# Patient Record
Sex: MALE | Race: WHITE | NOT HISPANIC OR LATINO | Employment: OTHER | URBAN - NONMETROPOLITAN AREA
[De-identification: names, ages, dates, MRNs, and addresses within clinical notes are randomized per-mention and may not be internally consistent; named-entity substitution may affect disease eponyms.]

---

## 2017-04-20 ENCOUNTER — HOSPITAL ENCOUNTER (EMERGENCY)
Facility: HOSPITAL | Age: 52
Discharge: HOME | End: 2017-04-20
Attending: FAMILY MEDICINE | Admitting: FAMILY MEDICINE

## 2017-04-20 ENCOUNTER — APPOINTMENT (OUTPATIENT)
Dept: GENERAL RADIOLOGY | Facility: HOSPITAL | Age: 52
End: 2017-04-20

## 2017-04-20 ENCOUNTER — APPOINTMENT (OUTPATIENT)
Dept: CT IMAGING | Facility: HOSPITAL | Age: 52
End: 2017-04-20

## 2017-04-20 VITALS
BODY MASS INDEX: 36.57 KG/M2 | SYSTOLIC BLOOD PRESSURE: 146 MMHG | DIASTOLIC BLOOD PRESSURE: 64 MMHG | OXYGEN SATURATION: 96 % | RESPIRATION RATE: 18 BRPM | HEART RATE: 86 BPM | TEMPERATURE: 98.4 F | HEIGHT: 72 IN | WEIGHT: 270 LBS

## 2017-04-20 DIAGNOSIS — N39.0 UTI (URINARY TRACT INFECTION), BACTERIAL: ICD-10-CM

## 2017-04-20 DIAGNOSIS — N30.00 ACUTE CYSTITIS WITHOUT HEMATURIA: Primary | ICD-10-CM

## 2017-04-20 DIAGNOSIS — K76.0 STEATOSIS OF LIVER: ICD-10-CM

## 2017-04-20 DIAGNOSIS — A49.9 UTI (URINARY TRACT INFECTION), BACTERIAL: ICD-10-CM

## 2017-04-20 DIAGNOSIS — K80.20 CALCULUS OF GALLBLADDER WITHOUT CHOLECYSTITIS WITHOUT OBSTRUCTION: ICD-10-CM

## 2017-04-20 DIAGNOSIS — N41.9 PROSTATITIS, UNSPECIFIED PROSTATITIS TYPE: ICD-10-CM

## 2017-04-20 LAB
ALBUMIN SERPL-MCNC: 3.7 G/DL (ref 3.5–5)
ALBUMIN/GLOB SERPL: 1.1 G/DL (ref 1.1–2.5)
ALP SERPL-CCNC: 88 U/L (ref 24–120)
ALT SERPL W P-5'-P-CCNC: 49 U/L (ref 0–54)
ANION GAP SERPL CALCULATED.3IONS-SCNC: 14 MMOL/L (ref 4–13)
AST SERPL-CCNC: 52 U/L (ref 7–45)
BACTERIA UR QL AUTO: ABNORMAL /HPF
BASOPHILS # BLD AUTO: 0.04 10*3/MM3 (ref 0–0.2)
BASOPHILS NFR BLD AUTO: 0.2 % (ref 0–2)
BILIRUB SERPL-MCNC: 0.4 MG/DL (ref 0.1–1)
BILIRUB UR QL STRIP: NEGATIVE
BUN BLD-MCNC: 17 MG/DL (ref 5–21)
BUN/CREAT SERPL: 19.8 (ref 7–25)
CALCIUM SPEC-SCNC: 10.6 MG/DL (ref 8.4–10.4)
CHLORIDE SERPL-SCNC: 97 MMOL/L (ref 98–110)
CLARITY UR: CLEAR
CO2 SERPL-SCNC: 27 MMOL/L (ref 24–31)
COLOR UR: YELLOW
CREAT BLD-MCNC: 0.86 MG/DL (ref 0.5–1.4)
DEPRECATED RDW RBC AUTO: 41.5 FL (ref 40–54)
EOSINOPHIL # BLD AUTO: 0.14 10*3/MM3 (ref 0–0.7)
EOSINOPHIL NFR BLD AUTO: 0.8 % (ref 0–4)
ERYTHROCYTE [DISTWIDTH] IN BLOOD BY AUTOMATED COUNT: 12.6 % (ref 12–15)
GFR SERPL CREATININE-BSD FRML MDRD: 95 ML/MIN/1.73
GLOBULIN UR ELPH-MCNC: 3.5 GM/DL
GLUCOSE BLD-MCNC: 318 MG/DL (ref 70–100)
GLUCOSE UR STRIP-MCNC: ABNORMAL MG/DL
HCT VFR BLD AUTO: 36.1 % (ref 40–52)
HGB BLD-MCNC: 12.7 G/DL (ref 14–18)
HGB UR QL STRIP.AUTO: NEGATIVE
HOLD SPECIMEN: NORMAL
HOLD SPECIMEN: NORMAL
HYALINE CASTS UR QL AUTO: ABNORMAL /LPF
IMM GRANULOCYTES # BLD: 0.15 10*3/MM3 (ref 0–0.03)
IMM GRANULOCYTES NFR BLD: 0.9 % (ref 0–5)
KETONES UR QL STRIP: NEGATIVE
LEUKOCYTE ESTERASE UR QL STRIP.AUTO: ABNORMAL
LIPASE SERPL-CCNC: 129 U/L (ref 23–203)
LYMPHOCYTES # BLD AUTO: 2.79 10*3/MM3 (ref 0.72–4.86)
LYMPHOCYTES NFR BLD AUTO: 16.1 % (ref 15–45)
MCH RBC QN AUTO: 32.2 PG (ref 28–32)
MCHC RBC AUTO-ENTMCNC: 35.2 G/DL (ref 33–36)
MCV RBC AUTO: 91.4 FL (ref 82–95)
MONOCYTES # BLD AUTO: 1.67 10*3/MM3 (ref 0.19–1.3)
MONOCYTES NFR BLD AUTO: 9.6 % (ref 4–12)
NEUTROPHILS # BLD AUTO: 12.57 10*3/MM3 (ref 1.87–8.4)
NEUTROPHILS NFR BLD AUTO: 72.4 % (ref 39–78)
NITRITE UR QL STRIP: NEGATIVE
PH UR STRIP.AUTO: 6 [PH] (ref 5–8)
PLATELET # BLD AUTO: 248 10*3/MM3 (ref 130–400)
PMV BLD AUTO: 11.9 FL (ref 6–12)
POTASSIUM BLD-SCNC: 4.3 MMOL/L (ref 3.5–5.3)
PROT SERPL-MCNC: 7.2 G/DL (ref 6.3–8.7)
PROT UR QL STRIP: NEGATIVE
RBC # BLD AUTO: 3.95 10*6/MM3 (ref 4.8–5.9)
RBC # UR: ABNORMAL /HPF
REF LAB TEST METHOD: ABNORMAL
SODIUM BLD-SCNC: 138 MMOL/L (ref 135–145)
SP GR UR STRIP: 1.03 (ref 1–1.03)
SQUAMOUS #/AREA URNS HPF: ABNORMAL /HPF
UROBILINOGEN UR QL STRIP: ABNORMAL
WBC NRBC COR # BLD: 17.36 10*3/MM3 (ref 4.8–10.8)
WBC UR QL AUTO: ABNORMAL /HPF
WHOLE BLOOD HOLD SPECIMEN: NORMAL
WHOLE BLOOD HOLD SPECIMEN: NORMAL

## 2017-04-20 PROCEDURE — 0 IOPAMIDOL PER 1 ML: Performed by: FAMILY MEDICINE

## 2017-04-20 PROCEDURE — 96365 THER/PROPH/DIAG IV INF INIT: CPT

## 2017-04-20 PROCEDURE — 87077 CULTURE AEROBIC IDENTIFY: CPT | Performed by: FAMILY MEDICINE

## 2017-04-20 PROCEDURE — 25010000002 ONDANSETRON PER 1 MG: Performed by: FAMILY MEDICINE

## 2017-04-20 PROCEDURE — 99284 EMERGENCY DEPT VISIT MOD MDM: CPT

## 2017-04-20 PROCEDURE — 74177 CT ABD & PELVIS W/CONTRAST: CPT

## 2017-04-20 PROCEDURE — 87186 SC STD MICRODIL/AGAR DIL: CPT | Performed by: FAMILY MEDICINE

## 2017-04-20 PROCEDURE — 25010000002 MORPHINE PER 10 MG: Performed by: FAMILY MEDICINE

## 2017-04-20 PROCEDURE — 25010000002 CEFTRIAXONE: Performed by: FAMILY MEDICINE

## 2017-04-20 PROCEDURE — 81001 URINALYSIS AUTO W/SCOPE: CPT | Performed by: FAMILY MEDICINE

## 2017-04-20 PROCEDURE — 83690 ASSAY OF LIPASE: CPT | Performed by: FAMILY MEDICINE

## 2017-04-20 PROCEDURE — 80053 COMPREHEN METABOLIC PANEL: CPT | Performed by: FAMILY MEDICINE

## 2017-04-20 PROCEDURE — 87086 URINE CULTURE/COLONY COUNT: CPT | Performed by: FAMILY MEDICINE

## 2017-04-20 PROCEDURE — 36415 COLL VENOUS BLD VENIPUNCTURE: CPT | Performed by: FAMILY MEDICINE

## 2017-04-20 PROCEDURE — 96375 TX/PRO/DX INJ NEW DRUG ADDON: CPT

## 2017-04-20 PROCEDURE — 85025 COMPLETE CBC W/AUTO DIFF WBC: CPT | Performed by: FAMILY MEDICINE

## 2017-04-20 RX ORDER — SODIUM CHLORIDE 0.9 % (FLUSH) 0.9 %
10 SYRINGE (ML) INJECTION AS NEEDED
Status: DISCONTINUED | OUTPATIENT
Start: 2017-04-20 | End: 2017-04-21 | Stop reason: HOSPADM

## 2017-04-20 RX ORDER — SODIUM CHLORIDE 9 MG/ML
500 INJECTION, SOLUTION INTRAVENOUS ONCE
Status: COMPLETED | OUTPATIENT
Start: 2017-04-20 | End: 2017-04-20

## 2017-04-20 RX ORDER — SODIUM CHLORIDE 9 MG/ML
1000 INJECTION, SOLUTION INTRAVENOUS ONCE
Status: COMPLETED | OUTPATIENT
Start: 2017-04-20 | End: 2017-04-20

## 2017-04-20 RX ORDER — MORPHINE SULFATE 4 MG/ML
4 INJECTION, SOLUTION INTRAMUSCULAR; INTRAVENOUS ONCE
Status: COMPLETED | OUTPATIENT
Start: 2017-04-20 | End: 2017-04-20

## 2017-04-20 RX ORDER — CIPROFLOXACIN 500 MG/1
500 TABLET, FILM COATED ORAL 2 TIMES DAILY
Qty: 56 TABLET | Refills: 0 | Status: SHIPPED | OUTPATIENT
Start: 2017-04-20 | End: 2017-05-18

## 2017-04-20 RX ORDER — ONDANSETRON 2 MG/ML
4 INJECTION INTRAMUSCULAR; INTRAVENOUS ONCE
Status: COMPLETED | OUTPATIENT
Start: 2017-04-20 | End: 2017-04-20

## 2017-04-20 RX ADMIN — SODIUM CHLORIDE 500 ML: 0.9 INJECTION, SOLUTION INTRAVENOUS at 19:55

## 2017-04-20 RX ADMIN — ONDANSETRON 4 MG: 2 INJECTION INTRAMUSCULAR; INTRAVENOUS at 19:17

## 2017-04-20 RX ADMIN — CEFTRIAXONE 1 G: 1 INJECTION, POWDER, FOR SOLUTION INTRAMUSCULAR; INTRAVENOUS at 19:20

## 2017-04-20 RX ADMIN — MORPHINE SULFATE 4 MG: 4 INJECTION, SOLUTION INTRAMUSCULAR; INTRAVENOUS at 19:17

## 2017-04-20 RX ADMIN — IOPAMIDOL 100 ML: 755 INJECTION, SOLUTION INTRAVENOUS at 21:10

## 2017-04-20 RX ADMIN — SODIUM CHLORIDE 1000 ML: 9 INJECTION, SOLUTION INTRAVENOUS at 19:21

## 2017-04-21 LAB — BACTERIA SPEC AEROBE CULT: ABNORMAL

## 2017-04-24 ENCOUNTER — TELEPHONE (OUTPATIENT)
Dept: EMERGENCY DEPT | Facility: HOSPITAL | Age: 52
End: 2017-04-24

## 2017-04-24 NOTE — TELEPHONE ENCOUNTER
----- Message from SOBIA Barnett sent at 4/24/2017 11:35 AM CDT -----  Stop cipro, change to Keflex 500mg PO BID x 7 days. Thanks

## 2017-04-25 ENCOUNTER — TELEPHONE (OUTPATIENT)
Dept: EMERGENCY DEPT | Facility: HOSPITAL | Age: 52
End: 2017-04-25

## 2017-04-25 NOTE — TELEPHONE ENCOUNTER
----- Message from Mervin Epps MD sent at 4/25/2017  7:30 AM CDT -----  There is a possibility of 1 st generation resistance so start omnicef 300 mg po bid x 10 days and dc the rest

## 2017-04-27 ENCOUNTER — TELEPHONE (OUTPATIENT)
Dept: EMERGENCY DEPT | Facility: HOSPITAL | Age: 52
End: 2017-04-27

## 2017-07-17 ENCOUNTER — HOSPITAL ENCOUNTER (EMERGENCY)
Facility: HOSPITAL | Age: 52
Discharge: HOME | End: 2017-07-17
Attending: EMERGENCY MEDICINE | Admitting: EMERGENCY MEDICINE

## 2017-07-17 VITALS
TEMPERATURE: 97.8 F | SYSTOLIC BLOOD PRESSURE: 107 MMHG | DIASTOLIC BLOOD PRESSURE: 68 MMHG | HEART RATE: 80 BPM | BODY MASS INDEX: 33.18 KG/M2 | HEIGHT: 72 IN | WEIGHT: 245 LBS | RESPIRATION RATE: 18 BRPM | OXYGEN SATURATION: 95 %

## 2017-07-17 DIAGNOSIS — R51.9 CHRONIC NONINTRACTABLE HEADACHE, UNSPECIFIED HEADACHE TYPE: Primary | ICD-10-CM

## 2017-07-17 DIAGNOSIS — G89.29 CHRONIC NONINTRACTABLE HEADACHE, UNSPECIFIED HEADACHE TYPE: Primary | ICD-10-CM

## 2017-07-17 PROCEDURE — 25010000002 KETOROLAC TROMETHAMINE PER 15 MG: Performed by: EMERGENCY MEDICINE

## 2017-07-17 PROCEDURE — 25010000002 PROMETHAZINE PER 50 MG: Performed by: EMERGENCY MEDICINE

## 2017-07-17 PROCEDURE — 99283 EMERGENCY DEPT VISIT LOW MDM: CPT

## 2017-07-17 PROCEDURE — 96372 THER/PROPH/DIAG INJ SC/IM: CPT

## 2017-07-17 RX ORDER — KETOROLAC TROMETHAMINE 30 MG/ML
60 INJECTION, SOLUTION INTRAMUSCULAR; INTRAVENOUS ONCE
Status: COMPLETED | OUTPATIENT
Start: 2017-07-17 | End: 2017-07-17

## 2017-07-17 RX ORDER — PROMETHAZINE HYDROCHLORIDE 25 MG/ML
25 INJECTION, SOLUTION INTRAMUSCULAR; INTRAVENOUS ONCE
Status: COMPLETED | OUTPATIENT
Start: 2017-07-17 | End: 2017-07-17

## 2017-07-17 RX ADMIN — PROMETHAZINE HYDROCHLORIDE 25 MG: 25 INJECTION INTRAMUSCULAR; INTRAVENOUS at 10:59

## 2017-07-17 RX ADMIN — KETOROLAC TROMETHAMINE 60 MG: 30 INJECTION, SOLUTION INTRAMUSCULAR at 10:59

## 2017-07-17 NOTE — ED PROVIDER NOTES
Subjective   Patient is a 51 y.o. male presenting with migraines.   Migraine   Pain location:  Generalized  Quality:  Dull  Radiates to:  Does not radiate  Severity currently:  9/10  Severity at highest:  9/10  Onset quality:  Gradual  Timing:  Constant  Progression:  Worsening  Chronicity:  Chronic  Context: not activity, not exposure to bright light, not caffeine, not coughing, not defecating, not eating, not stress, not exposure to cold air, not intercourse, not loud noise and not straining    Relieved by:  Nothing  Worsened by:  Nothing  Ineffective treatments:  None tried  Associated symptoms: no abdominal pain, no back pain, no blurred vision, no congestion, no cough, no diarrhea, no dizziness, no drainage, no eye pain, no facial pain, no fatigue, no fever, no hearing loss, no loss of balance, no myalgias, no nausea, no near-syncope, no neck pain, no neck stiffness, no numbness, no photophobia, no seizures, no sinus pressure, no swollen glands, no syncope, no visual change, no vomiting and no weakness    Risk factors: no anger, no family hx of SAH and does not have insomnia        Review of Systems   Constitutional: Negative.  Negative for chills, fatigue and fever.   HENT: Negative.  Negative for congestion, hearing loss, postnasal drip and sinus pressure.    Eyes: Negative for blurred vision, photophobia and pain.   Respiratory: Negative.  Negative for cough, chest tightness and stridor.    Cardiovascular: Negative.  Negative for chest pain, syncope and near-syncope.   Gastrointestinal: Negative.  Negative for abdominal distention, abdominal pain, diarrhea, nausea and vomiting.   Endocrine: Negative.    Genitourinary: Negative.  Negative for difficulty urinating and flank pain.   Musculoskeletal: Negative.  Negative for back pain, myalgias, neck pain and neck stiffness.   Skin: Negative.  Negative for color change.   Neurological: Negative.  Negative for dizziness, seizures, weakness, numbness, headaches and  loss of balance.   All other systems reviewed and are negative.      Past Medical History:   Diagnosis Date   • Allergic rhinitis    • Anxiety    • Bursitis    • DDD (degenerative disc disease), cervical    • Depression    • HIV disease    • HLD (hyperlipidemia)    • HTN (hypertension)    • Osteoporosis    • Type 2 diabetes mellitus        Allergies   Allergen Reactions   • Amitriptyline    • Darvon [Propoxyphene]    • Zithromax [Azithromycin]        Past Surgical History:   Procedure Laterality Date   • ANKLE SURGERY     • FOREARM SURGERY     • LYMPH NODE BIOPSY         Family History   Problem Relation Age of Onset   • Diabetes Mother    • Hypertension Mother    • Thyroid disease Mother    • Hypertension Father    • Thyroid disease Father    • Diabetes Maternal Grandfather    • Heart disease Maternal Grandfather    • Hypertension Maternal Grandfather    • Diabetes Paternal Grandmother    • Stroke Paternal Grandmother    • Heart disease Paternal Grandmother    • Hypertension Paternal Grandmother    • Thyroid disease Paternal Grandmother    • Hypertension Paternal Grandfather        Social History     Social History   • Marital status:      Spouse name: N/A   • Number of children: N/A   • Years of education: N/A     Social History Main Topics   • Smoking status: Current Every Day Smoker     Packs/day: 1.00     Types: Cigarettes   • Smokeless tobacco: Never Used   • Alcohol use No   • Drug use: Defer   • Sexual activity: Not Asked     Other Topics Concern   • None     Social History Narrative           Objective   Physical Exam   Constitutional: He is oriented to person, place, and time. He appears well-developed and well-nourished.   HENT:   Head: Normocephalic and atraumatic.   Eyes: Conjunctivae and EOM are normal. Pupils are equal, round, and reactive to light.   Neck: Normal range of motion. Neck supple.   Cardiovascular: Normal rate, regular rhythm, normal heart sounds and intact distal pulses.     Pulmonary/Chest: Effort normal and breath sounds normal.   Abdominal: Soft. Bowel sounds are normal.   Musculoskeletal: Normal range of motion.   Neurological: He is alert and oriented to person, place, and time. He has normal reflexes.   Skin: Skin is warm and dry.   Psychiatric: He has a normal mood and affect.   Nursing note and vitals reviewed.      Procedures         ED Course  ED Course   Comment By Time   Does not want ct or lp or tyler wants toradol and dc Mervin Epps MD 07/17 6779                  [facility]    Final diagnoses:   Chronic nonintractable headache, unspecified headache type            Mervin Epps MD  07/18/17 6517

## 2017-07-24 ENCOUNTER — TRANSCRIBE ORDERS (OUTPATIENT)
Dept: ADMINISTRATIVE | Facility: HOSPITAL | Age: 52
End: 2017-07-24

## 2017-07-24 ENCOUNTER — APPOINTMENT (OUTPATIENT)
Dept: LAB | Facility: HOSPITAL | Age: 52
End: 2017-07-24

## 2017-07-24 DIAGNOSIS — Z79.899 POLYPHARMACY: Primary | ICD-10-CM

## 2017-07-24 LAB
AMPHET+METHAMPHET UR QL: NEGATIVE
BARBITURATES UR QL SCN: NEGATIVE
BENZODIAZ UR QL SCN: NEGATIVE
CANNABINOIDS SERPL QL: NEGATIVE
COCAINE UR QL: NEGATIVE
METHADONE UR QL SCN: NEGATIVE
OPIATES UR QL: NEGATIVE
PCP UR QL SCN: NEGATIVE

## 2017-07-24 PROCEDURE — 80307 DRUG TEST PRSMV CHEM ANLYZR: CPT | Performed by: PSYCHIATRY & NEUROLOGY

## 2017-10-04 RX ORDER — M-VIT,TX,IRON,MINS/CALC/FOLIC 27MG-0.4MG
1 TABLET ORAL DAILY
COMMUNITY
End: 2019-10-01 | Stop reason: SDUPTHER

## 2017-10-04 RX ORDER — ESOMEPRAZOLE MAGNESIUM 40 MG/1
40 CAPSULE, DELAYED RELEASE ORAL
COMMUNITY
End: 2018-11-08 | Stop reason: SDUPTHER

## 2017-10-04 RX ORDER — LOPERAMIDE HYDROCHLORIDE 2 MG/1
2 CAPSULE ORAL 4 TIMES DAILY PRN
COMMUNITY
End: 2020-02-10 | Stop reason: CLARIF

## 2017-10-04 RX ORDER — LOSARTAN POTASSIUM 100 MG/1
100 TABLET ORAL DAILY
COMMUNITY
End: 2018-01-08 | Stop reason: SDUPTHER

## 2017-10-04 RX ORDER — PREGABALIN 75 MG/1
75 CAPSULE ORAL 2 TIMES DAILY
COMMUNITY
End: 2022-06-09

## 2017-10-04 RX ORDER — TRAZODONE HYDROCHLORIDE 150 MG/1
150 TABLET ORAL NIGHTLY
COMMUNITY
End: 2018-08-15 | Stop reason: SDUPTHER

## 2017-10-04 RX ORDER — TIZANIDINE 2 MG/1
2 TABLET ORAL 4 TIMES DAILY
COMMUNITY
End: 2018-06-21 | Stop reason: SDUPTHER

## 2017-10-04 RX ORDER — LORAZEPAM 1 MG/1
1 TABLET ORAL 2 TIMES DAILY PRN
COMMUNITY
End: 2017-10-06

## 2017-10-04 RX ORDER — IBUPROFEN 800 MG/1
800 TABLET ORAL 3 TIMES DAILY
COMMUNITY
End: 2017-10-06 | Stop reason: ALTCHOICE

## 2017-10-04 RX ORDER — HYDROCODONE BITARTRATE AND ACETAMINOPHEN 5; 325 MG/1; MG/1
1 TABLET ORAL 2 TIMES DAILY PRN
COMMUNITY
End: 2022-01-11 | Stop reason: SDUPTHER

## 2017-10-04 RX ORDER — PAROXETINE HYDROCHLORIDE 40 MG/1
20 TABLET, FILM COATED ORAL EVERY MORNING
COMMUNITY
End: 2018-01-08 | Stop reason: ALTCHOICE

## 2017-10-04 RX ORDER — PROPRANOLOL HCL 60 MG
60 CAPSULE, EXTENDED RELEASE 24HR ORAL DAILY
COMMUNITY
End: 2018-01-08 | Stop reason: SDUPTHER

## 2017-10-04 RX ORDER — GABAPENTIN 600 MG/1
1200 TABLET ORAL 2 TIMES DAILY
COMMUNITY
End: 2018-01-08 | Stop reason: ALTCHOICE

## 2017-10-04 RX ORDER — RIZATRIPTAN BENZOATE 10 MG/1
10 TABLET ORAL
COMMUNITY
End: 2020-03-11 | Stop reason: SDUPTHER

## 2017-10-04 RX ORDER — ATORVASTATIN CALCIUM 40 MG/1
40 TABLET, FILM COATED ORAL DAILY
COMMUNITY
End: 2018-04-10 | Stop reason: SDUPTHER

## 2017-10-04 RX ORDER — HYDROXYZINE PAMOATE 50 MG/1
50 CAPSULE ORAL 3 TIMES DAILY PRN
COMMUNITY
End: 2018-08-31

## 2017-10-06 ENCOUNTER — TELEPHONE (OUTPATIENT)
Dept: INTERNAL MEDICINE | Age: 52
End: 2017-10-06

## 2017-10-06 ENCOUNTER — OFFICE VISIT (OUTPATIENT)
Dept: INTERNAL MEDICINE | Age: 52
End: 2017-10-06
Payer: MEDICARE

## 2017-10-06 VITALS
DIASTOLIC BLOOD PRESSURE: 64 MMHG | BODY MASS INDEX: 35.62 KG/M2 | OXYGEN SATURATION: 97 % | HEIGHT: 72 IN | WEIGHT: 263 LBS | SYSTOLIC BLOOD PRESSURE: 122 MMHG | HEART RATE: 86 BPM

## 2017-10-06 DIAGNOSIS — N39.44 BED WETTING: ICD-10-CM

## 2017-10-06 DIAGNOSIS — Z12.5 SCREENING FOR PROSTATE CANCER: ICD-10-CM

## 2017-10-06 DIAGNOSIS — R07.9 CHEST PAIN AT REST: ICD-10-CM

## 2017-10-06 DIAGNOSIS — Z79.4 TYPE 2 DIABETES MELLITUS WITHOUT COMPLICATION, WITH LONG-TERM CURRENT USE OF INSULIN (HCC): Primary | ICD-10-CM

## 2017-10-06 DIAGNOSIS — I20.8 ANGINA AT REST (HCC): ICD-10-CM

## 2017-10-06 DIAGNOSIS — E11.9 TYPE 2 DIABETES MELLITUS WITHOUT COMPLICATION, WITH LONG-TERM CURRENT USE OF INSULIN (HCC): Primary | ICD-10-CM

## 2017-10-06 DIAGNOSIS — E78.00 PURE HYPERCHOLESTEROLEMIA: ICD-10-CM

## 2017-10-06 DIAGNOSIS — I10 ESSENTIAL HYPERTENSION: ICD-10-CM

## 2017-10-06 PROBLEM — I20.89 ANGINA AT REST: Status: ACTIVE | Noted: 2017-10-06

## 2017-10-06 LAB
BACTERIA: NEGATIVE /HPF
BILIRUBIN URINE: NEGATIVE
BLOOD, URINE: NEGATIVE
CLARITY: CLEAR
COLOR: YELLOW
EPITHELIAL CELLS, UA: 3 /HPF (ref 0–5)
GLUCOSE URINE: >=1000 MG/DL
HYALINE CASTS: 0 /HPF (ref 0–8)
KETONES, URINE: NEGATIVE MG/DL
LEUKOCYTE ESTERASE, URINE: NEGATIVE
NITRITE, URINE: NEGATIVE
PH UA: 6
PROSTATE SPECIFIC ANTIGEN: 0.24 NG/ML (ref 0–4)
PROTEIN UA: NEGATIVE MG/DL
RBC UA: 0 /HPF (ref 0–4)
SPECIFIC GRAVITY UA: 1.03
UROBILINOGEN, URINE: 0.2 E.U./DL
WBC UA: 3 /HPF (ref 0–5)

## 2017-10-06 PROCEDURE — G8484 FLU IMMUNIZE NO ADMIN: HCPCS | Performed by: INTERNAL MEDICINE

## 2017-10-06 PROCEDURE — G8417 CALC BMI ABV UP PARAM F/U: HCPCS | Performed by: INTERNAL MEDICINE

## 2017-10-06 PROCEDURE — 3046F HEMOGLOBIN A1C LEVEL >9.0%: CPT | Performed by: INTERNAL MEDICINE

## 2017-10-06 PROCEDURE — 4004F PT TOBACCO SCREEN RCVD TLK: CPT | Performed by: INTERNAL MEDICINE

## 2017-10-06 PROCEDURE — G8599 NO ASA/ANTIPLAT THER USE RNG: HCPCS | Performed by: INTERNAL MEDICINE

## 2017-10-06 PROCEDURE — G8427 DOCREV CUR MEDS BY ELIG CLIN: HCPCS | Performed by: INTERNAL MEDICINE

## 2017-10-06 PROCEDURE — 99204 OFFICE O/P NEW MOD 45 MIN: CPT | Performed by: INTERNAL MEDICINE

## 2017-10-06 RX ORDER — NYSTATIN 100000 [USP'U]/G
POWDER TOPICAL
Refills: 0 | COMMUNITY
Start: 2017-09-21 | End: 2020-07-31 | Stop reason: ALTCHOICE

## 2017-10-06 RX ORDER — BACITRACIN, NEOMYCIN, POLYMYXIN B 400; 3.5; 5 [USP'U]/G; MG/G; [USP'U]/G
OINTMENT TOPICAL
COMMUNITY
End: 2017-10-06

## 2017-10-06 RX ORDER — MECLIZINE HYDROCHLORIDE 25 MG/1
TABLET ORAL
Refills: 2 | COMMUNITY
Start: 2017-09-21 | End: 2018-01-08 | Stop reason: SDUPTHER

## 2017-10-06 RX ORDER — CLONAZEPAM 0.5 MG/1
0.5 TABLET ORAL 2 TIMES DAILY PRN
Qty: 60 TABLET | Refills: 5
Start: 2017-10-06 | End: 2022-09-27 | Stop reason: ALTCHOICE

## 2017-10-06 RX ORDER — PEN NEEDLE, DIABETIC
NEEDLE, DISPOSABLE MISCELLANEOUS
Refills: 6 | COMMUNITY
Start: 2017-07-24 | End: 2020-02-25

## 2017-10-06 RX ORDER — PHENOL 1.4 %
600 AEROSOL, SPRAY (ML) MUCOUS MEMBRANE
COMMUNITY
End: 2021-02-05 | Stop reason: ALTCHOICE

## 2017-10-06 RX ORDER — FLUTICASONE PROPIONATE 50 MCG
2 SPRAY, SUSPENSION (ML) NASAL
COMMUNITY
Start: 2016-10-11 | End: 2017-10-06

## 2017-10-06 RX ORDER — PROMETHAZINE HYDROCHLORIDE 25 MG/1
25 TABLET ORAL
COMMUNITY
End: 2017-10-06

## 2017-10-06 RX ORDER — CLONAZEPAM 0.5 MG/1
0.5 TABLET ORAL
COMMUNITY
End: 2017-10-06 | Stop reason: SDUPTHER

## 2017-10-06 RX ORDER — ACETAMINOPHEN 160 MG
TABLET,DISINTEGRATING ORAL
Refills: 5 | COMMUNITY
Start: 2017-09-21 | End: 2020-02-26

## 2017-10-06 RX ORDER — IBUPROFEN 600 MG/1
TABLET ORAL
Refills: 2 | COMMUNITY
Start: 2017-09-21 | End: 2020-07-31 | Stop reason: SDUPTHER

## 2017-10-06 RX ORDER — MELATONIN
1000
COMMUNITY
End: 2017-10-06

## 2017-10-06 RX ORDER — LORATADINE 10 MG/1
TABLET ORAL
Refills: 2 | COMMUNITY
Start: 2017-09-21 | End: 2020-02-26

## 2017-10-06 RX ORDER — VARENICLINE TARTRATE 1 MG/1
1 TABLET, FILM COATED ORAL 2 TIMES DAILY
Qty: 60 TABLET | Refills: 3
Start: 2017-10-06 | End: 2019-06-17

## 2017-10-06 ASSESSMENT — PATIENT HEALTH QUESTIONNAIRE - PHQ9
SUM OF ALL RESPONSES TO PHQ QUESTIONS 1-9: 1
SUM OF ALL RESPONSES TO PHQ9 QUESTIONS 1 & 2: 1
2. FEELING DOWN, DEPRESSED OR HOPELESS: 1
1. LITTLE INTEREST OR PLEASURE IN DOING THINGS: 0

## 2017-10-06 NOTE — MR AVS SNAPSHOT
After Visit Summary             Brenda Roger   10/6/2017 8:00 AM   Office Visit    Description:  Male : 1965   Provider:  Jacob Mckenna MD   Department:  [facility]              Your Follow-Up and Future Appointments         Below is a list of your follow-up and future appointments. This may not be a complete list as you may have made appointments directly with providers that we are not aware of or your providers may have made some for you. Please call your providers to confirm appointments. It is important to keep your appointments. Please bring your current insurance card, photo ID, co-pay, and all medication bottles to your appointment. If self-pay, payment is expected at the time of service. Your To-Do List     Future Appointments Provider Department Dept Phone    2018 12:30 PM Jacob Mckenna MD [facility] [phone]    Please arrive 15 minutes prior to appointment time, bring insurance card and photo ID. Future Orders Complete By Expires    EKG 12 Lead [EKG1 Custom]  10/6/2017 10/6/2018    Psa screening [TZQ3006 Custom]  10/6/2017 10/6/2018    Urinalysis with Microscopic [ILI406 Custom]  10/6/2017 10/6/2018    NM Myocardial Spect Rest Multi [61290 Custom]  10/12/2017 10/6/2018    CBC [WVA552 Custom]  2018 10/6/2018    Comprehensive Metabolic Panel [VTZ81 Custom]  2018 10/6/2018    Hemoglobin A1C [LAB90 Custom]  2018 10/6/2018    Lipid Panel [LAB18 Custom]  2018 10/6/2018    Microalbumin / Creatinine Urine Ratio [GRA109 Custom]  2018 10/6/2018    TSH without Reflex [QSY378 Custom]  2018 10/6/2018    Follow-Up    Return in about 3 months (around 2018).          Information from Your Visit        Department     Name Address Phone Fax    [facility] [address] [phone]      You Were Seen for:         Comments    Screening for prostate cancer   [778147] TIMES DAILY    nystatin (MYCOSTATIN) 926844 UNIT/GM powder APPLY 1 GRAM ON THE SKIN 3 TIMES DAILY AS NEEDED    NASONEX 50 MCG/ACT nasal spray Use 1 spray in each nostril TWICE DAILY . ...(SHAKE WELL)    Cholecalciferol (VITAMIN D3) 2000 units CAPS TAKE 1 CAPSULE BY MOUTH DAILY    clonazePAM (KLONOPIN) 0.5 MG tablet Take 1 tablet by mouth 2 times daily as needed for Anxiety    varenicline (CHANTIX CONTINUING MONTH AMAYA) 1 MG tablet Take 1 tablet by mouth 2 times daily    insulin detemir (LEVEMIR FLEXTOUCH) 100 UNIT/ML injection pen Inject 130 Units into the skin nightly    gabapentin (NEURONTIN) 600 MG tablet Take 1,200 mg by mouth 2 times daily    Fqnaflc-Kpgwj-Sfdpfhoo-TenofAF (GENVOYA) [phone] MG TABS Take 1 tablet by mouth daily    HYDROcodone-acetaminophen (NORCO) 5-325 MG per tablet Take 1 tablet by mouth 2 times daily as needed for Pain . atorvastatin (LIPITOR) 40 MG tablet Take 40 mg by mouth daily    loperamide (IMODIUM) 2 MG capsule Take 2 mg by mouth 4 times daily as needed for Diarrhea    losartan (COZAAR) 100 MG tablet Take 100 mg by mouth daily 80 mg    pregabalin (LYRICA) 75 MG capsule Take 75 mg by mouth 2 times daily    rizatriptan (MAXALT) 10 MG tablet Take 10 mg by mouth once as needed for Migraine May repeat in 2 hours if needed    metFORMIN (GLUCOPHAGE) 500 MG tablet Take 1,000 mg by mouth 2 times daily (with meals)    Multiple Vitamins-Minerals (THERAPEUTIC MULTIVITAMIN-MINERALS) tablet Take 1 tablet by mouth daily    esomeprazole (NEXIUM) 40 MG delayed release capsule Take 40 mg by mouth every morning (before breakfast)    PARoxetine (PAXIL) 40 MG tablet Take 20 mg by mouth every morning Indications:  Take 1.5 tabs a day = 60mg  takes care of this medication    propranolol (INDERAL LA) 60 MG extended release capsule Take 60 mg by mouth daily    traZODone (DESYREL) 150 MG tablet Take 150 mg by mouth nightly hydrOXYzine (VISTARIL) 50 MG capsule Take 50 mg by mouth 3 times daily as needed for Anxiety    tiZANidine (ZANAFLEX) 2 MG tablet Take 2 mg by mouth 4 times daily     insulin aspart (NOVOLOG FLEXPEN) 100 UNIT/ML injection pen Inject into the skin 3 times daily (before meals) Indications: 70 untis with reg. meals and 80 units with large meals 60 units with snacks       Allergies              Darvon [Propoxyphene]     Elavil [Amitriptyline Hcl]     Zithromax [Azithromycin]          Additional Information        Basic Information     Date Of Birth Sex Race Ethnicity Preferred Language    1965 Male White Unavailable/Unknown English      Problem List as of 10/6/2017                 Type 2 diabetes mellitus without complication (HCC)    Hypertension    Hyperlipidemia    Bed wetting    Angina at rest [facility])    Chest pain at rest      Preventive Care        Date Due    HIV screening is recommended for all people regardless of risk factors  aged 15-65 years at least once (lifetime) who have never been HIV tested. 12/31/1982    Tetanus Combination Vaccine (1 - Tdap) 12/31/1986    Cholesterol Screening 12/31/2007    Diabetes Screening 12/31/2007    Yearly Flu Vaccine (1) 9/1/2017            MyChart Signup           Our records indicate that you have declined MyChart signup.

## 2017-10-06 NOTE — PROGRESS NOTES
Chief Complaint   Patient presents with    Established New Doctor    Diabetes     Uncontrolled needs new machine uses Lifestlye lite and talk about insulin    Sore     Pt noticed a sore on his left arm. Started off 2 months ago a zit and recently popped and left a sore        HPI:  Pt sees Dr Jennifer Heaton, Dr Emilio Pereira, Dr Javier Olivas. He did see Dr. Gus Ramsey for diabetes he sees Dr. Jennifer Heaton for HIV primary care. He is 54 he has diabetes controlled HIV infection hypertension and hyperlipidemia. Blood sugars running too high. He has some fatigue he has chronic back pain because to pain management mention several medical problems he's had some bedwetting which is new for him recently is had a little bit of erectile dysfunction recently he has had some chest pain recently and is at high risk for coronary artery disease he has fatigue back pain arthralgias we spent a good deal of time reviewing his medical care and history. Past Medical History:   Diagnosis Date    Controlled diabetes mellitus with diabetic polyneuropathy (HCC)     Depression     HIV-[facility] infection, symptomatic ([facility] [address]     Hyperlipidemia     Hypertension     Male hypogonadism     Osteopenia     Type 2 diabetes mellitus without complication ([facility] [address]        Past Surgical History:   Procedure Laterality Date    LYMPHADENECTOMY Left     thoracic       Family History   Problem Relation Age of Onset    High Blood Pressure Mother     Diabetes Mother     High Blood Pressure Father     Diabetes Maternal Grandfather        Social History     Social History    Marital status:      Spouse name: N/A    Number of children: N/A    Years of education: N/A     Occupational History    Not on file.      Social History Main Topics    Smoking status: Current Every Day Smoker    Smokeless tobacco: Never Used    Alcohol use Yes    Drug use: No    Sexual activity: Not on file     Other Topics Concern    Not on file     Social History Narrative    No narrative on daily (with meals)      Multiple Vitamins-Minerals (THERAPEUTIC MULTIVITAMIN-MINERALS) tablet Take 1 tablet by mouth daily      esomeprazole (NEXIUM) 40 MG delayed release capsule Take 40 mg by mouth every morning (before breakfast)      PARoxetine (PAXIL) 40 MG tablet Take 20 mg by mouth every morning Indications: Take 1.5 tabs a day = 60mg  takes care of this medication      propranolol (INDERAL LA) 60 MG extended release capsule Take 60 mg by mouth daily      traZODone (DESYREL) 150 MG tablet Take 150 mg by mouth nightly      hydrOXYzine (VISTARIL) 50 MG capsule Take 50 mg by mouth 3 times daily as needed for Anxiety      tiZANidine (ZANAFLEX) 2 MG tablet Take 2 mg by mouth 4 times daily       insulin aspart (NOVOLOG FLEXPEN) 100 UNIT/ML injection pen As directed 5 Pen 5    insulin detemir (LEVEMIR FLEXTOUCH) 100 UNIT/ML injection pen Inject 130 Units into the skin nightly 5 Pen 5     No current facility-administered medications for this visit. Review of Systems   Constitutional: Positive for fatigue. Negative for chills and fever. HENT: Negative for congestion and sinus pressure. Eyes: Negative for discharge and redness. Respiratory: Positive for shortness of breath. Negative for cough. Cardiovascular: Positive for chest pain and leg swelling. Negative for palpitations. Gastrointestinal: Negative for abdominal distention and abdominal pain. Endocrine: Positive for polyuria. Genitourinary: Positive for enuresis. Negative for dysuria, frequency and urgency. Erectile dysfunction   Musculoskeletal: Positive for arthralgias. Skin: Positive for wound. Negative for rash. Allergic/Immunologic: Positive for environmental allergies and immunocompromised state. Neurological: Negative for dizziness, light-headedness and headaches. Hematological: Negative for adenopathy. Does not bruise/bleed easily.    Psychiatric/Behavioral: Negative for dysphoric mood and sleep UNIT/ML injection pen  Diabetes not in good control he needs to check his blood sugar follow with diabetic diet were going to work with his insulin he has not been taking it recently. Follow up closely. We renewed his insulin strips etc.    2. Essential hypertension  Stable    3. Pure hypercholesterolemia  Lipid Panel   4. Bed wetting  Psa screening    Urinalysis with Microscopic    May be overmedication sleeping to having may be related to an infection or prostate issue we will assess    5. Angina at rest [facility])  EKG 12 Lead    We are going to get a Cat scan    6. Screening for prostate cancer  Psa screening   7.  Chest pain at rest  EKG 12 Lead    Stress Test W Pharm     Keep up to date with routine care and follow-up keep up-to-date with infectious disease physicians call with any problems or complaints a monitor

## 2017-10-11 ENCOUNTER — TRANSCRIBE ORDERS (OUTPATIENT)
Dept: ADMINISTRATIVE | Facility: HOSPITAL | Age: 52
End: 2017-10-11

## 2017-10-11 DIAGNOSIS — I20.8 ANGINAL CHEST PAIN AT REST (HCC): Primary | ICD-10-CM

## 2017-10-12 ENCOUNTER — TELEPHONE (OUTPATIENT)
Dept: INTERNAL MEDICINE | Age: 52
End: 2017-10-12

## 2017-10-13 DIAGNOSIS — Z79.4 TYPE 2 DIABETES MELLITUS WITHOUT COMPLICATION, WITH LONG-TERM CURRENT USE OF INSULIN (HCC): ICD-10-CM

## 2017-10-13 DIAGNOSIS — E11.9 TYPE 2 DIABETES MELLITUS WITHOUT COMPLICATION, WITH LONG-TERM CURRENT USE OF INSULIN (HCC): ICD-10-CM

## 2017-10-16 ENCOUNTER — TELEPHONE (OUTPATIENT)
Dept: INTERNAL MEDICINE | Age: 52
End: 2017-10-16

## 2017-10-17 ENCOUNTER — HOSPITAL ENCOUNTER (OUTPATIENT)
Dept: CARDIOLOGY | Facility: HOSPITAL | Age: 52
Discharge: HOME | End: 2017-10-17

## 2017-10-17 ENCOUNTER — TELEPHONE (OUTPATIENT)
Dept: INTERNAL MEDICINE | Age: 52
End: 2017-10-17

## 2017-10-17 VITALS — HEART RATE: 90 BPM | DIASTOLIC BLOOD PRESSURE: 105 MMHG | SYSTOLIC BLOOD PRESSURE: 144 MMHG

## 2017-10-17 DIAGNOSIS — I20.8 ANGINAL CHEST PAIN AT REST (HCC): ICD-10-CM

## 2017-10-17 PROCEDURE — 78452 HT MUSCLE IMAGE SPECT MULT: CPT | Performed by: INTERNAL MEDICINE

## 2017-10-17 PROCEDURE — A9500 TC99M SESTAMIBI: HCPCS | Performed by: INTERNAL MEDICINE

## 2017-10-17 PROCEDURE — 0 TECHNETIUM SESTAMIBI: Performed by: INTERNAL MEDICINE

## 2017-10-17 PROCEDURE — 93018 CV STRESS TEST I&R ONLY: CPT | Performed by: INTERNAL MEDICINE

## 2017-10-17 PROCEDURE — 78452 HT MUSCLE IMAGE SPECT MULT: CPT

## 2017-10-17 PROCEDURE — 93017 CV STRESS TEST TRACING ONLY: CPT

## 2017-10-17 PROCEDURE — 25010000002 REGADENOSON 0.4 MG/5ML SOLUTION: Performed by: INTERNAL MEDICINE

## 2017-10-17 RX ADMIN — REGADENOSON 0.4 MG: 0.08 INJECTION, SOLUTION INTRAVENOUS at 10:29

## 2017-10-17 RX ADMIN — TECHNETIUM TC-99M SESTAMIBI 1 DOSE: 1 INJECTION INTRAVENOUS at 10:30

## 2017-10-17 RX ADMIN — TECHNETIUM TC-99M SESTAMIBI 1 DOSE: 1 INJECTION INTRAVENOUS at 09:30

## 2017-10-17 ASSESSMENT — ENCOUNTER SYMPTOMS
SINUS PRESSURE: 0
COUGH: 0
ABDOMINAL PAIN: 0
EYE DISCHARGE: 0
ABDOMINAL DISTENTION: 0
EYE REDNESS: 0
SHORTNESS OF BREATH: 1

## 2017-10-17 NOTE — TELEPHONE ENCOUNTER
[phone], they have left a vm stating that order is incorrect and they are needing to verify what we are needing ordered

## 2017-10-18 ENCOUNTER — TELEPHONE (OUTPATIENT)
Dept: INTERNAL MEDICINE | Age: 52
End: 2017-10-18

## 2017-10-18 DIAGNOSIS — R07.9 CHEST PAIN AT REST: Primary | ICD-10-CM

## 2017-10-18 LAB
BH CV STRESS BP STAGE 1: NORMAL
BH CV STRESS COMMENTS STAGE 1: NORMAL
BH CV STRESS DOSE REGADENOSON STAGE 1: 0.4
BH CV STRESS DURATION MIN STAGE 1: 0
BH CV STRESS DURATION SEC STAGE 1: 10
BH CV STRESS HR STAGE 1: 108
BH CV STRESS PROTOCOL 1: NORMAL
BH CV STRESS RECOVERY BP: NORMAL MMHG
BH CV STRESS RECOVERY HR: 68 BPM
BH CV STRESS STAGE 1: 1
LV EF NUC BP: 70 %
MAXIMAL PREDICTED HEART RATE: 171 BPM
PERCENT MAX PREDICTED HR: 63.16 %
STRESS BASELINE BP: NORMAL MMHG
STRESS BASELINE HR: 90 BPM
STRESS PERCENT HR: 74 %
STRESS POST EXERCISE DUR SEC: 10 SEC
STRESS POST PEAK BP: NORMAL MMHG
STRESS POST PEAK HR: 108 BPM
STRESS TARGET HR: 145 BPM

## 2017-10-19 NOTE — TELEPHONE ENCOUNTER
Let him know no active area of ischemia --- it looks like he may have had a heart attack in one area that is hard to see versus artifact --- Cardiology says otherwise ok--- suggest we refer to Dr Tangela Ma for his opinion and if not taking a baby aspirin a day- take one daily

## 2017-10-25 ENCOUNTER — TELEPHONE (OUTPATIENT)
Dept: INTERNAL MEDICINE | Age: 52
End: 2017-10-25

## 2017-10-25 NOTE — TELEPHONE ENCOUNTER
[facility] wants us to know that they have an appointment for Mr. Kaveh Shannon on Friday, October 27th at 9 am with Dr. [employer]

## 2017-10-27 ENCOUNTER — OFFICE VISIT (OUTPATIENT)
Dept: CARDIOLOGY | Facility: CLINIC | Age: 52
End: 2017-10-27

## 2017-10-27 VITALS
HEIGHT: 72 IN | SYSTOLIC BLOOD PRESSURE: 130 MMHG | HEART RATE: 84 BPM | DIASTOLIC BLOOD PRESSURE: 80 MMHG | WEIGHT: 266 LBS | BODY MASS INDEX: 36.03 KG/M2

## 2017-10-27 DIAGNOSIS — R07.82 INTERCOSTAL PAIN: Primary | ICD-10-CM

## 2017-10-27 PROCEDURE — 99204 OFFICE O/P NEW MOD 45 MIN: CPT | Performed by: INTERNAL MEDICINE

## 2017-10-27 PROCEDURE — 93000 ELECTROCARDIOGRAM COMPLETE: CPT | Performed by: INTERNAL MEDICINE

## 2017-10-27 RX ORDER — VARENICLINE TARTRATE 1 MG/1
1 TABLET, FILM COATED ORAL 2 TIMES DAILY
COMMUNITY
End: 2019-07-24

## 2017-10-27 NOTE — PROGRESS NOTES
Referring Provider: Oksana Mares MD    Reason for Consultation: chest pain    Chief complaint:   Chief Complaint   Patient presents with   • Abnormal ECG     referred by Dr. Valentine for evaluation of a abnormal ekg and stress echo   • Chest Pain     happens everynight when lays stuart.  feels like a bolt of lightning that comes and goes every 30 min until he falls asleep.   • Shortness of Breath     has this with exertion.  he smokes   • Palpitations     with anxiety   • Edema     ankles   • Fatigue     gets tired easy.  has HIV       Subjective .     History of present illness:  Donis Yi is a 51 y.o. yo male with history of sharp stabbing chest pain that feels like a lightening bolt when it hits. Usually occurrs at night while in bed. He had a normal nuclear stress test last month   Chief Complaint   Patient presents with   • Abnormal ECG     referred by Dr. Valentine for evaluation of a abnormal ekg and stress echo   • Chest Pain     happens everynight when lays stuart.  feels like a bolt of lightning that comes and goes every 30 min until he falls asleep.   • Shortness of Breath     has this with exertion.  he smokes   • Palpitations     with anxiety   • Edema     ankles   • Fatigue     gets tired easy.  has HIV   .    History  Past Medical History:   Diagnosis Date   • Allergic rhinitis    • Anxiety    • Bursitis    • DDD (degenerative disc disease), cervical    • Depression    • HIV disease    • HLD (hyperlipidemia)    • HTN (hypertension)    • Myocardial infarction    • Osteoporosis    • Sleep apnea    • Type 2 diabetes mellitus    ,   Past Surgical History:   Procedure Laterality Date   • ANKLE SURGERY     • FOREARM SURGERY     • LYMPH NODE BIOPSY     ,   Family History   Problem Relation Age of Onset   • Diabetes Mother    • Hypertension Mother    • Thyroid disease Mother    • Hypertension Father    • Thyroid disease Father    • Arrhythmia Father    • Diabetes Maternal Grandfather    • Heart disease  Maternal Grandfather    • Hypertension Maternal Grandfather    • Diabetes Paternal Grandmother    • Stroke Paternal Grandmother    • Heart disease Paternal Grandmother    • Hypertension Paternal Grandmother    • Thyroid disease Paternal Grandmother    • Hypertension Paternal Grandfather    • Hypertension Sister    • No Known Problems Brother    ,   Social History   Substance Use Topics   • Smoking status: Current Every Day Smoker     Packs/day: 1.00     Types: Cigarettes     Start date: 1988   • Smokeless tobacco: Never Used   • Alcohol use Yes      Comment: occasionally   ,     Medications  Current Outpatient Prescriptions   Medication Sig Dispense Refill   • albuterol (PROVENTIL HFA;VENTOLIN HFA) 108 (90 BASE) MCG/ACT inhaler Inhale 2 puffs every 6 (six) hours as needed for wheezing.     • atorvastatin (LIPITOR) 80 MG tablet Take 80 mg by mouth daily.     • calcium carbonate (OS-THEODORA) 600 MG tablet Take 600 mg by mouth Daily.     • cholecalciferol (VITAMIN D3) 1000 UNITS tablet Take 1,000 Units by mouth daily.     • clonazePAM (KlonoPIN) 0.5 MG tablet Take 0.5 mg by mouth 3 (Three) Times a Day As Needed for Anxiety.     • Oqmhniu-Haweu-Hyutxjfc-TenofAF (GENVOYA) [phone] MG tablet Take 1 tablet by mouth daily.     • esomeprazole (NexIUM) 40 MG capsule Take 40 mg by mouth every morning before breakfast.     • fluticasone (FLONASE) 50 MCG/ACT nasal spray 2 sprays into each nostril Daily. 1 bottle 6   • gabapentin (NEURONTIN) 600 MG tablet Take 600 mg by mouth 2 (Two) Times a Day. Takes 2 twice a day     • guaiFENesin (MUCINEX) 600 MG 12 hr tablet Take 600 mg by mouth 2 (two) times a day as needed for cough.     • HYDROcodone-acetaminophen (NORCO) 5-325 MG per tablet Take 1 tablet by mouth 2 (two) times a day as needed.     • hydrOXYzine (VISTARIL) 25 MG capsule Take 25 mg by mouth every 6 (six) hours as needed for itching.     • insulin aspart (NOVOLOG FLEXPEN) 100 UNIT/ML solution pen-injector sc pen Inject 80  Units under the skin 4 (Four) Times a Day With Meals & at Bedtime.     • insulin detemir (LEVEMIR) 100 UNIT/ML injection Inject 130 Units under the skin Every Night.     • loperamide (IMODIUM) 2 MG capsule Take 2 mg by mouth daily as needed for diarrhea.     • loratadine (CLARITIN) 10 MG tablet Take 10 mg by mouth daily.     • losartan (COZAAR) 100 MG tablet Take 100 mg by mouth daily.     • meclizine (ANTIVERT) 25 MG tablet Take 25 mg by mouth daily.     • metFORMIN (GLUCOPHAGE) 1000 MG tablet Take 1,000 mg by mouth 2 (two) times a day with meals.     • miconazole (MICOTIN) 2 % cream Apply 1 application topically 2 (two) times a day.     • neomycin-bacitracin-polymyxin (NEOSPORIN) 5-400-5000 ointment Apply 1 application topically 4 (four) times a day as needed.     • PARoxetine (PAXIL) 40 MG tablet Take 10 mg by mouth Every Morning.     • pregabalin (LYRICA) 50 MG capsule Take 50 mg by mouth 2 (two) times a day.     • Prenatal Vit-Fe Fumarate-FA (VOL-PLUS) 27-1 MG tablet Take 1 tablet by mouth daily.     • promethazine (PHENERGAN) 25 MG tablet Take 25 mg by mouth every 6 (six) hours as needed for nausea or vomiting.     • propranolol LA (INDERAL LA) 60 MG 24 hr capsule Take 60 mg by mouth daily.     • rizatriptan (MAXALT) 10 MG tablet Take 10 mg by mouth 1 (one) time as needed for migraine. May repeat in 2 hours if needed     • sodium chloride (OCEAN) 0.65 % nasal spray 1 spray into each nostril as needed for congestion.     • tiZANidine (ZANAFLEX) 4 MG tablet Take 6 mg by mouth 3 (three) times a day as needed for muscle spasms.     • traZODone (DESYREL) 150 MG tablet Take 150 mg by mouth Every Night.     • varenicline (CHANTIX) 1 MG tablet Take 1 mg by mouth 2 (Two) Times a Day.     • teriparatide (FORTEO) 600 MCG/2.4ML injection Inject 20 mcg under the skin daily.       No current facility-administered medications for this visit.         Allergies:  Amitriptyline; Darvon [propoxyphene]; and Zithromax  "[azithromycin]    Review of Systems  Review of Systems   HENT: Negative for nosebleeds.    Cardiovascular: Positive for chest pain and dyspnea on exertion. Negative for claudication, irregular heartbeat, leg swelling, near-syncope, orthopnea, palpitations, paroxysmal nocturnal dyspnea and syncope.   Respiratory: Positive for shortness of breath. Negative for cough and hemoptysis.    Gastrointestinal: Negative for dysphagia, hematemesis and melena.   Genitourinary: Negative for hematuria.       Objective     Physical Exam:  /80 (BP Location: Left arm, Patient Position: Sitting, Cuff Size: Adult)  Pulse 84  Ht 72\" (182.9 cm)  Wt 266 lb (121 kg)  BMI 36.08 kg/m2  Physical Exam   Constitutional: He is oriented to person, place, and time. He appears well-nourished. No distress.   HENT:   Head: Normocephalic.   Eyes: No scleral icterus.   Neck: Normal range of motion. Neck supple.   Cardiovascular: Normal rate, regular rhythm and normal heart sounds.  Exam reveals no gallop and no friction rub.    No murmur heard.  Pulmonary/Chest: Effort normal and breath sounds normal. No respiratory distress. He has no wheezes. He has no rales.   Abdominal: Soft. Bowel sounds are normal. He exhibits no distension. There is no tenderness.   Musculoskeletal: He exhibits no edema.   Neurological: He is alert and oriented to person, place, and time.   Skin: Skin is warm and dry. He is not diaphoretic. No erythema.   Psychiatric: He has a normal mood and affect. His behavior is normal.       Results Review:   I reviewed the patient's new clinical results.    ECG 12 Lead  Date/Time: 10/27/2017 9:41 AM  Performed by: NIELS GOODRICH  Authorized by: NIELS GOODRICH   Previous ECG: no previous ECG available  Rhythm: sinus rhythm  Rate: normal  QRS axis: right  Q waves: II, III and aVF  Clinical impression: abnormal ECG            Hospital Outpatient Visit on 10/17/2017   Component Date Value Ref Range Status   •  CV STRESS PROTOCOL 1 " 10/17/2017 Pharmacologic   Final   • Stage 1 10/17/2017 1   Final   • HR Stage 1 10/17/2017 108   Final   • BP Stage 1 10/17/2017 129/77   Final   • Duration Min Stage 1 10/17/2017 0   Final   • Duration Sec Stage 1 10/17/2017 10   Final   • Stress Dose Regadenoson Stage 1 10/17/2017 0.4   Final   • Stress Comments Stage 1 10/17/2017 10 sec bolus injection   Final   • Baseline HR 10/17/2017 90  bpm Final   • Baseline BP 10/17/2017 133/99  mmHg Final   • Peak HR 10/17/2017 108  bpm Final   • Percent Max Pred HR 10/17/2017 63.16  % Final   • Percent Target HR 10/17/2017 74  % Final   • Peak BP 10/17/2017 129/77  mmHg Final   • Recovery HR 10/17/2017 68  bpm Final   • Recovery BP 10/17/2017 122/81  mmHg Final   • Target HR (85%) 10/17/2017 145  bpm Final   • Max. Pred. HR (100%) 10/17/2017 171  bpm Final   • Exercise duration (sec) 10/17/2017 10  sec Final   • Nuc Stress EF 10/17/2017 70  % Final       Assessment/Plan   Donis was seen today for abnormal ecg, chest pain, shortness of breath, palpitations, edema and fatigue.    Diagnoses and all orders for this visit:    Intercostal pain, very atypical with normal nuclear stress test. He certainly has significant risk factors for CAD and I would recommend continued statin therapy. I have urged the Pt to quick smoking. No further evaluation of his chest pain is indicated at present unless there is a change in the character of the pain.

## 2017-11-07 ENCOUNTER — INFUSION (OUTPATIENT)
Dept: ONCOLOGY | Facility: HOSPITAL | Age: 52
End: 2017-11-07

## 2017-11-07 VITALS
BODY MASS INDEX: 38.65 KG/M2 | RESPIRATION RATE: 18 BRPM | SYSTOLIC BLOOD PRESSURE: 128 MMHG | HEIGHT: 70 IN | TEMPERATURE: 97.9 F | DIASTOLIC BLOOD PRESSURE: 68 MMHG | WEIGHT: 270 LBS | OXYGEN SATURATION: 96 % | HEART RATE: 91 BPM

## 2017-11-07 DIAGNOSIS — M81.0 AGE-RELATED OSTEOPOROSIS WITHOUT CURRENT PATHOLOGICAL FRACTURE: Primary | ICD-10-CM

## 2017-11-07 PROCEDURE — 25010000002 DENOSUMAB 60 MG/ML SOLUTION: Performed by: NURSE PRACTITIONER

## 2017-11-07 PROCEDURE — 96372 THER/PROPH/DIAG INJ SC/IM: CPT

## 2017-11-07 RX ADMIN — DENOSUMAB 60 MG: 60 INJECTION SUBCUTANEOUS at 12:54

## 2017-11-07 NOTE — PATIENT INSTRUCTIONS
Denosumab injection  What is this medicine?  DENOSUMAB (den oh kash mab) slows bone breakdown. Prolia is used to treat osteoporosis in women after menopause and in men. Xgeva is used to prevent bone fractures and other bone problems caused by cancer bone metastases. Xgeva is also used to treat giant cell tumor of the bone.  This medicine may be used for other purposes; ask your health care provider or pharmacist if you have questions.  COMMON BRAND NAME(S): Prolia, XGEVA  What should I tell my health care provider before I take this medicine?  They need to know if you have any of these conditions:  -dental disease  -eczema  -infection or history of infections  -kidney disease or on dialysis  -low blood calcium or vitamin D  -malabsorption syndrome  -scheduled to have surgery or tooth extraction  -taking medicine that contains denosumab  -thyroid or parathyroid disease  -an unusual reaction to denosumab, other medicines, foods, dyes, or preservatives  -pregnant or trying to get pregnant  -breast-feeding  How should I use this medicine?  This medicine is for injection under the skin. It is given by a health care professional in a hospital or clinic setting.  If you are getting Prolia, a special MedGuide will be given to you by the pharmacist with each prescription and refill. Be sure to read this information carefully each time.  For Prolia, talk to your pediatrician regarding the use of this medicine in children. Special care may be needed. For Xgeva, talk to your pediatrician regarding the use of this medicine in children. While this drug may be prescribed for children as young as 13 years for selected conditions, precautions do apply.  Overdosage: If you think you have taken too much of this medicine contact a poison control center or emergency room at once.  NOTE: This medicine is only for you. Do not share this medicine with others.  What if I miss a dose?  It is important not to miss your dose. Call your doctor  or health care professional if you are unable to keep an appointment.  What may interact with this medicine?  Do not take this medicine with any of the following medications:  -other medicines containing denosumab  This medicine may also interact with the following medications:  -medicines that suppress the immune system  -medicines that treat cancer  -steroid medicines like prednisone or cortisone  This list may not describe all possible interactions. Give your health care provider a list of all the medicines, herbs, non-prescription drugs, or dietary supplements you use. Also tell them if you smoke, drink alcohol, or use illegal drugs. Some items may interact with your medicine.  What should I watch for while using this medicine?  Visit your doctor or health care professional for regular checks on your progress. Your doctor or health care professional may order blood tests and other tests to see how you are doing.  Call your doctor or health care professional if you get a cold or other infection while receiving this medicine. Do not treat yourself. This medicine may decrease your body's ability to fight infection.  You should make sure you get enough calcium and vitamin D while you are taking this medicine, unless your doctor tells you not to. Discuss the foods you eat and the vitamins you take with your health care professional.  See your dentist regularly. Brush and floss your teeth as directed. Before you have any dental work done, tell your dentist you are receiving this medicine.  Do not become pregnant while taking this medicine or for 5 months after stopping it. Women should inform their doctor if they wish to become pregnant or think they might be pregnant. There is a potential for serious side effects to an unborn child. Talk to your health care professional or pharmacist for more information.  What side effects may I notice from receiving this medicine?  Side effects that you should report to your doctor  or health care professional as soon as possible:  -allergic reactions like skin rash, itching or hives, swelling of the face, lips, or tongue  -breathing problems  -chest pain  -fast, irregular heartbeat  -feeling faint or lightheaded, falls  -fever, chills, or any other sign of infection  -muscle spasms, tightening, or twitches  -numbness or tingling  -skin blisters or bumps, or is dry, peels, or red  -slow healing or unexplained pain in the mouth or jaw  -unusual bleeding or bruising  Side effects that usually do not require medical attention (report to your doctor or health care professional if they continue or are bothersome):  -muscle pain  -stomach upset, gas  This list may not describe all possible side effects. Call your doctor for medical advice about side effects. You may report side effects to FDA at 0-478-FDA-0958.  Where should I keep my medicine?  This medicine is only given in a clinic, doctor's office, or other health care setting and will not be stored at home.  NOTE: This sheet is a summary. It may not cover all possible information. If you have questions about this medicine, talk to your doctor, pharmacist, or health care provider.     © 2017, [employer]. (2017-01-19 10:06:55)

## 2017-11-07 NOTE — PROGRESS NOTES
S/W  Patient regarding statement about having suicidal thoughts. He stated that he had those a long time ago and is now on anti-depression medicine. He also stated that he never actually tried to commit suicide. Kuldip Huang RN

## 2017-12-06 ENCOUNTER — HOSPITAL ENCOUNTER (EMERGENCY)
Facility: HOSPITAL | Age: 52
Discharge: HOME | End: 2017-12-06
Admitting: EMERGENCY MEDICINE

## 2017-12-06 VITALS
SYSTOLIC BLOOD PRESSURE: 154 MMHG | DIASTOLIC BLOOD PRESSURE: 68 MMHG | HEART RATE: 83 BPM | HEIGHT: 72 IN | TEMPERATURE: 98.5 F | OXYGEN SATURATION: 97 % | BODY MASS INDEX: 35.89 KG/M2 | RESPIRATION RATE: 20 BRPM | WEIGHT: 265 LBS

## 2017-12-06 DIAGNOSIS — K08.89 PAIN, DENTAL: Primary | ICD-10-CM

## 2017-12-06 PROCEDURE — 99282 EMERGENCY DEPT VISIT SF MDM: CPT

## 2017-12-06 RX ORDER — AMOXICILLIN 500 MG/1
500 CAPSULE ORAL 2 TIMES DAILY
Qty: 20 CAPSULE | Refills: 0 | Status: SHIPPED | OUTPATIENT
Start: 2017-12-06 | End: 2017-12-16

## 2017-12-06 NOTE — ED PROVIDER NOTES
Subjective   Patient is a 51 y.o. male presenting with tooth pain.   History provided by:  Patient   used: No    Dental Pain   Location:  Lower  Lower teeth location:  31/RL 2nd molar  Quality:  Aching and dull  Severity:  Mild  Onset quality:  Sudden  Duration:  2 days  Timing:  Constant  Progression:  Unchanged  Chronicity:  New  Context: dental caries    Context: not abscess, cap still on, not crown fracture, not dental fracture, not enamel fracture, filling intact, not intrusion, not malocclusion, normal dentition, not recent dental surgery and not trauma    Relieved by:  Nothing  Worsened by:  Nothing  Ineffective treatments:  None tried  Associated symptoms: no congestion, no difficulty swallowing, no drooling, no facial pain, no facial swelling, no fever, no gum swelling, no headaches, no neck pain, no neck swelling, no oral bleeding, no oral lesions and no trismus    Risk factors: diabetes and smoking    Risk factors: no alcohol problem, no cancer, no chewing tobacco use, no immunosuppression, sufficient dental care and no periodontal disease        Review of Systems   Constitutional: Negative for fever.   HENT: Negative for congestion, drooling, facial swelling and mouth sores.    Musculoskeletal: Negative for neck pain.   Neurological: Negative for headaches.   All other systems reviewed and are negative.      Past Medical History:   Diagnosis Date   • Allergic rhinitis    • Anxiety    • Bursitis    • DDD (degenerative disc disease), cervical    • Depression    • HIV disease    • HLD (hyperlipidemia)    • HTN (hypertension)    • Myocardial infarction    • Osteoporosis    • Sleep apnea    • Type 2 diabetes mellitus        Allergies   Allergen Reactions   • Amitriptyline    • Darvon [Propoxyphene]    • Zithromax [Azithromycin]        Past Surgical History:   Procedure Laterality Date   • ANKLE SURGERY     • FOREARM SURGERY     • LYMPH NODE BIOPSY         Family History   Problem Relation  Age of Onset   • Diabetes Mother    • Hypertension Mother    • Thyroid disease Mother    • Hypertension Father    • Thyroid disease Father    • Arrhythmia Father    • Diabetes Maternal Grandfather    • Heart disease Maternal Grandfather    • Hypertension Maternal Grandfather    • Diabetes Paternal Grandmother    • Stroke Paternal Grandmother    • Heart disease Paternal Grandmother    • Hypertension Paternal Grandmother    • Thyroid disease Paternal Grandmother    • Hypertension Paternal Grandfather    • Hypertension Sister    • No Known Problems Brother        Social History     Social History   • Marital status:      Spouse name: N/A   • Number of children: N/A   • Years of education: N/A     Social History Main Topics   • Smoking status: Current Every Day Smoker     Packs/day: 1.00     Types: Cigarettes     Start date: 1988   • Smokeless tobacco: Never Used   • Alcohol use Yes      Comment: occasionally   • Drug use: Defer   • Sexual activity: Not Asked     Other Topics Concern   • None     Social History Narrative           Objective   Physical Exam   Constitutional: He is oriented to person, place, and time.   HENT:   Mouth/Throat:       Cardiovascular: Normal rate.    Pulmonary/Chest: Effort normal.   Neurological: He is alert and oriented to person, place, and time.   Skin: Skin is warm and dry.   Psychiatric: He has a normal mood and affect.   Nursing note and vitals reviewed.      Procedures         ED Course  ED Course   Comment By Time   Pt currently goes to pain mngt. Recieves [place] monthly from Dr. Tinsley; advised to cont pain meds as prescribed Jocy Vasquez, APRN 12/06 1425   At this time patient will be discharged in stable condition.  I will give him a RX for amoxicillin.  Advised to follow-up with his dentist tomorrow.  He is advised that he may return to the ER for any new or worsening symptoms Jocy Vasquez, APRN 12/06 [id number]                  [facility]  Number of Diagnoses or Management  Options  Pain, dental: new and requires workup  Patient Progress  Patient progress: stable      Final diagnoses:   Pain, dental            Jocy Vasquez, APRN  12/06/17 [id number]

## 2018-01-08 ENCOUNTER — OFFICE VISIT (OUTPATIENT)
Dept: INTERNAL MEDICINE | Age: 53
End: 2018-01-08
Payer: MEDICARE

## 2018-01-08 VITALS
SYSTOLIC BLOOD PRESSURE: 130 MMHG | HEART RATE: 90 BPM | DIASTOLIC BLOOD PRESSURE: 74 MMHG | BODY MASS INDEX: 35.76 KG/M2 | OXYGEN SATURATION: 99 % | WEIGHT: 264 LBS | HEIGHT: 72 IN

## 2018-01-08 DIAGNOSIS — I10 ESSENTIAL HYPERTENSION: ICD-10-CM

## 2018-01-08 DIAGNOSIS — B20: ICD-10-CM

## 2018-01-08 DIAGNOSIS — E78.00 PURE HYPERCHOLESTEROLEMIA: ICD-10-CM

## 2018-01-08 DIAGNOSIS — E11.9 TYPE 2 DIABETES MELLITUS WITHOUT COMPLICATION, WITH LONG-TERM CURRENT USE OF INSULIN (HCC): Primary | ICD-10-CM

## 2018-01-08 DIAGNOSIS — Z79.4 TYPE 2 DIABETES MELLITUS WITHOUT COMPLICATION, WITH LONG-TERM CURRENT USE OF INSULIN (HCC): Primary | ICD-10-CM

## 2018-01-08 PROCEDURE — G8484 FLU IMMUNIZE NO ADMIN: HCPCS | Performed by: INTERNAL MEDICINE

## 2018-01-08 PROCEDURE — G8417 CALC BMI ABV UP PARAM F/U: HCPCS | Performed by: INTERNAL MEDICINE

## 2018-01-08 PROCEDURE — 4004F PT TOBACCO SCREEN RCVD TLK: CPT | Performed by: INTERNAL MEDICINE

## 2018-01-08 PROCEDURE — 3017F COLORECTAL CA SCREEN DOC REV: CPT | Performed by: INTERNAL MEDICINE

## 2018-01-08 PROCEDURE — 99214 OFFICE O/P EST MOD 30 MIN: CPT | Performed by: INTERNAL MEDICINE

## 2018-01-08 PROCEDURE — 3046F HEMOGLOBIN A1C LEVEL >9.0%: CPT | Performed by: INTERNAL MEDICINE

## 2018-01-08 PROCEDURE — G8599 NO ASA/ANTIPLAT THER USE RNG: HCPCS | Performed by: INTERNAL MEDICINE

## 2018-01-08 PROCEDURE — G8427 DOCREV CUR MEDS BY ELIG CLIN: HCPCS | Performed by: INTERNAL MEDICINE

## 2018-01-08 RX ORDER — MECLIZINE HYDROCHLORIDE 25 MG/1
TABLET ORAL
Qty: 90 TABLET | Refills: 2 | Status: SHIPPED | OUTPATIENT
Start: 2018-01-08 | End: 2018-08-28 | Stop reason: SDUPTHER

## 2018-01-08 RX ORDER — GABAPENTIN 400 MG/1
800 CAPSULE ORAL 3 TIMES DAILY
COMMUNITY
End: 2020-04-20

## 2018-01-08 RX ORDER — LOSARTAN POTASSIUM 100 MG/1
100 TABLET ORAL DAILY
Qty: 90 TABLET | Refills: 3 | Status: SHIPPED | OUTPATIENT
Start: 2018-01-08 | End: 2019-01-30 | Stop reason: SDUPTHER

## 2018-01-08 RX ORDER — PROPRANOLOL HCL 60 MG
60 CAPSULE, EXTENDED RELEASE 24HR ORAL DAILY
Qty: 90 CAPSULE | Refills: 3 | Status: SHIPPED | OUTPATIENT
Start: 2018-01-08 | End: 2018-12-27 | Stop reason: SDUPTHER

## 2018-01-09 ASSESSMENT — ENCOUNTER SYMPTOMS
ABDOMINAL PAIN: 0
BACK PAIN: 1
ABDOMINAL DISTENTION: 0
SHORTNESS OF BREATH: 1
EYE REDNESS: 0
COUGH: 0
EYE DISCHARGE: 0

## 2018-03-03 ENCOUNTER — APPOINTMENT (OUTPATIENT)
Dept: GENERAL RADIOLOGY | Facility: HOSPITAL | Age: 53
End: 2018-03-03

## 2018-03-03 ENCOUNTER — HOSPITAL ENCOUNTER (EMERGENCY)
Facility: HOSPITAL | Age: 53
Discharge: HOME | End: 2018-03-03
Admitting: EMERGENCY MEDICINE

## 2018-03-03 VITALS
TEMPERATURE: 98.3 F | RESPIRATION RATE: 16 BRPM | WEIGHT: 280 LBS | DIASTOLIC BLOOD PRESSURE: 84 MMHG | OXYGEN SATURATION: 98 % | HEIGHT: 72 IN | HEART RATE: 97 BPM | SYSTOLIC BLOOD PRESSURE: 138 MMHG | BODY MASS INDEX: 37.93 KG/M2

## 2018-03-03 DIAGNOSIS — B34.9 VIRAL SYNDROME: ICD-10-CM

## 2018-03-03 DIAGNOSIS — S46.912A SHOULDER STRAIN, LEFT, INITIAL ENCOUNTER: Primary | ICD-10-CM

## 2018-03-03 PROCEDURE — 73030 X-RAY EXAM OF SHOULDER: CPT

## 2018-03-03 PROCEDURE — 99283 EMERGENCY DEPT VISIT LOW MDM: CPT

## 2018-03-03 RX ORDER — KETOROLAC TROMETHAMINE 10 MG/1
10 TABLET, FILM COATED ORAL EVERY 6 HOURS PRN
Qty: 12 TABLET | Refills: 0 | Status: SHIPPED | OUTPATIENT
Start: 2018-03-03 | End: 2018-04-29

## 2018-03-04 NOTE — ED NOTES
Patient states left shoulder pain that started yesterday afternoon. Patient states that when he awoke his left arm was sore. Patient is unaware of mechanism of injury . Patient does report having a torn rotator cuff in the past and is concerned that this may be the cause of his his pain to his shoulder. Patient also reports abdominal pain with diarrhea that started this morning.      Renetta Galeana RN  03/03/18 [id number]

## 2018-03-04 NOTE — DISCHARGE INSTRUCTIONS
Push fluids; advance diet as tolerated; f/u with pcp for re-evaluation; f/u with orthopedics with continued shoulder pain

## 2018-03-06 NOTE — ED PROVIDER NOTES
Subjective   Patient is a 52 y.o. male presenting with upper extremity pain.   Upper Extremity Issue   Location:  Shoulder  Shoulder location:  L shoulder  Upper extremity injury: woke up with pain to the left shoulder, reports difficulty raising shoulder, admits to being a forceful wild sleeper.    Pain details:     Quality:  Unable to specify    Severity:  Mild    Timing:  Intermittent  Prior injury to area:  No  Relieved by:  Rest  Worsened by:  Movement  Associated symptoms: no back pain, no decreased range of motion, no fatigue, no fever, no neck pain, no numbness, no stiffness and no swelling    Associated symptoms comment:  Incidentally patient also reports 2 episodes of diarrhea with abdominal cramping; he has been exposed to others with similar sxs  Risk factors: no concern for non-accidental trauma        Review of Systems   Constitutional: Negative for appetite change, chills, fatigue and fever.   HENT: Negative for congestion and sore throat.    Respiratory: Negative for chest tightness and shortness of breath.    Cardiovascular: Negative for palpitations.   Gastrointestinal: Positive for diarrhea. Negative for abdominal distention, abdominal pain and vomiting.   Genitourinary: Negative for difficulty urinating and dysuria.   Musculoskeletal: Negative for back pain, joint swelling, neck pain, neck stiffness and stiffness.        Positive for left shoulder pain   Skin: Negative for rash.   Neurological: Negative for dizziness and headaches.   All other systems reviewed and are negative.      Past Medical History:   Diagnosis Date   • Allergic rhinitis    • Anxiety    • Bursitis    • DDD (degenerative disc disease), cervical    • Depression    • HIV disease    • HLD (hyperlipidemia)    • HTN (hypertension)    • Myocardial infarction    • Osteoporosis    • Sleep apnea    • Type 2 diabetes mellitus        Allergies   Allergen Reactions   • Amitriptyline Anaphylaxis   • Amitriptyline Hcl Anaphylaxis   •  Darvon [Propoxyphene] Anaphylaxis   • Zithromax [Azithromycin] Anaphylaxis       Past Surgical History:   Procedure Laterality Date   • ANKLE SURGERY     • FOREARM SURGERY     • LYMPH NODE BIOPSY         Family History   Problem Relation Age of Onset   • Diabetes Mother    • Hypertension Mother    • Thyroid disease Mother    • Hypertension Father    • Thyroid disease Father    • Arrhythmia Father    • Diabetes Maternal Grandfather    • Heart disease Maternal Grandfather    • Hypertension Maternal Grandfather    • Diabetes Paternal Grandmother    • Stroke Paternal Grandmother    • Heart disease Paternal Grandmother    • Hypertension Paternal Grandmother    • Thyroid disease Paternal Grandmother    • Hypertension Paternal Grandfather    • Hypertension Sister    • No Known Problems Brother        Social History     Social History   • Marital status:      Social History Main Topics   • Smoking status: Current Every Day Smoker     Packs/day: 1.00     Types: Cigarettes     Start date: 1988   • Smokeless tobacco: Never Used   • Alcohol use Yes      Comment: occasionally   • Drug use: Defer       Prior to Admission medications    Medication Sig Start Date End Date Taking? Authorizing Provider   albuterol (PROVENTIL HFA;VENTOLIN HFA) 108 (90 BASE) MCG/ACT inhaler Inhale 2 puffs every 6 (six) hours as needed for wheezing.   Yes Historical Provider, MD   atorvastatin (LIPITOR) 80 MG tablet Take 80 mg by mouth daily.   Yes Historical Provider, MD   calcium carbonate (OS-THEODORA) 600 MG tablet Take 600 mg by mouth Daily.   Yes Historical Provider, MD   cholecalciferol (VITAMIN D3) 1000 UNITS tablet Take 1,000 Units by mouth daily.   Yes Historical Provider, MD   clonazePAM (KlonoPIN) 0.5 MG tablet Take 0.5 mg by mouth 3 (Three) Times a Day As Needed for Anxiety.   Yes Nurse Epic Emergency, RN   denosumab (PROLIA) 60 MG/ML solution syringe Inject 60 mg under the skin Every 6 (Six) Months.   Yes Historical Provider, MD    Fdvsowi-Emkbq-Rpmfaejh-TenofAF (GENVOYA) [phone] MG tablet Take 1 tablet by mouth daily.   Yes Historical Provider, MD   esomeprazole (NexIUM) 40 MG capsule Take 40 mg by mouth every morning before breakfast.   Yes Historical Provider, MD   fluticasone (FLONASE) 50 MCG/ACT nasal spray 2 sprays into each nostril Daily.  Patient taking differently: 2 sprays into each nostril Daily As Needed. 10/11/16  Yes RAISA Tenorio   gabapentin (NEURONTIN) 600 MG tablet Take 800 mg by mouth 3 (Three) Times a Day. Takes 2 twice a day   Yes Historical Provider, MD   guaiFENesin (MUCINEX) 600 MG 12 hr tablet Take 600 mg by mouth 2 (two) times a day as needed for cough.   Yes Historical Provider, MD   HYDROcodone-acetaminophen (NORCO) 5-325 MG per tablet Take 1 tablet by mouth 2 (two) times a day as needed.   Yes Historical Provider, MD   hydrOXYzine (VISTARIL) 25 MG capsule Take 25 mg by mouth every 6 (six) hours as needed for itching.   Yes Historical Provider, MD   insulin aspart (NOVOLOG FLEXPEN) 100 UNIT/ML solution pen-injector sc pen Inject 80 Units under the skin 4 (Four) Times a Day With Meals & at Bedtime.   Yes Historical Provider, MD   insulin detemir (LEVEMIR) 100 UNIT/ML injection Inject 140 Units under the skin Every Night.   Yes Historical Provider, MD   loperamide (IMODIUM) 2 MG capsule Take 2 mg by mouth daily as needed for diarrhea.   Yes Historical Provider, MD   loratadine (CLARITIN) 10 MG tablet Take 10 mg by mouth daily.   Yes Historical Provider, MD   losartan (COZAAR) 100 MG tablet Take 100 mg by mouth daily.   Yes Historical Provider, MD   meclizine (ANTIVERT) 25 MG tablet Take 25 mg by mouth Daily As Needed.   Yes Historical Provider, MD   metFORMIN (GLUCOPHAGE) 1000 MG tablet Take 1,000 mg by mouth 2 (two) times a day with meals.   Yes Historical Provider, MD   miconazole (MICOTIN) 2 % cream Apply 1 application topically 2 (two) times a day.   Yes Historical Provider, MD   pregabalin  "(LYRICA) 50 MG capsule Take 100 mg by mouth 2 (Two) Times a Day.   Yes Historical Provider, MD   Prenatal Vit-Fe Fumarate-FA (VOL-PLUS) 27-1 MG tablet Take 1 tablet by mouth daily.   Yes Historical Provider, MD   promethazine (PHENERGAN) 25 MG tablet Take 25 mg by mouth every 6 (six) hours as needed for nausea or vomiting.   Yes Historical Provider, MD   propranolol LA (INDERAL LA) 60 MG 24 hr capsule Take 60 mg by mouth daily.   Yes Historical Provider, MD   rizatriptan (MAXALT) 10 MG tablet Take 10 mg by mouth 1 (one) time as needed for migraine. May repeat in 2 hours if needed   Yes Historical Provider, MD   tiZANidine (ZANAFLEX) 4 MG tablet Take 6 mg by mouth Every 6 (Six) Hours As Needed for Muscle Spasms.   Yes Historical Provider, MD   traZODone (DESYREL) 150 MG tablet Take 100 mg by mouth Every Night.   Yes Historical Provider, MD   varenicline (CHANTIX) 1 MG tablet Take 1 mg by mouth 2 (Two) Times a Day.   Yes Historical Provider, MD   ketorolac (TORADOL) 10 MG tablet Take 1 tablet by mouth Every 6 (Six) Hours As Needed for Moderate Pain . 3/3/18   SOBIA Lee   neomycin-bacitracin-polymyxin (NEOSPORIN) 5-400-5000 ointment Apply 1 application topically 4 (four) times a day as needed.    Historical Provider, MD   sodium chloride (OCEAN) 0.65 % nasal spray 1 spray into each nostril as needed for congestion.    Historical Provider, MD   Vortioxetine HBr (TRINTELLIX) 5 MG tablet Take 20 mg by mouth Daily With Breakfast.    Historical Provider, MD       Medications - No data to display    /84  Pulse 97  Temp 98.3 °F (36.8 °C) (Temporal Artery )   Resp 16  Ht 182.9 cm (72\")  Wt 127 kg (280 lb)  SpO2 98%  BMI 37.97 kg/m2      Objective   Physical Exam   Constitutional: He is oriented to person, place, and time. He appears well-developed and well-nourished.   HENT:   Head: Normocephalic and atraumatic.   Right Ear: External ear normal.   Left Ear: External ear normal.   Nose: Nose normal. "   Mouth/Throat: Oropharynx is clear and moist.   Eyes: Conjunctivae and EOM are normal. Pupils are equal, round, and reactive to light.   Neck: Normal range of motion. Neck supple.   Cardiovascular: Normal rate, regular rhythm, normal heart sounds and intact distal pulses.    Pulmonary/Chest: Effort normal and breath sounds normal.   Abdominal: Soft. Bowel sounds are normal.   Musculoskeletal: Normal range of motion. He exhibits tenderness. He exhibits no edema or deformity.   Pain to palpation to the left shoulder with limited range of motion secondary to pain; no swelling or deformity noted; neurovascular intact; cap refill within normal limits.   Neurological: He is alert and oriented to person, place, and time.   Skin: Skin is warm and dry.   Psychiatric: He has a normal mood and affect. His behavior is normal. Judgment and thought content normal.   Nursing note and vitals reviewed.      Procedures         Lab Results (last 24 hours)     ** No results found for the last 24 hours. **          XR Shoulder 2+ View Left   Final Result            ED Course offered work up regarding complaints of few episodes of diarrhea however patient prefers to refrain and will return if sxs worsen. He has had no vomiting or diarrhea while in the emergency dept  ED Course          MDM  Number of Diagnoses or Management Options  Shoulder strain, left, initial encounter: new and requires workup  Viral syndrome: new and requires workup     Amount and/or Complexity of Data Reviewed  Tests in the radiology section of CPT®: ordered and reviewed  Discuss the patient with other providers: yes    Risk of Complications, Morbidity, and/or Mortality  Presenting problems: low  Diagnostic procedures: low  Management options: low    Patient Progress  Patient progress: improved      Final diagnoses:   Shoulder strain, left, initial encounter   Viral syndrome          Beverly Molina, APRN  03/05/18 6410

## 2018-03-13 RX ORDER — INSULIN ASPART 100 [IU]/ML
INJECTION, SOLUTION INTRAVENOUS; SUBCUTANEOUS
Qty: 105 ML | Refills: 3 | Status: SHIPPED | OUTPATIENT
Start: 2018-03-13 | End: 2018-07-03 | Stop reason: SDUPTHER

## 2018-04-10 ENCOUNTER — OFFICE VISIT (OUTPATIENT)
Dept: INTERNAL MEDICINE | Age: 53
End: 2018-04-10
Payer: MEDICARE

## 2018-04-10 VITALS
HEIGHT: 72 IN | DIASTOLIC BLOOD PRESSURE: 86 MMHG | WEIGHT: 279 LBS | BODY MASS INDEX: 37.79 KG/M2 | OXYGEN SATURATION: 98 % | SYSTOLIC BLOOD PRESSURE: 138 MMHG | HEART RATE: 87 BPM

## 2018-04-10 DIAGNOSIS — N48.89 PENILE CYST: ICD-10-CM

## 2018-04-10 DIAGNOSIS — E78.00 PURE HYPERCHOLESTEROLEMIA: ICD-10-CM

## 2018-04-10 DIAGNOSIS — Z79.4 TYPE 2 DIABETES MELLITUS WITHOUT COMPLICATION, WITH LONG-TERM CURRENT USE OF INSULIN (HCC): Primary | ICD-10-CM

## 2018-04-10 DIAGNOSIS — E78.2 MIXED HYPERLIPIDEMIA: ICD-10-CM

## 2018-04-10 DIAGNOSIS — E11.9 TYPE 2 DIABETES MELLITUS WITHOUT COMPLICATION, WITH LONG-TERM CURRENT USE OF INSULIN (HCC): Primary | ICD-10-CM

## 2018-04-10 DIAGNOSIS — I10 ESSENTIAL HYPERTENSION: ICD-10-CM

## 2018-04-10 DIAGNOSIS — B20: ICD-10-CM

## 2018-04-10 PROCEDURE — 99214 OFFICE O/P EST MOD 30 MIN: CPT | Performed by: INTERNAL MEDICINE

## 2018-04-10 PROCEDURE — 2022F DILAT RTA XM EVC RTNOPTHY: CPT | Performed by: INTERNAL MEDICINE

## 2018-04-10 PROCEDURE — 3017F COLORECTAL CA SCREEN DOC REV: CPT | Performed by: INTERNAL MEDICINE

## 2018-04-10 PROCEDURE — 3046F HEMOGLOBIN A1C LEVEL >9.0%: CPT | Performed by: INTERNAL MEDICINE

## 2018-04-10 PROCEDURE — G8427 DOCREV CUR MEDS BY ELIG CLIN: HCPCS | Performed by: INTERNAL MEDICINE

## 2018-04-10 PROCEDURE — 4004F PT TOBACCO SCREEN RCVD TLK: CPT | Performed by: INTERNAL MEDICINE

## 2018-04-10 PROCEDURE — G8599 NO ASA/ANTIPLAT THER USE RNG: HCPCS | Performed by: INTERNAL MEDICINE

## 2018-04-10 PROCEDURE — G8417 CALC BMI ABV UP PARAM F/U: HCPCS | Performed by: INTERNAL MEDICINE

## 2018-04-10 RX ORDER — ATORVASTATIN CALCIUM 40 MG/1
40 TABLET, FILM COATED ORAL DAILY
Qty: 90 TABLET | Refills: 3 | Status: SHIPPED | OUTPATIENT
Start: 2018-04-10 | End: 2019-04-23 | Stop reason: SDUPTHER

## 2018-04-17 ASSESSMENT — ENCOUNTER SYMPTOMS
COUGH: 0
BACK PAIN: 1
ABDOMINAL DISTENTION: 0
EYE DISCHARGE: 0
EYE REDNESS: 0
SINUS PRESSURE: 0
ABDOMINAL PAIN: 0
SHORTNESS OF BREATH: 1

## 2018-04-29 ENCOUNTER — APPOINTMENT (OUTPATIENT)
Dept: GENERAL RADIOLOGY | Facility: HOSPITAL | Age: 53
End: 2018-04-29

## 2018-04-29 ENCOUNTER — HOSPITAL ENCOUNTER (EMERGENCY)
Facility: HOSPITAL | Age: 53
Discharge: HOME | End: 2018-04-29
Admitting: EMERGENCY MEDICINE

## 2018-04-29 VITALS
HEART RATE: 73 BPM | DIASTOLIC BLOOD PRESSURE: 86 MMHG | RESPIRATION RATE: 14 BRPM | WEIGHT: 270 LBS | TEMPERATURE: 98.2 F | BODY MASS INDEX: 36.57 KG/M2 | HEIGHT: 72 IN | SYSTOLIC BLOOD PRESSURE: 145 MMHG | OXYGEN SATURATION: 95 %

## 2018-04-29 DIAGNOSIS — M54.42 CHRONIC MIDLINE LOW BACK PAIN WITH LEFT-SIDED SCIATICA: Primary | ICD-10-CM

## 2018-04-29 DIAGNOSIS — G89.29 CHRONIC MIDLINE LOW BACK PAIN WITH LEFT-SIDED SCIATICA: Primary | ICD-10-CM

## 2018-04-29 LAB
BILIRUB UR QL STRIP: NEGATIVE
CLARITY UR: CLEAR
COLOR UR: YELLOW
GLUCOSE UR STRIP-MCNC: ABNORMAL MG/DL
HGB UR QL STRIP.AUTO: NEGATIVE
KETONES UR QL STRIP: NEGATIVE
LEUKOCYTE ESTERASE UR QL STRIP.AUTO: NEGATIVE
NITRITE UR QL STRIP: NEGATIVE
PH UR STRIP.AUTO: <=5 [PH] (ref 5–8)
PROT UR QL STRIP: NEGATIVE
SP GR UR STRIP: 1.02 (ref 1–1.03)
UROBILINOGEN UR QL STRIP: ABNORMAL

## 2018-04-29 PROCEDURE — 25010000002 ONDANSETRON PER 1 MG: Performed by: NURSE PRACTITIONER

## 2018-04-29 PROCEDURE — 99284 EMERGENCY DEPT VISIT MOD MDM: CPT

## 2018-04-29 PROCEDURE — 72110 X-RAY EXAM L-2 SPINE 4/>VWS: CPT

## 2018-04-29 PROCEDURE — 25010000002 KETOROLAC TROMETHAMINE PER 15 MG: Performed by: NURSE PRACTITIONER

## 2018-04-29 PROCEDURE — 81003 URINALYSIS AUTO W/O SCOPE: CPT | Performed by: NURSE PRACTITIONER

## 2018-04-29 PROCEDURE — 72220 X-RAY EXAM SACRUM TAILBONE: CPT

## 2018-04-29 PROCEDURE — 96372 THER/PROPH/DIAG INJ SC/IM: CPT

## 2018-04-29 PROCEDURE — 25010000002 HYDROMORPHONE PER 4 MG: Performed by: NURSE PRACTITIONER

## 2018-04-29 RX ORDER — DIAZEPAM 5 MG/1
5 TABLET ORAL ONCE
Status: COMPLETED | OUTPATIENT
Start: 2018-04-29 | End: 2018-04-29

## 2018-04-29 RX ORDER — KETOROLAC TROMETHAMINE 10 MG/1
10 TABLET, FILM COATED ORAL EVERY 6 HOURS PRN
Qty: 8 TABLET | Refills: 0 | Status: SHIPPED | OUTPATIENT
Start: 2018-04-29 | End: 2018-11-19

## 2018-04-29 RX ORDER — KETOROLAC TROMETHAMINE 30 MG/ML
30 INJECTION, SOLUTION INTRAMUSCULAR; INTRAVENOUS ONCE
Status: COMPLETED | OUTPATIENT
Start: 2018-04-29 | End: 2018-04-29

## 2018-04-29 RX ORDER — HYDROMORPHONE HCL 110MG/55ML
1 PATIENT CONTROLLED ANALGESIA SYRINGE INTRAVENOUS ONCE
Status: COMPLETED | OUTPATIENT
Start: 2018-04-29 | End: 2018-04-29

## 2018-04-29 RX ORDER — ONDANSETRON 2 MG/ML
4 INJECTION INTRAMUSCULAR; INTRAVENOUS ONCE
Status: COMPLETED | OUTPATIENT
Start: 2018-04-29 | End: 2018-04-29

## 2018-04-29 RX ADMIN — DIAZEPAM 5 MG: 5 TABLET ORAL at 16:49

## 2018-04-29 RX ADMIN — HYDROMORPHONE HYDROCHLORIDE 1 MG: 2 INJECTION, SOLUTION INTRAMUSCULAR; INTRAVENOUS; SUBCUTANEOUS at 16:38

## 2018-04-29 RX ADMIN — ONDANSETRON 4 MG: 2 INJECTION, SOLUTION INTRAMUSCULAR; INTRAVENOUS at 16:40

## 2018-04-29 RX ADMIN — KETOROLAC TROMETHAMINE 30 MG: 30 INJECTION, SOLUTION INTRAMUSCULAR at 16:02

## 2018-05-03 ENCOUNTER — APPOINTMENT (OUTPATIENT)
Dept: CT IMAGING | Facility: HOSPITAL | Age: 53
End: 2018-05-03

## 2018-05-03 ENCOUNTER — HOSPITAL ENCOUNTER (EMERGENCY)
Facility: HOSPITAL | Age: 53
Discharge: HOME | End: 2018-05-04
Attending: EMERGENCY MEDICINE | Admitting: FAMILY MEDICINE
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
## 2018-05-03 DIAGNOSIS — R10.11 RIGHT UPPER QUADRANT ABDOMINAL PAIN: ICD-10-CM

## 2018-05-03 DIAGNOSIS — R73.9 HYPERGLYCEMIA: ICD-10-CM

## 2018-05-03 LAB
ALBUMIN SERPL-MCNC: 3.7 G/DL (ref 3.5–5)
ALBUMIN/GLOB SERPL: 1.3 G/DL (ref 1.1–2.5)
ALP SERPL-CCNC: 70 U/L (ref 24–120)
ALT SERPL W P-5'-P-CCNC: 42 U/L (ref 0–54)
ANION GAP SERPL CALCULATED.3IONS-SCNC: 13 MMOL/L (ref 4–13)
AST SERPL-CCNC: 32 U/L (ref 7–45)
BASOPHILS # BLD AUTO: 0.04 10*3/MM3 (ref 0–0.2)
BASOPHILS NFR BLD AUTO: 0.5 % (ref 0–2)
BILIRUB SERPL-MCNC: 0.2 MG/DL (ref 0.1–1)
BUN BLD-MCNC: 20 MG/DL (ref 5–21)
BUN/CREAT SERPL: 20.2 (ref 7–25)
CALCIUM SPEC-SCNC: 8.7 MG/DL (ref 8.4–10.4)
CHLORIDE SERPL-SCNC: 106 MMOL/L (ref 98–110)
CO2 SERPL-SCNC: 22 MMOL/L (ref 24–31)
CREAT BLD-MCNC: 0.99 MG/DL (ref 0.5–1.4)
DEPRECATED RDW RBC AUTO: 41.3 FL (ref 40–54)
EOSINOPHIL # BLD AUTO: 0.2 10*3/MM3 (ref 0–0.7)
EOSINOPHIL NFR BLD AUTO: 2.3 % (ref 0–4)
ERYTHROCYTE [DISTWIDTH] IN BLOOD BY AUTOMATED COUNT: 12.6 % (ref 12–15)
GFR SERPL CREATININE-BSD FRML MDRD: 80 ML/MIN/1.73
GLOBULIN UR ELPH-MCNC: 2.8 GM/DL
GLUCOSE BLD-MCNC: 555 MG/DL (ref 70–100)
GLUCOSE BLDC GLUCOMTR-MCNC: 485 MG/DL (ref 70–130)
GLUCOSE BLDC GLUCOMTR-MCNC: 486 MG/DL (ref 70–130)
HCT VFR BLD AUTO: 31.1 % (ref 40–52)
HGB BLD-MCNC: 10.6 G/DL (ref 14–18)
IMM GRANULOCYTES # BLD: 0.15 10*3/MM3 (ref 0–0.03)
IMM GRANULOCYTES NFR BLD: 1.7 % (ref 0–5)
LIPASE SERPL-CCNC: 155 U/L (ref 23–203)
LYMPHOCYTES # BLD AUTO: 2.28 10*3/MM3 (ref 0.72–4.86)
LYMPHOCYTES NFR BLD AUTO: 25.9 % (ref 15–45)
MCH RBC QN AUTO: 31.3 PG (ref 28–32)
MCHC RBC AUTO-ENTMCNC: 34.1 G/DL (ref 33–36)
MCV RBC AUTO: 91.7 FL (ref 82–95)
MONOCYTES # BLD AUTO: 0.72 10*3/MM3 (ref 0.19–1.3)
MONOCYTES NFR BLD AUTO: 8.2 % (ref 4–12)
NEUTROPHILS # BLD AUTO: 5.43 10*3/MM3 (ref 1.87–8.4)
NEUTROPHILS NFR BLD AUTO: 61.4 % (ref 39–78)
NRBC BLD MANUAL-RTO: 0 /100 WBC (ref 0–0)
PLATELET # BLD AUTO: 181 10*3/MM3 (ref 130–400)
PMV BLD AUTO: 12 FL (ref 6–12)
POTASSIUM BLD-SCNC: 5.2 MMOL/L (ref 3.5–5.3)
PROT SERPL-MCNC: 6.5 G/DL (ref 6.3–8.7)
RBC # BLD AUTO: 3.39 10*6/MM3 (ref 4.8–5.9)
SODIUM BLD-SCNC: 141 MMOL/L (ref 135–145)
WBC NRBC COR # BLD: 8.82 10*3/MM3 (ref 4.8–10.8)

## 2018-05-03 PROCEDURE — 25010000002 HYDROMORPHONE PER 4 MG: Performed by: EMERGENCY MEDICINE

## 2018-05-03 PROCEDURE — 99284 EMERGENCY DEPT VISIT MOD MDM: CPT

## 2018-05-03 PROCEDURE — 96374 THER/PROPH/DIAG INJ IV PUSH: CPT

## 2018-05-03 PROCEDURE — 74177 CT ABD & PELVIS W/CONTRAST: CPT

## 2018-05-03 PROCEDURE — 25010000002 ONDANSETRON PER 1 MG: Performed by: EMERGENCY MEDICINE

## 2018-05-03 PROCEDURE — 96375 TX/PRO/DX INJ NEW DRUG ADDON: CPT

## 2018-05-03 PROCEDURE — 83690 ASSAY OF LIPASE: CPT | Performed by: EMERGENCY MEDICINE

## 2018-05-03 PROCEDURE — 25010000002 IOPAMIDOL 61 % SOLUTION: Performed by: FAMILY MEDICINE

## 2018-05-03 PROCEDURE — 85025 COMPLETE CBC W/AUTO DIFF WBC: CPT | Performed by: EMERGENCY MEDICINE

## 2018-05-03 PROCEDURE — 80053 COMPREHEN METABOLIC PANEL: CPT | Performed by: EMERGENCY MEDICINE

## 2018-05-03 PROCEDURE — 82962 GLUCOSE BLOOD TEST: CPT

## 2018-05-03 PROCEDURE — 63710000001 INSULIN REGULAR HUMAN PER 5 UNITS: Performed by: EMERGENCY MEDICINE

## 2018-05-03 RX ORDER — ONDANSETRON 2 MG/ML
4 INJECTION INTRAMUSCULAR; INTRAVENOUS ONCE
Status: COMPLETED | OUTPATIENT
Start: 2018-05-03 | End: 2018-05-03

## 2018-05-03 RX ORDER — SODIUM CHLORIDE 0.9 % (FLUSH) 0.9 %
10 SYRINGE (ML) INJECTION AS NEEDED
Status: DISCONTINUED | OUTPATIENT
Start: 2018-05-03 | End: 2018-05-04 | Stop reason: HOSPADM

## 2018-05-03 RX ORDER — HYDROCODONE BITARTRATE AND ACETAMINOPHEN 7.5; 325 MG/1; MG/1
1 TABLET ORAL EVERY 4 HOURS PRN
Qty: 12 TABLET | Refills: 0 | Status: SHIPPED | OUTPATIENT
Start: 2018-05-03 | End: 2018-11-19

## 2018-05-03 RX ORDER — HYDROMORPHONE HCL 110MG/55ML
1 PATIENT CONTROLLED ANALGESIA SYRINGE INTRAVENOUS ONCE
Status: COMPLETED | OUTPATIENT
Start: 2018-05-03 | End: 2018-05-03

## 2018-05-03 RX ADMIN — ONDANSETRON 4 MG: 2 INJECTION, SOLUTION INTRAMUSCULAR; INTRAVENOUS at 21:47

## 2018-05-03 RX ADMIN — IOPAMIDOL 100 ML: 612 INJECTION, SOLUTION INTRAVENOUS at 22:25

## 2018-05-03 RX ADMIN — INSULIN HUMAN 10 UNITS: 100 INJECTION, SOLUTION PARENTERAL at 22:47

## 2018-05-03 RX ADMIN — HYDROMORPHONE HYDROCHLORIDE 1 MG: 2 INJECTION INTRAMUSCULAR; INTRAVENOUS; SUBCUTANEOUS at 21:47

## 2018-05-04 ENCOUNTER — TELEPHONE (OUTPATIENT)
Dept: INTERNAL MEDICINE | Age: 53
End: 2018-05-04

## 2018-05-04 VITALS
OXYGEN SATURATION: 97 % | SYSTOLIC BLOOD PRESSURE: 117 MMHG | BODY MASS INDEX: 36.57 KG/M2 | RESPIRATION RATE: 16 BRPM | DIASTOLIC BLOOD PRESSURE: 61 MMHG | TEMPERATURE: 98.3 F | HEIGHT: 72 IN | HEART RATE: 77 BPM | WEIGHT: 270 LBS

## 2018-05-04 LAB — GLUCOSE BLDC GLUCOMTR-MCNC: 467 MG/DL (ref 70–130)

## 2018-05-04 NOTE — ED PROVIDER NOTES
Subjective   Patient was getting up at home and fell to right side when he tripped over table.  Fell directly to right side on floor.  Since then having increasing pain in right upper abdomen and can not move or turn without significant pain.        History provided by:  Patient   used: No    Fall   Mechanism of injury: fall    Injury location:  Torso  Torso injury location:  Abdomen  Incident location:  Home  Time since incident:  2 hours  Arrived directly from scene: no    Fall:     Fall occurred:  Standing    Impact surface:  Hard floor    Point of impact:  Unable to specify    Entrapped after fall: no    Protective equipment: none    Suspicion of alcohol use: no    Suspicion of drug use: no    Tetanus status:  Unknown  Prior to arrival data:     Bystander interventions:  None    Patient ambulatory at scene: yes      Blood loss:  None    Responsiveness at scene:  Alert    Orientation at scene:  Person, place, situation and time    Loss of consciousness: no      Amnesic to event: no      Airway interventions:  None    Breathing interventions:  None    IV access status:  None    IO access:  None    Fluids administered:  None    Cardiac interventions:  None    Medications administered:  None    Immobilization:  None  Associated symptoms: abdominal pain and chest pain        Review of Systems   Constitutional: Negative.    HENT: Negative.    Respiratory: Negative.    Cardiovascular: Positive for chest pain.   Gastrointestinal: Positive for abdominal pain.   Genitourinary: Negative.    Musculoskeletal: Negative.    Skin: Negative.    Neurological: Negative.    Hematological: Negative.    Psychiatric/Behavioral: Negative.    All other systems reviewed and are negative.      Past Medical History:   Diagnosis Date   • Allergic rhinitis    • Anxiety    • Bursitis    • DDD (degenerative disc disease), cervical    • Depression    • HIV disease    • HLD (hyperlipidemia)    • HTN (hypertension)    •  Myocardial infarction    • Osteoporosis    • Sleep apnea    • Type 2 diabetes mellitus        Allergies   Allergen Reactions   • Amitriptyline Anaphylaxis   • Amitriptyline Hcl Anaphylaxis   • Darvon [Propoxyphene] Anaphylaxis   • Zithromax [Azithromycin] Anaphylaxis       Past Surgical History:   Procedure Laterality Date   • ANKLE SURGERY     • FOREARM SURGERY     • LYMPH NODE BIOPSY         Family History   Problem Relation Age of Onset   • Diabetes Mother    • Hypertension Mother    • Thyroid disease Mother    • Hypertension Father    • Thyroid disease Father    • Arrhythmia Father    • Diabetes Maternal Grandfather    • Heart disease Maternal Grandfather    • Hypertension Maternal Grandfather    • Diabetes Paternal Grandmother    • Stroke Paternal Grandmother    • Heart disease Paternal Grandmother    • Hypertension Paternal Grandmother    • Thyroid disease Paternal Grandmother    • Hypertension Paternal Grandfather    • Hypertension Sister    • No Known Problems Brother        Social History     Social History   • Marital status:      Social History Main Topics   • Smoking status: Current Every Day Smoker     Packs/day: 1.00     Types: Cigarettes     Start date: 1988   • Smokeless tobacco: Never Used   • Alcohol use Yes      Comment: occasionally   • Drug use: Unknown     Other Topics Concern   • Not on file       Prior to Admission medications    Medication Sig Start Date End Date Taking? Authorizing Provider   albuterol (PROVENTIL HFA;VENTOLIN HFA) 108 (90 BASE) MCG/ACT inhaler Inhale 2 puffs every 6 (six) hours as needed for wheezing.    Historical Provider, MD   atorvastatin (LIPITOR) 80 MG tablet Take 80 mg by mouth daily.    Historical Provider, MD   calcium carbonate (OS-THEODORA) 600 MG tablet Take 600 mg by mouth Daily.    Historical Provider, MD   cholecalciferol (VITAMIN D3) 1000 UNITS tablet Take 1,000 Units by mouth daily.    Historical Provider, MD   clonazePAM (KlonoPIN) 0.5 MG tablet Take  0.5 mg by mouth 3 (Three) Times a Day As Needed for Anxiety.    Nurse Epic Emergency, RN   denosumab (PROLIA) 60 MG/ML solution syringe Inject 60 mg under the skin Every 6 (Six) Months.    Historical Provider, MD YatesXigvhdj-Skogu-Nrjsxtjw-TenofAF (GENVOYA) [phone] MG tablet Take 1 tablet by mouth daily.    Historical Provider, MD   esomeprazole (NexIUM) 40 MG capsule Take 40 mg by mouth every morning before breakfast.    Historical Provider, MD   fluticasone (FLONASE) 50 MCG/ACT nasal spray 2 sprays into each nostril Daily.  Patient taking differently: 2 sprays into each nostril Daily As Needed. 10/11/16   RAISA Tenorio   gabapentin (NEURONTIN) 600 MG tablet Take 800 mg by mouth 3 (Three) Times a Day. Takes 2 twice a day    Historical Provider, MD   guaiFENesin (MUCINEX) 600 MG 12 hr tablet Take 600 mg by mouth 2 (two) times a day as needed for cough.    Historical Provider, MD   HYDROcodone-acetaminophen (NORCO) 5-325 MG per tablet Take 1 tablet by mouth 2 (two) times a day as needed.    Historical Provider, MD   hydrOXYzine (VISTARIL) 25 MG capsule Take 25 mg by mouth every 6 (six) hours as needed for itching.    Historical Provider, MD   insulin aspart (NOVOLOG FLEXPEN) 100 UNIT/ML solution pen-injector sc pen Inject 80 Units under the skin 4 (Four) Times a Day With Meals & at Bedtime.    Historical Provider, MD   insulin detemir (LEVEMIR) 100 UNIT/ML injection Inject 140 Units under the skin Every Night.    Historical Provider, MD   ketorolac (TORADOL) 10 MG tablet Take 1 tablet by mouth Every 6 (Six) Hours As Needed for Moderate Pain . 4/29/18   SOBIA Chacko   loperamide (IMODIUM) 2 MG capsule Take 2 mg by mouth daily as needed for diarrhea.    Historical Provider, MD   loratadine (CLARITIN) 10 MG tablet Take 10 mg by mouth daily.    Historical Provider, MD   losartan (COZAAR) 100 MG tablet Take 100 mg by mouth daily.    Historical Provider, MD   meclizine (ANTIVERT) 25 MG tablet  Take 25 mg by mouth Daily As Needed.    Historical Provider, MD   metFORMIN (GLUCOPHAGE) 1000 MG tablet Take 1,000 mg by mouth 2 (two) times a day with meals.    Historical Provider, MD   miconazole (MICOTIN) 2 % cream Apply 1 application topically 2 (two) times a day.    Historical Provider, MD   neomycin-bacitracin-polymyxin (NEOSPORIN) 5-400-5000 ointment Apply 1 application topically 4 (four) times a day as needed.    Historical Provider, MD   pregabalin (LYRICA) 50 MG capsule Take 100 mg by mouth 2 (Two) Times a Day.    Historical Provider, MD   Prenatal Vit-Fe Fumarate-FA (VOL-PLUS) 27-1 MG tablet Take 1 tablet by mouth daily.    Historical Provider, MD   promethazine (PHENERGAN) 25 MG tablet Take 25 mg by mouth every 6 (six) hours as needed for nausea or vomiting.    Historical Provider, MD   propranolol LA (INDERAL LA) 60 MG 24 hr capsule Take 60 mg by mouth daily.    Historical Provider, MD   rizatriptan (MAXALT) 10 MG tablet Take 10 mg by mouth 1 (one) time as needed for migraine. May repeat in 2 hours if needed    Historical Provider, MD   sodium chloride (OCEAN) 0.65 % nasal spray 1 spray into each nostril as needed for congestion.    Historical Provider, MD   tiZANidine (ZANAFLEX) 4 MG tablet Take 6 mg by mouth Every 6 (Six) Hours As Needed for Muscle Spasms.    Historical Provider, MD   traZODone (DESYREL) 150 MG tablet Take 100 mg by mouth Every Night.    Historical Provider, MD   varenicline (CHANTIX) 1 MG tablet Take 1 mg by mouth 2 (Two) Times a Day.    Historical Provider, MD   Vortioxetine HBr (TRINTELLIX) 5 MG tablet Take 20 mg by mouth Daily With Breakfast.    Historical Provider, MD       Medications   sodium chloride 0.9 % flush 10 mL (not administered)   HYDROmorphone (DILAUDID) injection 1 mg (1 mg Intravenous Given 5/3/18 2147)   ondansetron (ZOFRAN) injection 4 mg (4 mg Intravenous Given 5/3/18 2147)   insulin regular (humuLIN R,novoLIN R) injection 10 Units (10 Units Intravenous Given  5/3/18 2247)   iopamidol (ISOVUE-300) 61 % injection 100 mL (100 mL Intravenous Given 5/3/18 2225)       Vitals:    05/03/18 2329   BP: 114/74   Pulse: 88   Resp:    Temp:    SpO2: 94%         Objective   Physical Exam   Constitutional: He is oriented to person, place, and time. He appears well-developed and well-nourished.   HENT:   Head: Normocephalic and atraumatic.   Neck: Normal range of motion. Neck supple.   Cardiovascular: Normal rate.    Pulmonary/Chest: Effort normal and breath sounds normal.   Abdominal: Soft. Bowel sounds are normal. There is tenderness.   Marked tenderness to palpation RUQ and flank, no rebound or percussion tenderness   Musculoskeletal: Normal range of motion.   Neurological: He is alert and oriented to person, place, and time.   Skin: Skin is warm and dry.   Psychiatric: He has a normal mood and affect. His behavior is normal.   Nursing note and vitals reviewed.      Procedures         Lab Results (last 24 hours)     Procedure Component Value Units Date/Time    CBC & Differential [[id number]] Collected:  05/03/18 2146    Specimen:  Blood Updated:  05/03/18 2154    Narrative:       The following orders were created for panel order CBC & Differential.  Procedure                               Abnormality         Status                     ---------                               -----------         ------                     CBC Auto Differential[[id number]]        Abnormal            Final result                 Please view results for these tests on the individual orders.    Comprehensive Metabolic Panel [[id number]]  (Abnormal) Collected:  05/03/18 2146    Specimen:  Blood Updated:  05/03/18 2207     Glucose 555 (C) mg/dL      BUN 20 mg/dL      Creatinine 0.99 mg/dL      Sodium 141 mmol/L      Potassium 5.2 mmol/L      Chloride 106 mmol/L      CO2 22.0 (L) mmol/L      Calcium 8.7 mg/dL      Total Protein 6.5 g/dL      Albumin 3.70 g/dL      ALT (SGPT) 42 U/L      AST (SGOT) 32 U/L       Alkaline Phosphatase 70 U/L      Total Bilirubin 0.2 mg/dL      eGFR Non African Amer 80 mL/min/1.73      Globulin 2.8 gm/dL      A/G Ratio 1.3 g/dL      BUN/Creatinine Ratio 20.2     Anion Gap 13.0 mmol/L     Lipase [[id number]]  (Normal) Collected:  05/03/18 2146    Specimen:  Blood Updated:  05/03/18 2204     Lipase 155 U/L     CBC Auto Differential [[id number]]  (Abnormal) Collected:  05/03/18 2146    Specimen:  Blood Updated:  05/03/18 2154     WBC 8.82 10*3/mm3      RBC 3.39 (L) 10*6/mm3      Hemoglobin 10.6 (L) g/dL      Hematocrit 31.1 (L) %      MCV 91.7 fL      MCH 31.3 pg      MCHC 34.1 g/dL      RDW 12.6 %      RDW-SD 41.3 fl      MPV 12.0 fL      Platelets 181 10*3/mm3      Neutrophil % 61.4 %      Lymphocyte % 25.9 %      Monocyte % 8.2 %      Eosinophil % 2.3 %      Basophil % 0.5 %      Immature Grans % 1.7 %      Neutrophils, Absolute 5.43 10*3/mm3      Lymphocytes, Absolute 2.28 10*3/mm3      Monocytes, Absolute 0.72 10*3/mm3      Eosinophils, Absolute 0.20 10*3/mm3      Basophils, Absolute 0.04 10*3/mm3      Immature Grans, Absolute 0.15 (H) 10*3/mm3      nRBC 0.0 /100 WBC     POC Glucose Once [[id number]]  (Abnormal) Collected:  05/03/18 2244    Specimen:  Blood Updated:  05/03/18 2259     Glucose 486 (C) mg/dL      Comment: : [id number] Cortez AndrewMeter ID: [id number]       POC Glucose Once [[id number]]  (Abnormal) Collected:  05/03/18 2245    Specimen:  Blood Updated:  05/03/18 2259     Glucose 485 (C) mg/dL      Comment: : [id number] Cortez AndrewMeter ID: [id number]             CT Abdomen Pelvis With Contrast    (Results Pending)       ED Course  ED Course   Comment By Time   Left with Dr. Ramos at shift change. Steve Peralta Jr., MD 05/03 2242          [facility]    Final diagnoses:   Fall, initial encounter   Right upper quadrant abdominal pain   Hyperglycemia     Patient will be discharged home in stable condition.  Patient reports to me that he needs to take his insulin at home.  He does  take 20 units every evening.  He is also going to be more mindful of keeping a glucose diary.  Finally patient will be given a small prescription for Norco for his contusions.  I will return here if symptoms change or worsen.     Charisma Ramos,   05/03/18 2875

## 2018-05-05 ENCOUNTER — HOSPITAL ENCOUNTER (EMERGENCY)
Age: 53
Discharge: HOME | End: 2018-05-05
Attending: EMERGENCY MEDICINE
Payer: MEDICARE

## 2018-05-05 ENCOUNTER — APPOINTMENT (OUTPATIENT)
Dept: GENERAL RADIOLOGY | Age: 53
End: 2018-05-05
Payer: MEDICARE

## 2018-05-05 VITALS
SYSTOLIC BLOOD PRESSURE: 117 MMHG | WEIGHT: 270 LBS | HEART RATE: 82 BPM | RESPIRATION RATE: 14 BRPM | TEMPERATURE: 98.2 F | OXYGEN SATURATION: 97 % | BODY MASS INDEX: 36.57 KG/M2 | HEIGHT: 72 IN | DIASTOLIC BLOOD PRESSURE: 68 MMHG

## 2018-05-05 DIAGNOSIS — S29.8XXA BLUNT CHEST TRAUMA, INITIAL ENCOUNTER: Primary | ICD-10-CM

## 2018-05-05 PROCEDURE — 99284 EMERGENCY DEPT VISIT MOD MDM: CPT

## 2018-05-05 PROCEDURE — 71101 X-RAY EXAM UNILAT RIBS/CHEST: CPT

## 2018-05-05 PROCEDURE — 99284 EMERGENCY DEPT VISIT MOD MDM: CPT | Performed by: EMERGENCY MEDICINE

## 2018-05-05 PROCEDURE — 6370000000 HC RX 637 (ALT 250 FOR IP): Performed by: EMERGENCY MEDICINE

## 2018-05-05 PROCEDURE — 94664 DEMO&/EVAL PT USE INHALER: CPT

## 2018-05-05 RX ORDER — OXYCODONE AND ACETAMINOPHEN 10; 325 MG/1; MG/1
1 TABLET ORAL ONCE
Status: COMPLETED | OUTPATIENT
Start: 2018-05-05 | End: 2018-05-05

## 2018-05-05 RX ADMIN — OXYCODONE HYDROCHLORIDE AND ACETAMINOPHEN 1 TABLET: 10; 325 TABLET ORAL at 10:50

## 2018-05-05 ASSESSMENT — PAIN SCALES - GENERAL
PAINLEVEL_OUTOF10: 10
PAINLEVEL_OUTOF10: 2
PAINLEVEL_OUTOF10: 10

## 2018-05-05 ASSESSMENT — ENCOUNTER SYMPTOMS
BACK PAIN: 0
DIARRHEA: 0
SHORTNESS OF BREATH: 1
RHINORRHEA: 0
COUGH: 0
NAUSEA: 0
VOMITING: 0
ABDOMINAL PAIN: 0
SORE THROAT: 0

## 2018-05-05 ASSESSMENT — PAIN DESCRIPTION - LOCATION: LOCATION: RIB CAGE

## 2018-05-05 ASSESSMENT — PAIN DESCRIPTION - ORIENTATION: ORIENTATION: RIGHT

## 2018-05-05 ASSESSMENT — PAIN DESCRIPTION - PAIN TYPE: TYPE: ACUTE PAIN

## 2018-05-09 ENCOUNTER — APPOINTMENT (OUTPATIENT)
Dept: ONCOLOGY | Facility: HOSPITAL | Age: 53
End: 2018-05-09

## 2018-05-18 ENCOUNTER — APPOINTMENT (OUTPATIENT)
Dept: ONCOLOGY | Facility: HOSPITAL | Age: 53
End: 2018-05-18

## 2018-05-18 RX ORDER — LANCETS 30 GAUGE
EACH MISCELLANEOUS
Qty: 400 EACH | Refills: 3 | Status: SHIPPED | OUTPATIENT
Start: 2018-05-18 | End: 2022-09-27

## 2018-05-22 ENCOUNTER — TELEPHONE (OUTPATIENT)
Dept: INTERNAL MEDICINE | Age: 53
End: 2018-05-22

## 2018-05-22 RX ORDER — AMOXICILLIN AND CLAVULANATE POTASSIUM 875; 125 MG/1; MG/1
1 TABLET, FILM COATED ORAL 2 TIMES DAILY
Qty: 20 TABLET | Refills: 0 | Status: SHIPPED | OUTPATIENT
Start: 2018-05-22 | End: 2018-06-01

## 2018-06-06 DIAGNOSIS — N28.9 ACUTE RENAL INSUFFICIENCY: Primary | ICD-10-CM

## 2018-06-15 ENCOUNTER — TRANSCRIBE ORDERS (OUTPATIENT)
Dept: ADMINISTRATIVE | Facility: HOSPITAL | Age: 53
End: 2018-06-15

## 2018-06-15 ENCOUNTER — APPOINTMENT (OUTPATIENT)
Dept: LAB | Facility: HOSPITAL | Age: 53
End: 2018-06-15

## 2018-06-15 DIAGNOSIS — Z79.899 ENCOUNTER FOR LONG-TERM (CURRENT) USE OF MEDICATIONS: Primary | ICD-10-CM

## 2018-06-15 LAB
AMPHET+METHAMPHET UR QL: NEGATIVE
BARBITURATES UR QL SCN: NEGATIVE
BENZODIAZ UR QL SCN: NEGATIVE
CANNABINOIDS SERPL QL: NEGATIVE
COCAINE UR QL: NEGATIVE
METHADONE UR QL SCN: NEGATIVE
OPIATES UR QL: POSITIVE
PCP UR QL SCN: NEGATIVE

## 2018-06-15 PROCEDURE — 80307 DRUG TEST PRSMV CHEM ANLYZR: CPT | Performed by: PSYCHIATRY & NEUROLOGY

## 2018-06-21 RX ORDER — FLUCONAZOLE 200 MG/1
200 TABLET ORAL DAILY
Qty: 3 TABLET | Refills: 0 | Status: SHIPPED | OUTPATIENT
Start: 2018-06-21 | End: 2018-06-24

## 2018-06-21 RX ORDER — TIZANIDINE 2 MG/1
2 TABLET ORAL 2 TIMES DAILY PRN
Qty: 60 TABLET | Refills: 0 | Status: SHIPPED | OUTPATIENT
Start: 2018-06-21 | End: 2020-04-03

## 2018-06-27 ENCOUNTER — APPOINTMENT (OUTPATIENT)
Dept: ONCOLOGY | Facility: HOSPITAL | Age: 53
End: 2018-06-27

## 2018-06-28 ENCOUNTER — APPOINTMENT (OUTPATIENT)
Dept: CT IMAGING | Facility: HOSPITAL | Age: 53
End: 2018-06-28

## 2018-06-28 ENCOUNTER — HOSPITAL ENCOUNTER (EMERGENCY)
Facility: HOSPITAL | Age: 53
Discharge: HOME | End: 2018-06-28
Attending: FAMILY MEDICINE | Admitting: FAMILY MEDICINE

## 2018-06-28 VITALS
DIASTOLIC BLOOD PRESSURE: 65 MMHG | RESPIRATION RATE: 16 BRPM | WEIGHT: 264 LBS | HEIGHT: 72 IN | BODY MASS INDEX: 35.76 KG/M2 | SYSTOLIC BLOOD PRESSURE: 115 MMHG | OXYGEN SATURATION: 95 % | TEMPERATURE: 98.4 F | HEART RATE: 107 BPM

## 2018-06-28 DIAGNOSIS — R73.9 HYPERGLYCEMIA: Primary | ICD-10-CM

## 2018-06-28 DIAGNOSIS — G43.809 OTHER MIGRAINE WITHOUT STATUS MIGRAINOSUS, NOT INTRACTABLE: ICD-10-CM

## 2018-06-28 LAB
ANION GAP SERPL CALCULATED.3IONS-SCNC: 14 MMOL/L (ref 4–13)
ARTERIAL PATENCY WRIST A: POSITIVE
ATMOSPHERIC PRESS: 752 MMHG
BASE EXCESS BLDA CALC-SCNC: -3.4 MMOL/L (ref 0–2)
BASOPHILS # BLD AUTO: 0.03 10*3/MM3 (ref 0–0.2)
BASOPHILS NFR BLD AUTO: 0.3 % (ref 0–2)
BDY SITE: ABNORMAL
BODY TEMPERATURE: 37 C
BUN BLD-MCNC: 27 MG/DL (ref 5–21)
BUN/CREAT SERPL: 13.1 (ref 7–25)
CALCIUM SPEC-SCNC: 8.8 MG/DL (ref 8.4–10.4)
CHLORIDE SERPL-SCNC: 102 MMOL/L (ref 98–110)
CO2 SERPL-SCNC: 22 MMOL/L (ref 24–31)
CREAT BLD-MCNC: 2.06 MG/DL (ref 0.5–1.4)
DEPRECATED RDW RBC AUTO: 44.2 FL (ref 40–54)
EOSINOPHIL # BLD AUTO: 0.14 10*3/MM3 (ref 0–0.7)
EOSINOPHIL NFR BLD AUTO: 1.2 % (ref 0–4)
ERYTHROCYTE [DISTWIDTH] IN BLOOD BY AUTOMATED COUNT: 13.1 % (ref 12–15)
GFR SERPL CREATININE-BSD FRML MDRD: 34 ML/MIN/1.73
GLUCOSE BLD-MCNC: 586 MG/DL (ref 70–100)
HCO3 BLDA-SCNC: 21.5 MMOL/L (ref 20–26)
HCT VFR BLD AUTO: 35.9 % (ref 40–52)
HGB BLD-MCNC: 11.9 G/DL (ref 14–18)
IMM GRANULOCYTES # BLD: 0.08 10*3/MM3 (ref 0–0.03)
IMM GRANULOCYTES NFR BLD: 0.7 % (ref 0–5)
LYMPHOCYTES # BLD AUTO: 1.92 10*3/MM3 (ref 0.72–4.86)
LYMPHOCYTES NFR BLD AUTO: 16.7 % (ref 15–45)
Lab: ABNORMAL
MCH RBC QN AUTO: 30.7 PG (ref 28–32)
MCHC RBC AUTO-ENTMCNC: 33.1 G/DL (ref 33–36)
MCV RBC AUTO: 92.8 FL (ref 82–95)
MODALITY: ABNORMAL
MONOCYTES # BLD AUTO: 0.97 10*3/MM3 (ref 0.19–1.3)
MONOCYTES NFR BLD AUTO: 8.4 % (ref 4–12)
NEUTROPHILS # BLD AUTO: 8.34 10*3/MM3 (ref 1.87–8.4)
NEUTROPHILS NFR BLD AUTO: 72.7 % (ref 39–78)
NRBC BLD MANUAL-RTO: 0 /100 WBC (ref 0–0)
PCO2 BLDA: 37.3 MM HG (ref 35–45)
PH BLDA: 7.37 PH UNITS (ref 7.35–7.45)
PLATELET # BLD AUTO: 187 10*3/MM3 (ref 130–400)
PMV BLD AUTO: 12 FL (ref 6–12)
PO2 BLDA: 80.3 MM HG (ref 83–108)
POTASSIUM BLD-SCNC: 4.8 MMOL/L (ref 3.5–5.3)
RBC # BLD AUTO: 3.87 10*6/MM3 (ref 4.8–5.9)
SAO2 % BLDCOA: 96.6 % (ref 94–99)
SODIUM BLD-SCNC: 138 MMOL/L (ref 135–145)
VENTILATOR MODE: ABNORMAL
WBC NRBC COR # BLD: 11.48 10*3/MM3 (ref 4.8–10.8)

## 2018-06-28 PROCEDURE — 70450 CT HEAD/BRAIN W/O DYE: CPT

## 2018-06-28 PROCEDURE — 85025 COMPLETE CBC W/AUTO DIFF WBC: CPT | Performed by: FAMILY MEDICINE

## 2018-06-28 PROCEDURE — 82803 BLOOD GASES ANY COMBINATION: CPT

## 2018-06-28 PROCEDURE — 36415 COLL VENOUS BLD VENIPUNCTURE: CPT

## 2018-06-28 PROCEDURE — 36600 WITHDRAWAL OF ARTERIAL BLOOD: CPT

## 2018-06-28 PROCEDURE — 25010000002 DIPHENHYDRAMINE PER 50 MG: Performed by: FAMILY MEDICINE

## 2018-06-28 PROCEDURE — 25010000002 METOCLOPRAMIDE PER 10 MG: Performed by: FAMILY MEDICINE

## 2018-06-28 PROCEDURE — 63710000001 INSULIN REGULAR HUMAN PER 5 UNITS: Performed by: FAMILY MEDICINE

## 2018-06-28 PROCEDURE — 80048 BASIC METABOLIC PNL TOTAL CA: CPT | Performed by: FAMILY MEDICINE

## 2018-06-28 PROCEDURE — 96372 THER/PROPH/DIAG INJ SC/IM: CPT

## 2018-06-28 PROCEDURE — 36415 COLL VENOUS BLD VENIPUNCTURE: CPT | Performed by: FAMILY MEDICINE

## 2018-06-28 PROCEDURE — 99283 EMERGENCY DEPT VISIT LOW MDM: CPT

## 2018-06-28 PROCEDURE — 25010000002 KETOROLAC TROMETHAMINE PER 15 MG: Performed by: FAMILY MEDICINE

## 2018-06-28 RX ORDER — DEXAMETHASONE SODIUM PHOSPHATE 10 MG/ML
10 INJECTION INTRAMUSCULAR; INTRAVENOUS ONCE
Status: DISCONTINUED | OUTPATIENT
Start: 2018-06-28 | End: 2018-06-28

## 2018-06-28 RX ORDER — KETOROLAC TROMETHAMINE 30 MG/ML
60 INJECTION, SOLUTION INTRAMUSCULAR; INTRAVENOUS ONCE
Status: COMPLETED | OUTPATIENT
Start: 2018-06-28 | End: 2018-06-28

## 2018-06-28 RX ORDER — METOCLOPRAMIDE HYDROCHLORIDE 5 MG/ML
10 INJECTION INTRAMUSCULAR; INTRAVENOUS ONCE
Status: COMPLETED | OUTPATIENT
Start: 2018-06-28 | End: 2018-06-28

## 2018-06-28 RX ORDER — DIPHENHYDRAMINE HYDROCHLORIDE 50 MG/ML
50 INJECTION INTRAMUSCULAR; INTRAVENOUS ONCE
Status: COMPLETED | OUTPATIENT
Start: 2018-06-28 | End: 2018-06-28

## 2018-06-28 RX ORDER — SUMATRIPTAN 6 MG/.5ML
6 INJECTION, SOLUTION SUBCUTANEOUS ONCE
Status: COMPLETED | OUTPATIENT
Start: 2018-06-28 | End: 2018-06-28

## 2018-06-28 RX ADMIN — DIPHENHYDRAMINE HYDROCHLORIDE 50 MG: 50 INJECTION, SOLUTION INTRAMUSCULAR; INTRAVENOUS at 22:05

## 2018-06-28 RX ADMIN — KETOROLAC TROMETHAMINE 60 MG: 30 INJECTION, SOLUTION INTRAMUSCULAR at 22:04

## 2018-06-28 RX ADMIN — SUMATRIPTAN 6 MG: 6 INJECTION, SOLUTION SUBCUTANEOUS at 22:05

## 2018-06-28 RX ADMIN — METOCLOPRAMIDE 10 MG: 5 INJECTION, SOLUTION INTRAMUSCULAR; INTRAVENOUS at 22:05

## 2018-06-28 RX ADMIN — INSULIN HUMAN 10 UNITS: 100 INJECTION, SOLUTION PARENTERAL at 22:53

## 2018-06-29 ENCOUNTER — TELEPHONE (OUTPATIENT)
Dept: INTERNAL MEDICINE | Age: 53
End: 2018-06-29

## 2018-06-29 NOTE — TELEPHONE ENCOUNTER
He states that Maxalt is no longer helping with headaches. He had to go to ER to get an inj.  on June 28th. He would like to switch to something else for migraines. Aware that he will not get a call back until after July 9th.

## 2018-07-03 RX ORDER — INSULIN ASPART 100 [IU]/ML
INJECTION, SOLUTION INTRAVENOUS; SUBCUTANEOUS
Qty: 105 ML | Refills: 3 | Status: SHIPPED | OUTPATIENT
Start: 2018-07-03 | End: 2018-08-27 | Stop reason: CLARIF

## 2018-07-09 ENCOUNTER — APPOINTMENT (OUTPATIENT)
Dept: LAB | Facility: HOSPITAL | Age: 53
End: 2018-07-09

## 2018-07-09 ENCOUNTER — TRANSCRIBE ORDERS (OUTPATIENT)
Dept: ADMINISTRATIVE | Facility: HOSPITAL | Age: 53
End: 2018-07-09

## 2018-07-09 DIAGNOSIS — M81.0 AGE-RELATED OSTEOPOROSIS WITHOUT CURRENT PATHOLOGICAL FRACTURE: Primary | ICD-10-CM

## 2018-07-09 LAB
25(OH)D3 SERPL-MCNC: 35.8 NG/ML (ref 30–100)
CALCIUM SPEC-SCNC: 9.4 MG/DL (ref 8.4–10.4)
MAGNESIUM SERPL-MCNC: 1.2 MG/DL (ref 1.4–2.2)
PHOSPHATE SERPL-MCNC: 3.2 MG/DL (ref 2.5–4.5)

## 2018-07-09 PROCEDURE — 84100 ASSAY OF PHOSPHORUS: CPT | Performed by: NURSE PRACTITIONER

## 2018-07-09 PROCEDURE — 82306 VITAMIN D 25 HYDROXY: CPT | Performed by: NURSE PRACTITIONER

## 2018-07-09 PROCEDURE — 83735 ASSAY OF MAGNESIUM: CPT | Performed by: NURSE PRACTITIONER

## 2018-07-09 PROCEDURE — 36415 COLL VENOUS BLD VENIPUNCTURE: CPT

## 2018-07-09 PROCEDURE — 82310 ASSAY OF CALCIUM: CPT | Performed by: NURSE PRACTITIONER

## 2018-07-10 ENCOUNTER — INFUSION (OUTPATIENT)
Dept: ONCOLOGY | Facility: HOSPITAL | Age: 53
End: 2018-07-10

## 2018-07-10 VITALS
WEIGHT: 269 LBS | DIASTOLIC BLOOD PRESSURE: 73 MMHG | OXYGEN SATURATION: 99 % | HEART RATE: 116 BPM | RESPIRATION RATE: 20 BRPM | SYSTOLIC BLOOD PRESSURE: 139 MMHG | BODY MASS INDEX: 36.44 KG/M2 | HEIGHT: 72 IN | TEMPERATURE: 98.2 F

## 2018-07-10 DIAGNOSIS — M81.0 AGE-RELATED OSTEOPOROSIS WITHOUT CURRENT PATHOLOGICAL FRACTURE: Primary | ICD-10-CM

## 2018-07-10 PROCEDURE — 25010000002 DENOSUMAB 60 MG/ML SOLUTION: Performed by: NURSE PRACTITIONER

## 2018-07-10 PROCEDURE — 96372 THER/PROPH/DIAG INJ SC/IM: CPT

## 2018-07-10 RX ADMIN — DENOSUMAB 60 MG: 60 INJECTION SUBCUTANEOUS at 15:21

## 2018-07-10 NOTE — PATIENT INSTRUCTIONS
Denosumab injection  What is this medicine?  DENOSUMAB (den oh kash mab) slows bone breakdown. Prolia is used to treat osteoporosis in women after menopause and in men. Xgeva is used to treat a high calcium level due to cancer and to prevent bone fractures and other bone problems caused by multiple myeloma or cancer bone metastases. Xgeva is also used to treat giant cell tumor of the bone.  This medicine may be used for other purposes; ask your health care provider or pharmacist if you have questions.  COMMON BRAND NAME(S): Prolia, XGEVA  What should I tell my health care provider before I take this medicine?  They need to know if you have any of these conditions:  -dental disease  -having surgery or tooth extraction  -infection  -kidney disease  -low levels of calcium or Vitamin D in the blood  -malnutrition  -on hemodialysis  -skin conditions or sensitivity  -thyroid or parathyroid disease  -an unusual reaction to denosumab, other medicines, foods, dyes, or preservatives  -pregnant or trying to get pregnant  -breast-feeding  How should I use this medicine?  This medicine is for injection under the skin. It is given by a health care professional in a hospital or clinic setting.  If you are getting Prolia, a special MedGuide will be given to you by the pharmacist with each prescription and refill. Be sure to read this information carefully each time.  For Prolia, talk to your pediatrician regarding the use of this medicine in children. Special care may be needed. For Xgeva, talk to your pediatrician regarding the use of this medicine in children. While this drug may be prescribed for children as young as 13 years for selected conditions, precautions do apply.  Overdosage: If you think you have taken too much of this medicine contact a poison control center or emergency room at once.  NOTE: This medicine is only for you. Do not share this medicine with others.  What if I miss a dose?  It is important not to miss your  dose. Call your doctor or health care professional if you are unable to keep an appointment.  What may interact with this medicine?  Do not take this medicine with any of the following medications:  -other medicines containing denosumab  This medicine may also interact with the following medications:  -medicines that lower your chance of fighting infection  -steroid medicines like prednisone or cortisone  This list may not describe all possible interactions. Give your health care provider a list of all the medicines, herbs, non-prescription drugs, or dietary supplements you use. Also tell them if you smoke, drink alcohol, or use illegal drugs. Some items may interact with your medicine.  What should I watch for while using this medicine?  Visit your doctor or health care professional for regular checks on your progress. Your doctor or health care professional may order blood tests and other tests to see how you are doing.  Call your doctor or health care professional for advice if you get a fever, chills or sore throat, or other symptoms of a cold or flu. Do not treat yourself. This drug may decrease your body's ability to fight infection. Try to avoid being around people who are sick.  You should make sure you get enough calcium and vitamin D while you are taking this medicine, unless your doctor tells you not to. Discuss the foods you eat and the vitamins you take with your health care professional.  See your dentist regularly. Brush and floss your teeth as directed. Before you have any dental work done, tell your dentist you are receiving this medicine.  Do not become pregnant while taking this medicine or for 5 months after stopping it. Talk with your doctor or health care professional about your birth control options while taking this medicine. Women should inform their doctor if they wish to become pregnant or think they might be pregnant. There is a potential for serious side effects to an unborn child. Talk  to your health care professional or pharmacist for more information.  What side effects may I notice from receiving this medicine?  Side effects that you should report to your doctor or health care professional as soon as possible:  -allergic reactions like skin rash, itching or hives, swelling of the face, lips, or tongue  -bone pain  -breathing problems  -dizziness  -jaw pain, especially after dental work  -redness, blistering, peeling of the skin  -signs and symptoms of infection like fever or chills; cough; sore throat; pain or trouble passing urine  -signs of low calcium like fast heartbeat, muscle cramps or muscle pain; pain, tingling, numbness in the hands or feet; seizures  -unusual bleeding or bruising  -unusually weak or tired  Side effects that usually do not require medical attention (report to your doctor or health care professional if they continue or are bothersome):  -constipation  -diarrhea  -headache  -joint pain  -loss of appetite  -muscle pain  -runny nose  -tiredness  -upset stomach  This list may not describe all possible side effects. Call your doctor for medical advice about side effects. You may report side effects to FDA at 9-301-FDA-1877.  Where should I keep my medicine?  This medicine is only given in a clinic, doctor's office, or other health care setting and will not be stored at home.  NOTE: This sheet is a summary. It may not cover all possible information. If you have questions about this medicine, talk to your doctor, pharmacist, or health care provider.  © 2018 [employer] (2018-01-09 19:17:21)

## 2018-07-10 NOTE — PROGRESS NOTES
Patient arrives for Prolia injection.  Side effects reviewed w/patient including importance of continuing Calcium/Vitamin D, avoiding invasive dental procedures, and following up w/labs prior to patient return in 6 months.  Patient v/u and discharged in stable condition.  Patient reports on-going suicidal thoughts but he is in therapy.  He states he has no plans to act on these and states his providers are aware.  Confirmed he has a support person to call if his ideas become more than ideas.  Patient reminded to reach out if he needs help.  NERY Loja RN

## 2018-07-31 DIAGNOSIS — E11.9 TYPE 2 DIABETES MELLITUS WITHOUT COMPLICATION, WITH LONG-TERM CURRENT USE OF INSULIN (HCC): ICD-10-CM

## 2018-07-31 DIAGNOSIS — Z79.4 TYPE 2 DIABETES MELLITUS WITHOUT COMPLICATION, WITH LONG-TERM CURRENT USE OF INSULIN (HCC): ICD-10-CM

## 2018-07-31 RX ORDER — INSULIN DETEMIR 100 [IU]/ML
INJECTION, SOLUTION SUBCUTANEOUS
Qty: 45 ML | Refills: 3 | Status: SHIPPED | OUTPATIENT
Start: 2018-07-31 | End: 2018-08-27 | Stop reason: CLARIF

## 2018-08-15 RX ORDER — TRAZODONE HYDROCHLORIDE 150 MG/1
150 TABLET ORAL NIGHTLY
Qty: 90 TABLET | Refills: 3 | Status: SHIPPED | OUTPATIENT
Start: 2018-08-15 | End: 2019-06-06 | Stop reason: SDUPTHER

## 2018-08-19 ENCOUNTER — APPOINTMENT (OUTPATIENT)
Dept: CT IMAGING | Facility: HOSPITAL | Age: 53
End: 2018-08-19

## 2018-08-19 ENCOUNTER — HOSPITAL ENCOUNTER (EMERGENCY)
Facility: HOSPITAL | Age: 53
Discharge: HOME | End: 2018-08-19
Attending: EMERGENCY MEDICINE | Admitting: EMERGENCY MEDICINE

## 2018-08-19 VITALS
RESPIRATION RATE: 16 BRPM | WEIGHT: 262 LBS | DIASTOLIC BLOOD PRESSURE: 65 MMHG | BODY MASS INDEX: 35.49 KG/M2 | SYSTOLIC BLOOD PRESSURE: 109 MMHG | HEART RATE: 93 BPM | HEIGHT: 72 IN | TEMPERATURE: 97.8 F | OXYGEN SATURATION: 94 %

## 2018-08-19 DIAGNOSIS — N39.0 ACUTE UTI (URINARY TRACT INFECTION): Primary | ICD-10-CM

## 2018-08-19 LAB
ALBUMIN SERPL-MCNC: 4.2 G/DL (ref 3.5–5)
ALBUMIN/GLOB SERPL: 1.1 G/DL (ref 1.1–2.5)
ALP SERPL-CCNC: 57 U/L (ref 24–120)
ALT SERPL W P-5'-P-CCNC: 36 U/L (ref 0–54)
ANION GAP SERPL CALCULATED.3IONS-SCNC: 10 MMOL/L (ref 4–13)
APTT PPP: 26.7 SECONDS (ref 24.1–34.8)
AST SERPL-CCNC: 38 U/L (ref 7–45)
BACTERIA UR QL AUTO: ABNORMAL /HPF
BASOPHILS # BLD AUTO: 0.04 10*3/MM3 (ref 0–0.2)
BASOPHILS NFR BLD AUTO: 0.4 % (ref 0–2)
BILIRUB SERPL-MCNC: 0.3 MG/DL (ref 0.1–1)
BILIRUB UR QL STRIP: NEGATIVE
BUN BLD-MCNC: 17 MG/DL (ref 5–21)
BUN/CREAT SERPL: 21.8 (ref 7–25)
CALCIUM SPEC-SCNC: 9.8 MG/DL (ref 8.4–10.4)
CHLORIDE SERPL-SCNC: 108 MMOL/L (ref 98–110)
CLARITY UR: ABNORMAL
CO2 SERPL-SCNC: 26 MMOL/L (ref 24–31)
COLOR UR: YELLOW
CREAT BLD-MCNC: 0.78 MG/DL (ref 0.5–1.4)
DEPRECATED RDW RBC AUTO: 42.5 FL (ref 40–54)
EOSINOPHIL # BLD AUTO: 0.2 10*3/MM3 (ref 0–0.7)
EOSINOPHIL NFR BLD AUTO: 2 % (ref 0–4)
ERYTHROCYTE [DISTWIDTH] IN BLOOD BY AUTOMATED COUNT: 12.9 % (ref 12–15)
GFR SERPL CREATININE-BSD FRML MDRD: 105 ML/MIN/1.73
GLOBULIN UR ELPH-MCNC: 3.7 GM/DL
GLUCOSE BLD-MCNC: 83 MG/DL (ref 70–100)
GLUCOSE UR STRIP-MCNC: ABNORMAL MG/DL
HCT VFR BLD AUTO: 33.7 % (ref 40–52)
HGB BLD-MCNC: 11.3 G/DL (ref 14–18)
HGB UR QL STRIP.AUTO: NEGATIVE
HYALINE CASTS UR QL AUTO: ABNORMAL /LPF
IMM GRANULOCYTES # BLD: 0.06 10*3/MM3 (ref 0–0.03)
IMM GRANULOCYTES NFR BLD: 0.6 % (ref 0–5)
INR PPP: 0.91 (ref 0.91–1.09)
KETONES UR QL STRIP: NEGATIVE
LEUKOCYTE ESTERASE UR QL STRIP.AUTO: ABNORMAL
LYMPHOCYTES # BLD AUTO: 2.92 10*3/MM3 (ref 0.72–4.86)
LYMPHOCYTES NFR BLD AUTO: 29.6 % (ref 15–45)
MCH RBC QN AUTO: 30.3 PG (ref 28–32)
MCHC RBC AUTO-ENTMCNC: 33.5 G/DL (ref 33–36)
MCV RBC AUTO: 90.3 FL (ref 82–95)
MONOCYTES # BLD AUTO: 0.88 10*3/MM3 (ref 0.19–1.3)
MONOCYTES NFR BLD AUTO: 8.9 % (ref 4–12)
NEUTROPHILS # BLD AUTO: 5.76 10*3/MM3 (ref 1.87–8.4)
NEUTROPHILS NFR BLD AUTO: 58.5 % (ref 39–78)
NITRITE UR QL STRIP: NEGATIVE
NRBC BLD MANUAL-RTO: 0 /100 WBC (ref 0–0)
PH UR STRIP.AUTO: <=5 [PH] (ref 5–8)
PLATELET # BLD AUTO: 289 10*3/MM3 (ref 130–400)
PMV BLD AUTO: 10.7 FL (ref 6–12)
POTASSIUM BLD-SCNC: 3.7 MMOL/L (ref 3.5–5.3)
PROT SERPL-MCNC: 7.9 G/DL (ref 6.3–8.7)
PROT UR QL STRIP: ABNORMAL
PROTHROMBIN TIME: 12.5 SECONDS (ref 11.9–14.6)
RBC # BLD AUTO: 3.73 10*6/MM3 (ref 4.8–5.9)
RBC # UR: ABNORMAL /HPF
REF LAB TEST METHOD: ABNORMAL
SODIUM BLD-SCNC: 144 MMOL/L (ref 135–145)
SP GR UR STRIP: 1.02 (ref 1–1.03)
SQUAMOUS #/AREA URNS HPF: ABNORMAL /HPF
UROBILINOGEN UR QL STRIP: ABNORMAL
WBC NRBC COR # BLD: 9.86 10*3/MM3 (ref 4.8–10.8)
WBC UR QL AUTO: ABNORMAL /HPF

## 2018-08-19 PROCEDURE — 80053 COMPREHEN METABOLIC PANEL: CPT | Performed by: EMERGENCY MEDICINE

## 2018-08-19 PROCEDURE — 85025 COMPLETE CBC W/AUTO DIFF WBC: CPT | Performed by: EMERGENCY MEDICINE

## 2018-08-19 PROCEDURE — 25010000002 ERTAPENEM PER 500 MG: Performed by: EMERGENCY MEDICINE

## 2018-08-19 PROCEDURE — 25010000002 ONDANSETRON PER 1 MG: Performed by: EMERGENCY MEDICINE

## 2018-08-19 PROCEDURE — 85610 PROTHROMBIN TIME: CPT | Performed by: EMERGENCY MEDICINE

## 2018-08-19 PROCEDURE — 87086 URINE CULTURE/COLONY COUNT: CPT | Performed by: EMERGENCY MEDICINE

## 2018-08-19 PROCEDURE — 25010000002 IOPAMIDOL 61 % SOLUTION: Performed by: EMERGENCY MEDICINE

## 2018-08-19 PROCEDURE — 99284 EMERGENCY DEPT VISIT MOD MDM: CPT

## 2018-08-19 PROCEDURE — 74177 CT ABD & PELVIS W/CONTRAST: CPT

## 2018-08-19 PROCEDURE — 25010000002 HYDROMORPHONE PER 4 MG: Performed by: EMERGENCY MEDICINE

## 2018-08-19 PROCEDURE — 81001 URINALYSIS AUTO W/SCOPE: CPT | Performed by: EMERGENCY MEDICINE

## 2018-08-19 PROCEDURE — 96375 TX/PRO/DX INJ NEW DRUG ADDON: CPT

## 2018-08-19 PROCEDURE — 85730 THROMBOPLASTIN TIME PARTIAL: CPT | Performed by: EMERGENCY MEDICINE

## 2018-08-19 PROCEDURE — 87077 CULTURE AEROBIC IDENTIFY: CPT | Performed by: EMERGENCY MEDICINE

## 2018-08-19 PROCEDURE — 96365 THER/PROPH/DIAG IV INF INIT: CPT

## 2018-08-19 PROCEDURE — 87186 SC STD MICRODIL/AGAR DIL: CPT | Performed by: EMERGENCY MEDICINE

## 2018-08-19 RX ORDER — HYDROMORPHONE HYDROCHLORIDE 1 MG/ML
1 INJECTION, SOLUTION INTRAMUSCULAR; INTRAVENOUS; SUBCUTANEOUS ONCE
Status: COMPLETED | OUTPATIENT
Start: 2018-08-19 | End: 2018-08-19

## 2018-08-19 RX ORDER — SODIUM CHLORIDE 9 MG/ML
75 INJECTION, SOLUTION INTRAVENOUS CONTINUOUS
Status: DISCONTINUED | OUTPATIENT
Start: 2018-08-19 | End: 2018-08-19 | Stop reason: HOSPADM

## 2018-08-19 RX ORDER — ONDANSETRON 2 MG/ML
4 INJECTION INTRAMUSCULAR; INTRAVENOUS ONCE
Status: COMPLETED | OUTPATIENT
Start: 2018-08-19 | End: 2018-08-19

## 2018-08-19 RX ORDER — CEPHALEXIN 500 MG/1
500 CAPSULE ORAL 4 TIMES DAILY
Qty: 28 CAPSULE | Refills: 0 | Status: SHIPPED | OUTPATIENT
Start: 2018-08-19 | End: 2018-08-26

## 2018-08-19 RX ADMIN — HYDROMORPHONE HYDROCHLORIDE 1 MG: 1 INJECTION, SOLUTION INTRAMUSCULAR; INTRAVENOUS; SUBCUTANEOUS at 18:43

## 2018-08-19 RX ADMIN — SODIUM CHLORIDE 1 G: 900 INJECTION INTRAVENOUS at 20:22

## 2018-08-19 RX ADMIN — IOPAMIDOL 150 ML: 612 INJECTION, SOLUTION INTRAVENOUS at 19:08

## 2018-08-19 RX ADMIN — SODIUM CHLORIDE 250 ML: 9 INJECTION, SOLUTION INTRAVENOUS at 18:30

## 2018-08-19 RX ADMIN — ONDANSETRON HYDROCHLORIDE 4 MG: 2 INJECTION, SOLUTION INTRAMUSCULAR; INTRAVENOUS at 18:43

## 2018-08-19 NOTE — ED PROVIDER NOTES
"Subjective   Patient is a 52-year-old male who reports that he has had \"sharp\" bilateral kidney pain, bilateral flank pain with painful urination and urinary frequency and urinary urgency with nausea since 1 AM today.  Patient reports this pain is worsened by movement and by urinating and relieved partially by rest.  He reports the pain currently is a 9 (out of 10) and at its worse is a 10 (out of 10).        History provided by:  Patient      Review of Systems   Constitutional: Negative.    HENT: Negative.    Eyes: Negative.    Respiratory: Negative.    Cardiovascular: Negative.    Gastrointestinal: Positive for nausea. Negative for diarrhea and vomiting.   Endocrine: Negative.    Genitourinary: Positive for dysuria, flank pain, frequency and urgency.   Skin: Negative.    Allergic/Immunologic: Negative.    Neurological: Negative.    Hematological: Negative.    Psychiatric/Behavioral: Negative.    All other systems reviewed and are negative.      Past Medical History:   Diagnosis Date   • Allergic rhinitis    • Anxiety    • Bursitis    • DDD (degenerative disc disease), cervical    • Depression    • HIV disease (CMS/HCC)    • HLD (hyperlipidemia)    • HTN (hypertension)    • Migraine    • Myocardial infarction    • Osteoporosis    • Sleep apnea    • Type 2 diabetes mellitus (CMS/HCC)        Allergies   Allergen Reactions   • Amitriptyline Anaphylaxis   • Amitriptyline Hcl Anaphylaxis   • Darvon [Propoxyphene] Anaphylaxis   • Zithromax [Azithromycin] Anaphylaxis       Past Surgical History:   Procedure Laterality Date   • ANKLE SURGERY     • FOREARM SURGERY     • LYMPH NODE BIOPSY         Family History   Problem Relation Age of Onset   • Diabetes Mother    • Hypertension Mother    • Thyroid disease Mother    • Hypertension Father    • Thyroid disease Father    • Arrhythmia Father    • Diabetes Maternal Grandfather    • Heart disease Maternal Grandfather    • Hypertension Maternal Grandfather    • Diabetes Paternal " Grandmother    • Stroke Paternal Grandmother    • Heart disease Paternal Grandmother    • Hypertension Paternal Grandmother    • Thyroid disease Paternal Grandmother    • Hypertension Paternal Grandfather    • Hypertension Sister    • No Known Problems Brother        Social History     Social History   • Marital status:      Social History Main Topics   • Smoking status: Current Every Day Smoker     Packs/day: 1.00     Types: Cigarettes     Start date: 1988   • Smokeless tobacco: Never Used   • Alcohol use Yes      Comment: occasionally   • Drug use: No     Other Topics Concern   • Not on file           Objective   Physical Exam   Constitutional: He is oriented to person, place, and time. He appears well-developed and well-nourished.   HENT:   Head: Normocephalic and atraumatic.   Right Ear: External ear normal.   Left Ear: External ear normal.   Nose: Nose normal.   Mouth/Throat: Oropharynx is clear and moist.   Eyes: Pupils are equal, round, and reactive to light. Conjunctivae and EOM are normal. Right eye exhibits no discharge. Left eye exhibits no discharge.   Neck: Normal range of motion. Neck supple. No tracheal deviation present. No thyromegaly present.   Cardiovascular: Normal rate, regular rhythm, normal heart sounds and intact distal pulses.    No murmur heard.  Pulmonary/Chest: Effort normal and breath sounds normal. No respiratory distress. He exhibits no tenderness.   Abdominal: Soft. He exhibits no distension. There is tenderness (Moderate pain to palpation of the left posterior kidney area, left flank area and left lower abdominal area.  Mild pain to palpation over the right posterior kidney area, right flank area and right lower abdominal area.).   Musculoskeletal: Normal range of motion. He exhibits no edema, tenderness or deformity.   Neurological: He is alert and oriented to person, place, and time. No cranial nerve deficit.   Skin: Skin is warm and dry. No erythema. No pallor.    Psychiatric: He has a normal mood and affect. Judgment normal.   Nursing note and vitals reviewed.      Procedures           ED Course  ED Course as of Aug 19 2057   Sun Aug 19, 2018   1730 Indications, risks and benefits of a CT scan of the abdomen and pelvis with IV contrast was discussed with the patient in details.  The patient voiced understanding.  The patient agrees to have a CT scan of the abdomen and pelvis with IV contrast performed at this emergency department visit.  [RM]   2049 Patient reports that he feels better at time of discharge.  Patient understands to return immediately to the emergency department for any new medical issues or any worsening current medical issues.  [RM]   2056 At time of discharge, patient understands to not drive or use any heavy equipment for at least the next 6 hours.  [RM]      ED Course User Index  [RM] Huber Figueroa MD                  MDM  Number of Diagnoses or Management Options  Acute UTI (urinary tract infection):      Amount and/or Complexity of Data Reviewed  Clinical lab tests: reviewed and ordered  Tests in the radiology section of CPT®: reviewed and ordered    Risk of Complications, Morbidity, and/or Mortality  Presenting problems: moderate  Diagnostic procedures: moderate  Management options: moderate    Patient Progress  Patient progress: stable        Final diagnoses:   Acute UTI (urinary tract infection)            Huber Figueroa MD  08/24/18 0802

## 2018-08-21 LAB — BACTERIA SPEC AEROBE CULT: ABNORMAL

## 2018-08-24 ENCOUNTER — OFFICE VISIT (OUTPATIENT)
Dept: ENDOCRINOLOGY | Facility: CLINIC | Age: 53
End: 2018-08-24

## 2018-08-24 VITALS
SYSTOLIC BLOOD PRESSURE: 122 MMHG | HEART RATE: 105 BPM | HEIGHT: 72 IN | DIASTOLIC BLOOD PRESSURE: 72 MMHG | WEIGHT: 264.8 LBS | BODY MASS INDEX: 35.87 KG/M2

## 2018-08-24 DIAGNOSIS — I15.2 HYPERTENSION ASSOCIATED WITH DIABETES (HCC): ICD-10-CM

## 2018-08-24 DIAGNOSIS — Z79.4 TYPE 2 DIABETES MELLITUS WITH HYPERGLYCEMIA, WITH LONG-TERM CURRENT USE OF INSULIN (HCC): Primary | ICD-10-CM

## 2018-08-24 DIAGNOSIS — E11.69 MIXED DIABETIC HYPERLIPIDEMIA ASSOCIATED WITH TYPE 2 DIABETES MELLITUS (HCC): ICD-10-CM

## 2018-08-24 DIAGNOSIS — E11.59 HYPERTENSION ASSOCIATED WITH DIABETES (HCC): ICD-10-CM

## 2018-08-24 DIAGNOSIS — E78.2 MIXED DIABETIC HYPERLIPIDEMIA ASSOCIATED WITH TYPE 2 DIABETES MELLITUS (HCC): ICD-10-CM

## 2018-08-24 DIAGNOSIS — E11.65 TYPE 2 DIABETES MELLITUS WITH HYPERGLYCEMIA, WITH LONG-TERM CURRENT USE OF INSULIN (HCC): Primary | ICD-10-CM

## 2018-08-24 PROCEDURE — 99204 OFFICE O/P NEW MOD 45 MIN: CPT | Performed by: INTERNAL MEDICINE

## 2018-08-24 NOTE — PATIENT INSTRUCTIONS
Keep metformin 1000 mg bid     Start trulicity 0.75 mg weekly , if you feel full - stop eating , if nausea try to eat less, if vomiting, please stop     Stop levemir and stop novolog    Start U500 insulin     120 units , 30 minutes before bkfast and 80 units, 30 minutes before supper      Change to U500 insulin     120  units for bkfast     bkfast dose controls lunch and before supper reading     If either lunch or supper reading is less than 100 - back off by 10 units     If lunch is higher than 180 - let it be     If supper is higher than 180 for 3 to 5 days - start increasing bkfast dose by 5 units      ------------        50 units for supper     supper dose controls bedtime and waking  reading     If either bedtime or waking reading is less than 100 - back off by 5 units     If  Bedtime  is higher than 180 - let it be     If waking is higher than 180 for 3 to 5 days - start increasing supper  dose by 5 units     ===    Novolog you may use it to fix high sugars    3 units per 50 above 150

## 2018-08-24 NOTE — PROGRESS NOTES
"Donis Yi is a 52 y.o. male who presents for  evaluation of   Chief Complaint   Patient presents with   • Diabetes     bs 135       Referring provider    Oksana Mares MD  [address] [place] [address] [address]  [place], [facility]    Primary Care Provider    Oksana Mares MD    Duration 15 years    Timing - Diabetes is Constant    Quality -  poorly controlled    Severity -  severe    Complications - peripheral neuropathy    Current symptoms/problems  none     Alleviating Factors: Compliance      Aggravating Factors :    Side Effects  none    Current diet  in general, a \"healthy\" diet      Current exercise none    Current monitoring regimen: home blood tests - checking 4 x daily   Home blood sugar records:     Hypoglycemia nocturnal and if skipped meals     Past Medical History:   Diagnosis Date   • Allergic rhinitis    • Anxiety    • Bursitis    • DDD (degenerative disc disease), cervical    • Depression    • HIV disease (CMS/HCC)    • HLD (hyperlipidemia)    • HTN (hypertension)    • Migraine    • Myocardial infarction    • Osteoporosis    • Sleep apnea    • Type 2 diabetes mellitus (CMS/HCC)      Family History   Problem Relation Age of Onset   • Diabetes Mother    • Hypertension Mother    • Thyroid disease Mother    • Hypertension Father    • Thyroid disease Father    • Arrhythmia Father    • Diabetes Maternal Grandfather    • Heart disease Maternal Grandfather    • Hypertension Maternal Grandfather    • Diabetes Paternal Grandmother    • Stroke Paternal Grandmother    • Heart disease Paternal Grandmother    • Hypertension Paternal Grandmother    • Thyroid disease Paternal Grandmother    • Hypertension Paternal Grandfather    • Hypertension Sister    • No Known Problems Brother      Social History   Substance Use Topics   • Smoking status: Current Every Day Smoker     Packs/day: 1.00     Types: Cigarettes     Start date: 1988   • Smokeless tobacco: Never Used   • Alcohol use Yes      Comment: occasionally "         Current Outpatient Prescriptions:   •  albuterol (PROVENTIL HFA;VENTOLIN HFA) 108 (90 BASE) MCG/ACT inhaler, Inhale 2 puffs every 6 (six) hours as needed for wheezing., Disp: , Rfl:   •  atorvastatin (LIPITOR) 80 MG tablet, Take 80 mg by mouth daily., Disp: , Rfl:   •  calcium carbonate (OS-THEODORA) 600 MG tablet, Take 600 mg by mouth Daily., Disp: , Rfl:   •  cephalexin (KEFLEX) 500 MG capsule, Take 1 capsule by mouth 4 (Four) Times a Day for 7 days., Disp: 28 capsule, Rfl: 0  •  cholecalciferol (VITAMIN D3) 1000 UNITS tablet, Take 1,000 Units by mouth daily., Disp: , Rfl:   •  clonazePAM (KlonoPIN) 0.5 MG tablet, Take 0.5 mg by mouth 3 (Three) Times a Day As Needed for Anxiety., Disp: , Rfl:   •  denosumab (PROLIA) 60 MG/ML solution syringe, Inject 60 mg under the skin Every 6 (Six) Months., Disp: , Rfl:   •  Vmwehub-Djnqv-Frmqsqyp-TenofAF (GENVOYA) [phone] MG tablet, Take 1 tablet by mouth daily., Disp: , Rfl:   •  esomeprazole (NexIUM) 40 MG capsule, Take 40 mg by mouth every morning before breakfast., Disp: , Rfl:   •  fluticasone (FLONASE) 50 MCG/ACT nasal spray, 2 sprays into each nostril Daily. (Patient taking differently: 2 sprays into each nostril Daily As Needed.), Disp: 1 bottle, Rfl: 6  •  gabapentin (NEURONTIN) 600 MG tablet, Take 800 mg by mouth 3 (Three) Times a Day. Takes 2 twice a day, Disp: , Rfl:   •  guaiFENesin (MUCINEX) 600 MG 12 hr tablet, Take 600 mg by mouth 2 (two) times a day as needed for cough., Disp: , Rfl:   •  HYDROcodone-acetaminophen (NORCO) 5-325 MG per tablet, Take 1 tablet by mouth 2 (two) times a day as needed., Disp: , Rfl:   •  HYDROcodone-acetaminophen (NORCO) 7.5-325 MG per tablet, Take 1 tablet by mouth Every 4 (Four) Hours As Needed for Severe Pain ., Disp: 12 tablet, Rfl: 0  •  hydrOXYzine (VISTARIL) 25 MG capsule, Take 25 mg by mouth every 6 (six) hours as needed for itching., Disp: , Rfl:   •  insulin aspart (NOVOLOG FLEXPEN) 100 UNIT/ML solution  pen-injector sc pen, Inject 80 Units under the skin 4 (Four) Times a Day With Meals & at Bedtime., Disp: , Rfl:   •  insulin detemir (LEVEMIR) 100 UNIT/ML injection, Inject 150 Units under the skin into the appropriate area as directed Every Night., Disp: , Rfl:   •  ketorolac (TORADOL) 10 MG tablet, Take 1 tablet by mouth Every 6 (Six) Hours As Needed for Moderate Pain ., Disp: 8 tablet, Rfl: 0  •  loperamide (IMODIUM) 2 MG capsule, Take 2 mg by mouth daily as needed for diarrhea., Disp: , Rfl:   •  loratadine (CLARITIN) 10 MG tablet, Take 10 mg by mouth daily., Disp: , Rfl:   •  losartan (COZAAR) 100 MG tablet, Take 100 mg by mouth daily., Disp: , Rfl:   •  meclizine (ANTIVERT) 25 MG tablet, Take 25 mg by mouth Daily As Needed., Disp: , Rfl:   •  metFORMIN (GLUCOPHAGE) 1000 MG tablet, Take 1,000 mg by mouth 2 (two) times a day with meals., Disp: , Rfl:   •  miconazole (MICOTIN) 2 % cream, Apply 1 application topically 2 (two) times a day., Disp: , Rfl:   •  neomycin-bacitracin-polymyxin (NEOSPORIN) 5-400-5000 ointment, Apply 1 application topically 4 (four) times a day as needed., Disp: , Rfl:   •  pregabalin (LYRICA) 50 MG capsule, Take 100 mg by mouth 2 (Two) Times a Day., Disp: , Rfl:   •  Prenatal Vit-Fe Fumarate-FA (VOL-PLUS) 27-1 MG tablet, Take 1 tablet by mouth daily., Disp: , Rfl:   •  promethazine (PHENERGAN) 25 MG tablet, Take 25 mg by mouth every 6 (six) hours as needed for nausea or vomiting., Disp: , Rfl:   •  propranolol LA (INDERAL LA) 60 MG 24 hr capsule, Take 60 mg by mouth daily., Disp: , Rfl:   •  rizatriptan (MAXALT) 10 MG tablet, Take 10 mg by mouth 1 (one) time as needed for migraine. May repeat in 2 hours if needed, Disp: , Rfl:   •  sodium chloride (OCEAN) 0.65 % nasal spray, 1 spray into each nostril as needed for congestion., Disp: , Rfl:   •  tiZANidine (ZANAFLEX) 4 MG tablet, Take 6 mg by mouth Every 6 (Six) Hours As Needed for Muscle Spasms., Disp: , Rfl:   •  traZODone (DESYREL) 150  MG tablet, Take 100 mg by mouth Every Night., Disp: , Rfl:   •  varenicline (CHANTIX) 1 MG tablet, Take 1 mg by mouth 2 (Two) Times a Day., Disp: , Rfl:   •  Vortioxetine HBr (TRINTELLIX) 5 MG tablet, Take 20 mg by mouth Daily With Breakfast., Disp: , Rfl:     Review of Systems    Review of Systems   Constitutional: Negative for activity change, appetite change, chills, diaphoresis, fatigue, fever and unexpected weight change.   HENT: Negative for congestion, dental problem, drooling, ear discharge, ear pain, facial swelling, mouth sores, postnasal drip, rhinorrhea, sinus pressure, sore throat, tinnitus, trouble swallowing and voice change.    Eyes: Negative for photophobia, pain, discharge, redness, itching and visual disturbance.   Respiratory: Negative for apnea, cough, choking, chest tightness, shortness of breath, wheezing and stridor.    Cardiovascular: Negative for chest pain, palpitations and leg swelling.   Gastrointestinal: Negative for abdominal distention, abdominal pain, constipation, diarrhea, nausea and vomiting.   Endocrine: Negative for cold intolerance, heat intolerance, polydipsia, polyphagia and polyuria.   Genitourinary: Negative for decreased urine volume, difficulty urinating, dysuria, flank pain, frequency, hematuria and urgency.   Musculoskeletal: Negative for arthralgias, back pain, gait problem, joint swelling, myalgias, neck pain and neck stiffness.   Skin: Negative for color change, pallor, rash and wound.   Allergic/Immunologic: Negative for immunocompromised state.   Neurological: Negative for dizziness, tremors, seizures, syncope, facial asymmetry, speech difficulty, weakness, light-headedness, numbness and headaches.   Hematological: Negative for adenopathy.   Psychiatric/Behavioral: Negative for agitation, behavioral problems, confusion, decreased concentration, dysphoric mood, hallucinations, self-injury, sleep disturbance and suicidal ideas. The patient is not nervous/anxious and  "is not hyperactive.         Objective:   /72   Pulse 105   Ht 182.9 cm (72\")   Wt 120 kg (264 lb 12.8 oz)   BMI 35.91 kg/m²     Physical Exam   Constitutional: He is oriented to person, place, and time. He appears well-developed and well-nourished. He is cooperative.   HENT:   Head: Normocephalic and atraumatic.   Right Ear: External ear normal.   Left Ear: External ear normal.   Nose: Nose normal.   Mouth/Throat: Oropharynx is clear and moist. No oropharyngeal exudate.   Eyes: Pupils are equal, round, and reactive to light. Conjunctivae and EOM are normal. No scleral icterus. Right eye exhibits normal extraocular motion. Left eye exhibits normal extraocular motion.   Neck: Neck supple. No JVD present. No muscular tenderness present. No tracheal deviation, no edema and no erythema present. No thyromegaly present.   Cardiovascular: Normal rate, regular rhythm, normal heart sounds and intact distal pulses.  Exam reveals no gallop and no friction rub.    No murmur heard.  Pulmonary/Chest: Effort normal and breath sounds normal. No stridor. No respiratory distress. He has no decreased breath sounds. He has no wheezes. He has no rhonchi. He has no rales. He exhibits no tenderness.   Abdominal: Soft. Bowel sounds are normal. He exhibits no distension and no mass. There is no hepatomegaly. There is no tenderness. There is no rebound and no guarding. No hernia.   Musculoskeletal: Normal range of motion. He exhibits no edema, tenderness or deformity.   Lymphadenopathy:     He has no cervical adenopathy.   Neurological: He is alert and oriented to person, place, and time. He has normal reflexes. No cranial nerve deficit. He exhibits normal muscle tone. Coordination normal.   Skin: Skin is warm. No rash noted. No erythema. No pallor.   Psychiatric: He has a normal mood and affect. His behavior is normal. Judgment and thought content normal.   Nursing note and vitals reviewed.      Lab Review          Assessment/Plan "       ICD-10-CM ICD-9-CM   1. Type 2 diabetes mellitus with hyperglycemia, with long-term current use of insulin (CMS/[facility]) E11.65 250.00    Z79.4 790.29     V58.67   2. Hypertension associated with diabetes (CMS/[facility]) E11.59 250.80    I10 401.9   3. Mixed diabetic hyperlipidemia associated with type 2 diabetes mellitus (CMS/[facility]) E11.69 250.80    E78.2 272.2         I reviewed and summarized records from Oksana Mares MD from 2018 and I reviewed / ordered labs.   From review of records :        Glycemic Management:   No results found for: HGBA1C  Lab Results   Component Value Date    GLUCOSE 83 08/19/2018    BUN 17 08/19/2018    CREATININE 0.78 08/19/2018    EGFRIFNONA 105 08/19/2018    BCR 21.8 08/19/2018    K 3.7 08/19/2018    CO2 26.0 08/19/2018    CALCIUM 9.8 08/19/2018    ALBUMIN 4.20 08/19/2018    LABIL2 CANCELED 03/29/2016    AST 38 08/19/2018    ALT 36 08/19/2018    ANIONGAP 10.0 08/19/2018     Lab Results   Component Value Date    WBC 9.86 08/19/2018    HGB 11.3 (L) 08/19/2018    HCT 33.7 (L) 08/19/2018    MCV 90.3 08/19/2018     08/19/2018       Novolog 60 to 80 w meals , for small to big meals    Levemir 150 mg qhs    Stop and start     U500 insulin , 120 units for bkfast and 80 units for supper    In addition , He will add U100 humalog 5 units per 50 above 150 at lunch and bedtime     Total 4 injections per day     Metformin 1000 mg bid     Add Trulicity 0.75 mg weekly     Next appt add jardiance    Approve for continuous sensor     #1  Patient has diabetes mellitus, insulin-dependent.    #2 he performs blood glucose testing 4 times daily and blood glucose log was brought to office with variability from .    #3  he is requiring U500 and U100  Insulin 3 times daily for a total of 4 injections per day.    #4 Patient tests blood sugars 4 times daily and makes frequent self-adjustments and patient is injecting insulin 4 times daily. He has been doing this for more than 90 days     #5 I have  personally seen patient within the past 6 months    #6 We plan on seeing him every 2-3 months for continuous adjustment of her diabetes regimen     #7 patient has hypoglycemia with unawareness.    #8 patient has day-to-day variation in her mealtime which confounds the degree of insulin dosing with multiple daily injections.    #9 patient has completed diabetes education program with us.    #10 he has demonstrated the ability to self monitor her glucose.        #11 Patient is motivated in improving  diabetes control           Lipid Management  No results found for: CHOL  No results found for: TRIG  No results found for: HDL  No components found for: LDLCALC  No results found for: LDL  No results found for: LDLDIRECT      Blood Pressure Management:    Vitals:    08/24/18 1555   BP: 122/72   Pulse: 105     Lab Results   Component Value Date    GLUCOSE 83 08/19/2018    CALCIUM 9.8 08/19/2018     08/19/2018    K 3.7 08/19/2018    CO2 26.0 08/19/2018     08/19/2018    BUN 17 08/19/2018    CREATININE 0.78 08/19/2018    EGFRIFNONA 105 08/19/2018    BCR 21.8 08/19/2018    ANIONGAP 10.0 08/19/2018               Microvascular Complication Monitoring:      Eye Exam Evaluation    -----------    Last Microalbumin-Proteinuria Assessment    No results found for: MALBCRERATIO    No results found for: UTPCR    -----------      Neuropathy      Immunizations:          Preventive Care:        Weight Related:   Wt Readings from Last 3 Encounters:   08/24/18 120 kg (264 lb 12.8 oz)   08/19/18 119 kg (262 lb)   07/10/18 122 kg (269 lb)     Body mass index is 35.91 kg/m².        Diet interventions: moderate (500 kCal/d) deficit diet.      Bone Health    Lab Results   Component Value Date    CALCIUM 9.8 08/19/2018    PHOS 3.2 07/09/2018    ALDX77HR 35.8 07/09/2018       Thyroid Health    No results found for: TSH    No results found for: FREET4    No results found for: G1OPFNV    Thyroid Antibodies  TPO AB  No results found for:  THYROIDAB    Thyroid Stimulating Immunoglobulin    No results found for: LABTHYR              Other Diabetes Related Aspects       No results found for: [id number]        Last celiac panel     No results found for: GDPABIGA, TTRANSGLIGA, IGA, [id number]      No orders of the defined types were placed in this encounter.        A copy of my note was sent to Oksana Mares MD    Please see my above opinion and suggestions.

## 2018-08-27 ENCOUNTER — TELEPHONE (OUTPATIENT)
Dept: INTERNAL MEDICINE | Age: 53
End: 2018-08-27

## 2018-08-28 ENCOUNTER — DOCUMENTATION (OUTPATIENT)
Dept: ENDOCRINOLOGY | Facility: CLINIC | Age: 53
End: 2018-08-28

## 2018-08-28 RX ORDER — MECLIZINE HYDROCHLORIDE 25 MG/1
TABLET ORAL
Qty: 90 TABLET | Refills: 2 | Status: SHIPPED | OUTPATIENT
Start: 2018-08-28 | End: 2019-05-21 | Stop reason: SDUPTHER

## 2018-08-31 ENCOUNTER — OFFICE VISIT (OUTPATIENT)
Dept: INTERNAL MEDICINE | Age: 53
End: 2018-08-31
Payer: MEDICARE

## 2018-08-31 VITALS
DIASTOLIC BLOOD PRESSURE: 72 MMHG | OXYGEN SATURATION: 99 % | HEART RATE: 84 BPM | HEIGHT: 72 IN | BODY MASS INDEX: 36.3 KG/M2 | WEIGHT: 268 LBS | SYSTOLIC BLOOD PRESSURE: 118 MMHG

## 2018-08-31 DIAGNOSIS — B96.89 UTI DUE TO KLEBSIELLA SPECIES: ICD-10-CM

## 2018-08-31 DIAGNOSIS — Z79.4 TYPE 2 DIABETES MELLITUS WITHOUT COMPLICATION, WITH LONG-TERM CURRENT USE OF INSULIN (HCC): ICD-10-CM

## 2018-08-31 DIAGNOSIS — Z79.4 TYPE 2 DIABETES MELLITUS WITHOUT COMPLICATION, WITH LONG-TERM CURRENT USE OF INSULIN (HCC): Primary | ICD-10-CM

## 2018-08-31 DIAGNOSIS — E78.2 MIXED HYPERLIPIDEMIA: ICD-10-CM

## 2018-08-31 DIAGNOSIS — E11.9 TYPE 2 DIABETES MELLITUS WITHOUT COMPLICATION, WITH LONG-TERM CURRENT USE OF INSULIN (HCC): Primary | ICD-10-CM

## 2018-08-31 DIAGNOSIS — N39.0 UTI DUE TO KLEBSIELLA SPECIES: ICD-10-CM

## 2018-08-31 DIAGNOSIS — E11.9 TYPE 2 DIABETES MELLITUS WITHOUT COMPLICATION, WITH LONG-TERM CURRENT USE OF INSULIN (HCC): ICD-10-CM

## 2018-08-31 DIAGNOSIS — I10 ESSENTIAL HYPERTENSION: ICD-10-CM

## 2018-08-31 LAB
ALBUMIN SERPL-MCNC: 4.2 G/DL (ref 3.5–5.2)
ALP BLD-CCNC: 56 U/L (ref 40–130)
ALT SERPL-CCNC: 33 U/L (ref 5–41)
ANION GAP SERPL CALCULATED.3IONS-SCNC: 16 MMOL/L (ref 7–19)
AST SERPL-CCNC: 35 U/L (ref 5–40)
BILIRUB SERPL-MCNC: <0.2 MG/DL (ref 0.2–1.2)
BUN BLDV-MCNC: 16 MG/DL (ref 6–20)
CALCIUM SERPL-MCNC: 8.3 MG/DL (ref 8.6–10)
CHLORIDE BLD-SCNC: 103 MMOL/L (ref 98–111)
CHOLESTEROL, TOTAL: 130 MG/DL (ref 160–199)
CO2: 21 MMOL/L (ref 22–29)
CREAT SERPL-MCNC: 1 MG/DL (ref 0.5–1.2)
GFR NON-AFRICAN AMERICAN: >60
GLUCOSE BLD-MCNC: 202 MG/DL (ref 74–109)
HBA1C MFR BLD: 10.4 % (ref 4–6)
HCT VFR BLD CALC: 35.9 % (ref 42–52)
HDLC SERPL-MCNC: 41 MG/DL (ref 55–121)
HEMOGLOBIN: 11.4 G/DL (ref 14–18)
LDL CHOLESTEROL CALCULATED: 71 MG/DL
MCH RBC QN AUTO: 30.1 PG (ref 27–31)
MCHC RBC AUTO-ENTMCNC: 31.8 G/DL (ref 33–37)
MCV RBC AUTO: 94.7 FL (ref 80–94)
PDW BLD-RTO: 13.5 % (ref 11.5–14.5)
PLATELET # BLD: 232 K/UL (ref 130–400)
PMV BLD AUTO: 11.5 FL (ref 9.4–12.4)
POTASSIUM SERPL-SCNC: 4.8 MMOL/L (ref 3.5–5)
RBC # BLD: 3.79 M/UL (ref 4.7–6.1)
SODIUM BLD-SCNC: 140 MMOL/L (ref 136–145)
TOTAL PROTEIN: 7.5 G/DL (ref 6.6–8.7)
TRIGL SERPL-MCNC: 91 MG/DL (ref 0–149)
TSH SERPL DL<=0.05 MIU/L-ACNC: 1.54 UIU/ML (ref 0.27–4.2)
WBC # BLD: 9.1 K/UL (ref 4.8–10.8)

## 2018-08-31 PROCEDURE — 99214 OFFICE O/P EST MOD 30 MIN: CPT | Performed by: INTERNAL MEDICINE

## 2018-08-31 PROCEDURE — 4004F PT TOBACCO SCREEN RCVD TLK: CPT | Performed by: INTERNAL MEDICINE

## 2018-08-31 PROCEDURE — G8417 CALC BMI ABV UP PARAM F/U: HCPCS | Performed by: INTERNAL MEDICINE

## 2018-08-31 PROCEDURE — G8427 DOCREV CUR MEDS BY ELIG CLIN: HCPCS | Performed by: INTERNAL MEDICINE

## 2018-08-31 PROCEDURE — 2022F DILAT RTA XM EVC RTNOPTHY: CPT | Performed by: INTERNAL MEDICINE

## 2018-08-31 PROCEDURE — 3046F HEMOGLOBIN A1C LEVEL >9.0%: CPT | Performed by: INTERNAL MEDICINE

## 2018-08-31 PROCEDURE — G8599 NO ASA/ANTIPLAT THER USE RNG: HCPCS | Performed by: INTERNAL MEDICINE

## 2018-08-31 PROCEDURE — 3017F COLORECTAL CA SCREEN DOC REV: CPT | Performed by: INTERNAL MEDICINE

## 2018-08-31 NOTE — PROGRESS NOTES
Chief Complaint   Patient presents with    3 Month Follow-Up    Diabetes     Checked sugar yesterday it was 106 - wanted to let you know    Other     Wants to discuss low labido       HPI: Patient is here today to follow-up diabetes we referred him to Dr. Carol Ann Babin endocrinologist patient is very excited about changes in plan of care for diabetes feels like his blood sugars doing much better he feels better physically and mentally more motivated to get diabetic control he denies headaches recent chest pain dyspnea or abdominal pain he complains of decreased libido and a recent UTI. Some stable numbness tingling of his feet arthralgias back pain. Past Medical History:   Diagnosis Date    Controlled diabetes mellitus with diabetic polyneuropathy (HCC)     Depression     HIV-[facility] symptomatic ([facility]     Hyperlipidemia     Hypertension     Male hypogonadism     Osteopenia     Type 2 diabetes mellitus without complication ([facility]        Past Surgical History:   Procedure Laterality Date    LYMPHADENECTOMY Left     thoracic       Family History   Problem Relation Age of Onset    High Blood Pressure Mother     Diabetes Mother     High Blood Pressure Father     Diabetes Maternal Grandfather        Social History     Social History    Marital status:      Spouse name: N/A    Number of children: N/A    Years of education: N/A     Occupational History    Not on file.      Social History Main Topics    Smoking status: Current Every Day Smoker    Smokeless tobacco: Never Used    Alcohol use Yes    Drug use: No    Sexual activity: Not on file     Other Topics Concern    Not on file     Social History Narrative    No narrative on file       Allergies   Allergen Reactions    Darvon [Propoxyphene]     Elavil [Amitriptyline Hcl]     Zithromax [Azithromycin]        Current Outpatient Prescriptions   Medication Sig Dispense Refill    meclizine (ANTIVERT) 25 MG tablet TAKE 1 TABLET BY MOUTH DAILY 90 tablet 2    Dulaglutide (TRULICITY) 9.12 YF/3.3HK SOPN Inject 0.75 mg into the skin once a week      insulin regular human (HUMULIN R U-500 KWIKPEN) 500 UNIT/ML SOPN concentrated injection pen Inject into the skin      traZODone (DESYREL) 150 MG tablet Take 1 tablet by mouth nightly (Patient taking differently: Take 100 mg by mouth nightly ) 90 tablet 3    tiZANidine (ZANAFLEX) 2 MG tablet Take 1 tablet by mouth 2 times daily as needed (muscle pain) (Patient taking differently: Take 4 mg by mouth 3 times daily ) 60 tablet 0    Lancets MISC Use to check blood sugar 4 times daily   Dx  E11.9  Insulin dependent 400 each 3    atorvastatin (LIPITOR) 40 MG tablet Take 1 tablet by mouth daily 90 tablet 3    VORTIoxetine HBr (TRINTELLIX) 20 MG TABS tablet Take 20 mg by mouth daily       gabapentin (NEURONTIN) 400 MG capsule Take 800 mg by mouth 3 times daily.  propranolol (INDERAL LA) 60 MG extended release capsule Take 1 capsule by mouth daily 90 capsule 3    losartan (COZAAR) 100 MG tablet Take 1 tablet by mouth daily 80 mg 90 tablet 3    metFORMIN (GLUCOPHAGE) 500 MG tablet Take 2 tablets by mouth 2 times daily (with meals) 360 tablet 3    glucose blood VI test strips (FREESTYLE LITE) strip 1 each by In Vitro route daily Test blood sugar 4 times daily 100 each 3    VENTOLIN  (90 Base) MCG/ACT inhaler INHALE 2 PUFFS EVERY 6 HOURS AS NEEDED  2    calcium carbonate 600 MG TABS tablet Take 600 mg by mouth      UNIFINE PENTIPS 31G X 6 MM MISC for use FOUR TIMES DAILY  6    loratadine (CLARITIN) 10 MG tablet TAKE 1 TABLET BY MOUTH DAILY  2    miconazole (MICOTIN) 2 % cream apply to affected area on the skin THREE TIMES DAILY  1    nystatin (MYCOSTATIN) 438089 UNIT/GM powder APPLY 1 GRAM ON THE SKIN 3 TIMES DAILY AS NEEDED  0    NASONEX 50 MCG/ACT nasal spray Use 1 spray in each nostril TWICE DAILY . ...(SHAKE WELL)  2    Cholecalciferol (VITAMIN D3) 2000 units CAPS TAKE 1 CAPSULE BY MOUTH DAILY 5    clonazePAM (KLONOPIN) 0.5 MG tablet Take 1 tablet by mouth 2 times daily as needed for Anxiety 60 tablet 5    varenicline (CHANTIX CONTINUING MONTH AMAYA) 1 MG tablet Take 1 tablet by mouth 2 times daily 60 tablet 3    Pdhzapr-Oflrr-Udezzcbc-TenofAF (GENVOYA) 275-256-030-10 MG TABS Take 1 tablet by mouth daily      HYDROcodone-acetaminophen (NORCO) 5-325 MG per tablet Take 1 tablet by mouth 2 times daily as needed for Pain .  loperamide (IMODIUM) 2 MG capsule Take 2 mg by mouth 4 times daily as needed for Diarrhea      pregabalin (LYRICA) 75 MG capsule Take 75 mg by mouth 2 times daily      rizatriptan (MAXALT) 10 MG tablet Take 10 mg by mouth once as needed for Migraine May repeat in 2 hours if needed      Multiple Vitamins-Minerals (THERAPEUTIC MULTIVITAMIN-MINERALS) tablet Take 1 tablet by mouth daily      esomeprazole (NEXIUM) 40 MG delayed release capsule Take 40 mg by mouth every morning (before breakfast)       No current facility-administered medications for this visit. Review of Systems   Constitutional: Positive for fatigue. Negative for chills and fever. HENT: Positive for congestion, postnasal drip and rhinorrhea. Negative for sinus pressure. Eyes: Negative for discharge and redness. Respiratory: Negative for cough and shortness of breath. Cardiovascular: Negative for chest pain, palpitations and leg swelling. Gastrointestinal: Negative for abdominal distention and abdominal pain. Endocrine:        Decreased libido   Genitourinary: Negative for dysuria, frequency and urgency. Musculoskeletal: Positive for arthralgias. Negative for back pain. Skin: Negative for rash and wound. Allergic/Immunologic: Positive for environmental allergies. Neurological: Positive for numbness. Negative for dizziness, light-headedness and headaches. Psychiatric/Behavioral: Positive for sleep disturbance. Negative for dysphoric mood. The patient is not nervous/anxious.         BP

## 2018-09-07 ENCOUNTER — DOCUMENTATION (OUTPATIENT)
Dept: ENDOCRINOLOGY | Facility: CLINIC | Age: 53
End: 2018-09-07

## 2018-09-07 NOTE — PROGRESS NOTES
Patient left voicemail stating he called [facility] and they had not received our request for Freestyle [employer]. Refaxed request along with office notes on 9/7/18.

## 2018-09-11 ASSESSMENT — ENCOUNTER SYMPTOMS
RHINORRHEA: 1
SHORTNESS OF BREATH: 0
COUGH: 0
ABDOMINAL DISTENTION: 0
SINUS PRESSURE: 0
EYE REDNESS: 0
EYE DISCHARGE: 0
BACK PAIN: 0
ABDOMINAL PAIN: 0

## 2018-09-12 PROBLEM — D64.9 ANEMIA: Status: ACTIVE | Noted: 2018-09-12

## 2018-09-18 ENCOUNTER — PATIENT MESSAGE (OUTPATIENT)
Dept: OTHER | Facility: CLINIC | Age: 53
End: 2018-09-18

## 2018-10-09 ENCOUNTER — TELEPHONE (OUTPATIENT)
Dept: INTERNAL MEDICINE | Age: 53
End: 2018-10-09

## 2018-10-09 RX ORDER — OSELTAMIVIR PHOSPHATE 75 MG/1
75 CAPSULE ORAL DAILY
Qty: 10 CAPSULE | Refills: 0 | Status: SHIPPED | OUTPATIENT
Start: 2018-10-09 | End: 2018-10-19

## 2018-10-10 ENCOUNTER — NURSE ONLY (OUTPATIENT)
Dept: INTERNAL MEDICINE | Age: 53
End: 2018-10-10
Payer: MEDICARE

## 2018-10-10 DIAGNOSIS — Z23 FLU VACCINE NEED: Primary | ICD-10-CM

## 2018-10-10 PROCEDURE — G0008 ADMIN INFLUENZA VIRUS VAC: HCPCS | Performed by: INTERNAL MEDICINE

## 2018-10-10 PROCEDURE — 90682 RIV4 VACC RECOMBINANT DNA IM: CPT | Performed by: INTERNAL MEDICINE

## 2018-11-08 ENCOUNTER — HOSPITAL ENCOUNTER (OUTPATIENT)
Dept: GENERAL RADIOLOGY | Age: 53
Discharge: HOME | End: 2018-11-08
Payer: MEDICARE

## 2018-11-08 ENCOUNTER — OFFICE VISIT (OUTPATIENT)
Dept: INTERNAL MEDICINE | Age: 53
End: 2018-11-08
Payer: MEDICARE

## 2018-11-08 VITALS
SYSTOLIC BLOOD PRESSURE: 100 MMHG | BODY MASS INDEX: 37.03 KG/M2 | DIASTOLIC BLOOD PRESSURE: 66 MMHG | WEIGHT: 273 LBS | HEART RATE: 72 BPM

## 2018-11-08 DIAGNOSIS — B20: ICD-10-CM

## 2018-11-08 DIAGNOSIS — M25.562 ACUTE PAIN OF BOTH KNEES: ICD-10-CM

## 2018-11-08 DIAGNOSIS — K21.9 GASTROESOPHAGEAL REFLUX DISEASE WITHOUT ESOPHAGITIS: ICD-10-CM

## 2018-11-08 DIAGNOSIS — M25.561 ACUTE PAIN OF BOTH KNEES: ICD-10-CM

## 2018-11-08 DIAGNOSIS — E11.9 TYPE 2 DIABETES MELLITUS WITHOUT COMPLICATION, WITH LONG-TERM CURRENT USE OF INSULIN (HCC): Primary | ICD-10-CM

## 2018-11-08 DIAGNOSIS — I10 ESSENTIAL HYPERTENSION: ICD-10-CM

## 2018-11-08 DIAGNOSIS — E78.2 MIXED HYPERLIPIDEMIA: ICD-10-CM

## 2018-11-08 DIAGNOSIS — E11.9 TYPE 2 DIABETES MELLITUS WITHOUT COMPLICATION, WITH LONG-TERM CURRENT USE OF INSULIN (HCC): ICD-10-CM

## 2018-11-08 DIAGNOSIS — Z79.4 TYPE 2 DIABETES MELLITUS WITHOUT COMPLICATION, WITH LONG-TERM CURRENT USE OF INSULIN (HCC): ICD-10-CM

## 2018-11-08 DIAGNOSIS — D64.9 ANEMIA, UNSPECIFIED TYPE: ICD-10-CM

## 2018-11-08 DIAGNOSIS — Z79.4 TYPE 2 DIABETES MELLITUS WITHOUT COMPLICATION, WITH LONG-TERM CURRENT USE OF INSULIN (HCC): Primary | ICD-10-CM

## 2018-11-08 LAB
ALBUMIN SERPL-MCNC: 4.1 G/DL (ref 3.5–5.2)
ALP BLD-CCNC: 46 U/L (ref 40–130)
ALT SERPL-CCNC: 34 U/L (ref 5–41)
ANION GAP SERPL CALCULATED.3IONS-SCNC: 14 MMOL/L (ref 7–19)
AST SERPL-CCNC: 28 U/L (ref 5–40)
BILIRUB SERPL-MCNC: <0.2 MG/DL (ref 0.2–1.2)
BUN BLDV-MCNC: 19 MG/DL (ref 6–20)
C-REACTIVE PROTEIN: 0.24 MG/DL (ref 0–0.5)
CALCIUM SERPL-MCNC: 10.1 MG/DL (ref 8.6–10)
CHLORIDE BLD-SCNC: 101 MMOL/L (ref 98–111)
CO2: 27 MMOL/L (ref 22–29)
CREAT SERPL-MCNC: 1 MG/DL (ref 0.5–1.2)
CREATININE URINE: 88.8 MG/DL (ref 4.2–622)
FERRITIN: 73.5 NG/ML (ref 30–400)
FOLATE: >20 NG/ML (ref 4.5–32.2)
GFR NON-AFRICAN AMERICAN: >60
GLUCOSE BLD-MCNC: 289 MG/DL (ref 74–109)
HBA1C MFR BLD: 8.9 % (ref 4–6)
HCT VFR BLD CALC: 38.8 % (ref 42–52)
HEMOGLOBIN: 12.4 G/DL (ref 14–18)
IRON: 56 UG/DL (ref 59–158)
MCH RBC QN AUTO: 30.5 PG (ref 27–31)
MCHC RBC AUTO-ENTMCNC: 32 G/DL (ref 33–37)
MCV RBC AUTO: 95.3 FL (ref 80–94)
MICROALBUMIN UR-MCNC: <1.2 MG/DL (ref 0–19)
MICROALBUMIN/CREAT UR-RTO: NORMAL MG/G
PDW BLD-RTO: 13.1 % (ref 11.5–14.5)
PLATELET # BLD: 213 K/UL (ref 130–400)
PMV BLD AUTO: 11.5 FL (ref 9.4–12.4)
POTASSIUM SERPL-SCNC: 4.2 MMOL/L (ref 3.5–5)
RBC # BLD: 4.07 M/UL (ref 4.7–6.1)
RHEUMATOID FACTOR: <10 IU/ML
SEDIMENTATION RATE, ERYTHROCYTE: 20 MM/HR (ref 0–15)
SODIUM BLD-SCNC: 142 MMOL/L (ref 136–145)
TOTAL PROTEIN: 7.5 G/DL (ref 6.6–8.7)
TSH SERPL DL<=0.05 MIU/L-ACNC: 1.38 UIU/ML (ref 0.27–4.2)
URIC ACID, SERUM: 4.8 MG/DL (ref 3.4–7)
VITAMIN B-12: 357 PG/ML (ref 211–946)
WBC # BLD: 11 K/UL (ref 4.8–10.8)

## 2018-11-08 PROCEDURE — 3045F PR MOST RECENT HEMOGLOBIN A1C LEVEL 7.0-9.0%: CPT | Performed by: INTERNAL MEDICINE

## 2018-11-08 PROCEDURE — G8427 DOCREV CUR MEDS BY ELIG CLIN: HCPCS | Performed by: INTERNAL MEDICINE

## 2018-11-08 PROCEDURE — 99214 OFFICE O/P EST MOD 30 MIN: CPT | Performed by: INTERNAL MEDICINE

## 2018-11-08 PROCEDURE — G8417 CALC BMI ABV UP PARAM F/U: HCPCS | Performed by: INTERNAL MEDICINE

## 2018-11-08 PROCEDURE — 3017F COLORECTAL CA SCREEN DOC REV: CPT | Performed by: INTERNAL MEDICINE

## 2018-11-08 PROCEDURE — G8482 FLU IMMUNIZE ORDER/ADMIN: HCPCS | Performed by: INTERNAL MEDICINE

## 2018-11-08 PROCEDURE — 4004F PT TOBACCO SCREEN RCVD TLK: CPT | Performed by: INTERNAL MEDICINE

## 2018-11-08 PROCEDURE — 73560 X-RAY EXAM OF KNEE 1 OR 2: CPT

## 2018-11-08 PROCEDURE — G8599 NO ASA/ANTIPLAT THER USE RNG: HCPCS | Performed by: INTERNAL MEDICINE

## 2018-11-08 PROCEDURE — 2022F DILAT RTA XM EVC RTNOPTHY: CPT | Performed by: INTERNAL MEDICINE

## 2018-11-08 RX ORDER — ESOMEPRAZOLE MAGNESIUM 40 MG/1
40 CAPSULE, DELAYED RELEASE ORAL
Qty: 90 CAPSULE | Refills: 3 | Status: SHIPPED | OUTPATIENT
Start: 2018-11-08 | End: 2019-02-08

## 2018-11-08 NOTE — PROGRESS NOTES
knees without any active synovitis a little crepitus on exam no edema no rashes mood is okay. Results for orders placed or performed in visit on 08/31/18   CBC   Result Value Ref Range    WBC 9.1 4.8 - 10.8 K/uL    RBC 3.79 (L) 4.70 - 6.10 M/uL    Hemoglobin 11.4 (L) 14.0 - 18.0 g/dL    Hematocrit 35.9 (L) 42.0 - 52.0 %    MCV 94.7 (H) 80.0 - 94.0 fL    MCH 30.1 27.0 - 31.0 pg    MCHC 31.8 (L) 33.0 - 37.0 g/dL    RDW 13.5 11.5 - 14.5 %    Platelets 375 588 - 867 K/uL    MPV 11.5 9.4 - 12.4 fL   Comprehensive Metabolic Panel   Result Value Ref Range    Sodium 140 136 - 145 mmol/L    Potassium 4.8 3.5 - 5.0 mmol/L    Chloride 103 98 - 111 mmol/L    CO2 21 (L) 22 - 29 mmol/L    Anion Gap 16 7 - 19 mmol/L    Glucose 202 (H) 74 - 109 mg/dL    BUN 16 6 - 20 mg/dL    CREATININE 1.0 0.5 - 1.2 mg/dL    GFR Non-African American >60 >60    Calcium 8.3 (L) 8.6 - 10.0 mg/dL    Total Protein 7.5 6.6 - 8.7 g/dL    Alb 4.2 3.5 - 5.2 g/dL    Total Bilirubin <0.2 0.2 - 1.2 mg/dL    Alkaline Phosphatase 56 40 - 130 U/L    ALT 33 5 - 41 U/L    AST 35 5 - 40 U/L   Hemoglobin A1C   Result Value Ref Range    Hemoglobin A1C 10.4 (H) 4.0 - 6.0 %   TSH without Reflex   Result Value Ref Range    TSH 1.540 0.270 - 4.200 uIU/mL   Lipid Panel   Result Value Ref Range    Cholesterol, Total 130 (L) 160 - 199 mg/dL    Triglycerides 91 0 - 149 mg/dL    HDL 41 (L) 55 - 121 mg/dL    LDL Calculated 71 <100 mg/dL       ASSESSMENT/ PLAN:  1. Type 2 diabetes mellitus without complication, with long-term current use of insulin (HCC)  Much better control of his diabetes although his hemoglobin A1c is still high he is improving continue to work with endocrinology. Needs to be compliant with a diabetic diet. - CBC; Future  - Comprehensive Metabolic Panel; Future  - TSH without Reflex; Future  - Hemoglobin A1C; Future  - CBC; Future  - Comprehensive Metabolic Panel; Future  - Hemoglobin A1C; Future  - TSH without Reflex; Future  - Lipid Panel; Future    2.

## 2018-11-24 ASSESSMENT — ENCOUNTER SYMPTOMS
SINUS PRESSURE: 0
ABDOMINAL PAIN: 0
EYE REDNESS: 0
BACK PAIN: 0
COUGH: 0
EYE DISCHARGE: 0
ABDOMINAL DISTENTION: 0
SHORTNESS OF BREATH: 0

## 2018-11-26 ENCOUNTER — OFFICE VISIT (OUTPATIENT)
Dept: ENDOCRINOLOGY | Facility: CLINIC | Age: 53
End: 2018-11-26

## 2018-11-26 VITALS
HEART RATE: 88 BPM | OXYGEN SATURATION: 98 % | WEIGHT: 277.7 LBS | BODY MASS INDEX: 37.61 KG/M2 | HEIGHT: 72 IN | DIASTOLIC BLOOD PRESSURE: 72 MMHG | SYSTOLIC BLOOD PRESSURE: 124 MMHG

## 2018-11-26 DIAGNOSIS — E11.69 MIXED DIABETIC HYPERLIPIDEMIA ASSOCIATED WITH TYPE 2 DIABETES MELLITUS (HCC): ICD-10-CM

## 2018-11-26 DIAGNOSIS — Z79.4 TYPE 2 DIABETES MELLITUS WITH HYPERGLYCEMIA, WITH LONG-TERM CURRENT USE OF INSULIN (HCC): Primary | ICD-10-CM

## 2018-11-26 DIAGNOSIS — E11.65 TYPE 2 DIABETES MELLITUS WITH HYPERGLYCEMIA, WITH LONG-TERM CURRENT USE OF INSULIN (HCC): Primary | ICD-10-CM

## 2018-11-26 DIAGNOSIS — E11.59 HYPERTENSION ASSOCIATED WITH DIABETES (HCC): ICD-10-CM

## 2018-11-26 DIAGNOSIS — I15.2 HYPERTENSION ASSOCIATED WITH DIABETES (HCC): ICD-10-CM

## 2018-11-26 DIAGNOSIS — E78.2 MIXED DIABETIC HYPERLIPIDEMIA ASSOCIATED WITH TYPE 2 DIABETES MELLITUS (HCC): ICD-10-CM

## 2018-11-26 PROCEDURE — 95251 CONT GLUC MNTR ANALYSIS I&R: CPT | Performed by: INTERNAL MEDICINE

## 2018-11-26 PROCEDURE — 99214 OFFICE O/P EST MOD 30 MIN: CPT | Performed by: INTERNAL MEDICINE

## 2018-11-26 NOTE — PROGRESS NOTES
"Donis Yi is a 52 y.o. male who presents for  evaluation of   Chief Complaint   Patient presents with   • Diabetes       Referring provider    No referring provider defined for this encounter.    Primary Care Provider    Oksana Mares MD    Duration 15 years    Timing - Diabetes is Constant    Quality -  poorly controlled but some improvement    Severity -  severe    Complications - peripheral neuropathy    Current symptoms/problems  none     Alleviating Factors: Compliance      Aggravating Factors :    Side Effects  none    Current diet  in general, a \"healthy\" diet      Current exercise none    Current monitoring regimen: home blood tests - checking 4 x daily   Home blood sugar records:  now avg 200s     Hypoglycemia nocturnal and if skipped meals     Past Medical History:   Diagnosis Date   • Allergic rhinitis    • Anxiety    • Bursitis    • DDD (degenerative disc disease), cervical    • Depression    • HIV disease (CMS/HCC)    • HLD (hyperlipidemia)    • HTN (hypertension)    • Migraine    • Myocardial infarction (CMS/HCC)    • Osteoporosis    • Sleep apnea    • Type 2 diabetes mellitus (CMS/HCC)      Family History   Problem Relation Age of Onset   • Diabetes Mother    • Hypertension Mother    • Thyroid disease Mother    • Hypertension Father    • Thyroid disease Father    • Arrhythmia Father    • Diabetes Maternal Grandfather    • Heart disease Maternal Grandfather    • Hypertension Maternal Grandfather    • Diabetes Paternal Grandmother    • Stroke Paternal Grandmother    • Heart disease Paternal Grandmother    • Hypertension Paternal Grandmother    • Thyroid disease Paternal Grandmother    • Hypertension Paternal Grandfather    • Hypertension Sister    • No Known Problems Brother      Social History     Tobacco Use   • Smoking status: Current Every Day Smoker     Packs/day: 1.00     Types: Cigarettes     Start date: 1988   • Smokeless tobacco: Never Used   Substance Use Topics   • Alcohol " use: Yes     Comment: occasionally   • Drug use: No         Current Outpatient Medications:   •  albuterol (PROVENTIL HFA;VENTOLIN HFA) 108 (90 BASE) MCG/ACT inhaler, Inhale 2 puffs every 6 (six) hours as needed for wheezing., Disp: , Rfl:   •  atorvastatin (LIPITOR) 80 MG tablet, Take 80 mg by mouth daily., Disp: , Rfl:   •  calcium carbonate (OS-THEODORA) 600 MG tablet, Take 600 mg by mouth Daily., Disp: , Rfl:   •  cholecalciferol (VITAMIN D3) 1000 UNITS tablet, Take 1,000 Units by mouth daily., Disp: , Rfl:   •  clonazePAM (KlonoPIN) 0.5 MG tablet, Take 0.5 mg by mouth 3 (Three) Times a Day As Needed for Anxiety., Disp: , Rfl:   •  denosumab (PROLIA) 60 MG/ML solution syringe, Inject 60 mg under the skin Every 6 (Six) Months., Disp: , Rfl:   •  Dulaglutide 0.75 MG/0.5ML solution pen-injector, Inject 0.75 mg under the skin into the appropriate area as directed 1 (One) Time Per Week., Disp: 4 pen, Rfl: 11  •  Iqqptmj-Bzqol-Hrffgfcc-TenofAF (GENVOYA) 410-125-067-10 MG tablet, Take 1 tablet by mouth daily., Disp: , Rfl:   •  esomeprazole (NexIUM) 40 MG capsule, Take 40 mg by mouth every morning before breakfast., Disp: , Rfl:   •  fluticasone (FLONASE) 50 MCG/ACT nasal spray, 2 sprays into each nostril Daily. (Patient taking differently: 2 sprays into each nostril Daily As Needed.), Disp: 1 bottle, Rfl: 6  •  gabapentin (NEURONTIN) 800 MG tablet, , Disp: , Rfl:   •  guaiFENesin (MUCINEX) 600 MG 12 hr tablet, Take 600 mg by mouth 2 (two) times a day as needed for cough., Disp: , Rfl:   •  HYDROcodone-acetaminophen (NORCO) 5-325 MG per tablet, Take 1 tablet by mouth 2 (two) times a day as needed., Disp: , Rfl:   •  hydrOXYzine (VISTARIL) 25 MG capsule, Take 25 mg by mouth every 6 (six) hours as needed for itching., Disp: , Rfl:   •  insulin aspart (NOVOLOG FLEXPEN) 100 UNIT/ML solution pen-injector sc pen, Inject 80 Units under the skin 4 (Four) Times a Day With Meals & at Bedtime., Disp: , Rfl:   •  Insulin Regular Human,  Conc, (HUMULIN R U-500 KWIKPEN) 500 UNIT/ML solution pen-injector CONCENTRATED injection, Up to 200 units bid with meals, Disp: 8 pen, Rfl: 11  •  loperamide (IMODIUM) 2 MG capsule, Take 2 mg by mouth daily as needed for diarrhea., Disp: , Rfl:   •  loratadine (CLARITIN) 10 MG tablet, Take 10 mg by mouth daily., Disp: , Rfl:   •  losartan (COZAAR) 100 MG tablet, Take 100 mg by mouth daily., Disp: , Rfl:   •  LYRICA 100 MG capsule, Take 100 mg by mouth 2 (Two) Times a Day., Disp: , Rfl:   •  meclizine (ANTIVERT) 25 MG tablet, Take 25 mg by mouth Daily As Needed., Disp: , Rfl:   •  metFORMIN (GLUCOPHAGE) 1000 MG tablet, Take 1,000 mg by mouth 2 (two) times a day with meals., Disp: , Rfl:   •  miconazole (MICOTIN) 2 % cream, Apply 1 application topically 2 (two) times a day., Disp: , Rfl:   •  neomycin-bacitracin-polymyxin (NEOSPORIN) 5-400-5000 ointment, Apply 1 application topically 4 (four) times a day as needed., Disp: , Rfl:   •  promethazine (PHENERGAN) 25 MG tablet, Take 25 mg by mouth every 6 (six) hours as needed for nausea or vomiting., Disp: , Rfl:   •  propranolol LA (INDERAL LA) 60 MG 24 hr capsule, Take 60 mg by mouth daily., Disp: , Rfl:   •  rizatriptan (MAXALT) 10 MG tablet, Take 10 mg by mouth 1 (one) time as needed for migraine. May repeat in 2 hours if needed, Disp: , Rfl:   •  tiZANidine (ZANAFLEX) 4 MG tablet, Take 6 mg by mouth Every 6 (Six) Hours As Needed for Muscle Spasms., Disp: , Rfl:   •  traZODone (DESYREL) 150 MG tablet, Take 100 mg by mouth Every Night., Disp: , Rfl:   •  triamcinolone (KENALOG) 0.1 % cream, Apply  topically to the appropriate area as directed 2 (Two) Times a Day for 14 days., Disp: 30 g, Rfl: 0  •  varenicline (CHANTIX) 1 MG tablet, Take 1 mg by mouth 2 (Two) Times a Day., Disp: , Rfl:   •  Vortioxetine HBr (TRINTELLIX) 5 MG tablet, Take 20 mg by mouth Daily With Breakfast., Disp: , Rfl:     Review of Systems    Review of Systems   Constitutional: Negative for activity  "change, appetite change, chills, diaphoresis, fatigue, fever and unexpected weight change.   HENT: Negative for congestion, dental problem, drooling, ear discharge, ear pain, facial swelling, mouth sores, postnasal drip, rhinorrhea, sinus pressure, sore throat, tinnitus, trouble swallowing and voice change.    Eyes: Negative for photophobia, pain, discharge, redness, itching and visual disturbance.   Respiratory: Negative for apnea, cough, choking, chest tightness, shortness of breath, wheezing and stridor.    Cardiovascular: Negative for chest pain, palpitations and leg swelling.   Gastrointestinal: Negative for abdominal distention, abdominal pain, constipation, diarrhea, nausea and vomiting.   Endocrine: Negative for cold intolerance, heat intolerance, polydipsia, polyphagia and polyuria.   Genitourinary: Negative for decreased urine volume, difficulty urinating, dysuria, flank pain, frequency, hematuria and urgency.   Musculoskeletal: Negative for arthralgias, back pain, gait problem, joint swelling, myalgias, neck pain and neck stiffness.   Skin: Negative for color change, pallor, rash and wound.   Allergic/Immunologic: Negative for immunocompromised state.   Neurological: Negative for dizziness, tremors, seizures, syncope, facial asymmetry, speech difficulty, weakness, light-headedness, numbness and headaches.   Hematological: Negative for adenopathy.   Psychiatric/Behavioral: Negative for agitation, behavioral problems, confusion, decreased concentration, dysphoric mood, hallucinations, self-injury, sleep disturbance and suicidal ideas. The patient is not nervous/anxious and is not hyperactive.         Objective:   /72   Pulse 88   Ht 182.9 cm (72\")   Wt 126 kg (277 lb 11.2 oz)   SpO2 98%   BMI 37.66 kg/m²     Physical Exam   Constitutional: He is oriented to person, place, and time. He appears well-developed and well-nourished. He is cooperative.   HENT:   Head: Normocephalic and atraumatic. "   Right Ear: External ear normal.   Left Ear: External ear normal.   Nose: Nose normal.   Mouth/Throat: Oropharynx is clear and moist. No oropharyngeal exudate.   Eyes: Conjunctivae and EOM are normal. Pupils are equal, round, and reactive to light. No scleral icterus. Right eye exhibits normal extraocular motion. Left eye exhibits normal extraocular motion.   Neck: Neck supple. No JVD present. No muscular tenderness present. No tracheal deviation, no edema and no erythema present. No thyromegaly present.   Cardiovascular: Normal rate, regular rhythm, normal heart sounds and intact distal pulses. Exam reveals no gallop and no friction rub.   No murmur heard.  Pulmonary/Chest: Effort normal and breath sounds normal. No stridor. No respiratory distress. He has no decreased breath sounds. He has no wheezes. He has no rhonchi. He has no rales. He exhibits no tenderness.   Abdominal: Soft. Bowel sounds are normal. He exhibits no distension and no mass. There is no hepatomegaly. There is no tenderness. There is no rebound and no guarding. No hernia.   Musculoskeletal: Normal range of motion. He exhibits no edema, tenderness or deformity.   Lymphadenopathy:     He has no cervical adenopathy.   Neurological: He is alert and oriented to person, place, and time. He has normal reflexes. No cranial nerve deficit. He exhibits normal muscle tone. Coordination normal.   Skin: Skin is warm. No rash noted. No erythema. No pallor.   Psychiatric: He has a normal mood and affect. His behavior is normal. Judgment and thought content normal.   Nursing note and vitals reviewed.      Lab Review          Assessment/Plan       ICD-10-CM ICD-9-CM   1. Type 2 diabetes mellitus with hyperglycemia, with long-term current use of insulin (CMS/[facility]) E11.65 250.00    Z79.4 790.29     V58.67   2. Hypertension associated with diabetes (CMS/[facility]) E11.59 250.80    I10 401.9   3. Mixed diabetic hyperlipidemia associated with type 2 diabetes mellitus  (CMS/[facility]) E11.69 250.80    E78.2 272.2         I reviewed and summarized records from Oksana Mares MD from 2018 and I reviewed / ordered labs.   From review of records :        Glycemic Management:   No results found for: HGBA1C  Lab Results   Component Value Date    GLUCOSE 83 08/19/2018    BUN 17 08/19/2018    CREATININE 0.78 08/19/2018    EGFRIFNONA 105 08/19/2018    BCR 21.8 08/19/2018    K 3.7 08/19/2018    CO2 26.0 08/19/2018    CALCIUM 9.8 08/19/2018    ALBUMIN 4.20 08/19/2018    LABIL2 CANCELED 03/29/2016    AST 38 08/19/2018    ALT 36 08/19/2018    ANIONGAP 10.0 08/19/2018     Lab Results   Component Value Date    WBC 9.86 08/19/2018    HGB 11.3 (L) 08/19/2018    HCT 33.7 (L) 08/19/2018    MCV 90.3 08/19/2018     08/19/2018        U500 insulin      120 units for bkfast -- increase to 140     80 units for supper    In addition , He will add U100 humalog 5 units per 50 above 150 at lunch and bedtime     Total 4 injections per day     Metformin 1000 mg bid     Add Trulicity 0.75 mg weekly     Next appt add jardiance    Approve for continuous sensor     #1  Patient has diabetes mellitus, insulin-dependent.    #2 he performs blood glucose testing 4 times daily and blood glucose log was brought to office with variability from .    #3  he is requiring U500 and U100  Insulin 3 times daily for a total of 4 injections per day.    #4 Patient tests blood sugars 4 times daily and makes frequent self-adjustments and patient is injecting insulin 4 times daily. He has been doing this for more than 90 days     #5 I have personally seen patient within the past 6 months    #6 We plan on seeing him every 2-3 months for continuous adjustment of her diabetes regimen     #7 patient has hypoglycemia with unawareness.    #8 patient has day-to-day variation in her mealtime which confounds the degree of insulin dosing with multiple daily injections.    #9 patient has completed diabetes education program with us.    #10  he has demonstrated the ability to self monitor her glucose.        #11 Patient is motivated in improving  diabetes control       I reviewed melba personal log from Nov 13 to Nov 26     avg glucose 225 , 2% low , 63% above 180    Lipid Management  No results found for: CHOL  No results found for: TRIG  No results found for: HDL  No components found for: LDLCALC  No results found for: LDL  No results found for: LDLDIRECT    lipitor 80 mg qhs     Blood Pressure Management:    Vitals:    11/26/18 1451   BP: 124/72   Pulse: 88   SpO2: 98%     Lab Results   Component Value Date    GLUCOSE 83 08/19/2018    CALCIUM 9.8 08/19/2018     08/19/2018    K 3.7 08/19/2018    CO2 26.0 08/19/2018     08/19/2018    BUN 17 08/19/2018    CREATININE 0.78 08/19/2018    EGFRIFNONA 105 08/19/2018    BCR 21.8 08/19/2018    ANIONGAP 10.0 08/19/2018               Microvascular Complication Monitoring:      Eye Exam Evaluation    -----------    Last Microalbumin-Proteinuria Assessment    No results found for: MALBCRERATIO    No results found for: UTPCR    -----------      Neuropathy      Immunizations:          Preventive Care:        Weight Related:   Wt Readings from Last 3 Encounters:   11/26/18 126 kg (277 lb 11.2 oz)   08/24/18 120 kg (264 lb 12.8 oz)   08/19/18 119 kg (262 lb)     Body mass index is 37.66 kg/m².        Diet interventions: moderate (500 kCal/d) deficit diet.      Bone Health    Lab Results   Component Value Date    CALCIUM 9.8 08/19/2018    PHOS 3.2 07/09/2018    OQHD08OP 35.8 07/09/2018       Thyroid Health    No results found for: TSH           Other Diabetes Related Aspects       No results found for: [id number]       No orders of the defined types were placed in this encounter.        A copy of my note was sent to Oksana Mares MD    Please see my above opinion and suggestions.

## 2018-12-06 ENCOUNTER — TELEPHONE (OUTPATIENT)
Dept: INTERNAL MEDICINE | Age: 53
End: 2018-12-06

## 2018-12-06 RX ORDER — GUAIFENESIN 600 MG/1
600 TABLET, EXTENDED RELEASE ORAL 2 TIMES DAILY PRN
Qty: 60 TABLET | Refills: 0 | Status: SHIPPED | OUTPATIENT
Start: 2018-12-06 | End: 2019-01-05

## 2018-12-11 ENCOUNTER — TELEPHONE (OUTPATIENT)
Dept: INTERNAL MEDICINE | Age: 53
End: 2018-12-11

## 2018-12-11 DIAGNOSIS — Z79.4 TYPE 2 DIABETES MELLITUS WITHOUT COMPLICATION, WITH LONG-TERM CURRENT USE OF INSULIN (HCC): Primary | ICD-10-CM

## 2018-12-11 DIAGNOSIS — E11.9 TYPE 2 DIABETES MELLITUS WITHOUT COMPLICATION, WITH LONG-TERM CURRENT USE OF INSULIN (HCC): Primary | ICD-10-CM

## 2018-12-13 RX ORDER — INSULIN ASPART 100 [IU]/ML
INJECTION, SOLUTION INTRAVENOUS; SUBCUTANEOUS
Qty: 105 ML | Refills: 3 | Status: SHIPPED | OUTPATIENT
Start: 2018-12-13 | End: 2019-03-28 | Stop reason: SDUPTHER

## 2018-12-27 RX ORDER — PROPRANOLOL HCL 60 MG
60 CAPSULE, EXTENDED RELEASE 24HR ORAL DAILY
Qty: 90 CAPSULE | Refills: 3 | Status: SHIPPED | OUTPATIENT
Start: 2018-12-27 | End: 2019-07-19

## 2019-01-03 ENCOUNTER — TELEPHONE (OUTPATIENT)
Dept: INTERNAL MEDICINE | Age: 54
End: 2019-01-03

## 2019-01-03 DIAGNOSIS — J01.00 ACUTE MAXILLARY SINUSITIS, RECURRENCE NOT SPECIFIED: ICD-10-CM

## 2019-01-03 PROBLEM — J01.90 ACUTE SINUSITIS: Status: ACTIVE | Noted: 2019-01-03

## 2019-01-03 RX ORDER — CEFDINIR 300 MG/1
300 CAPSULE ORAL 2 TIMES DAILY
Qty: 20 CAPSULE | Refills: 0 | Status: SHIPPED | OUTPATIENT
Start: 2019-01-03 | End: 2019-01-13

## 2019-01-14 ENCOUNTER — APPOINTMENT (OUTPATIENT)
Dept: LAB | Facility: HOSPITAL | Age: 54
End: 2019-01-14

## 2019-01-14 ENCOUNTER — TRANSCRIBE ORDERS (OUTPATIENT)
Dept: LAB | Facility: HOSPITAL | Age: 54
End: 2019-01-14

## 2019-01-14 DIAGNOSIS — E11.9 DIABETES MELLITUS WITHOUT COMPLICATION (HCC): Primary | ICD-10-CM

## 2019-01-14 DIAGNOSIS — M81.0 AGE-RELATED OSTEOPOROSIS WITHOUT CURRENT PATHOLOGICAL FRACTURE: ICD-10-CM

## 2019-01-14 LAB
25(OH)D3 SERPL-MCNC: 47.2 NG/ML (ref 30–100)
ALBUMIN SERPL-MCNC: 4.4 G/DL (ref 3.5–5)
ALBUMIN/GLOB SERPL: 1.4 G/DL (ref 1.1–2.5)
ALP SERPL-CCNC: 63 U/L (ref 24–120)
ALT SERPL W P-5'-P-CCNC: 71 U/L (ref 0–54)
ANION GAP SERPL CALCULATED.3IONS-SCNC: 10 MMOL/L (ref 4–13)
ARTICHOKE IGE QN: 92 MG/DL (ref 0–99)
AST SERPL-CCNC: 53 U/L (ref 7–45)
BILIRUB SERPL-MCNC: 0.4 MG/DL (ref 0.1–1)
BUN BLD-MCNC: 16 MG/DL (ref 5–21)
BUN/CREAT SERPL: 18.6 (ref 7–25)
CALCIUM SPEC-SCNC: 9.3 MG/DL (ref 8.4–10.4)
CHLORIDE SERPL-SCNC: 101 MMOL/L (ref 98–110)
CHOLEST SERPL-MCNC: 147 MG/DL (ref 130–200)
CO2 SERPL-SCNC: 28 MMOL/L (ref 24–31)
CREAT BLD-MCNC: 0.86 MG/DL (ref 0.5–1.4)
DEPRECATED RDW RBC AUTO: 44.3 FL (ref 40–54)
ERYTHROCYTE [DISTWIDTH] IN BLOOD BY AUTOMATED COUNT: 13 % (ref 12–15)
GFR SERPL CREATININE-BSD FRML MDRD: 94 ML/MIN/1.73
GLOBULIN UR ELPH-MCNC: 3.2 GM/DL
GLUCOSE BLD-MCNC: 352 MG/DL (ref 70–100)
HBA1C MFR BLD: 10.1 %
HCT VFR BLD AUTO: 39.8 % (ref 40–52)
HDLC SERPL-MCNC: 47 MG/DL
HGB BLD-MCNC: 13.2 G/DL (ref 14–18)
LDLC/HDLC SERPL: 1.6 {RATIO}
MAGNESIUM SERPL-MCNC: 1.7 MG/DL (ref 1.4–2.2)
MCH RBC QN AUTO: 31.1 PG (ref 28–32)
MCHC RBC AUTO-ENTMCNC: 33.2 G/DL (ref 33–36)
MCV RBC AUTO: 93.9 FL (ref 82–95)
PHOSPHATE SERPL-MCNC: 3.2 MG/DL (ref 2.5–4.5)
PLATELET # BLD AUTO: 225 10*3/MM3 (ref 130–400)
PMV BLD AUTO: 11.3 FL (ref 6–12)
POTASSIUM BLD-SCNC: 4.3 MMOL/L (ref 3.5–5.3)
PROT SERPL-MCNC: 7.6 G/DL (ref 6.3–8.7)
RBC # BLD AUTO: 4.24 10*6/MM3 (ref 4.8–5.9)
SODIUM BLD-SCNC: 139 MMOL/L (ref 135–145)
TRIGL SERPL-MCNC: 123 MG/DL (ref 0–149)
TSH SERPL DL<=0.05 MIU/L-ACNC: 2.15 MIU/ML (ref 0.47–4.68)
WBC NRBC COR # BLD: 9.03 10*3/MM3 (ref 4.8–10.8)

## 2019-01-14 PROCEDURE — 36415 COLL VENOUS BLD VENIPUNCTURE: CPT

## 2019-01-14 PROCEDURE — 80061 LIPID PANEL: CPT | Performed by: INTERNAL MEDICINE

## 2019-01-14 PROCEDURE — 80053 COMPREHEN METABOLIC PANEL: CPT | Performed by: INTERNAL MEDICINE

## 2019-01-14 PROCEDURE — 84100 ASSAY OF PHOSPHORUS: CPT | Performed by: INTERNAL MEDICINE

## 2019-01-14 PROCEDURE — 83735 ASSAY OF MAGNESIUM: CPT | Performed by: INTERNAL MEDICINE

## 2019-01-14 PROCEDURE — 83036 HEMOGLOBIN GLYCOSYLATED A1C: CPT | Performed by: INTERNAL MEDICINE

## 2019-01-14 PROCEDURE — 85027 COMPLETE CBC AUTOMATED: CPT | Performed by: INTERNAL MEDICINE

## 2019-01-14 PROCEDURE — 82306 VITAMIN D 25 HYDROXY: CPT | Performed by: INTERNAL MEDICINE

## 2019-01-14 PROCEDURE — 84443 ASSAY THYROID STIM HORMONE: CPT | Performed by: INTERNAL MEDICINE

## 2019-01-15 ENCOUNTER — INFUSION (OUTPATIENT)
Dept: ONCOLOGY | Facility: HOSPITAL | Age: 54
End: 2019-01-15

## 2019-01-15 VITALS
OXYGEN SATURATION: 96 % | BODY MASS INDEX: 37.65 KG/M2 | DIASTOLIC BLOOD PRESSURE: 65 MMHG | SYSTOLIC BLOOD PRESSURE: 128 MMHG | HEART RATE: 74 BPM | TEMPERATURE: 97.8 F | HEIGHT: 72 IN | WEIGHT: 278 LBS

## 2019-01-15 DIAGNOSIS — M81.0 OSTEOPOROSIS WITHOUT CURRENT PATHOLOGICAL FRACTURE, UNSPECIFIED OSTEOPOROSIS TYPE: Primary | ICD-10-CM

## 2019-01-15 DIAGNOSIS — M81.0 AGE-RELATED OSTEOPOROSIS WITHOUT CURRENT PATHOLOGICAL FRACTURE: ICD-10-CM

## 2019-01-15 PROCEDURE — 25010000002 DENOSUMAB 60 MG/ML SOLUTION: Performed by: NURSE PRACTITIONER

## 2019-01-15 PROCEDURE — 96372 THER/PROPH/DIAG INJ SC/IM: CPT

## 2019-01-15 RX ADMIN — DENOSUMAB 60 MG: 60 INJECTION SUBCUTANEOUS at 09:07

## 2019-01-15 NOTE — PATIENT INSTRUCTIONS
Denosumab injection  What is this medicine?  DENOSUMAB (den oh kash mab) slows bone breakdown. Prolia is used to treat osteoporosis in women after menopause and in men. Xgeva is used to treat a high calcium level due to cancer and to prevent bone fractures and other bone problems caused by multiple myeloma or cancer bone metastases. Xgeva is also used to treat giant cell tumor of the bone.  This medicine may be used for other purposes; ask your health care provider or pharmacist if you have questions.  COMMON BRAND NAME(S): Prolia, XGEVA  What should I tell my health care provider before I take this medicine?  They need to know if you have any of these conditions:  -dental disease  -having surgery or tooth extraction  -infection  -kidney disease  -low levels of calcium or Vitamin D in the blood  -malnutrition  -on hemodialysis  -skin conditions or sensitivity  -thyroid or parathyroid disease  -an unusual reaction to denosumab, other medicines, foods, dyes, or preservatives  -pregnant or trying to get pregnant  -breast-feeding  How should I use this medicine?  This medicine is for injection under the skin. It is given by a health care professional in a hospital or clinic setting.  If you are getting Prolia, a special MedGuide will be given to you by the pharmacist with each prescription and refill. Be sure to read this information carefully each time.  For Prolia, talk to your pediatrician regarding the use of this medicine in children. Special care may be needed. For Xgeva, talk to your pediatrician regarding the use of this medicine in children. While this drug may be prescribed for children as young as 13 years for selected conditions, precautions do apply.  Overdosage: If you think you have taken too much of this medicine contact a poison control center or emergency room at once.  NOTE: This medicine is only for you. Do not share this medicine with others.  What if I miss a dose?  It is important not to miss your  dose. Call your doctor or health care professional if you are unable to keep an appointment.  What may interact with this medicine?  Do not take this medicine with any of the following medications:  -other medicines containing denosumab  This medicine may also interact with the following medications:  -medicines that lower your chance of fighting infection  -steroid medicines like prednisone or cortisone  This list may not describe all possible interactions. Give your health care provider a list of all the medicines, herbs, non-prescription drugs, or dietary supplements you use. Also tell them if you smoke, drink alcohol, or use illegal drugs. Some items may interact with your medicine.  What should I watch for while using this medicine?  Visit your doctor or health care professional for regular checks on your progress. Your doctor or health care professional may order blood tests and other tests to see how you are doing.  Call your doctor or health care professional for advice if you get a fever, chills or sore throat, or other symptoms of a cold or flu. Do not treat yourself. This drug may decrease your body's ability to fight infection. Try to avoid being around people who are sick.  You should make sure you get enough calcium and vitamin D while you are taking this medicine, unless your doctor tells you not to. Discuss the foods you eat and the vitamins you take with your health care professional.  See your dentist regularly. Brush and floss your teeth as directed. Before you have any dental work done, tell your dentist you are receiving this medicine.  Do not become pregnant while taking this medicine or for 5 months after stopping it. Talk with your doctor or health care professional about your birth control options while taking this medicine. Women should inform their doctor if they wish to become pregnant or think they might be pregnant. There is a potential for serious side effects to an unborn child. Talk  to your health care professional or pharmacist for more information.  What side effects may I notice from receiving this medicine?  Side effects that you should report to your doctor or health care professional as soon as possible:  -allergic reactions like skin rash, itching or hives, swelling of the face, lips, or tongue  -bone pain  -breathing problems  -dizziness  -jaw pain, especially after dental work  -redness, blistering, peeling of the skin  -signs and symptoms of infection like fever or chills; cough; sore throat; pain or trouble passing urine  -signs of low calcium like fast heartbeat, muscle cramps or muscle pain; pain, tingling, numbness in the hands or feet; seizures  -unusual bleeding or bruising  -unusually weak or tired  Side effects that usually do not require medical attention (report to your doctor or health care professional if they continue or are bothersome):  -constipation  -diarrhea  -headache  -joint pain  -loss of appetite  -muscle pain  -runny nose  -tiredness  -upset stomach  This list may not describe all possible side effects. Call your doctor for medical advice about side effects. You may report side effects to FDA at 2-857-FDA-0703.  Where should I keep my medicine?  This medicine is only given in a clinic, doctor's office, or other health care setting and will not be stored at home.  NOTE: This sheet is a summary. It may not cover all possible information. If you have questions about this medicine, talk to your doctor, pharmacist, or health care provider.  © 2018 [employer] (2018-01-09 19:17:21)

## 2019-01-30 RX ORDER — LOSARTAN POTASSIUM 100 MG/1
TABLET ORAL
Qty: 90 TABLET | Refills: 3 | Status: SHIPPED | OUTPATIENT
Start: 2019-01-30 | End: 2020-01-07

## 2019-02-08 ENCOUNTER — OFFICE VISIT (OUTPATIENT)
Dept: INTERNAL MEDICINE | Age: 54
End: 2019-02-08
Payer: MEDICARE

## 2019-02-08 VITALS
HEIGHT: 72 IN | HEART RATE: 90 BPM | OXYGEN SATURATION: 98 % | BODY MASS INDEX: 36.84 KG/M2 | WEIGHT: 272 LBS | SYSTOLIC BLOOD PRESSURE: 122 MMHG | DIASTOLIC BLOOD PRESSURE: 68 MMHG

## 2019-02-08 DIAGNOSIS — Z79.4 TYPE 2 DIABETES MELLITUS WITHOUT COMPLICATION, WITH LONG-TERM CURRENT USE OF INSULIN (HCC): ICD-10-CM

## 2019-02-08 DIAGNOSIS — Z23 NEED FOR PROPHYLACTIC VACCINATION AND INOCULATION AGAINST VARICELLA: ICD-10-CM

## 2019-02-08 DIAGNOSIS — B20: ICD-10-CM

## 2019-02-08 DIAGNOSIS — I10 ESSENTIAL HYPERTENSION: ICD-10-CM

## 2019-02-08 DIAGNOSIS — K21.9 GASTROESOPHAGEAL REFLUX DISEASE WITHOUT ESOPHAGITIS: Primary | ICD-10-CM

## 2019-02-08 DIAGNOSIS — E11.9 TYPE 2 DIABETES MELLITUS WITHOUT COMPLICATION, WITH LONG-TERM CURRENT USE OF INSULIN (HCC): ICD-10-CM

## 2019-02-08 DIAGNOSIS — Z00.00 ROUTINE GENERAL MEDICAL EXAMINATION AT A HEALTH CARE FACILITY: ICD-10-CM

## 2019-02-08 DIAGNOSIS — E78.2 MIXED HYPERLIPIDEMIA: ICD-10-CM

## 2019-02-08 PROCEDURE — G8417 CALC BMI ABV UP PARAM F/U: HCPCS | Performed by: INTERNAL MEDICINE

## 2019-02-08 PROCEDURE — 4004F PT TOBACCO SCREEN RCVD TLK: CPT | Performed by: INTERNAL MEDICINE

## 2019-02-08 PROCEDURE — G8427 DOCREV CUR MEDS BY ELIG CLIN: HCPCS | Performed by: INTERNAL MEDICINE

## 2019-02-08 PROCEDURE — 3046F HEMOGLOBIN A1C LEVEL >9.0%: CPT | Performed by: INTERNAL MEDICINE

## 2019-02-08 PROCEDURE — 2022F DILAT RTA XM EVC RTNOPTHY: CPT | Performed by: INTERNAL MEDICINE

## 2019-02-08 PROCEDURE — G8599 NO ASA/ANTIPLAT THER USE RNG: HCPCS | Performed by: INTERNAL MEDICINE

## 2019-02-08 PROCEDURE — G8482 FLU IMMUNIZE ORDER/ADMIN: HCPCS | Performed by: INTERNAL MEDICINE

## 2019-02-08 PROCEDURE — 3017F COLORECTAL CA SCREEN DOC REV: CPT | Performed by: INTERNAL MEDICINE

## 2019-02-08 PROCEDURE — G0439 PPPS, SUBSEQ VISIT: HCPCS | Performed by: INTERNAL MEDICINE

## 2019-02-08 PROCEDURE — 99214 OFFICE O/P EST MOD 30 MIN: CPT | Performed by: INTERNAL MEDICINE

## 2019-02-08 RX ORDER — DEXLANSOPRAZOLE 60 MG/1
60 CAPSULE, DELAYED RELEASE ORAL DAILY
Qty: 90 CAPSULE | Refills: 1 | Status: SHIPPED | OUTPATIENT
Start: 2019-02-08 | End: 2019-07-18 | Stop reason: SDUPTHER

## 2019-02-08 ASSESSMENT — LIFESTYLE VARIABLES: HOW OFTEN DO YOU HAVE A DRINK CONTAINING ALCOHOL: 0

## 2019-02-08 ASSESSMENT — PATIENT HEALTH QUESTIONNAIRE - PHQ9
SUM OF ALL RESPONSES TO PHQ QUESTIONS 1-9: 0
SUM OF ALL RESPONSES TO PHQ QUESTIONS 1-9: 0

## 2019-02-20 ASSESSMENT — ENCOUNTER SYMPTOMS
EYE REDNESS: 0
ABDOMINAL DISTENTION: 0
ABDOMINAL PAIN: 0
EYE DISCHARGE: 0
SHORTNESS OF BREATH: 0
BACK PAIN: 1
SINUS PRESSURE: 0
COUGH: 0

## 2019-03-18 ENCOUNTER — OFFICE VISIT (OUTPATIENT)
Dept: ENDOCRINOLOGY | Facility: CLINIC | Age: 54
End: 2019-03-18

## 2019-03-18 DIAGNOSIS — E11.59 HYPERTENSION ASSOCIATED WITH DIABETES (HCC): ICD-10-CM

## 2019-03-18 DIAGNOSIS — E78.2 MIXED DIABETIC HYPERLIPIDEMIA ASSOCIATED WITH TYPE 2 DIABETES MELLITUS (HCC): ICD-10-CM

## 2019-03-18 DIAGNOSIS — E11.8 TYPE 2 DIABETES MELLITUS WITH COMPLICATION, UNSPECIFIED WHETHER LONG TERM INSULIN USE: Primary | ICD-10-CM

## 2019-03-18 DIAGNOSIS — E11.69 MIXED DIABETIC HYPERLIPIDEMIA ASSOCIATED WITH TYPE 2 DIABETES MELLITUS (HCC): ICD-10-CM

## 2019-03-18 DIAGNOSIS — I15.2 HYPERTENSION ASSOCIATED WITH DIABETES (HCC): ICD-10-CM

## 2019-03-18 LAB
GLUCOSE BLDC GLUCOMTR-MCNC: 105 MG/DL (ref 70–130)
HBA1C MFR BLD: 9.2 %

## 2019-03-18 PROCEDURE — 95249 CONT GLUC MNTR PT PROV EQP: CPT | Performed by: INTERNAL MEDICINE

## 2019-03-18 PROCEDURE — 99214 OFFICE O/P EST MOD 30 MIN: CPT | Performed by: INTERNAL MEDICINE

## 2019-03-18 NOTE — PROGRESS NOTES
"Donis Yi is a 53 y.o. male who presents for  evaluation of   Chief Complaint   Patient presents with   • Diabetes       Referring provider    No referring provider defined for this encounter.    Primary Care Provider    Oksana Mares MD    Duration 15 years    Timing - Diabetes is Constant    Quality -  poorly controlled but some improvement    Severity -  severe    Complications - peripheral neuropathy    Current symptoms/problems  none     Alleviating Factors: Compliance      Aggravating Factors :    Side Effects  none    Current diet  in general, a \"healthy\" diet      Current exercise none    Current monitoring regimen: home blood tests - checking 4 x daily   Home blood sugar records:  now avg 200s     Hypoglycemia nocturnal and if skipped meals     Past Medical History:   Diagnosis Date   • Allergic rhinitis    • Anxiety    • Bursitis    • DDD (degenerative disc disease), cervical    • Depression    • HIV disease (CMS/HCC)    • HLD (hyperlipidemia)    • HTN (hypertension)    • Migraine    • Myocardial infarction (CMS/HCC)    • Osteoporosis    • Sleep apnea    • Type 2 diabetes mellitus (CMS/HCC)      Family History   Problem Relation Age of Onset   • Diabetes Mother    • Hypertension Mother    • Thyroid disease Mother    • Hypertension Father    • Thyroid disease Father    • Arrhythmia Father    • Diabetes Maternal Grandfather    • Heart disease Maternal Grandfather    • Hypertension Maternal Grandfather    • Diabetes Paternal Grandmother    • Stroke Paternal Grandmother    • Heart disease Paternal Grandmother    • Hypertension Paternal Grandmother    • Thyroid disease Paternal Grandmother    • Hypertension Paternal Grandfather    • Hypertension Sister    • No Known Problems Brother      Social History     Tobacco Use   • Smoking status: Current Every Day Smoker     Packs/day: 1.00     Types: Cigarettes     Start date: 1988   • Smokeless tobacco: Never Used   Substance Use Topics   • Alcohol " use: Yes     Comment: occasionally   • Drug use: No         Current Outpatient Medications:   •  albuterol (PROVENTIL HFA;VENTOLIN HFA) 108 (90 BASE) MCG/ACT inhaler, Inhale 2 puffs every 6 (six) hours as needed for wheezing., Disp: , Rfl:   •  atorvastatin (LIPITOR) 80 MG tablet, Take 80 mg by mouth daily., Disp: , Rfl:   •  calcium carbonate (OS-THEODORA) 600 MG tablet, Take 600 mg by mouth Daily., Disp: , Rfl:   •  cholecalciferol (VITAMIN D3) 1000 UNITS tablet, Take 1,000 Units by mouth daily., Disp: , Rfl:   •  clonazePAM (KlonoPIN) 0.5 MG tablet, Take 0.5 mg by mouth 3 (Three) Times a Day As Needed for Anxiety., Disp: , Rfl:   •  denosumab (PROLIA) 60 MG/ML solution syringe, Inject 60 mg under the skin Every 6 (Six) Months., Disp: , Rfl:   •  Dulaglutide 0.75 MG/0.5ML solution pen-injector, Inject 0.75 mg under the skin into the appropriate area as directed 1 (One) Time Per Week., Disp: 4 pen, Rfl: 11  •  Acmdncq-Qfmmc-Cwuwvdfb-TenofAF (GENVOYA) [phone] MG tablet, Take 1 tablet by mouth daily., Disp: , Rfl:   •  esomeprazole (NexIUM) 40 MG capsule, Take 40 mg by mouth every morning before breakfast., Disp: , Rfl:   •  fluticasone (FLONASE) 50 MCG/ACT nasal spray, 2 sprays into each nostril Daily. (Patient taking differently: 2 sprays into each nostril Daily As Needed.), Disp: 1 bottle, Rfl: 6  •  gabapentin (NEURONTIN) 800 MG tablet, , Disp: , Rfl:   •  guaiFENesin (MUCINEX) 600 MG 12 hr tablet, Take 600 mg by mouth 2 (two) times a day as needed for cough., Disp: , Rfl:   •  HYDROcodone-acetaminophen (NORCO) 5-325 MG per tablet, Take 1 tablet by mouth 2 (two) times a day as needed., Disp: , Rfl:   •  hydrOXYzine (VISTARIL) 25 MG capsule, Take 25 mg by mouth every 6 (six) hours as needed for itching., Disp: , Rfl:   •  insulin aspart (NOVOLOG FLEXPEN) 100 UNIT/ML solution pen-injector sc pen, Inject 80 Units under the skin 4 (Four) Times a Day With Meals & at Bedtime., Disp: , Rfl:   •  Insulin Regular Human,  Conc, (HUMULIN R U-500 KWIKPEN) 500 UNIT/ML solution pen-injector CONCENTRATED injection, Up to 200 units bid with meals, Disp: 8 pen, Rfl: 11  •  loperamide (IMODIUM) 2 MG capsule, Take 2 mg by mouth daily as needed for diarrhea., Disp: , Rfl:   •  loratadine (CLARITIN) 10 MG tablet, Take 10 mg by mouth daily., Disp: , Rfl:   •  losartan (COZAAR) 100 MG tablet, Take 100 mg by mouth daily., Disp: , Rfl:   •  LYRICA 100 MG capsule, Take 100 mg by mouth 2 (Two) Times a Day., Disp: , Rfl:   •  meclizine (ANTIVERT) 25 MG tablet, Take 25 mg by mouth Daily As Needed., Disp: , Rfl:   •  metFORMIN (GLUCOPHAGE) 1000 MG tablet, Take 1,000 mg by mouth 2 (two) times a day with meals., Disp: , Rfl:   •  miconazole (MICOTIN) 2 % cream, Apply 1 application topically 2 (two) times a day., Disp: , Rfl:   •  neomycin-bacitracin-polymyxin (NEOSPORIN) 5-400-5000 ointment, Apply 1 application topically 4 (four) times a day as needed., Disp: , Rfl:   •  promethazine (PHENERGAN) 25 MG tablet, Take 25 mg by mouth every 6 (six) hours as needed for nausea or vomiting., Disp: , Rfl:   •  propranolol LA (INDERAL LA) 60 MG 24 hr capsule, Take 60 mg by mouth daily., Disp: , Rfl:   •  rizatriptan (MAXALT) 10 MG tablet, Take 10 mg by mouth 1 (one) time as needed for migraine. May repeat in 2 hours if needed, Disp: , Rfl:   •  tiZANidine (ZANAFLEX) 4 MG tablet, Take 6 mg by mouth Every 6 (Six) Hours As Needed for Muscle Spasms., Disp: , Rfl:   •  traZODone (DESYREL) 150 MG tablet, Take 100 mg by mouth Every Night., Disp: , Rfl:   •  varenicline (CHANTIX) 1 MG tablet, Take 1 mg by mouth 2 (Two) Times a Day., Disp: , Rfl:   •  Vortioxetine HBr (TRINTELLIX) 5 MG tablet, Take 20 mg by mouth Daily With Breakfast., Disp: , Rfl:     Review of Systems    Review of Systems   Constitutional: Negative for activity change, appetite change, chills, diaphoresis, fatigue, fever and unexpected weight change.   HENT: Negative for congestion, dental problem,  drooling, ear discharge, ear pain, facial swelling, mouth sores, postnasal drip, rhinorrhea, sinus pressure, sore throat, tinnitus, trouble swallowing and voice change.    Eyes: Negative for photophobia, pain, discharge, redness, itching and visual disturbance.   Respiratory: Negative for apnea, cough, choking, chest tightness, shortness of breath, wheezing and stridor.    Cardiovascular: Negative for chest pain, palpitations and leg swelling.   Gastrointestinal: Negative for abdominal distention, abdominal pain, constipation, diarrhea, nausea and vomiting.   Endocrine: Negative for cold intolerance, heat intolerance, polydipsia, polyphagia and polyuria.   Genitourinary: Negative for decreased urine volume, difficulty urinating, dysuria, flank pain, frequency, hematuria and urgency.   Musculoskeletal: Negative for arthralgias, back pain, gait problem, joint swelling, myalgias, neck pain and neck stiffness.   Skin: Negative for color change, pallor, rash and wound.   Allergic/Immunologic: Negative for immunocompromised state.   Neurological: Negative for dizziness, tremors, seizures, syncope, facial asymmetry, speech difficulty, weakness, light-headedness, numbness and headaches.   Hematological: Negative for adenopathy.   Psychiatric/Behavioral: Negative for agitation, behavioral problems, confusion, decreased concentration, dysphoric mood, hallucinations, self-injury, sleep disturbance and suicidal ideas. The patient is not nervous/anxious and is not hyperactive.         Objective:   There were no vitals taken for this visit.    Physical Exam   Constitutional: He is oriented to person, place, and time. He appears well-developed and well-nourished. He is cooperative.   HENT:   Head: Normocephalic and atraumatic.   Right Ear: External ear normal.   Left Ear: External ear normal.   Nose: Nose normal.   Mouth/Throat: Oropharynx is clear and moist. No oropharyngeal exudate.   Eyes: Conjunctivae and EOM are normal.  Pupils are equal, round, and reactive to light. No scleral icterus. Right eye exhibits normal extraocular motion. Left eye exhibits normal extraocular motion.   Neck: Neck supple. No JVD present. No muscular tenderness present. No tracheal deviation, no edema and no erythema present. No thyromegaly present.   Cardiovascular: Normal rate, regular rhythm, normal heart sounds and intact distal pulses. Exam reveals no gallop and no friction rub.   No murmur heard.  Pulmonary/Chest: Effort normal and breath sounds normal. No stridor. No respiratory distress. He has no decreased breath sounds. He has no wheezes. He has no rhonchi. He has no rales. He exhibits no tenderness.   Abdominal: Soft. Bowel sounds are normal. He exhibits no distension and no mass. There is no hepatomegaly. There is no tenderness. There is no rebound and no guarding. No hernia.   Musculoskeletal: Normal range of motion. He exhibits no edema, tenderness or deformity.   Lymphadenopathy:     He has no cervical adenopathy.   Neurological: He is alert and oriented to person, place, and time. He has normal reflexes. No cranial nerve deficit. He exhibits normal muscle tone. Coordination normal.   Skin: Skin is warm. No rash noted. No erythema. No pallor.   Psychiatric: He has a normal mood and affect. His behavior is normal. Judgment and thought content normal.   Nursing note and vitals reviewed.      Lab Review          Assessment/Plan       ICD-10-CM ICD-9-CM   1. Type 2 diabetes mellitus with complication, unspecified whether long term insulin use (CMS/[facility]) E11.8 250.90   2. Hypertension associated with diabetes (CMS/[facility]) E11.59 250.80    I10 401.9   3. Mixed diabetic hyperlipidemia associated with type 2 diabetes mellitus (CMS/[facility]) E11.69 250.80    E78.2 272.2         I reviewed and summarized records from Oksana Mares MD from 2018 and I reviewed / ordered labs.   From review of records :        Glycemic Management:   Lab Results   Component Value  Date    HGBA1C 10.1 01/14/2019     Lab Results   Component Value Date    GLUCOSE 352 (H) 01/14/2019    BUN 16 01/14/2019    CREATININE 0.86 01/14/2019    EGFRIFNONA 94 01/14/2019    BCR 18.6 01/14/2019    K 4.3 01/14/2019    CO2 28.0 01/14/2019    CALCIUM 9.3 01/14/2019    ALBUMIN 4.40 01/14/2019    LABIL2 CANCELED 03/29/2016    AST 53 (H) 01/14/2019    ALT 71 (H) 01/14/2019    ANIONGAP 10.0 01/14/2019     Lab Results   Component Value Date    WBC 9.03 01/14/2019    HGB 13.2 (L) 01/14/2019    HCT 39.8 (L) 01/14/2019    MCV 93.9 01/14/2019     01/14/2019        U500 insulin      180 units in am     80 units for supper    In addition , He will add U100 humalog 5 units per 50 above 150 at lunch and bedtime     Total 4 injections per day     Metformin 1000 mg bid     Add Trulicity 0.75 mg weekly     Next appt add jardiance      Has freestyle melba  March 5, to March 18 reviewed   avg 231   Std dev 101     In his case we can't go up on the dose , we should be consistent with diet   Lipid Management  Lab Results   Component Value Date    CHOL 147 01/14/2019     Lab Results   Component Value Date    TRIG 123 01/14/2019     Lab Results   Component Value Date    HDL 47 01/14/2019     No components found for: LDLCALC  Lab Results   Component Value Date    LDL 92 01/14/2019     No results found for: LDLDIRECT    lipitor 80 mg qhs     Blood Pressure Management:    There were no vitals filed for this visit.  Lab Results   Component Value Date    GLUCOSE 352 (H) 01/14/2019    CALCIUM 9.3 01/14/2019     01/14/2019    K 4.3 01/14/2019    CO2 28.0 01/14/2019     01/14/2019    BUN 16 01/14/2019    CREATININE 0.86 01/14/2019    EGFRIFNONA 94 01/14/2019    BCR 18.6 01/14/2019    ANIONGAP 10.0 01/14/2019               Microvascular Complication Monitoring:      Eye Exam Evaluation    -----------    Last Microalbumin-Proteinuria Assessment    No results found for: MALBCRELAMINETIO    No results found for:  UTPCR    -----------      Neuropathy on lyrica 100 bid          Weight Related:   Wt Readings from Last 3 Encounters:   01/15/19 126 kg (278 lb)   11/26/18 126 kg (277 lb 11.2 oz)   08/24/18 120 kg (264 lb 12.8 oz)     There is no height or weight on file to calculate BMI.        Diet interventions: moderate (500 kCal/d) deficit diet.      Bone Health    Lab Results   Component Value Date    CALCIUM 9.3 01/14/2019    PHOS 3.2 01/14/2019    OGRI92ED 47.2 01/14/2019       Thyroid Health    Lab Results   Component Value Date    TSH 2.150 01/14/2019              Other Diabetes Related Aspects       No results found for: [id number]       Orders Placed This Encounter   Procedures   • POC Glucose   • POC Glycosylated Hemoglobin (Hb A1C)         A copy of my note was sent to Oksana Mares MD    Please see my above opinion and suggestions.

## 2019-03-24 ENCOUNTER — APPOINTMENT (OUTPATIENT)
Dept: GENERAL RADIOLOGY | Facility: HOSPITAL | Age: 54
End: 2019-03-24

## 2019-03-24 ENCOUNTER — HOSPITAL ENCOUNTER (EMERGENCY)
Facility: HOSPITAL | Age: 54
Discharge: HOME | End: 2019-03-24
Admitting: EMERGENCY MEDICINE

## 2019-03-24 VITALS
RESPIRATION RATE: 18 BRPM | BODY MASS INDEX: 37.38 KG/M2 | HEIGHT: 72 IN | DIASTOLIC BLOOD PRESSURE: 80 MMHG | OXYGEN SATURATION: 98 % | HEART RATE: 88 BPM | SYSTOLIC BLOOD PRESSURE: 128 MMHG | WEIGHT: 276 LBS | TEMPERATURE: 98.3 F

## 2019-03-24 DIAGNOSIS — S82.54XA CLOSED NONDISPLACED FRACTURE OF MEDIAL MALLEOLUS OF RIGHT TIBIA, INITIAL ENCOUNTER: ICD-10-CM

## 2019-03-24 DIAGNOSIS — S82.831A CLOSED FRACTURE OF HEAD OF RIGHT FIBULA, INITIAL ENCOUNTER: ICD-10-CM
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
## 2019-03-24 PROCEDURE — 96372 THER/PROPH/DIAG INJ SC/IM: CPT

## 2019-03-24 PROCEDURE — 73610 X-RAY EXAM OF ANKLE: CPT

## 2019-03-24 PROCEDURE — 99283 EMERGENCY DEPT VISIT LOW MDM: CPT

## 2019-03-24 PROCEDURE — 73562 X-RAY EXAM OF KNEE 3: CPT

## 2019-03-24 PROCEDURE — 73590 X-RAY EXAM OF LOWER LEG: CPT

## 2019-03-24 PROCEDURE — 25010000002 ONDANSETRON PER 1 MG: Performed by: NURSE PRACTITIONER

## 2019-03-24 RX ORDER — ONDANSETRON 2 MG/ML
4 INJECTION INTRAMUSCULAR; INTRAVENOUS ONCE
Status: COMPLETED | OUTPATIENT
Start: 2019-03-24 | End: 2019-03-24

## 2019-03-24 RX ADMIN — ONDANSETRON HYDROCHLORIDE 4 MG: 2 SOLUTION INTRAMUSCULAR; INTRAVENOUS at 15:30

## 2019-03-24 RX ADMIN — HYDROMORPHONE HYDROCHLORIDE 1 MG: 1 INJECTION, SOLUTION INTRAMUSCULAR; INTRAVENOUS; SUBCUTANEOUS at 15:31

## 2019-03-25 ENCOUNTER — TELEPHONE (OUTPATIENT)
Dept: INTERNAL MEDICINE | Age: 54
End: 2019-03-25

## 2019-03-28 ENCOUNTER — TELEPHONE (OUTPATIENT)
Dept: PODIATRY | Facility: CLINIC | Age: 54
End: 2019-03-28

## 2019-03-28 RX ORDER — INSULIN ASPART 100 [IU]/ML
INJECTION, SOLUTION INTRAVENOUS; SUBCUTANEOUS
Qty: 105 ML | Refills: 3 | Status: SHIPPED | OUTPATIENT
Start: 2019-03-28 | End: 2019-07-18 | Stop reason: SDUPTHER

## 2019-03-28 NOTE — TELEPHONE ENCOUNTER
Called and reminded patient of appointment with Dr. Caesar Harper on April 1, 2019 at 11:00 am. Patient gave verbal confirmation of appointment at this time.

## 2019-04-01 ENCOUNTER — TELEPHONE (OUTPATIENT)
Dept: PODIATRY | Facility: CLINIC | Age: 54
End: 2019-04-01

## 2019-04-02 ENCOUNTER — TELEPHONE (OUTPATIENT)
Dept: PODIATRY | Facility: CLINIC | Age: 54
End: 2019-04-02

## 2019-04-02 NOTE — TELEPHONE ENCOUNTER
Called pt regarding missed appt yesterday 04/01/19 -  Pt answered and requested to rescheduled his appt, he forgot and apologized,  I made pt appt on 07/29/19 @ 2:30.  I will mail pt appt reminder. Pt verbalized appt date and time. DN

## 2019-04-23 DIAGNOSIS — E78.00 PURE HYPERCHOLESTEROLEMIA: ICD-10-CM

## 2019-04-23 RX ORDER — ATORVASTATIN CALCIUM 40 MG/1
40 TABLET, FILM COATED ORAL DAILY
Qty: 90 TABLET | Refills: 3 | Status: SHIPPED | OUTPATIENT
Start: 2019-04-23 | End: 2019-10-01 | Stop reason: SDUPTHER

## 2019-05-21 RX ORDER — MECLIZINE HYDROCHLORIDE 25 MG/1
TABLET ORAL
Qty: 90 TABLET | Refills: 2 | Status: SHIPPED | OUTPATIENT
Start: 2019-05-21 | End: 2019-12-05 | Stop reason: SDUPTHER

## 2019-05-22 ENCOUNTER — APPOINTMENT (OUTPATIENT)
Dept: LAB | Facility: HOSPITAL | Age: 54
End: 2019-05-22

## 2019-05-22 ENCOUNTER — TRANSCRIBE ORDERS (OUTPATIENT)
Dept: ADMINISTRATIVE | Facility: HOSPITAL | Age: 54
End: 2019-05-22

## 2019-05-22 DIAGNOSIS — Z79.899 ENCOUNTER FOR LONG-TERM (CURRENT) USE OF MEDICATIONS: Primary | ICD-10-CM

## 2019-05-22 PROCEDURE — 80307 DRUG TEST PRSMV CHEM ANLYZR: CPT | Performed by: PSYCHIATRY & NEUROLOGY

## 2019-05-24 ENCOUNTER — APPOINTMENT (OUTPATIENT)
Dept: GENERAL RADIOLOGY | Facility: HOSPITAL | Age: 54
End: 2019-05-24

## 2019-05-24 ENCOUNTER — HOSPITAL ENCOUNTER (EMERGENCY)
Facility: HOSPITAL | Age: 54
Discharge: HOME | End: 2019-05-24
Admitting: EMERGENCY MEDICINE

## 2019-05-24 VITALS
HEART RATE: 86 BPM | HEIGHT: 72 IN | WEIGHT: 275 LBS | TEMPERATURE: 98.1 F | DIASTOLIC BLOOD PRESSURE: 60 MMHG | SYSTOLIC BLOOD PRESSURE: 98 MMHG | BODY MASS INDEX: 37.25 KG/M2 | RESPIRATION RATE: 16 BRPM | OXYGEN SATURATION: 94 %

## 2019-05-24 DIAGNOSIS — R07.89 NON-CARDIAC CHEST PAIN: ICD-10-CM

## 2019-05-24 DIAGNOSIS — J02.0 STREP PHARYNGITIS: Primary | ICD-10-CM

## 2019-05-24 DIAGNOSIS — R05.9 COUGH: ICD-10-CM

## 2019-05-24 LAB
ALBUMIN SERPL-MCNC: 4.4 G/DL (ref 3.5–5)
ALBUMIN/GLOB SERPL: 1.4 G/DL (ref 1.1–2.5)
ALP SERPL-CCNC: 60 U/L (ref 24–120)
ALT SERPL W P-5'-P-CCNC: 32 U/L (ref 0–54)
ANION GAP SERPL CALCULATED.3IONS-SCNC: 11 MMOL/L (ref 4–13)
AST SERPL-CCNC: 33 U/L (ref 7–45)
BASOPHILS # BLD AUTO: 0.05 10*3/MM3 (ref 0–0.2)
BASOPHILS NFR BLD AUTO: 0.4 % (ref 0–2)
BILIRUB SERPL-MCNC: 0.4 MG/DL (ref 0.1–1)
BUN BLD-MCNC: 25 MG/DL (ref 5–21)
BUN/CREAT SERPL: 24.3 (ref 7–25)
CALCIUM SPEC-SCNC: 9.9 MG/DL (ref 8.4–10.4)
CHLORIDE SERPL-SCNC: 99 MMOL/L (ref 98–110)
CO2 SERPL-SCNC: 28 MMOL/L (ref 24–31)
CREAT BLD-MCNC: 1.03 MG/DL (ref 0.5–1.4)
DEPRECATED RDW RBC AUTO: 45.1 FL (ref 40–54)
EOSINOPHIL # BLD AUTO: 0.24 10*3/MM3 (ref 0–0.7)
EOSINOPHIL NFR BLD AUTO: 1.8 % (ref 0–4)
ERYTHROCYTE [DISTWIDTH] IN BLOOD BY AUTOMATED COUNT: 13.1 % (ref 12–15)
GFR SERPL CREATININE-BSD FRML MDRD: 76 ML/MIN/1.73
GLOBULIN UR ELPH-MCNC: 3.2 GM/DL
GLUCOSE BLD-MCNC: 255 MG/DL (ref 70–100)
GLUCOSE BLDC GLUCOMTR-MCNC: 226 MG/DL (ref 70–130)
HCT VFR BLD AUTO: 38.3 % (ref 40–52)
HGB BLD-MCNC: 13 G/DL (ref 14–18)
HOLD SPECIMEN: NORMAL
HOLD SPECIMEN: NORMAL
IMM GRANULOCYTES # BLD AUTO: 0.1 10*3/MM3 (ref 0–0.05)
IMM GRANULOCYTES NFR BLD AUTO: 0.7 % (ref 0–5)
LYMPHOCYTES # BLD AUTO: 3.11 10*3/MM3 (ref 0.72–4.86)
LYMPHOCYTES NFR BLD AUTO: 23 % (ref 15–45)
MCH RBC QN AUTO: 31.8 PG (ref 28–32)
MCHC RBC AUTO-ENTMCNC: 33.9 G/DL (ref 33–36)
MCV RBC AUTO: 93.6 FL (ref 82–95)
MONOCYTES # BLD AUTO: 1.16 10*3/MM3 (ref 0.19–1.3)
MONOCYTES NFR BLD AUTO: 8.6 % (ref 4–12)
NEUTROPHILS # BLD AUTO: 8.85 10*3/MM3 (ref 1.87–8.4)
NEUTROPHILS NFR BLD AUTO: 65.5 % (ref 39–78)
NRBC BLD AUTO-RTO: 0 /100 WBC (ref 0–0.2)
PLATELET # BLD AUTO: 215 10*3/MM3 (ref 130–400)
PMV BLD AUTO: 11.3 FL (ref 6–12)
POTASSIUM BLD-SCNC: 4 MMOL/L (ref 3.5–5.3)
PROT SERPL-MCNC: 7.6 G/DL (ref 6.3–8.7)
RBC # BLD AUTO: 4.09 10*6/MM3 (ref 4.8–5.9)
S PYO AG THROAT QL: POSITIVE
SODIUM BLD-SCNC: 138 MMOL/L (ref 135–145)
TROPONIN I SERPL-MCNC: <0.012 NG/ML (ref 0–0.03)
TROPONIN I SERPL-MCNC: <0.012 NG/ML (ref 0–0.03)
WBC NRBC COR # BLD: 13.51 10*3/MM3 (ref 4.8–10.8)
WHOLE BLOOD HOLD SPECIMEN: NORMAL
WHOLE BLOOD HOLD SPECIMEN: NORMAL

## 2019-05-24 PROCEDURE — 82962 GLUCOSE BLOOD TEST: CPT

## 2019-05-24 PROCEDURE — 93010 ELECTROCARDIOGRAM REPORT: CPT | Performed by: INTERNAL MEDICINE

## 2019-05-24 PROCEDURE — 84484 ASSAY OF TROPONIN QUANT: CPT | Performed by: PHYSICIAN ASSISTANT

## 2019-05-24 PROCEDURE — 93005 ELECTROCARDIOGRAM TRACING: CPT | Performed by: PHYSICIAN ASSISTANT

## 2019-05-24 PROCEDURE — 71046 X-RAY EXAM CHEST 2 VIEWS: CPT

## 2019-05-24 PROCEDURE — 80053 COMPREHEN METABOLIC PANEL: CPT | Performed by: PHYSICIAN ASSISTANT

## 2019-05-24 PROCEDURE — 25010000002 PENICILLIN G BENZATHINE PER 1200000 UNITS: Performed by: PHYSICIAN ASSISTANT

## 2019-05-24 PROCEDURE — 87880 STREP A ASSAY W/OPTIC: CPT | Performed by: PHYSICIAN ASSISTANT

## 2019-05-24 PROCEDURE — 85025 COMPLETE CBC W/AUTO DIFF WBC: CPT | Performed by: PHYSICIAN ASSISTANT

## 2019-05-24 PROCEDURE — 99284 EMERGENCY DEPT VISIT MOD MDM: CPT

## 2019-05-24 PROCEDURE — 96372 THER/PROPH/DIAG INJ SC/IM: CPT

## 2019-05-24 RX ORDER — SODIUM CHLORIDE 0.9 % (FLUSH) 0.9 %
10 SYRINGE (ML) INJECTION AS NEEDED
Status: DISCONTINUED | OUTPATIENT
Start: 2019-05-24 | End: 2019-05-24 | Stop reason: HOSPADM

## 2019-05-24 RX ORDER — ARIPIPRAZOLE 5 MG/1
5 TABLET ORAL DAILY
COMMUNITY
End: 2022-02-18

## 2019-05-24 RX ORDER — ASPIRIN 81 MG/1
324 TABLET, CHEWABLE ORAL ONCE
Status: COMPLETED | OUTPATIENT
Start: 2019-05-24 | End: 2019-05-24

## 2019-05-24 RX ADMIN — PENICILLIN G BENZATHINE 2.4 MILLION UNITS: 1200000 INJECTION, SUSPENSION INTRAMUSCULAR at 18:34

## 2019-05-24 RX ADMIN — ASPIRIN 81 MG 324 MG: 81 TABLET ORAL at 17:36

## 2019-05-27 ENCOUNTER — HOSPITAL ENCOUNTER (EMERGENCY)
Facility: HOSPITAL | Age: 54
Discharge: HOME | End: 2019-05-27
Admitting: EMERGENCY MEDICINE

## 2019-05-27 VITALS
RESPIRATION RATE: 18 BRPM | OXYGEN SATURATION: 98 % | HEIGHT: 72 IN | WEIGHT: 273.4 LBS | BODY MASS INDEX: 37.03 KG/M2 | DIASTOLIC BLOOD PRESSURE: 62 MMHG | TEMPERATURE: 97.7 F | SYSTOLIC BLOOD PRESSURE: 119 MMHG | HEART RATE: 83 BPM

## 2019-05-27 DIAGNOSIS — M54.2 NECK PAIN: Primary | ICD-10-CM

## 2019-05-27 LAB
CODEINE UR QL: NEGATIVE
HYDROCODONE UR CFM-MCNC: 473 NG/ML
HYDROCODONE UR QL: POSITIVE
HYDROMORPHONE SERPL-MCNC: POSITIVE NG/ML
HYDROMORPHONE UR CFM-MCNC: 166 NG/ML
Lab: ABNORMAL
MORPHINE UR QL: NEGATIVE
OPIATES UR QL SCN: POSITIVE NG/ML

## 2019-05-27 PROCEDURE — 99283 EMERGENCY DEPT VISIT LOW MDM: CPT

## 2019-05-27 PROCEDURE — 96372 THER/PROPH/DIAG INJ SC/IM: CPT

## 2019-05-27 PROCEDURE — 25010000002 KETOROLAC TROMETHAMINE PER 15 MG: Performed by: NURSE PRACTITIONER

## 2019-05-27 PROCEDURE — 51798 US URINE CAPACITY MEASURE: CPT

## 2019-05-27 RX ORDER — NAPROXEN 500 MG/1
500 TABLET ORAL 2 TIMES DAILY PRN
Qty: 20 TABLET | Refills: 0 | Status: SHIPPED | OUTPATIENT
Start: 2019-05-27 | End: 2019-06-06

## 2019-05-27 RX ORDER — KETOROLAC TROMETHAMINE 30 MG/ML
60 INJECTION, SOLUTION INTRAMUSCULAR; INTRAVENOUS ONCE
Status: COMPLETED | OUTPATIENT
Start: 2019-05-27 | End: 2019-05-27

## 2019-05-27 RX ADMIN — KETOROLAC TROMETHAMINE 60 MG: 60 INJECTION, SOLUTION INTRAMUSCULAR at 14:45

## 2019-05-28 ENCOUNTER — HOSPITAL ENCOUNTER (EMERGENCY)
Facility: HOSPITAL | Age: 54
Discharge: HOME | End: 2019-05-28
Attending: EMERGENCY MEDICINE | Admitting: EMERGENCY MEDICINE

## 2019-05-28 VITALS
BODY MASS INDEX: 37.38 KG/M2 | WEIGHT: 276 LBS | OXYGEN SATURATION: 95 % | HEIGHT: 72 IN | TEMPERATURE: 98.5 F | HEART RATE: 99 BPM | SYSTOLIC BLOOD PRESSURE: 106 MMHG | DIASTOLIC BLOOD PRESSURE: 64 MMHG | RESPIRATION RATE: 20 BRPM

## 2019-05-28 DIAGNOSIS — R33.9 URINARY RETENTION: ICD-10-CM

## 2019-05-28 DIAGNOSIS — N30.00 ACUTE CYSTITIS WITHOUT HEMATURIA: Primary | ICD-10-CM

## 2019-05-28 LAB
ALBUMIN SERPL-MCNC: 4.1 G/DL (ref 3.5–5)
ALBUMIN/GLOB SERPL: 1.3 G/DL (ref 1.1–2.5)
ALP SERPL-CCNC: 51 U/L (ref 24–120)
ALT SERPL W P-5'-P-CCNC: 29 U/L (ref 0–54)
ANION GAP SERPL CALCULATED.3IONS-SCNC: 10 MMOL/L (ref 4–13)
AST SERPL-CCNC: 63 U/L (ref 7–45)
BACTERIA UR QL AUTO: ABNORMAL /HPF
BASOPHILS # BLD AUTO: 0.04 10*3/MM3 (ref 0–0.2)
BASOPHILS NFR BLD AUTO: 0.3 % (ref 0–2)
BILIRUB SERPL-MCNC: 0.5 MG/DL (ref 0.1–1)
BILIRUB UR QL STRIP: NEGATIVE
BUN BLD-MCNC: 22 MG/DL (ref 5–21)
BUN/CREAT SERPL: 16.8 (ref 7–25)
CALCIUM SPEC-SCNC: 8.5 MG/DL (ref 8.4–10.4)
CHLORIDE SERPL-SCNC: 103 MMOL/L (ref 98–110)
CLARITY UR: CLEAR
CO2 SERPL-SCNC: 24 MMOL/L (ref 24–31)
COLOR UR: YELLOW
CREAT BLD-MCNC: 1.31 MG/DL (ref 0.5–1.4)
D-LACTATE SERPL-SCNC: 1.5 MMOL/L (ref 0.5–2)
DEPRECATED RDW RBC AUTO: 42.4 FL (ref 40–54)
EOSINOPHIL # BLD AUTO: 0.18 10*3/MM3 (ref 0–0.7)
EOSINOPHIL NFR BLD AUTO: 1.3 % (ref 0–4)
ERYTHROCYTE [DISTWIDTH] IN BLOOD BY AUTOMATED COUNT: 12.9 % (ref 12–15)
GFR SERPL CREATININE-BSD FRML MDRD: 58 ML/MIN/1.73
GLOBULIN UR ELPH-MCNC: 3.2 GM/DL
GLUCOSE BLD-MCNC: 106 MG/DL (ref 70–100)
GLUCOSE UR STRIP-MCNC: ABNORMAL MG/DL
HCT VFR BLD AUTO: 32.8 % (ref 40–52)
HGB BLD-MCNC: 11.5 G/DL (ref 14–18)
HGB UR QL STRIP.AUTO: NEGATIVE
HOLD SPECIMEN: NORMAL
HYALINE CASTS UR QL AUTO: ABNORMAL /LPF
IMM GRANULOCYTES # BLD AUTO: 0.06 10*3/MM3 (ref 0–0.05)
IMM GRANULOCYTES NFR BLD AUTO: 0.4 % (ref 0–5)
INR PPP: 1.06 (ref 0.91–1.09)
KETONES UR QL STRIP: NEGATIVE
LEUKOCYTE ESTERASE UR QL STRIP.AUTO: ABNORMAL
LIPASE SERPL-CCNC: 53 U/L (ref 23–203)
LYMPHOCYTES # BLD AUTO: 2.73 10*3/MM3 (ref 0.72–4.86)
LYMPHOCYTES NFR BLD AUTO: 19.4 % (ref 15–45)
MCH RBC QN AUTO: 31.9 PG (ref 28–32)
MCHC RBC AUTO-ENTMCNC: 35.1 G/DL (ref 33–36)
MCV RBC AUTO: 90.9 FL (ref 82–95)
MONOCYTES # BLD AUTO: 1.26 10*3/MM3 (ref 0.19–1.3)
MONOCYTES NFR BLD AUTO: 9 % (ref 4–12)
NEUTROPHILS # BLD AUTO: 9.77 10*3/MM3 (ref 1.87–8.4)
NEUTROPHILS NFR BLD AUTO: 69.6 % (ref 39–78)
NITRITE UR QL STRIP: NEGATIVE
NRBC BLD AUTO-RTO: 0 /100 WBC (ref 0–0.2)
NT-PROBNP SERPL-MCNC: 49.2 PG/ML (ref 0–900)
PH UR STRIP.AUTO: <=5 [PH] (ref 5–8)
PLATELET # BLD AUTO: 247 10*3/MM3 (ref 130–400)
PMV BLD AUTO: 11.3 FL (ref 6–12)
POTASSIUM BLD-SCNC: 4.1 MMOL/L (ref 3.5–5.3)
PROT SERPL-MCNC: 7.3 G/DL (ref 6.3–8.7)
PROT UR QL STRIP: NEGATIVE
PROTHROMBIN TIME: 14.1 SECONDS (ref 11.9–14.6)
RBC # BLD AUTO: 3.61 10*6/MM3 (ref 4.8–5.9)
RBC # UR: ABNORMAL /HPF
REF LAB TEST METHOD: ABNORMAL
SODIUM BLD-SCNC: 137 MMOL/L (ref 135–145)
SP GR UR STRIP: 1.02 (ref 1–1.03)
SQUAMOUS #/AREA URNS HPF: ABNORMAL /HPF
TROPONIN I SERPL-MCNC: <0.012 NG/ML (ref 0–0.03)
UROBILINOGEN UR QL STRIP: ABNORMAL
WBC NRBC COR # BLD: 14.04 10*3/MM3 (ref 4.8–10.8)
WBC UR QL AUTO: ABNORMAL /HPF

## 2019-05-28 PROCEDURE — 83690 ASSAY OF LIPASE: CPT | Performed by: EMERGENCY MEDICINE

## 2019-05-28 PROCEDURE — 51702 INSERT TEMP BLADDER CATH: CPT

## 2019-05-28 PROCEDURE — 25010000002 CEFTRIAXONE PER 250 MG: Performed by: EMERGENCY MEDICINE

## 2019-05-28 PROCEDURE — 81001 URINALYSIS AUTO W/SCOPE: CPT | Performed by: EMERGENCY MEDICINE

## 2019-05-28 PROCEDURE — 87040 BLOOD CULTURE FOR BACTERIA: CPT | Performed by: EMERGENCY MEDICINE

## 2019-05-28 PROCEDURE — 83880 ASSAY OF NATRIURETIC PEPTIDE: CPT | Performed by: EMERGENCY MEDICINE

## 2019-05-28 PROCEDURE — 85025 COMPLETE CBC W/AUTO DIFF WBC: CPT | Performed by: EMERGENCY MEDICINE

## 2019-05-28 PROCEDURE — 85610 PROTHROMBIN TIME: CPT | Performed by: EMERGENCY MEDICINE

## 2019-05-28 PROCEDURE — 87077 CULTURE AEROBIC IDENTIFY: CPT | Performed by: EMERGENCY MEDICINE

## 2019-05-28 PROCEDURE — 87086 URINE CULTURE/COLONY COUNT: CPT | Performed by: EMERGENCY MEDICINE

## 2019-05-28 PROCEDURE — 83605 ASSAY OF LACTIC ACID: CPT | Performed by: EMERGENCY MEDICINE

## 2019-05-28 PROCEDURE — 99284 EMERGENCY DEPT VISIT MOD MDM: CPT

## 2019-05-28 PROCEDURE — 80053 COMPREHEN METABOLIC PANEL: CPT | Performed by: EMERGENCY MEDICINE

## 2019-05-28 PROCEDURE — 87186 SC STD MICRODIL/AGAR DIL: CPT | Performed by: EMERGENCY MEDICINE

## 2019-05-28 PROCEDURE — 84484 ASSAY OF TROPONIN QUANT: CPT | Performed by: EMERGENCY MEDICINE

## 2019-05-28 PROCEDURE — 96365 THER/PROPH/DIAG IV INF INIT: CPT

## 2019-05-28 RX ORDER — DIAZEPAM 5 MG/1
2.5 TABLET ORAL EVERY 6 HOURS PRN
Qty: 6 TABLET | Refills: 0 | Status: ON HOLD | OUTPATIENT
Start: 2019-05-28 | End: 2019-10-19

## 2019-05-28 RX ORDER — PHENAZOPYRIDINE HYDROCHLORIDE 200 MG/1
200 TABLET, FILM COATED ORAL ONCE
Status: COMPLETED | OUTPATIENT
Start: 2019-05-28 | End: 2019-05-28

## 2019-05-28 RX ORDER — DIAZEPAM 5 MG/1
5 TABLET ORAL ONCE
Status: COMPLETED | OUTPATIENT
Start: 2019-05-28 | End: 2019-05-28

## 2019-05-28 RX ORDER — PHENAZOPYRIDINE HYDROCHLORIDE 200 MG/1
200 TABLET, FILM COATED ORAL 3 TIMES DAILY PRN
Qty: 12 TABLET | Refills: 0 | Status: SHIPPED | OUTPATIENT
Start: 2019-05-28 | End: 2020-01-07

## 2019-05-28 RX ORDER — AMOXICILLIN AND CLAVULANATE POTASSIUM 875; 125 MG/1; MG/1
1 TABLET, FILM COATED ORAL 2 TIMES DAILY
Qty: 10 TABLET | Refills: 0 | Status: SHIPPED | OUTPATIENT
Start: 2019-05-28 | End: 2019-07-24

## 2019-05-28 RX ADMIN — DIAZEPAM 5 MG: 5 TABLET ORAL at 15:37

## 2019-05-28 RX ADMIN — PHENAZOPYRIDINE HYDROCHLORIDE 200 MG: 200 TABLET, FILM COATED ORAL at 15:37

## 2019-05-28 RX ADMIN — CEFTRIAXONE SODIUM 2 G: 2 INJECTION, POWDER, FOR SOLUTION INTRAMUSCULAR; INTRAVENOUS at 14:28

## 2019-05-29 ENCOUNTER — NURSE TRIAGE (OUTPATIENT)
Dept: CALL CENTER | Facility: HOSPITAL | Age: 54
End: 2019-05-29

## 2019-05-29 ENCOUNTER — TELEPHONE (OUTPATIENT)
Dept: INTERNAL MEDICINE | Age: 54
End: 2019-05-29

## 2019-05-29 RX ORDER — PHENAZOPYRIDINE HYDROCHLORIDE 200 MG/1
200 TABLET, FILM COATED ORAL 3 TIMES DAILY PRN
Qty: 12 TABLET | Refills: 0 | Status: SHIPPED | OUTPATIENT
Start: 2019-05-29 | End: 2019-06-01

## 2019-05-29 NOTE — TELEPHONE ENCOUNTER
"Caller states he was in the ER last night for UTI, catheter inserted and still in place. Given augmentin, valium and pyridium which he states his insurance would only cover the first two. He has spoken with [facility] and was told if ER will phone out new rx they will get pyridium covered.  Pt was sent home with script for pyridium, explained ER is not going to order it twice, he can request the script back from pharmacy he used last night or would go through his PCP. He states she is out of town. He has appt tomorrow with [facility]. Medication is available without script but he cannot afford $40.     Reason for Disposition  • Caller requesting a NON-URGENT new prescription or refill and triager unable to refill per unit policy    Additional Information  • Negative: Drug overdose and nurse unable to answer question  • Negative: Caller requesting information not related to medicine  • Negative: Caller requesting a prescription for Strep throat and has a positive culture result  • Negative: Rash while taking a medication or within 3 days of stopping it  • Negative: Immunization reaction suspected  • Negative: [1] Asthma and [2] having symptoms of asthma (cough, wheezing, etc)  • Negative: MORE THAN A DOUBLE DOSE of a prescription or over-the-counter (OTC) drug  • Negative: [1] DOUBLE DOSE (an extra dose or lesser amount) of over-the-counter (OTC) drug AND [2] any symptoms (e.g., dizziness, nausea, pain, sleepiness)  • Negative: [1] DOUBLE DOSE (an extra dose or lesser amount) of prescription drug AND [2] any symptoms (e.g., dizziness, nausea, pain, sleepiness)  • Negative: Took another person's prescription drug  • Negative: [1] DOUBLE DOSE (an extra dose or lesser amount) of prescription drug AND [2] NO symptoms (Exception: a double dose of antibiotics)  • Negative: Diabetes drug error or overdose (e.g., insulin or extra dose)  • Negative: [1] Request for URGENT new prescription or refill of \"essential\" " "medication (i.e., likelihood of harm to patient if not taken) AND [2] triager unable to fill per unit policy  • Negative: [1] Prescription not at pharmacy AND [2] was prescribed today by PCP  • Negative: Pharmacy calling with prescription questions and triager unable to answer question  • Negative: Caller has URGENT medication question about med that PCP prescribed and triager unable to answer question  • Negative: Caller has NON-URGENT medication question about med that PCP prescribed and triager unable to answer question    Answer Assessment - Initial Assessment Questions  1. SYMPTOMS: \"Do you have any symptoms?\"      Urinary pain  2. SEVERITY: If symptoms are present, ask \"Are they mild, moderate or severe?\"      Moderate    Protocols used: MEDICATION QUESTION CALL-ADULT-      "

## 2019-05-29 NOTE — TELEPHONE ENCOUNTER
Patient called and stated he missed his appt with Jackie Ahumada this morning due to being in the ED last night for an UTI. He stated that they cathed him and he will be following up tomorrow. He did state he could not afford the pyridium he was prescribed.

## 2019-05-30 LAB — BACTERIA SPEC AEROBE CULT: ABNORMAL

## 2019-06-01 ENCOUNTER — TELEPHONE (OUTPATIENT)
Dept: EMERGENCY DEPT | Facility: HOSPITAL | Age: 54
End: 2019-06-01

## 2019-06-02 LAB
BACTERIA SPEC AEROBE CULT: NORMAL
BACTERIA SPEC AEROBE CULT: NORMAL

## 2019-06-03 ENCOUNTER — OFFICE VISIT (OUTPATIENT)
Dept: INTERNAL MEDICINE | Age: 54
End: 2019-06-03
Payer: MEDICARE

## 2019-06-03 VITALS
SYSTOLIC BLOOD PRESSURE: 110 MMHG | DIASTOLIC BLOOD PRESSURE: 58 MMHG | BODY MASS INDEX: 36.7 KG/M2 | HEART RATE: 104 BPM | HEIGHT: 72 IN | WEIGHT: 271 LBS | OXYGEN SATURATION: 98 %

## 2019-06-03 DIAGNOSIS — Z23 NEED FOR 23-POLYVALENT PNEUMOCOCCAL POLYSACCHARIDE VACCINE: ICD-10-CM

## 2019-06-03 DIAGNOSIS — B20: ICD-10-CM

## 2019-06-03 DIAGNOSIS — Z79.4 TYPE 2 DIABETES MELLITUS WITHOUT COMPLICATION, WITH LONG-TERM CURRENT USE OF INSULIN (HCC): ICD-10-CM

## 2019-06-03 DIAGNOSIS — Z11.59 NEED FOR HEPATITIS C SCREENING TEST: ICD-10-CM

## 2019-06-03 DIAGNOSIS — Z23 NEED FOR PROPHYLACTIC VACCINATION AND INOCULATION AGAINST VARICELLA: ICD-10-CM

## 2019-06-03 DIAGNOSIS — N30.00 ACUTE CYSTITIS WITHOUT HEMATURIA: ICD-10-CM

## 2019-06-03 DIAGNOSIS — E11.9 TYPE 2 DIABETES MELLITUS WITHOUT COMPLICATION, WITH LONG-TERM CURRENT USE OF INSULIN (HCC): ICD-10-CM

## 2019-06-03 DIAGNOSIS — Z23 NEED FOR PROPHYLACTIC VACCINATION AGAINST DIPHTHERIA-TETANUS-PERTUSSIS (DTP): ICD-10-CM

## 2019-06-03 DIAGNOSIS — Z82.0 FAMILY HISTORY OF PARKINSON DISEASE: ICD-10-CM

## 2019-06-03 DIAGNOSIS — R25.1 TREMOR: Primary | ICD-10-CM

## 2019-06-03 LAB
ALBUMIN SERPL-MCNC: 4.4 G/DL (ref 3.5–5.2)
ALP BLD-CCNC: 59 U/L (ref 40–130)
ALT SERPL-CCNC: 34 U/L (ref 5–41)
ANION GAP SERPL CALCULATED.3IONS-SCNC: 18 MMOL/L (ref 7–19)
AST SERPL-CCNC: 32 U/L (ref 5–40)
BILIRUB SERPL-MCNC: <0.2 MG/DL (ref 0.2–1.2)
BUN BLDV-MCNC: 26 MG/DL (ref 6–20)
CALCIUM SERPL-MCNC: 10 MG/DL (ref 8.6–10)
CHLORIDE BLD-SCNC: 100 MMOL/L (ref 98–111)
CHOLESTEROL, TOTAL: 138 MG/DL (ref 160–199)
CO2: 23 MMOL/L (ref 22–29)
CREAT SERPL-MCNC: 1.1 MG/DL (ref 0.5–1.2)
GFR NON-AFRICAN AMERICAN: >60
GLUCOSE BLD-MCNC: 302 MG/DL (ref 74–109)
HBA1C MFR BLD: 9 % (ref 4–6)
HCT VFR BLD CALC: 37.2 % (ref 42–52)
HDLC SERPL-MCNC: 45 MG/DL (ref 55–121)
HEMOGLOBIN: 11.9 G/DL (ref 14–18)
HEPATITIS C ANTIBODY INTERPRETATION: NORMAL
LDL CHOLESTEROL CALCULATED: 66 MG/DL
MCH RBC QN AUTO: 30.9 PG (ref 27–31)
MCHC RBC AUTO-ENTMCNC: 32 G/DL (ref 33–37)
MCV RBC AUTO: 96.6 FL (ref 80–94)
PDW BLD-RTO: 13 % (ref 11.5–14.5)
PLATELET # BLD: 309 K/UL (ref 130–400)
PMV BLD AUTO: 10.7 FL (ref 9.4–12.4)
POTASSIUM SERPL-SCNC: 4.8 MMOL/L (ref 3.5–5)
RBC # BLD: 3.85 M/UL (ref 4.7–6.1)
SODIUM BLD-SCNC: 141 MMOL/L (ref 136–145)
TOTAL PROTEIN: 7.7 G/DL (ref 6.6–8.7)
TRIGL SERPL-MCNC: 133 MG/DL (ref 0–149)
TSH SERPL DL<=0.05 MIU/L-ACNC: 1.97 UIU/ML (ref 0.27–4.2)
WBC # BLD: 12.5 K/UL (ref 4.8–10.8)

## 2019-06-03 PROCEDURE — G8599 NO ASA/ANTIPLAT THER USE RNG: HCPCS | Performed by: INTERNAL MEDICINE

## 2019-06-03 PROCEDURE — 3017F COLORECTAL CA SCREEN DOC REV: CPT | Performed by: INTERNAL MEDICINE

## 2019-06-03 PROCEDURE — 4004F PT TOBACCO SCREEN RCVD TLK: CPT | Performed by: INTERNAL MEDICINE

## 2019-06-03 PROCEDURE — 90715 TDAP VACCINE 7 YRS/> IM: CPT | Performed by: INTERNAL MEDICINE

## 2019-06-03 PROCEDURE — 90471 IMMUNIZATION ADMIN: CPT | Performed by: INTERNAL MEDICINE

## 2019-06-03 PROCEDURE — G8427 DOCREV CUR MEDS BY ELIG CLIN: HCPCS | Performed by: INTERNAL MEDICINE

## 2019-06-03 PROCEDURE — 99214 OFFICE O/P EST MOD 30 MIN: CPT | Performed by: INTERNAL MEDICINE

## 2019-06-03 PROCEDURE — 3045F PR MOST RECENT HEMOGLOBIN A1C LEVEL 7.0-9.0%: CPT | Performed by: INTERNAL MEDICINE

## 2019-06-03 PROCEDURE — 2022F DILAT RTA XM EVC RTNOPTHY: CPT | Performed by: INTERNAL MEDICINE

## 2019-06-03 PROCEDURE — G8417 CALC BMI ABV UP PARAM F/U: HCPCS | Performed by: INTERNAL MEDICINE

## 2019-06-03 PROCEDURE — G0009 ADMIN PNEUMOCOCCAL VACCINE: HCPCS | Performed by: INTERNAL MEDICINE

## 2019-06-03 PROCEDURE — 90732 PPSV23 VACC 2 YRS+ SUBQ/IM: CPT | Performed by: INTERNAL MEDICINE

## 2019-06-03 NOTE — PROGRESS NOTES
Chief Complaint   Patient presents with    3 Month Follow-Up     Follow up from ER - UTI - on Septra    Diabetes     asking about tetanus inj. HPI: Pt is here today to f/u medical problems including dm, hiv, htn, depression, and other medical problems. Pt had a recent uti and had to go to the ER. He has been on antibiotics and feels better. Urinating ok. He wants to quit smoking and asks about meds to quit smoking. Chronic pain. No fever or chills or rashes. He is also worried about a tremor he has developed. Past Medical History:   Diagnosis Date    Controlled diabetes mellitus with diabetic polyneuropathy (HCC)     Depression     HIV-1 infection, symptomatic ([address]     Hyperlipidemia     Hypertension     Male hypogonadism     Osteopenia     Type 2 diabetes mellitus without complication ([facility] [address]        Past Surgical History:   Procedure Laterality Date    LYMPHADENECTOMY Left     thoracic       Family History   Problem Relation Age of Onset    High Blood Pressure Mother     Diabetes Mother     High Blood Pressure Father     Diabetes Maternal Grandfather        Social History     Socioeconomic History    Marital status:      Spouse name: Not on file    Number of children: Not on file    Years of education: Not on file    Highest education level: Not on file   Occupational History    Not on file   Social Needs    Financial resource strain: Not on file    Food insecurity:     Worry: Not on file     Inability: Not on file    Transportation needs:     Medical: Not on file     Non-medical: Not on file   Tobacco Use    Smoking status: Current Every Day Smoker    Smokeless tobacco: Never Used   Substance and Sexual Activity    Alcohol use:  Yes    Drug use: No    Sexual activity: Not on file   Lifestyle    Physical activity:     Days per week: Not on file     Minutes per session: Not on file    Stress: Not on file   Relationships    Social connections:     Talks on phone: Not on file     Gets together: Not on file     Attends Jewish service: Not on file     Active member of club or organization: Not on file     Attends meetings of clubs or organizations: Not on file     Relationship status: Not on file    Intimate partner violence:     Fear of current or ex partner: Not on file     Emotionally abused: Not on file     Physically abused: Not on file     Forced sexual activity: Not on file   Other Topics Concern    Not on file   Social History Narrative    Not on file       Allergies   Allergen Reactions    Darvon [Propoxyphene]     Elavil [Amitriptyline Hcl]     Zithromax [Azithromycin]        Current Outpatient Medications   Medication Sig Dispense Refill    traZODone (DESYREL) 100 MG tablet Take 2 tablets by mouth nightly 180 tablet 3    nicotine (NICODERM CQ) 21 MG/24HR Use 21mg patch daily for 2 weeks and then 14mg patch daily for 2 weeks and then 7mg patch daily for 2 weeks 42 patch 0    triamcinolone (KENALOG) 0.1 % cream Apply topically 2 times daily prn 60 g 0    meclizine (ANTIVERT) 25 MG tablet TAKE 1 TABLET BY MOUTH DAILY 90 tablet 2    atorvastatin (LIPITOR) 40 MG tablet Take 1 tablet by mouth daily 90 tablet 3    NOVOLOG FLEXPEN 100 UNIT/ML injection pen INJECT 70 UNITS WITH REGULAR MEALS, 80 UNITS WITH LARGER MEALS, AND 60 UNITS WITH SNACKS AS DIRECTED 105 mL 3    dexlansoprazole (DEXILANT) 60 MG CPDR delayed release capsule Take 1 capsule by mouth daily 90 capsule 1    losartan (COZAAR) 100 MG tablet Take 1 tablet by mouth daily 90 tablet 3    propranolol (INDERAL LA) 60 MG extended release capsule TAKE 1 CAPSULE BY MOUTH DAILY 90 capsule 3    metFORMIN (GLUCOPHAGE) 500 MG tablet TAKE 2 TABLETS BY MOUTH TWICE DAILY WITH MEALS 360 tablet 3    Dulaglutide (TRULICITY) 9.45 LL/2.1ZQ SOPN Inject 0.75 mg into the skin once a week      insulin regular human (HUMULIN R U-500 KWIKPEN) 500 UNIT/ML SOPN concentrated injection pen Inject into the skin       tiZANidine (ZANAFLEX) 2 MG tablet Take 1 tablet by mouth 2 times daily as needed (muscle pain) (Patient taking differently: Take 4 mg by mouth 3 times daily ) 60 tablet 0    Lancets MISC Use to check blood sugar 4 times daily   Dx  E11.9  Insulin dependent 400 each 3    VORTIoxetine HBr (TRINTELLIX) 20 MG TABS tablet Take 20 mg by mouth daily       gabapentin (NEURONTIN) 400 MG capsule Take 800 mg by mouth 3 times daily.  glucose blood VI test strips (FREESTYLE LITE) strip 1 each by In Vitro route daily Test blood sugar 4 times daily 100 each 3    VENTOLIN  (90 Base) MCG/ACT inhaler INHALE 2 PUFFS EVERY 6 HOURS AS NEEDED  2    calcium carbonate 600 MG TABS tablet Take 600 mg by mouth      UNIFINE PENTIPS 31G X 6 MM MISC for use FOUR TIMES DAILY  6    loratadine (CLARITIN) 10 MG tablet TAKE 1 TABLET BY MOUTH DAILY  2    miconazole (MICOTIN) 2 % cream apply to affected area on the skin THREE TIMES DAILY  1    nystatin (MYCOSTATIN) 900903 UNIT/GM powder APPLY 1 GRAM ON THE SKIN 3 TIMES DAILY AS NEEDED  0    NASONEX 50 MCG/ACT nasal spray Use 1 spray in each nostril TWICE DAILY . ...(SHAKE WELL)  2    Cholecalciferol (VITAMIN D3) 2000 units CAPS TAKE 1 CAPSULE BY MOUTH DAILY  5    clonazePAM (KLONOPIN) 0.5 MG tablet Take 1 tablet by mouth 2 times daily as needed for Anxiety 60 tablet 5    varenicline (CHANTIX CONTINUING MONTH AMAYA) 1 MG tablet Take 1 tablet by mouth 2 times daily 60 tablet 3    Hwyqnol-Cfuxa-Ylbotmmm-TenofAF (GENVOYA) [phone] MG TABS Take 1 tablet by mouth daily      HYDROcodone-acetaminophen (NORCO) 5-325 MG per tablet Take 1 tablet by mouth 2 times daily as needed for Pain .       loperamide (IMODIUM) 2 MG capsule Take 2 mg by mouth 4 times daily as needed for Diarrhea      pregabalin (LYRICA) 75 MG capsule Take 75 mg by mouth 2 times daily      rizatriptan (MAXALT) 10 MG tablet Take 10 mg by mouth once as needed for Migraine May repeat in 2 hours if needed      Multiple Vitamins-Minerals (THERAPEUTIC MULTIVITAMIN-MINERALS) tablet Take 1 tablet by mouth daily       No current facility-administered medications for this visit. Review of Systems   Constitutional: Positive for fatigue. Negative for chills and fever. HENT: Negative for congestion and sinus pressure. Eyes: Negative for discharge and redness. Respiratory: Negative for cough and shortness of breath. Cardiovascular: Negative for chest pain, palpitations and leg swelling. Gastrointestinal: Negative for abdominal distention and abdominal pain. Genitourinary: Negative for dysuria, frequency and urgency. Musculoskeletal: Positive for arthralgias. Negative for back pain. Skin: Negative for rash and wound. Neurological: Negative for dizziness, light-headedness and headaches. Psychiatric/Behavioral: Negative for dysphoric mood and sleep disturbance. The patient is not nervous/anxious. BP (!) 110/58 (Site: Left Upper Arm)   Pulse 104   Ht 6' (1.829 m)   Wt 271 lb (122.9 kg)   SpO2 98%   BMI 36.75 kg/m²   BP Readings from Last 7 Encounters:   06/03/19 (!) 110/58   02/08/19 122/68   11/08/18 100/66   08/31/18 118/72   05/05/18 117/68   04/10/18 138/86   01/08/18 130/74     Wt Readings from Last 7 Encounters:   06/03/19 271 lb (122.9 kg)   02/08/19 272 lb (123.4 kg)   11/08/18 273 lb (123.8 kg)   08/31/18 268 lb (121.6 kg)   05/05/18 270 lb (122.5 kg)   04/10/18 279 lb (126.6 kg)   01/08/18 264 lb (119.7 kg)     BMI Readings from Last 7 Encounters:   06/03/19 36.75 kg/m²   02/08/19 36.89 kg/m²   11/08/18 37.03 kg/m²   08/31/18 36.35 kg/m²   05/05/18 36.62 kg/m²   04/10/18 37.84 kg/m²   01/08/18 35.80 kg/m²     Resp Readings from Last 7 Encounters:   05/05/18 14       Physical Exam   Constitutional: He is oriented to person, place, and time. He appears well-developed. No distress. HENT:   Right Ear: Tympanic membrane is not injected. Left Ear: Tympanic membrane is not injected. 5 - 40 U/L   CBC   Result Value Ref Range    WBC 12.5 (H) 4.8 - 10.8 K/uL    RBC 3.85 (L) 4.70 - 6.10 M/uL    Hemoglobin 11.9 (L) 14.0 - 18.0 g/dL    Hematocrit 37.2 (L) 42.0 - 52.0 %    MCV 96.6 (H) 80.0 - 94.0 fL    MCH 30.9 27.0 - 31.0 pg    MCHC 32.0 (L) 33.0 - 37.0 g/dL    RDW 13.0 11.5 - 14.5 %    Platelets 481 026 - 349 K/uL    MPV 10.7 9.4 - 12.4 fL   Hepatitis C Antibody   Result Value Ref Range    Hep C Ab Interp Non-Reactive    Rubeola Antibody, IgG   Result Value Ref Range    Rubeola (Measles) Ab IgG >300.0 AU/mL       ASSESSMENT/ PLAN:  1. Tremor  Refer to neurology. Just some mild fine tremor does not appear significant at this time patient went immediately do genetic testing for Parkinson's and I told him that is not routinely done. We will refer him to neurology  - Apolonia Rodriguez MD, Neurology, [place]    2. Family history of Parkinson disease    - Apolonia Rodriguez MD, Neurology, [place]    3. Need for prophylactic vaccination against diphtheria-tetanus-pertussis (DTP)    - Tdap (age 6y and older) IM (Boostrix)    4. Need for prophylactic vaccination and inoculation against varicella    - zoster recombinant adjuvanted vaccine [facility]) 50 MCG/0.5ML SUSR injection; Inject 0.5 mLs into the muscle once for 1 dose 50 MCG IM then repeat 2-6 months. Dispense: 1 each; Refill: 1    5. HIV-1 infection, symptomatic (HCC)    - Rubeola Antibody, IgG; Future    6. Need for hepatitis C screening test    - Hepatitis C Antibody; Future    7. Type 2 diabetes mellitus without complication, with long-term current use of insulin (HCC)  Diabetes is still not in control he needs to be compliant with the diet I think with that he would be in control continue management with endocrinology  - CBC; Future  - Comprehensive Metabolic Panel; Future  - Hemoglobin A1C; Future  - TSH without Reflex; Future  - Lipid Panel; Future  - Diabetic Shoe    8.  Acute cystitis without hematuria  Status post treatment he does

## 2019-06-04 ENCOUNTER — TELEPHONE (OUTPATIENT)
Dept: INTERNAL MEDICINE | Age: 54
End: 2019-06-04

## 2019-06-04 NOTE — TELEPHONE ENCOUNTER
He is asking if Trazodone can be increased to 200mg? and would like Nicotine patches sent to [facility]

## 2019-06-05 ENCOUNTER — TELEPHONE (OUTPATIENT)
Dept: NEUROLOGY | Age: 54
End: 2019-06-05

## 2019-06-06 ENCOUNTER — TELEPHONE (OUTPATIENT)
Dept: INTERNAL MEDICINE | Age: 54
End: 2019-06-06

## 2019-06-06 ENCOUNTER — PATIENT MESSAGE (OUTPATIENT)
Dept: INTERNAL MEDICINE | Age: 54
End: 2019-06-06

## 2019-06-06 DIAGNOSIS — L30.9 ECZEMA, UNSPECIFIED TYPE: Primary | ICD-10-CM

## 2019-06-06 LAB — RUBEOLA (MEASLES) AB IGG: >300 AU/ML

## 2019-06-06 RX ORDER — NICOTINE 21 MG/24HR
PATCH, TRANSDERMAL 24 HOURS TRANSDERMAL
Qty: 42 PATCH | Refills: 0 | Status: SHIPPED | OUTPATIENT
Start: 2019-06-06 | End: 2021-09-16

## 2019-06-06 RX ORDER — TRAZODONE HYDROCHLORIDE 100 MG/1
200 TABLET ORAL NIGHTLY
Qty: 180 TABLET | Refills: 3 | Status: SHIPPED | OUTPATIENT
Start: 2019-06-06 | End: 2020-05-19

## 2019-06-06 RX ORDER — TRIAMCINOLONE ACETONIDE 1 MG/G
CREAM TOPICAL
Qty: 60 G | Refills: 0 | Status: SHIPPED | OUTPATIENT
Start: 2019-06-06

## 2019-06-06 NOTE — TELEPHONE ENCOUNTER
From: Leigh Ann Shaffer  To: Nasrin Gama MD  Sent: 6/6/2019 12:15 PM CDT  Subject: Test Results Question    Not sure I understand rubeola test results can u clarify please.

## 2019-06-09 PROBLEM — I20.8 ANGINA AT REST (HCC): Status: RESOLVED | Noted: 2017-10-06 | Resolved: 2019-06-09

## 2019-06-09 PROBLEM — N48.89 PENILE CYST: Status: RESOLVED | Noted: 2018-04-10 | Resolved: 2019-06-09

## 2019-06-09 PROBLEM — I20.89 ANGINA AT REST: Status: RESOLVED | Noted: 2017-10-06 | Resolved: 2019-06-09

## 2019-06-09 PROBLEM — R07.9 CHEST PAIN AT REST: Status: RESOLVED | Noted: 2017-10-06 | Resolved: 2019-06-09

## 2019-06-09 ASSESSMENT — ENCOUNTER SYMPTOMS
EYE REDNESS: 0
BACK PAIN: 0
EYE DISCHARGE: 0
SHORTNESS OF BREATH: 0
SINUS PRESSURE: 0
COUGH: 0
ABDOMINAL DISTENTION: 0
ABDOMINAL PAIN: 0

## 2019-06-11 ENCOUNTER — TELEPHONE (OUTPATIENT)
Dept: INTERNAL MEDICINE | Age: 54
End: 2019-06-11

## 2019-06-11 RX ORDER — GUAIFENESIN/DEXTROMETHORPHAN 100-10MG/5
5 SYRUP ORAL 3 TIMES DAILY PRN
Qty: 120 ML | Refills: 0 | Status: SHIPPED | OUTPATIENT
Start: 2019-06-11 | End: 2019-06-17

## 2019-06-11 RX ORDER — CEFDINIR 300 MG/1
300 CAPSULE ORAL 2 TIMES DAILY
Qty: 14 CAPSULE | Refills: 0 | Status: SHIPPED | OUTPATIENT
Start: 2019-06-11 | End: 2019-06-17

## 2019-06-11 NOTE — TELEPHONE ENCOUNTER
He has bronchitis symptoms, cough, thick phelgm, fatigue. Would like abx and guafenasin syrup sent to pharmacy.

## 2019-06-17 ENCOUNTER — TELEPHONE (OUTPATIENT)
Dept: INTERNAL MEDICINE | Age: 54
End: 2019-06-17

## 2019-06-17 ENCOUNTER — HOSPITAL ENCOUNTER (OUTPATIENT)
Dept: GENERAL RADIOLOGY | Age: 54
Discharge: HOME | End: 2019-06-17
Payer: MEDICARE

## 2019-06-17 ENCOUNTER — OFFICE VISIT (OUTPATIENT)
Dept: INTERNAL MEDICINE | Age: 54
End: 2019-06-17
Payer: MEDICARE

## 2019-06-17 VITALS
DIASTOLIC BLOOD PRESSURE: 66 MMHG | SYSTOLIC BLOOD PRESSURE: 124 MMHG | HEART RATE: 90 BPM | OXYGEN SATURATION: 97 % | WEIGHT: 275 LBS | HEIGHT: 72 IN | TEMPERATURE: 97.3 F | BODY MASS INDEX: 37.25 KG/M2

## 2019-06-17 DIAGNOSIS — B37.9 YEAST INFECTION: ICD-10-CM

## 2019-06-17 DIAGNOSIS — B20: ICD-10-CM

## 2019-06-17 DIAGNOSIS — J01.00 ACUTE MAXILLARY SINUSITIS, RECURRENCE NOT SPECIFIED: Primary | ICD-10-CM

## 2019-06-17 DIAGNOSIS — R05.9 COUGH: ICD-10-CM

## 2019-06-17 PROCEDURE — G8599 NO ASA/ANTIPLAT THER USE RNG: HCPCS | Performed by: NURSE PRACTITIONER

## 2019-06-17 PROCEDURE — 99214 OFFICE O/P EST MOD 30 MIN: CPT | Performed by: NURSE PRACTITIONER

## 2019-06-17 PROCEDURE — 96372 THER/PROPH/DIAG INJ SC/IM: CPT | Performed by: NURSE PRACTITIONER

## 2019-06-17 PROCEDURE — 3017F COLORECTAL CA SCREEN DOC REV: CPT | Performed by: NURSE PRACTITIONER

## 2019-06-17 PROCEDURE — G8427 DOCREV CUR MEDS BY ELIG CLIN: HCPCS | Performed by: NURSE PRACTITIONER

## 2019-06-17 PROCEDURE — 4004F PT TOBACCO SCREEN RCVD TLK: CPT | Performed by: NURSE PRACTITIONER

## 2019-06-17 PROCEDURE — G8417 CALC BMI ABV UP PARAM F/U: HCPCS | Performed by: NURSE PRACTITIONER

## 2019-06-17 PROCEDURE — 71046 X-RAY EXAM CHEST 2 VIEWS: CPT

## 2019-06-17 RX ORDER — FLUCONAZOLE 100 MG/1
100 TABLET ORAL DAILY
Qty: 2 TABLET | Refills: 0 | Status: SHIPPED | OUTPATIENT
Start: 2019-06-17 | End: 2019-06-19

## 2019-06-17 RX ORDER — CEFDINIR 300 MG/1
300 CAPSULE ORAL 2 TIMES DAILY
Qty: 14 CAPSULE | Refills: 0 | Status: SHIPPED | OUTPATIENT
Start: 2019-06-17 | End: 2019-06-24

## 2019-06-17 RX ORDER — ARIPIPRAZOLE 5 MG/1
5 TABLET ORAL DAILY
COMMUNITY
Start: 2019-06-07 | End: 2022-01-18

## 2019-06-17 RX ORDER — NYSTATIN 100000 U/G
OINTMENT TOPICAL
Qty: 1 TUBE | Refills: 0 | Status: SHIPPED | OUTPATIENT
Start: 2019-06-17 | End: 2019-06-18

## 2019-06-17 RX ORDER — CEFTRIAXONE 1 G/1
1 INJECTION, POWDER, FOR SOLUTION INTRAMUSCULAR; INTRAVENOUS ONCE
Status: COMPLETED | OUTPATIENT
Start: 2019-06-17 | End: 2019-06-17

## 2019-06-17 RX ORDER — CEFTRIAXONE 1 G/1
1 INJECTION, POWDER, FOR SOLUTION INTRAMUSCULAR; INTRAVENOUS ONCE
Qty: 1 G | Refills: 0
Start: 2019-06-17 | End: 2019-06-17

## 2019-06-17 RX ADMIN — CEFTRIAXONE 1 G: 1 INJECTION, POWDER, FOR SOLUTION INTRAMUSCULAR; INTRAVENOUS at 16:19

## 2019-06-17 ASSESSMENT — ENCOUNTER SYMPTOMS
CHOKING: 0
COLOR CHANGE: 0
COUGH: 1
ABDOMINAL DISTENTION: 0
SORE THROAT: 0
SHORTNESS OF BREATH: 1
EYE ITCHING: 0
WHEEZING: 0
NAUSEA: 0
STRIDOR: 0
EYE DISCHARGE: 0
VOMITING: 0
ABDOMINAL PAIN: 0
RHINORRHEA: 1
TROUBLE SWALLOWING: 0
CONSTIPATION: 0
DIARRHEA: 0
BLOOD IN STOOL: 0

## 2019-06-17 NOTE — PROGRESS NOTES
97 98 98 - 99 97   Weight 275 lb 271 lb 272 lb 273 lb 268 lb -   Height 6' 0\" 6' 0\" 6' 0\" - 6' 0\" -   BMI (wt*703/ht~2) 37.29 kg/m2 36.75 kg/m2 36.89 kg/m2 - 36.34 kg/m2 -   Pain Level - - - - - 2   Some recent data might be hidden       Family History   Problem Relation Age of Onset    High Blood Pressure Mother     Diabetes Mother     High Blood Pressure Father     Diabetes Maternal Grandfather        Social History     Tobacco Use    Smoking status: Current Every Day Smoker    Smokeless tobacco: Never Used   Substance Use Topics    Alcohol use: Yes      Current Outpatient Medications   Medication Sig Dispense Refill    ARIPiprazole (ABILIFY) 5 MG tablet Take 5 mg by mouth daily       traZODone (DESYREL) 100 MG tablet Take 2 tablets by mouth nightly 180 tablet 3    nicotine (NICODERM CQ) 21 MG/24HR Use 21mg patch daily for 2 weeks and then 14mg patch daily for 2 weeks and then 7mg patch daily for 2 weeks 42 patch 0    triamcinolone (KENALOG) 0.1 % cream Apply topically 2 times daily prn 60 g 0    meclizine (ANTIVERT) 25 MG tablet TAKE 1 TABLET BY MOUTH DAILY 90 tablet 2    atorvastatin (LIPITOR) 40 MG tablet Take 1 tablet by mouth daily 90 tablet 3    NOVOLOG FLEXPEN 100 UNIT/ML injection pen INJECT 70 UNITS WITH REGULAR MEALS, 80 UNITS WITH LARGER MEALS, AND 60 UNITS WITH SNACKS AS DIRECTED 105 mL 3    dexlansoprazole (DEXILANT) 60 MG CPDR delayed release capsule Take 1 capsule by mouth daily 90 capsule 1    losartan (COZAAR) 100 MG tablet Take 1 tablet by mouth daily 90 tablet 3    propranolol (INDERAL LA) 60 MG extended release capsule TAKE 1 CAPSULE BY MOUTH DAILY 90 capsule 3    metFORMIN (GLUCOPHAGE) 500 MG tablet TAKE 2 TABLETS BY MOUTH TWICE DAILY WITH MEALS 360 tablet 3    Dulaglutide (TRULICITY) 3.82 CO/0.6LB SOPN Inject 0.75 mg into the skin once a week      insulin regular human (HUMULIN R U-500 KWIKPEN) 500 UNIT/ML SOPN concentrated injection pen Inject into the skin       tiZANidine (ZANAFLEX) 2 MG tablet Take 1 tablet by mouth 2 times daily as needed (muscle pain) (Patient taking differently: Take 4 mg by mouth 3 times daily ) 60 tablet 0    Lancets MISC Use to check blood sugar 4 times daily   Dx  E11.9  Insulin dependent 400 each 3    VORTIoxetine HBr (TRINTELLIX) 20 MG TABS tablet Take 20 mg by mouth daily       gabapentin (NEURONTIN) 400 MG capsule Take 800 mg by mouth 3 times daily.  glucose blood VI test strips (FREESTYLE LITE) strip 1 each by In Vitro route daily Test blood sugar 4 times daily 100 each 3    VENTOLIN  (90 Base) MCG/ACT inhaler INHALE 2 PUFFS EVERY 6 HOURS AS NEEDED  2    calcium carbonate 600 MG TABS tablet Take 600 mg by mouth      UNIFINE PENTIPS 31G X 6 MM MISC for use FOUR TIMES DAILY  6    loratadine (CLARITIN) 10 MG tablet TAKE 1 TABLET BY MOUTH DAILY  2    miconazole (MICOTIN) 2 % cream apply to affected area on the skin THREE TIMES DAILY  1    Cholecalciferol (VITAMIN D3) 2000 units CAPS TAKE 1 CAPSULE BY MOUTH DAILY  5    clonazePAM (KLONOPIN) 0.5 MG tablet Take 1 tablet by mouth 2 times daily as needed for Anxiety 60 tablet 5    Kpnpsnn-Ydtgg-Ylegzvnv-TenofAF (GENVOYA) [phone] MG TABS Take 1 tablet by mouth daily      HYDROcodone-acetaminophen (NORCO) 5-325 MG per tablet Take 1 tablet by mouth 2 times daily as needed for Pain .  loperamide (IMODIUM) 2 MG capsule Take 2 mg by mouth 4 times daily as needed for Diarrhea      pregabalin (LYRICA) 75 MG capsule Take 75 mg by mouth 2 times daily      rizatriptan (MAXALT) 10 MG tablet Take 10 mg by mouth once as needed for Migraine May repeat in 2 hours if needed      Multiple Vitamins-Minerals (THERAPEUTIC MULTIVITAMIN-MINERALS) tablet Take 1 tablet by mouth daily      nystatin (MYCOSTATIN) 840017 UNIT/GM powder APPLY 1 GRAM ON THE SKIN 3 TIMES DAILY AS NEEDED  0    NASONEX 50 MCG/ACT nasal spray Use 1 spray in each nostril TWICE DAILY . ...(SHAKE WELL)  2     No current facility-administered medications for this visit.       Allergies   Allergen Reactions    Darvon [Propoxyphene]     Elavil [Amitriptyline Hcl]     Zithromax [Azithromycin]        Health Maintenance   Topic Date Due    Meningococcal (ACWY) Vaccine (1 - Risk 2-dose series) 12/31/1969    Diabetic foot exam  12/31/1977    Diabetic retinal exam  12/31/1977    Measles,Mumps,Rubella (MMR) vaccine (1 of 2 - Risk 2-dose series) 12/31/1985    Hepatitis B Vaccine (1 of 3 - Risk 3-dose series) 12/31/1986    Colon cancer screen colonoscopy  06/23/2019    Shingles Vaccine (2 of 2) 08/05/2019    A1C test (Diabetic or Prediabetic)  09/03/2019    Diabetic microalbuminuria test  11/08/2019    Lipid screen  06/03/2020    Potassium monitoring  06/03/2020    Creatinine monitoring  06/03/2020    Pneumococcal 0-64 years Vaccine (3 of 3 - PPSV23) 06/03/2024    DTaP/Tdap/Td vaccine (2 - Td) 06/03/2029    Flu vaccine  Completed    Hib Vaccine  Aged Out    HPV vaccine  Aged Out       Lab Results   Component Value Date    LABA1C 9.0 (H) 06/03/2019     Lab Results   Component Value Date    PSA 0.24 10/06/2017     TSH   Date Value Ref Range Status   06/03/2019 1.970 0.270 - 4.200 uIU/mL Final   ]  Lab Results   Component Value Date     06/03/2019    K 4.8 06/03/2019     06/03/2019    CO2 23 06/03/2019    BUN 26 (H) 06/03/2019    CREATININE 1.1 06/03/2019    GLUCOSE 302 (H) 06/03/2019    CALCIUM 10.0 06/03/2019    PROT 7.7 06/03/2019    LABALBU 4.4 06/03/2019    BILITOT <0.2 06/03/2019    ALKPHOS 59 06/03/2019    AST 32 06/03/2019    ALT 34 06/03/2019    LABGLOM >60 06/03/2019     Lab Results   Component Value Date    CHOL 138 (L) 06/03/2019    CHOL 130 (L) 08/31/2018     Lab Results   Component Value Date    TRIG 133 06/03/2019    TRIG 91 08/31/2018     Lab Results   Component Value Date    HDL 45 (L) 06/03/2019    HDL 41 (L) 08/31/2018     Lab Results   Component Value Date    LDLCALC 66 06/03/2019 LDLCALC 71 08/31/2018     Lab Results   Component Value Date     06/03/2019    K 4.8 06/03/2019     06/03/2019    CO2 23 06/03/2019    BUN 26 06/03/2019    CREATININE 1.1 06/03/2019    GLUCOSE 302 06/03/2019    CALCIUM 10.0 06/03/2019      Lab Results   Component Value Date    WBC 12.5 (H) 06/03/2019    HGB 11.9 (L) 06/03/2019    HCT 37.2 (L) 06/03/2019    MCV 96.6 (H) 06/03/2019     06/03/2019    RBC 3.85 (L) 06/03/2019    MCH 30.9 06/03/2019    MCHC 32.0 (L) 06/03/2019    RDW 13.0 06/03/2019     No results found for: VITD25    Subjective:      Review of Systems   Constitutional: Negative for fatigue, fever and unexpected weight change. HENT: Positive for congestion and rhinorrhea. Negative for ear discharge, ear pain, mouth sores, sore throat and trouble swallowing. Eyes: Negative for discharge, itching and visual disturbance. Respiratory: Positive for cough and shortness of breath. Negative for choking, wheezing and stridor. Cardiovascular: Negative for chest pain, palpitations and leg swelling. Gastrointestinal: Negative for abdominal distention, abdominal pain, blood in stool, constipation, diarrhea, nausea and vomiting. Endocrine: Negative for cold intolerance, polydipsia and polyuria. Genitourinary: Positive for dysuria and frequency. Negative for difficulty urinating and urgency. Musculoskeletal: Negative for arthralgias and gait problem. Skin: Negative for color change and rash. Allergic/Immunologic: Negative for food allergies and immunocompromised state. Neurological: Negative for dizziness, tremors, syncope, speech difficulty, weakness and headaches. Hematological: Negative for adenopathy. Does not bruise/bleed easily. Psychiatric/Behavioral: Negative for confusion and hallucinations. Objective:     Physical Exam   Constitutional: He is oriented to person, place, and time. He appears well-developed and well-nourished. No distress.    HENT:   Head:

## 2019-06-18 RX ORDER — NYSTATIN 100000 U/G
CREAM TOPICAL 2 TIMES DAILY
Qty: 30 G | Refills: 1 | Status: SHIPPED | OUTPATIENT
Start: 2019-06-18

## 2019-06-18 RX ORDER — ALBUTEROL SULFATE 90 UG/1
2 AEROSOL, METERED RESPIRATORY (INHALATION) 4 TIMES DAILY PRN
Qty: 1 INHALER | Refills: 0 | Status: SHIPPED | OUTPATIENT
Start: 2019-06-18 | End: 2019-07-19

## 2019-06-18 RX ORDER — ALBUTEROL SULFATE 90 UG/1
2 AEROSOL, METERED RESPIRATORY (INHALATION) 4 TIMES DAILY PRN
Qty: 1 INHALER | Refills: 0 | Status: SHIPPED | OUTPATIENT
Start: 2019-06-18 | End: 2019-06-18 | Stop reason: SDUPTHER

## 2019-06-18 RX ORDER — NYSTATIN 100000 U/G
CREAM TOPICAL 2 TIMES DAILY
COMMUNITY
End: 2019-06-18 | Stop reason: SDUPTHER

## 2019-06-19 ENCOUNTER — TELEPHONE (OUTPATIENT)
Dept: INTERNAL MEDICINE | Age: 54
End: 2019-06-19

## 2019-06-19 NOTE — TELEPHONE ENCOUNTER
He borrowed a nebulizer from a friend and it helped a lot. He saw Jonathon Ordoñez a few days ago. Can we send in a script for neb machine with medicine?

## 2019-06-21 RX ORDER — IPRATROPIUM BROMIDE AND ALBUTEROL SULFATE 2.5; .5 MG/3ML; MG/3ML
1 SOLUTION RESPIRATORY (INHALATION) EVERY 4 HOURS PRN
Qty: 40 VIAL | Refills: 1 | Status: SHIPPED | OUTPATIENT
Start: 2019-06-21 | End: 2019-07-19

## 2019-07-02 ENCOUNTER — TELEPHONE (OUTPATIENT)
Dept: FAMILY MEDICINE CLINIC | Facility: CLINIC | Age: 54
End: 2019-07-02

## 2019-07-02 NOTE — TELEPHONE ENCOUNTER
Pt thinks he was duped over the phone with someone telling him he had been approved over the phone with a new meter. He is afraid that they will send a fax for approval. He said please deny any approvals except for [facility]. Any questions please call patient, thanks!

## 2019-07-08 DIAGNOSIS — E11.9 TYPE 2 DIABETES MELLITUS WITHOUT COMPLICATION, WITH LONG-TERM CURRENT USE OF INSULIN (HCC): Primary | ICD-10-CM

## 2019-07-08 DIAGNOSIS — Z79.4 TYPE 2 DIABETES MELLITUS WITHOUT COMPLICATION, WITH LONG-TERM CURRENT USE OF INSULIN (HCC): Primary | ICD-10-CM

## 2019-07-12 RX ORDER — DULAGLUTIDE 0.75 MG/.5ML
INJECTION, SOLUTION SUBCUTANEOUS
Qty: 2 ML | Refills: 3 | Status: SHIPPED | OUTPATIENT
Start: 2019-07-12 | End: 2019-10-30 | Stop reason: SDUPTHER

## 2019-07-16 ENCOUNTER — TELEPHONE (OUTPATIENT)
Dept: INTERNAL MEDICINE | Age: 54
End: 2019-07-16

## 2019-07-16 DIAGNOSIS — R39.11 URINARY HESITANCY: Primary | ICD-10-CM

## 2019-07-16 NOTE — TELEPHONE ENCOUNTER
He is having trouble starting and stopping when he goes to urinate. Wanting a referral to urology.  Dr. Dia Mills

## 2019-07-18 ENCOUNTER — APPOINTMENT (OUTPATIENT)
Dept: GENERAL RADIOLOGY | Facility: HOSPITAL | Age: 54
End: 2019-07-18

## 2019-07-18 ENCOUNTER — HOSPITAL ENCOUNTER (EMERGENCY)
Facility: HOSPITAL | Age: 54
Discharge: HOME | End: 2019-07-18
Admitting: EMERGENCY MEDICINE

## 2019-07-18 ENCOUNTER — APPOINTMENT (OUTPATIENT)
Dept: CT IMAGING | Facility: HOSPITAL | Age: 54
End: 2019-07-18

## 2019-07-18 VITALS
RESPIRATION RATE: 15 BRPM | HEART RATE: 91 BPM | DIASTOLIC BLOOD PRESSURE: 77 MMHG | TEMPERATURE: 98.1 F | BODY MASS INDEX: 39.01 KG/M2 | OXYGEN SATURATION: 97 % | SYSTOLIC BLOOD PRESSURE: 127 MMHG | WEIGHT: 288 LBS | HEIGHT: 72 IN

## 2019-07-18 DIAGNOSIS — M54.2 NECK PAIN, ACUTE: ICD-10-CM

## 2019-07-18 DIAGNOSIS — K21.9 GASTROESOPHAGEAL REFLUX DISEASE WITHOUT ESOPHAGITIS: ICD-10-CM

## 2019-07-18 DIAGNOSIS — M25.511 ACUTE PAIN OF RIGHT SHOULDER: Primary | ICD-10-CM

## 2019-07-18 PROCEDURE — 72125 CT NECK SPINE W/O DYE: CPT

## 2019-07-18 PROCEDURE — 70450 CT HEAD/BRAIN W/O DYE: CPT

## 2019-07-18 PROCEDURE — 73030 X-RAY EXAM OF SHOULDER: CPT

## 2019-07-18 PROCEDURE — 99283 EMERGENCY DEPT VISIT LOW MDM: CPT

## 2019-07-18 RX ORDER — HYDROCODONE BITARTRATE AND ACETAMINOPHEN 5; 325 MG/1; MG/1
1 TABLET ORAL ONCE
Status: COMPLETED | OUTPATIENT
Start: 2019-07-18 | End: 2019-07-18

## 2019-07-18 RX ADMIN — HYDROCODONE BITARTRATE AND ACETAMINOPHEN 1 TABLET: 5; 325 TABLET ORAL at 21:34

## 2019-07-19 RX ORDER — INSULIN HUMAN 500 [IU]/ML
INJECTION, SOLUTION SUBCUTANEOUS
Qty: 24 ML | Refills: 5 | Status: SHIPPED | OUTPATIENT
Start: 2019-07-19 | End: 2020-01-07

## 2019-07-19 RX ORDER — PROPRANOLOL HYDROCHLORIDE 80 MG/1
CAPSULE, EXTENDED RELEASE ORAL
Qty: 30 CAPSULE | Refills: 5 | Status: SHIPPED | OUTPATIENT
Start: 2019-07-19 | End: 2020-01-06

## 2019-07-19 RX ORDER — INSULIN ASPART 100 [IU]/ML
INJECTION, SOLUTION INTRAVENOUS; SUBCUTANEOUS
Qty: 105 ML | Refills: 3 | Status: SHIPPED | OUTPATIENT
Start: 2019-07-19 | End: 2021-05-11 | Stop reason: ALTCHOICE

## 2019-07-19 RX ORDER — DEXLANSOPRAZOLE 60 MG/1
CAPSULE, DELAYED RELEASE ORAL
Qty: 90 CAPSULE | Refills: 1 | Status: SHIPPED | OUTPATIENT
Start: 2019-07-19 | End: 2020-01-06
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
## 2019-07-24 ENCOUNTER — INFUSION (OUTPATIENT)
Dept: ONCOLOGY | Facility: HOSPITAL | Age: 54
End: 2019-07-24

## 2019-07-24 VITALS
HEIGHT: 70 IN | HEART RATE: 88 BPM | OXYGEN SATURATION: 96 % | RESPIRATION RATE: 17 BRPM | WEIGHT: 285 LBS | TEMPERATURE: 98.1 F | BODY MASS INDEX: 40.8 KG/M2 | SYSTOLIC BLOOD PRESSURE: 133 MMHG | DIASTOLIC BLOOD PRESSURE: 60 MMHG

## 2019-07-24 DIAGNOSIS — M81.0 AGE-RELATED OSTEOPOROSIS WITHOUT CURRENT PATHOLOGICAL FRACTURE: ICD-10-CM

## 2019-07-24 DIAGNOSIS — M81.0 OSTEOPOROSIS WITHOUT CURRENT PATHOLOGICAL FRACTURE, UNSPECIFIED OSTEOPOROSIS TYPE: Primary | ICD-10-CM

## 2019-07-24 PROCEDURE — 25010000002 DENOSUMAB 60 MG/ML SOLUTION PREFILLED SYRINGE: Performed by: NURSE PRACTITIONER

## 2019-07-24 PROCEDURE — 96372 THER/PROPH/DIAG INJ SC/IM: CPT

## 2019-07-24 RX ADMIN — DENOSUMAB 60 MG: 60 INJECTION SUBCUTANEOUS at 14:36

## 2019-07-25 ENCOUNTER — OFFICE VISIT (OUTPATIENT)
Dept: NEUROLOGY | Age: 54
End: 2019-07-25
Payer: COMMERCIAL

## 2019-07-25 ENCOUNTER — TELEPHONE (OUTPATIENT)
Dept: PODIATRY | Facility: CLINIC | Age: 54
End: 2019-07-25

## 2019-07-25 VITALS
BODY MASS INDEX: 40.37 KG/M2 | HEART RATE: 79 BPM | DIASTOLIC BLOOD PRESSURE: 68 MMHG | WEIGHT: 282 LBS | HEIGHT: 70 IN | SYSTOLIC BLOOD PRESSURE: 109 MMHG

## 2019-07-25 DIAGNOSIS — G62.9 NEUROPATHY: ICD-10-CM

## 2019-07-25 DIAGNOSIS — Z86.39 HISTORY OF DIABETES MELLITUS: ICD-10-CM

## 2019-07-25 DIAGNOSIS — G25.0 ESSENTIAL TREMOR: Primary | ICD-10-CM

## 2019-07-25 PROCEDURE — 99204 OFFICE O/P NEW MOD 45 MIN: CPT | Performed by: PSYCHIATRY & NEUROLOGY

## 2019-07-25 NOTE — PROGRESS NOTES
mouth 2 times daily as needed for Anxiety 60 tablet 5    Foyypmm-Pigst-Ygvpzeno-TenofAF (GENVOYA) [phone] MG TABS Take 1 tablet by mouth daily      HYDROcodone-acetaminophen (NORCO) 5-325 MG per tablet Take 1 tablet by mouth 2 times daily as needed for Pain .  loperamide (IMODIUM) 2 MG capsule Take 2 mg by mouth 4 times daily as needed for Diarrhea      pregabalin (LYRICA) 75 MG capsule Take 75 mg by mouth 2 times daily      rizatriptan (MAXALT) 10 MG tablet Take 10 mg by mouth once as needed for Migraine May repeat in 2 hours if needed      Multiple Vitamins-Minerals (THERAPEUTIC MULTIVITAMIN-MINERALS) tablet Take 1 tablet by mouth daily       No current facility-administered medications for this visit. Outpatient Medications Marked as Taking for the 7/25/19 encounter (Office Visit) with Ray Person MD   Medication Sig Dispense Refill    propranolol (INDERAL LA) 80 MG extended release capsule 1 capsule by mouth daily 30 capsule 5    HUMULIN R U-500 KWIKPEN 500 UNIT/ML SOPN concentrated injection pen inject 200 units TWICE DAILY with meals 24 mL 5    NOVOLOG FLEXPEN 100 UNIT/ML injection pen INJECT 70 UNITS WITH REGULAR MEALS, 80 UNITS WITH LARGER MEALS, AND 60 UNITS WITH SNACKS AS DIRECTED 105 mL 3    DEXILANT 60 MG CPDR delayed release capsule TAKE 1 CAPSULE BY MOUTH DAILY 90 capsule 1    TRULICITY 1.28 GS/0.4HO SOPN Inject 0.75mg (0.5ml) under the skin AS DIRECTED every week 2 mL 3    nystatin (MYCOSTATIN) 059831 UNIT/GM cream Apply topically 2 times daily Apply topically 2 times daily.  30 g 1    ARIPiprazole (ABILIFY) 5 MG tablet Take 5 mg by mouth daily       traZODone (DESYREL) 100 MG tablet Take 2 tablets by mouth nightly 180 tablet 3    nicotine (NICODERM CQ) 21 MG/24HR Use 21mg patch daily for 2 weeks and then 14mg patch daily for 2 weeks and then 7mg patch daily for 2 weeks 42 patch 0    triamcinolone (KENALOG) 0.1 % cream Apply topically 2 times daily prn 60 g 0    times daily      rizatriptan (MAXALT) 10 MG tablet Take 10 mg by mouth once as needed for Migraine May repeat in 2 hours if needed      Multiple Vitamins-Minerals (THERAPEUTIC MULTIVITAMIN-MINERALS) tablet Take 1 tablet by mouth daily         /68   Pulse 79   Ht 5' 10\" (1.778 m)   Wt 282 lb (127.9 kg)   BMI 40.46 kg/m²       Constitutional - well developed, well nourished. Morbidly obese  Eyes - conjunctiva normal.  Pupils react to light  Ear, nose, throat -hearing intact to finger rub No scars, masses, or lesions over external nose or ears, no atrophy of tongue  Neck-symmetric, no masses noted, no jugular vein distension. No bruits noted. Respiration- chest wall appears symmetric, good expansion,   normal effort without use of accessory muscles  Cardiovascular- RRR  Musculoskeletal - no significant wasting of muscles noted, no bony deformities, gait no gross ataxia. Hammertoes noted  Extremities-no clubbing, cyanosis or edema  Skin - warm, dry, and intact. No rash, erythema, or pallor. Psychiatric - mood, affect, and behavior appear normal.      Neurological exam  Awake, alert, fluent oriented x 3 appropriate affect  Attention and concentration appear appropriate  But memory good. Short-term memory 2/4 at 5 minutes 4/4 with cues  Speech normal without dysarthria  No clear issues with language of fund of knowledge    Cranial Nerve Exam   CN II- Visual fields grossly unremarkable. VA adequate. Discs sharp  CN III, IV,VI- PERRLA, EOMI, No nystagmus, conjugate eye movements, no ptosis  CN V-sensation intact to LT over face  CN VII-no facial asymmetry  CN VIII-Hearing intact   CN IX and X- Palate elevates in midline  CN XI-good shoulder shrug  CN XII-Tongue midline with no fasciculations or fibrillations    Motor Exam  V/V throughout upper and lower extremities bilaterally, no cogwheeling, normal tone    Sensory Exam  decreased vibration in the left foot.   Decreased pinprick to the bilateral

## 2019-07-29 ENCOUNTER — OFFICE VISIT (OUTPATIENT)
Dept: PODIATRY | Facility: CLINIC | Age: 54
End: 2019-07-29

## 2019-07-29 VITALS
WEIGHT: 288 LBS | OXYGEN SATURATION: 98 % | BODY MASS INDEX: 41.23 KG/M2 | HEART RATE: 68 BPM | HEIGHT: 70 IN | SYSTOLIC BLOOD PRESSURE: 138 MMHG | DIASTOLIC BLOOD PRESSURE: 80 MMHG

## 2019-07-29 DIAGNOSIS — B20 HIV (HUMAN IMMUNODEFICIENCY VIRUS INFECTION) (HCC): ICD-10-CM

## 2019-07-29 DIAGNOSIS — B35.3 TINEA PEDIS OF BOTH FEET: ICD-10-CM

## 2019-07-29 DIAGNOSIS — L60.2 THICKENED NAILS: Primary | ICD-10-CM

## 2019-07-29 DIAGNOSIS — E11.8 DIABETIC FOOT (HCC): ICD-10-CM

## 2019-07-29 DIAGNOSIS — E11.49 DIABETES MELLITUS TYPE 2 WITH NEUROLOGICAL MANIFESTATIONS (HCC): ICD-10-CM

## 2019-07-29 DIAGNOSIS — G57.62 MORTON'S NEUROMA, LEFT: ICD-10-CM

## 2019-07-29 DIAGNOSIS — L84 FOOT CALLUS: ICD-10-CM

## 2019-07-29 PROBLEM — Z21 HIV (HUMAN IMMUNODEFICIENCY VIRUS INFECTION): Status: ACTIVE | Noted: 2019-07-29

## 2019-07-29 PROCEDURE — 11721 DEBRIDE NAIL 6 OR MORE: CPT | Performed by: PODIATRIST

## 2019-07-29 PROCEDURE — 99203 OFFICE O/P NEW LOW 30 MIN: CPT | Performed by: PODIATRIST

## 2019-07-29 PROCEDURE — 11056 PARNG/CUTG B9 HYPRKR LES 2-4: CPT | Performed by: PODIATRIST

## 2019-07-29 RX ORDER — PRENATAL VIT 91/IRON/FOLIC/DHA 28-975-200
COMBINATION PACKAGE (EA) ORAL 2 TIMES DAILY
Qty: 36 G | Refills: 2 | Status: ON HOLD | OUTPATIENT
Start: 2019-07-29 | End: 2020-05-12

## 2019-07-29 NOTE — PATIENT INSTRUCTIONS
Bennett Neuralgia  Bennett neuralgia is a type of foot pain in the area closest to your toes. This area is sometimes called the ball of your foot. Bennett neuralgia occurs when a branch of a nerve in your foot (digital nerve) becomes compressed.  When this happens over a long period of time, the nerve can thicken (neuroma) and cause pain. This usually occurs between the third and fourth toe. Bennett neuralgia can come and go but may get worse over time.  What are the causes?  Your digital nerve can become compressed and stretched at a point where it passes under a thick band of tissue that connects your toes (intermetatarsal ligament). Bennett neuralgia can be caused by mild repetitive damage in this area. This type of damage can result from:  · Activities such as running or jumping.  · Wearing shoes that are too tight.    What increases the risk?  You may be at risk for Bennett neuralgia if you:  · Are female.  · Wear high heels.  · Wear shoes that are narrow or tight.  · Participate in activities that stretch your toes. These include:  ? Running.  ? Ballet.  ? Long-distance walking.    What are the signs or symptoms?  The first symptom of Bennett neuralgia is pain that spreads from the ball of your foot to your toes. It may feel like you are walking on a marble. Pain usually gets worse with walking and goes away at night. Other symptoms may include numbness and cramping of your toes.  How is this diagnosed?  Your health care provider will do a physical exam. When doing the exam, your health care provider may:  · Squeeze your foot just behind your toe.  · Ask you to move your toes to check for pain.    You may also have tests on your foot to confirm the diagnosis. These may include:  · An X-ray.  · An MRI.    How is this treated?  Treatment for Bennett neuralgia may be as simple as changing the kind of shoes you wear. Other treatments may include:  · Wearing a supportive pad (orthosis) under the front of your foot. This  lifts your toe bones and takes pressure off the nerve.  · Getting injections of numbing medicine and anti-inflammatory medicine (steroid) in the nerve.  · Having surgery to remove part of the thickened nerve.    Follow these instructions at home:  · Take medicine only as directed by your health care provider.  · Wear soft-soled shoes with a wide toe area.  · Stop activities that may be causing pain.  · Elevate your foot when resting.  · Massage your foot.  · Apply ice to the injured area:  ? Put ice in a plastic bag.  ? Place a towel between your skin and the bag.  ? Leave the ice on for 20 minutes, 2-3 times a day.  · Keep all follow-up visits as directed by your health care provider. This is important.  Contact a health care provider if:  · Home care instructions are not helping you get better.  · Your symptoms change or get worse.  This information is not intended to replace advice given to you by your health care provider. Make sure you discuss any questions you have with your health care provider.  Document Released: 03/26/2002 Document Revised: 05/25/2017 Document Reviewed: 02/18/2015  [employer] Interactive Patient Education © 2019 [employer]

## 2019-07-30 ENCOUNTER — TELEPHONE (OUTPATIENT)
Dept: INTERNAL MEDICINE | Age: 54
End: 2019-07-30

## 2019-07-30 NOTE — TELEPHONE ENCOUNTER
He thinks his body is getting so used to Trazodone that it is not helping any longer. He is up to 200mg at night. What else do you recommend?

## 2019-08-10 ENCOUNTER — HOSPITAL ENCOUNTER (EMERGENCY)
Facility: HOSPITAL | Age: 54
Discharge: HOME | End: 2019-08-10
Admitting: FAMILY MEDICINE

## 2019-08-10 ENCOUNTER — APPOINTMENT (OUTPATIENT)
Dept: CT IMAGING | Facility: HOSPITAL | Age: 54
End: 2019-08-10

## 2019-08-10 VITALS
WEIGHT: 288 LBS | TEMPERATURE: 98.2 F | HEIGHT: 72 IN | BODY MASS INDEX: 39.01 KG/M2 | RESPIRATION RATE: 19 BRPM | SYSTOLIC BLOOD PRESSURE: 145 MMHG | OXYGEN SATURATION: 97 % | HEART RATE: 89 BPM | DIASTOLIC BLOOD PRESSURE: 87 MMHG

## 2019-08-10 DIAGNOSIS — G43.809 OTHER MIGRAINE WITHOUT STATUS MIGRAINOSUS, NOT INTRACTABLE: Primary | ICD-10-CM

## 2019-08-10 LAB
ALBUMIN SERPL-MCNC: 4.2 G/DL (ref 3.5–5)
ALBUMIN/GLOB SERPL: 1.2 G/DL (ref 1.1–2.5)
ALP SERPL-CCNC: 65 U/L (ref 24–120)
ALT SERPL W P-5'-P-CCNC: 61 U/L (ref 0–54)
ANION GAP SERPL CALCULATED.3IONS-SCNC: 9 MMOL/L (ref 4–13)
AST SERPL-CCNC: 49 U/L (ref 7–45)
BASOPHILS # BLD AUTO: 0.05 10*3/MM3 (ref 0–0.2)
BASOPHILS NFR BLD AUTO: 0.5 % (ref 0–1.5)
BILIRUB SERPL-MCNC: 0.4 MG/DL (ref 0.1–1)
BUN BLD-MCNC: 23 MG/DL (ref 5–21)
BUN/CREAT SERPL: 34.8 (ref 7–25)
CALCIUM SPEC-SCNC: 9.6 MG/DL (ref 8.4–10.4)
CHLORIDE SERPL-SCNC: 100 MMOL/L (ref 98–110)
CO2 SERPL-SCNC: 28 MMOL/L (ref 24–31)
CREAT BLD-MCNC: 0.66 MG/DL (ref 0.5–1.4)
DEPRECATED RDW RBC AUTO: 45.2 FL (ref 37–54)
EOSINOPHIL # BLD AUTO: 0.21 10*3/MM3 (ref 0–0.4)
EOSINOPHIL NFR BLD AUTO: 2.1 % (ref 0.3–6.2)
ERYTHROCYTE [DISTWIDTH] IN BLOOD BY AUTOMATED COUNT: 13.2 % (ref 12.3–15.4)
GFR SERPL CREATININE-BSD FRML MDRD: 127 ML/MIN/1.73
GLOBULIN UR ELPH-MCNC: 3.6 GM/DL
GLUCOSE BLD-MCNC: 272 MG/DL (ref 70–100)
HCT VFR BLD AUTO: 36.4 % (ref 37.5–51)
HGB BLD-MCNC: 12.2 G/DL (ref 13–17.7)
IMM GRANULOCYTES # BLD AUTO: 0.08 10*3/MM3 (ref 0–0.05)
IMM GRANULOCYTES NFR BLD AUTO: 0.8 % (ref 0–0.5)
LYMPHOCYTES # BLD AUTO: 2.39 10*3/MM3 (ref 0.7–3.1)
LYMPHOCYTES NFR BLD AUTO: 24.1 % (ref 19.6–45.3)
MAGNESIUM SERPL-MCNC: 1.8 MG/DL (ref 1.4–2.2)
MCH RBC QN AUTO: 31.4 PG (ref 26.6–33)
MCHC RBC AUTO-ENTMCNC: 33.5 G/DL (ref 31.5–35.7)
MCV RBC AUTO: 93.6 FL (ref 79–97)
MONOCYTES # BLD AUTO: 0.7 10*3/MM3 (ref 0.1–0.9)
MONOCYTES NFR BLD AUTO: 7.1 % (ref 5–12)
NEUTROPHILS # BLD AUTO: 6.49 10*3/MM3 (ref 1.7–7)
NEUTROPHILS NFR BLD AUTO: 65.4 % (ref 42.7–76)
NRBC BLD AUTO-RTO: 0 /100 WBC (ref 0–0.2)
PLATELET # BLD AUTO: 226 10*3/MM3 (ref 140–450)
PMV BLD AUTO: 10.4 FL (ref 6–12)
POTASSIUM BLD-SCNC: 4.5 MMOL/L (ref 3.5–5.3)
PROT SERPL-MCNC: 7.8 G/DL (ref 6.3–8.7)
RBC # BLD AUTO: 3.89 10*6/MM3 (ref 4.14–5.8)
SODIUM BLD-SCNC: 137 MMOL/L (ref 135–145)
WBC NRBC COR # BLD: 9.92 10*3/MM3 (ref 3.4–10.8)

## 2019-08-10 PROCEDURE — 25010000002 DIPHENHYDRAMINE PER 50 MG: Performed by: PHYSICIAN ASSISTANT

## 2019-08-10 PROCEDURE — 85025 COMPLETE CBC W/AUTO DIFF WBC: CPT | Performed by: PHYSICIAN ASSISTANT

## 2019-08-10 PROCEDURE — 25010000002 METOCLOPRAMIDE PER 10 MG: Performed by: PHYSICIAN ASSISTANT

## 2019-08-10 PROCEDURE — 99283 EMERGENCY DEPT VISIT LOW MDM: CPT

## 2019-08-10 PROCEDURE — 70450 CT HEAD/BRAIN W/O DYE: CPT

## 2019-08-10 PROCEDURE — 96374 THER/PROPH/DIAG INJ IV PUSH: CPT

## 2019-08-10 PROCEDURE — 25010000002 PROCHLORPERAZINE 10 MG/2ML SOLUTION: Performed by: PHYSICIAN ASSISTANT

## 2019-08-10 PROCEDURE — 80053 COMPREHEN METABOLIC PANEL: CPT | Performed by: PHYSICIAN ASSISTANT

## 2019-08-10 PROCEDURE — 96375 TX/PRO/DX INJ NEW DRUG ADDON: CPT

## 2019-08-10 PROCEDURE — 83735 ASSAY OF MAGNESIUM: CPT | Performed by: PHYSICIAN ASSISTANT

## 2019-08-10 RX ORDER — METOCLOPRAMIDE HYDROCHLORIDE 5 MG/ML
10 INJECTION INTRAMUSCULAR; INTRAVENOUS ONCE
Status: COMPLETED | OUTPATIENT
Start: 2019-08-10 | End: 2019-08-10

## 2019-08-10 RX ORDER — PROCHLORPERAZINE EDISYLATE 5 MG/ML
10 INJECTION INTRAMUSCULAR; INTRAVENOUS ONCE
Status: COMPLETED | OUTPATIENT
Start: 2019-08-10 | End: 2019-08-10

## 2019-08-10 RX ORDER — DIPHENHYDRAMINE HYDROCHLORIDE 50 MG/ML
25 INJECTION INTRAMUSCULAR; INTRAVENOUS ONCE
Status: COMPLETED | OUTPATIENT
Start: 2019-08-10 | End: 2019-08-10

## 2019-08-10 RX ADMIN — METOCLOPRAMIDE 10 MG: 5 INJECTION, SOLUTION INTRAMUSCULAR; INTRAVENOUS at 18:24

## 2019-08-10 RX ADMIN — DIPHENHYDRAMINE HYDROCHLORIDE 25 MG: 50 INJECTION INTRAMUSCULAR; INTRAVENOUS at 16:00

## 2019-08-10 RX ADMIN — PROCHLORPERAZINE EDISYLATE 10 MG: 5 INJECTION INTRAMUSCULAR; INTRAVENOUS at 16:00

## 2019-08-11 NOTE — ED PROVIDER NOTES
Subjective   History of Present Illness  53-year-old male presents with a chief complaint of headache, nausea, pressure behind his eyes.  Patient reports his symptoms are severe and he feels as if he had difficulty speaking.  He does have HIV.  No cough chest pain shortness of breath  Review of Systems   All other systems reviewed and are negative.      Past Medical History:   Diagnosis Date   • Allergic rhinitis    • Anxiety    • Bursitis    • DDD (degenerative disc disease), cervical    • Depression    • HIV disease (CMS/[facility])    • HLD (hyperlipidemia)    • HSP (Henoch Schonlein purpura) (CMS/[facility])    • HTN (hypertension)    • Migraine    • Myocardial infarction (CMS/HCC)    • Osteoporosis    • Sleep apnea    • Type 2 diabetes mellitus (CMS/[facility])        Allergies   Allergen Reactions   • Amitriptyline Anaphylaxis   • Amitriptyline Hcl Anaphylaxis   • Darvon [Propoxyphene] Anaphylaxis   • Zithromax [Azithromycin] Anaphylaxis       Past Surgical History:   Procedure Laterality Date   • ANKLE SURGERY     • FOREARM SURGERY     • LYMPH NODE BIOPSY         Family History   Problem Relation Age of Onset   • Diabetes Mother    • Hypertension Mother    • Thyroid disease Mother    • Hypertension Father    • Thyroid disease Father    • Arrhythmia Father    • Diabetes Maternal Grandfather    • Heart disease Maternal Grandfather    • Hypertension Maternal Grandfather    • Diabetes Paternal Grandmother    • Stroke Paternal Grandmother    • Heart disease Paternal Grandmother    • Hypertension Paternal Grandmother    • Thyroid disease Paternal Grandmother    • Hypertension Paternal Grandfather    • Hypertension Sister    • No Known Problems Brother        Social History     Socioeconomic History   • Marital status:      Spouse name: Not on file   • Number of children: Not on file   • Years of education: Not on file   • Highest education level: Not on file   Tobacco Use   • Smoking status: Current Every Day Smoker      Packs/day: 1.00     Types: Cigarettes     Start date: 1988   • Smokeless tobacco: Never Used   Substance and Sexual Activity   • Alcohol use: Yes     Comment: occasionally   • Drug use: No   • Sexual activity: Defer           Objective   Physical Exam   Constitutional: He is oriented to person, place, and time. He appears well-developed and well-nourished.   HENT:   Head: Normocephalic and atraumatic.   Eyes: EOM are normal. Pupils are equal, round, and reactive to light.   Neck: Normal range of motion. Neck supple.   Cardiovascular: Normal rate and regular rhythm.   Pulmonary/Chest: Effort normal and breath sounds normal.   Abdominal: Soft. Bowel sounds are normal.   Musculoskeletal: Normal range of motion.   Neurological: He is alert and oriented to person, place, and time. No cranial nerve deficit. Coordination normal.   Skin: Skin is warm. Capillary refill takes less than 2 seconds.   Psychiatric: He has a normal mood and affect. His behavior is normal.   Nursing note and vitals reviewed.      Procedures           ED Course        Labs Reviewed   COMPREHENSIVE METABOLIC PANEL - Abnormal; Notable for the following components:       Result Value    Glucose 272 (*)     BUN 23 (*)     ALT (SGPT) 61 (*)     AST (SGOT) 49 (*)     BUN/Creatinine Ratio 34.8 (*)     All other components within normal limits    Narrative:     GFR Normal >60  Chronic Kidney Disease <60  Kidney Failure <15   CBC WITH AUTO DIFFERENTIAL - Abnormal; Notable for the following components:    RBC 3.89 (*)     Hemoglobin 12.2 (*)     Hematocrit 36.4 (*)     Immature Grans % 0.8 (*)     Immature Grans, Absolute 0.08 (*)     All other components within normal limits   MAGNESIUM - Normal   CBC AND DIFFERENTIAL    Narrative:     The following orders were created for panel order CBC & Differential.  Procedure                               Abnormality         Status                     ---------                               -----------         ------                      CBC Auto Differential[[id number]]        Abnormal            Final result                 Please view results for these tests on the individual orders.     CT Head Without Contrast   Final Result   1. No acute intracranial process.           This report was finalized on 08/10/2019 16:39 by Dr. Nick Manzanares MD.                  MDM  Number of Diagnoses or Management Options  Diagnosis management comments: Negative CT, improved, will dc in stable condition        Amount and/or Complexity of Data Reviewed  Clinical lab tests: reviewed and ordered  Tests in the radiology section of CPT®: ordered and reviewed  Tests in the medicine section of CPT®: ordered and reviewed    Risk of Complications, Morbidity, and/or Mortality  Presenting problems: moderate  Diagnostic procedures: moderate  Management options: moderate    Patient Progress  Patient progress: stable        Final diagnoses:   Other migraine without status migrainosus, not intractable            Barrie Alexander PA-C  08/10/19 1916

## 2019-08-20 NOTE — PROGRESS NOTES
Subjective    Mr. Yi is 53 y.o. male    Chief Complaint: Urinary Frequency     History of Present Illness     Lower Urinary tract symptoms  Patient is here for her lower urinary tract symptoms. The patient is bothered by slow stream, hesitancy, intermittency, nocturia, urgency, sense of residual urine, frequency, but is without dysuria.  Onset has been gradual.  The patient perceives the voided amount is Symptoms have been present for several years.  Severity is described as Severe.  Course has been worsening. The patient perceives the amount voided is normal for the urge. Previous  history includes no significant issues.  Previous treatment includes none.         The following portions of the patient's history were reviewed and updated as appropriate: allergies, current medications, past family history, past medical history, past social history, past surgical history and problem list.    Review of Systems   Constitutional: Negative for appetite change, chills, fever and unexpected weight change.   HENT: Negative for congestion, ear pain, facial swelling, hearing loss, nosebleeds, trouble swallowing and voice change.    Eyes: Negative for photophobia, pain, discharge and visual disturbance.   Respiratory: Negative for cough, choking, chest tightness and shortness of breath.    Cardiovascular: Negative for chest pain and palpitations.   Gastrointestinal: Negative for abdominal distention, abdominal pain, blood in stool, constipation, diarrhea, nausea and vomiting.   Endocrine: Negative for cold intolerance, heat intolerance and polydipsia.   Genitourinary: Positive for frequency. Negative for decreased urine volume, difficulty urinating, discharge, dysuria, enuresis, flank pain, genital sores, hematuria, penile pain, penile swelling, scrotal swelling, testicular pain and urgency.   Musculoskeletal: Negative for arthralgias, joint swelling, neck pain and neck stiffness.   Skin: Negative for pallor and rash.    Allergic/Immunologic: Negative for immunocompromised state.   Neurological: Negative for dizziness, tremors, seizures, syncope, light-headedness and headaches.   Hematological: Negative for adenopathy. Does not bruise/bleed easily.   Psychiatric/Behavioral: Negative for agitation, confusion, dysphoric mood, hallucinations, self-injury and suicidal ideas.         Current Outpatient Medications:   •  albuterol (PROVENTIL HFA;VENTOLIN HFA) 108 (90 BASE) MCG/ACT inhaler, Inhale 2 puffs every 6 (six) hours as needed for wheezing., Disp: , Rfl:   •  ARIPiprazole (ABILIFY) 2 MG tablet, Take 2 mg by mouth Daily., Disp: , Rfl:   •  atorvastatin (LIPITOR) 80 MG tablet, Take 80 mg by mouth daily., Disp: , Rfl:   •  calcium carbonate (OS-THEODORA) 600 MG tablet, Take 600 mg by mouth Daily., Disp: , Rfl:   •  cholecalciferol (VITAMIN D3) 1000 UNITS tablet, Take 1,000 Units by mouth daily., Disp: , Rfl:   •  ciclopirox (LOPROX) 0.77 % cream, Apply  topically to the appropriate area as directed 2 (Two) Times a Day., Disp: 30 g, Rfl: 5  •  clonazePAM (KlonoPIN) 0.5 MG tablet, Take 0.5 mg by mouth 3 (Three) Times a Day As Needed for Anxiety., Disp: , Rfl:   •  denosumab (PROLIA) 60 MG/ML solution syringe, Inject 60 mg under the skin Every 6 (Six) Months., Disp: , Rfl:   •  diazePAM (VALIUM) 5 MG tablet, Take 0.5 tablets by mouth Every 6 (Six) Hours As Needed (bladder spasms)., Disp: 6 tablet, Rfl: 0  •  Dulaglutide 0.75 MG/0.5ML solution pen-injector, Inject 0.75 mg under the skin into the appropriate area as directed 1 (One) Time Per Week., Disp: 4 pen, Rfl: 11  •  Eweeykw-Mpmnb-Ufawmqnp-TenofAF (GENVOYA) [phone] MG tablet, Take 1 tablet by mouth daily., Disp: , Rfl:   •  esomeprazole (NexIUM) 40 MG capsule, Take 40 mg by mouth every morning before breakfast., Disp: , Rfl:   •  fluticasone (FLONASE) 50 MCG/ACT nasal spray, 2 sprays into each nostril Daily. (Patient taking differently: 2 sprays into each nostril Daily As  Needed.), Disp: 1 bottle, Rfl: 6  •  Foot Care Products (FOOT POWDER MEDICATED EX), , Disp: , Rfl:   •  gabapentin (NEURONTIN) 800 MG tablet, , Disp: , Rfl:   •  guaiFENesin (MUCINEX) 600 MG 12 hr tablet, Take 600 mg by mouth 2 (two) times a day as needed for cough., Disp: , Rfl:   •  HYDROcodone-acetaminophen (NORCO) 5-325 MG per tablet, Take 1 tablet by mouth 2 (two) times a day as needed., Disp: , Rfl:   •  hydrOXYzine (VISTARIL) 25 MG capsule, Take 25 mg by mouth every 6 (six) hours as needed for itching., Disp: , Rfl:   •  insulin aspart (NOVOLOG FLEXPEN) 100 UNIT/ML solution pen-injector sc pen, Inject 80 Units under the skin 4 (Four) Times a Day With Meals & at Bedtime., Disp: , Rfl:   •  Insulin Regular Human, Conc, (HUMULIN R U-500 KWIKPEN) 500 UNIT/ML solution pen-injector CONCENTRATED injection, Up to 200 units bid with meals, Disp: 8 pen, Rfl: 11  •  loperamide (IMODIUM) 2 MG capsule, Take 2 mg by mouth daily as needed for diarrhea., Disp: , Rfl:   •  loratadine (CLARITIN) 10 MG tablet, Take 10 mg by mouth daily., Disp: , Rfl:   •  losartan (COZAAR) 100 MG tablet, Take 100 mg by mouth daily., Disp: , Rfl:   •  LYRICA 100 MG capsule, Take 100 mg by mouth 2 (Two) Times a Day., Disp: , Rfl:   •  meclizine (ANTIVERT) 25 MG tablet, Take 25 mg by mouth Daily As Needed., Disp: , Rfl:   •  metFORMIN (GLUCOPHAGE) 1000 MG tablet, Take 1,000 mg by mouth 2 (two) times a day with meals., Disp: , Rfl:   •  miconazole (MICOTIN) 2 % cream, Apply 1 application topically 2 (two) times a day., Disp: , Rfl:   •  neomycin-bacitracin-polymyxin (NEOSPORIN) 5-400-5000 ointment, Apply 1 application topically 4 (four) times a day as needed., Disp: , Rfl:   •  phenazopyridine (PYRIDIUM) 200 MG tablet, Take 1 tablet by mouth 3 (Three) Times a Day As Needed for bladder spasms., Disp: 12 tablet, Rfl: 0  •  promethazine (PHENERGAN) 25 MG tablet, Take 25 mg by mouth every 6 (six) hours as needed for nausea or vomiting., Disp: , Rfl:    •  propranolol LA (INDERAL LA) 60 MG 24 hr capsule, Take 60 mg by mouth daily., Disp: , Rfl:   •  rizatriptan (MAXALT) 10 MG tablet, Take 10 mg by mouth 1 (one) time as needed for migraine. May repeat in 2 hours if needed, Disp: , Rfl:   •  terbinafine (lamISIL) 1 % cream, Apply  topically to the appropriate area as directed 2 (Two) Times a Day., Disp: 36 g, Rfl: 2  •  tiZANidine (ZANAFLEX) 4 MG tablet, Take 6 mg by mouth Every 6 (Six) Hours As Needed for Muscle Spasms., Disp: , Rfl:   •  traZODone (DESYREL) 150 MG tablet, Take 100 mg by mouth Every Night., Disp: , Rfl:   •  Vortioxetine HBr (TRINTELLIX) 5 MG tablet, Take 20 mg by mouth Daily With Breakfast., Disp: , Rfl:   •  tamsulosin (FLOMAX) 0.4 MG capsule 24 hr capsule, Take 1 capsule by mouth Every Night., Disp: 90 capsule, Rfl: 3    Past Medical History:   Diagnosis Date   • Allergic rhinitis    • Anxiety    • Bursitis    • DDD (degenerative disc disease), cervical    • Depression    • HIV disease (CMS/HCC)    • HLD (hyperlipidemia)    • HSP (Henoch Schonlein purpura) (CMS/HCC)    • HTN (hypertension)    • Migraine    • Myocardial infarction (CMS/HCC)    • Osteoporosis    • Sleep apnea    • Type 2 diabetes mellitus (CMS/HCC)        Past Surgical History:   Procedure Laterality Date   • ANKLE SURGERY     • COLONOSCOPY  06/19/2009    Normal exam repeat in 10 years   • FOREARM SURGERY     • LYMPH NODE BIOPSY         Social History     Socioeconomic History   • Marital status:      Spouse name: Not on file   • Number of children: Not on file   • Years of education: Not on file   • Highest education level: Not on file   Tobacco Use   • Smoking status: Current Every Day Smoker     Packs/day: 1.00     Types: Cigarettes     Start date: 1988   • Smokeless tobacco: Never Used   Substance and Sexual Activity   • Alcohol use: Yes     Comment: occasionally   • Drug use: No   • Sexual activity: Defer       Family History   Problem Relation Age of Onset   •  "Diabetes Mother    • Hypertension Mother    • Thyroid disease Mother    • Hypertension Father    • Thyroid disease Father    • Arrhythmia Father    • Diabetes Maternal Grandfather    • Heart disease Maternal Grandfather    • Hypertension Maternal Grandfather    • Diabetes Paternal Grandmother    • Stroke Paternal Grandmother    • Heart disease Paternal Grandmother    • Hypertension Paternal Grandmother    • Thyroid disease Paternal Grandmother    • Hypertension Paternal Grandfather    • Hypertension Sister    • No Known Problems Brother        Objective    Temp 98.3 °F (36.8 °C)   Ht 182.9 cm (72\")   Wt 131 kg (288 lb)   BMI 39.06 kg/m²     Physical Exam   Constitutional: He is oriented to person, place, and time. He appears well-developed and well-nourished. No distress.   HENT:   Nose: Nose normal.   Neck: Normal range of motion. Neck supple. No tracheal deviation present. No thyromegaly present.   Cardiovascular: Normal rate, regular rhythm and intact distal pulses.   No significant edema or tenderness    Pulmonary/Chest: Breath sounds normal. No accessory muscle usage. No respiratory distress.   Abdominal: Soft. Bowel sounds are normal. He exhibits no distension, no ascites and no mass. There is no hepatosplenomegaly. There is no tenderness. There is no rebound, no guarding and no CVA tenderness. No hernia.   Stool specimen is not indicated for my portion of the exam   Genitourinary: Testes normal and penis normal. Rectal exam shows no mass, no tenderness, anal tone normal and guaiac negative stool. Tender: no nodules. Right testis shows no mass, no swelling and no tenderness. Left testis shows no mass, no swelling and no tenderness. No penile tenderness (no lesion or deformities).   Genitourinary Comments:  The urethral meatus normal in position without evidence of stricture. Epididymis without mass or tenderness. Vas Deferens is palpably normal.Anus and perineum without mass or tenderness. The seminal " vesicle are without mass or enlargement. The prostate is approximately 40 ml. It is Symmetric, with a Soft consistency. There are no nodules present. . The seminal vesicles are Not palpable due to the size of the prostate.     Lymphadenopathy:     He has no cervical adenopathy. No inguinal adenopathy noted on the right or left side.        Right: No inguinal adenopathy present.        Left: No inguinal adenopathy present.   Neurological: He is alert and oriented to person, place, and time.   Skin: Skin is warm and dry. No rash noted. He is not diaphoretic. No pallor.   Psychiatric: He has a normal mood and affect. His behavior is normal.   Vitals reviewed.          Results for orders placed or performed in visit on 08/23/19   POC Urinalysis Dipstick, Multipro   Result Value Ref Range    Color Yellow Yellow, Straw, Dark Yellow, Cassi    Clarity, UA Clear Clear    Glucose, UA Negative Negative, 1000 mg/dL (3+) mg/dL    Bilirubin Negative Negative    Ketones, UA Negative Negative    Specific Gravity  1.101 (A) 1.005 - 1.030    Blood, UA Trace (A) Negative    pH, Urine 6.0 5.0 - 8.0    Protein, POC Negative Negative mg/dL    Urobilinogen, UA Normal Normal    Nitrite, UA Negative Negative    Leukocytes Negative Negative   Estimation of residual urine via abdominal ultrasound  Residual Urine: 126ml  Indication: BPH  Position: Supine  Examination: Incremental scanning of the suprapubic area using 3 MHz transducer using copious amounts of acoustic gel.   Findings: An anechoic area was demonstrated which represented the bladder, with measurement of residual urine as noted. I inspected this myself. In that the residual urine was stable or insignificant, no treatment will be necessary at this time.     Patient's Body mass index is 39.06 kg/m². BMI is above normal parameters. Recommendations include: educational material.        Assessment and Plan    Diagnoses and all orders for this visit:    Urinary frequency  -     POC  Urinalysis Dipstick, Multipro    BPH with urinary obstruction  -     tamsulosin (FLOMAX) 0.4 MG capsule 24 hr capsule; Take 1 capsule by mouth Every Night.    Retention, urine    Impotence of organic origin        AUA symptoms score 20/35. Patient with high caffeinated fluid intake daily > 2L.     I have asked him to decrease his caffeinated intake.  We will also start him on Flomax.    We discussed the ED.  I cannot start a ED medication at the same time as Flomax.  He will follow-up in 6 weeks for symptom check.

## 2019-08-23 ENCOUNTER — OFFICE VISIT (OUTPATIENT)
Dept: UROLOGY | Facility: CLINIC | Age: 54
End: 2019-08-23

## 2019-08-23 VITALS — WEIGHT: 288 LBS | HEIGHT: 72 IN | BODY MASS INDEX: 39.01 KG/M2 | TEMPERATURE: 98.3 F

## 2019-08-23 DIAGNOSIS — N52.9 IMPOTENCE OF ORGANIC ORIGIN: ICD-10-CM

## 2019-08-23 DIAGNOSIS — N40.1 BPH WITH URINARY OBSTRUCTION: ICD-10-CM

## 2019-08-23 DIAGNOSIS — N13.8 BPH WITH URINARY OBSTRUCTION: ICD-10-CM

## 2019-08-23 DIAGNOSIS — R33.9 RETENTION, URINE: ICD-10-CM

## 2019-08-23 DIAGNOSIS — R35.0 URINARY FREQUENCY: Primary | ICD-10-CM

## 2019-08-23 LAB
BILIRUB BLD-MCNC: NEGATIVE MG/DL
CLARITY, POC: CLEAR
COLOR UR: YELLOW
GLUCOSE UR STRIP-MCNC: NEGATIVE MG/DL
KETONES UR QL: NEGATIVE
LEUKOCYTE EST, POC: NEGATIVE
NITRITE UR-MCNC: NEGATIVE MG/ML
PH UR: 6 [PH] (ref 5–8)
PROT UR STRIP-MCNC: NEGATIVE MG/DL
RBC # UR STRIP: ABNORMAL /UL
SP GR UR: 1.1 (ref 1–1.03)
UROBILINOGEN UR QL: NORMAL

## 2019-08-23 PROCEDURE — 99204 OFFICE O/P NEW MOD 45 MIN: CPT | Performed by: UROLOGY

## 2019-08-23 PROCEDURE — 81001 URINALYSIS AUTO W/SCOPE: CPT | Performed by: UROLOGY

## 2019-08-23 PROCEDURE — 51798 US URINE CAPACITY MEASURE: CPT | Performed by: UROLOGY

## 2019-08-23 RX ORDER — TAMSULOSIN HYDROCHLORIDE 0.4 MG/1
1 CAPSULE ORAL NIGHTLY
Qty: 90 CAPSULE | Refills: 3 | Status: SHIPPED | OUTPATIENT
Start: 2019-08-23 | End: 2020-08-18

## 2019-08-23 NOTE — PATIENT INSTRUCTIONS

## 2019-09-09 ENCOUNTER — OFFICE VISIT (OUTPATIENT)
Dept: INTERNAL MEDICINE | Age: 54
End: 2019-09-09
Payer: COMMERCIAL

## 2019-09-09 VITALS
BODY MASS INDEX: 39.42 KG/M2 | HEART RATE: 85 BPM | WEIGHT: 291 LBS | SYSTOLIC BLOOD PRESSURE: 120 MMHG | OXYGEN SATURATION: 97 % | DIASTOLIC BLOOD PRESSURE: 70 MMHG | HEIGHT: 72 IN

## 2019-09-09 DIAGNOSIS — I10 ESSENTIAL HYPERTENSION: ICD-10-CM

## 2019-09-09 DIAGNOSIS — E78.2 MIXED HYPERLIPIDEMIA: ICD-10-CM

## 2019-09-09 DIAGNOSIS — Z79.4 TYPE 2 DIABETES MELLITUS WITHOUT COMPLICATION, WITH LONG-TERM CURRENT USE OF INSULIN (HCC): Primary | ICD-10-CM

## 2019-09-09 DIAGNOSIS — D64.9 ANEMIA, UNSPECIFIED TYPE: ICD-10-CM

## 2019-09-09 DIAGNOSIS — G47.09 OTHER INSOMNIA: ICD-10-CM

## 2019-09-09 DIAGNOSIS — E11.9 TYPE 2 DIABETES MELLITUS WITHOUT COMPLICATION, WITH LONG-TERM CURRENT USE OF INSULIN (HCC): Primary | ICD-10-CM

## 2019-09-09 DIAGNOSIS — G47.10 HYPERSOMNOLENCE: ICD-10-CM

## 2019-09-09 DIAGNOSIS — Z79.4 TYPE 2 DIABETES MELLITUS WITHOUT COMPLICATION, WITH LONG-TERM CURRENT USE OF INSULIN (HCC): ICD-10-CM

## 2019-09-09 DIAGNOSIS — E11.9 TYPE 2 DIABETES MELLITUS WITHOUT COMPLICATION, WITH LONG-TERM CURRENT USE OF INSULIN (HCC): ICD-10-CM

## 2019-09-09 DIAGNOSIS — B20: ICD-10-CM

## 2019-09-09 DIAGNOSIS — K21.9 GASTROESOPHAGEAL REFLUX DISEASE WITHOUT ESOPHAGITIS: ICD-10-CM

## 2019-09-09 LAB
ALBUMIN SERPL-MCNC: 4.2 G/DL (ref 3.5–5.2)
ALP BLD-CCNC: 52 U/L (ref 40–130)
ALT SERPL-CCNC: 57 U/L (ref 5–41)
ANION GAP SERPL CALCULATED.3IONS-SCNC: 18 MMOL/L (ref 7–19)
AST SERPL-CCNC: 43 U/L (ref 5–40)
BILIRUB SERPL-MCNC: <0.2 MG/DL (ref 0.2–1.2)
BUN BLDV-MCNC: 28 MG/DL (ref 6–20)
CALCIUM SERPL-MCNC: 9.4 MG/DL (ref 8.6–10)
CHLORIDE BLD-SCNC: 103 MMOL/L (ref 98–111)
CHOLESTEROL, TOTAL: 126 MG/DL (ref 160–199)
CO2: 22 MMOL/L (ref 22–29)
CREAT SERPL-MCNC: 1.1 MG/DL (ref 0.5–1.2)
GFR NON-AFRICAN AMERICAN: >60
GLUCOSE BLD-MCNC: 284 MG/DL (ref 74–109)
HBA1C MFR BLD: 10.1 % (ref 4–6)
HCT VFR BLD CALC: 38.8 % (ref 42–52)
HDLC SERPL-MCNC: 48 MG/DL (ref 55–121)
HEMOGLOBIN: 12.5 G/DL (ref 14–18)
LDL CHOLESTEROL CALCULATED: 51 MG/DL
MCH RBC QN AUTO: 31.2 PG (ref 27–31)
MCHC RBC AUTO-ENTMCNC: 32.2 G/DL (ref 33–37)
MCV RBC AUTO: 96.8 FL (ref 80–94)
PDW BLD-RTO: 13 % (ref 11.5–14.5)
PLATELET # BLD: 197 K/UL (ref 130–400)
PMV BLD AUTO: 11.9 FL (ref 9.4–12.4)
POTASSIUM SERPL-SCNC: 4.9 MMOL/L (ref 3.5–5)
RBC # BLD: 4.01 M/UL (ref 4.7–6.1)
SODIUM BLD-SCNC: 143 MMOL/L (ref 136–145)
TOTAL PROTEIN: 7.6 G/DL (ref 6.6–8.7)
TRIGL SERPL-MCNC: 133 MG/DL (ref 0–149)
WBC # BLD: 10.3 K/UL (ref 4.8–10.8)

## 2019-09-09 PROCEDURE — 99214 OFFICE O/P EST MOD 30 MIN: CPT | Performed by: INTERNAL MEDICINE

## 2019-09-09 RX ORDER — TAMSULOSIN HYDROCHLORIDE 0.4 MG/1
0.4 CAPSULE ORAL DAILY
COMMUNITY

## 2019-09-09 NOTE — PROGRESS NOTES
Chief Complaint   Patient presents with    3 Month Follow-Up     c/o insomnia    Diabetes    Gastroesophageal Reflux       HPI: Pt is here to f/u dm, htn, gerd, hiv, HIV and other medical problems. Pt is doing well. He complains mainly of insomnia. Other medical problems are stable no headache no chest pain no dyspnea no abdominal pain he has ongoing arthralgias and he has chronic pain. Urinary frequency greatly improved with Flomax. Mainly complains of just very difficult time with sleep he does not have good eating or sleeping habits. Past Medical History:   Diagnosis Date    Controlled diabetes mellitus with diabetic polyneuropathy (HCC)     Depression     HIV-1 infection, symptomatic ([facility] [address]     Hyperlipidemia     Hypertension     Male hypogonadism     Osteopenia     Type 2 diabetes mellitus without complication ([facility] [address]        Past Surgical History:   Procedure Laterality Date    LYMPHADENECTOMY Left     thoracic       Family History   Problem Relation Age of Onset    High Blood Pressure Mother     Diabetes Mother     High Blood Pressure Father     Diabetes Maternal Grandfather        Social History     Socioeconomic History    Marital status:      Spouse name: Not on file    Number of children: Not on file    Years of education: Not on file    Highest education level: Not on file   Occupational History    Not on file   Social Needs    Financial resource strain: Not on file    Food insecurity:     Worry: Not on file     Inability: Not on file    Transportation needs:     Medical: Not on file     Non-medical: Not on file   Tobacco Use    Smoking status: Current Every Day Smoker    Smokeless tobacco: Never Used   Substance and Sexual Activity    Alcohol use:  Yes    Drug use: No    Sexual activity: Not on file   Lifestyle    Physical activity:     Days per week: Not on file     Minutes per session: Not on file    Stress: Not on file   Relationships    Social connections: Talks on phone: Not on file     Gets together: Not on file     Attends Gnosticism service: Not on file     Active member of club or organization: Not on file     Attends meetings of clubs or organizations: Not on file     Relationship status: Not on file    Intimate partner violence:     Fear of current or ex partner: Not on file     Emotionally abused: Not on file     Physically abused: Not on file     Forced sexual activity: Not on file   Other Topics Concern    Not on file   Social History Narrative    Not on file       Allergies   Allergen Reactions    Darvon [Propoxyphene]     Elavil [Amitriptyline Hcl]     Zithromax [Azithromycin]        Current Outpatient Medications   Medication Sig Dispense Refill    tamsulosin (FLOMAX) 0.4 MG capsule Take 0.4 mg by mouth daily      propranolol (INDERAL LA) 80 MG extended release capsule 1 capsule by mouth daily 30 capsule 5    HUMULIN R U-500 KWIKPEN 500 UNIT/ML SOPN concentrated injection pen inject 200 units TWICE DAILY with meals 24 mL 5    NOVOLOG FLEXPEN 100 UNIT/ML injection pen INJECT 70 UNITS WITH REGULAR MEALS, 80 UNITS WITH LARGER MEALS, AND 60 UNITS WITH SNACKS AS DIRECTED 105 mL 3    DEXILANT 60 MG CPDR delayed release capsule TAKE 1 CAPSULE BY MOUTH DAILY 90 capsule 1    TRULICITY 4.88 OM/0.2QR SOPN Inject 0.75mg (0.5ml) under the skin AS DIRECTED every week 2 mL 3    nystatin (MYCOSTATIN) 960569 UNIT/GM cream Apply topically 2 times daily Apply topically 2 times daily.  30 g 1    ARIPiprazole (ABILIFY) 5 MG tablet Take 5 mg by mouth daily       traZODone (DESYREL) 100 MG tablet Take 2 tablets by mouth nightly 180 tablet 3    nicotine (NICODERM CQ) 21 MG/24HR Use 21mg patch daily for 2 weeks and then 14mg patch daily for 2 weeks and then 7mg patch daily for 2 weeks 42 patch 0    triamcinolone (KENALOG) 0.1 % cream Apply topically 2 times daily prn 60 g 0    meclizine (ANTIVERT) 25 MG tablet TAKE 1 TABLET BY MOUTH DAILY 90 tablet 2    needed for Migraine May repeat in 2 hours if needed      Multiple Vitamins-Minerals (THERAPEUTIC MULTIVITAMIN-MINERALS) tablet Take 1 tablet by mouth daily       No current facility-administered medications for this visit. Review of Systems   Constitutional: Positive for fatigue. Negative for chills and fever. HENT: Negative for congestion and sinus pressure. Eyes: Negative for discharge and redness. Respiratory: Positive for shortness of breath. Negative for cough. Cardiovascular: Negative for chest pain and palpitations. Gastrointestinal: Negative for abdominal distention and abdominal pain. Genitourinary: Positive for frequency and urgency. Negative for dysuria. Musculoskeletal: Positive for arthralgias. Skin: Negative for rash and wound. Neurological: Negative for dizziness, light-headedness and headaches. Psychiatric/Behavioral: Positive for sleep disturbance. The patient is not nervous/anxious. /70 (Site: Left Upper Arm)   Pulse 85   Ht 6' (1.829 m)   Wt 291 lb (132 kg)   SpO2 97%   BMI 39.47 kg/m²   BP Readings from Last 7 Encounters:   09/09/19 120/70   07/25/19 109/68   06/17/19 124/66   06/03/19 (!) 110/58   02/08/19 122/68   11/08/18 100/66   08/31/18 118/72     Wt Readings from Last 7 Encounters:   09/09/19 291 lb (132 kg)   07/25/19 282 lb (127.9 kg)   06/17/19 275 lb (124.7 kg)   06/03/19 271 lb (122.9 kg)   02/08/19 272 lb (123.4 kg)   11/08/18 273 lb (123.8 kg)   08/31/18 268 lb (121.6 kg)     BMI Readings from Last 7 Encounters:   09/09/19 39.47 kg/m²   07/25/19 40.46 kg/m²   06/17/19 37.30 kg/m²   06/03/19 36.75 kg/m²   02/08/19 36.89 kg/m²   11/08/18 37.03 kg/m²   08/31/18 36.35 kg/m²     Resp Readings from Last 7 Encounters:   05/05/18 14       Physical Exam neck is supple sclera anicteric no supraclavicular cervical lymphadenopathy heart S1-S2 lungs are clear extremities without edema mood is tired but okay.     Results for orders placed or change his Klonopin to be 1 mg at at bedtime rather than 0.5 twice daily as he says it does make him sleepy I would also suggest he try to minimize some of his sedating medications in the day if possible be careful with multiple medications summer sedating and drug interactions. - Home Sleep Study; Future    4. HIV-1 infection, symptomatic ([facility] [address]  Stable follows with [facility]    5. Essential hypertension  Stable continue current care and follow    6. Gastroesophageal reflux disease without esophagitis  stable and follow    7. Anemia, unspecified type  stable    8. Insomnia I think a lot of his insomnia is related to multiple medications and lifestyle issues without having a good schedule. Continue to work with things and as above.

## 2019-09-18 DIAGNOSIS — E11.9 TYPE 2 DIABETES MELLITUS WITHOUT COMPLICATION, WITH LONG-TERM CURRENT USE OF INSULIN (HCC): Primary | ICD-10-CM

## 2019-09-18 DIAGNOSIS — Z79.4 TYPE 2 DIABETES MELLITUS WITHOUT COMPLICATION, WITH LONG-TERM CURRENT USE OF INSULIN (HCC): Primary | ICD-10-CM

## 2019-09-18 DIAGNOSIS — B20: ICD-10-CM

## 2019-09-21 ENCOUNTER — HOSPITAL ENCOUNTER (EMERGENCY)
Facility: HOSPITAL | Age: 54
Discharge: HOME | End: 2019-09-21
Admitting: EMERGENCY MEDICINE

## 2019-09-21 ENCOUNTER — APPOINTMENT (OUTPATIENT)
Dept: GENERAL RADIOLOGY | Facility: HOSPITAL | Age: 54
End: 2019-09-21

## 2019-09-21 ENCOUNTER — APPOINTMENT (OUTPATIENT)
Dept: ULTRASOUND IMAGING | Facility: HOSPITAL | Age: 54
End: 2019-09-21

## 2019-09-21 VITALS
HEART RATE: 102 BPM | SYSTOLIC BLOOD PRESSURE: 125 MMHG | TEMPERATURE: 98 F | DIASTOLIC BLOOD PRESSURE: 84 MMHG | HEIGHT: 70 IN | BODY MASS INDEX: 41.95 KG/M2 | OXYGEN SATURATION: 96 % | RESPIRATION RATE: 16 BRPM | WEIGHT: 293 LBS

## 2019-09-21 DIAGNOSIS — M54.41 CHRONIC LOW BACK PAIN WITH BILATERAL SCIATICA, UNSPECIFIED BACK PAIN LATERALITY: ICD-10-CM

## 2019-09-21 DIAGNOSIS — M54.42 CHRONIC LOW BACK PAIN WITH BILATERAL SCIATICA, UNSPECIFIED BACK PAIN LATERALITY: ICD-10-CM

## 2019-09-21 DIAGNOSIS — R73.9 HYPERGLYCEMIA: Primary | ICD-10-CM

## 2019-09-21 DIAGNOSIS — R06.09 OTHER FORM OF DYSPNEA: ICD-10-CM

## 2019-09-21 DIAGNOSIS — R07.9 CHEST PAIN, UNSPECIFIED TYPE: ICD-10-CM

## 2019-09-21 DIAGNOSIS — G89.29 CHRONIC LOW BACK PAIN WITH BILATERAL SCIATICA, UNSPECIFIED BACK PAIN LATERALITY: ICD-10-CM

## 2019-09-21 LAB
ACETONE BLD QL: NEGATIVE
ALBUMIN SERPL-MCNC: 4 G/DL (ref 3.5–5.2)
ALBUMIN/GLOB SERPL: 1.2 G/DL
ALP SERPL-CCNC: 56 U/L (ref 39–117)
ALT SERPL W P-5'-P-CCNC: 40 U/L (ref 1–41)
ANION GAP SERPL CALCULATED.3IONS-SCNC: 13 MMOL/L (ref 5–15)
ARTERIAL PATENCY WRIST A: ABNORMAL
AST SERPL-CCNC: 27 U/L (ref 1–40)
ATMOSPHERIC PRESS: 755 MMHG
BASE EXCESS BLDA CALC-SCNC: 3.6 MMOL/L (ref 0–2)
BASOPHILS # BLD AUTO: 0.03 10*3/MM3 (ref 0–0.2)
BASOPHILS NFR BLD AUTO: 0.4 % (ref 0–1.5)
BDY SITE: ABNORMAL
BILIRUB SERPL-MCNC: 0.3 MG/DL (ref 0.2–1.2)
BODY TEMPERATURE: 37 C
BUN BLD-MCNC: 21 MG/DL (ref 6–20)
BUN/CREAT SERPL: 16.8 (ref 7–25)
CALCIUM SPEC-SCNC: 9.8 MG/DL (ref 8.6–10.5)
CHLORIDE SERPL-SCNC: 100 MMOL/L (ref 98–107)
CO2 SERPL-SCNC: 27 MMOL/L (ref 22–29)
CREAT BLD-MCNC: 1.25 MG/DL (ref 0.76–1.27)
D DIMER PPP FEU-MCNC: 0.39 MG/L (FEU) (ref 0–0.5)
DEPRECATED RDW RBC AUTO: 44 FL (ref 37–54)
EOSINOPHIL # BLD AUTO: 0.13 10*3/MM3 (ref 0–0.4)
EOSINOPHIL NFR BLD AUTO: 1.5 % (ref 0.3–6.2)
ERYTHROCYTE [DISTWIDTH] IN BLOOD BY AUTOMATED COUNT: 12.9 % (ref 12.3–15.4)
GFR SERPL CREATININE-BSD FRML MDRD: 61 ML/MIN/1.73
GLOBULIN UR ELPH-MCNC: 3.3 GM/DL
GLUCOSE BLD-MCNC: 533 MG/DL (ref 65–99)
GLUCOSE BLDC GLUCOMTR-MCNC: 374 MG/DL (ref 70–130)
HCO3 BLDA-SCNC: 28.7 MMOL/L (ref 20–26)
HCT VFR BLD AUTO: 37.3 % (ref 37.5–51)
HGB BLD-MCNC: 12.3 G/DL (ref 13–17.7)
HOROWITZ INDEX BLD+IHG-RTO: 21 %
IMM GRANULOCYTES # BLD AUTO: 0.04 10*3/MM3 (ref 0–0.05)
IMM GRANULOCYTES NFR BLD AUTO: 0.5 % (ref 0–0.5)
LYMPHOCYTES # BLD AUTO: 1.76 10*3/MM3 (ref 0.7–3.1)
LYMPHOCYTES NFR BLD AUTO: 21 % (ref 19.6–45.3)
Lab: ABNORMAL
MCH RBC QN AUTO: 30.6 PG (ref 26.6–33)
MCHC RBC AUTO-ENTMCNC: 33 G/DL (ref 31.5–35.7)
MCV RBC AUTO: 92.8 FL (ref 79–97)
MODALITY: ABNORMAL
MONOCYTES # BLD AUTO: 0.63 10*3/MM3 (ref 0.1–0.9)
MONOCYTES NFR BLD AUTO: 7.5 % (ref 5–12)
NEUTROPHILS # BLD AUTO: 5.8 10*3/MM3 (ref 1.7–7)
NEUTROPHILS NFR BLD AUTO: 69.1 % (ref 42.7–76)
NRBC BLD AUTO-RTO: 0 /100 WBC (ref 0–0.2)
NT-PROBNP SERPL-MCNC: 83 PG/ML (ref 5–900)
PCO2 BLDA: 44.9 MM HG (ref 35–45)
PH BLDA: 7.42 PH UNITS (ref 7.35–7.45)
PLATELET # BLD AUTO: 177 10*3/MM3 (ref 140–450)
PMV BLD AUTO: 11.6 FL (ref 6–12)
PO2 BLDA: 63.1 MM HG (ref 83–108)
POTASSIUM BLD-SCNC: 5.1 MMOL/L (ref 3.5–5.2)
PROT SERPL-MCNC: 7.3 G/DL (ref 6–8.5)
RBC # BLD AUTO: 4.02 10*6/MM3 (ref 4.14–5.8)
SAO2 % BLDCOA: ABNORMAL %
SODIUM BLD-SCNC: 140 MMOL/L (ref 136–145)
TROPONIN T SERPL-MCNC: <0.01 NG/ML (ref 0–0.03)
TROPONIN T SERPL-MCNC: <0.01 NG/ML (ref 0–0.03)
VENTILATOR MODE: ABNORMAL
WBC NRBC COR # BLD: 8.39 10*3/MM3 (ref 3.4–10.8)

## 2019-09-21 PROCEDURE — 93010 ELECTROCARDIOGRAM REPORT: CPT | Performed by: INTERNAL MEDICINE

## 2019-09-21 PROCEDURE — 71045 X-RAY EXAM CHEST 1 VIEW: CPT

## 2019-09-21 PROCEDURE — 99284 EMERGENCY DEPT VISIT MOD MDM: CPT

## 2019-09-21 PROCEDURE — 63710000001 INSULIN REGULAR HUMAN PER 5 UNITS: Performed by: PHYSICIAN ASSISTANT

## 2019-09-21 PROCEDURE — 85025 COMPLETE CBC W/AUTO DIFF WBC: CPT | Performed by: PHYSICIAN ASSISTANT

## 2019-09-21 PROCEDURE — 93970 EXTREMITY STUDY: CPT

## 2019-09-21 PROCEDURE — 82962 GLUCOSE BLOOD TEST: CPT

## 2019-09-21 PROCEDURE — 93005 ELECTROCARDIOGRAM TRACING: CPT | Performed by: PHYSICIAN ASSISTANT

## 2019-09-21 PROCEDURE — 36600 WITHDRAWAL OF ARTERIAL BLOOD: CPT

## 2019-09-21 PROCEDURE — 72072 X-RAY EXAM THORAC SPINE 3VWS: CPT

## 2019-09-21 PROCEDURE — 84484 ASSAY OF TROPONIN QUANT: CPT | Performed by: PHYSICIAN ASSISTANT

## 2019-09-21 PROCEDURE — 82803 BLOOD GASES ANY COMBINATION: CPT

## 2019-09-21 PROCEDURE — 36415 COLL VENOUS BLD VENIPUNCTURE: CPT

## 2019-09-21 PROCEDURE — 82009 KETONE BODYS QUAL: CPT | Performed by: PHYSICIAN ASSISTANT

## 2019-09-21 PROCEDURE — 80053 COMPREHEN METABOLIC PANEL: CPT | Performed by: PHYSICIAN ASSISTANT

## 2019-09-21 PROCEDURE — 72110 X-RAY EXAM L-2 SPINE 4/>VWS: CPT

## 2019-09-21 PROCEDURE — 93970 EXTREMITY STUDY: CPT | Performed by: SURGERY

## 2019-09-21 PROCEDURE — 85379 FIBRIN DEGRADATION QUANT: CPT | Performed by: PHYSICIAN ASSISTANT

## 2019-09-21 PROCEDURE — 83880 ASSAY OF NATRIURETIC PEPTIDE: CPT | Performed by: PHYSICIAN ASSISTANT

## 2019-09-21 RX ORDER — OXYCODONE HYDROCHLORIDE AND ACETAMINOPHEN 5; 325 MG/1; MG/1
1 TABLET ORAL ONCE
Status: COMPLETED | OUTPATIENT
Start: 2019-09-21 | End: 2019-09-21

## 2019-09-21 RX ORDER — SODIUM CHLORIDE 0.9 % (FLUSH) 0.9 %
10 SYRINGE (ML) INJECTION AS NEEDED
Status: DISCONTINUED | OUTPATIENT
Start: 2019-09-21 | End: 2019-09-21 | Stop reason: HOSPADM

## 2019-09-21 RX ADMIN — INSULIN HUMAN 8 UNITS: 100 INJECTION, SOLUTION PARENTERAL at 15:06

## 2019-09-21 RX ADMIN — OXYCODONE HYDROCHLORIDE AND ACETAMINOPHEN 1 TABLET: 5; 325 TABLET ORAL at 19:24

## 2019-09-21 NOTE — ED NOTES
PATIENT WAS NOT BEING COOPERATIVE DURING EKG. HE CONTINUED TO PLAY ON HIS PHONE AND MOVE DURING REPEAT. NURSE AND DOCTOR WERE NOTIFIED. DOCTOR DID NOT ASK FOR ANOTHER EKG.      Charisse La  09/21/19 1823

## 2019-09-21 NOTE — ED NOTES
Codie updated .  Ok for pt ice chips.  No pain meds at this time per Codie KLINE.  Pt updated.     Emanuel Slade RN  09/21/19 [id number]

## 2019-09-21 NOTE — ED PROVIDER NOTES
Subjective   History of Present Illness    Patient is a 53-year-old male who is well-known to this ED who presents the ED due to recurrent chronic low back pain.  Patient has a history of degenerative disc disease and bulging disks.  He does have established care with pain management.  He last received injection at the pain interfacility 2 months ago.  He reports some transient improvement.  Recently, his pain has worsened.  He has chronic sciatica bilaterally that remains unchanged.  He has chronic urinary retention and being seen by urologist for this.  He is currently on Flomax.  He denies any bowel dysfunction or saddle anesthesia.  He denies any recent needling.  He denies any associated fever.  He has pain with any type of movement particularly with stairs.    Patient also complains of chest squeezing sensation as of yesterday lasting for a few minutes.  He denies recurrent episodes today.  He does complain of shortness of breath for the past 4 months since he is put on 40 pounds of weight unintentionally.  Recently, he noted bilateral lower extremity edema as well.  He denies any previous cardiac history.  He denies any history of CHF, PE, or DVT.  He denies any recent surgery or travels.  He denies any fever but does complain of a scant cough with white productive phlegm.  He reports a history of pneumonia in the past that presented differently.  He denies any chest pain during examination but does complain of continued shortness of breath worse with exertion.    Review of Systems   Constitutional: Positive for activity change. Negative for fever.   HENT: Negative.    Eyes: Negative.    Respiratory: Positive for cough, chest tightness and shortness of breath.    Cardiovascular: Positive for chest pain and leg swelling.   Gastrointestinal: Negative.    Genitourinary: Negative.    Musculoskeletal: Positive for back pain and gait problem.   Skin: Negative.    Psychiatric/Behavioral: Negative.        Past  Medical History:   Diagnosis Date   • Allergic rhinitis    • Anxiety    • Bursitis    • DDD (degenerative disc disease), cervical    • Depression    • HIV disease (CMS/[facility])    • HLD (hyperlipidemia)    • HSP (Henoch Schonlein purpura) (CMS/[facility])    • HTN (hypertension)    • Migraine    • Myocardial infarction (CMS/[facility])    • Osteoporosis    • Sleep apnea    • Type 2 diabetes mellitus (CMS/[facility])        Allergies   Allergen Reactions   • Amitriptyline Anaphylaxis   • Amitriptyline Hcl Anaphylaxis   • Darvon [Propoxyphene] Anaphylaxis   • Zithromax [Azithromycin] Anaphylaxis       Past Surgical History:   Procedure Laterality Date   • ANKLE SURGERY     • COLONOSCOPY  06/19/2009    Normal exam repeat in 10 years   • FOREARM SURGERY     • LYMPH NODE BIOPSY         Family History   Problem Relation Age of Onset   • Diabetes Mother    • Hypertension Mother    • Thyroid disease Mother    • Hypertension Father    • Thyroid disease Father    • Arrhythmia Father    • Diabetes Maternal Grandfather    • Heart disease Maternal Grandfather    • Hypertension Maternal Grandfather    • Diabetes Paternal Grandmother    • Stroke Paternal Grandmother    • Heart disease Paternal Grandmother    • Hypertension Paternal Grandmother    • Thyroid disease Paternal Grandmother    • Hypertension Paternal Grandfather    • Hypertension Sister    • No Known Problems Brother        Social History     Socioeconomic History   • Marital status:      Spouse name: Not on file   • Number of children: Not on file   • Years of education: Not on file   • Highest education level: Not on file   Tobacco Use   • Smoking status: Current Every Day Smoker     Packs/day: 1.00     Types: Cigarettes     Start date: 1988   • Smokeless tobacco: Never Used   Substance and Sexual Activity   • Alcohol use: Yes     Comment: occasionally   • Drug use: No   • Sexual activity: Defer       Prior to Admission medications    Medication Sig Start Date End Date Taking?  Authorizing Provider   albuterol (PROVENTIL HFA;VENTOLIN HFA) 108 (90 BASE) MCG/ACT inhaler Inhale 2 puffs every 6 (six) hours as needed for wheezing.    Pauline Perez MD   ARIPiprazole (ABILIFY) 2 MG tablet Take 2 mg by mouth Daily.    Pauline Perez MD   atorvastatin (LIPITOR) 80 MG tablet Take 80 mg by mouth daily.    Pauline Perez MD   calcium carbonate (OS-THEODORA) 600 MG tablet Take 600 mg by mouth Daily.    Pauline Perez MD   cholecalciferol (VITAMIN D3) 1000 UNITS tablet Take 1,000 Units by mouth daily.    Pauline Perez MD   ciclopirox (LOPROX) 0.77 % cream Apply  topically to the appropriate area as directed 2 (Two) Times a Day. 7/29/19   Pacheco Harper DPM   clonazePAM (KlonoPIN) 0.5 MG tablet Take 0.5 mg by mouth 3 (Three) Times a Day As Needed for Anxiety.    Emergency, Nurse Epic, RN   denosumab (PROLIA) 60 MG/ML solution syringe Inject 60 mg under the skin Every 6 (Six) Months.    Pauline Perez MD   diazePAM (VALIUM) 5 MG tablet Take 0.5 tablets by mouth Every 6 (Six) Hours As Needed (bladder spasms). 5/28/19   Jenny Mitchell MD   Dulaglutide 0.75 MG/0.5ML solution pen-injector Inject 0.75 mg under the skin into the appropriate area as directed 1 (One) Time Per Week. 8/24/18   Demetri Westfall MD   Xjmrmus-Dqtdt-Wgepqxbf-TenofAF (GENVOYA) 834-961-281-10 MG tablet Take 1 tablet by mouth daily.    Pauline Perez MD   esomeprazole (NexIUM) 40 MG capsule Take 40 mg by mouth every morning before breakfast.    Pauline Perez MD   fluticasone (FLONASE) 50 MCG/ACT nasal spray 2 sprays into each nostril Daily.  Patient taking differently: 2 sprays into each nostril Daily As Needed. 10/11/16   Tyron Ace PA   Foot Care Products (FOOT POWDER MEDICATED EX)  7/8/19   Pauline Perez MD   gabapentin (NEURONTIN) 800 MG tablet  11/14/18   Emergency, Nurse Epic, RN   guaiFENesin (MUCINEX) 600 MG 12 hr tablet Take 600 mg by mouth 2  (two) times a day as needed for cough.    Pauline Perez MD   HYDROcodone-acetaminophen (NORCO) 5-325 MG per tablet Take 1 tablet by mouth 2 (two) times a day as needed.    Pauline Perez MD   hydrOXYzine (VISTARIL) 25 MG capsule Take 25 mg by mouth every 6 (six) hours as needed for itching.    Pauline Perez MD   insulin aspart (NOVOLOG FLEXPEN) 100 UNIT/ML solution pen-injector sc pen Inject 80 Units under the skin 4 (Four) Times a Day With Meals & at Bedtime.    Pauline Perez MD   Insulin Regular Human, Conc, (HUMULIN R U-500 KWIKPEN) 500 UNIT/ML solution pen-injector CONCENTRATED injection Up to 200 units bid with meals 8/24/18   Demetri Westfall MD   loperamide (IMODIUM) 2 MG capsule Take 2 mg by mouth daily as needed for diarrhea.    Pauline Perez MD   loratadine (CLARITIN) 10 MG tablet Take 10 mg by mouth daily.    Pauline Perez MD   losartan (COZAAR) 100 MG tablet Take 100 mg by mouth daily.    Pauline Perez MD   LYRICA 100 MG capsule Take 100 mg by mouth 2 (Two) Times a Day. 11/14/18   Emergency, Nurse Epic, RN   meclizine (ANTIVERT) 25 MG tablet Take 25 mg by mouth Daily As Needed.    Pauline Perez MD   metFORMIN (GLUCOPHAGE) 1000 MG tablet Take 1,000 mg by mouth 2 (two) times a day with meals.    Pauline Perez MD   miconazole (MICOTIN) 2 % cream Apply 1 application topically 2 (two) times a day.    Pauline Perez MD   neomycin-bacitracin-polymyxin (NEOSPORIN) 5-400-5000 ointment Apply 1 application topically 4 (four) times a day as needed.    Pauline Perez MD   phenazopyridine (PYRIDIUM) 200 MG tablet Take 1 tablet by mouth 3 (Three) Times a Day As Needed for bladder spasms. 5/28/19   Jenny Mitchell MD   promethazine (PHENERGAN) 25 MG tablet Take 25 mg by mouth every 6 (six) hours as needed for nausea or vomiting.    Pauline Perez MD   propranolol LA (INDERAL LA) 60 MG 24 hr capsule Take 60 mg by mouth  "daily.    Pauline Perez MD   rizatriptan (MAXALT) 10 MG tablet Take 10 mg by mouth 1 (one) time as needed for migraine. May repeat in 2 hours if needed    Pauline Perez MD   tamsulosin (FLOMAX) 0.4 MG capsule 24 hr capsule Take 1 capsule by mouth Every Night. 8/23/19   Marv Gomez MD   terbinafine (lamISIL) 1 % cream Apply  topically to the appropriate area as directed 2 (Two) Times a Day. 7/29/19   Pacheco Harper DPM   tiZANidine (ZANAFLEX) 4 MG tablet Take 6 mg by mouth Every 6 (Six) Hours As Needed for Muscle Spasms.    Pauline Perez MD   traZODone (DESYREL) 150 MG tablet Take 100 mg by mouth Every Night.    Pauline Perez MD   Vortioxetine HBr (TRINTELLIX) 5 MG tablet Take 20 mg by mouth Daily With Breakfast.    Pauline Perez MD       Medications   sodium chloride 0.9 % flush 10 mL (not administered)   insulin regular (humuLIN R,novoLIN R) injection 8 Units (8 Units Subcutaneous Given 9/21/19 1506)       /55   Pulse 95   Temp 98.3 °F (36.8 °C) (Oral)   Resp 18   Ht 177.8 cm (70\")   Wt 133 kg (293 lb)   SpO2 94%   BMI 42.04 kg/m²       Objective   Physical Exam   Constitutional: He is oriented to person, place, and time. He appears well-developed and well-nourished.   HENT:   Head: Normocephalic and atraumatic.   Right Ear: External ear normal.   Left Ear: External ear normal.   Nose: Nose normal.   Mouth/Throat: Oropharynx is clear and moist.   Eyes: Conjunctivae and EOM are normal. Pupils are equal, round, and reactive to light.   Neck: Normal range of motion. Neck supple. No tracheal deviation present.   Cardiovascular: Normal rate, regular rhythm, normal heart sounds and intact distal pulses. Exam reveals no gallop and no friction rub.   No murmur heard.  Pulmonary/Chest: Effort normal and breath sounds normal. No respiratory distress. He has no wheezes. He has no rales. He exhibits no tenderness.   Abdominal: Soft. Bowel sounds are normal. He " exhibits no distension and no mass. There is no tenderness. There is no rebound and no guarding.   Musculoskeletal: Normal range of motion. He exhibits edema and tenderness. He exhibits no deformity.        Cervical back: Normal.        Thoracic back: He exhibits tenderness and bony tenderness.        Lumbar back: He exhibits tenderness and bony tenderness.   Patient has 1+ pitting edema to his left lower extremity.  Neurovascularly intact.   Neurological: He is alert and oriented to person, place, and time. He has normal reflexes. He exhibits normal muscle tone. Coordination normal.   Skin: Skin is warm and dry. No rash noted. No erythema. No pallor.   Psychiatric: He has a normal mood and affect. His behavior is normal. Judgment and thought content normal.   Vitals reviewed.      Procedures         Lab Results (last 24 hours)     Procedure Component Value Units Date/Time    CBC & Differential [[id number]] Collected:  09/21/19 1335    Specimen:  Blood Updated:  09/21/19 1344    Narrative:       The following orders were created for panel order CBC & Differential.  Procedure                               Abnormality         Status                     ---------                               -----------         ------                     CBC Auto Differential[[id number]]        Abnormal            Final result                 Please view results for these tests on the individual orders.    Comprehensive Metabolic Panel [[id number]]  (Abnormal) Collected:  09/21/19 1335    Specimen:  Blood Updated:  09/21/19 1405     Glucose 533 mg/dL      BUN 21 mg/dL      Creatinine 1.25 mg/dL      Sodium 140 mmol/L      Potassium 5.1 mmol/L      Chloride 100 mmol/L      CO2 27.0 mmol/L      Calcium 9.8 mg/dL      Total Protein 7.3 g/dL      Albumin 4.00 g/dL      ALT (SGPT) 40 U/L      AST (SGOT) 27 U/L      Alkaline Phosphatase 56 U/L      Total Bilirubin 0.3 mg/dL      eGFR Non African Amer 61 mL/min/1.73      Globulin 3.3 gm/dL       A/G Ratio 1.2 g/dL      BUN/Creatinine Ratio 16.8     Anion Gap 13.0 mmol/L     Narrative:       GFR Normal >60  Chronic Kidney Disease <60  Kidney Failure <15    Troponin [[id number]]  (Normal) Collected:  09/21/19 1335    Specimen:  Blood Updated:  09/21/19 1400     Troponin T <0.010 ng/mL     Narrative:       Troponin T Reference Range:  <= 0.03 ng/mL-   Negative for AMI  >0.03 ng/mL-     Abnormal for myocardial necrosis.  Clinicians would have to utilize clinical acumen, EKG, Troponin and serial changes to determine if it is an Acute Myocardial Infarction or myocardial injury due to an underlying chronic condition.     D-dimer, Quantitative [[id number]]  (Normal) Collected:  09/21/19 1335    Specimen:  Blood Updated:  09/21/19 1352     D-Dimer, Quantitative 0.39 mg/L (FEU)     Narrative:       Reference Range is 0-0.50 mg/L FEU. However, results <0.50 mg/L FEU tends to rule out DVT or PE. Results >0.50 mg/L FEU are not useful in predicting absence or presence of DVT or PE.    BNP [[id number]]  (Normal) Collected:  09/21/19 1335    Specimen:  Blood Updated:  09/21/19 1400     proBNP 83.0 pg/mL     Narrative:       Among patients with dyspnea, NT-proBNP is highly sensitive for the detection of acute congestive heart failure. In addition NT-proBNP of <300 pg/ml effectively rules out acute congestive heart failure with 99% negative predictive value.    CBC Auto Differential [[id number]]  (Abnormal) Collected:  09/21/19 1335    Specimen:  Blood Updated:  09/21/19 1344     WBC 8.39 10*3/mm3      RBC 4.02 10*6/mm3      Hemoglobin 12.3 g/dL      Hematocrit 37.3 %      MCV 92.8 fL      MCH 30.6 pg      MCHC 33.0 g/dL      RDW 12.9 %      RDW-SD 44.0 fl      MPV 11.6 fL      Platelets 177 10*3/mm3      Neutrophil % 69.1 %      Lymphocyte % 21.0 %      Monocyte % 7.5 %      Eosinophil % 1.5 %      Basophil % 0.4 %      Immature Grans % 0.5 %      Neutrophils, Absolute 5.80 10*3/mm3      Lymphocytes, Absolute 1.76 10*3/mm3       Monocytes, Absolute 0.63 10*3/mm3      Eosinophils, Absolute 0.13 10*3/mm3      Basophils, Absolute 0.03 10*3/mm3      Immature Grans, Absolute 0.04 10*3/mm3      nRBC 0.0 /100 WBC     Blood Gas, Arterial [[id number]]  (Abnormal) Collected:  09/21/19 1345    Specimen:  Arterial Blood Updated:  09/21/19 1355     Site Right Brachial     Gregorio's Test N/A     pH, Arterial 7.415 pH units      pCO2, Arterial 44.9 mm Hg      pO2, Arterial 63.1 mm Hg      Comment: 84 Value below reference range        HCO3, Arterial 28.7 mmol/L      Comment: 83 Value above reference range        Base Excess, Arterial 3.6 mmol/L      Comment:  The parameter value has a question mark83 Value above reference range        O2 Saturation, Arterial --     Comment:  The parameter value has a question [id number] OXI spectrum [id number] Value below reference range        Temperature 37.0 C      Barometric Pressure for Blood Gas 755 mmHg      Modality Room Air     FIO2 21 %      Ventilator Mode NA     Collected by [id number]     Comment: Meter: [id number]     :  [id number]       Ketone Bodies, Serum (Not performed at [place]) [[id number]] Collected:  09/21/19 1542    Specimen:  Blood Updated:  09/21/19 1559    Narrative:       The following orders were created for panel order Ketone Bodies, Serum (Not performed at [place]).  Procedure                               Abnormality         Status                     ---------                               -----------         ------                     Acetone[[id number]]                      Normal              Final result                 Please view results for these tests on the individual orders.    Acetone [[id number]]  (Normal) Collected:  09/21/19 1542    Specimen:  Blood Updated:  09/21/19 1559     Acetone Negative    POC Glucose Once [[id number]]  (Abnormal) Collected:  09/21/19 1626    Specimen:  Blood Updated:  09/21/19 1638     Glucose 374 mg/dL      Comment: : [id number] [place]  [facility] [place] [facility] [id number]       Troponin [[id number]]  (Normal) Collected:  09/21/19 1834    Specimen:  Blood Updated:  09/21/19 1902     Troponin T <0.010 ng/mL     Narrative:       Troponin T Reference Range:  <= 0.03 ng/mL-   Negative for AMI  >0.03 ng/mL-     Abnormal for myocardial necrosis.  Clinicians would have to utilize clinical acumen, EKG, Troponin and serial changes to determine if it is an Acute Myocardial Infarction or myocardial injury due to an underlying chronic condition.           Xr Spine Thoracic 3 View    Result Date: 9/21/2019  Narrative: XR SPINE THORACIC 3 VW- 9/21/2019 3:18 PM CDT  HISTORY: Back pain  COMPARISON: None  FINDINGS: Frontal, lateral and swimmers lateral radiographs of the thoracic spine demonstrate normal alignment without evidence of compression fracture or subluxation. The pedicles are intact. Small osteophytes are seen at multiple levels. Multiple splenic granulomas are present.  The visualized thorax is clear.      Impression: 1. Minimal degenerative changes in the thoracic spine. 2. No evidence of compression fracture or subluxation.  This report was finalized on 09/21/2019 15:36 by Dr. Nick Manzanares MD.    Xr Chest 1 View    Result Date: 9/21/2019  Narrative: XR CHEST 1 VW- 9/21/2019 2:06 PM CDT  HISTORY: Chest pain and shortness of breath   COMPARISON: 05/24/2019.  FINDINGS: The lungs are clear. The cardiomediastinal silhouette and pulmonary vascularity are unchanged.  The osseous structures and surrounding soft tissues demonstrate no acute abnormality.      Impression: 1. No radiographic evidence of acute cardiopulmonary process.   This report was finalized on 09/21/2019 14:38 by Dr. Nick Manzanares MD.    Xr Spine Lumbar 4+ View    Result Date: 9/21/2019  Narrative: XR SPINE LUMBAR 4+ VW- 9/21/2019 3:18 PM CDT  HISTORY: Low back pain  COMPARISON: 04/29/2018  FINDINGS: Frontal, lateral, and coned-down lateral radiographs of the lumbar spine were obtained.  There is  no acute fracture or subluxation. There is straightening of the normal lumbar lordosis. Marked disc space loss is present at L5-S1. Osteophytes are seen at L2-L3, L3-L4, L4-L5, and L5-S1. There is suspected neuroforaminal stenosis at L5-S1.   The visualized osseous pelvis and soft tissues are grossly unremarkable.       Impression: 1. Moderate to marked degenerative changes at L5-S1.   This report was finalized on 2019 15:37 by Dr. Nick Manzanares MD.    Donis Yi   Regadenoson Stress Test With Myocardial Perfusion SPECT (Multi Study)   Order# [id number]   Reading physician: Bairon Tanner MD Ordering physician: Oksana Mares MD Study date: 10/17/17   Patient Information     Patient Name  Donis Yi MRN  [id number] Sex  Male  (Age)  1965 (53 y.o.)   Interpretation Summary     · Left ventricular ejection fraction is normal (Calculated EF = 70%).  · Myocardial perfusion imaging indicates a small-sized infarct located in the apex with no significant ischemia noted.  · Impressions are consistent with a low risk study.          ED Course  ED Course as of Sep 21 1908   Sat Sep 21, 2019   1907 Patient has received insulin due to his hyperglycemia 533.  8 units was administered and repeat glucose 374.  Patient's cardiac work-up is unremarkable.  He does not have evidence of congestive heart failure.  D-dimer is negative and he has a Wells criteria for PE is 1.5 points consistent with a low risk group.  The patient persistently asked for pain medication for his chronic low back pain.  He has been advised given his chronic back pain and evidence of hypoxia, I am hesitant to give him anything intravenously.  He has been advised to follow his primary care provider on Monday, in 2 days, for further evaluation.  Patient will be discharged stable condition.  [TK]      ED Course User Index  [TK] Gopi Espinoza PA Wells critieria for PE:  1.5 points  Low risk group: 1.3% chance of PE in  an ED population.     Another study assigned scores ? 4 as “PE Unlikely” and had a 3% incidence of PE.        MDM    Final diagnoses:   Hyperglycemia   Chronic low back pain with bilateral sciatica, unspecified back pain laterality   Chest pain, unspecified type   Other form of dyspnea          Gopi Espinoza PA  09/21/19 190

## 2019-09-22 PROBLEM — G47.09 OTHER INSOMNIA: Status: ACTIVE | Noted: 2019-09-22

## 2019-09-22 ASSESSMENT — ENCOUNTER SYMPTOMS
SINUS PRESSURE: 0
EYE DISCHARGE: 0
COUGH: 0
ABDOMINAL PAIN: 0
ABDOMINAL DISTENTION: 0
SHORTNESS OF BREATH: 1
EYE REDNESS: 0

## 2019-09-22 NOTE — DISCHARGE INSTRUCTIONS
Aspirin and Your Heart    Aspirin is a medicine that prevents the cells in the blood that are used for clotting, called platelets, from sticking together. Aspirin can be used to help reduce the risk of blood clots, heart attacks, and other heart-related problems.  Can I take aspirin?  Your health care provider will help you determine whether it is safe and beneficial for you to take aspirin daily. Taking aspirin daily may be helpful if you:  · Have had a heart attack or chest pain.  · Are at risk for a heart attack.  · Have undergone open-heart surgery, such as coronary artery bypass surgery (CABG).  · Have had coronary angioplasty or a stent.  · Have had certain types of stroke or transient ischemic attack (TIA).  · Have peripheral artery disease (PAD).  · Have chronic heart rhythm problems such as atrial fibrillation and cannot take an anticoagulant.  · Have valve disease or have had surgery on a valve.  What are the risks?  Daily use of aspirin can cause side effects. Some of these include:  · Bleeding. Bleeding problems can be minor or serious. An example of a minor problem is a cut that does not stop bleeding. An example of a more serious problem is stomach bleeding or, rarely, bleeding into the brain. Your risk of bleeding is increased if you are also taking non-steroidal anti-inflammatory drugs (NSAIDs).  · Increased bruising.  · Upset stomach.  · An allergic reaction. People who have nasal polyps have an increased risk of developing an aspirin allergy.  General guidelines  · Take aspirin only as told by your health care provider. Make sure that you understand how much you should take and what form you should take. The two forms of aspirin are:  ? Non-enteric-coated.This type of aspirin does not have a coating and is absorbed quickly. This type of aspirin also comes in a chewable form.  ? Enteric-coated. This type of aspirin has a coating that releases the medicine very slowly. Enteric-coated aspirin might  cause less stomach upset than non-enteric-coated aspirin. This type of aspirin should not be chewed or crushed.  · Limit alcohol intake to no more than 1 drink a day for nonpregnant women and 2 drinks a day for men. Drinking alcohol increases your risk of bleeding. One drink equals 12 oz of beer, 5 oz of wine, or 1½ oz of hard liquor.  Contact a health care provider if you:  · Have unusual bleeding or bruising.  · Have stomach pain or nausea.  · Have ringing in your ears.  · Have an allergic reaction that causes:  ? Hives.  ? Itchy skin.  ? Swelling of the lips, tongue, or face.  Get help right away if you:  · Notice that your bowel movements are bloody, dark red, or black in color.  · Vomit or cough up blood.  · Have blood in your urine.  · Cough, have noisy breathing (wheeze), or feel short of breath.  · Have chest pain, especially if the pain spreads to the arms, back, neck, or jaw.  · Have a severe headache, or a headache with confusion, or dizziness.  These symptoms may represent a serious problem that is an emergency. Do not wait to see if the symptoms will go away. Get medical help right away. Call your local emergency services (911 in the [place] Do not drive yourself to the hospital.  Summary  · Aspirin can be used to help reduce the risk of blood clots, heart attacks, and other heart-related problems.  · Daily use of aspirin can increase your risk of side effects. Your health care provider will help you determine whether it is safe and beneficial for you to take aspirin daily.  · Take aspirin only as told by your health care provider. Make sure that you understand how much you can take and what form you can take.  This information is not intended to replace advice given to you by your health care provider. Make sure you discuss any questions you have with your health care provider.  Document Released: 11/30/2009 Document Revised: 10/18/2018 Document Reviewed: 10/18/2018  [employer] Patient  Education © 2019 [employer]

## 2019-10-01 DIAGNOSIS — E78.00 PURE HYPERCHOLESTEROLEMIA: ICD-10-CM

## 2019-10-01 RX ORDER — M-VIT,TX,IRON,MINS/CALC/FOLIC 27MG-0.4MG
1 TABLET ORAL DAILY
Qty: 100 TABLET | Refills: 3 | Status: SHIPPED | OUTPATIENT
Start: 2019-10-01 | End: 2020-09-10

## 2019-10-01 RX ORDER — ATORVASTATIN CALCIUM 40 MG/1
40 TABLET, FILM COATED ORAL DAILY
Qty: 90 TABLET | Refills: 3 | Status: SHIPPED | OUTPATIENT
Start: 2019-10-01 | End: 2020-09-10

## 2019-10-14 ENCOUNTER — OFFICE VISIT (OUTPATIENT)
Dept: ENDOCRINOLOGY | Facility: CLINIC | Age: 54
End: 2019-10-14

## 2019-10-14 VITALS
SYSTOLIC BLOOD PRESSURE: 136 MMHG | WEIGHT: 300.5 LBS | HEIGHT: 70 IN | HEART RATE: 84 BPM | OXYGEN SATURATION: 98 % | BODY MASS INDEX: 43.02 KG/M2 | DIASTOLIC BLOOD PRESSURE: 78 MMHG

## 2019-10-14 DIAGNOSIS — I15.2 HYPERTENSION ASSOCIATED WITH DIABETES (HCC): Primary | ICD-10-CM

## 2019-10-14 DIAGNOSIS — E11.69 MIXED DIABETIC HYPERLIPIDEMIA ASSOCIATED WITH TYPE 2 DIABETES MELLITUS (HCC): ICD-10-CM

## 2019-10-14 DIAGNOSIS — Z79.4 TYPE 2 DIABETES MELLITUS WITH HYPERGLYCEMIA, WITH LONG-TERM CURRENT USE OF INSULIN (HCC): ICD-10-CM

## 2019-10-14 DIAGNOSIS — E11.59 HYPERTENSION ASSOCIATED WITH DIABETES (HCC): Primary | ICD-10-CM

## 2019-10-14 DIAGNOSIS — E11.65 TYPE 2 DIABETES MELLITUS WITH HYPERGLYCEMIA, WITH LONG-TERM CURRENT USE OF INSULIN (HCC): ICD-10-CM

## 2019-10-14 DIAGNOSIS — E78.2 MIXED DIABETIC HYPERLIPIDEMIA ASSOCIATED WITH TYPE 2 DIABETES MELLITUS (HCC): ICD-10-CM

## 2019-10-14 PROCEDURE — 99214 OFFICE O/P EST MOD 30 MIN: CPT | Performed by: INTERNAL MEDICINE

## 2019-10-14 NOTE — PROGRESS NOTES
" Donis iY is a 53 y.o. male who presents for  evaluation of   Chief Complaint   Patient presents with   • Diabetes       Referring provider    No referring provider defined for this encounter.    Primary Care Provider    Oksana Mares MD    Duration 15 years    Timing - Diabetes is Constant    Quality -  poorly controlled    Severity -  severe    Complications - peripheral neuropathy    Current symptoms/problems  none     Alleviating Factors: Compliance      Aggravating Factors :    Side Effects  none    Current diet  in general, a \"healthy\" diet      Current exercise none    Current monitoring regimen: home blood tests - checking 4 x daily   Home blood sugar records:  now avg 200s     Hypoglycemia nocturnal and if skipped meals     Past Medical History:   Diagnosis Date   • Allergic rhinitis    • Anxiety    • Bursitis    • DDD (degenerative disc disease), cervical    • Depression    • HIV disease (CMS/[facility])    • HLD (hyperlipidemia)    • HSP (Henoch Schonlein purpura) (CMS/[facility])    • HTN (hypertension)    • Migraine    • Myocardial infarction (CMS/[facility])    • Osteoporosis    • Sleep apnea    • Type 2 diabetes mellitus (CMS/[facility])      Family History   Problem Relation Age of Onset   • Diabetes Mother    • Hypertension Mother    • Thyroid disease Mother    • Hypertension Father    • Thyroid disease Father    • Arrhythmia Father    • Diabetes Maternal Grandfather    • Heart disease Maternal Grandfather    • Hypertension Maternal Grandfather    • Diabetes Paternal Grandmother    • Stroke Paternal Grandmother    • Heart disease Paternal Grandmother    • Hypertension Paternal Grandmother    • Thyroid disease Paternal Grandmother    • Hypertension Paternal Grandfather    • Hypertension Sister    • No Known Problems Brother      Social History     Tobacco Use   • Smoking status: Current Every Day Smoker     Packs/day: 1.00     Types: Cigarettes     Start date: 1988   • Smokeless tobacco: Never Used   Substance " Use Topics   • Alcohol use: Yes     Comment: occasionally   • Drug use: No         Current Outpatient Medications:   •  albuterol (PROVENTIL HFA;VENTOLIN HFA) 108 (90 BASE) MCG/ACT inhaler, Inhale 2 puffs every 6 (six) hours as needed for wheezing., Disp: , Rfl:   •  ARIPiprazole (ABILIFY) 2 MG tablet, Take 2 mg by mouth Daily., Disp: , Rfl:   •  atorvastatin (LIPITOR) 80 MG tablet, Take 80 mg by mouth daily., Disp: , Rfl:   •  calcium carbonate (OS-THEODORA) 600 MG tablet, Take 600 mg by mouth Daily., Disp: , Rfl:   •  cholecalciferol (VITAMIN D3) 1000 UNITS tablet, Take 1,000 Units by mouth daily., Disp: , Rfl:   •  ciclopirox (LOPROX) 0.77 % cream, Apply  topically to the appropriate area as directed 2 (Two) Times a Day., Disp: 30 g, Rfl: 5  •  clonazePAM (KlonoPIN) 0.5 MG tablet, Take 0.5 mg by mouth 3 (Three) Times a Day As Needed for Anxiety., Disp: , Rfl:   •  denosumab (PROLIA) 60 MG/ML solution syringe, Inject 60 mg under the skin Every 6 (Six) Months., Disp: , Rfl:   •  Dulaglutide 0.75 MG/0.5ML solution pen-injector, Inject 0.75 mg under the skin into the appropriate area as directed 1 (One) Time Per Week., Disp: 4 pen, Rfl: 11  •  Yblwgsn-Jhjkj-Ocejweeg-TenofAF (GENVOYA) [phone] MG tablet, Take 1 tablet by mouth daily., Disp: , Rfl:   •  esomeprazole (NexIUM) 40 MG capsule, Take 40 mg by mouth every morning before breakfast., Disp: , Rfl:   •  fluticasone (FLONASE) 50 MCG/ACT nasal spray, 2 sprays into each nostril Daily. (Patient taking differently: 2 sprays into each nostril Daily As Needed.), Disp: 1 bottle, Rfl: 6  •  Foot Care Products (FOOT POWDER MEDICATED EX), , Disp: , Rfl:   •  gabapentin (NEURONTIN) 800 MG tablet, , Disp: , Rfl:   •  guaiFENesin (MUCINEX) 600 MG 12 hr tablet, Take 600 mg by mouth 2 (two) times a day as needed for cough., Disp: , Rfl:   •  HYDROcodone-acetaminophen (NORCO) 5-325 MG per tablet, Take 1 tablet by mouth 2 (two) times a day as needed., Disp: , Rfl:   •   hydrOXYzine (VISTARIL) 25 MG capsule, Take 25 mg by mouth every 6 (six) hours as needed for itching., Disp: , Rfl:   •  insulin aspart (NOVOLOG FLEXPEN) 100 UNIT/ML solution pen-injector sc pen, Inject 80 Units under the skin 4 (Four) Times a Day With Meals & at Bedtime., Disp: , Rfl:   •  Insulin Regular Human, Conc, (HUMULIN R U-500 KWIKPEN) 500 UNIT/ML solution pen-injector CONCENTRATED injection, Up to 200 units bid with meals, Disp: 8 pen, Rfl: 11  •  loperamide (IMODIUM) 2 MG capsule, Take 2 mg by mouth daily as needed for diarrhea., Disp: , Rfl:   •  loratadine (CLARITIN) 10 MG tablet, Take 10 mg by mouth daily., Disp: , Rfl:   •  losartan (COZAAR) 100 MG tablet, Take 100 mg by mouth daily., Disp: , Rfl:   •  LYRICA 100 MG capsule, Take 100 mg by mouth 2 (Two) Times a Day., Disp: , Rfl:   •  meclizine (ANTIVERT) 25 MG tablet, Take 25 mg by mouth Daily As Needed., Disp: , Rfl:   •  metFORMIN (GLUCOPHAGE) 1000 MG tablet, Take 1,000 mg by mouth 2 (two) times a day with meals., Disp: , Rfl:   •  miconazole (MICOTIN) 2 % cream, Apply 1 application topically 2 (two) times a day., Disp: , Rfl:   •  neomycin-bacitracin-polymyxin (NEOSPORIN) 5-400-5000 ointment, Apply 1 application topically 4 (four) times a day as needed., Disp: , Rfl:   •  phenazopyridine (PYRIDIUM) 200 MG tablet, Take 1 tablet by mouth 3 (Three) Times a Day As Needed for bladder spasms., Disp: 12 tablet, Rfl: 0  •  promethazine (PHENERGAN) 25 MG tablet, Take 25 mg by mouth every 6 (six) hours as needed for nausea or vomiting., Disp: , Rfl:   •  propranolol LA (INDERAL LA) 60 MG 24 hr capsule, Take 60 mg by mouth daily., Disp: , Rfl:   •  rizatriptan (MAXALT) 10 MG tablet, Take 10 mg by mouth 1 (one) time as needed for migraine. May repeat in 2 hours if needed, Disp: , Rfl:   •  tamsulosin (FLOMAX) 0.4 MG capsule 24 hr capsule, Take 1 capsule by mouth Every Night., Disp: 90 capsule, Rfl: 3  •  terbinafine (lamISIL) 1 % cream, Apply  topically to the  appropriate area as directed 2 (Two) Times a Day., Disp: 36 g, Rfl: 2  •  tiZANidine (ZANAFLEX) 4 MG tablet, Take 6 mg by mouth Every 6 (Six) Hours As Needed for Muscle Spasms., Disp: , Rfl:   •  traZODone (DESYREL) 150 MG tablet, Take 100 mg by mouth Every Night., Disp: , Rfl:   •  Vortioxetine HBr (TRINTELLIX) 5 MG tablet, Take 20 mg by mouth Daily With Breakfast., Disp: , Rfl:   •  diazePAM (VALIUM) 5 MG tablet, Take 0.5 tablets by mouth Every 6 (Six) Hours As Needed (bladder spasms)., Disp: 6 tablet, Rfl: 0    Review of Systems    Review of Systems   Constitutional: Negative for activity change, appetite change, chills, diaphoresis, fatigue, fever and unexpected weight change.   HENT: Negative for congestion, dental problem, drooling, ear discharge, ear pain, facial swelling, mouth sores, postnasal drip, rhinorrhea, sinus pressure, sore throat, tinnitus, trouble swallowing and voice change.    Eyes: Negative for photophobia, pain, discharge, redness, itching and visual disturbance.   Respiratory: Negative for apnea, cough, choking, chest tightness, shortness of breath, wheezing and stridor.    Cardiovascular: Negative for chest pain, palpitations and leg swelling.   Gastrointestinal: Negative for abdominal distention, abdominal pain, constipation, diarrhea, nausea and vomiting.   Endocrine: Negative for cold intolerance, heat intolerance, polydipsia, polyphagia and polyuria.   Genitourinary: Negative for decreased urine volume, difficulty urinating, dysuria, flank pain, frequency, hematuria and urgency.   Musculoskeletal: Negative for arthralgias, back pain, gait problem, joint swelling, myalgias, neck pain and neck stiffness.   Skin: Negative for color change, pallor, rash and wound.   Allergic/Immunologic: Negative for immunocompromised state.   Neurological: Negative for dizziness, tremors, seizures, syncope, facial asymmetry, speech difficulty, weakness, light-headedness, numbness and headaches.  "  Hematological: Negative for adenopathy.   Psychiatric/Behavioral: Negative for agitation, behavioral problems, confusion, decreased concentration, dysphoric mood, hallucinations, self-injury, sleep disturbance and suicidal ideas. The patient is not nervous/anxious and is not hyperactive.         Objective:   /78   Pulse 84   Ht 177.8 cm (70\")   Wt (!) 136 kg (300 lb 8 oz)   SpO2 98%   BMI 43.12 kg/m²     Physical Exam   Constitutional: He is oriented to person, place, and time. He appears well-developed and well-nourished. He is cooperative.   HENT:   Head: Normocephalic and atraumatic.   Right Ear: External ear normal.   Left Ear: External ear normal.   Nose: Nose normal.   Mouth/Throat: Oropharynx is clear and moist. No oropharyngeal exudate.   Eyes: Conjunctivae and EOM are normal. Pupils are equal, round, and reactive to light. No scleral icterus. Right eye exhibits normal extraocular motion. Left eye exhibits normal extraocular motion.   Neck: Neck supple. No JVD present. No muscular tenderness present. No tracheal deviation, no edema and no erythema present. No thyromegaly present.   Cardiovascular: Normal rate, regular rhythm, normal heart sounds and intact distal pulses. Exam reveals no gallop and no friction rub.   No murmur heard.  Pulmonary/Chest: Effort normal and breath sounds normal. No stridor. No respiratory distress. He has no decreased breath sounds. He has no wheezes. He has no rhonchi. He has no rales. He exhibits no tenderness.   Abdominal: Soft. Bowel sounds are normal. He exhibits no distension and no mass. There is no hepatomegaly. There is no tenderness. There is no rebound and no guarding. No hernia.   Musculoskeletal: Normal range of motion. He exhibits no edema, tenderness or deformity.   Lymphadenopathy:     He has no cervical adenopathy.   Neurological: He is alert and oriented to person, place, and time. He has normal reflexes. No cranial nerve deficit. He exhibits normal " muscle tone. Coordination normal.   Skin: Skin is warm. No rash noted. No erythema. No pallor.   Psychiatric: He has a normal mood and affect. His behavior is normal. Judgment and thought content normal.   Nursing note and vitals reviewed.      Lab Review          Assessment/Plan       ICD-10-CM ICD-9-CM   1. Hypertension associated with diabetes (CMS/HCC) E11.59 250.80    I10 401.9   2. Mixed diabetic hyperlipidemia associated with type 2 diabetes mellitus (CMS/HCC) E11.69 250.80    E78.2 272.2   3. Type 2 diabetes mellitus with hyperglycemia, with long-term current use of insulin (CMS/HCC) E11.65 250.00    Z79.4 790.29     V58.67         I reviewed and summarized records from Oksana Mares MD from present year  and I reviewed / ordered labs.   From review of records :    Patient has uncontrolled type 2 diabetes    Glycemic Management:   Lab Results   Component Value Date    HGBA1C 10.1 (H) 09/09/2019    HGBA1C 9.0 (H) 06/03/2019    HGBA1C 9.2 03/18/2019     Lab Results   Component Value Date    GLUCOSE 533 (C) 09/21/2019    BUN 21 (H) 09/21/2019    CREATININE 1.25 09/21/2019    EGFRIFNONA 61 09/21/2019    BCR 16.8 09/21/2019    K 5.1 09/21/2019    CO2 27.0 09/21/2019    CALCIUM 9.8 09/21/2019    ALBUMIN 4.00 09/21/2019    LABIL2 CANCELED 03/29/2016    AST 27 09/21/2019    ALT 40 09/21/2019    ANIONGAP 13.0 09/21/2019     Lab Results   Component Value Date    WBC 8.39 09/21/2019    HGB 12.3 (L) 09/21/2019    HCT 37.3 (L) 09/21/2019    MCV 92.8 09/21/2019     09/21/2019        U500 insulin      180 units in am     80 units for supper    In addition , He will add U100 humalog 5 units per 50 above 150 at lunch and bedtime     Total 4 injections per day     Metformin 1000 mg bid     Add Trulicity 0.75 mg weekly     Next appt add jardiance but be careful since has BPH     Has freestyle  Didn't bring it   avg 258     We focused on motivation, he has all the right medicines, he just needs to be compliant         Lipid Management  Lab Results   Component Value Date    CHOL 147 01/14/2019     Lab Results   Component Value Date    TRIG 133 09/09/2019    TRIG 133 06/03/2019    TRIG 123 01/14/2019     Lab Results   Component Value Date    HDL 48 (L) 09/09/2019    HDL 45 (L) 06/03/2019    HDL 47 01/14/2019     No components found for: LDLCALC  Lab Results   Component Value Date    LDL 51 09/09/2019    LDL 66 06/03/2019    LDL 92 01/14/2019     No results found for: LDLDIRECT    lipitor 80 mg qhs     Blood Pressure Management:    Vitals:    10/14/19 1626   BP: 136/78   Pulse: 84   SpO2: 98%     Lab Results   Component Value Date    GLUCOSE 533 (C) 09/21/2019    CALCIUM 9.8 09/21/2019     09/21/2019    K 5.1 09/21/2019    CO2 27.0 09/21/2019     09/21/2019    BUN 21 (H) 09/21/2019    CREATININE 1.25 09/21/2019    EGFRIFNONA 61 09/21/2019    BCR 16.8 09/21/2019    ANIONGAP 13.0 09/21/2019               Microvascular Complication Monitoring:      Eye Exam Evaluation    -----------    Last Microalbumin-Proteinuria Assessment    No results found for: MALBCRERATIO    No results found for: UTPCR    -----------      Neuropathy on lyrica 100 bid          Weight Related:   Wt Readings from Last 3 Encounters:   10/14/19 (!) 136 kg (300 lb 8 oz)   09/21/19 133 kg (293 lb)   08/23/19 131 kg (288 lb)     Body mass index is 43.12 kg/m².        Diet interventions: moderate (500 kCal/d) deficit diet.      Bone Health    Lab Results   Component Value Date    CALCIUM 9.8 09/21/2019    PHOS 3.2 01/14/2019    UYRD07HY 47.2 01/14/2019       Thyroid Health    Lab Results   Component Value Date    TSH 1.970 06/03/2019    TSH 2.150 01/14/2019    TSH 1.380 11/08/2018              Other Diabetes Related Aspects       No results found for: [id number]       No orders of the defined types were placed in this encounter.        A copy of my note was sent to Oksana Mares MD    Please see my above opinion and suggestions.

## 2019-10-17 ENCOUNTER — HOSPITAL ENCOUNTER (INPATIENT)
Facility: HOSPITAL | Age: 54
LOS: 2 days | Discharge: HOME | End: 2019-10-19
Attending: EMERGENCY MEDICINE | Admitting: INTERNAL MEDICINE

## 2019-10-17 ENCOUNTER — APPOINTMENT (OUTPATIENT)
Dept: CT IMAGING | Facility: HOSPITAL | Age: 54
End: 2019-10-17

## 2019-10-17 DIAGNOSIS — N39.0 ACUTE UTI: Primary | ICD-10-CM

## 2019-10-17 DIAGNOSIS — K52.9 COLITIS: ICD-10-CM

## 2019-10-17 DIAGNOSIS — E86.0 DEHYDRATION: ICD-10-CM

## 2019-10-17 DIAGNOSIS — N17.9 AKI (ACUTE KIDNEY INJURY) (HCC): ICD-10-CM

## 2019-10-17 LAB
ALBUMIN SERPL-MCNC: 4.1 G/DL (ref 3.5–5.2)
ALBUMIN/GLOB SERPL: 1.2 G/DL
ALP SERPL-CCNC: 51 U/L (ref 39–117)
ALT SERPL W P-5'-P-CCNC: 61 U/L (ref 1–41)
ANION GAP SERPL CALCULATED.3IONS-SCNC: 13 MMOL/L (ref 5–15)
AST SERPL-CCNC: 44 U/L (ref 1–40)
BACTERIA UR QL AUTO: ABNORMAL /HPF
BASOPHILS # BLD AUTO: 0.04 10*3/MM3 (ref 0–0.2)
BASOPHILS NFR BLD AUTO: 0.4 % (ref 0–1.5)
BILIRUB SERPL-MCNC: 0.2 MG/DL (ref 0.2–1.2)
BILIRUB UR QL STRIP: NEGATIVE
BUN BLD-MCNC: 33 MG/DL (ref 6–20)
BUN/CREAT SERPL: 19.1 (ref 7–25)
CALCIUM SPEC-SCNC: 8.4 MG/DL (ref 8.6–10.5)
CHLORIDE SERPL-SCNC: 101 MMOL/L (ref 98–107)
CLARITY UR: ABNORMAL
CO2 SERPL-SCNC: 27 MMOL/L (ref 22–29)
COLOR UR: ABNORMAL
CREAT BLD-MCNC: 1.73 MG/DL (ref 0.76–1.27)
D-LACTATE SERPL-SCNC: 1.3 MMOL/L (ref 0.5–2)
DEPRECATED RDW RBC AUTO: 43.6 FL (ref 37–54)
EOSINOPHIL # BLD AUTO: 0.28 10*3/MM3 (ref 0–0.4)
EOSINOPHIL NFR BLD AUTO: 3 % (ref 0.3–6.2)
ERYTHROCYTE [DISTWIDTH] IN BLOOD BY AUTOMATED COUNT: 13 % (ref 12.3–15.4)
GFR SERPL CREATININE-BSD FRML MDRD: 42 ML/MIN/1.73
GLOBULIN UR ELPH-MCNC: 3.3 GM/DL
GLUCOSE BLD-MCNC: 115 MG/DL (ref 65–99)
GLUCOSE BLDC GLUCOMTR-MCNC: 119 MG/DL (ref 70–130)
GLUCOSE BLDC GLUCOMTR-MCNC: 76 MG/DL (ref 70–130)
GLUCOSE UR STRIP-MCNC: ABNORMAL MG/DL
HBA1C MFR BLD: 11 % (ref 4.8–5.6)
HCT VFR BLD AUTO: 34 % (ref 37.5–51)
HGB BLD-MCNC: 11.3 G/DL (ref 13–17.7)
HGB UR QL STRIP.AUTO: NEGATIVE
HOLD SPECIMEN: NORMAL
HYALINE CASTS UR QL AUTO: ABNORMAL /LPF
IMM GRANULOCYTES # BLD AUTO: 0.04 10*3/MM3 (ref 0–0.05)
IMM GRANULOCYTES NFR BLD AUTO: 0.4 % (ref 0–0.5)
KETONES UR QL STRIP: NEGATIVE
LEUKOCYTE ESTERASE UR QL STRIP.AUTO: ABNORMAL
LIPASE SERPL-CCNC: 15 U/L (ref 13–60)
LYMPHOCYTES # BLD AUTO: 2.49 10*3/MM3 (ref 0.7–3.1)
LYMPHOCYTES NFR BLD AUTO: 26.9 % (ref 19.6–45.3)
MCH RBC QN AUTO: 30.6 PG (ref 26.6–33)
MCHC RBC AUTO-ENTMCNC: 33.2 G/DL (ref 31.5–35.7)
MCV RBC AUTO: 92.1 FL (ref 79–97)
MONOCYTES # BLD AUTO: 0.91 10*3/MM3 (ref 0.1–0.9)
MONOCYTES NFR BLD AUTO: 9.8 % (ref 5–12)
NEUTROPHILS # BLD AUTO: 5.51 10*3/MM3 (ref 1.7–7)
NEUTROPHILS NFR BLD AUTO: 59.5 % (ref 42.7–76)
NITRITE UR QL STRIP: NEGATIVE
NRBC BLD AUTO-RTO: 0 /100 WBC (ref 0–0.2)
NT-PROBNP SERPL-MCNC: 53.8 PG/ML (ref 5–900)
PH UR STRIP.AUTO: <=5 [PH] (ref 5–8)
PLATELET # BLD AUTO: 188 10*3/MM3 (ref 140–450)
PMV BLD AUTO: 11.5 FL (ref 6–12)
POTASSIUM BLD-SCNC: 4.3 MMOL/L (ref 3.5–5.2)
PROT SERPL-MCNC: 7.4 G/DL (ref 6–8.5)
PROT UR QL STRIP: ABNORMAL
RBC # BLD AUTO: 3.69 10*6/MM3 (ref 4.14–5.8)
RBC # UR: ABNORMAL /HPF
REF LAB TEST METHOD: ABNORMAL
SODIUM BLD-SCNC: 141 MMOL/L (ref 136–145)
SP GR UR STRIP: 1.02 (ref 1–1.03)
SQUAMOUS #/AREA URNS HPF: ABNORMAL /HPF
TROPONIN T SERPL-MCNC: <0.01 NG/ML (ref 0–0.03)
UROBILINOGEN UR QL STRIP: ABNORMAL
WBC NRBC COR # BLD: 9.27 10*3/MM3 (ref 3.4–10.8)
WBC UR QL AUTO: ABNORMAL /HPF
WHOLE BLOOD HOLD SPECIMEN: NORMAL
WHOLE BLOOD HOLD SPECIMEN: NORMAL

## 2019-10-17 PROCEDURE — 87077 CULTURE AEROBIC IDENTIFY: CPT | Performed by: EMERGENCY MEDICINE

## 2019-10-17 PROCEDURE — 71275 CT ANGIOGRAPHY CHEST: CPT

## 2019-10-17 PROCEDURE — 87086 URINE CULTURE/COLONY COUNT: CPT | Performed by: EMERGENCY MEDICINE

## 2019-10-17 PROCEDURE — 0 IOPAMIDOL PER 1 ML: Performed by: EMERGENCY MEDICINE

## 2019-10-17 PROCEDURE — 83036 HEMOGLOBIN GLYCOSYLATED A1C: CPT | Performed by: INTERNAL MEDICINE

## 2019-10-17 PROCEDURE — 83605 ASSAY OF LACTIC ACID: CPT | Performed by: EMERGENCY MEDICINE

## 2019-10-17 PROCEDURE — 74177 CT ABD & PELVIS W/CONTRAST: CPT

## 2019-10-17 PROCEDURE — 36415 COLL VENOUS BLD VENIPUNCTURE: CPT

## 2019-10-17 PROCEDURE — 83690 ASSAY OF LIPASE: CPT | Performed by: EMERGENCY MEDICINE

## 2019-10-17 PROCEDURE — 85025 COMPLETE CBC W/AUTO DIFF WBC: CPT | Performed by: EMERGENCY MEDICINE

## 2019-10-17 PROCEDURE — 25010000002 FENTANYL CITRATE (PF) 100 MCG/2ML SOLUTION: Performed by: EMERGENCY MEDICINE

## 2019-10-17 PROCEDURE — 93005 ELECTROCARDIOGRAM TRACING: CPT | Performed by: EMERGENCY MEDICINE

## 2019-10-17 PROCEDURE — 25010000002 CEFTRIAXONE: Performed by: EMERGENCY MEDICINE

## 2019-10-17 PROCEDURE — 99285 EMERGENCY DEPT VISIT HI MDM: CPT

## 2019-10-17 PROCEDURE — 25010000002 ENOXAPARIN PER 10 MG: Performed by: INTERNAL MEDICINE

## 2019-10-17 PROCEDURE — 87040 BLOOD CULTURE FOR BACTERIA: CPT | Performed by: EMERGENCY MEDICINE

## 2019-10-17 PROCEDURE — 83880 ASSAY OF NATRIURETIC PEPTIDE: CPT | Performed by: EMERGENCY MEDICINE

## 2019-10-17 PROCEDURE — 81001 URINALYSIS AUTO W/SCOPE: CPT | Performed by: EMERGENCY MEDICINE

## 2019-10-17 PROCEDURE — 25010000002 ONDANSETRON PER 1 MG: Performed by: EMERGENCY MEDICINE

## 2019-10-17 PROCEDURE — 84484 ASSAY OF TROPONIN QUANT: CPT | Performed by: EMERGENCY MEDICINE

## 2019-10-17 PROCEDURE — 80053 COMPREHEN METABOLIC PANEL: CPT | Performed by: EMERGENCY MEDICINE

## 2019-10-17 PROCEDURE — 93010 ELECTROCARDIOGRAM REPORT: CPT | Performed by: INTERNAL MEDICINE

## 2019-10-17 PROCEDURE — 82962 GLUCOSE BLOOD TEST: CPT

## 2019-10-17 PROCEDURE — 87186 SC STD MICRODIL/AGAR DIL: CPT | Performed by: EMERGENCY MEDICINE

## 2019-10-17 RX ORDER — SODIUM CHLORIDE 0.9 % (FLUSH) 0.9 %
10 SYRINGE (ML) INJECTION AS NEEDED
Status: DISCONTINUED | OUTPATIENT
Start: 2019-10-17 | End: 2019-10-19 | Stop reason: HOSPADM

## 2019-10-17 RX ORDER — SODIUM CHLORIDE 0.9 % (FLUSH) 0.9 %
10 SYRINGE (ML) INJECTION EVERY 12 HOURS SCHEDULED
Status: DISCONTINUED | OUTPATIENT
Start: 2019-10-17 | End: 2019-10-19 | Stop reason: HOSPADM

## 2019-10-17 RX ORDER — ATORVASTATIN CALCIUM 40 MG/1
80 TABLET, FILM COATED ORAL NIGHTLY
Status: DISCONTINUED | OUTPATIENT
Start: 2019-10-17 | End: 2019-10-19 | Stop reason: HOSPADM

## 2019-10-17 RX ORDER — FLUTICASONE PROPIONATE 50 MCG
2 SPRAY, SUSPENSION (ML) NASAL DAILY
Status: DISCONTINUED | OUTPATIENT
Start: 2019-10-18 | End: 2019-10-18

## 2019-10-17 RX ORDER — TAMSULOSIN HYDROCHLORIDE 0.4 MG/1
0.4 CAPSULE ORAL NIGHTLY
Status: DISCONTINUED | OUTPATIENT
Start: 2019-10-17 | End: 2019-10-19 | Stop reason: HOSPADM

## 2019-10-17 RX ORDER — PANTOPRAZOLE SODIUM 40 MG/1
40 TABLET, DELAYED RELEASE ORAL EVERY MORNING
Status: DISCONTINUED | OUTPATIENT
Start: 2019-10-18 | End: 2019-10-19 | Stop reason: HOSPADM

## 2019-10-17 RX ORDER — PROPRANOLOL HCL 60 MG
60 CAPSULE, EXTENDED RELEASE 24HR ORAL DAILY
Status: DISCONTINUED | OUTPATIENT
Start: 2019-10-17 | End: 2019-10-19 | Stop reason: HOSPADM

## 2019-10-17 RX ORDER — ARIPIPRAZOLE 2 MG/1
2 TABLET ORAL DAILY
Status: DISCONTINUED | OUTPATIENT
Start: 2019-10-17 | End: 2019-10-19 | Stop reason: HOSPADM

## 2019-10-17 RX ORDER — CETIRIZINE HYDROCHLORIDE 10 MG/1
10 TABLET ORAL DAILY
Status: DISCONTINUED | OUTPATIENT
Start: 2019-10-17 | End: 2019-10-19 | Stop reason: HOSPADM

## 2019-10-17 RX ORDER — HYDROCODONE BITARTRATE AND ACETAMINOPHEN 5; 325 MG/1; MG/1
1 TABLET ORAL 2 TIMES DAILY PRN
Status: DISCONTINUED | OUTPATIENT
Start: 2019-10-17 | End: 2019-10-19 | Stop reason: HOSPADM

## 2019-10-17 RX ORDER — PHENAZOPYRIDINE HYDROCHLORIDE 200 MG/1
200 TABLET, FILM COATED ORAL 3 TIMES DAILY PRN
Status: DISCONTINUED | OUTPATIENT
Start: 2019-10-17 | End: 2019-10-19 | Stop reason: HOSPADM

## 2019-10-17 RX ORDER — ONDANSETRON 2 MG/ML
4 INJECTION INTRAMUSCULAR; INTRAVENOUS ONCE
Status: COMPLETED | OUTPATIENT
Start: 2019-10-17 | End: 2019-10-17

## 2019-10-17 RX ORDER — FENTANYL CITRATE 50 UG/ML
25 INJECTION, SOLUTION INTRAMUSCULAR; INTRAVENOUS ONCE
Status: COMPLETED | OUTPATIENT
Start: 2019-10-17 | End: 2019-10-17

## 2019-10-17 RX ORDER — DEXTROSE MONOHYDRATE 25 G/50ML
25 INJECTION, SOLUTION INTRAVENOUS
Status: DISCONTINUED | OUTPATIENT
Start: 2019-10-17 | End: 2019-10-19 | Stop reason: HOSPADM

## 2019-10-17 RX ORDER — CALCIUM CARBONATE 500(1250)
500 TABLET ORAL DAILY
Status: DISCONTINUED | OUTPATIENT
Start: 2019-10-17 | End: 2019-10-19 | Stop reason: HOSPADM

## 2019-10-17 RX ORDER — ONDANSETRON 2 MG/ML
4 INJECTION INTRAMUSCULAR; INTRAVENOUS EVERY 6 HOURS PRN
Status: DISCONTINUED | OUTPATIENT
Start: 2019-10-17 | End: 2019-10-19 | Stop reason: HOSPADM

## 2019-10-17 RX ORDER — NICOTINE POLACRILEX 4 MG
15 LOZENGE BUCCAL
Status: DISCONTINUED | OUTPATIENT
Start: 2019-10-17 | End: 2019-10-19 | Stop reason: HOSPADM

## 2019-10-17 RX ORDER — MELATONIN
1000 DAILY
Status: DISCONTINUED | OUTPATIENT
Start: 2019-10-17 | End: 2019-10-19 | Stop reason: HOSPADM

## 2019-10-17 RX ORDER — SODIUM CHLORIDE 9 MG/ML
100 INJECTION, SOLUTION INTRAVENOUS CONTINUOUS
Status: DISCONTINUED | OUTPATIENT
Start: 2019-10-17 | End: 2019-10-19 | Stop reason: HOSPADM

## 2019-10-17 RX ADMIN — SODIUM CHLORIDE 100 ML/HR: 9 INJECTION, SOLUTION INTRAVENOUS at 18:27

## 2019-10-17 RX ADMIN — ARIPIPRAZOLE 2 MG: 2 TABLET ORAL at 20:30

## 2019-10-17 RX ADMIN — ONDANSETRON HYDROCHLORIDE 4 MG: 2 SOLUTION INTRAMUSCULAR; INTRAVENOUS at 12:37

## 2019-10-17 RX ADMIN — IOPAMIDOL 125 ML: 755 INJECTION, SOLUTION INTRAVENOUS at 15:00

## 2019-10-17 RX ADMIN — ATORVASTATIN CALCIUM 80 MG: 40 TABLET, FILM COATED ORAL at 20:29

## 2019-10-17 RX ADMIN — CEFTRIAXONE 1 G: 1 INJECTION, POWDER, FOR SOLUTION INTRAMUSCULAR; INTRAVENOUS at 16:58

## 2019-10-17 RX ADMIN — TAMSULOSIN HYDROCHLORIDE 0.4 MG: 0.4 CAPSULE ORAL at 20:30

## 2019-10-17 RX ADMIN — CALCIUM 500 MG: 500 TABLET ORAL at 20:30

## 2019-10-17 RX ADMIN — VITAMIN D 1000 UNITS: 25 TAB ORAL at 20:30

## 2019-10-17 RX ADMIN — FENTANYL CITRATE 25 MCG: 50 INJECTION INTRAMUSCULAR; INTRAVENOUS at 12:37

## 2019-10-17 RX ADMIN — CETIRIZINE HYDROCHLORIDE 10 MG: 10 TABLET, FILM COATED ORAL at 20:30

## 2019-10-17 RX ADMIN — SODIUM CHLORIDE 1190 ML: 9 INJECTION, SOLUTION INTRAVENOUS at 16:39

## 2019-10-17 RX ADMIN — ENOXAPARIN SODIUM 30 MG: 30 INJECTION SUBCUTANEOUS at 20:29

## 2019-10-17 RX ADMIN — SODIUM CHLORIDE 1000 ML: 9 INJECTION, SOLUTION INTRAVENOUS at 12:38

## 2019-10-17 NOTE — H&P
[facility] Medicine Services  HISTORY AND PHYSICAL    Date of Admission: 10/17/2019  Primary Care Physician: Oksana Mares MD    Subjective     Chief Complaint:     History of Present Illness   He is 53-year-old man who presented with abdominal pain of one-week duration.  He is being admitted through the ER with the following diagnosis acute urinary tract infection, acute kidney injury, dehydration and colitis.  His past medical history as outlined below includes HIV on antiretroviral therapy, diabetes mellitus, hypertension, hyperlipidemia, depression, sleep apnea.  He has almost been in the emergency room on a monthly basis    Initial vital signs on admit were stable except that he was tachypneic at 26.  Diagnostic studies showed leukocyte esterase positive, pyuria, bacteriuria and 3-6 squamous epithelial cells.  His normal creatinine ranges from 0.66-1.25.  Currently he is 1.73    CT of the abdomen and pelvis showed mild distention of the ascending and transverse colon which demonstrate mild wall thickening and contain layering fluid consistent with mild colitis and or diarrheal illness.  He has diffuse hepatic steatosis.  He has cholelithiasis without evidence of cholecystitis.  CT angiogram of the chest showed no pulmonary embolus identified.  No acute findings.  Review of Systems     Otherwise complete ROS reviewed and negative except as mentioned in the HPI.    Past Medical History:   Past Medical History:   Diagnosis Date   • Allergic rhinitis    • Anxiety    • Bursitis    • DDD (degenerative disc disease), cervical    • Depression    • HIV disease (CMS/HCC)    • HLD (hyperlipidemia)    • HSP (Henoch Schonlein purpura) (CMS/HCC)    • HTN (hypertension)    • Migraine    • Myocardial infarction (CMS/HCC)    • Osteoporosis    • Sleep apnea    • Type 2 diabetes mellitus (CMS/HCC)      Past Surgical History:  Past Surgical History:   Procedure Laterality Date   • ANKLE  SURGERY     • COLONOSCOPY  06/19/2009    Normal exam repeat in 10 years   • FOREARM SURGERY     • LYMPH NODE BIOPSY       Social History:  reports that he has been smoking cigarettes.  He started smoking about 31 years ago. He has been smoking about 1.00 pack per day. He has never used smokeless tobacco. He reports that he drinks alcohol. He reports that he does not use drugs.    Family History: family history includes Arrhythmia in his father; Diabetes in his maternal grandfather, mother, and paternal grandmother; Heart disease in his maternal grandfather and paternal grandmother; Hypertension in his father, maternal grandfather, mother, paternal grandfather, paternal grandmother, and sister; No Known Problems in his brother; Stroke in his paternal grandmother; Thyroid disease in his father, mother, and paternal grandmother.     Allergies:  Allergies   Allergen Reactions   • Amitriptyline Anaphylaxis   • Amitriptyline Hcl Anaphylaxis   • Darvon [Propoxyphene] Anaphylaxis   • Zithromax [Azithromycin] Anaphylaxis     Medications:  Prior to Admission medications    Medication Sig Start Date End Date Taking? Authorizing Provider   albuterol (PROVENTIL HFA;VENTOLIN HFA) 108 (90 BASE) MCG/ACT inhaler Inhale 2 puffs every 6 (six) hours as needed for wheezing.    ProviderPauline MD   ARIPiprazole (ABILIFY) 2 MG tablet Take 2 mg by mouth Daily.    Pauline Perez MD   atorvastatin (LIPITOR) 80 MG tablet Take 80 mg by mouth daily.    Pauline Perez MD   calcium carbonate (OS-THEODORA) 600 MG tablet Take 600 mg by mouth Daily.    Pauline Perez MD   cholecalciferol (VITAMIN D3) 1000 UNITS tablet Take 1,000 Units by mouth daily.    Pauline Perez MD   ciclopirox (LOPROX) 0.77 % cream Apply  topically to the appropriate area as directed 2 (Two) Times a Day. 7/29/19   Pacheco Harper DPM   clonazePAM (KlonoPIN) 0.5 MG tablet Take 0.5 mg by mouth 3 (Three) Times a Day As Needed for Anxiety.     Emergency, Nurse Radha RN   denosumab (PROLIA) 60 MG/ML solution syringe Inject 60 mg under the skin Every 6 (Six) Months.    Pauline Perez MD   diazePAM (VALIUM) 5 MG tablet Take 0.5 tablets by mouth Every 6 (Six) Hours As Needed (bladder spasms). 5/28/19   Jenny Mitchell MD   Dulaglutide 0.75 MG/0.5ML solution pen-injector Inject 0.75 mg under the skin into the appropriate area as directed 1 (One) Time Per Week. 8/24/18   Demetri Westfall MD   Uyoifyw-Uakxd-Mqwwrvui-TenofAF (GENVOYA) [phone] MG tablet Take 1 tablet by mouth daily.    Pauline Perez MD   esomeprazole (NexIUM) 40 MG capsule Take 40 mg by mouth every morning before breakfast.    Pauline Perez MD   fluticasone (FLONASE) 50 MCG/ACT nasal spray 2 sprays into each nostril Daily.  Patient taking differently: 2 sprays into each nostril Daily As Needed. 10/11/16   Tyron Ace PA   Foot Care Products (FOOT POWDER MEDICATED EX)  7/8/19   Pauline Perez MD   gabapentin (NEURONTIN) 800 MG tablet  11/14/18   Emergency, Nurse Radha, RN   guaiFENesin (MUCINEX) 600 MG 12 hr tablet Take 600 mg by mouth 2 (two) times a day as needed for cough.    Pauline Perez MD   HYDROcodone-acetaminophen (NORCO) 5-325 MG per tablet Take 1 tablet by mouth 2 (two) times a day as needed.    Pauline Perez MD   hydrOXYzine (VISTARIL) 25 MG capsule Take 25 mg by mouth every 6 (six) hours as needed for itching.    Pauline Perez MD   insulin aspart (NOVOLOG FLEXPEN) 100 UNIT/ML solution pen-injector sc pen Inject 80 Units under the skin 4 (Four) Times a Day With Meals & at Bedtime.    Pauline Perez MD   Insulin Regular Human, Conc, (HUMULIN R U-500 KWIKPEN) 500 UNIT/ML solution pen-injector CONCENTRATED injection Up to 200 units bid with meals 8/24/18   Demetri Westfall MD   loperamide (IMODIUM) 2 MG capsule Take 2 mg by mouth daily as needed for diarrhea.    Provider, MD Pauline    loratadine (CLARITIN) 10 MG tablet Take 10 mg by mouth daily.    Pauline Perez MD   losartan (COZAAR) 100 MG tablet Take 100 mg by mouth daily.    Pauline Perez MD   LYRICA 100 MG capsule Take 100 mg by mouth 2 (Two) Times a Day. 11/14/18   Emergency, Nurse Epic, RN   meclizine (ANTIVERT) 25 MG tablet Take 25 mg by mouth Daily As Needed.    Pauline Perez MD   metFORMIN (GLUCOPHAGE) 1000 MG tablet Take 1,000 mg by mouth 2 (two) times a day with meals.    Pauline Perez MD   miconazole (MICOTIN) 2 % cream Apply 1 application topically 2 (two) times a day.    Pauline Perez MD   neomycin-bacitracin-polymyxin (NEOSPORIN) 5-400-5000 ointment Apply 1 application topically 4 (four) times a day as needed.    Pauline Perez MD   phenazopyridine (PYRIDIUM) 200 MG tablet Take 1 tablet by mouth 3 (Three) Times a Day As Needed for bladder spasms. 5/28/19   Jenny Mitchell MD   promethazine (PHENERGAN) 25 MG tablet Take 25 mg by mouth every 6 (six) hours as needed for nausea or vomiting.    Pauline Perez MD   propranolol LA (INDERAL LA) 60 MG 24 hr capsule Take 60 mg by mouth daily.    Pauline Perez MD   rizatriptan (MAXALT) 10 MG tablet Take 10 mg by mouth 1 (one) time as needed for migraine. May repeat in 2 hours if needed    Pauline Perez MD   tamsulosin (FLOMAX) 0.4 MG capsule 24 hr capsule Take 1 capsule by mouth Every Night. 8/23/19   Marv Gomez MD   terbinafine (lamISIL) 1 % cream Apply  topically to the appropriate area as directed 2 (Two) Times a Day. 7/29/19   Pacheco Harper DPM   tiZANidine (ZANAFLEX) 4 MG tablet Take 6 mg by mouth Every 6 (Six) Hours As Needed for Muscle Spasms.    Pauline Perez MD   traZODone (DESYREL) 150 MG tablet Take 100 mg by mouth Every Night.    Pauline Perez MD   Vortioxetine HBr (TRINTELLIX) 5 MG tablet Take 20 mg by mouth Daily With Breakfast.    Pauline Perez MD     Objective  "    Vital Signs: BP 91/56   Pulse 84   Temp 98.8 °F (37.1 °C)   Resp 24   Ht 177.8 cm (70\")   Wt (!) 137 kg (302 lb)   SpO2 97%   BMI 43.33 kg/m²   Physical Exam   Constitutional: He appears well-developed. No distress.   Morbidly obese, he is drowsy. He is very unreliable historian.  He almost fell back  on gurney   HENT:   Head: Normocephalic and atraumatic.   Right Ear: External ear normal.   Left Ear: External ear normal.   Nose: Nose normal.   Mouth/Throat: Oropharynx is clear and moist. No oropharyngeal exudate.   Eyes: Conjunctivae and EOM are normal. Pupils are equal, round, and reactive to light. Right eye exhibits no discharge. Left eye exhibits no discharge. No scleral icterus.   Neck: Neck supple. No tracheal deviation present. No thyromegaly present.   Cardiovascular: Normal rate, regular rhythm and normal heart sounds.   Pulmonary/Chest: Effort normal and breath sounds normal. No stridor. No respiratory distress. He has no wheezes.   Abdominal: Soft. Bowel sounds are normal. He exhibits no distension and no mass. There is no tenderness. There is no guarding.   Musculoskeletal: He exhibits edema. He exhibits no tenderness.   Neurological: He displays normal reflexes. No cranial nerve deficit. Coordination normal.   He follows command   Skin: Skin is warm and dry. Capillary refill takes less than 2 seconds. He is not diaphoretic. No erythema.   Psychiatric: He has a normal mood and affect. His behavior is normal.   Vitals reviewed.          Results Reviewed:  Lab Results (last 24 hours)     Procedure Component Value Units Date/Time    Blood Culture - Blood, Blood, Venous Line [[id number]] Collected:  10/17/19 1615    Specimen:  Blood, Venous Line Updated:  10/17/19 1621    Lactic Acid, Plasma [[id number]] Collected:  10/17/19 1615    Specimen:  Blood Updated:  10/17/19 1619    Urinalysis With Culture If Indicated - Urine, Catheter In/Out [[id number]]  (Abnormal) Collected:  10/17/19 7705    Specimen: "  Urine, Catheter In/Out Updated:  10/17/19 1454     Color, UA Dark Yellow     Appearance, UA Turbid     pH, UA <=5.0     Specific Gravity, UA 1.021     Glucose,  mg/dL (2+)     Ketones, UA Negative     Bilirubin, UA Negative     Blood, UA Negative     Protein, UA 30 mg/dL (1+)     Leuk Esterase, UA Moderate (2+)     Nitrite, UA Negative     Urobilinogen, UA 0.2 E.U./dL    Urinalysis, Microscopic Only - Urine, Catheter In/Out [[id number]]  (Abnormal) Collected:  10/17/19 1438    Specimen:  Urine, Catheter In/Out Updated:  10/17/19 1454     RBC, UA 3-5 /HPF      WBC, UA Too Numerous to Count /HPF      Bacteria, UA 4+ /HPF      Squamous Epithelial Cells, UA 3-6 /HPF      Hyaline Casts, UA 7-12 /LPF      Methodology Automated Microscopy    Urine Culture - Urine, Urine, Catheter In/Out [[id number]] Collected:  10/17/19 1438    Specimen:  Urine, Catheter In/Out Updated:  10/17/19 1454    Comprehensive Metabolic Panel [[id number]]  (Abnormal) Collected:  10/17/19 1330    Specimen:  Blood Updated:  10/17/19 1424     Glucose 115 mg/dL      BUN 33 mg/dL      Creatinine 1.73 mg/dL      Sodium 141 mmol/L      Potassium 4.3 mmol/L      Chloride 101 mmol/L      CO2 27.0 mmol/L      Calcium 8.4 mg/dL      Total Protein 7.4 g/dL      Albumin 4.10 g/dL      ALT (SGPT) 61 U/L      AST (SGOT) 44 U/L      Alkaline Phosphatase 51 U/L      Total Bilirubin 0.2 mg/dL      eGFR Non African Amer 42 mL/min/1.73      Globulin 3.3 gm/dL      A/G Ratio 1.2 g/dL      BUN/Creatinine Ratio 19.1     Anion Gap 13.0 mmol/L     Narrative:       GFR Normal >60  Chronic Kidney Disease <60  Kidney Failure <15    Troponin [[id number]]  (Normal) Collected:  10/17/19 1330    Specimen:  Blood Updated:  10/17/19 1421     Troponin T <0.010 ng/mL     Narrative:       Troponin T Reference Range:  <= 0.03 ng/mL-   Negative for AMI  >0.03 ng/mL-     Abnormal for myocardial necrosis.  Clinicians would have to utilize clinical acumen, EKG, Troponin and serial  changes to determine if it is an Acute Myocardial Infarction or myocardial injury due to an underlying chronic condition.     Red Top [[id number]] Collected:  10/17/19 1330    Specimen:  Blood Updated:  10/17/19 1358    [place] Draw [[id number]] Collected:  10/17/19 1236    Specimen:  Blood Updated:  10/17/19 1346    Narrative:       The following orders were created for panel order [place] Draw.  Procedure                               Abnormality         Status                     ---------                               -----------         ------                     Light Blue Top[[id number]]                                   Final result               Green Top (Gel)[[id number]]                                  Final result               Lavender Top[[id number]]                                     Final result               Red Top[[id number]]                                          In process                   Please view results for these tests on the individual orders.    Light Blue Top [[id number]] Collected:  10/17/19 1236    Specimen:  Blood Updated:  10/17/19 1346     Extra Tube hold for add-on     Comment: Auto resulted       Green Top (Gel) [[id number]] Collected:  10/17/19 1236    Specimen:  Blood Updated:  10/17/19 1346     Extra Tube Hold for add-ons.     Comment: Auto resulted.       Lavender Top [[id number]] Collected:  10/17/19 1236    Specimen:  Blood Updated:  10/17/19 1346     Extra Tube hold for add-on     Comment: Auto resulted       Lipase [[id number]]  (Normal) Collected:  10/17/19 1236    Specimen:  Blood Updated:  10/17/19 1309     Lipase 15 U/L     BNP [[id number]]  (Normal) Collected:  10/17/19 1236    Specimen:  Blood Updated:  10/17/19 1309     proBNP 53.8 pg/mL     Narrative:       Among patients with dyspnea, NT-proBNP is highly sensitive for the detection of acute congestive heart failure. In addition NT-proBNP of <300 pg/ml effectively rules out acute congestive heart failure with 99%  negative predictive value.    CBC & Differential [[id number]] Collected:  10/17/19 1236    Specimen:  Blood Updated:  10/17/19 1251    Narrative:       The following orders were created for panel order CBC & Differential.  Procedure                               Abnormality         Status                     ---------                               -----------         ------                     CBC Auto Differential[[id number]]        Abnormal            Final result                 Please view results for these tests on the individual orders.    CBC Auto Differential [[id number]]  (Abnormal) Collected:  10/17/19 1236    Specimen:  Blood Updated:  10/17/19 1251     WBC 9.27 10*3/mm3      RBC 3.69 10*6/mm3      Hemoglobin 11.3 g/dL      Hematocrit 34.0 %      MCV 92.1 fL      MCH 30.6 pg      MCHC 33.2 g/dL      RDW 13.0 %      RDW-SD 43.6 fl      MPV 11.5 fL      Platelets 188 10*3/mm3      Neutrophil % 59.5 %      Lymphocyte % 26.9 %      Monocyte % 9.8 %      Eosinophil % 3.0 %      Basophil % 0.4 %      Immature Grans % 0.4 %      Neutrophils, Absolute 5.51 10*3/mm3      Lymphocytes, Absolute 2.49 10*3/mm3      Monocytes, Absolute 0.91 10*3/mm3      Eosinophils, Absolute 0.28 10*3/mm3      Basophils, Absolute 0.04 10*3/mm3      Immature Grans, Absolute 0.04 10*3/mm3      nRBC 0.0 /100 WBC         Imaging Results (last 24 hours)     Procedure Component Value Units Date/Time    CT Abdomen Pelvis With Contrast [[id number]] Collected:  10/17/19 1523     Updated:  10/17/19 1534    Narrative:       Exam: CT of the abdomen and pelvis with IV contrast     Indication: Abdominal pain, fever, abscess suspected     Comparison: 08/19/2018     DOSE LENGTH PRODUCT: 1807.8 mGy cm. Automated exposure control was also  utilized to decrease patient radiation dose.     Findings:     The liver is diffusely steatotic. No suspicious liver lesion. Small  calcified stone in the gallbladder. No gallbladder wall thickening or  adjacent  inflammation. No biliary ductal dilatation. Pancreas and  adrenal glands are unremarkable. Numerous calcified yoseph within the  spleen. Spleen is borderline enlarged measuring 13 cm.     Low density subcentimeter renal lesions are likely cysts but too small  to definitively characterize. No solid enhancing renal mass. No  urolithiasis or hydronephrosis. Urinary bladder appears unremarkable.     The ascending and transverse colon are mildly distended, mildly  thick-walled, and contain layering fluid. The appendix is not identified  but no secondary signs of appendicitis are present.     No ascites or free pelvic fluid. No pelvic mass organized pelvic  collection. Abdominal aorta is normal in caliber. Celiac trunk, SMA, and  ALIRIO are widely patent. Renal arteries appear widely patent. No enlarged  retroperitoneal, mesenteric, pelvic, or inguinal lymph nodes.      L5-S1 degenerative change. No suspicious bone lesion.          Impression:          Mild distention of the ascending and transverse colon which demonstrate  mild wall thickening and contain layering fluid. Findings are most  consistent with mild colitis and/or diarrhea illness.     Diffuse hepatic steatosis.     Cholelithiasis without evidence of cholecystitis.  This report was finalized on 10/17/2019 15:31 by Dr. Siddharth Carias MD.    CT Angiogram Chest [[id number]] Collected:  10/17/19 1515     Updated:  10/17/19 1526    Narrative:       Exam: CT ANGIOGRAM CHEST-     Indication: Chest pain, acute, PE suspected, high pretest prob     Comparison: 07/30/2006     DOSE LENGTH PRODUCT: 635.6 mGy cm. Automated exposure control was also  utilized to decrease patient radiation dose.     Findings:     No pulmonary embolus identified. The main pulmonary trunk is normal in  caliber. No evidence of right heart strain. The thoracic aorta is normal  in caliber. The central airways are clear. No consolidation, pleural  effusion, or pneumothorax. No suspicious pulmonary  nodule. Unchanged  small suspected varix in the right posterior supraclavicular region.     No enlarged thoracic lymph nodes. Findings in the upper abdomen will be  described on same day CT abdomen/pelvis report. No suspicious osseous  lesions.       Impression:          1.  No pulmonary embolus identified. No acute findings.  2.  Findings in the upper abdomen will be described on same day CT  abdomen/pelvis report.     This report was finalized on 10/17/2019 15:23 by Dr. Siddharth Carias MD.        I have personally reviewed and interpreted the radiology studies and ECG obtained at time of admission.     Assessment / Plan     Assessment:   Active Hospital Problems    Diagnosis   • Acute UTI       UTI  Abdominal pain   Diarrhea  HIV +   DMT2  ?AMS - during my stay he will readily fall asleep and almost fall back on the gurney while seated at the edge.  His last fentanyl dose was 5 hours ago.   Depression/anxiety  Morbidly obese    Plan:    hydrate; follow renal function  D/c losartan and metformin; limit sedating medication (Lyrica, etc)   Place on ssi, monitor blood sugar   Consult Dr. Casey - HIV; I discussed with pharmacist re: Genvoya  - no need for dose adjustment base on renal function   Empiric abx  Follow culture  Check gi pcr      Code Status: full code   I discussed the patient's findings and my recommendations with the  Patient and er nurse Rose Mary  Estimated length of stay to be determined  Talon Rizo MD   10/17/19   4:31 PM

## 2019-10-17 NOTE — ED PROVIDER NOTES
Subjective   Patient is a 53-year-old male who presents to the ER with abdominal pain.  Patient states he has had sharp and stabbing bilateral upper abdominal pain for the last week, progressively worsening.  Patient states he has also had shortness of air for the last week especially worse with exertion.  Patient has also had nausea and diarrhea but denies vomiting.  Patient has never had pain like this previously.  He denies any fever, chest pain, vomiting, urinary changes, neurologic changes.  Patient has no other concerns.            Review of Systems   Constitutional: Negative.    HENT: Negative.    Eyes: Negative.    Respiratory: Positive for shortness of breath.    Cardiovascular: Negative.    Gastrointestinal: Positive for abdominal pain, diarrhea and nausea.   Endocrine: Negative.    Genitourinary: Negative.    Musculoskeletal: Negative.    Skin: Negative.    Allergic/Immunologic: Negative.    Neurological: Negative.    Hematological: Negative.    Psychiatric/Behavioral: Negative.    All other systems reviewed and are negative.      Past Medical History:   Diagnosis Date   • Allergic rhinitis    • Anxiety    • Bursitis    • DDD (degenerative disc disease), cervical    • Depression    • HIV disease (CMS/HCC)    • HLD (hyperlipidemia)    • HSP (Henoch Schonlein purpura) (CMS/HCC)    • HTN (hypertension)    • Migraine    • Myocardial infarction (CMS/HCC)    • Osteoporosis    • Sleep apnea    • Type 2 diabetes mellitus (CMS/HCC)        Allergies   Allergen Reactions   • Amitriptyline Anaphylaxis   • Amitriptyline Hcl Anaphylaxis   • Darvon [Propoxyphene] Anaphylaxis   • Zithromax [Azithromycin] Anaphylaxis       Past Surgical History:   Procedure Laterality Date   • ANKLE SURGERY     • COLONOSCOPY  06/19/2009    Normal exam repeat in 10 years   • FOREARM SURGERY     • LYMPH NODE BIOPSY         Family History   Problem Relation Age of Onset   • Diabetes Mother    • Hypertension Mother    • Thyroid disease Mother     • Hypertension Father    • Thyroid disease Father    • Arrhythmia Father    • Diabetes Maternal Grandfather    • Heart disease Maternal Grandfather    • Hypertension Maternal Grandfather    • Diabetes Paternal Grandmother    • Stroke Paternal Grandmother    • Heart disease Paternal Grandmother    • Hypertension Paternal Grandmother    • Thyroid disease Paternal Grandmother    • Hypertension Paternal Grandfather    • Hypertension Sister    • No Known Problems Brother        Social History     Socioeconomic History   • Marital status:      Spouse name: Not on file   • Number of children: Not on file   • Years of education: Not on file   • Highest education level: Not on file   Tobacco Use   • Smoking status: Current Every Day Smoker     Packs/day: 1.00     Types: Cigarettes     Start date: 1988   • Smokeless tobacco: Never Used   Substance and Sexual Activity   • Alcohol use: Yes     Comment: occasionally   • Drug use: No   • Sexual activity: Defer           Objective   Physical Exam   Constitutional: He is oriented to person, place, and time. He appears well-developed and well-nourished.   HENT:   Head: Normocephalic and atraumatic.   Eyes: Conjunctivae are normal. Pupils are equal, round, and reactive to light.   Neck: Normal range of motion.   Cardiovascular: Normal rate, regular rhythm and normal heart sounds.   Pulmonary/Chest: Effort normal and breath sounds normal.   Abdominal: Soft. There is tenderness in the right upper quadrant, epigastric area and left upper quadrant. There is guarding. There is no rigidity, no rebound and no CVA tenderness.   Musculoskeletal: Normal range of motion. He exhibits no edema or deformity.   Neurological: He is alert and oriented to person, place, and time. He has normal strength.   Skin: Skin is warm.   Psychiatric: He has a normal mood and affect. His behavior is normal.   Nursing note and vitals reviewed.      Procedures           ED Course      ECG: Normal sinus  rhythm with a rate of 84, no acute ischemia or infarction    Patient was given IV fluids, fentanyl and Zofran.      Lab Results (last 24 hours)     Procedure Component Value Units Date/Time    CBC & Differential [[id number]] Collected:  10/17/19 1236    Specimen:  Blood Updated:  10/17/19 1251    Narrative:       The following orders were created for panel order CBC & Differential.  Procedure                               Abnormality         Status                     ---------                               -----------         ------                     CBC Auto Differential[[id number]]        Abnormal            Final result                 Please view results for these tests on the individual orders.    Lipase [[id number]]  (Normal) Collected:  10/17/19 1236    Specimen:  Blood Updated:  10/17/19 1309     Lipase 15 U/L     BNP [[id number]]  (Normal) Collected:  10/17/19 1236    Specimen:  Blood Updated:  10/17/19 1309     proBNP 53.8 pg/mL     Narrative:       Among patients with dyspnea, NT-proBNP is highly sensitive for the detection of acute congestive heart failure. In addition NT-proBNP of <300 pg/ml effectively rules out acute congestive heart failure with 99% negative predictive value.    CBC Auto Differential [[id number]]  (Abnormal) Collected:  10/17/19 1236    Specimen:  Blood Updated:  10/17/19 1251     WBC 9.27 10*3/mm3      RBC 3.69 10*6/mm3      Hemoglobin 11.3 g/dL      Hematocrit 34.0 %      MCV 92.1 fL      MCH 30.6 pg      MCHC 33.2 g/dL      RDW 13.0 %      RDW-SD 43.6 fl      MPV 11.5 fL      Platelets 188 10*3/mm3      Neutrophil % 59.5 %      Lymphocyte % 26.9 %      Monocyte % 9.8 %      Eosinophil % 3.0 %      Basophil % 0.4 %      Immature Grans % 0.4 %      Neutrophils, Absolute 5.51 10*3/mm3      Lymphocytes, Absolute 2.49 10*3/mm3      Monocytes, Absolute 0.91 10*3/mm3      Eosinophils, Absolute 0.28 10*3/mm3      Basophils, Absolute 0.04 10*3/mm3      Immature Grans, Absolute 0.04 10*3/mm3       nRBC 0.0 /100 WBC     Comprehensive Metabolic Panel [[id number]]  (Abnormal) Collected:  10/17/19 1330    Specimen:  Blood Updated:  10/17/19 1424     Glucose 115 mg/dL      BUN 33 mg/dL      Creatinine 1.73 mg/dL      Sodium 141 mmol/L      Potassium 4.3 mmol/L      Chloride 101 mmol/L      CO2 27.0 mmol/L      Calcium 8.4 mg/dL      Total Protein 7.4 g/dL      Albumin 4.10 g/dL      ALT (SGPT) 61 U/L      AST (SGOT) 44 U/L      Alkaline Phosphatase 51 U/L      Total Bilirubin 0.2 mg/dL      eGFR Non African Amer 42 mL/min/1.73      Globulin 3.3 gm/dL      A/G Ratio 1.2 g/dL      BUN/Creatinine Ratio 19.1     Anion Gap 13.0 mmol/L     Narrative:       GFR Normal >60  Chronic Kidney Disease <60  Kidney Failure <15    Troponin [[id number]]  (Normal) Collected:  10/17/19 1330    Specimen:  Blood Updated:  10/17/19 1421     Troponin T <0.010 ng/mL     Narrative:       Troponin T Reference Range:  <= 0.03 ng/mL-   Negative for AMI  >0.03 ng/mL-     Abnormal for myocardial necrosis.  Clinicians would have to utilize clinical acumen, EKG, Troponin and serial changes to determine if it is an Acute Myocardial Infarction or myocardial injury due to an underlying chronic condition.     Urinalysis With Culture If Indicated - Urine, Catheter In/Out [[id number]]  (Abnormal) Collected:  10/17/19 1438    Specimen:  Urine, Catheter In/Out Updated:  10/17/19 1454     Color, UA Dark Yellow     Appearance, UA Turbid     pH, UA <=5.0     Specific Gravity, UA 1.021     Glucose,  mg/dL (2+)     Ketones, UA Negative     Bilirubin, UA Negative     Blood, UA Negative     Protein, UA 30 mg/dL (1+)     Leuk Esterase, UA Moderate (2+)     Nitrite, UA Negative     Urobilinogen, UA 0.2 E.U./dL    Urinalysis, Microscopic Only - Urine, Catheter In/Out [[id number]]  (Abnormal) Collected:  10/17/19 1438    Specimen:  Urine, Catheter In/Out Updated:  10/17/19 1454     RBC, UA 3-5 /HPF      WBC, UA Too Numerous to Count /HPF      Bacteria, UA 4+  /HPF      Squamous Epithelial Cells, UA 3-6 /HPF      Hyaline Casts, UA 7-12 /LPF      Methodology Automated Microscopy    Urine Culture - Urine, Urine, Catheter In/Out [[id number]] Collected:  10/17/19 1438    Specimen:  Urine, Catheter In/Out Updated:  10/17/19 1454        CT Abdomen Pelvis With Contrast   Final Result       Mild distention of the ascending and transverse colon which demonstrate   mild wall thickening and contain layering fluid. Findings are most   consistent with mild colitis and/or diarrhea illness.       Diffuse hepatic steatosis.       Cholelithiasis without evidence of cholecystitis.   This report was finalized on 10/17/2019 15:31 by Dr. Siddharth Carias MD.      CT Angiogram Chest   Final Result       1.  No pulmonary embolus identified. No acute findings.   2.  Findings in the upper abdomen will be described on same day CT   abdomen/pelvis report.       This report was finalized on 10/17/2019 15:23 by Dr. Siddharth Carias MD.        Labs showed an elevated BUN and creatinine, elevated GFR and a UTI.  CT scan of the chest showed no acute findings.  CT scan of the abdomen showed mild colitis.  Patient blood pressure remained borderline low while here in the ER.  He was given a sepsis bolus.  Cultures were obtained.  Patient was given Rocephin.  Patient was then admitted to the hospitalist service for further treatment.            MDM    Final diagnoses:   Acute UTI   DINA (acute kidney injury) (CMS/[facility], located within [facility])   Dehydration   Colitis              Rita Bailey MD  10/17/19 4817

## 2019-10-17 NOTE — PLAN OF CARE
Problem: Fall Risk (Adult)  Goal: Identify Related Risk Factors and Signs and Symptoms  Outcome: Outcome(s) achieved Date Met: 10/17/19    Goal: Absence of Fall  Outcome: Ongoing (interventions implemented as appropriate)      Problem: Patient Care Overview  Goal: Plan of Care Review  Outcome: Ongoing (interventions implemented as appropriate)   10/17/19 1804   Coping/Psychosocial   Plan of Care Reviewed With patient       Problem: Renal Failure/Kidney Injury, Acute (Adult)  Goal: Signs and Symptoms of Listed Potential Problems Will be Absent, Minimized or Managed (Renal Failure/Kidney Injury, Acute)  Outcome: Ongoing (interventions implemented as appropriate)      Problem: Sepsis/Septic Shock (Adult)  Goal: Signs and Symptoms of Listed Potential Problems Will be Absent, Minimized or Managed (Sepsis/Septic Shock)  Outcome: Ongoing (interventions implemented as appropriate)      Problem: Urinary Tract Infection (Adult)  Goal: Signs and Symptoms of Listed Potential Problems Will be Absent, Minimized or Managed (Urinary Tract Infection)  Outcome: Ongoing (interventions implemented as appropriate)

## 2019-10-18 PROBLEM — IMO0001 IDDM (INSULIN DEPENDENT DIABETES MELLITUS): Status: ACTIVE | Noted: 2019-10-18

## 2019-10-18 PROBLEM — K76.0 HEPATIC STEATOSIS: Status: ACTIVE | Noted: 2019-10-18

## 2019-10-18 PROBLEM — R74.01 TRANSAMINITIS: Status: ACTIVE | Noted: 2019-10-18

## 2019-10-18 PROBLEM — Z72.0 TOBACCO ABUSE: Status: ACTIVE | Noted: 2019-10-18

## 2019-10-18 PROBLEM — G47.33 OSA (OBSTRUCTIVE SLEEP APNEA): Status: ACTIVE | Noted: 2019-10-18

## 2019-10-18 PROBLEM — E78.2 MIXED HYPERLIPIDEMIA: Status: ACTIVE | Noted: 2019-10-18

## 2019-10-18 LAB
ANION GAP SERPL CALCULATED.3IONS-SCNC: 13 MMOL/L (ref 5–15)
ARTERIAL PATENCY WRIST A: POSITIVE
ATMOSPHERIC PRESS: 752 MMHG
BASE EXCESS BLDA CALC-SCNC: -0.3 MMOL/L (ref 0–2)
BDY SITE: ABNORMAL
BODY TEMPERATURE: 37 C
BUN BLD-MCNC: 32 MG/DL (ref 6–20)
BUN/CREAT SERPL: 21.2 (ref 7–25)
CALCIUM SPEC-SCNC: 8.1 MG/DL (ref 8.6–10.5)
CHLORIDE SERPL-SCNC: 100 MMOL/L (ref 98–107)
CO2 SERPL-SCNC: 24 MMOL/L (ref 22–29)
CREAT BLD-MCNC: 1.51 MG/DL (ref 0.76–1.27)
GFR SERPL CREATININE-BSD FRML MDRD: 49 ML/MIN/1.73
GLUCOSE BLD-MCNC: 152 MG/DL (ref 65–99)
GLUCOSE BLDC GLUCOMTR-MCNC: 169 MG/DL (ref 70–130)
GLUCOSE BLDC GLUCOMTR-MCNC: 238 MG/DL (ref 70–130)
GLUCOSE BLDC GLUCOMTR-MCNC: 267 MG/DL (ref 70–130)
GLUCOSE BLDC GLUCOMTR-MCNC: 312 MG/DL (ref 70–130)
HCO3 BLDA-SCNC: 25.7 MMOL/L (ref 20–26)
HOROWITZ INDEX BLD+IHG-RTO: 21 %
Lab: ABNORMAL
MODALITY: ABNORMAL
PCO2 BLDA: 46.6 MM HG (ref 35–45)
PH BLDA: 7.35 PH UNITS (ref 7.35–7.45)
PO2 BLDA: 57.4 MM HG (ref 83–108)
POTASSIUM BLD-SCNC: 4.5 MMOL/L (ref 3.5–5.2)
SAO2 % BLDCOA: 88.8 % (ref 94–99)
SODIUM BLD-SCNC: 137 MMOL/L (ref 136–145)
VENTILATOR MODE: ABNORMAL

## 2019-10-18 PROCEDURE — 82962 GLUCOSE BLOOD TEST: CPT

## 2019-10-18 PROCEDURE — 25010000002 ENOXAPARIN PER 10 MG: Performed by: INTERNAL MEDICINE

## 2019-10-18 PROCEDURE — 36600 WITHDRAWAL OF ARTERIAL BLOOD: CPT

## 2019-10-18 PROCEDURE — 25010000002 CEFTRIAXONE PER 250 MG: Performed by: INTERNAL MEDICINE

## 2019-10-18 PROCEDURE — 80048 BASIC METABOLIC PNL TOTAL CA: CPT | Performed by: INTERNAL MEDICINE

## 2019-10-18 PROCEDURE — 94799 UNLISTED PULMONARY SVC/PX: CPT

## 2019-10-18 PROCEDURE — 63710000001 INSULIN LISPRO (HUMAN) PER 5 UNITS: Performed by: INTERNAL MEDICINE

## 2019-10-18 PROCEDURE — 82803 BLOOD GASES ANY COMBINATION: CPT

## 2019-10-18 RX ADMIN — PROPRANOLOL HYDROCHLORIDE 60 MG: 60 CAPSULE, EXTENDED RELEASE ORAL at 09:11

## 2019-10-18 RX ADMIN — CEFTRIAXONE SODIUM 1 G: 1 INJECTION, POWDER, FOR SOLUTION INTRAMUSCULAR; INTRAVENOUS at 18:38

## 2019-10-18 RX ADMIN — INSULIN LISPRO 10 UNITS: 100 INJECTION, SOLUTION INTRAVENOUS; SUBCUTANEOUS at 21:30

## 2019-10-18 RX ADMIN — SODIUM CHLORIDE 100 ML/HR: 9 INJECTION, SOLUTION INTRAVENOUS at 23:23

## 2019-10-18 RX ADMIN — ARIPIPRAZOLE 2 MG: 2 TABLET ORAL at 09:11

## 2019-10-18 RX ADMIN — TAMSULOSIN HYDROCHLORIDE 0.4 MG: 0.4 CAPSULE ORAL at 21:30

## 2019-10-18 RX ADMIN — SODIUM CHLORIDE 100 ML/HR: 9 INJECTION, SOLUTION INTRAVENOUS at 12:47

## 2019-10-18 RX ADMIN — CETIRIZINE HYDROCHLORIDE 10 MG: 10 TABLET, FILM COATED ORAL at 09:11

## 2019-10-18 RX ADMIN — INSULIN LISPRO 5 UNITS: 100 INJECTION, SOLUTION INTRAVENOUS; SUBCUTANEOUS at 12:47

## 2019-10-18 RX ADMIN — VITAMIN D 1000 UNITS: 25 TAB ORAL at 09:11

## 2019-10-18 RX ADMIN — ENOXAPARIN SODIUM 40 MG: 40 INJECTION SUBCUTANEOUS at 18:38

## 2019-10-18 RX ADMIN — HYDROCODONE BITARTRATE AND ACETAMINOPHEN 1 TABLET: 5; 325 TABLET ORAL at 11:02

## 2019-10-18 RX ADMIN — CALCIUM 500 MG: 500 TABLET ORAL at 09:11

## 2019-10-18 RX ADMIN — INSULIN LISPRO 3 UNITS: 100 INJECTION, SOLUTION INTRAVENOUS; SUBCUTANEOUS at 09:11

## 2019-10-18 RX ADMIN — PANTOPRAZOLE SODIUM 40 MG: 40 TABLET, DELAYED RELEASE ORAL at 09:11

## 2019-10-18 RX ADMIN — INSULIN LISPRO 8 UNITS: 100 INJECTION, SOLUTION INTRAVENOUS; SUBCUTANEOUS at 18:39

## 2019-10-18 RX ADMIN — ATORVASTATIN CALCIUM 80 MG: 40 TABLET, FILM COATED ORAL at 21:30

## 2019-10-18 NOTE — PROGRESS NOTES
"Pharmacy Renal Dose Conversion    Donis Yi is a 53 y.o. year old male  177.8 cm (70\") (!) 137 kg (302 lb)    Estimated Creatinine Clearance: 80.7 mL/min (A) (by C-G formula based on SCr of 1.51 mg/dL (H)).   Lab Results   Component Value Date    CREATININE 1.51 (H) 10/18/2019    CREATININE 1.73 (H) 10/17/2019    CREATININE 1.25 09/21/2019    CREATININE 1.1 09/09/2019    CREATININE 1.1 06/03/2019    CREATININE 1 08/31/2018       PLAN  Based on prescribing information provided by the drug , Enoxaparin 30mg sq Q24H,  has been changed to Enoxaparin 40mg sq Q24H.    Adjusted per the directives and guidelines established by [facility] and [facility] Medical Executive Committee.  Pharmacy will continue to monitor daily and make further adjustment(s) accordingly.    Siddharth Beltre, PharmD  10/18/192:52 PM    "

## 2019-10-18 NOTE — PLAN OF CARE
Problem: Fall Risk (Adult)  Goal: Absence of Fall  Outcome: Ongoing (interventions implemented as appropriate)      Problem: Patient Care Overview  Goal: Plan of Care Review  Outcome: Ongoing (interventions implemented as appropriate)    Goal: Individualization and Mutuality  Outcome: Ongoing (interventions implemented as appropriate)    Goal: Discharge Needs Assessment  Outcome: Ongoing (interventions implemented as appropriate)    Goal: Interprofessional Rounds/Family Conf  Outcome: Ongoing (interventions implemented as appropriate)      Problem: Renal Failure/Kidney Injury, Acute (Adult)  Goal: Signs and Symptoms of Listed Potential Problems Will be Absent, Minimized or Managed (Renal Failure/Kidney Injury, Acute)  Outcome: Ongoing (interventions implemented as appropriate)      Problem: Sepsis/Septic Shock (Adult)  Goal: Signs and Symptoms of Listed Potential Problems Will be Absent, Minimized or Managed (Sepsis/Septic Shock)  Outcome: Ongoing (interventions implemented as appropriate)      Problem: Urinary Tract Infection (Adult)  Goal: Signs and Symptoms of Listed Potential Problems Will be Absent, Minimized or Managed (Urinary Tract Infection)  Outcome: Ongoing (interventions implemented as appropriate)      Problem: Patient Care Overview  Goal: Plan of Care Review  Outcome: Ongoing (interventions implemented as appropriate)   10/18/19 0323   Coping/Psychosocial   Plan of Care Reviewed With patient   Plan of Care Review   Progress no change   OTHER   Outcome Summary pt continues to be sleepy. sleeps when undisturbed. pt dc'd iv x2. attempted to reinsert x4. O2@2l applied as sats are in low 80's while asleep. pt with hx of sleep apnea states lost cpap approx 3 years ago. pt unsteady on feet. bedcheck on. answers all questions correctly.      Goal: Individualization and Mutuality  Outcome: Ongoing (interventions implemented as appropriate)    Goal: Discharge Needs Assessment  Outcome: Ongoing (interventions  implemented as appropriate)    Goal: Interprofessional Rounds/Family Conf  Outcome: Ongoing (interventions implemented as appropriate)      Problem: Pain, Chronic (Adult)  Goal: Identify Related Risk Factors and Signs and Symptoms  Outcome: Ongoing (interventions implemented as appropriate)    Goal: Acceptable Pain/Comfort Level and Functional Ability  Outcome: Ongoing (interventions implemented as appropriate)

## 2019-10-18 NOTE — PROGRESS NOTES
Discharge Planning Assessment  [facility]     Patient Name: Donis Yi  MRN: [id number]  Today's Date: 10/18/2019    Admit Date: 10/17/2019    Discharge Needs Assessment     Row Name 10/18/19 1518       Living Environment    Lives With  friend(s)    Current Living Arrangements  home/apartment/condo    Primary Care Provided by  self    Provides Primary Care For  no one    Quality of Family Relationships  supportive    Able to Return to Prior Arrangements  yes       Resource/Environmental Concerns    Resource/Environmental Concerns  none       Transition Planning    Patient/Family Anticipates Transition to  home with family    Transportation Anticipated  family or friend will provide       Discharge Needs Assessment    Readmission Within the Last 30 Days  no previous admission in last 30 days    Equipment Currently Used at Home  cane, straight;bipap/cpap    Anticipated Changes Related to Illness  none    Discharge Coordination/Progress  Pt lives at home and plans to return home at d/c. He states he is independent and he does not think he will need home health. Pt has rx coverage. He denies needs.         Discharge Plan    No documentation.       Destination      No service coordination in this encounter.      Durable Medical Equipment      No service coordination in this encounter.      Dialysis/Infusion      No service coordination in this encounter.      Home Medical Care      No service coordination in this encounter.      Therapy      No service coordination in this encounter.      Community Resources      No service coordination in this encounter.          Demographic Summary    No documentation.       Functional Status    No documentation.       Psychosocial    No documentation.       Abuse/Neglect    No documentation.       Legal    No documentation.       Substance Abuse    No documentation.       Patient Forms    No documentation.           LEOBARDO Murphy

## 2019-10-18 NOTE — CONSULTS
"INFECTIOUS DISEASES CONSULT NOTE    Patient:  Donis Yi 53 y.o. male  ROOM # 394/1  YOB: 1965  MRN: [id number]  [facility]:  [id number]  Admit date: 10/17/2019   Admitting Physician: Talon Rizo*  Primary Care Physician: Oksana Mares MD  REFERRING PROVIDER: Talon Rizo*      REASON FOR CONSULTATION :HIV patient on antirtroviral agent presenting with diarrhea, abdominal pain and uti - recommendation on eval and management      HISTORY OF PRESENT ILLNESS: The patient is a 53-year-old gentleman who I follow at [facility] for approximately 20 years for HIV/AIDS.  His initial history was complicated by disseminated Mycobacterium avium complex.  He has had complete immune reconstitution and has a viral load that has been undetectable for over a decade.    He reports she was admitted with abdominal pain and \"a bad UTI\".  He did not mention diarrhea and noticed his last bowel movement documented was October 16 upon his initial history from nursing.    He reports he has seen Dr. Gomez as an outpatient and alluded that he had seen him recently held off on a note in August.  He states he is having some difficulty voiding.    I spoke with the nurse and she stated they bladder scanned into the night but did not find much however he voided over a liter this morning.  It is possible to bladder scans or not reliable given his obesity.    Nursing has noted that he has been very sleepy and that he fell asleep on the bedside toilet.  It appeared he was very sleepy in the emergency department as well.  Of course there is a note that he had received fentanyl.  Gracie RN reports he did not receive any additional pain medication.  I did explain the pain that are known for years and have never known him to fall asleep while talking to him.    He does state that he started on melatonin a few months ago to help with sleep.  He does not think there are any other new medications.    He does have " a diagnosis of sleep apnea.  Unclear about use of CPAP      Past Medical History:   Diagnosis Date   • Allergic rhinitis    • Anxiety    • Bursitis    • DDD (degenerative disc disease), cervical    • Depression    • HIV disease (CMS/HCC)    • HLD (hyperlipidemia)    • HSP (Hen[place] Schonlein purpura) (CMS/HCC)    • HTN (hypertension)    • Migraine    • Myocardial infarction (CMS/HCC)    • Osteoporosis    • Sleep apnea    • Type 2 diabetes mellitus (CMS/HCC)    Disseminated Mycobacterium avium 1998        Past Surgical History:   Procedure Laterality Date   • ANKLE SURGERY     • COLONOSCOPY  06/19/2009    Normal exam repeat in 10 years   • FOREARM SURGERY     • LYMPH NODE BIOPSY       Family History   Problem Relation Age of Onset   • Diabetes Mother    • Hypertension Mother    • Thyroid disease Mother    • Hypertension Father    • Thyroid disease Father    • Arrhythmia Father    • Diabetes Maternal Grandfather    • Heart disease Maternal Grandfather    • Hypertension Maternal Grandfather    • Diabetes Paternal Grandmother    • Stroke Paternal Grandmother    • Heart disease Paternal Grandmother    • Hypertension Paternal Grandmother    • Thyroid disease Paternal Grandmother    • Hypertension Paternal Grandfather    • Hypertension Sister    • No Known Problems Brother      Social History     Socioeconomic History   • Marital status:      Spouse name: Not on file   • Number of children: Not on file   • Years of education: Not on file   • Highest education level: Not on file   Tobacco Use   • Smoking status: Current Every Day Smoker     Packs/day: 1.00     Types: Cigarettes     Start date: 1988   • Smokeless tobacco: Never Used   Substance and Sexual Activity   • Alcohol use: Yes     Comment: occasionally   • Drug use: No   • Sexual activity: Defer           Current Meds:     Current Facility-Administered Medications   Medication Dose Route Frequency Provider Last Rate Last Dose   • ARIPiprazole (ABILIFY) tablet 2  mg  2 mg Oral Daily Talon Rizo MD   2 mg at 10/17/19 2030   • atorvastatin (LIPITOR) tablet 80 mg  80 mg Oral Nightly Talon Rizo MD   80 mg at 10/17/19 2029   • calcium carbonate (oyster shell) tablet 500 mg  500 mg Oral Daily Talon Rizo MD   500 mg at 10/17/19 2030   • cefTRIAXone (ROCEPHIN) 1 g/100 mL 0.9% NS (MBP)  1 g Intravenous Q24H Talon Rizo MD       • cetirizine (zyrTEC) tablet 10 mg  10 mg Oral Daily Talon Rizo MD   10 mg at 10/17/19 2030   • cholecalciferol (VITAMIN D3) tablet 1,000 Units  1,000 Units Oral Daily Talon Rizo MD   1,000 Units at 10/17/19 2030   • dextrose (D50W) 25 g/ 50mL Intravenous Solution 25 g  25 g Intravenous Q15 Min PRN Talon Rizo MD       • dextrose (GLUTOSE) oral gel 15 g  15 g Oral Q15 Min PRN Talon Rizo MD       • Ckwwtqv-Jwicf-Xwptszwt-TenofAF (GENVOYA) 822-581-663-10 MG per tablet 1 tablet  1 tablet Oral Daily Talon Rizo MD       • enoxaparin (LOVENOX) syringe 30 mg  30 mg Subcutaneous Q24H Talon Rizo MD   30 mg at 10/17/19 2029   • fluticasone (FLONASE) 50 MCG/ACT nasal spray 2 spray  2 spray Nasal Daily Talon Rizo MD       • glucagon (human recombinant) (GLUCAGEN DIAGNOSTIC) injection 1 mg  1 mg Subcutaneous Q15 Min PRN Talon Rizo MD       • HYDROcodone-acetaminophen (NORCO) 5-325 MG per tablet 1 tablet  1 tablet Oral BID PRN Talon Rizo MD       • insulin lispro (humaLOG) injection 0-14 Units  0-14 Units Subcutaneous 4x Daily With Meals & Nightly Talon Rizo MD       • ondansetron (ZOFRAN) injection 4 mg  4 mg Intravenous Q6H PRN Talon Rizo MD       • pantoprazole (PROTONIX) EC tablet 40 mg  40 mg Oral QAM Talon Rizo MD       • phenazopyridine (PYRIDIUM) tablet 200 mg  200 mg Oral TID PRN Talon Rizo MD       • propranolol LA  (INDERAL LA) 24 hr capsule 60 mg  60 mg Oral Daily Talon Rizo MD       • sodium chloride 0.9 % flush 10 mL  10 mL Intravenous PRN Rita Bailey MD       • sodium chloride 0.9 % flush 10 mL  10 mL Intravenous Q12H Talon Rizo MD       • sodium chloride 0.9 % flush 10 mL  10 mL Intravenous PRN Talon Rizo MD       • sodium chloride 0.9 % infusion  100 mL/hr Intravenous Continuous Talon Rizo  mL/hr at 10/17/19 1827 100 mL/hr at 10/17/19 1827   • tamsulosin (FLOMAX) 24 hr capsule 0.4 mg  0.4 mg Oral Nightly Talon Rizo MD   0.4 mg at 10/17/19 2030         Home Meds:  Prior to Admission medications    Medication Sig Start Date End Date Taking? Authorizing Provider   albuterol (PROVENTIL HFA;VENTOLIN HFA) 108 (90 BASE) MCG/ACT inhaler Inhale 2 puffs every 6 (six) hours as needed for wheezing.    ProviderPauline MD   ARIPiprazole (ABILIFY) 2 MG tablet Take 2 mg by mouth Daily.    ProviderPauline MD   atorvastatin (LIPITOR) 80 MG tablet Take 80 mg by mouth daily.    ProviderPauline MD   calcium carbonate (OS-THEODORA) 600 MG tablet Take 600 mg by mouth Daily.    Pauline Perez MD   cholecalciferol (VITAMIN D3) 1000 UNITS tablet Take 1,000 Units by mouth daily.    Pauline Perez MD   ciclopirox (LOPROX) 0.77 % cream Apply  topically to the appropriate area as directed 2 (Two) Times a Day. 7/29/19   Pacheco Harper DPM   clonazePAM (KlonoPIN) 0.5 MG tablet Take 0.5 mg by mouth 3 (Three) Times a Day As Needed for Anxiety.    Emergency, Nurse Radha, RN   denosumab (PROLIA) 60 MG/ML solution syringe Inject 60 mg under the skin Every 6 (Six) Months.    ProviderPauline MD   diazePAM (VALIUM) 5 MG tablet Take 0.5 tablets by mouth Every 6 (Six) Hours As Needed (bladder spasms). 5/28/19   Mitchell, Amar R, MD   Dulaglutide 0.75 MG/0.5ML solution pen-injector Inject 0.75 mg under the skin into the appropriate area as  directed 1 (One) Time Per Week. 8/24/18   Demetri Westfall MD   Vogcftu-Vzgiw-Hobcuncj-TenofAF (GENVOYA) 400-495-058-10 MG tablet Take 1 tablet by mouth daily.    Pauline Perez MD   esomeprazole (NexIUM) 40 MG capsule Take 40 mg by mouth every morning before breakfast.    Pauline Perez MD   fluticasone (FLONASE) 50 MCG/ACT nasal spray 2 sprays into each nostril Daily.  Patient taking differently: 2 sprays into each nostril Daily As Needed. 10/11/16   Tyron Ace PA   Foot Care Products (FOOT POWDER MEDICATED EX)  7/8/19   Pauline Perez MD   gabapentin (NEURONTIN) 800 MG tablet  11/14/18   Emergency, Nurse Epic, RN   guaiFENesin (MUCINEX) 600 MG 12 hr tablet Take 600 mg by mouth 2 (two) times a day as needed for cough.    Pauline Perez MD   HYDROcodone-acetaminophen (NORCO) 5-325 MG per tablet Take 1 tablet by mouth 2 (two) times a day as needed.    Pauline Perez MD   hydrOXYzine (VISTARIL) 25 MG capsule Take 25 mg by mouth every 6 (six) hours as needed for itching.    Pauline Perez MD   insulin aspart (NOVOLOG FLEXPEN) 100 UNIT/ML solution pen-injector sc pen Inject 80 Units under the skin 4 (Four) Times a Day With Meals & at Bedtime.    Pauline Perez MD   Insulin Regular Human, Conc, (HUMULIN R U-500 KWIKPEN) 500 UNIT/ML solution pen-injector CONCENTRATED injection Up to 200 units bid with meals 8/24/18   Demetri Westfall MD   loperamide (IMODIUM) 2 MG capsule Take 2 mg by mouth daily as needed for diarrhea.    Pauline Perez MD   loratadine (CLARITIN) 10 MG tablet Take 10 mg by mouth daily.    Pauline Perez MD   losartan (COZAAR) 100 MG tablet Take 100 mg by mouth daily.    Pauline Perez MD   LYRICA 100 MG capsule Take 100 mg by mouth 2 (Two) Times a Day. 11/14/18   Emergency, Nurse Epic, RN   meclizine (ANTIVERT) 25 MG tablet Take 25 mg by mouth Daily As Needed.    Pauline Perez MD   metFORMIN  (GLUCOPHAGE) 1000 MG tablet Take 1,000 mg by mouth 2 (two) times a day with meals.    Pauline Perez MD   miconazole (MICOTIN) 2 % cream Apply 1 application topically 2 (two) times a day.    Pauline Perez MD   neomycin-bacitracin-polymyxin (NEOSPORIN) 5-400-5000 ointment Apply 1 application topically 4 (four) times a day as needed.    Pauline Perez MD   phenazopyridine (PYRIDIUM) 200 MG tablet Take 1 tablet by mouth 3 (Three) Times a Day As Needed for bladder spasms. 5/28/19   Jenny Mitchell MD   promethazine (PHENERGAN) 25 MG tablet Take 25 mg by mouth every 6 (six) hours as needed for nausea or vomiting.    Pauline Perez MD   propranolol LA (INDERAL LA) 60 MG 24 hr capsule Take 60 mg by mouth daily.    Pauline Perez MD   rizatriptan (MAXALT) 10 MG tablet Take 10 mg by mouth 1 (one) time as needed for migraine. May repeat in 2 hours if needed    Pauline Perez MD   tamsulosin (FLOMAX) 0.4 MG capsule 24 hr capsule Take 1 capsule by mouth Every Night. 8/23/19   Marv Gomez MD   terbinafine (lamISIL) 1 % cream Apply  topically to the appropriate area as directed 2 (Two) Times a Day. 7/29/19   Pacheco Harper DPM   tiZANidine (ZANAFLEX) 4 MG tablet Take 6 mg by mouth Every 6 (Six) Hours As Needed for Muscle Spasms.    Pauline Perez MD   traZODone (DESYREL) 150 MG tablet Take 100 mg by mouth Every Night.    Pauline Perez MD   Vortioxetine HBr (TRINTELLIX) 5 MG tablet Take 20 mg by mouth Daily With Breakfast.    Pauline Perez MD            Allergies   Allergen Reactions   • Amitriptyline Anaphylaxis   • Amitriptyline Hcl Anaphylaxis   • Darvon [Propoxyphene] Anaphylaxis   • Zithromax [Azithromycin] Anaphylaxis       Review of Systems   Constitutional: Positive for fatigue. Negative for fever.   HENT: Negative for mouth sores.    Eyes: Negative for photophobia.   Respiratory: Negative for cough.    Cardiovascular: Negative for chest pain  "and palpitations.   Gastrointestinal: Positive for abdominal pain.   Genitourinary: Positive for difficulty urinating.   Musculoskeletal: Negative for arthralgias.   Skin: Negative for wound.   Allergic/Immunologic: Positive for immunocompromised state (poorly controlled diabetic).   Neurological: Positive for headaches.   Psychiatric/Behavioral: Negative for decreased concentration.            Vital Signs:  /70 (BP Location: Right arm, Patient Position: Lying)   Pulse 115   Temp 99.1 °F (37.3 °C) (Oral)   Resp 16   Ht 177.8 cm (70\")   Wt (!) 137 kg (302 lb)   SpO2 97%   BMI 43.33 kg/m²   Temp (24hrs), Av.8 °F (37.1 °C), Min:98.6 °F (37 °C), Max:99.1 °F (37.3 °C)      Physical Exam   General: Patient is a morbidly obese white male sitting up in bed in no acute distress.  He does appear quite sleepy  HEENT: Sclera anicteric and noninjected.  Oral pharynx is without lesions or thrush.  His O2 is out of his nose and up on his forehead  Neck is supple without lymphadenopathy  Heart: S1-S2 rate is regular  Lungs: Diminished breath sounds throughout  Abdomen: Morbidly obese, soft, bowel sounds are positive, did not appreciate any tenderness, rebound or guarding  Extremities no clonus pleasant cooperative  Neuro: He easily arousable from sleep and does continue conversation without difficulty and his speech is clear however when there is a pause for me to examine him more when I am speaking more than a few sentences he does appear to dose back off to sleep.    Results Review:    I reviewed the patient's new clinical results.    Lab Results:  CBC:   Lab Results   Lab 10/17/19  1236   WBC 9.27   HEMOGLOBIN 11.3*   HEMATOCRIT 34.0*   PLATELETS 188       CMP:   Lab Results   Lab 10/17/19  1330   SODIUM 141   POTASSIUM 4.3   CHLORIDE 101   CO2 27.0   BUN 33*   CREATININE 1.73*   CALCIUM 8.4*   BILIRUBIN 0.2   ALK PHOS 51   ALT (SGPT) 61*   AST (SGOT) 44*   GLUCOSE 115*       Lab Results (last 72 hours)     " Procedure Component Value Units Date/Time    POC Glucose Once [[id number]]  (Normal) Collected:  10/17/19 2027    Specimen:  Blood Updated:  10/17/19 2039     Glucose 119 mg/dL      Comment: : [id number] Sherly Contreras ID: [id number]       POC Glucose Once [[id number]]  (Normal) Collected:  10/17/19 1823    Specimen:  Blood Updated:  10/17/19 1834     Glucose 76 mg/dL      Comment: : [id number] Riky Shearer ([place]) CMeter ID: [id number]       Hemoglobin A1c [[id number]]  (Abnormal) Collected:  10/17/19 1236    Specimen:  Blood Updated:  10/17/19 1814     Hemoglobin A1C 11.00 %     Narrative:       Hemoglobin A1C Ranges:    Increased Risk for Diabetes  5.7% to 6.4%  Diabetes                     >= 6.5%  Diabetic Goal                < 7.0%    Blood Culture - Blood, Blood, Venous Line [[id number]] Collected:  10/17/19 1659    Specimen:  Blood, Venous Line Updated:  10/17/19 1707    Lactic Acid, Plasma [[id number]]  (Normal) Collected:  10/17/19 1615    Specimen:  Blood Updated:  10/17/19 1633     Lactate 1.3 mmol/L     Blood Culture - Blood, Blood, Venous Line [[id number]] Collected:  10/17/19 1615    Specimen:  Blood, Venous Line Updated:  10/17/19 1621    Urinalysis With Culture If Indicated - Urine, Catheter In/Out [[id number]]  (Abnormal) Collected:  10/17/19 1438    Specimen:  Urine, Catheter In/Out Updated:  10/17/19 1454     Color, UA Dark Yellow     Appearance, UA Turbid     pH, UA <=5.0     Specific Gravity, UA 1.021     Glucose,  mg/dL (2+)     Ketones, UA Negative     Bilirubin, UA Negative     Blood, UA Negative     Protein, UA 30 mg/dL (1+)     Leuk Esterase, UA Moderate (2+)     Nitrite, UA Negative     Urobilinogen, UA 0.2 E.U./dL    Urinalysis, Microscopic Only - Urine, Catheter In/Out [[id number]]  (Abnormal) Collected:  10/17/19 1438    Specimen:  Urine, Catheter In/Out Updated:  10/17/19 1454     RBC, UA 3-5 /HPF      WBC, UA Too Numerous to Count /HPF      Bacteria, UA 4+ /HPF       Squamous Epithelial Cells, UA 3-6 /HPF      Hyaline Casts, UA 7-12 /LPF      Methodology Automated Microscopy    Urine Culture - Urine, Urine, Catheter In/Out [[id number]] Collected:  10/17/19 1438    Specimen:  Urine, Catheter In/Out Updated:  10/17/19 1454    Comprehensive Metabolic Panel [[id number]]  (Abnormal) Collected:  10/17/19 1330    Specimen:  Blood Updated:  10/17/19 1424     Glucose 115 mg/dL      BUN 33 mg/dL      Creatinine 1.73 mg/dL      Sodium 141 mmol/L      Potassium 4.3 mmol/L      Chloride 101 mmol/L      CO2 27.0 mmol/L      Calcium 8.4 mg/dL      Total Protein 7.4 g/dL      Albumin 4.10 g/dL      ALT (SGPT) 61 U/L      AST (SGOT) 44 U/L      Alkaline Phosphatase 51 U/L      Total Bilirubin 0.2 mg/dL      eGFR Non African Amer 42 mL/min/1.73      Globulin 3.3 gm/dL      A/G Ratio 1.2 g/dL      BUN/Creatinine Ratio 19.1     Anion Gap 13.0 mmol/L     Narrative:       GFR Normal >60  Chronic Kidney Disease <60  Kidney Failure <15    Troponin [[id number]]  (Normal) Collected:  10/17/19 1330    Specimen:  Blood Updated:  10/17/19 1421     Troponin T <0.010 ng/mL     Narrative:       Troponin T Reference Range:  <= 0.03 ng/mL-   Negative for AMI  >0.03 ng/mL-     Abnormal for myocardial necrosis.  Clinicians would have to utilize clinical acumen, EKG, Troponin and serial changes to determine if it is an Acute Myocardial Infarction or myocardial injury due to an underlying chronic condition.     Red Top [[id number]] Collected:  10/17/19 1330    Specimen:  Blood Updated:  10/17/19 1358    [place] Draw [[id number]] Collected:  10/17/19 1236    Specimen:  Blood Updated:  10/17/19 1346    Narrative:       The following orders were created for panel order [place] Draw.  Procedure                               Abnormality         Status                     ---------                               -----------         ------                     Light Blue Top[[id number]]                                   Final  result               Green Top (Gel)[[id number]]                                  Final result               Lavender Top[[id number]]                                     Final result               Red Top[[id number]]                                          In process                   Please view results for these tests on the individual orders.    Light Blue Top [[id number]] Collected:  10/17/19 1236    Specimen:  Blood Updated:  10/17/19 1346     Extra Tube hold for add-on     Comment: Auto resulted       Green Top (Gel) [[id number]] Collected:  10/17/19 1236    Specimen:  Blood Updated:  10/17/19 1346     Extra Tube Hold for add-ons.     Comment: Auto resulted.       Lavender Top [[id number]] Collected:  10/17/19 1236    Specimen:  Blood Updated:  10/17/19 1346     Extra Tube hold for add-on     Comment: Auto resulted       Lipase [[id number]]  (Normal) Collected:  10/17/19 1236    Specimen:  Blood Updated:  10/17/19 1309     Lipase 15 U/L     BNP [[id number]]  (Normal) Collected:  10/17/19 1236    Specimen:  Blood Updated:  10/17/19 1309     proBNP 53.8 pg/mL     Narrative:       Among patients with dyspnea, NT-proBNP is highly sensitive for the detection of acute congestive heart failure. In addition NT-proBNP of <300 pg/ml effectively rules out acute congestive heart failure with 99% negative predictive value.    CBC & Differential [[id number]] Collected:  10/17/19 1236    Specimen:  Blood Updated:  10/17/19 1251    Narrative:       The following orders were created for panel order CBC & Differential.  Procedure                               Abnormality         Status                     ---------                               -----------         ------                     CBC Auto Differential[[id number]]        Abnormal            Final result                 Please view results for these tests on the individual orders.    CBC Auto Differential [[id number]]  (Abnormal) Collected:  10/17/19 1236    Specimen:  Blood  Updated:  10/17/19 1251     WBC 9.27 10*3/mm3      RBC 3.69 10*6/mm3      Hemoglobin 11.3 g/dL      Hematocrit 34.0 %      MCV 92.1 fL      MCH 30.6 pg      MCHC 33.2 g/dL      RDW 13.0 %      RDW-SD 43.6 fl      MPV 11.5 fL      Platelets 188 10*3/mm3      Neutrophil % 59.5 %      Lymphocyte % 26.9 %      Monocyte % 9.8 %      Eosinophil % 3.0 %      Basophil % 0.4 %      Immature Grans % 0.4 %      Neutrophils, Absolute 5.51 10*3/mm3      Lymphocytes, Absolute 2.49 10*3/mm3      Monocytes, Absolute 0.91 10*3/mm3      Eosinophils, Absolute 0.28 10*3/mm3      Basophils, Absolute 0.04 10*3/mm3      Immature Grans, Absolute 0.04 10*3/mm3      nRBC 0.0 /100 WBC           Culture Results:    Urine culture pending      Radiology:   Imaging Results (last 72 hours)     Procedure Component Value Units Date/Time    CT Abdomen Pelvis With Contrast [[id number]] Collected:  10/17/19 1523     Updated:  10/17/19 1534    Narrative:       Exam: CT of the abdomen and pelvis with IV contrast     Indication: Abdominal pain, fever, abscess suspected     Comparison: 08/19/2018     DOSE LENGTH PRODUCT: 1807.8 mGy cm. Automated exposure control was also  utilized to decrease patient radiation dose.     Findings:     The liver is diffusely steatotic. No suspicious liver lesion. Small  calcified stone in the gallbladder. No gallbladder wall thickening or  adjacent inflammation. No biliary ductal dilatation. Pancreas and  adrenal glands are unremarkable. Numerous calcified yoseph within the  spleen. Spleen is borderline enlarged measuring 13 cm.     Low density subcentimeter renal lesions are likely cysts but too small  to definitively characterize. No solid enhancing renal mass. No  urolithiasis or hydronephrosis. Urinary bladder appears unremarkable.     The ascending and transverse colon are mildly distended, mildly  thick-walled, and contain layering fluid. The appendix is not identified  but no secondary signs of appendicitis are  present.     No ascites or free pelvic fluid. No pelvic mass organized pelvic  collection. Abdominal aorta is normal in caliber. Celiac trunk, SMA, and  ALIRIO are widely patent. Renal arteries appear widely patent. No enlarged  retroperitoneal, mesenteric, pelvic, or inguinal lymph nodes.      L5-S1 degenerative change. No suspicious bone lesion.          Impression:          Mild distention of the ascending and transverse colon which demonstrate  mild wall thickening and contain layering fluid. Findings are most  consistent with mild colitis and/or diarrhea illness.     Diffuse hepatic steatosis.     Cholelithiasis without evidence of cholecystitis.  This report was finalized on 10/17/2019 15:31 by Dr. Siddharth Carias MD.    CT Angiogram Chest [[id number]] Collected:  10/17/19 1515     Updated:  10/17/19 1526    Narrative:       Exam: CT ANGIOGRAM CHEST-     Indication: Chest pain, acute, PE suspected, high pretest prob     Comparison: 07/30/2006     DOSE LENGTH PRODUCT: 635.6 mGy cm. Automated exposure control was also  utilized to decrease patient radiation dose.     Findings:     No pulmonary embolus identified. The main pulmonary trunk is normal in  caliber. No evidence of right heart strain. The thoracic aorta is normal  in caliber. The central airways are clear. No consolidation, pleural  effusion, or pneumothorax. No suspicious pulmonary nodule. Unchanged  small suspected varix in the right posterior supraclavicular region.     No enlarged thoracic lymph nodes. Findings in the upper abdomen will be  described on same day CT abdomen/pelvis report. No suspicious osseous  lesions.       Impression:          1.  No pulmonary embolus identified. No acute findings.  2.  Findings in the upper abdomen will be described on same day CT  abdomen/pelvis report.     This report was finalized on 10/17/2019 15:23 by Dr. Siddharth Carias MD.            Assessment/Plan       Acute UTI      IMPRESSION:  Urinary tract infection-  with history of urinary frequency-symptomatic with lower abdominal cramping, dysuria    BPH with urinary obstruction-has been seen by Dr. Gomez.  Was prescribed tamsulosin in August.  Patient with urinary retention based on history as voided over 1 L this morning per Gracie, RN    Excessive sleepiness- history of sleep apnea.  Concerned about CO2 retention in this chronic smoker    HIV/AIDS- under control for 2 decades.  The viral load is been undetectable and CD4 count over thousand.    Uncontrolled diabetes mellitus    RECOMMENDATION:   · ABG   · Dc flonase -this med list may be old as patient is not on this.  There is a drug interaction with Genvoya.  · Follow-up on urine culture  · Per prior evaluation by Dr. Gomez, in August was recommended that patient cut down his caffeine as he had greater than 2 L of high caffeinated fluid intake per day.  He was also started on Flomax.  Patient did state he recently followed up but I do not see a note in epic        Ilsa Crenshaw MD  10/18/19  8:14 AM

## 2019-10-18 NOTE — PLAN OF CARE
Problem: Fall Risk (Adult)  Goal: Absence of Fall  Outcome: Ongoing (interventions implemented as appropriate)      Problem: Patient Care Overview  Goal: Plan of Care Review  Outcome: Ongoing (interventions implemented as appropriate)   10/18/19 1720   Coping/Psychosocial   Plan of Care Reviewed With patient   Plan of Care Review   Progress no change   OTHER   Outcome Summary Pt extremely sleepy - falls asleep in mid sentence - MD aware, voiding with hesitancy noted, hypotensive, medicated X1 for pain, IVF as ordered, ABGs, abx as ordered, safety maintained, will continue to monitor   Coping/Psychosocial   Patient Agreement with Plan of Care agrees     Goal: Interprofessional Rounds/Family Conf  Outcome: Ongoing (interventions implemented as appropriate)      Problem: Renal Failure/Kidney Injury, Acute (Adult)  Goal: Signs and Symptoms of Listed Potential Problems Will be Absent, Minimized or Managed (Renal Failure/Kidney Injury, Acute)  Outcome: Ongoing (interventions implemented as appropriate)      Problem: Sepsis/Septic Shock (Adult)  Goal: Signs and Symptoms of Listed Potential Problems Will be Absent, Minimized or Managed (Sepsis/Septic Shock)  Outcome: Ongoing (interventions implemented as appropriate)      Problem: Urinary Tract Infection (Adult)  Goal: Signs and Symptoms of Listed Potential Problems Will be Absent, Minimized or Managed (Urinary Tract Infection)  Outcome: Ongoing (interventions implemented as appropriate)      Problem: Pain, Chronic (Adult)  Goal: Identify Related Risk Factors and Signs and Symptoms  Outcome: Ongoing (interventions implemented as appropriate)    Goal: Acceptable Pain/Comfort Level and Functional Ability  Outcome: Ongoing (interventions implemented as appropriate)

## 2019-10-18 NOTE — PAYOR COMM NOTE
"ADMIT INPT 10-17-19  PLEASE REVIEW IN Epic  PHONE    [phone]    Dayanara Madera (53 y.o. Male)     Date of Birth Social Security Number Address Home Phone MRN    1965  [address] [facility] [address] [phone] [id number]    Buddhism Marital Status          Other        Admission Date Admission Type Admitting Provider Attending Provider Department, Room/Bed    10/17/19 Emergency Talon Rizo MD [place], Glenn Riego, MD [facility] 3C, 394/1    Discharge Date Discharge Disposition Discharge Destination                       Attending Provider:  Talon Rizo MD    Allergies:  Amitriptyline, Amitriptyline Hcl, Darvon [Propoxyphene], Zithromax [Azithromycin]    Isolation:  None   Infection:  None   Code Status:  CPR    Ht:  177.8 cm (70\")   Wt:  137 kg (302 lb)    Admission Cmt:  None   Principal Problem:  None                Active Insurance as of 10/17/2019     Primary Coverage     Payor Plan Insurance Group Employer/Plan Group    HUMANA MEDICARE REPLACEMENT HUMANA MEDICARE REPL [id number]     Payor Plan Address Payor Plan Phone Number Payor Plan Fax Number Effective Dates    [address] [address] [phone]  7/1/2019 - None Entered    [facility] [address]       Subscriber Name Subscriber Birth Date Member ID       DAYANARA MADERA 1965 [id number]                 Emergency Contacts      (Rel.) Home Phone Work Phone Mobile Phone    KassidyRoxi (Mother) [phone] -- --    NICHOLE JENNIFER (Sister) [phone] -- --    Jaime Rodrigues (Other) -- -- [phone]            Insurance Information                HUMANA MEDICARE REPLACEMENT/HUMANA MEDICARE REPL Phone: [phone]    Subscriber: Dayanara Madera Subscriber#: [id number]    Group#: [id number] Precert#:           "

## 2019-10-18 NOTE — PROGRESS NOTES
[facility]  INPATIENT PROGRESS NOTE    Patient Name: Donis Yi  Date of Admission: 10/17/2019  Today's Date: 10/18/19  Length of Stay: 1  Primary Care Physician: Oksana Mares MD    Subjective   Chief Complaint: weakness, fatigue     HPI   Currently sitting up on the bedside Gracie weber RN at bedside with that patient.  He has apparently been sleepy this morning to the point where he has been difficult to arouse.  He has a history of HIV/AIDS and has been under control for going on 20 years.  He states that his partner has been sick lately with flu like symptoms but recently got a flu shot.      Review of Systems   Constitutional: Positive for activity change and fatigue. Negative for appetite change, chills and fever.   HENT: Negative for hearing loss, nosebleeds, tinnitus and trouble swallowing.    Eyes: Negative for visual disturbance.   Respiratory: Negative for cough, chest tightness, shortness of breath and wheezing.    Cardiovascular: Negative for chest pain, palpitations and leg swelling.   Gastrointestinal: Positive for abdominal pain. Negative for abdominal distention, blood in stool, constipation, diarrhea, nausea and vomiting.   Endocrine: Negative for cold intolerance, heat intolerance, polydipsia, polyphagia and polyuria.   Genitourinary: Positive for decreased urine volume. Negative for difficulty urinating, dysuria, flank pain, frequency and hematuria.   Musculoskeletal: Negative for arthralgias, joint swelling and myalgias.   Skin: Negative for rash.   Allergic/Immunologic: Negative for immunocompromised state.   Neurological: Positive for weakness. Negative for dizziness, syncope, light-headedness and headaches.   Hematological: Negative for adenopathy. Does not bruise/bleed easily.   Psychiatric/Behavioral: Negative for confusion and sleep disturbance. The patient is not nervous/anxious.       All pertinent negatives and positives  are as above. All other systems have been reviewed and are negative unless otherwise stated.     Objective    Temp:  [98.6 °F (37 °C)-99.4 °F (37.4 °C)] 99.4 °F (37.4 °C)  Heart Rate:  [] 107  Resp:  [16-18] 18  BP: ()/(49-70) 100/49     Intake/Output Summary (Last 24 hours) at 10/18/2019 1442  Last data filed at 10/18/2019 1137  Gross per 24 hour   Intake 2100 ml   Output 1750 ml   Net 350 ml     Physical Exam   Constitutional: He is oriented to person, place, and time. He appears well-developed and well-nourished.   Sitting up in bed.  NAD.  Morbidly obese.    HENT:   Head: Normocephalic and atraumatic.   Eyes: Conjunctivae and EOM are normal. Pupils are equal, round, and reactive to light.   Neck: Neck supple. No JVD present. No thyromegaly present.   Cardiovascular: Normal rate, regular rhythm, normal heart sounds and intact distal pulses. Exam reveals no gallop and no friction rub.   No murmur heard.  Pulmonary/Chest: Effort normal and breath sounds normal. No respiratory distress. He has no wheezes. He has no rales. He exhibits no tenderness.   Room air.    Abdominal: Soft. Bowel sounds are normal. He exhibits no distension. There is no tenderness. There is no rebound and no guarding.   Genitourinary:   Genitourinary Comments: Voiding.    Musculoskeletal: Normal range of motion. He exhibits no edema, tenderness or deformity.   Lymphadenopathy:     He has no cervical adenopathy.   Neurological: He is alert and oriented to person, place, and time.   Skin: Skin is warm and dry. No rash noted.   Psychiatric: He has a normal mood and affect. His behavior is normal. Judgment and thought content normal.   Nursing note and vitals reviewed.    Results Review:  I have reviewed the labs, radiology results, and diagnostic studies.    Laboratory Data:   Results from last 7 days   Lab Units 10/17/19  1236   WBC 10*3/mm3 9.27   HEMOGLOBIN g/dL 11.3*   HEMATOCRIT % 34.0*   PLATELETS 10*3/mm3 188     Results from  last 7 days   Lab Units 10/18/19  0838 10/17/19  1330   SODIUM mmol/L 137 141   POTASSIUM mmol/L 4.5 4.3   CHLORIDE mmol/L 100 101   CO2 mmol/L 24.0 27.0   BUN mg/dL 32* 33*   CREATININE mg/dL 1.51* 1.73*   CALCIUM mg/dL 8.1* 8.4*   BILIRUBIN mg/dL  --  0.2   ALK PHOS U/L  --  51   ALT (SGPT) U/L  --  61*   AST (SGOT) U/L  --  44*   GLUCOSE mg/dL 152* 115*     Results from last 7 days   Lab Units 10/17/19  1236   HEMOGLOBIN A1C % 11.00*     Results from last 7 days   Lab Units 10/18/19  0835   PH, ARTERIAL pH units 7.349*   PO2 ART mm Hg 57.4*   PCO2, ARTERIAL mm Hg 46.6*   HCO3 ART mmol/L 25.7     Culture Data:   Urine Culture   Date Value Ref Range Status   10/17/2019 >100,000 CFU/mL Gram Negative Bacilli (A)  Preliminary     Radiology Data:   Imaging Results (last 24 hours)     Procedure Component Value Units Date/Time    CT Abdomen Pelvis With Contrast [[id number]] Collected:  10/17/19 1523     Updated:  10/17/19 1534    Narrative:       Exam: CT of the abdomen and pelvis with IV contrast     Indication: Abdominal pain, fever, abscess suspected     Comparison: 08/19/2018     DOSE LENGTH PRODUCT: 1807.8 mGy cm. Automated exposure control was also  utilized to decrease patient radiation dose.     Findings:     The liver is diffusely steatotic. No suspicious liver lesion. Small  calcified stone in the gallbladder. No gallbladder wall thickening or  adjacent inflammation. No biliary ductal dilatation. Pancreas and  adrenal glands are unremarkable. Numerous calcified yoseph within the  spleen. Spleen is borderline enlarged measuring 13 cm.     Low density subcentimeter renal lesions are likely cysts but too small  to definitively characterize. No solid enhancing renal mass. No  urolithiasis or hydronephrosis. Urinary bladder appears unremarkable.     The ascending and transverse colon are mildly distended, mildly  thick-walled, and contain layering fluid. The appendix is not identified  but no secondary signs of  appendicitis are present.     No ascites or free pelvic fluid. No pelvic mass organized pelvic  collection. Abdominal aorta is normal in caliber. Celiac trunk, SMA, and  ALIRIO are widely patent. Renal arteries appear widely patent. No enlarged  retroperitoneal, mesenteric, pelvic, or inguinal lymph nodes.      L5-S1 degenerative change. No suspicious bone lesion.          Impression:          Mild distention of the ascending and transverse colon which demonstrate  mild wall thickening and contain layering fluid. Findings are most  consistent with mild colitis and/or diarrhea illness.     Diffuse hepatic steatosis.     Cholelithiasis without evidence of cholecystitis.  This report was finalized on 10/17/2019 15:31 by Dr. Siddharth Carias MD.    CT Angiogram Chest [[id number]] Collected:  10/17/19 1515     Updated:  10/17/19 1526    Narrative:       Exam: CT ANGIOGRAM CHEST-     Indication: Chest pain, acute, PE suspected, high pretest prob     Comparison: 07/30/2006     DOSE LENGTH PRODUCT: 635.6 mGy cm. Automated exposure control was also  utilized to decrease patient radiation dose.     Findings:     No pulmonary embolus identified. The main pulmonary trunk is normal in  caliber. No evidence of right heart strain. The thoracic aorta is normal  in caliber. The central airways are clear. No consolidation, pleural  effusion, or pneumothorax. No suspicious pulmonary nodule. Unchanged  small suspected varix in the right posterior supraclavicular region.     No enlarged thoracic lymph nodes. Findings in the upper abdomen will be  described on same day CT abdomen/pelvis report. No suspicious osseous  lesions.       Impression:          1.  No pulmonary embolus identified. No acute findings.  2.  Findings in the upper abdomen will be described on same day CT  abdomen/pelvis report.     This report was finalized on 10/17/2019 15:23 by Dr. Siddharth Carias MD.        I have reviewed the patient's current medications.      Assessment/Plan     Active Hospital Problems    Diagnosis   • **Acute UTI   • SRINIVAS (obstructive sleep apnea)   • Hepatic steatosis   • Transaminitis   • IDDM (insulin dependent diabetes mellitus) (CMS/[facility])   • HIV (human immunodeficiency virus infection) (CMS/[facility])   • Mixed hyperlipidemia   • Tobacco abuse   • Osteoporosis     Plan:  1.  Continue Rocephin   2.  Glucoses: 119, 152, 169, 238.  3.  Dr. Crenshaw following for HIV meds  4.  Hold sedating medications  5.  GI PCR   6.  Blood cultures in process  7.  Urine culture currently showing > 100,000 gram negative bacilli  8.  Start Levemir 10 units nightly for now  9.  Hemoglobin A1C 11  10.  CBC and BMP in AM  11.  CT abd/pelvis - mild distention of the ascending and transverse colon which demonstrate mild wall thickening and contain layering fluid.  ? Consistent with mild colitis and/or diarrhea illness.    12.  Start IV fluids, creatinine 1.73 on admission and now 1.51    Discharge Planning: I expect the patient to be discharged to home in 1-3 days.    SOBIA Richards   10/18/19   2:38 PM    I personally evaluated and examined the patient in conjunction with SOBIA Goldman and agree with the assessment, treatment plan, and disposition of the patient as recorded by her. My history, exam, and further recommendations are:   Reviewed the nurse's notes it at 1720.  Patient reportedly extremely sleepy and falls asleep in the middle of sentence.  She reported to have hesitancy in urination.  Patient reportedly has been hypotensive.  Vitals:    10/18/19 1545   BP: 123/57   Pulse: 92   Resp: 18   Temp: 98.5 °F (36.9 °C)   SpO2: 92%     Current vital sign does not show hypertension but has had blood pressure last night at 98/61.  He is not tachycardic either.  His oxygen saturation is 92%  He is awake and alert.  He is working on his soup.  His oxygen saturation is 94 to 99% on 2-1/2 L nasal cannula.  He does not normally use oxygen at home.  He said that   Oksana Marse is working on getting him scheduled for a sleep study.  He is nontoxic-appearing.  He maintained adequate level of consciousness throughout my visit with him.  His renal function is slowly improving at 1.51 compared to 1.73 yesterday.  He is hypoxic with oxygen saturation of 88% and PaO2 of 57.  Slightly hypercarbic.  His blood cultures remain no growth to date however the urine culture showed gram-negative bacilli.  We will continue on empiric antibiotic  Limit sedating medication  Agree with above plan of care  Patient discussed with Dr. Carmela Rizo MD  10/18/19  5:46 PM

## 2019-10-18 NOTE — ACP (ADVANCE CARE PLANNING)
Date of [facility] ACP interview: 10/18/2019  Location of interview: Patient's Room  [facility] ACP Facilitator: Ernie Bob  Referral Source: Nurse  Present for facilitation: Patient    SUMMARY OF ADVANCE CARE PLANNING DISCUSSION:  Donis presents for [facility] Steps facilitation. We reviewed purpose and goals for advance care planning.    I reviewed with Donis qualities to consider when choosing a healthcare agent. Donis had selected his mom as his primary surrogate, his aunt, and two others as his secondary surrogates.. I encouraged Donis to discuss his preferences for future care with healthcare surrogates and others close to him.    Each section of the advance care/living will document was reviewed and discussed.    Advance care/living will document finished during this facilitation? yes    Time spent on preparation, facilitation and documentation was 31-60 minutes.    RECOMMENDATIONS/FOLLOW-UP:  Recommended that he give copies to his surrogates and any physicians of his choosing.  Also recommended that he keep the document in a safe place at home.    CONSULT/NOTE ROUTED  Yes.  Faxed to Medical Records.    Ernie Bob

## 2019-10-19 VITALS
HEART RATE: 88 BPM | RESPIRATION RATE: 16 BRPM | OXYGEN SATURATION: 95 % | HEIGHT: 70 IN | TEMPERATURE: 98.3 F | SYSTOLIC BLOOD PRESSURE: 135 MMHG | DIASTOLIC BLOOD PRESSURE: 68 MMHG | BODY MASS INDEX: 43.23 KG/M2 | WEIGHT: 302 LBS

## 2019-10-19 LAB
ANION GAP SERPL CALCULATED.3IONS-SCNC: 12 MMOL/L (ref 5–15)
BACTERIA SPEC AEROBE CULT: ABNORMAL
BASOPHILS # BLD AUTO: 0.04 10*3/MM3 (ref 0–0.2)
BASOPHILS NFR BLD AUTO: 0.3 % (ref 0–1.5)
BUN BLD-MCNC: 18 MG/DL (ref 6–20)
BUN/CREAT SERPL: 21.4 (ref 7–25)
CALCIUM SPEC-SCNC: 8.4 MG/DL (ref 8.6–10.5)
CHLORIDE SERPL-SCNC: 100 MMOL/L (ref 98–107)
CO2 SERPL-SCNC: 27 MMOL/L (ref 22–29)
CREAT BLD-MCNC: 0.84 MG/DL (ref 0.76–1.27)
DEPRECATED RDW RBC AUTO: 43.9 FL (ref 37–54)
EOSINOPHIL # BLD AUTO: 0.23 10*3/MM3 (ref 0–0.4)
EOSINOPHIL NFR BLD AUTO: 1.9 % (ref 0.3–6.2)
ERYTHROCYTE [DISTWIDTH] IN BLOOD BY AUTOMATED COUNT: 12.7 % (ref 12.3–15.4)
GFR SERPL CREATININE-BSD FRML MDRD: 96 ML/MIN/1.73
GLUCOSE BLD-MCNC: 250 MG/DL (ref 65–99)
GLUCOSE BLDC GLUCOMTR-MCNC: 237 MG/DL (ref 70–130)
GLUCOSE BLDC GLUCOMTR-MCNC: 252 MG/DL (ref 70–130)
HCT VFR BLD AUTO: 33.9 % (ref 37.5–51)
HGB BLD-MCNC: 11 G/DL (ref 13–17.7)
IMM GRANULOCYTES # BLD AUTO: 0.09 10*3/MM3 (ref 0–0.05)
IMM GRANULOCYTES NFR BLD AUTO: 0.7 % (ref 0–0.5)
LYMPHOCYTES # BLD AUTO: 2.37 10*3/MM3 (ref 0.7–3.1)
LYMPHOCYTES NFR BLD AUTO: 19.3 % (ref 19.6–45.3)
MCH RBC QN AUTO: 30.6 PG (ref 26.6–33)
MCHC RBC AUTO-ENTMCNC: 32.4 G/DL (ref 31.5–35.7)
MCV RBC AUTO: 94.2 FL (ref 79–97)
MONOCYTES # BLD AUTO: 1.11 10*3/MM3 (ref 0.1–0.9)
MONOCYTES NFR BLD AUTO: 9 % (ref 5–12)
NEUTROPHILS # BLD AUTO: 8.44 10*3/MM3 (ref 1.7–7)
NEUTROPHILS NFR BLD AUTO: 68.8 % (ref 42.7–76)
NRBC BLD AUTO-RTO: 0 /100 WBC (ref 0–0.2)
PLATELET # BLD AUTO: 173 10*3/MM3 (ref 140–450)
PMV BLD AUTO: 11.8 FL (ref 6–12)
POTASSIUM BLD-SCNC: 4.6 MMOL/L (ref 3.5–5.2)
RBC # BLD AUTO: 3.6 10*6/MM3 (ref 4.14–5.8)
SODIUM BLD-SCNC: 139 MMOL/L (ref 136–145)
WBC NRBC COR # BLD: 12.28 10*3/MM3 (ref 3.4–10.8)

## 2019-10-19 PROCEDURE — 25010000002 CEFTRIAXONE PER 250 MG: Performed by: INTERNAL MEDICINE

## 2019-10-19 PROCEDURE — 80048 BASIC METABOLIC PNL TOTAL CA: CPT | Performed by: NURSE PRACTITIONER

## 2019-10-19 PROCEDURE — 85025 COMPLETE CBC W/AUTO DIFF WBC: CPT | Performed by: NURSE PRACTITIONER

## 2019-10-19 PROCEDURE — 63710000001 INSULIN LISPRO (HUMAN) PER 5 UNITS: Performed by: INTERNAL MEDICINE

## 2019-10-19 PROCEDURE — 82962 GLUCOSE BLOOD TEST: CPT

## 2019-10-19 PROCEDURE — 25010000002 ONDANSETRON PER 1 MG: Performed by: INTERNAL MEDICINE

## 2019-10-19 RX ORDER — CEFDINIR 300 MG/1
300 CAPSULE ORAL 2 TIMES DAILY
Qty: 14 CAPSULE | Refills: 0 | Status: SHIPPED | OUTPATIENT
Start: 2019-10-19 | End: 2019-10-19 | Stop reason: HOSPADM

## 2019-10-19 RX ORDER — SULFAMETHOXAZOLE AND TRIMETHOPRIM 800; 160 MG/1; MG/1
1 TABLET ORAL EVERY 12 HOURS SCHEDULED
Status: DISCONTINUED | OUTPATIENT
Start: 2019-10-20 | End: 2019-10-19 | Stop reason: HOSPADM

## 2019-10-19 RX ORDER — SULFAMETHOXAZOLE AND TRIMETHOPRIM 800; 160 MG/1; MG/1
1 TABLET ORAL EVERY 12 HOURS
Qty: 14 TABLET | Refills: 0 | Status: SHIPPED | OUTPATIENT
Start: 2019-10-19 | End: 2020-01-07

## 2019-10-19 RX ADMIN — CALCIUM 500 MG: 500 TABLET ORAL at 08:38

## 2019-10-19 RX ADMIN — ONDANSETRON HYDROCHLORIDE 4 MG: 2 SOLUTION INTRAMUSCULAR; INTRAVENOUS at 02:55

## 2019-10-19 RX ADMIN — CETIRIZINE HYDROCHLORIDE 10 MG: 10 TABLET, FILM COATED ORAL at 08:38

## 2019-10-19 RX ADMIN — CEFTRIAXONE SODIUM 1 G: 1 INJECTION, POWDER, FOR SOLUTION INTRAMUSCULAR; INTRAVENOUS at 13:49

## 2019-10-19 RX ADMIN — HYDROCODONE BITARTRATE AND ACETAMINOPHEN 1 TABLET: 5; 325 TABLET ORAL at 00:28

## 2019-10-19 RX ADMIN — ARIPIPRAZOLE 2 MG: 2 TABLET ORAL at 08:38

## 2019-10-19 RX ADMIN — PANTOPRAZOLE SODIUM 40 MG: 40 TABLET, DELAYED RELEASE ORAL at 06:14

## 2019-10-19 RX ADMIN — INSULIN LISPRO 8 UNITS: 100 INJECTION, SOLUTION INTRAVENOUS; SUBCUTANEOUS at 08:38

## 2019-10-19 RX ADMIN — VITAMIN D 1000 UNITS: 25 TAB ORAL at 08:38

## 2019-10-19 RX ADMIN — PROPRANOLOL HYDROCHLORIDE 60 MG: 60 CAPSULE, EXTENDED RELEASE ORAL at 08:38

## 2019-10-19 RX ADMIN — INSULIN LISPRO 5 UNITS: 100 INJECTION, SOLUTION INTRAVENOUS; SUBCUTANEOUS at 11:47

## 2019-10-19 NOTE — DISCHARGE SUMMARY
[facility] Medicine Services  DISCHARGE SUMMARY       Date of Admission: 10/17/2019  Date of Discharge:  10/19/2019  Primary Care Physician: Oksana Mares MD    Presenting Problem/History of Present Illness:  Acute UTI [N39.0]     Final Discharge Diagnoses:  Active Hospital Problems    Diagnosis   • **Acute UTI   • SRINIVAS (obstructive sleep apnea)   • Hepatic steatosis   • Transaminitis   • IDDM (insulin dependent diabetes mellitus) (CMS/[facility])   • HIV (human immunodeficiency virus infection) (CMS/[facility])   • Mixed hyperlipidemia   • Tobacco abuse   • Osteoporosis     Consults:   1.  Infectious disease, Dr. Mayra Tanner    Procedures Performed: None    Pertinent Test Results:   Lab Results (last 72 hours)     Procedure Component Value Units Date/Time    POC Glucose Once [[id number]]  (Abnormal) Collected:  10/19/19 1116    Specimen:  Blood Updated:  10/19/19 1127     Glucose 237 mg/dL      Comment: : [id number] Ciro JenniferMeter ID: [id number]       POC Glucose Once [[id number]]  (Abnormal) Collected:  10/19/19 0816    Specimen:  Blood Updated:  10/19/19 0828     Glucose 252 mg/dL      Comment: : [id number] Ciro JenniferMeter ID: [id number]       Basic Metabolic Panel [[id number]]  (Abnormal) Collected:  10/19/19 0615    Specimen:  Blood Updated:  10/19/19 0722     Glucose 250 mg/dL      BUN 18 mg/dL      Creatinine 0.84 mg/dL      Sodium 139 mmol/L      Potassium 4.6 mmol/L      Chloride 100 mmol/L      CO2 27.0 mmol/L      Calcium 8.4 mg/dL      eGFR Non African Amer 96 mL/min/1.73      BUN/Creatinine Ratio 21.4     Anion Gap 12.0 mmol/L     Narrative:       GFR Normal >60  Chronic Kidney Disease <60  Kidney Failure <15    CBC & Differential [[id number]] Collected:  10/19/19 0615    Specimen:  Blood Updated:  10/19/19 0702    Narrative:       The following orders were created for panel order CBC & Differential.  Procedure                               Abnormality          Status                     ---------                               -----------         ------                     CBC Auto Differential[[id number]]        Abnormal            Final result                 Please view results for these tests on the individual orders.    CBC Auto Differential [[id number]]  (Abnormal) Collected:  10/19/19 0615    Specimen:  Blood Updated:  10/19/19 0702     WBC 12.28 10*3/mm3      RBC 3.60 10*6/mm3      Hemoglobin 11.0 g/dL      Hematocrit 33.9 %      MCV 94.2 fL      MCH 30.6 pg      MCHC 32.4 g/dL      RDW 12.7 %      RDW-SD 43.9 fl      MPV 11.8 fL      Platelets 173 10*3/mm3      Neutrophil % 68.8 %      Lymphocyte % 19.3 %      Monocyte % 9.0 %      Eosinophil % 1.9 %      Basophil % 0.3 %      Immature Grans % 0.7 %      Neutrophils, Absolute 8.44 10*3/mm3      Lymphocytes, Absolute 2.37 10*3/mm3      Monocytes, Absolute 1.11 10*3/mm3      Eosinophils, Absolute 0.23 10*3/mm3      Basophils, Absolute 0.04 10*3/mm3      Immature Grans, Absolute 0.09 10*3/mm3      nRBC 0.0 /100 WBC     Urine Culture - Urine, Urine, Catheter In/Out [[id number]]  (Abnormal)  (Susceptibility) Collected:  10/17/19 1438    Specimen:  Urine, Catheter In/Out Updated:  10/19/19 0418     Urine Culture >100,000 CFU/mL Klebsiella pneumoniae ssp pneumoniae    Susceptibility      Klebsiella pneumoniae ssp pneumoniae     ELDA     Ampicillin Resistant     Ampicillin + Sulbactam Resistant     Cefazolin Susceptible     Cefepime Susceptible     Ceftazidime Susceptible     Ceftriaxone Susceptible     Gentamicin Susceptible     Levofloxacin Susceptible     Nitrofurantoin Intermediate     Piperacillin + Tazobactam Susceptible     Tetracycline Resistant     Trimethoprim + Sulfamethoxazole Susceptible                    POC Glucose Once [[id number]]  (Abnormal) Collected:  10/18/19 2046    Specimen:  Blood Updated:  10/18/19 2058     Glucose 312 mg/dL      Comment: : [id number] Gilmore ID: [id number]       POC  Glucose Once [[id number]]  (Abnormal) Collected:  10/18/19 1750    Specimen:  Blood Updated:  10/18/19 1801     Glucose 267 mg/dL      Comment: : [id number] Solano PamelaMeter ID: [id number]       Blood Culture - Blood, Blood, Venous Line [[id number]] Collected:  10/17/19 1659    Specimen:  Blood, Venous Line Updated:  10/18/19 1715     Blood Culture No growth at 24 hours    Blood Culture - Blood, Blood, Venous Line [[id number]] Collected:  10/17/19 1615    Specimen:  Blood, Venous Line Updated:  10/18/19 1630     Blood Culture No growth at 24 hours    POC Glucose Once [[id number]]  (Abnormal) Collected:  10/18/19 1231    Specimen:  Blood Updated:  10/18/19 1242     Glucose 238 mg/dL      Comment: : [id number] Solano TripologyelaMeter ID: [id number]       Basic Metabolic Panel [[id number]]  (Abnormal) Collected:  10/18/19 0838    Specimen:  Blood Updated:  10/18/19 0917     Glucose 152 mg/dL      BUN 32 mg/dL      Creatinine 1.51 mg/dL      Sodium 137 mmol/L      Potassium 4.5 mmol/L      Chloride 100 mmol/L      CO2 24.0 mmol/L      Calcium 8.1 mg/dL      eGFR Non African Amer 49 mL/min/1.73      BUN/Creatinine Ratio 21.2     Anion Gap 13.0 mmol/L     Narrative:       GFR Normal >60  Chronic Kidney Disease <60  Kidney Failure <15    POC Glucose Once [[id number]]  (Abnormal) Collected:  10/18/19 0859    Specimen:  Blood Updated:  10/18/19 0915     Glucose 169 mg/dL      Comment: : [id number] Russ TripologyelaMeter ID: [id number]       Blood Gas, Arterial [[id number]]  (Abnormal) Collected:  10/18/19 0835    Specimen:  Arterial Blood Updated:  10/18/19 0844     Site Right Radial     Gregorio's Test Positive     pH, Arterial 7.349 pH units      Comment: 84 Value below reference range        pCO2, Arterial 46.6 mm Hg      Comment: 83 Value above reference range        pO2, Arterial 57.4 mm Hg      Comment: 84 Value below reference range        HCO3, Arterial 25.7 mmol/L      Base Excess, Arterial -0.3 mmol/L      Comment: 84  Value below reference range        O2 Saturation, Arterial 88.8 %      Comment: 84 Value below reference range        Temperature 37.0 C      Barometric Pressure for Blood Gas 752 mmHg      Modality Room Air     FIO2 21 %      Ventilator Mode NA     Collected by [id number]     Comment: Meter: [id number]     :  [id number]       POC Glucose Once [[id number]]  (Normal) Collected:  10/17/19 2027    Specimen:  Blood Updated:  10/17/19 2039     Glucose 119 mg/dL      Comment: : [id number] Sherlymonique Leigh NMeter ID: [id number]       POC Glucose Once [[id number]]  (Normal) Collected:  10/17/19 1823    Specimen:  Blood Updated:  10/17/19 1834     Glucose 76 mg/dL      Comment: : [id number] Riky SolisKendra Hernándezby CMeter ID: [id number]       Hemoglobin A1c [[id number]]  (Abnormal) Collected:  10/17/19 1236    Specimen:  Blood Updated:  10/17/19 1814     Hemoglobin A1C 11.00 %     Narrative:       Hemoglobin A1C Ranges:    Increased Risk for Diabetes  5.7% to 6.4%  Diabetes                     >= 6.5%  Diabetic Goal                < 7.0%    Lactic Acid, Plasma [[id number]]  (Normal) Collected:  10/17/19 1615    Specimen:  Blood Updated:  10/17/19 1633     Lactate 1.3 mmol/L     Urinalysis With Culture If Indicated - Urine, Catheter In/Out [[id number]]  (Abnormal) Collected:  10/17/19 1438    Specimen:  Urine, Catheter In/Out Updated:  10/17/19 1454     Color, UA Dark Yellow     Appearance, UA Turbid     pH, UA <=5.0     Specific Gravity, UA 1.021     Glucose,  mg/dL (2+)     Ketones, UA Negative     Bilirubin, UA Negative     Blood, UA Negative     Protein, UA 30 mg/dL (1+)     Leuk Esterase, UA Moderate (2+)     Nitrite, UA Negative     Urobilinogen, UA 0.2 E.U./dL    Urinalysis, Microscopic Only - Urine, Catheter In/Out [[id number]]  (Abnormal) Collected:  10/17/19 1438    Specimen:  Urine, Catheter In/Out Updated:  10/17/19 1454     RBC, UA 3-5 /HPF      WBC, UA Too Numerous to Count /HPF      Bacteria, UA  4+ /HPF      Squamous Epithelial Cells, UA 3-6 /HPF      Hyaline Casts, UA 7-12 /LPF      Methodology Automated Microscopy    Comprehensive Metabolic Panel [[id number]]  (Abnormal) Collected:  10/17/19 1330    Specimen:  Blood Updated:  10/17/19 1424     Glucose 115 mg/dL      BUN 33 mg/dL      Creatinine 1.73 mg/dL      Sodium 141 mmol/L      Potassium 4.3 mmol/L      Chloride 101 mmol/L      CO2 27.0 mmol/L      Calcium 8.4 mg/dL      Total Protein 7.4 g/dL      Albumin 4.10 g/dL      ALT (SGPT) 61 U/L      AST (SGOT) 44 U/L      Alkaline Phosphatase 51 U/L      Total Bilirubin 0.2 mg/dL      eGFR Non African Amer 42 mL/min/1.73      Globulin 3.3 gm/dL      A/G Ratio 1.2 g/dL      BUN/Creatinine Ratio 19.1     Anion Gap 13.0 mmol/L     Narrative:       GFR Normal >60  Chronic Kidney Disease <60  Kidney Failure <15    Troponin [[id number]]  (Normal) Collected:  10/17/19 1330    Specimen:  Blood Updated:  10/17/19 1421     Troponin T <0.010 ng/mL     Narrative:       Troponin T Reference Range:  <= 0.03 ng/mL-   Negative for AMI  >0.03 ng/mL-     Abnormal for myocardial necrosis.  Clinicians would have to utilize clinical acumen, EKG, Troponin and serial changes to determine if it is an Acute Myocardial Infarction or myocardial injury due to an underlying chronic condition.     Red Top [[id number]] Collected:  10/17/19 1330    Specimen:  Blood Updated:  10/17/19 1358    [place] Draw [[id number]] Collected:  10/17/19 1236    Specimen:  Blood Updated:  10/17/19 1346    Narrative:       The following orders were created for panel order [place] Draw.  Procedure                               Abnormality         Status                     ---------                               -----------         ------                     Light Blue Top[[id number]]                                   Final result               Green Top (Gel)[[id number]]                                  Final result               Lavender Top[[id number]]                                      Final result               Red Top[[id number]]                                          In process                   Please view results for these tests on the individual orders.    Light Blue Top [[id number]] Collected:  10/17/19 1236    Specimen:  Blood Updated:  10/17/19 1346     Extra Tube hold for add-on     Comment: Auto resulted       Green Top (Gel) [[id number]] Collected:  10/17/19 1236    Specimen:  Blood Updated:  10/17/19 1346     Extra Tube Hold for add-ons.     Comment: Auto resulted.       Lavender Top [[id number]] Collected:  10/17/19 1236    Specimen:  Blood Updated:  10/17/19 1346     Extra Tube hold for add-on     Comment: Auto resulted       Lipase [[id number]]  (Normal) Collected:  10/17/19 1236    Specimen:  Blood Updated:  10/17/19 1309     Lipase 15 U/L     BNP [[id number]]  (Normal) Collected:  10/17/19 1236    Specimen:  Blood Updated:  10/17/19 1309     proBNP 53.8 pg/mL     Narrative:       Among patients with dyspnea, NT-proBNP is highly sensitive for the detection of acute congestive heart failure. In addition NT-proBNP of <300 pg/ml effectively rules out acute congestive heart failure with 99% negative predictive value.    CBC & Differential [[id number]] Collected:  10/17/19 1236    Specimen:  Blood Updated:  10/17/19 1251    Narrative:       The following orders were created for panel order CBC & Differential.  Procedure                               Abnormality         Status                     ---------                               -----------         ------                     CBC Auto Differential[[id number]]        Abnormal            Final result                 Please view results for these tests on the individual orders.    CBC Auto Differential [[id number]]  (Abnormal) Collected:  10/17/19 1236    Specimen:  Blood Updated:  10/17/19 1251     WBC 9.27 10*3/mm3      RBC 3.69 10*6/mm3      Hemoglobin 11.3 g/dL      Hematocrit 34.0 %      MCV 92.1 fL      MCH  30.6 pg      MCHC 33.2 g/dL      RDW 13.0 %      RDW-SD 43.6 fl      MPV 11.5 fL      Platelets 188 10*3/mm3      Neutrophil % 59.5 %      Lymphocyte % 26.9 %      Monocyte % 9.8 %      Eosinophil % 3.0 %      Basophil % 0.4 %      Immature Grans % 0.4 %      Neutrophils, Absolute 5.51 10*3/mm3      Lymphocytes, Absolute 2.49 10*3/mm3      Monocytes, Absolute 0.91 10*3/mm3      Eosinophils, Absolute 0.28 10*3/mm3      Basophils, Absolute 0.04 10*3/mm3      Immature Grans, Absolute 0.04 10*3/mm3      nRBC 0.0 /100 WBC         Imaging Results (last 7 days)     Procedure Component Value Units Date/Time    CT Abdomen Pelvis With Contrast [[id number]] Collected:  10/17/19 1523     Updated:  10/17/19 1534    Narrative:       Exam: CT of the abdomen and pelvis with IV contrast     Indication: Abdominal pain, fever, abscess suspected     Comparison: 08/19/2018     DOSE LENGTH PRODUCT: 1807.8 mGy cm. Automated exposure control was also  utilized to decrease patient radiation dose.     Findings:     The liver is diffusely steatotic. No suspicious liver lesion. Small  calcified stone in the gallbladder. No gallbladder wall thickening or  adjacent inflammation. No biliary ductal dilatation. Pancreas and  adrenal glands are unremarkable. Numerous calcified yoseph within the  spleen. Spleen is borderline enlarged measuring 13 cm.     Low density subcentimeter renal lesions are likely cysts but too small  to definitively characterize. No solid enhancing renal mass. No  urolithiasis or hydronephrosis. Urinary bladder appears unremarkable.     The ascending and transverse colon are mildly distended, mildly  thick-walled, and contain layering fluid. The appendix is not identified  but no secondary signs of appendicitis are present.     No ascites or free pelvic fluid. No pelvic mass organized pelvic  collection. Abdominal aorta is normal in caliber. Celiac trunk, SMA, and  ALIRIO are widely patent. Renal arteries appear widely patent. No  enlarged  retroperitoneal, mesenteric, pelvic, or inguinal lymph nodes.      L5-S1 degenerative change. No suspicious bone lesion.          Impression:          Mild distention of the ascending and transverse colon which demonstrate  mild wall thickening and contain layering fluid. Findings are most  consistent with mild colitis and/or diarrhea illness.     Diffuse hepatic steatosis.     Cholelithiasis without evidence of cholecystitis.  This report was finalized on 10/17/2019 15:31 by Dr. Siddharth Carias MD.    CT Angiogram Chest [[id number]] Collected:  10/17/19 1515     Updated:  10/17/19 1526    Narrative:       Exam: CT ANGIOGRAM CHEST-     Indication: Chest pain, acute, PE suspected, high pretest prob     Comparison: 07/30/2006     DOSE LENGTH PRODUCT: 635.6 mGy cm. Automated exposure control was also  utilized to decrease patient radiation dose.     Findings:     No pulmonary embolus identified. The main pulmonary trunk is normal in  caliber. No evidence of right heart strain. The thoracic aorta is normal  in caliber. The central airways are clear. No consolidation, pleural  effusion, or pneumothorax. No suspicious pulmonary nodule. Unchanged  small suspected varix in the right posterior supraclavicular region.     No enlarged thoracic lymph nodes. Findings in the upper abdomen will be  described on same day CT abdomen/pelvis report. No suspicious osseous  lesions.       Impression:          1.  No pulmonary embolus identified. No acute findings.  2.  Findings in the upper abdomen will be described on same day CT  abdomen/pelvis report.     This report was finalized on 10/17/2019 15:23 by Dr. Siddharth Carias MD.        History of Present Illness on Day of Discharge:   Patient is currently sitting up in bed.  He is much more alert today.  Denies any lethargy currently.  Denies any fever or chills.  Urine culture shows multiple susceptibilities for oral antibiotic therapy.  Eager to be discharged home today.   "Denies any new complaints.    Hospital Course:  The patient is a 53 y.o. male who presented to [facility] with abdominal pain.  The patient has a past medical history significant for HIV on antiretroviral therapy under control for approximately 2 decades.  Type 2 diabetes, hypertension, hyperlipidemia, depression and sleep apnea.  The patient presented to the emergency department and was found to have an acute urinary tract infection as well as an acute kidney injury with a creatinine of 1.51.  CT scan of the abdomen pelvis was performed in the emergency department and revealed mild distention of the ascending and transverse colon with demonstrated mild wall thickening and contain layering fluid most consistent with a mild colitis or diarrheal in the illness however the patient has had no diarrhea.  Dr. Mayra Tanner was consulted for HIV medication recommendations with acute kidney injury as well as acute infection.  The patient was lethargic yesterday and ABG was performed with not a significant abnormality.  Apparently his primary care provider Dr. Oksana Mares is working on getting him an outpatient sleep study for concern of obstructive sleep apnea.  The patient is much more alert today after initiation of antibiotic therapy.  The patient's urine culture revealed Klebsiella susceptible to ceftriaxone.  The patient is eager and adamant to be discharged home today.  Creatinine this morning is 0.84 after hydration with IV fluids.  He will be discharged home today in stable condition on Omnicef.  He will follow-up with his primary care provider in 1 week.    Condition on Discharge: Stable    Physical Exam on Discharge:  /68 (BP Location: Right arm, Patient Position: Sitting)   Pulse 88   Temp 98.3 °F (36.8 °C)   Resp 16   Ht 177.8 cm (70\")   Wt (!) 137 kg (302 lb)   SpO2 95%   BMI 43.33 kg/m²   Physical Exam   Constitutional: He is oriented to person, place, and time. He appears " well-developed and well-nourished.   Sitting up in bed.  No acute distress.  Morbidly obese.   HENT:   Head: Normocephalic and atraumatic.   Eyes: Conjunctivae and EOM are normal. Pupils are equal, round, and reactive to light.   Neck: Neck supple. No JVD present. No thyromegaly present.   Cardiovascular: Normal rate, regular rhythm, normal heart sounds and intact distal pulses. Exam reveals no gallop and no friction rub.   No murmur heard.  Pulmonary/Chest: Effort normal and breath sounds normal. No respiratory distress. He has no wheezes. He has no rales. He exhibits no tenderness.   Abdominal: Soft. Bowel sounds are normal. He exhibits no distension. There is no tenderness. There is no rebound and no guarding.   Musculoskeletal: Normal range of motion. He exhibits no edema, tenderness or deformity.   Lymphadenopathy:     He has no cervical adenopathy.   Neurological: He is alert and oriented to person, place, and time.   Skin: Skin is warm and dry. No rash noted.   Psychiatric: He has a normal mood and affect. His behavior is normal. Judgment and thought content normal.   Nursing note and vitals reviewed.    Discharge Disposition:  Home or Self Care    Discharge Medications:     Discharge Medications      New Medications      Instructions Start Date   cefdinir 300 MG capsule  Commonly known as:  OMNICEF   300 mg, Oral, 2 Times Daily         Changes to Medications      Instructions Start Date   fluticasone 50 MCG/ACT nasal spray  Commonly known as:  FLONASE  What changed:    · when to take this  · reasons to take this   2 sprays, Nasal, Daily         Continue These Medications      Instructions Start Date   albuterol sulfate  (90 Base) MCG/ACT inhaler  Commonly known as:  PROVENTIL HFA;VENTOLIN HFA;PROAIR HFA   2 puffs, Inhalation, Every 6 Hours PRN      ARIPiprazole 2 MG tablet  Commonly known as:  ABILIFY   2 mg, Oral, Daily      atorvastatin 80 MG tablet  Commonly known as:  LIPITOR   80 mg, Oral,  Daily      calcium carbonate 600 MG tablet  Commonly known as:  OS-THEODORA   600 mg, Oral, Daily      cholecalciferol 1000 units tablet  Commonly known as:  VITAMIN D3   1,000 Units, Oral, Daily      ciclopirox 0.77 % cream  Commonly known as:  LOPROX   Topical, 2 Times Daily      clonazePAM 0.5 MG tablet  Commonly known as:  KlonoPIN   0.5 mg, Oral, 3 Times Daily PRN      Dulaglutide 0.75 MG/0.5ML solution pen-injector   0.75 mg, Subcutaneous, Weekly      esomeprazole 40 MG capsule  Commonly known as:  nexIUM   40 mg, Oral, Every Morning Before Breakfast      FOOT POWDER MEDICATED EX   No dose, route, or frequency recorded.      gabapentin 800 MG tablet  Commonly known as:  NEURONTIN   No dose, route, or frequency recorded.      GENVOYA [phone] MG per tablet  Generic drug:  Vezzbss-Xuwgk-Qlwrnbfd-TenofAF   1 tablet, Oral, Daily      guaiFENesin 600 MG 12 hr tablet  Commonly known as:  MUCINEX   600 mg, Oral, 2 Times Daily PRN      HYDROcodone-acetaminophen 5-325 MG per tablet  Commonly known as:  NORCO   1 tablet, Oral, 2 Times Daily PRN      hydrOXYzine pamoate 25 MG capsule  Commonly known as:  VISTARIL   25 mg, Oral, Every 6 Hours PRN      insulin aspart 100 UNIT/ML solution pen-injector sc pen  Commonly known as:  novoLOG FLEXPEN   80 Units, Subcutaneous, 4 Times Daily With Meals & Nightly      Insulin Regular Human (Conc) 500 UNIT/ML solution pen-injector CONCENTRATED injection  Commonly known as:  HUMULIN R U-500 KWIKPEN   Up to 200 units bid with meals      loperamide 2 MG capsule  Commonly known as:  IMODIUM   2 mg, Oral, Daily PRN      loratadine 10 MG tablet  Commonly known as:  CLARITIN   10 mg, Oral, Daily      losartan 100 MG tablet  Commonly known as:  COZAAR   100 mg, Oral, Daily      LYRICA 100 MG capsule  Generic drug:  pregabalin   100 mg, Oral, 2 Times Daily      meclizine 25 MG tablet  Commonly known as:  ANTIVERT   25 mg, Oral, Daily PRN      metFORMIN 1000 MG tablet  Commonly known as:   GLUCOPHAGE   1,000 mg, Oral, 2 Times Daily With Meals      miconazole 2 % cream  Commonly known as:  MICOTIN   1 application, Topical, 2 Times Daily      neomycin-bacitracin-polymyxin 5-400-5000 ointment   1 application, Topical, 4 Times Daily PRN      phenazopyridine 200 MG tablet  Commonly known as:  PYRIDIUM   200 mg, Oral, 3 Times Daily PRN      PROLIA 60 MG/ML solution syringe  Generic drug:  denosumab   60 mg, Subcutaneous, Every 6 Months      promethazine 25 MG tablet  Commonly known as:  PHENERGAN   25 mg, Oral, Every 6 Hours PRN      propranolol LA 60 MG 24 hr capsule  Commonly known as:  INDERAL LA   60 mg, Oral, Daily      rizatriptan 10 MG tablet  Commonly known as:  MAXALT   10 mg, Oral, Once As Needed, May repeat in 2 hours if needed       tamsulosin 0.4 MG capsule 24 hr capsule  Commonly known as:  FLOMAX   1 capsule, Oral, Nightly      terbinafine 1 % cream  Commonly known as:  lamISIL   Topical, 2 Times Daily      tiZANidine 4 MG tablet  Commonly known as:  ZANAFLEX   6 mg, Oral, Every 6 Hours PRN      traZODone 150 MG tablet  Commonly known as:  DESYREL   100 mg, Oral, Nightly      TRINTELLIX 5 MG tablet  Generic drug:  Vortioxetine HBr   20 mg, Oral, Daily With Breakfast           Discharge Diet:   Diet Instructions     Diet: Regular, Consistent Carbohydrate      Discharge Diet:   Regular  Consistent Carbohydrate           Activity at Discharge:   Activity Instructions     Activity as Tolerated          Discharge Care Plan/Instructions:   1.  Continue and finish complete course of antibiotics  2.  Follow-up with primary care provider in 1 week    Follow-up Appointments:   Future Appointments   Date Time Provider Department Center   11/5/2019 10:00 AM Elda Wilson APRN [facility] None   11/8/2019  2:30 PM Pacheco Harper DPM [facility] None   1/27/2020  2:30 PM Demetri Westfall MD [facility] None     Test Results Pending at Discharge: None    Jones Pascal,  [place]  10/19/19  1:30 PM    Time: 32 minutes    I personally evaluated and examined the patient in conjunction with Jones RAMSAY and agree with the assessment, treatment plan, and disposition of the patient as recorded by her. My history, exam, and further recommendations are:     He is seated on the vent talking over the phone.  No distress. He is awake and alert. He feels significantly better and expressed readiness to go home.  Renal function has normalized.  Klebsiella grew in urine culture.  Cont abx as prescribed  Talon Rizo MD  10/19/19  6:21 PM

## 2019-10-19 NOTE — PLAN OF CARE
Problem: Fall Risk (Adult)  Goal: Absence of Fall  Outcome: Outcome(s) achieved Date Met: 10/19/19      Problem: Patient Care Overview  Goal: Plan of Care Review  Outcome: Outcome(s) achieved Date Met: 10/19/19   10/19/19 1401   Coping/Psychosocial   Plan of Care Reviewed With patient;friend   Plan of Care Review   Progress improving   OTHER   Outcome Summary Patient d/c home to the care of sign other and self. Denies nausea/pain. Tolerating diet. Voiding well. Abx given early per IV and RX sent to pharm for d/c. Verbalizes understanding the need to schedule d/c f/u appts.      Goal: Individualization and Mutuality  Outcome: Outcome(s) achieved Date Met: 10/19/19    Goal: Discharge Needs Assessment  Outcome: Outcome(s) achieved Date Met: 10/19/19    Goal: Interprofessional Rounds/Family Conf  Outcome: Outcome(s) achieved Date Met: 10/19/19      Problem: Renal Failure/Kidney Injury, Acute (Adult)  Goal: Signs and Symptoms of Listed Potential Problems Will be Absent, Minimized or Managed (Renal Failure/Kidney Injury, Acute)  Outcome: Outcome(s) achieved Date Met: 10/19/19      Problem: Sepsis/Septic Shock (Adult)  Goal: Signs and Symptoms of Listed Potential Problems Will be Absent, Minimized or Managed (Sepsis/Septic Shock)  Outcome: Outcome(s) achieved Date Met: 10/19/19      Problem: Urinary Tract Infection (Adult)  Goal: Signs and Symptoms of Listed Potential Problems Will be Absent, Minimized or Managed (Urinary Tract Infection)  Outcome: Outcome(s) achieved Date Met: 10/19/19      Problem: Patient Care Overview  Goal: Plan of Care Review  Outcome: Outcome(s) achieved Date Met: 10/19/19    Goal: Individualization and Mutuality  Outcome: Outcome(s) achieved Date Met: 10/19/19    Goal: Discharge Needs Assessment  Outcome: Outcome(s) achieved Date Met: 10/19/19    Goal: Interprofessional Rounds/Family Conf  Outcome: Outcome(s) achieved Date Met: 10/19/19      Problem: Pain, Chronic (Adult)  Goal: Acceptable  Pain/Comfort Level and Functional Ability  Outcome: Outcome(s) achieved Date Met: 10/19/19

## 2019-10-19 NOTE — PLAN OF CARE
Problem: Fall Risk (Adult)  Goal: Absence of Fall  Outcome: Ongoing (interventions implemented as appropriate)      Problem: Renal Failure/Kidney Injury, Acute (Adult)  Goal: Signs and Symptoms of Listed Potential Problems Will be Absent, Minimized or Managed (Renal Failure/Kidney Injury, Acute)  Outcome: Ongoing (interventions implemented as appropriate)      Problem: Sepsis/Septic Shock (Adult)  Goal: Signs and Symptoms of Listed Potential Problems Will be Absent, Minimized or Managed (Sepsis/Septic Shock)  Outcome: Ongoing (interventions implemented as appropriate)      Problem: Urinary Tract Infection (Adult)  Goal: Signs and Symptoms of Listed Potential Problems Will be Absent, Minimized or Managed (Urinary Tract Infection)  Outcome: Ongoing (interventions implemented as appropriate)      Problem: Patient Care Overview  Goal: Plan of Care Review  Outcome: Ongoing (interventions implemented as appropriate)   10/18/19 1720 10/19/19 0406   Coping/Psychosocial   Plan of Care Reviewed With patient --    Plan of Care Review   Progress --  improving   OTHER   Outcome Summary --  Pt is more alert tonight but still slightly drowsy. Up to BSC with stand by assist, medicated for pain and nausea x1, cont pulse ox, O2 at 2L, voiding, IVF, VSS, cont to monitor.      Goal: Individualization and Mutuality  Outcome: Ongoing (interventions implemented as appropriate)    Goal: Discharge Needs Assessment  Outcome: Ongoing (interventions implemented as appropriate)      Problem: Pain, Chronic (Adult)  Goal: Identify Related Risk Factors and Signs and Symptoms  Outcome: Outcome(s) achieved Date Met: 10/19/19    Goal: Acceptable Pain/Comfort Level and Functional Ability  Outcome: Ongoing (interventions implemented as appropriate)

## 2019-10-19 NOTE — PROGRESS NOTES
Infectious Diseases Progress Note    Patient:  Donis Yi  YOB: 1965  MRN: [id number]   Admit date: 10/17/2019   Admitting Physician: Talon Rizo*  Primary Care Physician: Oksana Mares MD    Chief Complaint/Interval History: He is without new symptoms.  He would like to go home.  He has tolerated Bactrim in the past without difficulty.  He does not report any significant dysuria.  He feels he has reasonable urine flow.  He feels urine flow is improving when he started on Flomax in August.  He has follow-up with his primary provider Dr. Oksana Mares, his HIV provider (Dr. Casey), and his urologist Dr. Gomez per his report in the next few weeks.  [facility] which [facility] closed on the weekend.  He would like his prescription sent to [facility]  He has all of his other medications.    Intake/Output Summary (Last 24 hours) at 10/19/2019 1333  Last data filed at 10/19/2019 1329  Gross per 24 hour   Intake 1206.8 ml   Output 4300 ml   Net -3093.2 ml     Allergies:   Allergies   Allergen Reactions   • Amitriptyline Anaphylaxis   • Amitriptyline Hcl Anaphylaxis   • Darvon [Propoxyphene] Anaphylaxis   • Zithromax [Azithromycin] Anaphylaxis     Current Scheduled Medications:     ARIPiprazole 2 mg Oral Daily   atorvastatin 80 mg Oral Nightly   calcium carbonate (oyster shell) 500 mg Oral Daily   ceftriaxone 1 g Intravenous Q24H   cetirizine 10 mg Oral Daily   cholecalciferol 1,000 Units Oral Daily   Ejdtima-Zlzis-Uepgsbal-TenofAF 1 tablet Oral Daily   enoxaparin 40 mg Subcutaneous Q24H   insulin lispro 0-14 Units Subcutaneous 4x Daily With Meals & Nightly   pantoprazole 40 mg Oral QAM   propranolol LA 60 mg Oral Daily   sodium chloride 10 mL Intravenous Q12H   tamsulosin 0.4 mg Oral Nightly     Current PRN Medications:  dextrose  •  dextrose  •  glucagon (human recombinant)  •  HYDROcodone-acetaminophen  •  ondansetron  •  phenazopyridine  •  [COMPLETED]  "Insert peripheral IV **AND** sodium chloride  •  sodium chloride    Review of Systems no nausea, diarrhea, rash, or skin itching.    Vital Signs:  /68 (BP Location: Right arm, Patient Position: Sitting)   Pulse 88   Temp 98.3 °F (36.8 °C)   Resp 16   Ht 177.8 cm (70\")   Wt (!) 137 kg (302 lb)   SpO2 95%   BMI 43.33 kg/m²     Physical Exam  Vital signs - reviewed.  Line/IV site - No erythema, warmth, induration, or tenderness.  Abdomen soft and nontender.  No suprapubic tenderness.  No flank tenderness.    Lab Results:  CBC: Results from last 7 days   Lab Units 10/19/19  0615 10/17/19  1236   WBC 10*3/mm3 12.28* 9.27   HEMOGLOBIN g/dL 11.0* 11.3*   HEMATOCRIT % 33.9* 34.0*   PLATELETS 10*3/mm3 173 188     BMP:  Results from last 7 days   Lab Units 10/19/19  0615 10/18/19  0838 10/17/19  1330   SODIUM mmol/L 139 137 141   POTASSIUM mmol/L 4.6 4.5 4.3   CHLORIDE mmol/L 100 100 101   CO2 mmol/L 27.0 24.0 27.0   BUN mg/dL 18 32* 33*   CREATININE mg/dL 0.84 1.51* 1.73*   GLUCOSE mg/dL 250* 152* 115*   CALCIUM mg/dL 8.4* 8.1* 8.4*   ALT (SGPT) U/L  --   --  61*     Culture Results:   Blood Culture   Date Value Ref Range Status   10/17/2019 No growth at 24 hours  Preliminary   10/17/2019 No growth at 24 hours  Preliminary     Urine Culture   Date Value Ref Range Status   10/17/2019 (A)  Final    >100,000 CFU/mL Klebsiella pneumoniae ssp pneumoniae     Susceptibility      Klebsiella pneumoniae ssp pneumoniae     ELDA     Ampicillin >=32 ug/ml Resistant     Ampicillin + Sulbactam >=32 ug/ml Resistant     Cefazolin <=4 ug/ml Susceptible     Cefepime <=1 ug/ml Susceptible     Ceftazidime <=1 ug/ml Susceptible     Ceftriaxone <=1 ug/ml Susceptible     Gentamicin <=1 ug/ml Susceptible     Levofloxacin <=0.12 ug/ml Susceptible     Nitrofurantoin 64 ug/ml Intermediate     Piperacillin + Tazobactam 8 ug/ml Susceptible     Tetracycline >=16 ug/ml Resistant     Trimethoprim + Sulfamethoxazole <=20 ug/ml Susceptible  "     Radiology: None  Additional Studies Reviewed: None    Impression:   1.  Urinary tract infection-Klebsiella pneumonia  2.  BPH-fairly recently started Flomax  3.  HIV/AIDS-stable  4.  Uncontrolled diabetes mellitus    Recommendations:   He appears stable for discharge home with outpatient follow-up  He has tolerated Bactrim in the past without difficulty  Prescription for Bactrim double strength 1 orally every 12 hours for 7 days E prescribed to Adal Perez with me for discharge  He is going to make sure he follows up with his primary physician, Dr. Casey, and Dr. Gomez within the next month.  He will receive today's dose of ceftriaxone today-he does not need to begin Bactrim double strength until tomorrow    Nicko Nicole MD

## 2019-10-20 ENCOUNTER — READMISSION MANAGEMENT (OUTPATIENT)
Dept: CALL CENTER | Facility: HOSPITAL | Age: 54
End: 2019-10-20

## 2019-10-20 NOTE — OUTREACH NOTE
Prep Survey      Responses   Facility patient discharged from?  [facility]   Is patient eligible?  Yes   Discharge diagnosis  UTI   Does the patient have one of the following disease processes/diagnoses(primary or secondary)?  Other   Does the patient have Home health ordered?  No   Is there a DME ordered?  No   Comments regarding appointments  see AVS   Prep survey completed?  Yes          Yen Cortez RN

## 2019-10-21 ENCOUNTER — TELEPHONE (OUTPATIENT)
Dept: INTERNAL MEDICINE | Age: 54
End: 2019-10-21

## 2019-10-22 LAB
BACTERIA SPEC AEROBE CULT: NORMAL
BACTERIA SPEC AEROBE CULT: NORMAL

## 2019-10-23 ENCOUNTER — READMISSION MANAGEMENT (OUTPATIENT)
Dept: CALL CENTER | Facility: HOSPITAL | Age: 54
End: 2019-10-23

## 2019-10-23 NOTE — OUTREACH NOTE
Medical Week 1 Survey      Responses   Facility patient discharged from?  [facility]   Does the patient have one of the following disease processes/diagnoses(primary or secondary)?  Other   Is there a successful TCM telephone encounter documented?  No   Week 1 attempt successful?  No   Unsuccessful attempts  Attempt 1          Rabia Saini RN

## 2019-10-25 ENCOUNTER — READMISSION MANAGEMENT (OUTPATIENT)
Dept: CALL CENTER | Facility: HOSPITAL | Age: 54
End: 2019-10-25

## 2019-10-25 NOTE — OUTREACH NOTE
Medical Week 1 Survey      Responses   Facility patient discharged from?  [facility]   Does the patient have one of the following disease processes/diagnoses(primary or secondary)?  Other   Is there a successful TCM telephone encounter documented?  No   Week 1 attempt successful?  Yes   Call start time  1229   Call end time  1234   Is patient permission given to speak with other caregiver?  Yes   List who call center can speak with  Jaime Ravi Matas reviewed with patient/caregiver?  Yes   Is the patient having any side effects they believe may be caused by any medication additions or changes?  No   Does the patient have all medications ordered at discharge?  Yes   Is the patient taking all medications as directed (includes completed medication regime)?  Yes   Does the patient have a primary care provider?   Yes   Does the patient have an appointment with their PCP within 7 days of discharge?  Greater than 7 days   Comments regarding PCP  Dr Mares appt 11/01/19   What is preventing the patient from scheduling follow up appointments within 7 days of discharge?  Earlier appointment not available   Nursing Interventions  Verified appointment date/time/provider   Has the patient kept scheduled appointments due by today?  N/A   Comments  States has appt with DR Gomez scheduled.   Has home health visited the patient within 72 hours of discharge?  N/A   Psychosocial issues?  No   Did the patient receive a copy of their discharge instructions?  Yes   Nursing interventions  Reviewed instructions with patient, Educated on MyChart   What is the patient's perception of their health status since discharge?  Improving   Is the patient/caregiver able to teach back signs and symptoms related to disease process for when to call PCP?  Yes   Is the patient/caregiver able to teach back signs and symptoms related to disease process for when to call 911?  Yes   Is the patient/caregiver able to teach back the hierarchy of who to call/visit  for symptoms/problems? PCP, Specialist, Home health nurse, Urgent Care, ED, 911  Yes   If the patient is a current smoker, are they able to teach back resources for cessation?  Smoking cessation medications, Smoking cessation classes, Smoking cessation support groups, 6-466-IjrsSuy   Additional teach back comments  reviewed s/s of UTI   Week 1 call completed?  Yes   Wrap up additional comments  States is improving-denies any UTI s/s. No complaints today.          Kathy Bonner, RN

## 2019-10-30 RX ORDER — DULAGLUTIDE 0.75 MG/.5ML
INJECTION, SOLUTION SUBCUTANEOUS
Qty: 2 ML | Refills: 3 | Status: SHIPPED | OUTPATIENT
Start: 2019-10-30 | End: 2020-02-26

## 2019-11-01 ENCOUNTER — READMISSION MANAGEMENT (OUTPATIENT)
Dept: CALL CENTER | Facility: HOSPITAL | Age: 54
End: 2019-11-01

## 2019-11-01 NOTE — OUTREACH NOTE
Medical Week 2 Survey      Responses   Facility patient discharged from?  [facility]   Does the patient have one of the following disease processes/diagnoses(primary or secondary)?  Other   Week 2 attempt successful?  Yes   Call start time  1822   Discharge diagnosis  UTI   Rescheduled  Rescheduled-pt requested   Is patient permission given to speak with other caregiver?  Yes          Fay Douglas RN

## 2019-11-04 ENCOUNTER — READMISSION MANAGEMENT (OUTPATIENT)
Dept: CALL CENTER | Facility: HOSPITAL | Age: 54
End: 2019-11-04

## 2019-11-04 NOTE — OUTREACH NOTE
Medical Week 2 Survey      Responses   Facility patient discharged from?  [facility]   Does the patient have one of the following disease processes/diagnoses(primary or secondary)?  Other   Week 2 attempt successful?  Yes   Call start time  1246   Discharge diagnosis  UTI   Call end time  1250   Person spoke with today (if not patient) and relationship  motherRoxi reviewed with patient/caregiver?  Yes   Is the patient having any side effects they believe may be caused by any medication additions or changes?  No   Does the patient have all medications ordered at discharge?  Yes   Is the patient taking all medications as directed (includes completed medication regime)?  Yes   Does the patient have a primary care provider?   Yes   Has the patient kept scheduled appointments due by today?  N/A   Comments  mother not sure if pt kept appt on 11/1/19   Has home health visited the patient within 72 hours of discharge?  N/A   Psychosocial issues?  No   Did the patient receive a copy of their discharge instructions?  Yes   Nursing interventions  Reviewed instructions with patient   What is the patient's perception of their health status since discharge?  Improving   Is the patient/caregiver able to teach back signs and symptoms related to disease process for when to call PCP?  Yes   Is the patient/caregiver able to teach back signs and symptoms related to disease process for when to call 911?  Yes   Is the patient/caregiver able to teach back the hierarchy of who to call/visit for symptoms/problems? PCP, Specialist, Home health nurse, Urgent Care, ED, 911  Yes   Additional teach back comments  call brief, mother states pt was feeling better, states pt usually drinks some water and soft drinks daily, advised mother to have pt consult AVS for specific UTI instructions   Week 2 Call Completed?  Yes          Terese Mckinley RN

## 2019-11-07 ENCOUNTER — TELEPHONE (OUTPATIENT)
Dept: PODIATRY | Facility: CLINIC | Age: 54
End: 2019-11-07

## 2019-11-08 ENCOUNTER — TELEPHONE (OUTPATIENT)
Dept: PODIATRY | Facility: CLINIC | Age: 54
End: 2019-11-08

## 2019-11-11 ENCOUNTER — TELEPHONE (OUTPATIENT)
Dept: PODIATRY | Facility: CLINIC | Age: 54
End: 2019-11-11

## 2019-11-11 NOTE — TELEPHONE ENCOUNTER
Pt called needing to reschedule appt for today @ 3:45 due to another appt scheduled. I have cancelled appt and moved to 12/13/19.  I left pt and detailed message to call back. DN

## 2019-11-13 ENCOUNTER — READMISSION MANAGEMENT (OUTPATIENT)
Dept: CALL CENTER | Facility: HOSPITAL | Age: 54
End: 2019-11-13

## 2019-11-13 NOTE — OUTREACH NOTE
Medical Week 3 Survey      Responses   Facility patient discharged from?  [facility]   Does the patient have one of the following disease processes/diagnoses(primary or secondary)?  Other   Week 3 attempt successful?  Yes   Call start time  1113   Call end time  1123   Discharge diagnosis  UTI   Is patient permission given to speak with other caregiver?  Yes   List who call center can speak with  Jaime Bellob   Meds reviewed with patient/caregiver?  Yes   Is the patient having any side effects they believe may be caused by any medication additions or changes?  No   Does the patient have all medications ordered at discharge?  Yes   Is the patient taking all medications as directed (includes completed medication regime)?  Yes   Comments regarding appointments  He had to reschedule due to transportation problems. He call them and they are not avaliable. His card does not pay for transportation, and often funds are not available.    Does the patient have a primary care provider?   Yes   Comments regarding PCP  He was unable to see Dr. Mares. There is a bus that comes to the corner, and he does not have the monry to pay for it. A meesage will be send to .    Does the patient have an appointment with their PCP within 7 days of discharge?  Yes   Has the patient kept scheduled appointments due by today?  No   What is preventing the patient from keeping their appointments?  Financial   Notified Case Management  Transportation   Has home health visited the patient within 72 hours of discharge?  N/A   Psychosocial issues?  No   Did the patient receive a copy of their discharge instructions?  Yes   Nursing interventions  Reviewed instructions with patient   What is the patient's perception of their health status since discharge?  Improving   Is the patient/caregiver able to teach back signs and symptoms related to disease process for when to call PCP?  Yes   Is the patient/caregiver able to teach back the hierarchy of who to  call/visit for symptoms/problems? PCP, Specialist, Home health nurse, Urgent Care, ED, 911  Yes   If the patient is a current smoker, are they able to teach back resources for cessation?  Smoking cessation medications, Smoking cessation classes, Smoking cessation support groups, 0-250-NvtiQbd   Additional teach back comments  A message sent to CM regarding transportation and problems with the funds to pay.   Week 3 Call Completed?  Yes          Elise Raygoza RN

## 2019-11-20 ENCOUNTER — READMISSION MANAGEMENT (OUTPATIENT)
Dept: CALL CENTER | Facility: HOSPITAL | Age: 54
End: 2019-11-20

## 2019-12-06 RX ORDER — MECLIZINE HYDROCHLORIDE 25 MG/1
TABLET ORAL
Qty: 90 TABLET | Refills: 2 | Status: SHIPPED | OUTPATIENT
Start: 2019-12-06 | End: 2020-08-13

## 2020-01-06 ENCOUNTER — TELEPHONE (OUTPATIENT)
Dept: INTERNAL MEDICINE | Age: 55
End: 2020-01-06

## 2020-01-07 RX ORDER — PROPRANOLOL HYDROCHLORIDE 80 MG/1
CAPSULE, EXTENDED RELEASE ORAL
Qty: 90 CAPSULE | Refills: 3 | Status: SHIPPED | OUTPATIENT
Start: 2020-01-07 | End: 2020-04-21

## 2020-01-07 RX ORDER — DEXLANSOPRAZOLE 60 MG/1
CAPSULE, DELAYED RELEASE ORAL
Qty: 90 CAPSULE | Refills: 3 | Status: SHIPPED | OUTPATIENT
Start: 2020-01-07 | End: 2020-12-03

## 2020-01-07 RX ORDER — INSULIN HUMAN 500 [IU]/ML
INJECTION, SOLUTION SUBCUTANEOUS
Qty: 24 ML | Refills: 5 | Status: SHIPPED | OUTPATIENT
Start: 2020-01-07 | End: 2020-06-12

## 2020-01-07 RX ORDER — LOSARTAN POTASSIUM 100 MG/1
TABLET ORAL
Qty: 90 TABLET | Refills: 3 | Status: SHIPPED | OUTPATIENT
Start: 2020-01-07 | End: 2020-05-26 | Stop reason: SDUPTHER

## 2020-01-12 ENCOUNTER — APPOINTMENT (OUTPATIENT)
Dept: GENERAL RADIOLOGY | Facility: HOSPITAL | Age: 55
End: 2020-01-12

## 2020-01-12 ENCOUNTER — HOSPITAL ENCOUNTER (EMERGENCY)
Facility: HOSPITAL | Age: 55
Discharge: HOME | End: 2020-01-12
Attending: FAMILY MEDICINE | Admitting: FAMILY MEDICINE

## 2020-01-12 VITALS
HEART RATE: 82 BPM | WEIGHT: 286 LBS | BODY MASS INDEX: 40.94 KG/M2 | OXYGEN SATURATION: 97 % | SYSTOLIC BLOOD PRESSURE: 125 MMHG | RESPIRATION RATE: 18 BRPM | DIASTOLIC BLOOD PRESSURE: 64 MMHG | TEMPERATURE: 97.7 F | HEIGHT: 70 IN

## 2020-01-12 DIAGNOSIS — J02.0 STREP PHARYNGITIS: Primary | ICD-10-CM

## 2020-01-12 LAB
ALBUMIN SERPL-MCNC: 4.2 G/DL (ref 3.5–5.2)
ALBUMIN/GLOB SERPL: 1.3 G/DL
ALP SERPL-CCNC: 58 U/L (ref 39–117)
ALT SERPL W P-5'-P-CCNC: 48 U/L (ref 1–41)
ANION GAP SERPL CALCULATED.3IONS-SCNC: 13 MMOL/L (ref 5–15)
AST SERPL-CCNC: 35 U/L (ref 1–40)
BASOPHILS # BLD AUTO: 0.04 10*3/MM3 (ref 0–0.2)
BASOPHILS NFR BLD AUTO: 0.4 % (ref 0–1.5)
BILIRUB SERPL-MCNC: 0.2 MG/DL (ref 0.2–1.2)
BUN BLD-MCNC: 13 MG/DL (ref 6–20)
BUN/CREAT SERPL: 17.3 (ref 7–25)
CALCIUM SPEC-SCNC: 9.4 MG/DL (ref 8.6–10.5)
CHLORIDE SERPL-SCNC: 100 MMOL/L (ref 98–107)
CO2 SERPL-SCNC: 29 MMOL/L (ref 22–29)
CREAT BLD-MCNC: 0.75 MG/DL (ref 0.76–1.27)
DEPRECATED RDW RBC AUTO: 46.3 FL (ref 37–54)
EOSINOPHIL # BLD AUTO: 0.25 10*3/MM3 (ref 0–0.4)
EOSINOPHIL NFR BLD AUTO: 2.8 % (ref 0.3–6.2)
ERYTHROCYTE [DISTWIDTH] IN BLOOD BY AUTOMATED COUNT: 13.7 % (ref 12.3–15.4)
FLUAV AG NPH QL: NEGATIVE
FLUBV AG NPH QL IA: NEGATIVE
GFR SERPL CREATININE-BSD FRML MDRD: 109 ML/MIN/1.73
GLOBULIN UR ELPH-MCNC: 3.3 GM/DL
GLUCOSE BLD-MCNC: 219 MG/DL (ref 65–99)
HCT VFR BLD AUTO: 36.6 % (ref 37.5–51)
HGB BLD-MCNC: 12.5 G/DL (ref 13–17.7)
IMM GRANULOCYTES # BLD AUTO: 0.08 10*3/MM3 (ref 0–0.05)
IMM GRANULOCYTES NFR BLD AUTO: 0.9 % (ref 0–0.5)
LYMPHOCYTES # BLD AUTO: 2.64 10*3/MM3 (ref 0.7–3.1)
LYMPHOCYTES NFR BLD AUTO: 29.4 % (ref 19.6–45.3)
MAGNESIUM SERPL-MCNC: 1.4 MG/DL (ref 1.6–2.6)
MCH RBC QN AUTO: 31.2 PG (ref 26.6–33)
MCHC RBC AUTO-ENTMCNC: 34.2 G/DL (ref 31.5–35.7)
MCV RBC AUTO: 91.3 FL (ref 79–97)
MONOCYTES # BLD AUTO: 0.69 10*3/MM3 (ref 0.1–0.9)
MONOCYTES NFR BLD AUTO: 7.7 % (ref 5–12)
NEUTROPHILS # BLD AUTO: 5.29 10*3/MM3 (ref 1.7–7)
NEUTROPHILS NFR BLD AUTO: 58.8 % (ref 42.7–76)
NRBC BLD AUTO-RTO: 0 /100 WBC (ref 0–0.2)
NT-PROBNP SERPL-MCNC: 95.7 PG/ML (ref 5–900)
PLATELET # BLD AUTO: 202 10*3/MM3 (ref 140–450)
PMV BLD AUTO: 11.1 FL (ref 6–12)
POTASSIUM BLD-SCNC: 4 MMOL/L (ref 3.5–5.2)
PROT SERPL-MCNC: 7.5 G/DL (ref 6–8.5)
RBC # BLD AUTO: 4.01 10*6/MM3 (ref 4.14–5.8)
S PYO AG THROAT QL: POSITIVE
SODIUM BLD-SCNC: 142 MMOL/L (ref 136–145)
TROPONIN T SERPL-MCNC: <0.01 NG/ML (ref 0–0.03)
WBC NRBC COR # BLD: 8.99 10*3/MM3 (ref 3.4–10.8)

## 2020-01-12 PROCEDURE — 83880 ASSAY OF NATRIURETIC PEPTIDE: CPT | Performed by: FAMILY MEDICINE

## 2020-01-12 PROCEDURE — 25010000002 MAGNESIUM SULFATE 2 GM/50ML SOLUTION: Performed by: FAMILY MEDICINE

## 2020-01-12 PROCEDURE — 99283 EMERGENCY DEPT VISIT LOW MDM: CPT

## 2020-01-12 PROCEDURE — 87804 INFLUENZA ASSAY W/OPTIC: CPT | Performed by: FAMILY MEDICINE

## 2020-01-12 PROCEDURE — 83735 ASSAY OF MAGNESIUM: CPT | Performed by: FAMILY MEDICINE

## 2020-01-12 PROCEDURE — 85025 COMPLETE CBC W/AUTO DIFF WBC: CPT | Performed by: FAMILY MEDICINE

## 2020-01-12 PROCEDURE — 94760 N-INVAS EAR/PLS OXIMETRY 1: CPT

## 2020-01-12 PROCEDURE — 80053 COMPREHEN METABOLIC PANEL: CPT | Performed by: FAMILY MEDICINE

## 2020-01-12 PROCEDURE — 96372 THER/PROPH/DIAG INJ SC/IM: CPT

## 2020-01-12 PROCEDURE — 25010000002 PENICILLIN G BENZATHINE PER 1200000 UNITS: Performed by: FAMILY MEDICINE

## 2020-01-12 PROCEDURE — 36415 COLL VENOUS BLD VENIPUNCTURE: CPT

## 2020-01-12 PROCEDURE — 71046 X-RAY EXAM CHEST 2 VIEWS: CPT

## 2020-01-12 PROCEDURE — 87880 STREP A ASSAY W/OPTIC: CPT | Performed by: FAMILY MEDICINE

## 2020-01-12 PROCEDURE — 84484 ASSAY OF TROPONIN QUANT: CPT | Performed by: FAMILY MEDICINE

## 2020-01-12 PROCEDURE — 94640 AIRWAY INHALATION TREATMENT: CPT

## 2020-01-12 PROCEDURE — 96365 THER/PROPH/DIAG IV INF INIT: CPT

## 2020-01-12 RX ORDER — MAGNESIUM SULFATE HEPTAHYDRATE 40 MG/ML
2 INJECTION, SOLUTION INTRAVENOUS ONCE
Status: COMPLETED | OUTPATIENT
Start: 2020-01-12 | End: 2020-01-12

## 2020-01-12 RX ORDER — IPRATROPIUM BROMIDE AND ALBUTEROL SULFATE 2.5; .5 MG/3ML; MG/3ML
3 SOLUTION RESPIRATORY (INHALATION) ONCE
Status: COMPLETED | OUTPATIENT
Start: 2020-01-12 | End: 2020-01-12

## 2020-01-12 RX ADMIN — MAGNESIUM SULFATE HEPTAHYDRATE 2 G: 40 INJECTION, SOLUTION INTRAVENOUS at 12:54

## 2020-01-12 RX ADMIN — IPRATROPIUM BROMIDE AND ALBUTEROL SULFATE 3 ML: 2.5; .5 SOLUTION RESPIRATORY (INHALATION) at 12:13

## 2020-01-12 RX ADMIN — PENICILLIN G BENZATHINE 1.2 MILLION UNITS: 1200000 INJECTION, SUSPENSION INTRAMUSCULAR at 12:54

## 2020-01-12 RX ADMIN — SODIUM CHLORIDE 500 ML: 9 INJECTION, SOLUTION INTRAVENOUS at 12:39

## 2020-01-12 NOTE — ED PROVIDER NOTES
Subjective   Mr. Yi is a 54-year-old gentleman who for the last few days has been suffering from upper respiratory tract infection symptoms such as fatigue, malaise, cough and rhinitis.  He thought he was getting better yesterday but he feels much worse today and has also developed a scratchy throat.  He is not sure if he has had a fever but he does feel hot and cold.          Review of Systems   Constitutional: Positive for chills and fatigue.   HENT: Positive for sore throat.    Musculoskeletal: Positive for myalgias.   All other systems reviewed and are negative.      Past Medical History:   Diagnosis Date   • Allergic rhinitis    • Anxiety    • Bursitis    • DDD (degenerative disc disease), cervical    • Depression    • HIV disease (CMS/[facility])    • HLD (hyperlipidemia)    • HSP (Henoch Schonlein purpura) (CMS/[facility])    • HTN (hypertension)    • Migraine    • Myocardial infarction (CMS/[facility])    • Osteoporosis    • Sleep apnea    • Type 2 diabetes mellitus (CMS/[facility])        Allergies   Allergen Reactions   • Amitriptyline Anaphylaxis   • Amitriptyline Hcl Anaphylaxis   • Darvon [Propoxyphene] Anaphylaxis   • Zithromax [Azithromycin] Anaphylaxis       Past Surgical History:   Procedure Laterality Date   • ANKLE SURGERY     • COLONOSCOPY  06/19/2009    Normal exam repeat in 10 years   • FOREARM SURGERY     • LYMPH NODE BIOPSY         Family History   Problem Relation Age of Onset   • Diabetes Mother    • Hypertension Mother    • Thyroid disease Mother    • Hypertension Father    • Thyroid disease Father    • Arrhythmia Father    • Diabetes Maternal Grandfather    • Heart disease Maternal Grandfather    • Hypertension Maternal Grandfather    • Diabetes Paternal Grandmother    • Stroke Paternal Grandmother    • Heart disease Paternal Grandmother    • Hypertension Paternal Grandmother    • Thyroid disease Paternal Grandmother    • Hypertension Paternal Grandfather    • Hypertension Sister    • No Known Problems  Brother        Social History     Socioeconomic History   • Marital status:      Spouse name: Not on file   • Number of children: Not on file   • Years of education: Not on file   • Highest education level: Not on file   Tobacco Use   • Smoking status: Current Every Day Smoker     Packs/day: 1.00     Types: Cigarettes     Start date: 1988   • Smokeless tobacco: Never Used   Substance and Sexual Activity   • Alcohol use: Yes     Comment: occasionally   • Drug use: No   • Sexual activity: Defer           Objective   Physical Exam   Constitutional: He is oriented to person, place, and time. He appears well-developed and well-nourished.   HENT:   Head: Normocephalic and atraumatic.   Right Ear: External ear normal.   Left Ear: External ear normal.   Nose: Nose normal.   Mouth/Throat: Oropharynx is clear and moist.   Eyes: Conjunctivae and EOM are normal.   Neck: Normal range of motion. Neck supple.   Cardiovascular: Normal rate, regular rhythm, normal heart sounds and intact distal pulses.   Pulmonary/Chest: Effort normal and breath sounds normal.   Abdominal: Soft. Bowel sounds are normal.   Musculoskeletal: Normal range of motion.   Neurological: He is alert and oriented to person, place, and time.   Skin: Skin is warm and dry. Capillary refill takes less than 2 seconds.   Psychiatric: He has a normal mood and affect. His behavior is normal. Judgment and thought content normal.   Nursing note and vitals reviewed.      Procedures           ED Course                                               MDM  Number of Diagnoses or Management Options     Amount and/or Complexity of Data Reviewed  Clinical lab tests: reviewed and ordered    Critical Care  Total time providing critical care: < 30 minutes    Patient Progress  Patient progress: stable      Final diagnoses:   Strep pharyngitis     The patient's work-up revealed a positive strep test.  The patient elected to have a Bicillin injection today rather than take  p.o. medicines at home.  This was given along with some IV fluids and some magnesium to correct his mild low magnesium.       Emanuel Ruiz MD  01/12/20 4391

## 2020-01-30 ENCOUNTER — APPOINTMENT (OUTPATIENT)
Dept: ONCOLOGY | Facility: HOSPITAL | Age: 55
End: 2020-01-30

## 2020-02-05 ENCOUNTER — APPOINTMENT (OUTPATIENT)
Dept: ONCOLOGY | Facility: HOSPITAL | Age: 55
End: 2020-02-05

## 2020-02-05 ENCOUNTER — TELEPHONE (OUTPATIENT)
Dept: INTERNAL MEDICINE | Age: 55
End: 2020-02-05

## 2020-02-10 ENCOUNTER — OFFICE VISIT (OUTPATIENT)
Dept: INTERNAL MEDICINE | Age: 55
End: 2020-02-10
Payer: COMMERCIAL

## 2020-02-10 ENCOUNTER — TELEPHONE (OUTPATIENT)
Dept: PODIATRY | Facility: CLINIC | Age: 55
End: 2020-02-10

## 2020-02-10 VITALS
BODY MASS INDEX: 37.65 KG/M2 | DIASTOLIC BLOOD PRESSURE: 66 MMHG | HEART RATE: 88 BPM | WEIGHT: 278 LBS | OXYGEN SATURATION: 93 % | HEIGHT: 72 IN | SYSTOLIC BLOOD PRESSURE: 120 MMHG

## 2020-02-10 LAB — HBA1C MFR BLD: 10.1 %

## 2020-02-10 PROCEDURE — 99214 OFFICE O/P EST MOD 30 MIN: CPT | Performed by: INTERNAL MEDICINE

## 2020-02-10 PROCEDURE — G0439 PPPS, SUBSEQ VISIT: HCPCS | Performed by: INTERNAL MEDICINE

## 2020-02-10 PROCEDURE — 83036 HEMOGLOBIN GLYCOSYLATED A1C: CPT | Performed by: INTERNAL MEDICINE

## 2020-02-10 PROCEDURE — 96372 THER/PROPH/DIAG INJ SC/IM: CPT | Performed by: INTERNAL MEDICINE

## 2020-02-10 PROCEDURE — G0444 DEPRESSION SCREEN ANNUAL: HCPCS | Performed by: INTERNAL MEDICINE

## 2020-02-10 RX ORDER — LOPERAMIDE HYDROCHLORIDE 2 MG/1
2 CAPSULE ORAL 4 TIMES DAILY PRN
Qty: 30 CAPSULE | Refills: 5 | Status: SHIPPED | OUTPATIENT
Start: 2020-02-10 | End: 2020-09-14

## 2020-02-10 RX ORDER — CEFDINIR 300 MG/1
300 CAPSULE ORAL 2 TIMES DAILY
Qty: 20 CAPSULE | Refills: 0 | Status: SHIPPED | OUTPATIENT
Start: 2020-02-10 | End: 2020-02-20

## 2020-02-10 RX ORDER — METHYLPREDNISOLONE ACETATE 40 MG/ML
40 INJECTION, SUSPENSION INTRA-ARTICULAR; INTRALESIONAL; INTRAMUSCULAR; SOFT TISSUE ONCE
Status: COMPLETED | OUTPATIENT
Start: 2020-02-10 | End: 2020-02-10

## 2020-02-10 RX ADMIN — METHYLPREDNISOLONE ACETATE 40 MG: 40 INJECTION, SUSPENSION INTRA-ARTICULAR; INTRALESIONAL; INTRAMUSCULAR; SOFT TISSUE at 12:15

## 2020-02-10 ASSESSMENT — LIFESTYLE VARIABLES
HOW MANY STANDARD DRINKS CONTAINING ALCOHOL DO YOU HAVE ON A TYPICAL DAY: 0
AUDIT-C TOTAL SCORE: 1
HOW OFTEN DURING THE LAST YEAR HAVE YOU NEEDED AN ALCOHOLIC DRINK FIRST THING IN THE MORNING TO GET YOURSELF GOING AFTER A NIGHT OF HEAVY DRINKING: 0
HAS A RELATIVE, FRIEND, DOCTOR, OR ANOTHER HEALTH PROFESSIONAL EXPRESSED CONCERN ABOUT YOUR DRINKING OR SUGGESTED YOU CUT DOWN: 0
HOW OFTEN DURING THE LAST YEAR HAVE YOU FAILED TO DO WHAT WAS NORMALLY EXPECTED FROM YOU BECAUSE OF DRINKING: 0
HOW OFTEN DURING THE LAST YEAR HAVE YOU HAD A FEELING OF GUILT OR REMORSE AFTER DRINKING: 0
HOW OFTEN DO YOU HAVE A DRINK CONTAINING ALCOHOL: 1
AUDIT TOTAL SCORE: 1
HAVE YOU OR SOMEONE ELSE BEEN INJURED AS A RESULT OF YOUR DRINKING: 0
HOW OFTEN DO YOU HAVE SIX OR MORE DRINKS ON ONE OCCASION: 0
HOW OFTEN DURING THE LAST YEAR HAVE YOU FOUND THAT YOU WERE NOT ABLE TO STOP DRINKING ONCE YOU HAD STARTED: 0
HOW OFTEN DURING THE LAST YEAR HAVE YOU BEEN UNABLE TO REMEMBER WHAT HAPPENED THE NIGHT BEFORE BECAUSE YOU HAD BEEN DRINKING: 0

## 2020-02-10 ASSESSMENT — PATIENT HEALTH QUESTIONNAIRE - PHQ9: SUM OF ALL RESPONSES TO PHQ QUESTIONS 1-9: 7

## 2020-02-10 NOTE — PROGRESS NOTES
Smoking status: Current Every Day Smoker    Smokeless tobacco: Never Used   Substance and Sexual Activity    Alcohol use:  Yes    Drug use: No    Sexual activity: Not on file   Lifestyle    Physical activity:     Days per week: Not on file     Minutes per session: Not on file    Stress: Not on file   Relationships    Social connections:     Talks on phone: Not on file     Gets together: Not on file     Attends Holiness service: Not on file     Active member of club or organization: Not on file     Attends meetings of clubs or organizations: Not on file     Relationship status: Not on file    Intimate partner violence:     Fear of current or ex partner: Not on file     Emotionally abused: Not on file     Physically abused: Not on file     Forced sexual activity: Not on file   Other Topics Concern    Not on file   Social History Narrative    Not on file       Allergies   Allergen Reactions    Darvon [Propoxyphene]     Elavil [Amitriptyline Hcl]     Zithromax [Azithromycin]        Current Outpatient Medications   Medication Sig Dispense Refill    loperamide (IMODIUM) 2 MG capsule Take 1 capsule by mouth 4 times daily as needed for Diarrhea 30 capsule 5    cefdinir (OMNICEF) 300 MG capsule Take 1 capsule by mouth 2 times daily for 10 days 20 capsule 0    propranolol (INDERAL LA) 80 MG extended release capsule 1 capsule by mouth daily 90 capsule 3    DEXILANT 60 MG CPDR delayed release capsule TAKE 1 CAPSULE BY MOUTH DAILY 90 capsule 3    HUMULIN R U-500 KWIKPEN 500 UNIT/ML SOPN concentrated injection pen inject 200 units TWICE DAILY with meals 24 mL 5    losartan (COZAAR) 100 MG tablet Take 1 tablet by mouth daily 90 tablet 3    meclizine (ANTIVERT) 25 MG tablet TAKE 1 TABLET BY MOUTH DAILY 90 tablet 2    TRULICITY 1.03 LL/4.7EA SOPN Inject 0.75mg (0.5ml) under the skin AS DIRECTED every week 2 mL 3    metFORMIN (GLUCOPHAGE) 500 MG tablet TAKE 2 TABLETS BY MOUTH TWICE DAILY WITH MEALS 360 tablet 3  atorvastatin (LIPITOR) 40 MG tablet Take 1 tablet by mouth daily 90 tablet 3    Multiple Vitamins-Minerals (THERAPEUTIC MULTIVITAMIN-MINERALS) tablet Take 1 tablet by mouth daily 100 tablet 3    Melatonin 5 MG CAPS Take 1 tab nightly 90 capsule 3    tamsulosin (FLOMAX) 0.4 MG capsule Take 0.4 mg by mouth daily      NOVOLOG FLEXPEN 100 UNIT/ML injection pen INJECT 70 UNITS WITH REGULAR MEALS, 80 UNITS WITH LARGER MEALS, AND 60 UNITS WITH SNACKS AS DIRECTED 105 mL 3    nystatin (MYCOSTATIN) 859840 UNIT/GM cream Apply topically 2 times daily Apply topically 2 times daily. 30 g 1    ARIPiprazole (ABILIFY) 5 MG tablet Take 5 mg by mouth daily       traZODone (DESYREL) 100 MG tablet Take 2 tablets by mouth nightly 180 tablet 3    nicotine (NICODERM CQ) 21 MG/24HR Use 21mg patch daily for 2 weeks and then 14mg patch daily for 2 weeks and then 7mg patch daily for 2 weeks 42 patch 0    triamcinolone (KENALOG) 0.1 % cream Apply topically 2 times daily prn 60 g 0    tiZANidine (ZANAFLEX) 2 MG tablet Take 1 tablet by mouth 2 times daily as needed (muscle pain) (Patient taking differently: Take 4 mg by mouth 3 times daily ) 60 tablet 0    Lancets MISC Use to check blood sugar 4 times daily   Dx  E11.9  Insulin dependent 400 each 3    VORTIoxetine HBr (TRINTELLIX) 20 MG TABS tablet Take 20 mg by mouth daily       gabapentin (NEURONTIN) 400 MG capsule Take 800 mg by mouth 3 times daily.  glucose blood VI test strips (FREESTYLE LITE) strip 1 each by In Vitro route daily Test blood sugar 4 times daily 100 each 3    calcium carbonate 600 MG TABS tablet Take 600 mg by mouth      UNIFINE PENTIPS 31G X 6 MM MISC for use FOUR TIMES DAILY  6    loratadine (CLARITIN) 10 MG tablet TAKE 1 TABLET BY MOUTH DAILY  2    nystatin (MYCOSTATIN) 247258 UNIT/GM powder APPLY 1 GRAM ON THE SKIN 3 TIMES DAILY AS NEEDED  0    NASONEX 50 MCG/ACT nasal spray Use 1 spray in each nostril TWICE DAILY . ...(SHAKE WELL)  2    Cholecalciferol (VITAMIN D3) 2000 units CAPS TAKE 1 CAPSULE BY MOUTH DAILY  5    clonazePAM (KLONOPIN) 0.5 MG tablet Take 1 tablet by mouth 2 times daily as needed for Anxiety 60 tablet 5    Womszzk-Vubfy-Jcqnafwj-TenofAF (GENVOYA) [id number] MG TABS Take 1 tablet by mouth daily      HYDROcodone-acetaminophen (NORCO) 5-325 MG per tablet Take 1 tablet by mouth 2 times daily as needed for Pain .  pregabalin (LYRICA) 75 MG capsule Take 75 mg by mouth 2 times daily      rizatriptan (MAXALT) 10 MG tablet Take 10 mg by mouth once as needed for Migraine May repeat in 2 hours if needed       No current facility-administered medications for this visit. Review of Systems   Constitutional: Positive for fatigue. Negative for chills and fever. HENT: Positive for congestion and sinus pressure. Eyes: Negative for discharge and redness. Respiratory: Positive for cough and wheezing. Negative for shortness of breath. Cardiovascular: Negative for chest pain, palpitations and leg swelling. Gastrointestinal: Positive for anal bleeding, diarrhea and rectal pain. Negative for abdominal distention and abdominal pain. Genitourinary: Positive for difficulty urinating and frequency. Negative for dysuria. Musculoskeletal: Positive for arthralgias and back pain. Skin: Negative for rash and wound. Neurological: Negative for dizziness, light-headedness and headaches. Psychiatric/Behavioral: Positive for dysphoric mood. Negative for sleep disturbance.        /66 (Site: Left Upper Arm)   Pulse 88   Ht 6' (1.829 m)   Wt 278 lb (126.1 kg)   SpO2 93%   BMI 37.70 kg/m²   BP Readings from Last 7 Encounters:   02/10/20 120/66   09/09/19 120/70   07/25/19 109/68   06/17/19 124/66   06/03/19 (!) 110/58   02/08/19 122/68   11/08/18 100/66     Wt Readings from Last 7 Encounters:   02/10/20 278 lb (126.1 kg)   09/09/19 291 lb (132 kg)   07/25/19 282 lb (127.9 kg)   06/17/19 275 lb (124.7 kg)   06/03/19 271 lb (122.9 kg)   02/08/19 272 lb (123.4 kg)   11/08/18 273 lb (123.8 kg)     BMI Readings from Last 7 Encounters:   02/10/20 37.70 kg/m²   09/09/19 39.47 kg/m²   07/25/19 40.46 kg/m²   06/17/19 37.30 kg/m²   06/03/19 36.75 kg/m²   02/08/19 36.89 kg/m²   11/08/18 37.03 kg/m²     Resp Readings from Last 7 Encounters:   05/05/18 14       Physical Exam  Constitutional:       General: He is not in acute distress. Appearance: Normal appearance. He is well-developed. He is obese. Comments: Pale-appearing   HENT:      Right Ear: External ear normal. Tympanic membrane is not injected. Left Ear: External ear normal. Tympanic membrane is not injected. Mouth/Throat:      Mouth: Mucous membranes are moist.      Pharynx: Posterior oropharyngeal erythema present. No oropharyngeal exudate. Comments: Animal erythema  Eyes:      General: No scleral icterus. Conjunctiva/sclera: Conjunctivae normal.   Neck:      Musculoskeletal: Neck supple. Thyroid: No thyroid mass or thyromegaly. Vascular: No carotid bruit. Cardiovascular:      Rate and Rhythm: Normal rate and regular rhythm. Heart sounds: S1 normal and S2 normal. No murmur. No S3 or S4 sounds. Pulmonary:      Effort: Pulmonary effort is normal. No respiratory distress. Breath sounds: Normal breath sounds. No wheezing or rales. Comments: Some paroxysmal coughing  Abdominal:      General: Bowel sounds are normal. There is no distension. Palpations: Abdomen is soft. There is no mass. Tenderness: There is no abdominal tenderness. Lymphadenopathy:      Cervical: No cervical adenopathy. Upper Body:      Right upper body: No supraclavicular adenopathy. Left upper body: No supraclavicular adenopathy. Skin:     Findings: No rash. Neurological:      Mental Status: He is alert and oriented to person, place, and time. Cranial Nerves: No cranial nerve deficit.    Psychiatric:      Comments: Mood is okay Royal tags slightly engorged hemorrhoid on exam    Results for orders placed or performed in visit on 02/10/20   POCT glycosylated hemoglobin (Hb A1C)   Result Value Ref Range    Hemoglobin A1C 10.1 %       ASSESSMENT/ PLAN:  1. Medicare annual wellness visit, subsequent  Chart medications labs vaccines reviewed keep up-to-date with routine medical care and follow-up call with any problems or complaints    2. Type 2 diabetes mellitus without complication, with long-term current use of insulin (HCC)  Only out-of-control diabetes discuss in the future increasing his Trulicity follow-up with endocrinology has to have compliance with diet and insulin to get control. Increase activity exercise eat a healthy well-rounded diet. - POCT glycosylated hemoglobin (Hb A1C)  - CBC; Future  - Comprehensive Metabolic Panel; Future  - Hemoglobin A1C; Future  - TSH without Reflex; Future  - Lipid Panel; Future  - Microalbumin / Creatinine Urine Ratio; Future    3. Essential hypertension  Stable follow    4. Diarrhea due to drug  Overall stable  - loperamide (IMODIUM) 2 MG capsule; Take 1 capsule by mouth 4 times daily as needed for Diarrhea  Dispense: 30 capsule; Refill: 5    5. Colon cancer screening    - Cologuard (For External Results Only); Future    6. Acute maxillary sinusitis, recurrence not specified  Treat for acute URI. Steroid is going to really raise his blood sugar but he has been very congested ongoing issues we are going to treat and monitor closely his short acting insulin to cover the spikes with the steroid  - cefdinir (OMNICEF) 300 MG capsule; Take 1 capsule by mouth 2 times daily for 10 days  Dispense: 20 capsule; Refill: 0  - methylPREDNISolone acetate (DEPO-MEDROL) injection 40 mg    7. Screening for prostate cancer    - Psa screening; Future    8. Bleeding hemorrhoid    - hydrocortisone (PROCTO-AMAYA) 1 % cream; Apply topically 2 times daily and  Post bm prn until better  Dispense: 1 Tube; Refill: 1    9. TIMES DAILY  Historical Provider, MD   loratadine (CLARITIN) 10 MG tablet TAKE 1 TABLET BY MOUTH DAILY  Historical Provider, MD   nystatin (MYCOSTATIN) 813203 UNIT/GM powder APPLY 1 GRAM ON THE SKIN 3 TIMES DAILY AS NEEDED  Historical Provider, MD   NASONEX 50 MCG/ACT nasal spray Use 1 spray in each nostril TWICE DAILY . ...(SHAKE WELL)  Historical Provider, MD   Cholecalciferol (VITAMIN D3) 2000 units CAPS TAKE 1 CAPSULE BY MOUTH DAILY  Historical Provider, MD   clonazePAM (KLONOPIN) 0.5 MG tablet Take 1 tablet by mouth 2 times daily as needed for Bhaskar Bonds, MD   Gpxqtea-Wedef-Egpvznwy-TenofAF (GENVOYA) 400-937-544-10 MG TABS Take 1 tablet by mouth daily  Historical Provider, MD   HYDROcodone-acetaminophen (NORCO) 5-325 MG per tablet Take 1 tablet by mouth 2 times daily as needed for Pain .   Historical Provider, MD   pregabalin (LYRICA) 75 MG capsule Take 75 mg by mouth 2 times daily  Historical Provider, MD   rizatriptan (MAXALT) 10 MG tablet Take 10 mg by mouth once as needed for Migraine May repeat in 2 hours if needed  Historical Provider, MD       Past Medical History:   Diagnosis Date    Controlled diabetes mellitus with diabetic polyneuropathy ([facility] [address]     Depression     HIV-1 infection, symptomatic ([address]     Hyperlipidemia     Hypertension     Male hypogonadism     Osteopenia     Type 2 diabetes mellitus without complication ([facility] [address]        Past Surgical History:   Procedure Laterality Date    LYMPHADENECTOMY Left     thoracic       Family History   Problem Relation Age of Onset    High Blood Pressure Mother     Diabetes Mother     High Blood Pressure Father     Diabetes Maternal Grandfather        CareTeam (Including outside providers/suppliers regularly involved in providing care):   Patient Care Team:  Jailyn Nichols MD as PCP - General (Internal Medicine)  Jailyn Nichols MD as PCP [facility] Empaneled Provider  Dave Henao MD as Consulting Physician (Neurology)    Wt Readings from Last 3 food preparation, transportation, or taking medications?: [employer], Housekeeping, Transportation, Taking Medications  ADL Interventions:  · Patient has friend of family support and support through [facility]    Personalized Preventive Plan   Current Health Maintenance Status  Immunization History   Administered Date(s) Administered    Influenza Virus Vaccine 10/11/2019    Influenza, [place], Recombinant, IM PF (Flublok 18 yrs and older) 10/10/2018    Pneumococcal Conjugate 13-valent ([id number]) 05/24/2019    Pneumococcal Polysaccharide ([id number]) 06/03/2019    Tdap (Boostrix, Adacel) 06/03/2019    Zoster Recombinant (Shingrix) 06/10/2019, 10/11/2019        Health Maintenance   Topic Date Due    Meningococcal (ACWY) vaccine (1 - Risk start before 7 months 4-dose series) [birth year]29/1968    Diabetic foot exam  12/31/1977    Diabetic retinal exam  12/31/1977    Harvie Savory (MMR) vaccine (1 of 2 - Risk 2-dose series) 12/31/1985    Hepatitis B vaccine (1 of 3 - Risk 3-dose series) 12/31/1986    Colon cancer screen colonoscopy  06/23/2019    Diabetic microalbuminuria test  11/08/2019    A1C test (Diabetic or Prediabetic)  05/10/2020    Lipid screen  09/09/2020    Potassium monitoring  09/09/2020    Creatinine monitoring  09/09/2020    Pneumococcal 0-64 years Vaccine (3 of 3 - PPSV23) 06/03/2024    DTaP/Tdap/Td vaccine (2 - Td) 06/03/2029    Flu vaccine  Completed    Shingles Vaccine  Completed    Hepatitis A vaccine  Aged Out    Hib vaccine  Aged Out     Recommendations for [employer] Due: see orders and patient instructions/AVS.  . Recommended screening schedule for the next 5-10 years is provided to the patient in written form: see Patient Instructions/AVS.    Migue Gamboa was seen today for medicare awv, diabetes and gastroesophageal reflux.     Diagnoses and all orders for this visit:    Medicare annual wellness visit, subsequent    Type 2 diabetes mellitus without complication, with long-term current use of insulin (HCC)  -     POCT glycosylated hemoglobin (Hb A1C)  -     CBC; Future  -     Comprehensive Metabolic Panel; Future  -     Hemoglobin A1C; Future  -     TSH without Reflex; Future  -     Lipid Panel; Future  -     Microalbumin / Creatinine Urine Ratio; Future    Essential hypertension    Diarrhea due to drug  -     loperamide (IMODIUM) 2 MG capsule; Take 1 capsule by mouth 4 times daily as needed for Diarrhea    Colon cancer screening  -     Cologuard (For External Results Only); Future    Acute maxillary sinusitis, recurrence not specified  -     cefdinir (OMNICEF) 300 MG capsule; Take 1 capsule by mouth 2 times daily for 10 days  -     methylPREDNISolone acetate (DEPO-MEDROL) injection 40 mg    Screening for prostate cancer  -     Psa screening;  Future    Bleeding hemorrhoid  -     hydrocortisone (PROCTO-AMAYA) 1 % cream; Apply topically 2 times daily and  Post bm prn until better    HIV-1 infection, symptomatic (HCC)    Gastroesophageal reflux disease without esophagitis    Other insomnia    Chronic pain syndrome

## 2020-02-10 NOTE — PROGRESS NOTES
[facility] - PODIATRY    Today's Date: 02/11/20    Patient Name: Donis Yi  MRN: [id number]  CSN: [id number]  PCP: Oksana Mares MD  Referring Provider: No ref. provider found    SUBJECTIVE     Chief Complaint   Patient presents with   • Follow-up     pt c/o long, thickened toenails - painful callus on the bottom of right foot-  pain scale 7-8/10   • Diabetes     last A1C 10.1 on 2/10/20- PCP Dr. Mares last visit 2/11/20     HPI: Donis Yi, a 54 y.o.male, comes to clinic as a(n) established patient presenting for diabetic foot exam and complaining of fungal toenails and worsening callus. Patient has h/o anxiety, DDD, depression, HIV, HLD, HSP, HTN, Migraine, MI, Osteoporosis, Sleep Apnea, DM2. Patient is IDDM and unsure of last BG level. Last A1C of 10.1%. Admits to numbness in feet. Denies open wounds or sores. Relates that his toenails are long, thick and crumbly. He has trouble caring for them himself. Relates the callus of his right foot has come back and is worse. Denies drainage. Admits pain at 7-8/10 level and described as burning, aching, numbness and tingling. Has not been wearing his diabetic shoes. Denies any constitutional symptoms. No other pedal complaints at this time.    Past Medical History:   Diagnosis Date   • Allergic rhinitis    • Anxiety    • Bursitis    • DDD (degenerative disc disease), cervical    • Depression    • HIV disease (CMS/HCC)    • HLD (hyperlipidemia)    • HSP (Henoch Schonlein purpura) (CMS/HCC)    • HTN (hypertension)    • Migraine    • Myocardial infarction (CMS/HCC)    • Osteoporosis    • Sleep apnea    • Type 2 diabetes mellitus (CMS/HCC)      Past Surgical History:   Procedure Laterality Date   • ANKLE SURGERY     • COLONOSCOPY  06/19/2009    Normal exam repeat in 10 years   • FOREARM SURGERY     • LYMPH NODE BIOPSY       Family History   Problem Relation Age of Onset   • Diabetes Mother    • Hypertension Mother    • Thyroid disease Mother     • Hypertension Father    • Thyroid disease Father    • Arrhythmia Father    • Diabetes Maternal Grandfather    • Heart disease Maternal Grandfather    • Hypertension Maternal Grandfather    • Diabetes Paternal Grandmother    • Stroke Paternal Grandmother    • Heart disease Paternal Grandmother    • Hypertension Paternal Grandmother    • Thyroid disease Paternal Grandmother    • Hypertension Paternal Grandfather    • Hypertension Sister    • No Known Problems Brother      Social History     Socioeconomic History   • Marital status:      Spouse name: Not on file   • Number of children: Not on file   • Years of education: Not on file   • Highest education level: Not on file   Tobacco Use   • Smoking status: Current Every Day Smoker     Packs/day: 1.00     Types: Cigarettes     Start date: 1988   • Smokeless tobacco: Never Used   Substance and Sexual Activity   • Alcohol use: Yes     Comment: occasionally   • Drug use: No   • Sexual activity: Defer     Allergies   Allergen Reactions   • Amitriptyline Anaphylaxis   • Amitriptyline Hcl Anaphylaxis   • Darvon [Propoxyphene] Anaphylaxis   • Zithromax [Azithromycin] Anaphylaxis     Current Outpatient Medications   Medication Sig Dispense Refill   • albuterol (PROVENTIL HFA;VENTOLIN HFA) 108 (90 BASE) MCG/ACT inhaler Inhale 2 puffs every 6 (six) hours as needed for wheezing.     • ARIPiprazole (ABILIFY) 2 MG tablet Take 2 mg by mouth Daily.     • atorvastatin (LIPITOR) 40 MG tablet Take 40 mg by mouth Daily.  3   • calcium carbonate (OS-THEODORA) 600 MG tablet Take 600 mg by mouth Daily.     • cefdinir (OMNICEF) 300 MG capsule Take 300 mg by mouth.     • cholecalciferol (VITAMIN D3) 1000 UNITS tablet Take 1,000 Units by mouth daily.     • ciclopirox (LOPROX) 0.77 % cream Apply  topically to the appropriate area as directed 2 (Two) Times a Day. 30 g 5   • clonazePAM (KlonoPIN) 0.5 MG tablet Take 0.5 mg by mouth 3 (Three) Times a Day As Needed for Anxiety.     • denosumab  (PROLIA) 60 MG/ML solution syringe Inject 60 mg under the skin Every 6 (Six) Months.     • DEXILANT 60 MG capsule Take 1 capsule by mouth Daily.  1   • Dulaglutide 0.75 MG/0.5ML solution pen-injector Inject 0.75 mg under the skin into the appropriate area as directed 1 (One) Time Per Week. 4 pen 11   • Ifcafgr-Xarsw-Ptuznbjc-TenofAF (GENVOYA) [phone] MG tablet Take 1 tablet by mouth daily.     • fluticasone (FLONASE) 50 MCG/ACT nasal spray 2 sprays into each nostril Daily. (Patient taking differently: 2 sprays into each nostril Daily As Needed.) 1 bottle 6   • Foot Care Products (FOOT POWDER MEDICATED EX)      • gabapentin (NEURONTIN) 800 MG tablet      • glucose blood test strip 1 each.     • guaiFENesin (MUCINEX) 600 MG 12 hr tablet Take 600 mg by mouth 2 (two) times a day as needed for cough.     • HYDROcodone-acetaminophen (NORCO) 5-325 MG per tablet Take 1 tablet by mouth 2 (two) times a day as needed.     • insulin aspart (NOVOLOG FLEXPEN) 100 UNIT/ML solution pen-injector sc pen Inject 80 Units under the skin 4 (Four) Times a Day With Meals & at Bedtime.     • Insulin Pen Needle (UNIFINE PENTIPS) 31G X 6 MM misc for use FOUR TIMES DAILY     • Insulin Regular Human, Conc, (HUMULIN R U-500 KWIKPEN) 500 UNIT/ML solution pen-injector CONCENTRATED injection Up to 200 units bid with meals 8 pen 11   • Lancets misc Use to check blood sugar 4 times daily   Dx  E11.9  Insulin dependent     • loperamide (IMODIUM) 2 MG capsule Take 2 mg by mouth daily as needed for diarrhea.     • loratadine (CLARITIN) 10 MG tablet Take 10 mg by mouth daily.     • losartan (COZAAR) 100 MG tablet Take 100 mg by mouth daily.     • LYRICA 100 MG capsule Take 100 mg by mouth 2 (Two) Times a Day.     • meclizine (ANTIVERT) 25 MG tablet Take 25 mg by mouth Daily As Needed.     • Melatonin 5 MG capsule Take 5 mg by mouth every night at bedtime.  3   • metFORMIN (GLUCOPHAGE) 500 MG tablet Take 1,000 mg by mouth 2 (Two) Times a Day With  Meals.  3   • miconazole (MICOTIN) 2 % cream Apply 1 application topically 2 (two) times a day.     • neomycin-bacitracin-polymyxin (NEOSPORIN) 5-400-5000 ointment Apply 1 application topically 4 (four) times a day as needed.     • PRENATAL 28-0.8 MG tablet Take 1 tablet by mouth Daily.  3   • promethazine (PHENERGAN) 25 MG tablet Take 25 mg by mouth every 6 (six) hours as needed for nausea or vomiting.     • propranolol LA (INDERAL LA) 80 MG 24 hr capsule Take 80 mg by mouth Daily.  5   • rizatriptan (MAXALT) 10 MG tablet Take 10 mg by mouth 1 (one) time as needed for migraine. May repeat in 2 hours if needed     • tamsulosin (FLOMAX) 0.4 MG capsule 24 hr capsule Take 1 capsule by mouth Every Night. 90 capsule 3   • terbinafine (lamISIL) 1 % cream Apply  topically to the appropriate area as directed 2 (Two) Times a Day. 36 g 2   • tiZANidine (ZANAFLEX) 4 MG tablet Take 6 mg by mouth Every 6 (Six) Hours As Needed for Muscle Spasms.     • traZODone (DESYREL) 100 MG tablet Take 100 mg by mouth Every Night.  3   • Vortioxetine HBr (TRINTELLIX) 5 MG tablet Take 20 mg by mouth Daily With Breakfast.       No current facility-administered medications for this visit.      Review of Systems   Constitutional: Negative for chills and fever.   HENT: Negative for congestion.    Respiratory: Negative for shortness of breath.    Cardiovascular: Negative for chest pain and leg swelling.   Gastrointestinal: Negative for constipation, diarrhea, nausea and vomiting.   Musculoskeletal:        Foot pain   Skin: Positive for wound.   Neurological: Positive for numbness.       OBJECTIVE     Vitals:    02/11/20 0919   BP: 116/70   Pulse: 103   SpO2: 95%       PHYSICAL EXAM  GEN:   Accompanied by other.     Foot/Ankle Exam:       General:   Diabetic Foot Exam Performed    Appearance: appears stated age and healthy    Orientation: AAOx3    Affect: appropriate    Gait: unimpaired    Assistance: independent    Shoe Gear:  Casual  shoes    VASCULAR      Right Foot Vascularity   Dorsalis pedis:  2+  Posterior tibial:  2+  Skin Temperature: warm    Edema Grading:  None  CFT:  3  Pedal Hair Growth:  Present  Varicosities: none       Left Foot Vascularity   Dorsalis pedis:  2+  Posterior tibial:  2+  Skin Temperature: warm    Edema Grading:  None  CFT:  3  Pedal Hair Growth:  Present  Varicosities: none        NEUROLOGIC     Right Foot Neurologic   Light touch sensation:  Diminished  Vibratory sensation:  Diminished  Hot/Cold sensation: diminished    Protective Sensation using [place]-Laura Monofilament:  2     Left Foot Neurologic   Light touch sensation:  Diminished  Vibratory sensation:  Diminished  Hot/cold sensation: diminished    Protective Sensation using [place]-Laura Monofilament:  2     MUSCULOSKELETAL      Right Foot Musculoskeletal   Ecchymosis:  None  Tenderness: right foot callus and toenails    Arch:  Normal  Hammertoe:  Second toe  Hallux valgus: Yes    Hallux limitus: No       Left Foot Musculoskeletal   Ecchymosis:  None  Tenderness: neuroma and toenails    Tenderness comment:  3rd interspace  Arch:  Normal  Hammertoe:  Second toe  Hallux valgus: Yes    Hallux limitus: No       MUSCLE STRENGTH     Right Foot Muscle Strength   Foot dorsiflexion:  5  Foot plantar flexion:  5  Foot inversion:  5  Foot eversion:  5     Left Foot Muscle Strength   Foot dorsiflexion:  5  Foot plantar flexion:  5  Foot inversion:  5  Foot eversion:  5     RANGE OF MOTION      Right Foot Range of Motion   Foot and ankle ROM within normal limits       Left Foot Range of Motion   Foot and ankle ROM within normal limits       DERMATOLOGIC     Right Foot Dermatologic   Skin: ulcer    Skin: no right foot cellulitis, no right foot drainage and no right foot redness    Nails: abnormally thick, subungual debris and dystrophic nails       Left Foot Dermatologic   Nails: abnormally thick, subungual debris and dystrophic nails       Image:        RADIOLOGY/NUCLEAR:  Xr Chest 2 View    Result Date: 1/12/2020  Narrative: EXAMINATION: Chest 2 views 01/12/2020  HISTORY: Productive cough  FINDINGS: Upright frontal and lateral projection of chest are compared to prior exam of 09/21/2019. Lungs are hypoventilated. There are mild increased interstitial markings which are chronic in nature. There is no evidence of lobar pneumonia or effusion. Mediastinal contours are within normal limits.      Impression: . Expiratory chest. No evidence of acute infiltrate or effusion. This report was finalized on 01/12/2020 13:06 by Dr. Keegan Batista MD.      LABORATORY/CULTURE RESULTS:      PATHOLOGY RESULTS:       ASSESSMENT/PLAN     Donis was seen today for follow-up and diabetes.    Diagnoses and all orders for this visit:    Thickened nails    Type 2 diabetes mellitus with diabetic neuropathy, with long-term current use of insulin (CMS/[facility])    Non-pressure chronic ulcer of other part of right foot with fat layer exposed (CMS/[facility])  -     Ambulatory Referral to Wound Clinic    Tobacco abuse    HIV infection, unspecified symptom status (CMS/[facility])      Comprehensive lower extremity examination and evaluation was performed.  Discussed findings and treatment plan including risks, benefits, and treatment options with patient in detail. Patient agreed with treatment plan.  After verbal consent obtained, nail(s) x10 debrided of length and thickness with nail nipper without incidence  Patient may maintain nails and calluses at home utilizing emery board or pumice stone between visits as needed  Reviewed at home diabetic foot care including daily foot checks  After verbal consent obtained, excisional debridement performed to remove skin, slough, non-viable, and subQ tissue down to level of healthy bleeding tissue with dermal curette and/or sharp instrumentation. Hemostasis achieved with compression.   Apply antibiotic ointment and bandage daily. Referral to [facility].   Wear diabetic  shoes daily.  Tobacco cessation and BG control needed.   An After Visit Summary was printed and given to the patient at discharge, including (if requested) any available informative/educational handouts regarding diagnosis, treatment, or medications. All questions were answered to patient/family satisfaction. Should symptoms fail to improve or worsen they agree to call or return to clinic or to go to the Emergency Department. Discussed the importance of following up with any needed screening tests/labs/specialist appointments and any requested follow-up recommended by me today. Importance of maintaining follow-up discussed and patient accepts that missed appointments can delay diagnosis and potentially lead to worsening of conditions.  Return in about 3 months (around 5/11/2020)., or sooner if acute issues arise.        This document has been electronically signed by Pacheco Harper DPM on February 11, 2020 9:43 AM

## 2020-02-11 ENCOUNTER — OFFICE VISIT (OUTPATIENT)
Dept: PODIATRY | Facility: CLINIC | Age: 55
End: 2020-02-11

## 2020-02-11 VITALS
SYSTOLIC BLOOD PRESSURE: 116 MMHG | HEART RATE: 103 BPM | OXYGEN SATURATION: 95 % | WEIGHT: 282 LBS | DIASTOLIC BLOOD PRESSURE: 70 MMHG | BODY MASS INDEX: 40.37 KG/M2 | HEIGHT: 70 IN

## 2020-02-11 DIAGNOSIS — B20 HIV INFECTION, UNSPECIFIED SYMPTOM STATUS (HCC): ICD-10-CM

## 2020-02-11 DIAGNOSIS — L97.512 NON-PRESSURE CHRONIC ULCER OF OTHER PART OF RIGHT FOOT WITH FAT LAYER EXPOSED (HCC): ICD-10-CM

## 2020-02-11 DIAGNOSIS — L60.2 THICKENED NAILS: Primary | ICD-10-CM

## 2020-02-11 DIAGNOSIS — E11.40 TYPE 2 DIABETES MELLITUS WITH DIABETIC NEUROPATHY, WITH LONG-TERM CURRENT USE OF INSULIN (HCC): ICD-10-CM

## 2020-02-11 DIAGNOSIS — Z72.0 TOBACCO ABUSE: ICD-10-CM

## 2020-02-11 DIAGNOSIS — Z79.4 TYPE 2 DIABETES MELLITUS WITH DIABETIC NEUROPATHY, WITH LONG-TERM CURRENT USE OF INSULIN (HCC): ICD-10-CM

## 2020-02-11 PROCEDURE — 99213 OFFICE O/P EST LOW 20 MIN: CPT | Performed by: PODIATRIST

## 2020-02-11 PROCEDURE — 11721 DEBRIDE NAIL 6 OR MORE: CPT | Performed by: PODIATRIST

## 2020-02-11 PROCEDURE — 11042 DBRDMT SUBQ TIS 1ST 20SQCM/<: CPT | Performed by: PODIATRIST

## 2020-02-11 RX ORDER — CEFDINIR 300 MG/1
300 CAPSULE ORAL
COMMUNITY
Start: 2020-02-10 | End: 2020-02-20

## 2020-02-12 ENCOUNTER — APPOINTMENT (OUTPATIENT)
Dept: ONCOLOGY | Facility: HOSPITAL | Age: 55
End: 2020-02-12

## 2020-02-13 ENCOUNTER — TELEPHONE (OUTPATIENT)
Dept: INTERNAL MEDICINE | Age: 55
End: 2020-02-13

## 2020-02-13 RX ORDER — METHYLPREDNISOLONE 4 MG/1
TABLET ORAL
Qty: 1 KIT | Refills: 0 | Status: SHIPPED | OUTPATIENT
Start: 2020-02-13 | End: 2020-02-19

## 2020-02-16 ENCOUNTER — APPOINTMENT (OUTPATIENT)
Dept: GENERAL RADIOLOGY | Facility: HOSPITAL | Age: 55
End: 2020-02-16

## 2020-02-16 ENCOUNTER — HOSPITAL ENCOUNTER (EMERGENCY)
Facility: HOSPITAL | Age: 55
Discharge: HOME | End: 2020-02-17
Admitting: EMERGENCY MEDICINE

## 2020-02-16 DIAGNOSIS — J40 BRONCHITIS: Primary | ICD-10-CM

## 2020-02-16 DIAGNOSIS — R73.9 HYPERGLYCEMIA: ICD-10-CM

## 2020-02-16 LAB
ACETONE BLD QL: NEGATIVE
ALBUMIN SERPL-MCNC: 4.2 G/DL (ref 3.5–5.2)
ALBUMIN/GLOB SERPL: 1.5 G/DL
ALP SERPL-CCNC: 64 U/L (ref 39–117)
ALT SERPL W P-5'-P-CCNC: 33 U/L (ref 1–41)
ANION GAP SERPL CALCULATED.3IONS-SCNC: 16 MMOL/L (ref 5–15)
ARTERIAL PATENCY WRIST A: POSITIVE
AST SERPL-CCNC: 17 U/L (ref 1–40)
ATMOSPHERIC PRESS: 753 MMHG
BASE EXCESS BLDA CALC-SCNC: -3.1 MMOL/L (ref 0–2)
BASOPHILS # BLD AUTO: 0.06 10*3/MM3 (ref 0–0.2)
BASOPHILS NFR BLD AUTO: 0.4 % (ref 0–1.5)
BDY SITE: ABNORMAL
BILIRUB SERPL-MCNC: <0.2 MG/DL (ref 0.2–1.2)
BODY TEMPERATURE: 37 C
BUN BLD-MCNC: 22 MG/DL (ref 6–20)
BUN/CREAT SERPL: 25.6 (ref 7–25)
CALCIUM SPEC-SCNC: 9.4 MG/DL (ref 8.6–10.5)
CHLORIDE SERPL-SCNC: 95 MMOL/L (ref 98–107)
CO2 SERPL-SCNC: 20 MMOL/L (ref 22–29)
CREAT BLD-MCNC: 0.86 MG/DL (ref 0.76–1.27)
D DIMER PPP FEU-MCNC: 0.44 MG/L (FEU) (ref 0–0.5)
DEPRECATED RDW RBC AUTO: 43.2 FL (ref 37–54)
EOSINOPHIL # BLD AUTO: 0.02 10*3/MM3 (ref 0–0.4)
EOSINOPHIL NFR BLD AUTO: 0.1 % (ref 0.3–6.2)
ERYTHROCYTE [DISTWIDTH] IN BLOOD BY AUTOMATED COUNT: 13 % (ref 12.3–15.4)
GFR SERPL CREATININE-BSD FRML MDRD: 93 ML/MIN/1.73
GLOBULIN UR ELPH-MCNC: 2.8 GM/DL
GLUCOSE BLD-MCNC: 600 MG/DL (ref 65–99)
HCO3 BLDA-SCNC: 22.2 MMOL/L (ref 20–26)
HCT VFR BLD AUTO: 35.6 % (ref 37.5–51)
HGB BLD-MCNC: 12 G/DL (ref 13–17.7)
IMM GRANULOCYTES # BLD AUTO: 0.35 10*3/MM3 (ref 0–0.05)
IMM GRANULOCYTES NFR BLD AUTO: 2.6 % (ref 0–0.5)
LIPASE SERPL-CCNC: 95 U/L (ref 13–60)
LYMPHOCYTES # BLD AUTO: 2.8 10*3/MM3 (ref 0.7–3.1)
LYMPHOCYTES NFR BLD AUTO: 20.9 % (ref 19.6–45.3)
Lab: ABNORMAL
MAGNESIUM SERPL-MCNC: 1.8 MG/DL (ref 1.6–2.6)
MCH RBC QN AUTO: 31.1 PG (ref 26.6–33)
MCHC RBC AUTO-ENTMCNC: 33.7 G/DL (ref 31.5–35.7)
MCV RBC AUTO: 92.2 FL (ref 79–97)
MODALITY: ABNORMAL
MONOCYTES # BLD AUTO: 1.1 10*3/MM3 (ref 0.1–0.9)
MONOCYTES NFR BLD AUTO: 8.2 % (ref 5–12)
NEUTROPHILS # BLD AUTO: 9.06 10*3/MM3 (ref 1.7–7)
NEUTROPHILS NFR BLD AUTO: 67.8 % (ref 42.7–76)
NRBC BLD AUTO-RTO: 0 /100 WBC (ref 0–0.2)
NT-PROBNP SERPL-MCNC: 145.6 PG/ML (ref 5–900)
PCO2 BLDA: 40.2 MM HG (ref 35–45)
PH BLDA: 7.35 PH UNITS (ref 7.35–7.45)
PLATELET # BLD AUTO: 206 10*3/MM3 (ref 140–450)
PMV BLD AUTO: 12 FL (ref 6–12)
PO2 BLDA: 80.7 MM HG (ref 83–108)
POTASSIUM BLD-SCNC: 4.1 MMOL/L (ref 3.5–5.2)
PROT SERPL-MCNC: 7 G/DL (ref 6–8.5)
RBC # BLD AUTO: 3.86 10*6/MM3 (ref 4.14–5.8)
SAO2 % BLDCOA: 96.6 % (ref 94–99)
SODIUM BLD-SCNC: 131 MMOL/L (ref 136–145)
TROPONIN T SERPL-MCNC: <0.01 NG/ML (ref 0–0.03)
TSH SERPL DL<=0.05 MIU/L-ACNC: 0.9 UIU/ML (ref 0.27–4.2)
VENTILATOR MODE: ABNORMAL
WBC NRBC COR # BLD: 13.39 10*3/MM3 (ref 3.4–10.8)

## 2020-02-16 PROCEDURE — 36600 WITHDRAWAL OF ARTERIAL BLOOD: CPT

## 2020-02-16 PROCEDURE — 94640 AIRWAY INHALATION TREATMENT: CPT

## 2020-02-16 PROCEDURE — 83735 ASSAY OF MAGNESIUM: CPT | Performed by: PHYSICIAN ASSISTANT

## 2020-02-16 PROCEDURE — 63710000001 INSULIN REGULAR HUMAN PER 5 UNITS: Performed by: PHYSICIAN ASSISTANT

## 2020-02-16 PROCEDURE — 85379 FIBRIN DEGRADATION QUANT: CPT | Performed by: PHYSICIAN ASSISTANT

## 2020-02-16 PROCEDURE — 71045 X-RAY EXAM CHEST 1 VIEW: CPT

## 2020-02-16 PROCEDURE — 93005 ELECTROCARDIOGRAM TRACING: CPT | Performed by: EMERGENCY MEDICINE

## 2020-02-16 PROCEDURE — 83880 ASSAY OF NATRIURETIC PEPTIDE: CPT | Performed by: PHYSICIAN ASSISTANT

## 2020-02-16 PROCEDURE — 83690 ASSAY OF LIPASE: CPT | Performed by: PHYSICIAN ASSISTANT

## 2020-02-16 PROCEDURE — 93010 ELECTROCARDIOGRAM REPORT: CPT | Performed by: INTERNAL MEDICINE

## 2020-02-16 PROCEDURE — 99284 EMERGENCY DEPT VISIT MOD MDM: CPT

## 2020-02-16 PROCEDURE — 85025 COMPLETE CBC W/AUTO DIFF WBC: CPT | Performed by: PHYSICIAN ASSISTANT

## 2020-02-16 PROCEDURE — 82009 KETONE BODYS QUAL: CPT | Performed by: PHYSICIAN ASSISTANT

## 2020-02-16 PROCEDURE — 84484 ASSAY OF TROPONIN QUANT: CPT | Performed by: PHYSICIAN ASSISTANT

## 2020-02-16 PROCEDURE — 84443 ASSAY THYROID STIM HORMONE: CPT | Performed by: PHYSICIAN ASSISTANT

## 2020-02-16 PROCEDURE — 82803 BLOOD GASES ANY COMBINATION: CPT

## 2020-02-16 PROCEDURE — 80053 COMPREHEN METABOLIC PANEL: CPT | Performed by: PHYSICIAN ASSISTANT

## 2020-02-16 RX ORDER — IPRATROPIUM BROMIDE AND ALBUTEROL SULFATE 2.5; .5 MG/3ML; MG/3ML
3 SOLUTION RESPIRATORY (INHALATION) ONCE
Status: COMPLETED | OUTPATIENT
Start: 2020-02-16 | End: 2020-02-16

## 2020-02-16 RX ADMIN — INSULIN HUMAN 10 UNITS: 100 INJECTION, SOLUTION PARENTERAL at 23:26

## 2020-02-16 RX ADMIN — IPRATROPIUM BROMIDE AND ALBUTEROL SULFATE 3 ML: 2.5; .5 SOLUTION RESPIRATORY (INHALATION) at 23:01

## 2020-02-17 ENCOUNTER — TELEPHONE (OUTPATIENT)
Dept: INTERNAL MEDICINE | Age: 55
End: 2020-02-17

## 2020-02-17 VITALS
DIASTOLIC BLOOD PRESSURE: 59 MMHG | RESPIRATION RATE: 20 BRPM | WEIGHT: 287 LBS | SYSTOLIC BLOOD PRESSURE: 112 MMHG | HEART RATE: 82 BPM | BODY MASS INDEX: 41.09 KG/M2 | OXYGEN SATURATION: 97 % | HEIGHT: 70 IN | TEMPERATURE: 98.6 F

## 2020-02-17 LAB — GLUCOSE BLDC GLUCOMTR-MCNC: 346 MG/DL (ref 70–130)

## 2020-02-17 PROCEDURE — 82962 GLUCOSE BLOOD TEST: CPT

## 2020-02-17 NOTE — TELEPHONE ENCOUNTER
He went to ER and dx with broncitis. The medrol dosepack is making his blood sugar go up to 600 range. Does he need to stop steroid pack or increase daily insulin while taking it?

## 2020-02-17 NOTE — TELEPHONE ENCOUNTER
2 more days of hiis steroids  Please tell the patient to check Blood sugar before his meals  He may give an extra 10 units of Novolog with each meal

## 2020-02-17 NOTE — ED PROVIDER NOTES
"Subjective   History of Present Illness  54-year-old male presents with a chief complaint of continued cough and shortness of breath.  He reports he has been on antibiotics and steroids and is caused his glucose to go up.  EMS arrived and checked his glucose and found it to be \"high.\"  The patient reports no fevers nausea vomiting diarrhea or abdominal pain.  Patient is HIV positive.  Review of Systems   All other systems reviewed and are negative.      Past Medical History:   Diagnosis Date   • Allergic rhinitis    • Anxiety    • Bursitis    • DDD (degenerative disc disease), cervical    • Depression    • HIV disease (CMS/HCC)    • HLD (hyperlipidemia)    • HSP (Henoch Schonlein purpura) (CMS/HCC)    • HTN (hypertension)    • Migraine    • Myocardial infarction (CMS/HCC)    • Osteoporosis    • Sleep apnea    • Type 2 diabetes mellitus (CMS/HCC)        Allergies   Allergen Reactions   • Amitriptyline Anaphylaxis   • Amitriptyline Hcl Anaphylaxis   • Darvon [Propoxyphene] Anaphylaxis   • Zithromax [Azithromycin] Anaphylaxis       Past Surgical History:   Procedure Laterality Date   • ANKLE SURGERY     • COLONOSCOPY  06/19/2009    Normal exam repeat in 10 years   • FOREARM SURGERY     • LYMPH NODE BIOPSY         Family History   Problem Relation Age of Onset   • Diabetes Mother    • Hypertension Mother    • Thyroid disease Mother    • Hypertension Father    • Thyroid disease Father    • Arrhythmia Father    • Diabetes Maternal Grandfather    • Heart disease Maternal Grandfather    • Hypertension Maternal Grandfather    • Diabetes Paternal Grandmother    • Stroke Paternal Grandmother    • Heart disease Paternal Grandmother    • Hypertension Paternal Grandmother    • Thyroid disease Paternal Grandmother    • Hypertension Paternal Grandfather    • Hypertension Sister    • No Known Problems Brother        Social History     Socioeconomic History   • Marital status:      Spouse name: Not on file   • Number of " children: Not on file   • Years of education: Not on file   • Highest education level: Not on file   Tobacco Use   • Smoking status: Current Every Day Smoker     Packs/day: 1.00     Types: Cigarettes     Start date: 1988   • Smokeless tobacco: Never Used   Substance and Sexual Activity   • Alcohol use: Yes     Comment: occasionally   • Drug use: No   • Sexual activity: Defer           Objective   Physical Exam   Constitutional: He is oriented to person, place, and time. He appears well-developed and well-nourished.   HENT:   Head: Normocephalic and atraumatic.   Eyes: Pupils are equal, round, and reactive to light. EOM are normal.   Neck: Normal range of motion. Neck supple.   Cardiovascular: Normal rate and regular rhythm.   Pulmonary/Chest: Effort normal.   Abdominal: Soft. Bowel sounds are normal.   Musculoskeletal: Normal range of motion.        Right lower leg: Normal.        Left lower leg: Normal.   Neurological: He is alert and oriented to person, place, and time.   Skin: Skin is warm and dry. Capillary refill takes less than 2 seconds.   Psychiatric: He has a normal mood and affect. His behavior is normal.   Nursing note and vitals reviewed.      Procedures           ED Course           Labs Reviewed   COMPREHENSIVE METABOLIC PANEL - Abnormal; Notable for the following components:       Result Value    Glucose 600 (*)     BUN 22 (*)     Sodium 131 (*)     Chloride 95 (*)     CO2 20.0 (*)     Total Bilirubin <0.2 (*)     BUN/Creatinine Ratio 25.6 (*)     Anion Gap 16.0 (*)     All other components within normal limits    Narrative:     GFR Normal >60  Chronic Kidney Disease <60  Kidney Failure <15     LIPASE - Abnormal; Notable for the following components:    Lipase 95 (*)     All other components within normal limits   CBC WITH AUTO DIFFERENTIAL - Abnormal; Notable for the following components:    WBC 13.39 (*)     RBC 3.86 (*)     Hemoglobin 12.0 (*)     Hematocrit 35.6 (*)     Eosinophil % 0.1 (*)      Immature Grans % 2.6 (*)     Neutrophils, Absolute 9.06 (*)     Monocytes, Absolute 1.10 (*)     Immature Grans, Absolute 0.35 (*)     All other components within normal limits   BLOOD GAS, ARTERIAL - Abnormal; Notable for the following components:    pO2, Arterial 80.7 (*)     Base Excess, Arterial -3.1 (*)     All other components within normal limits   POCT GLUCOSE FINGERSTICK - Abnormal; Notable for the following components:    Glucose 346 (*)     All other components within normal limits   ACETONE - Normal   TROPONIN (IN-HOUSE) - Normal    Narrative:     Troponin T Reference Range:  <= 0.03 ng/mL-   Negative for AMI  >0.03 ng/mL-     Abnormal for myocardial necrosis.  Clinicians would have to utilize clinical acumen, EKG, Troponin and serial changes to determine if it is an Acute Myocardial Infarction or myocardial injury due to an underlying chronic condition.       Results may be falsely decreased if patient taking Biotin.     BNP (IN-HOUSE) - Normal    Narrative:     Among patients with dyspnea, NT-proBNP is highly sensitive for the detection of acute congestive heart failure. In addition NT-proBNP of <300 pg/ml effectively rules out acute congestive heart failure with 99% negative predictive value.    Results may be falsely decreased if patient taking Biotin.     D-DIMER, QUANTITATIVE - Normal    Narrative:     Reference Range is 0-0.50 mg/L FEU. However, results <0.50 mg/L FEU tends to rule out DVT or PE. Results >0.50 mg/L FEU are not useful in predicting absence or presence of DVT or PE.     TSH - Normal   MAGNESIUM - Normal   BLOOD GAS, ARTERIAL   POCT GLUCOSE FINGERSTICK   CBC AND DIFFERENTIAL    Narrative:     The following orders were created for panel order CBC & Differential.  Procedure                               Abnormality         Status                     ---------                               -----------         ------                     CBC Auto Differential[[id number]]        Abnormal             Final result                 Please view results for these tests on the individual orders.     XR Chest 1 View   ED Interpretation   No acute cardiopulmonary process.       Final Result   Impression: No evidence of acute cardiopulmonary disease.   This report was finalized on 02/17/2020 07:23 by Dr. Keegan Batista MD.                                        MDM  Number of Diagnoses or Management Options  Diagnosis management comments: Hyperglycemia improved, patient dc in stable condition, continue antibiotics, follow up with Dr. Mares to either discontinue steroids or offer plan for hyperglycemia control with steroid usage       Amount and/or Complexity of Data Reviewed  Clinical lab tests: reviewed and ordered  Tests in the radiology section of CPT®: ordered and reviewed  Tests in the medicine section of CPT®: ordered and reviewed  Independent visualization of images, tracings, or specimens: yes    Risk of Complications, Morbidity, and/or Mortality  Presenting problems: moderate  Diagnostic procedures: moderate  Management options: moderate    Patient Progress  Patient progress: stable      Final diagnoses:   Bronchitis   Hyperglycemia            Barrie Alexander PA-C  02/21/20 [id number]

## 2020-02-19 ENCOUNTER — APPOINTMENT (OUTPATIENT)
Dept: ONCOLOGY | Facility: HOSPITAL | Age: 55
End: 2020-02-19

## 2020-02-19 ENCOUNTER — TRANSCRIBE ORDERS (OUTPATIENT)
Dept: LAB | Facility: HOSPITAL | Age: 55
End: 2020-02-19

## 2020-02-19 ENCOUNTER — APPOINTMENT (OUTPATIENT)
Dept: LAB | Facility: HOSPITAL | Age: 55
End: 2020-02-19

## 2020-02-19 DIAGNOSIS — M81.0 AGE-RELATED OSTEOPOROSIS WITHOUT CURRENT PATHOLOGICAL FRACTURE: Primary | ICD-10-CM

## 2020-02-19 LAB
ALBUMIN SERPL-MCNC: 4 G/DL (ref 3.5–5.2)
ALBUMIN/GLOB SERPL: 1.5 G/DL
ALP SERPL-CCNC: 66 U/L (ref 39–117)
ALT SERPL W P-5'-P-CCNC: 45 U/L (ref 1–41)
ANION GAP SERPL CALCULATED.3IONS-SCNC: 11 MMOL/L (ref 5–15)
AST SERPL-CCNC: 33 U/L (ref 1–40)
BASOPHILS # BLD AUTO: 0.05 10*3/MM3 (ref 0–0.2)
BASOPHILS NFR BLD AUTO: 0.5 % (ref 0–1.5)
BILIRUB SERPL-MCNC: 0.2 MG/DL (ref 0.2–1.2)
BUN BLD-MCNC: 22 MG/DL (ref 6–20)
BUN/CREAT SERPL: 24.7 (ref 7–25)
CALCIUM SPEC-SCNC: 9.1 MG/DL (ref 8.6–10.5)
CHLORIDE SERPL-SCNC: 102 MMOL/L (ref 98–107)
CO2 SERPL-SCNC: 26 MMOL/L (ref 22–29)
CREAT BLD-MCNC: 0.89 MG/DL (ref 0.76–1.27)
DEPRECATED RDW RBC AUTO: 45.1 FL (ref 37–54)
EOSINOPHIL # BLD AUTO: 0.46 10*3/MM3 (ref 0–0.4)
EOSINOPHIL NFR BLD AUTO: 4.2 % (ref 0.3–6.2)
ERYTHROCYTE [DISTWIDTH] IN BLOOD BY AUTOMATED COUNT: 13.4 % (ref 12.3–15.4)
GFR SERPL CREATININE-BSD FRML MDRD: 90 ML/MIN/1.73
GLOBULIN UR ELPH-MCNC: 2.6 GM/DL
GLUCOSE BLD-MCNC: 303 MG/DL (ref 65–99)
HCT VFR BLD AUTO: 35.6 % (ref 37.5–51)
HGB BLD-MCNC: 11.9 G/DL (ref 13–17.7)
IMM GRANULOCYTES # BLD AUTO: 0.22 10*3/MM3 (ref 0–0.05)
IMM GRANULOCYTES NFR BLD AUTO: 2 % (ref 0–0.5)
LYMPHOCYTES # BLD AUTO: 2.4 10*3/MM3 (ref 0.7–3.1)
LYMPHOCYTES NFR BLD AUTO: 22.1 % (ref 19.6–45.3)
MAGNESIUM SERPL-MCNC: 1.9 MG/DL (ref 1.6–2.6)
MCH RBC QN AUTO: 31.2 PG (ref 26.6–33)
MCHC RBC AUTO-ENTMCNC: 33.4 G/DL (ref 31.5–35.7)
MCV RBC AUTO: 93.2 FL (ref 79–97)
MONOCYTES # BLD AUTO: 0.76 10*3/MM3 (ref 0.1–0.9)
MONOCYTES NFR BLD AUTO: 7 % (ref 5–12)
NEUTROPHILS # BLD AUTO: 6.99 10*3/MM3 (ref 1.7–7)
NEUTROPHILS NFR BLD AUTO: 64.2 % (ref 42.7–76)
NRBC BLD AUTO-RTO: 0 /100 WBC (ref 0–0.2)
PHOSPHATE SERPL-MCNC: 3.5 MG/DL (ref 2.5–4.5)
PLATELET # BLD AUTO: 206 10*3/MM3 (ref 140–450)
PMV BLD AUTO: 11.6 FL (ref 6–12)
POTASSIUM BLD-SCNC: 4.5 MMOL/L (ref 3.5–5.2)
PROT SERPL-MCNC: 6.6 G/DL (ref 6–8.5)
RBC # BLD AUTO: 3.82 10*6/MM3 (ref 4.14–5.8)
SODIUM BLD-SCNC: 139 MMOL/L (ref 136–145)
WBC NRBC COR # BLD: 10.88 10*3/MM3 (ref 3.4–10.8)

## 2020-02-19 PROCEDURE — 80061 LIPID PANEL: CPT | Performed by: INTERNAL MEDICINE

## 2020-02-19 PROCEDURE — 80053 COMPREHEN METABOLIC PANEL: CPT | Performed by: INTERNAL MEDICINE

## 2020-02-19 PROCEDURE — 83735 ASSAY OF MAGNESIUM: CPT | Performed by: INTERNAL MEDICINE

## 2020-02-19 PROCEDURE — 82306 VITAMIN D 25 HYDROXY: CPT | Performed by: INTERNAL MEDICINE

## 2020-02-19 PROCEDURE — 84100 ASSAY OF PHOSPHORUS: CPT | Performed by: INTERNAL MEDICINE

## 2020-02-19 PROCEDURE — 83036 HEMOGLOBIN GLYCOSYLATED A1C: CPT | Performed by: INTERNAL MEDICINE

## 2020-02-19 PROCEDURE — 84443 ASSAY THYROID STIM HORMONE: CPT | Performed by: INTERNAL MEDICINE

## 2020-02-19 PROCEDURE — 85025 COMPLETE CBC W/AUTO DIFF WBC: CPT | Performed by: INTERNAL MEDICINE

## 2020-02-19 PROCEDURE — 82607 VITAMIN B-12: CPT | Performed by: INTERNAL MEDICINE

## 2020-02-19 PROCEDURE — 36415 COLL VENOUS BLD VENIPUNCTURE: CPT | Performed by: INTERNAL MEDICINE

## 2020-02-20 ENCOUNTER — APPOINTMENT (OUTPATIENT)
Dept: WOUND CARE | Facility: HOSPITAL | Age: 55
End: 2020-02-20

## 2020-02-20 LAB
25(OH)D3 SERPL-MCNC: 42.4 NG/ML (ref 30–100)
CHOLEST SERPL-MCNC: 117 MG/DL (ref 0–200)
HBA1C MFR BLD: 10.9 % (ref 4.8–5.6)
HDLC SERPL-MCNC: 56 MG/DL (ref 40–60)
LDLC SERPL CALC-MCNC: 33 MG/DL (ref 0–100)
LDLC/HDLC SERPL: 0.59 {RATIO}
TRIGL SERPL-MCNC: 141 MG/DL (ref 0–150)
TSH SERPL DL<=0.05 MIU/L-ACNC: 1 UIU/ML (ref 0.27–4.2)
VIT B12 BLD-MCNC: 366 PG/ML (ref 211–946)
VLDLC SERPL-MCNC: 28.2 MG/DL (ref 5–40)

## 2020-02-20 RX ORDER — CALCIUM CARBONATE/VITAMIN D3 600 MG-20
TABLET,CHEWABLE ORAL
Qty: 60 TABLET | Refills: 2 | Status: SHIPPED | OUTPATIENT
Start: 2020-02-20 | End: 2020-07-14

## 2020-02-20 ASSESSMENT — ENCOUNTER SYMPTOMS
EYE DISCHARGE: 0
SHORTNESS OF BREATH: 0
ABDOMINAL PAIN: 0
ANAL BLEEDING: 1
WHEEZING: 1
SINUS PRESSURE: 1
EYE REDNESS: 0
COUGH: 1
DIARRHEA: 1
BACK PAIN: 1
ABDOMINAL DISTENTION: 0
RECTAL PAIN: 1

## 2020-02-20 NOTE — PATIENT INSTRUCTIONS
Personalized Preventive Plan for Denisse Crain - 2/10/2020  Medicare offers a range of preventive health benefits. Some of the tests and screenings are paid in full while other may be subject to a deductible, co-insurance, and/or copay. Some of these benefits include a comprehensive review of your medical history including lifestyle, illnesses that may run in your family, and various assessments and screenings as appropriate. After reviewing your medical record and screening and assessments performed today your provider may have ordered immunizations, labs, imaging, and/or referrals for you. A list of these orders (if applicable) as well as your Preventive Care list are included within your After Visit Summary for your review. Other Preventive Recommendations:    · A preventive eye exam performed by an eye specialist is recommended every 1-2 years to screen for glaucoma; cataracts, macular degeneration, and other eye disorders. · A preventive dental visit is recommended every 6 months. · Try to get at least 150 minutes of exercise per week or 10,000 steps per day on a pedometer . · Order or download the FREE \"Exercise & Physical Activity: Your Everyday Guide\" from [employer]. Call 6-882.672.5597 or search [employer] online. · You need 8236-0046 mg of calcium and 2149-0727 IU of vitamin D per day. It is possible to meet your calcium requirement with diet alone, but a vitamin D supplement is usually necessary to meet this goal.  · When exposed to the sun, use a sunscreen that protects against both UVA and UVB radiation with an SPF of 30 or greater. Reapply every 2 to 3 hours or after sweating, drying off with a towel, or swimming. · Always wear a seat belt when traveling in a car. Always wear a helmet when riding a bicycle or motorcycle.

## 2020-02-24 ENCOUNTER — APPOINTMENT (OUTPATIENT)
Dept: ONCOLOGY | Facility: HOSPITAL | Age: 55
End: 2020-02-24

## 2020-02-25 ENCOUNTER — OFFICE VISIT (OUTPATIENT)
Dept: WOUND CARE | Facility: HOSPITAL | Age: 55
End: 2020-02-25

## 2020-02-25 PROCEDURE — G0463 HOSPITAL OUTPT CLINIC VISIT: HCPCS

## 2020-02-25 RX ORDER — PEN NEEDLE, DIABETIC
NEEDLE, DISPOSABLE MISCELLANEOUS
Qty: 100 EACH | Refills: 6 | Status: SHIPPED | OUTPATIENT
Start: 2020-02-25 | End: 2020-09-03

## 2020-02-26 RX ORDER — LORATADINE 10 MG/1
TABLET ORAL
Qty: 30 TABLET | Refills: 5 | Status: SHIPPED | OUTPATIENT
Start: 2020-02-26 | End: 2020-08-13

## 2020-02-26 RX ORDER — DULAGLUTIDE 0.75 MG/.5ML
INJECTION, SOLUTION SUBCUTANEOUS
Qty: 2 ML | Refills: 3 | Status: SHIPPED | OUTPATIENT
Start: 2020-02-26 | End: 2020-06-12

## 2020-02-26 RX ORDER — ACETAMINOPHEN 160 MG
TABLET,DISINTEGRATING ORAL
Qty: 100 CAPSULE | Refills: 3 | Status: SHIPPED | OUTPATIENT
Start: 2020-02-26 | End: 2021-03-08

## 2020-02-29 ENCOUNTER — APPOINTMENT (OUTPATIENT)
Dept: CT IMAGING | Facility: HOSPITAL | Age: 55
End: 2020-02-29

## 2020-02-29 ENCOUNTER — HOSPITAL ENCOUNTER (EMERGENCY)
Facility: HOSPITAL | Age: 55
Discharge: HOME | End: 2020-02-29
Admitting: EMERGENCY MEDICINE

## 2020-02-29 VITALS
WEIGHT: 294 LBS | TEMPERATURE: 97.4 F | SYSTOLIC BLOOD PRESSURE: 116 MMHG | HEART RATE: 82 BPM | BODY MASS INDEX: 42.09 KG/M2 | RESPIRATION RATE: 14 BRPM | OXYGEN SATURATION: 95 % | HEIGHT: 70 IN | DIASTOLIC BLOOD PRESSURE: 69 MMHG

## 2020-02-29 DIAGNOSIS — N39.0 URINARY TRACT INFECTION WITHOUT HEMATURIA, SITE UNSPECIFIED: Primary | ICD-10-CM

## 2020-02-29 LAB
ALBUMIN SERPL-MCNC: 4.1 G/DL (ref 3.5–5.2)
ALBUMIN/GLOB SERPL: 1.5 G/DL
ALP SERPL-CCNC: 64 U/L (ref 39–117)
ALT SERPL W P-5'-P-CCNC: 54 U/L (ref 1–41)
AMYLASE SERPL-CCNC: 25 U/L (ref 28–100)
ANION GAP SERPL CALCULATED.3IONS-SCNC: 12 MMOL/L (ref 5–15)
APTT PPP: 26.2 SECONDS (ref 24.1–35)
AST SERPL-CCNC: 40 U/L (ref 1–40)
BACTERIA UR QL AUTO: ABNORMAL /HPF
BASOPHILS # BLD AUTO: 0.05 10*3/MM3 (ref 0–0.2)
BASOPHILS NFR BLD AUTO: 0.6 % (ref 0–1.5)
BILIRUB SERPL-MCNC: <0.2 MG/DL (ref 0.2–1.2)
BILIRUB UR QL STRIP: NEGATIVE
BUN BLD-MCNC: 24 MG/DL (ref 6–20)
BUN/CREAT SERPL: 28.2 (ref 7–25)
CALCIUM SPEC-SCNC: 9.2 MG/DL (ref 8.6–10.5)
CHLORIDE SERPL-SCNC: 101 MMOL/L (ref 98–107)
CLARITY UR: ABNORMAL
CO2 SERPL-SCNC: 27 MMOL/L (ref 22–29)
COLOR UR: ABNORMAL
CREAT BLD-MCNC: 0.85 MG/DL (ref 0.76–1.27)
DEPRECATED RDW RBC AUTO: 44.7 FL (ref 37–54)
EOSINOPHIL # BLD AUTO: 0.26 10*3/MM3 (ref 0–0.4)
EOSINOPHIL NFR BLD AUTO: 3 % (ref 0.3–6.2)
ERYTHROCYTE [DISTWIDTH] IN BLOOD BY AUTOMATED COUNT: 13.1 % (ref 12.3–15.4)
GFR SERPL CREATININE-BSD FRML MDRD: 95 ML/MIN/1.73
GLOBULIN UR ELPH-MCNC: 2.7 GM/DL
GLUCOSE BLD-MCNC: 325 MG/DL (ref 65–99)
GLUCOSE UR STRIP-MCNC: ABNORMAL MG/DL
HCT VFR BLD AUTO: 37 % (ref 37.5–51)
HGB BLD-MCNC: 12.6 G/DL (ref 13–17.7)
HGB UR QL STRIP.AUTO: NEGATIVE
HOLD SPECIMEN: NORMAL
HOLD SPECIMEN: NORMAL
HYALINE CASTS UR QL AUTO: ABNORMAL /LPF
IMM GRANULOCYTES # BLD AUTO: 0.05 10*3/MM3 (ref 0–0.05)
IMM GRANULOCYTES NFR BLD AUTO: 0.6 % (ref 0–0.5)
INR PPP: 0.86 (ref 0.91–1.09)
KETONES UR QL STRIP: ABNORMAL
LEUKOCYTE ESTERASE UR QL STRIP.AUTO: ABNORMAL
LIPASE SERPL-CCNC: 30 U/L (ref 13–60)
LYMPHOCYTES # BLD AUTO: 2.88 10*3/MM3 (ref 0.7–3.1)
LYMPHOCYTES NFR BLD AUTO: 33 % (ref 19.6–45.3)
MCH RBC QN AUTO: 31.9 PG (ref 26.6–33)
MCHC RBC AUTO-ENTMCNC: 34.1 G/DL (ref 31.5–35.7)
MCV RBC AUTO: 93.7 FL (ref 79–97)
MONOCYTES # BLD AUTO: 0.75 10*3/MM3 (ref 0.1–0.9)
MONOCYTES NFR BLD AUTO: 8.6 % (ref 5–12)
NEUTROPHILS # BLD AUTO: 4.73 10*3/MM3 (ref 1.7–7)
NEUTROPHILS NFR BLD AUTO: 54.2 % (ref 42.7–76)
NITRITE UR QL STRIP: NEGATIVE
NRBC BLD AUTO-RTO: 0 /100 WBC (ref 0–0.2)
PH UR STRIP.AUTO: <=5 [PH] (ref 5–8)
PLATELET # BLD AUTO: 174 10*3/MM3 (ref 140–450)
PMV BLD AUTO: 11 FL (ref 6–12)
POTASSIUM BLD-SCNC: 4.3 MMOL/L (ref 3.5–5.2)
PROT SERPL-MCNC: 6.8 G/DL (ref 6–8.5)
PROT UR QL STRIP: ABNORMAL
PROTHROMBIN TIME: 11.6 SECONDS (ref 11.9–14.6)
RBC # BLD AUTO: 3.95 10*6/MM3 (ref 4.14–5.8)
RBC # UR: ABNORMAL /HPF
REF LAB TEST METHOD: ABNORMAL
SODIUM BLD-SCNC: 140 MMOL/L (ref 136–145)
SP GR UR STRIP: >1.03 (ref 1–1.03)
SQUAMOUS #/AREA URNS HPF: ABNORMAL /HPF
UROBILINOGEN UR QL STRIP: ABNORMAL
WBC NRBC COR # BLD: 8.72 10*3/MM3 (ref 3.4–10.8)
WBC UR QL AUTO: ABNORMAL /HPF
WHOLE BLOOD HOLD SPECIMEN: NORMAL
WHOLE BLOOD HOLD SPECIMEN: NORMAL

## 2020-02-29 PROCEDURE — 25010000003 HYDROMORPHONE 1 MG/ML SOLUTION: Performed by: NURSE PRACTITIONER

## 2020-02-29 PROCEDURE — 85025 COMPLETE CBC W/AUTO DIFF WBC: CPT | Performed by: NURSE PRACTITIONER

## 2020-02-29 PROCEDURE — 85610 PROTHROMBIN TIME: CPT | Performed by: NURSE PRACTITIONER

## 2020-02-29 PROCEDURE — 96375 TX/PRO/DX INJ NEW DRUG ADDON: CPT

## 2020-02-29 PROCEDURE — 25010000002 ONDANSETRON PER 1 MG: Performed by: NURSE PRACTITIONER

## 2020-02-29 PROCEDURE — 83690 ASSAY OF LIPASE: CPT | Performed by: NURSE PRACTITIONER

## 2020-02-29 PROCEDURE — 85730 THROMBOPLASTIN TIME PARTIAL: CPT | Performed by: NURSE PRACTITIONER

## 2020-02-29 PROCEDURE — 81001 URINALYSIS AUTO W/SCOPE: CPT | Performed by: NURSE PRACTITIONER

## 2020-02-29 PROCEDURE — 74177 CT ABD & PELVIS W/CONTRAST: CPT

## 2020-02-29 PROCEDURE — 99284 EMERGENCY DEPT VISIT MOD MDM: CPT

## 2020-02-29 PROCEDURE — 96374 THER/PROPH/DIAG INJ IV PUSH: CPT

## 2020-02-29 PROCEDURE — 25010000002 IOPAMIDOL 61 % SOLUTION: Performed by: NURSE PRACTITIONER

## 2020-02-29 PROCEDURE — 87086 URINE CULTURE/COLONY COUNT: CPT | Performed by: NURSE PRACTITIONER

## 2020-02-29 PROCEDURE — 82150 ASSAY OF AMYLASE: CPT | Performed by: NURSE PRACTITIONER

## 2020-02-29 PROCEDURE — 80053 COMPREHEN METABOLIC PANEL: CPT | Performed by: NURSE PRACTITIONER

## 2020-02-29 RX ORDER — ONDANSETRON 2 MG/ML
4 INJECTION INTRAMUSCULAR; INTRAVENOUS ONCE
Status: COMPLETED | OUTPATIENT
Start: 2020-02-29 | End: 2020-02-29

## 2020-02-29 RX ORDER — SULFAMETHOXAZOLE AND TRIMETHOPRIM 800; 160 MG/1; MG/1
1 TABLET ORAL 2 TIMES DAILY
Qty: 20 TABLET | Refills: 0 | Status: SHIPPED | OUTPATIENT
Start: 2020-02-29 | End: 2020-03-10

## 2020-02-29 RX ORDER — SODIUM CHLORIDE 0.9 % (FLUSH) 0.9 %
10 SYRINGE (ML) INJECTION AS NEEDED
Status: DISCONTINUED | OUTPATIENT
Start: 2020-02-29 | End: 2020-02-29 | Stop reason: HOSPADM

## 2020-02-29 RX ADMIN — HYDROMORPHONE HYDROCHLORIDE 1 MG: 1 INJECTION, SOLUTION INTRAMUSCULAR; INTRAVENOUS; SUBCUTANEOUS at 08:32

## 2020-02-29 RX ADMIN — IOPAMIDOL 150 ML: 612 INJECTION, SOLUTION INTRAVENOUS at 09:25

## 2020-02-29 RX ADMIN — ONDANSETRON HYDROCHLORIDE 4 MG: 2 SOLUTION INTRAMUSCULAR; INTRAVENOUS at 08:33

## 2020-02-29 RX ADMIN — SODIUM CHLORIDE 1000 ML: 9 INJECTION, SOLUTION INTRAVENOUS at 08:32

## 2020-03-01 LAB — BACTERIA SPEC AEROBE CULT: NORMAL

## 2020-03-04 ENCOUNTER — OFFICE VISIT (OUTPATIENT)
Dept: WOUND CARE | Facility: HOSPITAL | Age: 55
End: 2020-03-04

## 2020-03-11 RX ORDER — RIZATRIPTAN BENZOATE 10 MG/1
10 TABLET ORAL
Qty: 30 TABLET | Refills: 2 | Status: SHIPPED | OUTPATIENT
Start: 2020-03-11 | End: 2021-02-19 | Stop reason: SDUPTHER

## 2020-03-19 ENCOUNTER — OFFICE VISIT (OUTPATIENT)
Dept: WOUND CARE | Facility: HOSPITAL | Age: 55
End: 2020-03-19

## 2020-04-03 ENCOUNTER — APPOINTMENT (OUTPATIENT)
Dept: WOUND CARE | Facility: HOSPITAL | Age: 55
End: 2020-04-03

## 2020-04-03 ENCOUNTER — VIRTUAL VISIT (OUTPATIENT)
Dept: INTERNAL MEDICINE | Age: 55
End: 2020-04-03
Payer: COMMERCIAL

## 2020-04-03 PROBLEM — F41.9 ANXIETY AND DEPRESSION: Status: ACTIVE | Noted: 2020-04-03

## 2020-04-03 PROBLEM — F32.A ANXIETY AND DEPRESSION: Status: ACTIVE | Noted: 2020-04-03

## 2020-04-03 PROBLEM — F33.40 RECURRENT MAJOR DEPRESSIVE DISORDER, IN REMISSION (HCC): Status: ACTIVE | Noted: 2020-04-03

## 2020-04-03 PROCEDURE — 99213 OFFICE O/P EST LOW 20 MIN: CPT | Performed by: INTERNAL MEDICINE

## 2020-04-03 ASSESSMENT — ENCOUNTER SYMPTOMS
COUGH: 0
FACIAL SWELLING: 0
EYE REDNESS: 0
ABDOMINAL DISTENTION: 0
BACK PAIN: 1
ABDOMINAL PAIN: 0
EYE DISCHARGE: 0

## 2020-04-03 NOTE — PROGRESS NOTES
mood. Negative for sleep disturbance. The patient is nervous/anxious. Prior to Visit Medications    Medication Sig Taking?  Authorizing Provider   rizatriptan (MAXALT) 10 MG tablet Take 1 tablet by mouth once as needed for Migraine May repeat in 2 hours if needed  Sydney Collins MD   TRULICITY 1.80 DT/5.1ZK SOPN Inject 0.75mg (0.5ml) under the skin AS DIRECTED every week  Sydney Collins MD   loratadine (CLARITIN) 10 MG tablet TAKE 1 TABLET BY MOUTH DAILY  Sydney Collins MD   Cholecalciferol (VITAMIN D3) 50 MCG (2000 UT) CAPS 1 capsule by mouth daily  Sydney Collins MD   UNIFINE PENTIPS 31G X 6 MM MISC for use FOUR TIMES DAILY  Sydney Collins MD   CALTRATE 600+D3 SOFT 600-800 MG-UNIT CHEW chew 1 tablet DAILY  Sydney Collins MD   loperamide (IMODIUM) 2 MG capsule Take 1 capsule by mouth 4 times daily as needed for Diarrhea  Sydney Collins MD   propranolol (INDERAL LA) 80 MG extended release capsule 1 capsule by mouth daily  Sydney Collins MD   DEXILANT 60 MG CPDR delayed release capsule TAKE 1 CAPSULE BY MOUTH DAILY  Sydney Collins MD   HUMULIN R U-500 KWIKPEN 500 UNIT/ML SOPN concentrated injection pen inject 200 units TWICE DAILY with meals  Sydney Collins MD   losartan (COZAAR) 100 MG tablet Take 1 tablet by mouth daily  Sydney Collins MD   meclizine (ANTIVERT) 25 MG tablet TAKE 1 TABLET BY MOUTH DAILY  Sydney Collins MD   metFORMIN (GLUCOPHAGE) 500 MG tablet TAKE 2 TABLETS BY MOUTH TWICE DAILY WITH MEALS  Sydney Collins MD   atorvastatin (LIPITOR) 40 MG tablet Take 1 tablet by mouth daily  Sydney Collins MD   Multiple Vitamins-Minerals (THERAPEUTIC MULTIVITAMIN-MINERALS) tablet Take 1 tablet by mouth daily  Sydney Collins MD   Melatonin 5 MG CAPS Take 1 tab nightly  Sydney Collins MD   tamsulosin (FLOMAX) 0.4 MG capsule Take 0.4 mg by mouth daily  Historical Provider, MD   NOVOLOG FLEXPEN 100 UNIT/ML injection pen INJECT 70 UNITS WITH REGULAR MEALS, 80 UNITS WITH LARGER MEALS, AND 60 UNITS WITH SNACKS AS DIRECTED  Rosemary Sabino Rucker MD   nystatin (MYCOSTATIN) 660984 UNIT/GM cream Apply topically 2 times daily Apply topically 2 times daily. MARCUS Bolanos   ARIPiprazole (ABILIFY) 5 MG tablet Take 5 mg by mouth daily   Historical Provider, MD   traZODone (DESYREL) 100 MG tablet Take 2 tablets by mouth nightly  Donaldo Redd MD   nicotine (NICODERM CQ) 21 MG/24HR Use 21mg patch daily for 2 weeks and then 14mg patch daily for 2 weeks and then 7mg patch daily for 2 weeks  Donaldo Redd MD   triamcinolone (KENALOG) 0.1 % cream Apply topically 2 times daily prn  Donaldo Redd MD   Lancets MISC Use to check blood sugar 4 times daily   Dx  E11.9  Insulin dependent  Donaldo Redd MD   VORTIoxetine HBr (TRINTELLIX) 20 MG TABS tablet Take 20 mg by mouth daily   Historical Provider, MD   gabapentin (NEURONTIN) 400 MG capsule Take 800 mg by mouth 3 times daily. Historical Provider, MD   glucose blood VI test strips (FREESTYLE LITE) strip 1 each by In Vitro route daily Test blood sugar 4 times daily  Donaldo Redd MD   calcium carbonate 600 MG TABS tablet Take 600 mg by mouth  Historical Provider, MD   nystatin (MYCOSTATIN) 807021 UNIT/GM powder APPLY 1 GRAM ON THE SKIN 3 TIMES DAILY AS NEEDED  Historical Provider, MD   NASONEX 50 MCG/ACT nasal spray Use 1 spray in each nostril TWICE DAILY . ...(SHAKE WELL)  Historical Provider, MD   clonazePAM (KLONOPIN) 0.5 MG tablet Take 1 tablet by mouth 2 times daily as needed for Redia Ash[place], MD   Bjoebdk-Spgxw-Ptzfsoqc-TenofAF (GENVOYA) [phone] MG TABS Take 1 tablet by mouth daily  Historical Provider, MD   HYDROcodone-acetaminophen (NORCO) 5-325 MG per tablet Take 1 tablet by mouth 2 times daily as needed for Pain .   Historical Provider, MD   pregabalin (LYRICA) 75 MG capsule Take 75 mg by mouth 2 times daily  Historical Provider, MD       Social History     Tobacco Use    Smoking status: Current Every Day Smoker    Smokeless tobacco: Never Used   Substance Use Topics    Alcohol use:

## 2020-04-17 ENCOUNTER — OFFICE VISIT (OUTPATIENT)
Dept: ENDOCRINOLOGY | Facility: CLINIC | Age: 55
End: 2020-04-17

## 2020-04-17 DIAGNOSIS — Z79.4 TYPE 2 DIABETES MELLITUS WITH HYPERGLYCEMIA, WITH LONG-TERM CURRENT USE OF INSULIN (HCC): Primary | ICD-10-CM

## 2020-04-17 DIAGNOSIS — E11.59 HYPERTENSION ASSOCIATED WITH DIABETES (HCC): ICD-10-CM

## 2020-04-17 DIAGNOSIS — E11.69 MIXED DIABETIC HYPERLIPIDEMIA ASSOCIATED WITH TYPE 2 DIABETES MELLITUS (HCC): ICD-10-CM

## 2020-04-17 DIAGNOSIS — E78.2 MIXED DIABETIC HYPERLIPIDEMIA ASSOCIATED WITH TYPE 2 DIABETES MELLITUS (HCC): ICD-10-CM

## 2020-04-17 DIAGNOSIS — E11.65 TYPE 2 DIABETES MELLITUS WITH HYPERGLYCEMIA, WITH LONG-TERM CURRENT USE OF INSULIN (HCC): Primary | ICD-10-CM

## 2020-04-17 DIAGNOSIS — I15.2 HYPERTENSION ASSOCIATED WITH DIABETES (HCC): ICD-10-CM

## 2020-04-17 PROCEDURE — 99442 PR PHYS/QHP TELEPHONE EVALUATION 11-20 MIN: CPT | Performed by: INTERNAL MEDICINE

## 2020-04-17 NOTE — PROGRESS NOTES
" Donis Yi is a 54 y.o. male who presents for  evaluation of   Chief Complaint   Patient presents with   • Diabetes                   TELEPHONE VISIT    This was a Telephone Encounter. Benefits and Disadvantages of a Telephone Visit were discussed and accepted by patient. .  Patient agreed to receive service through Telephone Visit as patient is being compliant with social distancing recommendations imparted by CDC.     You have chosen to receive care through a telephone visit. Do you consent to use a telephone visit for your medical care today? Yes        Referring provider     Primary Care Provider    Oksana Mares MD    Duration 15 years    Timing - Diabetes is Constant    Quality -  poorly controlled      Severity -  severe    Complications - peripheral neuropathy    Current symptoms/problems  none     Alleviating Factors: Compliance      Aggravating Factors :    Side Effects  none    Current diet  in general, a \"healthy\" diet      Current exercise none    Current monitoring regimen: home blood tests - checking 4 x daily   Home blood sugar records: 60 - 500s       Hypoglycemia nocturnal and if skipped meals     Past Medical History:   Diagnosis Date   • Allergic rhinitis    • Anxiety    • Bursitis    • DDD (degenerative disc disease), cervical    • Depression    • HIV disease (CMS/HCC)    • HLD (hyperlipidemia)    • HSP (Henoch Schonlein purpura) (CMS/HCC)    • HTN (hypertension)    • Migraine    • Myocardial infarction (CMS/HCC)    • Osteoporosis    • Sleep apnea    • Type 2 diabetes mellitus (CMS/HCC)      Family History   Problem Relation Age of Onset   • Diabetes Mother    • Hypertension Mother    • Thyroid disease Mother    • Hypertension Father    • Thyroid disease Father    • Arrhythmia Father    • Diabetes Maternal Grandfather    • Heart disease Maternal Grandfather    • Hypertension Maternal Grandfather    • Diabetes Paternal Grandmother    • Stroke Paternal Grandmother    • Heart disease " Paternal Grandmother    • Hypertension Paternal Grandmother    • Thyroid disease Paternal Grandmother    • Hypertension Paternal Grandfather    • Hypertension Sister    • No Known Problems Brother      Social History     Tobacco Use   • Smoking status: Current Every Day Smoker     Packs/day: 1.00     Types: Cigarettes     Start date: 1988   • Smokeless tobacco: Never Used   Substance Use Topics   • Alcohol use: Yes     Comment: occasionally   • Drug use: No         Current Outpatient Medications:   •  albuterol (PROVENTIL HFA;VENTOLIN HFA) 108 (90 BASE) MCG/ACT inhaler, Inhale 2 puffs every 6 (six) hours as needed for wheezing., Disp: , Rfl:   •  ARIPiprazole (ABILIFY) 2 MG tablet, Take 2 mg by mouth Daily., Disp: , Rfl:   •  atorvastatin (LIPITOR) 40 MG tablet, Take 40 mg by mouth Daily., Disp: , Rfl: 3  •  calcium carbonate (OS-THEODORA) 600 MG tablet, Take 600 mg by mouth Daily., Disp: , Rfl:   •  cholecalciferol (VITAMIN D3) 1000 UNITS tablet, Take 1,000 Units by mouth daily., Disp: , Rfl:   •  ciclopirox (LOPROX) 0.77 % cream, Apply  topically to the appropriate area as directed 2 (Two) Times a Day., Disp: 30 g, Rfl: 5  •  clonazePAM (KlonoPIN) 0.5 MG tablet, Take 0.5 mg by mouth 3 (Three) Times a Day As Needed for Anxiety., Disp: , Rfl:   •  denosumab (PROLIA) 60 MG/ML solution syringe, Inject 60 mg under the skin Every 6 (Six) Months., Disp: , Rfl:   •  DEXILANT 60 MG capsule, Take 1 capsule by mouth Daily., Disp: , Rfl: 1  •  Dulaglutide 0.75 MG/0.5ML solution pen-injector, Inject 0.75 mg under the skin into the appropriate area as directed 1 (One) Time Per Week., Disp: 4 pen, Rfl: 11  •  Ksbgvrl-Mjmys-Dnlgpuuh-TenofAF (GENVOYA) [phone] MG tablet, Take 1 tablet by mouth daily., Disp: , Rfl:   •  fluticasone (FLONASE) 50 MCG/ACT nasal spray, 2 sprays into each nostril Daily. (Patient taking differently: 2 sprays into each nostril Daily As Needed.), Disp: 1 bottle, Rfl: 6  •  Foot Care Products (FOOT POWDER  MEDICATED EX), , Disp: , Rfl:   •  gabapentin (NEURONTIN) 800 MG tablet, , Disp: , Rfl:   •  glucose blood test strip, 1 each., Disp: , Rfl:   •  guaiFENesin (MUCINEX) 600 MG 12 hr tablet, Take 600 mg by mouth 2 (two) times a day as needed for cough., Disp: , Rfl:   •  HYDROcodone-acetaminophen (NORCO) 5-325 MG per tablet, Take 1 tablet by mouth 2 (two) times a day as needed., Disp: , Rfl:   •  insulin aspart (NOVOLOG FLEXPEN) 100 UNIT/ML solution pen-injector sc pen, Inject 80 Units under the skin 4 (Four) Times a Day With Meals & at Bedtime., Disp: , Rfl:   •  Insulin Pen Needle (UNIFINE PENTIPS) 31G X 6 MM misc, for use FOUR TIMES DAILY, Disp: , Rfl:   •  Insulin Regular Human, Conc, (HUMULIN R U-500 KWIKPEN) 500 UNIT/ML solution pen-injector CONCENTRATED injection, Up to 200 units bid with meals, Disp: 8 pen, Rfl: 11  •  Lancets misc, Use to check blood sugar 4 times daily   Dx  E11.9  Insulin dependent, Disp: , Rfl:   •  loperamide (IMODIUM) 2 MG capsule, Take 2 mg by mouth daily as needed for diarrhea., Disp: , Rfl:   •  loratadine (CLARITIN) 10 MG tablet, Take 10 mg by mouth daily., Disp: , Rfl:   •  losartan (COZAAR) 100 MG tablet, Take 100 mg by mouth daily., Disp: , Rfl:   •  LYRICA 100 MG capsule, Take 100 mg by mouth 2 (Two) Times a Day., Disp: , Rfl:   •  meclizine (ANTIVERT) 25 MG tablet, Take 25 mg by mouth Daily As Needed., Disp: , Rfl:   •  Melatonin 5 MG capsule, Take 5 mg by mouth every night at bedtime., Disp: , Rfl: 3  •  metFORMIN (GLUCOPHAGE) 500 MG tablet, Take 1,000 mg by mouth 2 (Two) Times a Day With Meals., Disp: , Rfl: 3  •  miconazole (MICOTIN) 2 % cream, Apply 1 application topically 2 (two) times a day., Disp: , Rfl:   •  neomycin-bacitracin-polymyxin (NEOSPORIN) 5-400-5000 ointment, Apply 1 application topically 4 (four) times a day as needed., Disp: , Rfl:   •  PRENATAL 28-0.8 MG tablet, Take 1 tablet by mouth Daily., Disp: , Rfl: 3  •  promethazine (PHENERGAN) 25 MG tablet, Take 25  mg by mouth every 6 (six) hours as needed for nausea or vomiting., Disp: , Rfl:   •  propranolol LA (INDERAL LA) 80 MG 24 hr capsule, Take 80 mg by mouth Daily., Disp: , Rfl: 5  •  rizatriptan (MAXALT) 10 MG tablet, Take 10 mg by mouth 1 (one) time as needed for migraine. May repeat in 2 hours if needed, Disp: , Rfl:   •  tamsulosin (FLOMAX) 0.4 MG capsule 24 hr capsule, Take 1 capsule by mouth Every Night., Disp: 90 capsule, Rfl: 3  •  terbinafine (lamISIL) 1 % cream, Apply  topically to the appropriate area as directed 2 (Two) Times a Day., Disp: 36 g, Rfl: 2  •  tiZANidine (ZANAFLEX) 4 MG tablet, Take 6 mg by mouth Every 6 (Six) Hours As Needed for Muscle Spasms., Disp: , Rfl:   •  traZODone (DESYREL) 100 MG tablet, Take 100 mg by mouth Every Night., Disp: , Rfl: 3  •  Vortioxetine HBr (TRINTELLIX) 5 MG tablet, Take 20 mg by mouth Daily With Breakfast., Disp: , Rfl:     Review of Systems    Review of Systems   Constitutional: Negative for activity change, appetite change, chills, diaphoresis, fatigue, fever and unexpected weight change.   HENT: Negative for congestion, dental problem, drooling, ear discharge, ear pain, facial swelling, mouth sores, postnasal drip, rhinorrhea, sinus pressure, sore throat, tinnitus, trouble swallowing and voice change.    Eyes: Negative for photophobia, pain, discharge, redness, itching and visual disturbance.   Respiratory: Negative for apnea, cough, choking, chest tightness, shortness of breath, wheezing and stridor.    Cardiovascular: Negative for chest pain, palpitations and leg swelling.   Gastrointestinal: Negative for abdominal distention, abdominal pain, constipation, diarrhea, nausea and vomiting.   Endocrine: Negative for cold intolerance, heat intolerance, polydipsia, polyphagia and polyuria.   Genitourinary: Negative for decreased urine volume, difficulty urinating, dysuria, flank pain, frequency, hematuria and urgency.   Musculoskeletal: Negative for arthralgias, back  pain, gait problem, joint swelling, myalgias, neck pain and neck stiffness.   Skin: Negative for color change, pallor, rash and wound.   Allergic/Immunologic: Negative for immunocompromised state.   Neurological: Negative for dizziness, tremors, seizures, syncope, facial asymmetry, speech difficulty, weakness, light-headedness, numbness and headaches.   Hematological: Negative for adenopathy.   Psychiatric/Behavioral: Negative for agitation, behavioral problems, confusion, decreased concentration, dysphoric mood, hallucinations, self-injury, sleep disturbance and suicidal ideas. The patient is not nervous/anxious and is not hyperactive.         Objective:   There were no vitals taken for this visit.    Physical Exam    Lab Review          Assessment/Plan       ICD-10-CM ICD-9-CM   1. Type 2 diabetes mellitus with hyperglycemia, with long-term current use of insulin (CMS/HCC) E11.65 250.00    Z79.4 790.29     V58.67   2. Hypertension associated with diabetes (CMS/HCC) E11.59 250.80    I10 401.9   3. Mixed diabetic hyperlipidemia associated with type 2 diabetes mellitus (CMS/HCC) E11.69 250.80    E78.2 272.2         I reviewed and summarized records from Oksana Mares MD from present year  and I reviewed / ordered labs.   From review of records :    Patient has uncontrolled type 2 diabetes    Glycemic Management:   Lab Results   Component Value Date    HGBA1C 10.90 (H) 02/19/2020    HGBA1C 11.00 (H) 10/17/2019    HGBA1C 10.1 (H) 09/09/2019     Lab Results   Component Value Date    GLUCOSE 325 (H) 02/29/2020    BUN 24 (H) 02/29/2020    CREATININE 0.85 02/29/2020    EGFRIFNONA 95 02/29/2020    BCR 28.2 (H) 02/29/2020    K 4.3 02/29/2020    CO2 27.0 02/29/2020    CALCIUM 9.2 02/29/2020    ALBUMIN 4.10 02/29/2020    LABIL2 CANCELED 03/29/2016    AST 40 02/29/2020    ALT 54 (H) 02/29/2020    ANIONGAP 12.0 02/29/2020     Lab Results   Component Value Date    WBC 8.72 02/29/2020    HGB 12.6 (L) 02/29/2020    HCT 37.0 (L)  02/29/2020    MCV 93.7 02/29/2020     02/29/2020        U500 insulin      Has been taking 100 to 180 units   Try to take only 100 units and find the amount of food that matches it     200 units for supper    In addition , He will add U100 humalog 5 units per 50 above 150 at lunch and bedtime     Total 4 injections per day     Metformin 1000 mg bid     Add Trulicity 0.75 mg weekly     Next appt add jardiance but be careful since has BPH     Has freestyle melba reader - not available     We focused on motivation, he has all the right medicines, he just needs to be compliant        Lipid Management  Lab Results   Component Value Date    CHOL 117 02/19/2020    CHOL 147 01/14/2019     Lab Results   Component Value Date    TRIG 141 02/19/2020    TRIG 133 09/09/2019    TRIG 133 06/03/2019     Lab Results   Component Value Date    HDL 56 02/19/2020    HDL 48 (L) 09/09/2019    HDL 45 (L) 06/03/2019     No components found for: LDLCALC  Lab Results   Component Value Date    LDL 33 02/19/2020    LDL 51 09/09/2019    LDL 66 06/03/2019     No results found for: LDLDIRECT    lipitor 80 mg qhs     Blood Pressure Management:    There were no vitals filed for this visit.  Lab Results   Component Value Date    GLUCOSE 325 (H) 02/29/2020    CALCIUM 9.2 02/29/2020     02/29/2020    K 4.3 02/29/2020    CO2 27.0 02/29/2020     02/29/2020    BUN 24 (H) 02/29/2020    CREATININE 0.85 02/29/2020    EGFRIFNONA 95 02/29/2020    BCR 28.2 (H) 02/29/2020    ANIONGAP 12.0 02/29/2020               Microvascular Complication Monitoring:      Eye Exam Evaluation    -----------    Last Microalbumin-Proteinuria Assessment    No results found for: MALBCRERATIO    No results found for: UTPCR    -----------      Neuropathy on lyrica 100 bid          Weight Related:   Wt Readings from Last 3 Encounters:   02/29/20 133 kg (294 lb)   02/16/20 130 kg (287 lb)   02/11/20 128 kg (282 lb)     There is no height or weight on file to calculate  BMI.        Diet interventions: moderate (500 kCal/d) deficit diet.      Bone Health    Lab Results   Component Value Date    CALCIUM 9.2 02/29/2020    PHOS 3.5 02/19/2020    HUDZ38HY 42.4 02/19/2020       Thyroid Health    Lab Results   Component Value Date    TSH 0.996 02/19/2020    TSH 0.896 02/16/2020    TSH 1.970 06/03/2019              Other Diabetes Related Aspects       Lab Results   Component Value Date    FOGBOKTI34 366 02/19/2020          No orders of the defined types were placed in this encounter.        A copy of my note was sent to Oksana Mares MD    Please see my above opinion and suggestions.     I spent 11 minutes reviewing patient electronic chart , reviewing medications , past history , active problems.   I provided advice regarding management of medical conditions, refilled prescriptions , ordered labs and arranged for future appointment.   Patient was advised to contact us if there were any unanswered questions or ongoing concerns.

## 2020-04-19 ENCOUNTER — NURSE TRIAGE (OUTPATIENT)
Dept: CALL CENTER | Facility: HOSPITAL | Age: 55
End: 2020-04-19

## 2020-04-19 ENCOUNTER — HOSPITAL ENCOUNTER (EMERGENCY)
Facility: HOSPITAL | Age: 55
Discharge: HOME | End: 2020-04-19
Admitting: EMERGENCY MEDICINE

## 2020-04-19 VITALS
OXYGEN SATURATION: 96 % | RESPIRATION RATE: 18 BRPM | HEIGHT: 70 IN | TEMPERATURE: 98.7 F | WEIGHT: 278 LBS | BODY MASS INDEX: 39.8 KG/M2 | DIASTOLIC BLOOD PRESSURE: 80 MMHG | HEART RATE: 80 BPM | SYSTOLIC BLOOD PRESSURE: 140 MMHG

## 2020-04-19 DIAGNOSIS — R73.9 HYPERGLYCEMIA: ICD-10-CM

## 2020-04-19 DIAGNOSIS — Z91.199 MEDICAL NON-COMPLIANCE: ICD-10-CM

## 2020-04-19 DIAGNOSIS — N47.6 BALANOPOSTHITIS: ICD-10-CM

## 2020-04-19 DIAGNOSIS — N30.90 CYSTITIS: Primary | ICD-10-CM

## 2020-04-19 LAB
BACTERIA UR QL AUTO: ABNORMAL /HPF
BILIRUB UR QL STRIP: NEGATIVE
CLARITY UR: CLEAR
COLOR UR: YELLOW
GLUCOSE BLDC GLUCOMTR-MCNC: 398 MG/DL (ref 70–130)
GLUCOSE UR STRIP-MCNC: ABNORMAL MG/DL
HGB UR QL STRIP.AUTO: NEGATIVE
HYALINE CASTS UR QL AUTO: ABNORMAL /LPF
KETONES UR QL STRIP: NEGATIVE
LEUKOCYTE ESTERASE UR QL STRIP.AUTO: ABNORMAL
NITRITE UR QL STRIP: POSITIVE
PH UR STRIP.AUTO: <=5 [PH] (ref 5–8)
PROT UR QL STRIP: NEGATIVE
RBC # UR: ABNORMAL /HPF
REF LAB TEST METHOD: ABNORMAL
SP GR UR STRIP: 1.03 (ref 1–1.03)
SQUAMOUS #/AREA URNS HPF: ABNORMAL /HPF
UROBILINOGEN UR QL STRIP: ABNORMAL
WBC UR QL AUTO: ABNORMAL /HPF

## 2020-04-19 PROCEDURE — 81001 URINALYSIS AUTO W/SCOPE: CPT | Performed by: PHYSICIAN ASSISTANT

## 2020-04-19 PROCEDURE — 63710000001 INSULIN REGULAR HUMAN PER 5 UNITS: Performed by: PHYSICIAN ASSISTANT

## 2020-04-19 PROCEDURE — 87186 SC STD MICRODIL/AGAR DIL: CPT | Performed by: PHYSICIAN ASSISTANT

## 2020-04-19 PROCEDURE — 96372 THER/PROPH/DIAG INJ SC/IM: CPT

## 2020-04-19 PROCEDURE — 99283 EMERGENCY DEPT VISIT LOW MDM: CPT

## 2020-04-19 PROCEDURE — 82962 GLUCOSE BLOOD TEST: CPT

## 2020-04-19 PROCEDURE — 87077 CULTURE AEROBIC IDENTIFY: CPT | Performed by: PHYSICIAN ASSISTANT

## 2020-04-19 PROCEDURE — 87086 URINE CULTURE/COLONY COUNT: CPT | Performed by: PHYSICIAN ASSISTANT

## 2020-04-19 RX ORDER — SULFAMETHOXAZOLE AND TRIMETHOPRIM 800; 160 MG/1; MG/1
1 TABLET ORAL 2 TIMES DAILY
Qty: 20 TABLET | Refills: 0 | Status: ON HOLD | OUTPATIENT
Start: 2020-04-19 | End: 2020-05-12

## 2020-04-19 RX ORDER — CLOTRIMAZOLE 1 G/ML
SOLUTION TOPICAL 2 TIMES DAILY
Qty: 15 ML | Refills: 0 | Status: ON HOLD | OUTPATIENT
Start: 2020-04-19 | End: 2020-05-12

## 2020-04-19 RX ORDER — FLUCONAZOLE 150 MG/1
150 TABLET ORAL ONCE
Qty: 1 TABLET | Refills: 0 | Status: SHIPPED | OUTPATIENT
Start: 2020-04-19 | End: 2020-04-19

## 2020-04-19 RX ADMIN — HUMAN INSULIN 10 UNITS: 100 INJECTION, SOLUTION SUBCUTANEOUS at 17:52

## 2020-04-19 NOTE — ED PROVIDER NOTES
Subjective   History of Present Illness  54-year-old male presents with a chief complaint of penile pain ongoing the past 24 hours.  He reports he is having difficulty with pulling his foreskin back without having significant pain.  He also reports frequency urgency burning urination.  He is diabetic and had forgotten to take his insulin today.  The patient reports no fevers chills cough chest pain or shortness of breath.  He has had this in the past as well and had been treated and he said his symptoms resolved after 2 days.  Patient is HIV positive however his CD4 count is 1600 and his viral load is undetectable.  Review of Systems   All other systems reviewed and are negative.      Past Medical History:   Diagnosis Date   • Allergic rhinitis    • Anxiety    • Bursitis    • DDD (degenerative disc disease), cervical    • Depression    • HIV disease (CMS/HCC)    • HLD (hyperlipidemia)    • HSP (Henoch Schonlein purpura) (CMS/HCC)    • HTN (hypertension)    • Migraine    • Myocardial infarction (CMS/HCC)    • Osteoporosis    • Sleep apnea    • Type 2 diabetes mellitus (CMS/HCC)        Allergies   Allergen Reactions   • Amitriptyline Anaphylaxis   • Amitriptyline Hcl Anaphylaxis   • Darvon [Propoxyphene] Anaphylaxis   • Zithromax [Azithromycin] Anaphylaxis       Past Surgical History:   Procedure Laterality Date   • ANKLE SURGERY     • COLONOSCOPY  06/19/2009    Normal exam repeat in 10 years   • FOREARM SURGERY     • LYMPH NODE BIOPSY         Family History   Problem Relation Age of Onset   • Diabetes Mother    • Hypertension Mother    • Thyroid disease Mother    • Hypertension Father    • Thyroid disease Father    • Arrhythmia Father    • Diabetes Maternal Grandfather    • Heart disease Maternal Grandfather    • Hypertension Maternal Grandfather    • Diabetes Paternal Grandmother    • Stroke Paternal Grandmother    • Heart disease Paternal Grandmother    • Hypertension Paternal Grandmother    • Thyroid disease  Paternal Grandmother    • Hypertension Paternal Grandfather    • Hypertension Sister    • No Known Problems Brother        Social History     Socioeconomic History   • Marital status:      Spouse name: Not on file   • Number of children: Not on file   • Years of education: Not on file   • Highest education level: Not on file   Tobacco Use   • Smoking status: Current Every Day Smoker     Packs/day: 1.00     Types: Cigarettes     Start date: 1988   • Smokeless tobacco: Never Used   Substance and Sexual Activity   • Alcohol use: Yes     Comment: occasionally   • Drug use: No   • Sexual activity: Defer           Objective   Physical Exam   Constitutional: He is oriented to person, place, and time. He appears well-developed and well-nourished.   HENT:   Head: Normocephalic and atraumatic.   Eyes: Pupils are equal, round, and reactive to light. EOM are normal.   Neck: Normal range of motion. Neck supple.   Cardiovascular: Normal rate and regular rhythm.   Pulmonary/Chest: Effort normal and breath sounds normal.   Abdominal: Soft. Bowel sounds are normal.   Genitourinary:   Genitourinary Comments: DONG Reeves chaperone    Shaft ulceration without edema to foreskin or glans, there is mild irritation surrounding prepuce and dry, cracking skin    Musculoskeletal: Normal range of motion.   Neurological: He is alert and oriented to person, place, and time.   Skin: Skin is warm. Capillary refill takes less than 2 seconds.   Psychiatric: He has a normal mood and affect. His behavior is normal.   Nursing note and vitals reviewed.      Procedures           ED Course           Labs Reviewed   URINALYSIS W/ CULTURE IF INDICATED - Abnormal; Notable for the following components:       Result Value    Glucose, UA >=1000 mg/dL (3+) (*)     Leuk Esterase, UA Small (1+) (*)     Nitrite, UA Positive (*)     All other components within normal limits   URINALYSIS, MICROSCOPIC ONLY - Abnormal; Notable for the following components:     RBC, UA 0-2 (*)     WBC, UA 21-30 (*)     Bacteria, UA 4+ (*)     All other components within normal limits   POCT GLUCOSE FINGERSTICK - Abnormal; Notable for the following components:    Glucose 398 (*)     All other components within normal limits   URINE CULTURE   POCT GLUCOSE FINGERSTICK                                     MDM  Number of Diagnoses or Management Options  Diagnosis management comments: Shaft ulceration does appear to be more consistent with balanoposthesis patient does not have herpetic lesions or vesicular clusters. Cystitis, will treat with oral abx and treat fungal infection with clotrimazole BID and 1 dose of diflucan        Amount and/or Complexity of Data Reviewed  Clinical lab tests: reviewed and ordered  Tests in the medicine section of CPT®: ordered and reviewed    Risk of Complications, Morbidity, and/or Mortality  Presenting problems: moderate  Diagnostic procedures: moderate  Management options: moderate    Patient Progress  Patient progress: stable      Final diagnoses:   Cystitis   Balanoposthitis   Hyperglycemia   Medical non-compliance            Barrie Alexander PA-C  04/19/20 [id number]

## 2020-04-19 NOTE — TELEPHONE ENCOUNTER
"States \"I have what I think may be a yeast infection inside the foreskin of my penis\". States \"it's swelling and there's a cottage cheese looking thrushy material under my foreskin\". States he has had this before and they gave him diflucan and nystatin with lidocaine. States last time he had a lot of swelling. States he is starting to have swelling. Asking what to do?    Discussed calling PCP or on call provider for PCP and may be able to call something in for him. If unable to do so or no one on call, discussed urgent care that is open. He verbalized understanding.     Reason for Disposition  • [1] Not circumcised AND [2] swollen foreskin    Additional Information  • Negative: Followed a genital area injury  • Negative: Pain or burning with passing urine is main symptom  • Negative: Pain in scrotum or testicle is main symptom  • Negative: Swollen scrotum OR lump in the scrotum/groin area  • Negative: Pubic lice suspected  • Negative: [1] Blood from end of penis AND [2] large amount  • Negative: [1] Not circumcised AND [2] foreskin pulled back and stuck  • Negative: [1] Looks infected (e.g., draining sore, ulcer, rash is painful to touch) AND [2] fever > 100.5 F (38.1 C)  • Negative: [1] Unable to urinate (or only a few drops) > 4 hours AND     [2] bladder feels very full (e.g., palpable bladder or strong urge to urinate)  • Negative: [1] Erection AND [2] present > 4 hours  • Negative: Patient sounds very sick or weak to the triager  • Negative: [1] Pus or bloody discharge from the end of the penis AND [2] fever  • Negative: [1] Pain or burning with passing urine AND [2] fever > 100.5 F (38.1 C)  • Negative: [1] Pain or burning with passing urine AND [2] side (flank) or back pain present  • Negative: Entire penis is swollen (i.e., edema)  • Negative: [1] Antibiotic treatment > 3 days for STD (e.g., penile discharge from gonorrhea, chlamydia)    AND [2] painful urination not improved  • Negative: Pus (white, yellow) " "or bloody discharge from end of penis  • Negative: Pain or burning with passing urine    Answer Assessment - Initial Assessment Questions  1. SYMPTOM: \"What's the main symptom you're concerned about?\" (e.g., discharge from penis, rash, pain, itching, swelling)      See note  2. LOCATION: \"Where is the n/a located?\"      n/a  3. ONSET: \"When did n/a  start?\"      n/  4. PAIN: \"Is there any pain?\" If so, ask: \"How bad is it?\"  (Scale 1-10; or mild, moderate, severe)      An/a  5. URINE: \"Any difficulty passing urine?\" If so, ask: \"When was the last time?\"      n/a  6. CAUSE: \"What do you think is causing the symptoms?\"      n/a  7. OTHER SYMPTOMS: \"Do you have any other symptoms?\" (e.g., fever, abdominal pain, blood in urine)      n/a    Protocols used: PENIS AND SCROTUM SYMPTOMS-ADULT-      "

## 2020-04-20 ENCOUNTER — VIRTUAL VISIT (OUTPATIENT)
Dept: INTERNAL MEDICINE | Age: 55
End: 2020-04-20
Payer: COMMERCIAL

## 2020-04-20 VITALS — BODY MASS INDEX: 36.84 KG/M2 | HEIGHT: 72 IN | WEIGHT: 272 LBS

## 2020-04-20 PROCEDURE — 99214 OFFICE O/P EST MOD 30 MIN: CPT | Performed by: INTERNAL MEDICINE

## 2020-04-20 RX ORDER — NYSTATIN 100000 U/G
OINTMENT TOPICAL
Qty: 60 G | Refills: 1 | Status: SHIPPED | OUTPATIENT
Start: 2020-04-20 | End: 2020-07-27

## 2020-04-20 RX ORDER — CLOTRIMAZOLE 1 G/ML
1 SOLUTION TOPICAL 2 TIMES DAILY
COMMUNITY
Start: 2020-04-19

## 2020-04-20 RX ORDER — FLUCONAZOLE 100 MG/1
200 TABLET ORAL DAILY
Qty: 10 TABLET | Refills: 0 | Status: SHIPPED | OUTPATIENT
Start: 2020-04-20 | End: 2020-04-30

## 2020-04-20 ASSESSMENT — ENCOUNTER SYMPTOMS
COUGH: 0
BACK PAIN: 1
FACIAL SWELLING: 0
ABDOMINAL DISTENTION: 0
EYE DISCHARGE: 0
ABDOMINAL PAIN: 0
EYE REDNESS: 0

## 2020-04-20 NOTE — PROGRESS NOTES
TABLET BY MOUTH DAILY  Jennifer Glass MD   Cholecalciferol (VITAMIN D3) 50 MCG (2000 UT) CAPS 1 capsule by mouth daily  Jennifer Glass MD   UNIFINE PENTIPS 31G X 6 MM MISC for use FOUR TIMES DAILY  Jennifer Glass MD   CALTRATE 600+D3 SOFT 600-800 MG-UNIT CHEW chew 1 tablet DAILY  Jennifer Glass MD   loperamide (IMODIUM) 2 MG capsule Take 1 capsule by mouth 4 times daily as needed for Diarrhea  Jennifer Glass MD   propranolol (INDERAL LA) 80 MG extended release capsule 1 capsule by mouth daily  Jennifer Glass MD   DEXILANT 60 MG CPDR delayed release capsule TAKE 1 CAPSULE BY MOUTH DAILY  Jennifer Glass MD   HUMULIN R U-500 KWIKPEN 500 UNIT/ML SOPN concentrated injection pen inject 200 units TWICE DAILY with meals  Jennifer Glass MD   losartan (COZAAR) 100 MG tablet Take 1 tablet by mouth daily  Jennifer Glass MD   meclizine (ANTIVERT) 25 MG tablet TAKE 1 TABLET BY MOUTH DAILY  Jennifer Glass MD   metFORMIN (GLUCOPHAGE) 500 MG tablet TAKE 2 TABLETS BY MOUTH TWICE DAILY WITH MEALS  Jennifer Glass MD   atorvastatin (LIPITOR) 40 MG tablet Take 1 tablet by mouth daily  Jennifer Glass MD   Multiple Vitamins-Minerals (THERAPEUTIC MULTIVITAMIN-MINERALS) tablet Take 1 tablet by mouth daily  Jennifer Glass MD   Melatonin 5 MG CAPS Take 1 tab nightly  Jennifer Glass MD   tamsulosin (FLOMAX) 0.4 MG capsule Take 0.4 mg by mouth daily  Historical Provider, MD   NOVOLOG FLEXPEN 100 UNIT/ML injection pen INJECT 70 UNITS WITH REGULAR MEALS, 80 UNITS WITH LARGER MEALS, AND 60 UNITS WITH SNACKS AS DIRECTED  Jennifer Glass MD   nystatin (MYCOSTATIN) 611160 UNIT/GM cream Apply topically 2 times daily Apply topically 2 times daily.   MARCUS Minaya   ARIPiprazole (ABILIFY) 5 MG tablet Take 5 mg by mouth daily   Historical Provider, MD   traZODone (DESYREL) 100 MG tablet Take 2 tablets by mouth nightly  Jennifer Glass MD   nicotine (NICODERM CQ) 21 MG/24HR Use 21mg patch daily for 2 weeks and then 14mg patch daily for 2 weeks and then 7mg patch daily UNIT/GM ointment; Apply topically 2 times daily. Dispense: 60 g; Refill: 1    3. Other insomnia  We cut his trazodone to 100 mg and he has tapered off of Neurontin he was very overmedicated still has too many medications but for now this is better and we will continuously try to help him get off of some medication    4. Recurrent major depressive disorder, in remission [facility])  He does follow with psychiatry. Continue to monitor. Hopefully may be could get off of some medication in the future    5. Anxiety and depression  Above right now he is doing better in general follow    6. Acute cystitis without hematuria  We will look for the culture results apparently they gave him 10 days of Septra we told him to take it for 7 days. 7.  Diabetes type II with polyneuropathy his diabetes is out of control Dr. Princess Phelps made some recent adjustments- u-500 increased     Return for as scheduled but change to a video visit. Anders Jhaveri is a 48 y.o. male being evaluated by a Virtual Visit (video visit) encounter to address concerns as mentioned above. A caregiver was present when appropriate. Due to this being a TeleHealth encounter (During FYPSF-35 public health emergency), evaluation of the following organ systems was limited: Vitals/Constitutional/EENT/Resp/CV/GI//MS/Neuro/Skin/Heme-Lymph-Imm. Pursuant to the emergency declaration under the [address] [place] [address], [address] [place] [address] authority and the [employer] Act, this Virtual Visit was conducted with patient's (and/or legal guardian's) consent, to reduce the patient's risk of exposure to COVID-19 and provide necessary medical care. The patient (and/or legal guardian) has also been advised to contact this office for worsening conditions or problems, and seek emergency medical treatment and/or call 911 if deemed necessary.      Services were provided through a video synchronous discussion

## 2020-04-21 LAB — BACTERIA SPEC AEROBE CULT: ABNORMAL

## 2020-04-21 RX ORDER — PROPRANOLOL HYDROCHLORIDE 80 MG/1
CAPSULE, EXTENDED RELEASE ORAL
Qty: 90 CAPSULE | Refills: 3 | Status: SHIPPED | OUTPATIENT
Start: 2020-04-21 | End: 2021-03-26

## 2020-05-11 ENCOUNTER — HOSPITAL ENCOUNTER (OUTPATIENT)
Facility: HOSPITAL | Age: 55
Setting detail: OBSERVATION
Discharge: HOME | End: 2020-05-13
Attending: INTERNAL MEDICINE | Admitting: INTERNAL MEDICINE

## 2020-05-11 ENCOUNTER — APPOINTMENT (OUTPATIENT)
Dept: GENERAL RADIOLOGY | Facility: HOSPITAL | Age: 55
End: 2020-05-11

## 2020-05-11 DIAGNOSIS — R73.9 HYPERGLYCEMIA: ICD-10-CM

## 2020-05-11 DIAGNOSIS — R07.9 CHEST PAIN, UNSPECIFIED TYPE: Primary | ICD-10-CM

## 2020-05-11 LAB
ALBUMIN SERPL-MCNC: 4.1 G/DL (ref 3.5–5.2)
ALBUMIN/GLOB SERPL: 1.5 G/DL
ALP SERPL-CCNC: 69 U/L (ref 39–117)
ALT SERPL W P-5'-P-CCNC: 37 U/L (ref 1–41)
ANION GAP SERPL CALCULATED.3IONS-SCNC: 17 MMOL/L (ref 5–15)
AST SERPL-CCNC: 22 U/L (ref 1–40)
BASOPHILS # BLD AUTO: 0.05 10*3/MM3 (ref 0–0.2)
BASOPHILS NFR BLD AUTO: 0.6 % (ref 0–1.5)
BILIRUB SERPL-MCNC: <0.2 MG/DL (ref 0.2–1.2)
BUN BLD-MCNC: 25 MG/DL (ref 6–20)
BUN/CREAT SERPL: 22.1 (ref 7–25)
CALCIUM SPEC-SCNC: 10 MG/DL (ref 8.6–10.5)
CHLORIDE SERPL-SCNC: 97 MMOL/L (ref 98–107)
CO2 SERPL-SCNC: 24 MMOL/L (ref 22–29)
CREAT BLD-MCNC: 1.13 MG/DL (ref 0.76–1.27)
DEPRECATED RDW RBC AUTO: 43.8 FL (ref 37–54)
EOSINOPHIL # BLD AUTO: 0.14 10*3/MM3 (ref 0–0.4)
EOSINOPHIL NFR BLD AUTO: 1.8 % (ref 0.3–6.2)
ERYTHROCYTE [DISTWIDTH] IN BLOOD BY AUTOMATED COUNT: 13 % (ref 12.3–15.4)
GFR SERPL CREATININE-BSD FRML MDRD: 68 ML/MIN/1.73
GLOBULIN UR ELPH-MCNC: 2.7 GM/DL
GLUCOSE BLD-MCNC: 575 MG/DL (ref 65–99)
GLUCOSE BLDC GLUCOMTR-MCNC: 409 MG/DL (ref 70–130)
GLUCOSE BLDC GLUCOMTR-MCNC: 415 MG/DL (ref 70–130)
HCT VFR BLD AUTO: 36.4 % (ref 37.5–51)
HGB BLD-MCNC: 12.2 G/DL (ref 13–17.7)
HOLD SPECIMEN: NORMAL
HOLD SPECIMEN: NORMAL
IMM GRANULOCYTES # BLD AUTO: 0.03 10*3/MM3 (ref 0–0.05)
IMM GRANULOCYTES NFR BLD AUTO: 0.4 % (ref 0–0.5)
LYMPHOCYTES # BLD AUTO: 2.47 10*3/MM3 (ref 0.7–3.1)
LYMPHOCYTES NFR BLD AUTO: 32 % (ref 19.6–45.3)
MCH RBC QN AUTO: 31 PG (ref 26.6–33)
MCHC RBC AUTO-ENTMCNC: 33.5 G/DL (ref 31.5–35.7)
MCV RBC AUTO: 92.4 FL (ref 79–97)
MONOCYTES # BLD AUTO: 0.63 10*3/MM3 (ref 0.1–0.9)
MONOCYTES NFR BLD AUTO: 8.2 % (ref 5–12)
NEUTROPHILS # BLD AUTO: 4.4 10*3/MM3 (ref 1.7–7)
NEUTROPHILS NFR BLD AUTO: 57 % (ref 42.7–76)
NRBC BLD AUTO-RTO: 0 /100 WBC (ref 0–0.2)
NT-PROBNP SERPL-MCNC: 57.8 PG/ML (ref 5–900)
PLATELET # BLD AUTO: 168 10*3/MM3 (ref 140–450)
PMV BLD AUTO: 11.4 FL (ref 6–12)
POTASSIUM BLD-SCNC: 5 MMOL/L (ref 3.5–5.2)
PROT SERPL-MCNC: 6.8 G/DL (ref 6–8.5)
RBC # BLD AUTO: 3.94 10*6/MM3 (ref 4.14–5.8)
SODIUM BLD-SCNC: 138 MMOL/L (ref 136–145)
TROPONIN T SERPL-MCNC: <0.01 NG/ML (ref 0–0.03)
TROPONIN T SERPL-MCNC: <0.01 NG/ML (ref 0–0.03)
WBC NRBC COR # BLD: 7.72 10*3/MM3 (ref 3.4–10.8)
WHOLE BLOOD HOLD SPECIMEN: NORMAL
WHOLE BLOOD HOLD SPECIMEN: NORMAL

## 2020-05-11 PROCEDURE — 93005 ELECTROCARDIOGRAM TRACING: CPT | Performed by: INTERNAL MEDICINE

## 2020-05-11 PROCEDURE — 99284 EMERGENCY DEPT VISIT MOD MDM: CPT

## 2020-05-11 PROCEDURE — 93005 ELECTROCARDIOGRAM TRACING: CPT | Performed by: EMERGENCY MEDICINE

## 2020-05-11 PROCEDURE — 84484 ASSAY OF TROPONIN QUANT: CPT | Performed by: EMERGENCY MEDICINE

## 2020-05-11 PROCEDURE — 84100 ASSAY OF PHOSPHORUS: CPT | Performed by: INTERNAL MEDICINE

## 2020-05-11 PROCEDURE — 82962 GLUCOSE BLOOD TEST: CPT

## 2020-05-11 PROCEDURE — 83735 ASSAY OF MAGNESIUM: CPT | Performed by: INTERNAL MEDICINE

## 2020-05-11 PROCEDURE — 85025 COMPLETE CBC W/AUTO DIFF WBC: CPT | Performed by: EMERGENCY MEDICINE

## 2020-05-11 PROCEDURE — 71045 X-RAY EXAM CHEST 1 VIEW: CPT

## 2020-05-11 PROCEDURE — 36415 COLL VENOUS BLD VENIPUNCTURE: CPT

## 2020-05-11 PROCEDURE — 80053 COMPREHEN METABOLIC PANEL: CPT | Performed by: EMERGENCY MEDICINE

## 2020-05-11 PROCEDURE — 63710000001 INSULIN REGULAR HUMAN PER 5 UNITS: Performed by: PHYSICIAN ASSISTANT

## 2020-05-11 PROCEDURE — 83880 ASSAY OF NATRIURETIC PEPTIDE: CPT | Performed by: PHYSICIAN ASSISTANT

## 2020-05-11 PROCEDURE — 83036 HEMOGLOBIN GLYCOSYLATED A1C: CPT | Performed by: INTERNAL MEDICINE

## 2020-05-11 PROCEDURE — 84439 ASSAY OF FREE THYROXINE: CPT | Performed by: INTERNAL MEDICINE

## 2020-05-11 PROCEDURE — 84443 ASSAY THYROID STIM HORMONE: CPT | Performed by: INTERNAL MEDICINE

## 2020-05-11 PROCEDURE — 93010 ELECTROCARDIOGRAM REPORT: CPT | Performed by: INTERNAL MEDICINE

## 2020-05-11 RX ORDER — HYDROXYZINE HYDROCHLORIDE 25 MG/1
50 TABLET, FILM COATED ORAL ONCE
Status: COMPLETED | OUTPATIENT
Start: 2020-05-11 | End: 2020-05-11

## 2020-05-11 RX ORDER — ASPIRIN 81 MG/1
324 TABLET, CHEWABLE ORAL ONCE
Status: COMPLETED | OUTPATIENT
Start: 2020-05-11 | End: 2020-05-11

## 2020-05-11 RX ORDER — SODIUM CHLORIDE 0.9 % (FLUSH) 0.9 %
10 SYRINGE (ML) INJECTION AS NEEDED
Status: DISCONTINUED | OUTPATIENT
Start: 2020-05-11 | End: 2020-05-13 | Stop reason: HOSPADM

## 2020-05-11 RX ADMIN — SODIUM CHLORIDE 1000 ML: 9 INJECTION, SOLUTION INTRAVENOUS at 23:16

## 2020-05-11 RX ADMIN — ASPIRIN 324 MG: 81 TABLET, CHEWABLE ORAL at 20:36

## 2020-05-11 RX ADMIN — HYDROXYZINE HYDROCHLORIDE 50 MG: 25 TABLET, FILM COATED ORAL at 21:27

## 2020-05-11 RX ADMIN — HUMAN INSULIN 10 UNITS: 100 INJECTION, SOLUTION SUBCUTANEOUS at 22:03

## 2020-05-12 ENCOUNTER — APPOINTMENT (OUTPATIENT)
Dept: CARDIOLOGY | Facility: HOSPITAL | Age: 55
End: 2020-05-12

## 2020-05-12 PROBLEM — F32.A ANXIETY AND DEPRESSION: Status: ACTIVE | Noted: 2020-04-03

## 2020-05-12 PROBLEM — G47.09 OTHER INSOMNIA: Status: ACTIVE | Noted: 2019-09-22

## 2020-05-12 PROBLEM — B20: Status: ACTIVE | Noted: 2020-05-12

## 2020-05-12 PROBLEM — R07.9 CHEST PAIN: Status: ACTIVE | Noted: 2020-05-12

## 2020-05-12 PROBLEM — I25.2 HISTORY OF MI (MYOCARDIAL INFARCTION): Chronic | Status: ACTIVE | Noted: 2020-05-12

## 2020-05-12 PROBLEM — I10 HYPERTENSION: Status: ACTIVE | Noted: 2020-05-12

## 2020-05-12 PROBLEM — F41.9 ANXIETY AND DEPRESSION: Status: ACTIVE | Noted: 2020-04-03

## 2020-05-12 PROBLEM — D64.9 ANEMIA: Status: ACTIVE | Noted: 2018-09-12

## 2020-05-12 PROBLEM — K21.9 GASTROESOPHAGEAL REFLUX DISEASE WITHOUT ESOPHAGITIS: Status: ACTIVE | Noted: 2018-11-08

## 2020-05-12 LAB
ALBUMIN SERPL-MCNC: 4.1 G/DL (ref 3.5–5.2)
ALBUMIN/GLOB SERPL: 1.5 G/DL
ALP SERPL-CCNC: 57 U/L (ref 39–117)
ALT SERPL W P-5'-P-CCNC: 33 U/L (ref 1–41)
ANION GAP SERPL CALCULATED.3IONS-SCNC: 11 MMOL/L (ref 5–15)
AST SERPL-CCNC: 19 U/L (ref 1–40)
BH CV STRESS BP STAGE 1: NORMAL
BH CV STRESS BP STAGE 2: NORMAL
BH CV STRESS BP STAGE 3: NORMAL
BH CV STRESS BP STAGE 4: NORMAL
BH CV STRESS DOB - ATROPINE STAGE 3: 1
BH CV STRESS DOB - ATROPINE STAGE 4: 0.3
BH CV STRESS DOSE DOBUTAMINE STAGE 1: 10
BH CV STRESS DOSE DOBUTAMINE STAGE 2: 20
BH CV STRESS DOSE DOBUTAMINE STAGE 3: 30
BH CV STRESS DOSE DOBUTAMINE STAGE 4: 40
BH CV STRESS DURATION MIN STAGE 1: 3
BH CV STRESS DURATION MIN STAGE 2: 3
BH CV STRESS DURATION MIN STAGE 3: 3
BH CV STRESS DURATION MIN STAGE 4: 1
BH CV STRESS DURATION SEC STAGE 1: 0
BH CV STRESS DURATION SEC STAGE 2: 0
BH CV STRESS DURATION SEC STAGE 3: 0
BH CV STRESS DURATION SEC STAGE 4: 46
BH CV STRESS HR STAGE 1: 64
BH CV STRESS HR STAGE 2: 51
BH CV STRESS HR STAGE 3: 128
BH CV STRESS HR STAGE 4: 145
BH CV STRESS PROTOCOL 1: NORMAL
BH CV STRESS RECOVERY BP: NORMAL MMHG
BH CV STRESS RECOVERY HR: 100 BPM
BH CV STRESS STAGE 1: 1
BH CV STRESS STAGE 2: 2
BH CV STRESS STAGE 3: 3
BH CV STRESS STAGE 4: 4
BILIRUB SERPL-MCNC: <0.2 MG/DL (ref 0.2–1.2)
BUN BLD-MCNC: 24 MG/DL (ref 6–20)
BUN/CREAT SERPL: 21.8 (ref 7–25)
CALCIUM SPEC-SCNC: 9.8 MG/DL (ref 8.6–10.5)
CHLORIDE SERPL-SCNC: 102 MMOL/L (ref 98–107)
CHOLEST SERPL-MCNC: 129 MG/DL (ref 0–200)
CO2 SERPL-SCNC: 28 MMOL/L (ref 22–29)
CREAT BLD-MCNC: 1.1 MG/DL (ref 0.76–1.27)
GFR SERPL CREATININE-BSD FRML MDRD: 70 ML/MIN/1.73
GLOBULIN UR ELPH-MCNC: 2.7 GM/DL
GLUCOSE BLD-MCNC: 335 MG/DL (ref 65–99)
GLUCOSE BLDC GLUCOMTR-MCNC: 188 MG/DL (ref 70–130)
GLUCOSE BLDC GLUCOMTR-MCNC: 194 MG/DL (ref 70–130)
GLUCOSE BLDC GLUCOMTR-MCNC: 238 MG/DL (ref 70–130)
GLUCOSE BLDC GLUCOMTR-MCNC: 248 MG/DL (ref 70–130)
GLUCOSE BLDC GLUCOMTR-MCNC: 276 MG/DL (ref 70–130)
GLUCOSE BLDC GLUCOMTR-MCNC: 289 MG/DL (ref 70–130)
HBA1C MFR BLD: 10.8 % (ref 4.8–5.6)
HDLC SERPL-MCNC: 45 MG/DL (ref 40–60)
LDLC SERPL CALC-MCNC: 66 MG/DL (ref 0–100)
LDLC/HDLC SERPL: 1.46 {RATIO}
MAGNESIUM SERPL-MCNC: 1.7 MG/DL (ref 1.6–2.6)
PERCENT MAX PREDICTED HR: 86.31 %
PHOSPHATE SERPL-MCNC: 2.7 MG/DL (ref 2.5–4.5)
POTASSIUM BLD-SCNC: 4.2 MMOL/L (ref 3.5–5.2)
PROT SERPL-MCNC: 6.8 G/DL (ref 6–8.5)
SODIUM BLD-SCNC: 141 MMOL/L (ref 136–145)
STRESS BASELINE BP: NORMAL MMHG
STRESS BASELINE HR: 70 BPM
STRESS PERCENT HR: 102 %
STRESS POST EXERCISE DUR MIN: 10 MIN
STRESS POST EXERCISE DUR SEC: 46 SEC
STRESS POST PEAK BP: NORMAL MMHG
STRESS POST PEAK HR: 145 BPM
T4 FREE SERPL-MCNC: 1.15 NG/DL (ref 0.93–1.7)
TRIGL SERPL-MCNC: 92 MG/DL (ref 0–150)
TROPONIN T SERPL-MCNC: <0.01 NG/ML (ref 0–0.03)
TROPONIN T SERPL-MCNC: <0.01 NG/ML (ref 0–0.03)
TSH SERPL DL<=0.05 MIU/L-ACNC: 1.51 UIU/ML (ref 0.27–4.2)
VLDLC SERPL-MCNC: 18.4 MG/DL

## 2020-05-12 PROCEDURE — G0378 HOSPITAL OBSERVATION PER HR: HCPCS

## 2020-05-12 PROCEDURE — 63710000001 INSULIN LISPRO (HUMAN) PER 5 UNITS: Performed by: INTERNAL MEDICINE

## 2020-05-12 PROCEDURE — 25010000002 ENOXAPARIN PER 10 MG: Performed by: INTERNAL MEDICINE

## 2020-05-12 PROCEDURE — 80061 LIPID PANEL: CPT | Performed by: INTERNAL MEDICINE

## 2020-05-12 PROCEDURE — 82962 GLUCOSE BLOOD TEST: CPT

## 2020-05-12 PROCEDURE — 96360 HYDRATION IV INFUSION INIT: CPT

## 2020-05-12 PROCEDURE — 93010 ELECTROCARDIOGRAM REPORT: CPT | Performed by: INTERNAL MEDICINE

## 2020-05-12 PROCEDURE — 99204 OFFICE O/P NEW MOD 45 MIN: CPT | Performed by: INTERNAL MEDICINE

## 2020-05-12 PROCEDURE — 84484 ASSAY OF TROPONIN QUANT: CPT | Performed by: INTERNAL MEDICINE

## 2020-05-12 PROCEDURE — 93017 CV STRESS TEST TRACING ONLY: CPT

## 2020-05-12 PROCEDURE — 93018 CV STRESS TEST I&R ONLY: CPT | Performed by: INTERNAL MEDICINE

## 2020-05-12 PROCEDURE — 80053 COMPREHEN METABOLIC PANEL: CPT | Performed by: INTERNAL MEDICINE

## 2020-05-12 PROCEDURE — 93005 ELECTROCARDIOGRAM TRACING: CPT | Performed by: INTERNAL MEDICINE

## 2020-05-12 PROCEDURE — 96372 THER/PROPH/DIAG INJ SC/IM: CPT

## 2020-05-12 PROCEDURE — 25010000003 DOBUTAMINE PER 250 MG: Performed by: INTERNAL MEDICINE

## 2020-05-12 PROCEDURE — 25010000002 PERFLUTREN 6.52 MG/ML SUSPENSION: Performed by: INTERNAL MEDICINE

## 2020-05-12 PROCEDURE — 25010000003 ATROPINE SULFATE: Performed by: INTERNAL MEDICINE

## 2020-05-12 PROCEDURE — 93350 STRESS TTE ONLY: CPT

## 2020-05-12 PROCEDURE — 96361 HYDRATE IV INFUSION ADD-ON: CPT

## 2020-05-12 PROCEDURE — 93352 ADMIN ECG CONTRAST AGENT: CPT | Performed by: INTERNAL MEDICINE

## 2020-05-12 PROCEDURE — 93350 STRESS TTE ONLY: CPT | Performed by: INTERNAL MEDICINE

## 2020-05-12 RX ORDER — NYSTATIN 100000 U/G
OINTMENT TOPICAL 2 TIMES DAILY
Status: ON HOLD | COMMUNITY
End: 2022-01-04

## 2020-05-12 RX ORDER — SULFAMETHOXAZOLE AND TRIMETHOPRIM 800; 160 MG/1; MG/1
1 TABLET ORAL 2 TIMES DAILY
Status: DISCONTINUED | OUTPATIENT
Start: 2020-05-12 | End: 2020-05-12

## 2020-05-12 RX ORDER — PANTOPRAZOLE SODIUM 40 MG/1
40 TABLET, DELAYED RELEASE ORAL
Status: DISCONTINUED | OUTPATIENT
Start: 2020-05-12 | End: 2020-05-13 | Stop reason: HOSPADM

## 2020-05-12 RX ORDER — DEXTROSE MONOHYDRATE 25 G/50ML
25 INJECTION, SOLUTION INTRAVENOUS
Status: DISCONTINUED | OUTPATIENT
Start: 2020-05-12 | End: 2020-05-13 | Stop reason: HOSPADM

## 2020-05-12 RX ORDER — DOBUTAMINE HYDROCHLORIDE 100 MG/100ML
10-50 INJECTION INTRAVENOUS CONTINUOUS
Status: DISCONTINUED | OUTPATIENT
Start: 2020-05-12 | End: 2020-05-12

## 2020-05-12 RX ORDER — SODIUM CHLORIDE 9 MG/ML
100 INJECTION, SOLUTION INTRAVENOUS CONTINUOUS
Status: DISCONTINUED | OUTPATIENT
Start: 2020-05-12 | End: 2020-05-13 | Stop reason: HOSPADM

## 2020-05-12 RX ORDER — CALCIUM CARBONATE 500(1250)
500 TABLET ORAL DAILY
Status: DISCONTINUED | OUTPATIENT
Start: 2020-05-12 | End: 2020-05-13 | Stop reason: HOSPADM

## 2020-05-12 RX ORDER — ONDANSETRON 4 MG/1
4 TABLET, FILM COATED ORAL EVERY 6 HOURS PRN
Status: DISCONTINUED | OUTPATIENT
Start: 2020-05-12 | End: 2020-05-13 | Stop reason: HOSPADM

## 2020-05-12 RX ORDER — ATORVASTATIN CALCIUM 40 MG/1
40 TABLET, FILM COATED ORAL DAILY
Status: DISCONTINUED | OUTPATIENT
Start: 2020-05-12 | End: 2020-05-13 | Stop reason: HOSPADM

## 2020-05-12 RX ORDER — ACETAMINOPHEN 650 MG/1
650 SUPPOSITORY RECTAL EVERY 4 HOURS PRN
Status: DISCONTINUED | OUTPATIENT
Start: 2020-05-12 | End: 2020-05-13 | Stop reason: HOSPADM

## 2020-05-12 RX ORDER — ONDANSETRON 2 MG/ML
4 INJECTION INTRAMUSCULAR; INTRAVENOUS EVERY 6 HOURS PRN
Status: DISCONTINUED | OUTPATIENT
Start: 2020-05-12 | End: 2020-05-13 | Stop reason: HOSPADM

## 2020-05-12 RX ORDER — SODIUM CHLORIDE 0.9 % (FLUSH) 0.9 %
10 SYRINGE (ML) INJECTION AS NEEDED
Status: DISCONTINUED | OUTPATIENT
Start: 2020-05-12 | End: 2020-05-13 | Stop reason: HOSPADM

## 2020-05-12 RX ORDER — ARIPIPRAZOLE 2 MG/1
2 TABLET ORAL DAILY
Status: DISCONTINUED | OUTPATIENT
Start: 2020-05-12 | End: 2020-05-13 | Stop reason: HOSPADM

## 2020-05-12 RX ORDER — NICOTINE POLACRILEX 4 MG
15 LOZENGE BUCCAL
Status: DISCONTINUED | OUTPATIENT
Start: 2020-05-12 | End: 2020-05-13 | Stop reason: HOSPADM

## 2020-05-12 RX ORDER — PNV NO.95/FERROUS FUM/FOLIC AC 28MG-0.8MG
1 TABLET ORAL DAILY
Status: DISCONTINUED | OUTPATIENT
Start: 2020-05-12 | End: 2020-05-13 | Stop reason: HOSPADM

## 2020-05-12 RX ORDER — BACITRACIN, NEOMYCIN, POLYMYXIN B 400; 3.5; 5 [USP'U]/G; MG/G; [USP'U]/G
1 OINTMENT TOPICAL 4 TIMES DAILY PRN
Status: DISCONTINUED | OUTPATIENT
Start: 2020-05-12 | End: 2020-05-13 | Stop reason: HOSPADM

## 2020-05-12 RX ORDER — SODIUM CHLORIDE 0.9 % (FLUSH) 0.9 %
10 SYRINGE (ML) INJECTION EVERY 12 HOURS SCHEDULED
Status: DISCONTINUED | OUTPATIENT
Start: 2020-05-12 | End: 2020-05-13 | Stop reason: HOSPADM

## 2020-05-12 RX ORDER — GUAIFENESIN 600 MG/1
600 TABLET, EXTENDED RELEASE ORAL 2 TIMES DAILY PRN
Status: DISCONTINUED | OUTPATIENT
Start: 2020-05-12 | End: 2020-05-13 | Stop reason: HOSPADM

## 2020-05-12 RX ORDER — TIZANIDINE 4 MG/1
6 TABLET ORAL EVERY 6 HOURS PRN
Status: DISCONTINUED | OUTPATIENT
Start: 2020-05-12 | End: 2020-05-13 | Stop reason: HOSPADM

## 2020-05-12 RX ORDER — PROMETHAZINE HYDROCHLORIDE 25 MG/1
25 TABLET ORAL EVERY 6 HOURS PRN
Status: DISCONTINUED | OUTPATIENT
Start: 2020-05-12 | End: 2020-05-13 | Stop reason: HOSPADM

## 2020-05-12 RX ORDER — ALBUTEROL SULFATE 2.5 MG/3ML
2.5 SOLUTION RESPIRATORY (INHALATION) EVERY 6 HOURS PRN
Status: DISCONTINUED | OUTPATIENT
Start: 2020-05-12 | End: 2020-05-13 | Stop reason: HOSPADM

## 2020-05-12 RX ORDER — PROPRANOLOL HYDROCHLORIDE 80 MG/1
80 CAPSULE, EXTENDED RELEASE ORAL DAILY
Status: DISCONTINUED | OUTPATIENT
Start: 2020-05-12 | End: 2020-05-13 | Stop reason: HOSPADM

## 2020-05-12 RX ORDER — LOSARTAN POTASSIUM 50 MG/1
100 TABLET ORAL DAILY
Status: DISCONTINUED | OUTPATIENT
Start: 2020-05-12 | End: 2020-05-13

## 2020-05-12 RX ORDER — LOPERAMIDE HYDROCHLORIDE 2 MG/1
2 CAPSULE ORAL DAILY PRN
Status: DISCONTINUED | OUTPATIENT
Start: 2020-05-12 | End: 2020-05-13 | Stop reason: HOSPADM

## 2020-05-12 RX ORDER — TRAZODONE HYDROCHLORIDE 100 MG/1
100 TABLET ORAL NIGHTLY
Status: DISCONTINUED | OUTPATIENT
Start: 2020-05-12 | End: 2020-05-12

## 2020-05-12 RX ORDER — ACETAMINOPHEN 160 MG/5ML
650 SOLUTION ORAL EVERY 4 HOURS PRN
Status: DISCONTINUED | OUTPATIENT
Start: 2020-05-12 | End: 2020-05-13 | Stop reason: HOSPADM

## 2020-05-12 RX ORDER — TAMSULOSIN HYDROCHLORIDE 0.4 MG/1
0.4 CAPSULE ORAL NIGHTLY
Status: DISCONTINUED | OUTPATIENT
Start: 2020-05-12 | End: 2020-05-13 | Stop reason: HOSPADM

## 2020-05-12 RX ORDER — LANOLIN ALCOHOL/MO/W.PET/CERES
6 CREAM (GRAM) TOPICAL NIGHTLY
Status: DISCONTINUED | OUTPATIENT
Start: 2020-05-12 | End: 2020-05-13 | Stop reason: HOSPADM

## 2020-05-12 RX ORDER — NITROGLYCERIN 0.4 MG/1
0.4 TABLET SUBLINGUAL
Status: DISCONTINUED | OUTPATIENT
Start: 2020-05-12 | End: 2020-05-13 | Stop reason: HOSPADM

## 2020-05-12 RX ORDER — TRAZODONE HYDROCHLORIDE 100 MG/1
200 TABLET ORAL NIGHTLY
Status: DISCONTINUED | OUTPATIENT
Start: 2020-05-12 | End: 2020-05-13 | Stop reason: HOSPADM

## 2020-05-12 RX ORDER — CLONAZEPAM 0.5 MG/1
0.5 TABLET ORAL 3 TIMES DAILY PRN
Status: DISCONTINUED | OUTPATIENT
Start: 2020-05-12 | End: 2020-05-13 | Stop reason: HOSPADM

## 2020-05-12 RX ORDER — FLUTICASONE PROPIONATE 50 MCG
2 SPRAY, SUSPENSION (ML) NASAL DAILY PRN
Status: DISCONTINUED | OUTPATIENT
Start: 2020-05-12 | End: 2020-05-13 | Stop reason: HOSPADM

## 2020-05-12 RX ORDER — MECLIZINE HYDROCHLORIDE 25 MG/1
25 TABLET ORAL DAILY PRN
Status: DISCONTINUED | OUTPATIENT
Start: 2020-05-12 | End: 2020-05-13 | Stop reason: HOSPADM

## 2020-05-12 RX ORDER — MELATONIN
1000 DAILY
Status: DISCONTINUED | OUTPATIENT
Start: 2020-05-12 | End: 2020-05-13 | Stop reason: HOSPADM

## 2020-05-12 RX ORDER — CETIRIZINE HYDROCHLORIDE 10 MG/1
10 TABLET ORAL DAILY
Status: DISCONTINUED | OUTPATIENT
Start: 2020-05-12 | End: 2020-05-13 | Stop reason: HOSPADM

## 2020-05-12 RX ORDER — ASPIRIN 81 MG/1
81 TABLET ORAL DAILY
Status: DISCONTINUED | OUTPATIENT
Start: 2020-05-13 | End: 2020-05-13 | Stop reason: HOSPADM

## 2020-05-12 RX ORDER — ACETAMINOPHEN 325 MG/1
650 TABLET ORAL EVERY 4 HOURS PRN
Status: DISCONTINUED | OUTPATIENT
Start: 2020-05-12 | End: 2020-05-13 | Stop reason: HOSPADM

## 2020-05-12 RX ORDER — ASPIRIN 81 MG/1
81 TABLET, CHEWABLE ORAL ONCE
Status: COMPLETED | OUTPATIENT
Start: 2020-05-12 | End: 2020-05-12

## 2020-05-12 RX ORDER — SUMATRIPTAN 50 MG/1
50 TABLET, FILM COATED ORAL ONCE
Status: COMPLETED | OUTPATIENT
Start: 2020-05-12 | End: 2020-05-12

## 2020-05-12 RX ORDER — PREGABALIN 100 MG/1
100 CAPSULE ORAL 2 TIMES DAILY
Status: DISCONTINUED | OUTPATIENT
Start: 2020-05-12 | End: 2020-05-13 | Stop reason: HOSPADM

## 2020-05-12 RX ORDER — ALBUTEROL SULFATE 90 UG/1
2 AEROSOL, METERED RESPIRATORY (INHALATION) EVERY 6 HOURS PRN
Status: DISCONTINUED | OUTPATIENT
Start: 2020-05-12 | End: 2020-05-12 | Stop reason: CLARIF

## 2020-05-12 RX ORDER — HYDROCODONE BITARTRATE AND ACETAMINOPHEN 5; 325 MG/1; MG/1
1 TABLET ORAL 2 TIMES DAILY PRN
Status: DISCONTINUED | OUTPATIENT
Start: 2020-05-12 | End: 2020-05-13 | Stop reason: HOSPADM

## 2020-05-12 RX ADMIN — VITAMIN D 1000 UNITS: 25 TAB ORAL at 08:51

## 2020-05-12 RX ADMIN — Medication 10 MCG/KG/MIN: at 12:43

## 2020-05-12 RX ADMIN — PREGABALIN 100 MG: 100 CAPSULE ORAL at 21:11

## 2020-05-12 RX ADMIN — NITROGLYCERIN 0.4 MG: 0.4 TABLET SUBLINGUAL at 19:30

## 2020-05-12 RX ADMIN — INSULIN LISPRO 12 UNITS: 100 INJECTION, SOLUTION INTRAVENOUS; SUBCUTANEOUS at 21:18

## 2020-05-12 RX ADMIN — CETIRIZINE HYDROCHLORIDE 10 MG: 10 TABLET, FILM COATED ORAL at 08:46

## 2020-05-12 RX ADMIN — LOSARTAN POTASSIUM 100 MG: 50 TABLET ORAL at 08:46

## 2020-05-12 RX ADMIN — CLONAZEPAM 0.5 MG: 0.5 TABLET ORAL at 08:51

## 2020-05-12 RX ADMIN — INSULIN LISPRO 12 UNITS: 100 INJECTION, SOLUTION INTRAVENOUS; SUBCUTANEOUS at 03:05

## 2020-05-12 RX ADMIN — PROPRANOLOL HYDROCHLORIDE 80 MG: 80 CAPSULE, EXTENDED RELEASE ORAL at 08:46

## 2020-05-12 RX ADMIN — CALCIUM 500 MG: 500 TABLET ORAL at 08:46

## 2020-05-12 RX ADMIN — HYDROCODONE BITARTRATE AND ACETAMINOPHEN 1 TABLET: 5; 325 TABLET ORAL at 02:54

## 2020-05-12 RX ADMIN — MICONAZOLE NITRATE 1 APPLICATION: 20 CREAM TOPICAL at 21:10

## 2020-05-12 RX ADMIN — ATORVASTATIN CALCIUM 40 MG: 40 TABLET, FILM COATED ORAL at 08:46

## 2020-05-12 RX ADMIN — TRAZODONE HYDROCHLORIDE 100 MG: 100 TABLET ORAL at 02:54

## 2020-05-12 RX ADMIN — Medication 1 TABLET: at 08:46

## 2020-05-12 RX ADMIN — INSULIN LISPRO 8 UNITS: 100 INJECTION, SOLUTION INTRAVENOUS; SUBCUTANEOUS at 06:25

## 2020-05-12 RX ADMIN — Medication 6 MG: at 21:10

## 2020-05-12 RX ADMIN — MICONAZOLE NITRATE 1 APPLICATION: 20 CREAM TOPICAL at 08:55

## 2020-05-12 RX ADMIN — TRAZODONE HYDROCHLORIDE 200 MG: 100 TABLET ORAL at 21:10

## 2020-05-12 RX ADMIN — SODIUM CHLORIDE, PRESERVATIVE FREE 10 ML: 5 INJECTION INTRAVENOUS at 21:11

## 2020-05-12 RX ADMIN — METFORMIN HYDROCHLORIDE 1000 MG: 500 TABLET ORAL at 08:46

## 2020-05-12 RX ADMIN — METFORMIN HYDROCHLORIDE 1000 MG: 500 TABLET ORAL at 17:02

## 2020-05-12 RX ADMIN — INSULIN LISPRO 8 UNITS: 100 INJECTION, SOLUTION INTRAVENOUS; SUBCUTANEOUS at 17:03

## 2020-05-12 RX ADMIN — SODIUM CHLORIDE, PRESERVATIVE FREE 10 ML: 5 INJECTION INTRAVENOUS at 02:57

## 2020-05-12 RX ADMIN — SODIUM CHLORIDE 100 ML/HR: 9 INJECTION, SOLUTION INTRAVENOUS at 17:14

## 2020-05-12 RX ADMIN — PANTOPRAZOLE SODIUM 40 MG: 40 TABLET, DELAYED RELEASE ORAL at 17:02

## 2020-05-12 RX ADMIN — ASPIRIN 81 MG: 81 TABLET, CHEWABLE ORAL at 08:46

## 2020-05-12 RX ADMIN — TAMSULOSIN HYDROCHLORIDE 0.4 MG: 0.4 CAPSULE ORAL at 02:54

## 2020-05-12 RX ADMIN — PREGABALIN 100 MG: 100 CAPSULE ORAL at 08:48

## 2020-05-12 RX ADMIN — SUMATRIPTAN SUCCINATE 50 MG: 50 TABLET, FILM COATED ORAL at 02:54

## 2020-05-12 RX ADMIN — ATROPINE SULFATE 1.3 MG: 0.1 INJECTION PARENTERAL at 13:04

## 2020-05-12 RX ADMIN — ENOXAPARIN SODIUM 40 MG: 40 INJECTION SUBCUTANEOUS at 08:45

## 2020-05-12 RX ADMIN — ARIPIPRAZOLE 2 MG: 2 TABLET ORAL at 08:51

## 2020-05-12 RX ADMIN — SULFAMETHOXAZOLE AND TRIMETHOPRIM 160 MG: 800; 160 TABLET ORAL at 08:46

## 2020-05-12 RX ADMIN — SODIUM CHLORIDE 100 ML/HR: 9 INJECTION, SOLUTION INTRAVENOUS at 18:00

## 2020-05-12 RX ADMIN — PERFLUTREN 8.48 MG: 6.52 INJECTION, SUSPENSION INTRAVENOUS at 12:43

## 2020-05-12 RX ADMIN — TAMSULOSIN HYDROCHLORIDE 0.4 MG: 0.4 CAPSULE ORAL at 21:10

## 2020-05-12 RX ADMIN — PANTOPRAZOLE SODIUM 40 MG: 40 TABLET, DELAYED RELEASE ORAL at 06:26

## 2020-05-12 RX ADMIN — Medication 6 MG: at 02:54

## 2020-05-12 RX ADMIN — SODIUM CHLORIDE, PRESERVATIVE FREE 10 ML: 5 INJECTION INTRAVENOUS at 08:51

## 2020-05-12 RX ADMIN — SODIUM CHLORIDE 100 ML/HR: 9 INJECTION, SOLUTION INTRAVENOUS at 02:57

## 2020-05-12 NOTE — ED NOTES
PATIENT REQUEST TV VOLUME TO BE TURNED UP, REQUEST HIS CELL PHONE BE PLUGGED IN.      Suly Page, RN  05/11/20 2038

## 2020-05-12 NOTE — PLAN OF CARE
Problem: Patient Care Overview  Goal: Plan of Care Review  Outcome: Ongoing (interventions implemented as appropriate)  Flowsheets  Taken 5/12/2020 1554  Progress: no change  Outcome Summary: Patient admitted to room 440 from ER for chest pain. No chest pain since arriving to the floor. EKG done. RA. PRN meds given for back pain. SSI given. NPO for stress test in AM. A&Ox4. IVF infusing. VSS. Safety maintained. Will continue to monitor.  Taken 5/12/2020 7211  Plan of Care Reviewed With: patient

## 2020-05-12 NOTE — ED NOTES
PATIENT WITH MUSIC ON PHONE TURNED UP VERY LOUD, PATIENT SINGING, NO ACUTE DISTRESS NOTED AT THIS TIME.      Suly Page, RN  05/11/20 4821

## 2020-05-12 NOTE — PLAN OF CARE
Pt had stress echo today and is awaiting results. New consult to cardiology for possible intervention. Denies chest pain since admission. Diabetic educator here today for continued education. Pts a1c is 10. He will be npo after midnight for possible cath. Vss. Safety maintained. Will continue to monitor.

## 2020-05-12 NOTE — H&P
[facility] Medicine Services  HISTORY AND PHYSICAL    Date of Admission: 5/11/2020  Primary Care Physician: Oksana Mares MD    Subjective     Chief Complaint: Chest pain    History of Present Illness  Patient is a 54-year-old white male past medical history of poorly controlled insulin-dependent diabetes as well as questionable history of myocardial infarction.  He had a stress test in 2017 that showed an area of infarction at the apex on a perfusion scan with 70% EF.  He presents today with acute onset of left-sided chest pain radiating down his left arm.  He states that this occurred when his roommate was apparently upset and ranting which made the patient anxious.  He states the pain lasted about 30 minutes he took an aspirin he did not take nitroglycerin he presented to the emergency room.  His pain is better but he still having a slight amount of it.  He states that he has occasional chest pain that will be fleeting and last a couple seconds and be gone.  He states it is random he does have some dyspnea on exertion as well as some mild swelling in his lower extremities right greater than left all of these things are chronic for him and have not accelerated recently.  The patient's blood sugar was greater than 500 he states that he had eaten his dinner but not taken his regular insulin that he would take after meal.  He does state that his blood sugars are in the high 200s at times and sometimes difficult to control.  He is followed by an endocrinologist.  He states that however they have improved significantly lately since he is been trying to work very hard on them.  Patient is EKG and cardiac markers are negative for acute ischemia.  He is being admitted for work-up of chest pain.  He has never had a cardiac catheterization.  He did also note that his heart felt like it was racing and beating irregularly during this time period.  Of note he is HIV positive also with a  CD4 count of greater than 1600 and an undetectable viral load.        Review of Systems   14 point review of systems negative except for as per HPI    Otherwise complete ROS reviewed and negative except as mentioned in the HPI.    Past Medical History:   Past Medical History:   Diagnosis Date   • Allergic rhinitis    • Anxiety    • Bursitis    • DDD (degenerative disc disease), cervical    • Depression    • HIV disease (CMS/HCC)    • HLD (hyperlipidemia)    • HSP (Henoch Schonlein purpura) (CMS/HCC)    • HTN (hypertension)    • Migraine    • Myocardial infarction (CMS/HCC)    • Osteoporosis    • Sleep apnea    • Type 2 diabetes mellitus (CMS/HCC)      Past Surgical History:  Past Surgical History:   Procedure Laterality Date   • ANKLE SURGERY     • COLONOSCOPY  06/19/2009    Normal exam repeat in 10 years   • FOREARM SURGERY     • LYMPH NODE BIOPSY       Social History:  reports that he has been smoking cigarettes. He started smoking about 32 years ago. He has been smoking about 1.00 pack per day. He has never used smokeless tobacco. He reports that he drinks alcohol. He reports that he does not use drugs.    Family History: family history includes Arrhythmia in his father; Diabetes in his maternal grandfather, mother, and paternal grandmother; Heart disease in his maternal grandfather and paternal grandmother; Hypertension in his father, maternal grandfather, mother, paternal grandfather, paternal grandmother, and sister; No Known Problems in his brother; Stroke in his paternal grandmother; Thyroid disease in his father, mother, and paternal grandmother.       Allergies:  Allergies   Allergen Reactions   • Amitriptyline Anaphylaxis   • Amitriptyline Hcl Anaphylaxis   • Darvon [Propoxyphene] Anaphylaxis   • Zithromax [Azithromycin] Anaphylaxis     Medications:  Prior to Admission medications    Medication Sig Start Date End Date Taking? Authorizing Provider   albuterol (PROVENTIL HFA;VENTOLIN HFA) 108 (90 BASE)  MCG/ACT inhaler Inhale 2 puffs every 6 (six) hours as needed for wheezing.    ProviderPauline MD   ARIPiprazole (ABILIFY) 2 MG tablet Take 2 mg by mouth Daily.    ProviderPauline MD   atorvastatin (LIPITOR) 40 MG tablet Take 40 mg by mouth Daily. 10/21/19   Mahendra, Nurse Radha RN   calcium carbonate (OS-THEODORA) 600 MG tablet Take 600 mg by mouth Daily.    Pauline Peerz MD   cholecalciferol (VITAMIN D3) 1000 UNITS tablet Take 1,000 Units by mouth daily.    ProviderPauline MD   ciclopirox (LOPROX) 0.77 % cream Apply  topically to the appropriate area as directed 2 (Two) Times a Day. 7/29/19   Pacheco Harper DPM   clonazePAM (KlonoPIN) 0.5 MG tablet Take 0.5 mg by mouth 3 (Three) Times a Day As Needed for Anxiety.    Mahendra, Nurse Radha RN   clotrimazole (LOTRIMIN) 1 % external solution Apply  topically to the appropriate area as directed 2 (Two) Times a Day. X1 week 4/19/20   Barrie Alexander PA-C   denosumab (PROLIA) 60 MG/ML solution syringe Inject 60 mg under the skin Every 6 (Six) Months.    ProviderPauline MD   DEXILANT 60 MG capsule Take 1 capsule by mouth Daily. 10/21/19   Mahendra, Nurse Radha RN   Dulaglutide 0.75 MG/0.5ML solution pen-injector Inject 0.75 mg under the skin into the appropriate area as directed 1 (One) Time Per Week. 8/24/18   Demetri Westfall MD   Psttrsd-Htrrk-Opuodltj-TenofAF (GENVOYA) [phone] MG tablet Take 1 tablet by mouth daily.    ProviderPauline MD   fluticasone (FLONASE) 50 MCG/ACT nasal spray 2 sprays into each nostril Daily.  Patient taking differently: 2 sprays into each nostril Daily As Needed. 10/11/16   Tyron Ace PA   Foot Care Products (FOOT POWDER MEDICATED EX)  7/8/19   Pauline Perez MD   gabapentin (NEURONTIN) 800 MG tablet  11/14/18   Nurse Radha Mccray RN   glucose blood test strip 1 each. 1/8/18   Emergency, Nurse Radha, RN   guaiFENesin (MUCINEX) 600 MG 12 hr tablet Take 600 mg by  mouth 2 (two) times a day as needed for cough.    Pauline Perez MD   HYDROcodone-acetaminophen (NORCO) 5-325 MG per tablet Take 1 tablet by mouth 2 (two) times a day as needed.    Pauline Perez MD   insulin aspart (NOVOLOG FLEXPEN) 100 UNIT/ML solution pen-injector sc pen Inject 80 Units under the skin 4 (Four) Times a Day With Meals & at Bedtime.    Pauline Perez MD   Insulin Pen Needle (UNIFINE PENTIPS) 31G X 6 MM misc for use FOUR TIMES DAILY 7/24/17   Mahendra, Nurse Radha RN   Insulin Regular Human, Conc, (HUMULIN R U-500 KWIKPEN) 500 UNIT/ML solution pen-injector CONCENTRATED injection Up to 200 units bid with meals 8/24/18   Demetri Westfall MD   Lancets misc Use to check blood sugar 4 times daily   Dx  E11.9  Insulin dependent 5/18/18   Nurse Radha Mccray RN   loperamide (IMODIUM) 2 MG capsule Take 2 mg by mouth daily as needed for diarrhea.    Pauline Perez MD   loratadine (CLARITIN) 10 MG tablet Take 10 mg by mouth daily.    Pauline Perez MD   losartan (COZAAR) 100 MG tablet Take 100 mg by mouth daily.    Pauline Perez MD   LYRICA 100 MG capsule Take 100 mg by mouth 2 (Two) Times a Day. 11/14/18   Nurse Radha Mccray RN   meclizine (ANTIVERT) 25 MG tablet Take 25 mg by mouth Daily As Needed.    Pauline Perez MD   Melatonin 5 MG capsule Take 5 mg by mouth every night at bedtime. 11/4/19   Nurse Radha Mccray RN   metFORMIN (GLUCOPHAGE) 500 MG tablet Take 1,000 mg by mouth 2 (Two) Times a Day With Meals. 11/4/19   Nurse Radha Mccrya RN   miconazole (MICOTIN) 2 % cream Apply 1 application topically 2 (two) times a day.    Pauline Perez MD   neomycin-bacitracin-polymyxin (NEOSPORIN) 5-400-5000 ointment Apply 1 application topically 4 (four) times a day as needed.    Pauline Perez MD   PRENATAL 28-0.8 MG tablet Take 1 tablet by mouth Daily. 11/4/19   Nurse Radha Mccray RN   promethazine (PHENERGAN) 25 MG tablet  "Take 25 mg by mouth every 6 (six) hours as needed for nausea or vomiting.    Pauline Perez MD   propranolol LA (INDERAL LA) 80 MG 24 hr capsule Take 80 mg by mouth Daily. 10/21/19   Emergency, Nurse Epic, RN   rizatriptan (MAXALT) 10 MG tablet Take 10 mg by mouth 1 (one) time as needed for migraine. May repeat in 2 hours if needed    ProviderPauline MD   sulfamethoxazole-trimethoprim (BACTRIM DS,SEPTRA DS) 800-160 MG per tablet Take 1 tablet by mouth 2 (Two) Times a Day. 4/19/20   Barrie Alexander PA-C   tamsulosin (FLOMAX) 0.4 MG capsule 24 hr capsule Take 1 capsule by mouth Every Night. 8/23/19   Marv Gomez MD   terbinafine (lamISIL) 1 % cream Apply  topically to the appropriate area as directed 2 (Two) Times a Day. 7/29/19   Pacheco Harper DPM   tiZANidine (ZANAFLEX) 4 MG tablet Take 6 mg by mouth Every 6 (Six) Hours As Needed for Muscle Spasms.    ProviderPauline MD   traZODone (DESYREL) 100 MG tablet Take 100 mg by mouth Every Night. 10/21/19   Emergency, Nurse Epic, RN   Vortioxetine HBr (TRINTELLIX) 5 MG tablet Take 20 mg by mouth Daily With Breakfast.    ProviderPauline MD     Objective     Vital Signs: /58   Pulse 80   Temp 98.4 °F (36.9 °C)   Resp 18   Ht 177.8 cm (70\")   Wt 126 kg (278 lb)   SpO2 96%   BMI 39.89 kg/m²   Physical Exam  Gen.:  Well-nourished well-developed white male in no acute distress  HEENT: Atraumatic, normocephalic.  Pupils equal, round, and reactive to light. Extraocular movements intact.  Sclerae anicteric.  External ears negative.  Mucous membranes moist.  Neck is supple without lymphadenopathy.  No JVD is noted.  No carotid bruits are auscultated.  Oropharynx is without erythema or exudate.   Chest: Clear to auscultation and percussion.  CV: Regular rate and rhythm.  With ectopy normal S1-S2.  No gallops, murmurs. or rubs.  Abdomen: Soft, nontender, nondistended.  Active bowel sounds,  No hepatosplenomegaly.  No masses.  " No hernias.  Extremities: No clubbing, trace edema, or cyanosis.  Capillary refill is normal.  Pulses are 2+ and symmetric at radial and dorsalis pedis.  Posterior tibial pulses are intact and 2+ palpable.  Callus on ball of right foot  Neuro: Patient is awake, alert, and oriented ×3.  Cranial nerves II through XII are grossly intact.  Motor is 5 out of 5 bilaterally.  DTRs are 2+ and symmetric bilaterally. Sensory exam is nonfocal except for decreased sensation in his feet bilaterally  Skin: Warm, dry, and intact.  No evidence of breakdown.  Sensorium: Normal thought and affect    Nursing notes and vital signs reviewed        Results Reviewed:  Lab Results (last 24 hours)     Procedure Component Value Units Date/Time    Troponin [[id number]]  (Normal) Collected:  05/11/20 2317    Specimen:  Blood Updated:  05/11/20 2341     Troponin T <0.010 ng/mL     Narrative:       Troponin T Reference Range:  <= 0.03 ng/mL-   Negative for AMI  >0.03 ng/mL-     Abnormal for myocardial necrosis.  Clinicians would have to utilize clinical acumen, EKG, Troponin and serial changes to determine if it is an Acute Myocardial Infarction or myocardial injury due to an underlying chronic condition.       Results may be falsely decreased if patient taking Biotin.      POC Glucose Once [[id number]]  (Abnormal) Collected:  05/11/20 2315    Specimen:  Blood Updated:  05/11/20 2327     Glucose 409 mg/dL      Comment: : [id number] Camilo LisaMeter ID: [id number]       POC Glucose Once [[id number]]  (Abnormal) Collected:  05/11/20 2314    Specimen:  Blood Updated:  05/11/20 2327     Glucose 415 mg/dL      Comment: : [id number] Camilo LisaMeter ID: [id number]       Rosine Draw [[id number]] Collected:  05/11/20 2100    Specimen:  Blood Updated:  05/11/20 2215    Narrative:       The following orders were created for panel order Rosine Draw.  Procedure                               Abnormality         Status                     ---------                                -----------         ------                     Light Blue Top[[id number]]                                   Final result               Green Top (Gel)[[id number]]                                  Final result               Lavender Top[[id number]]                                     Final result               Red Top[[id number]]                                          Final result                 Please view results for these tests on the individual orders.    Light Blue Top [[id number]] Collected:  05/11/20 2100    Specimen:  Blood Updated:  05/11/20 2215     Extra Tube hold for add-on     Comment: Auto resulted       Green Top (Gel) [[id number]] Collected:  05/11/20 2100    Specimen:  Blood Updated:  05/11/20 2215     Extra Tube Hold for add-ons.     Comment: Auto resulted.       Lavender Top [[id number]] Collected:  05/11/20 2100    Specimen:  Blood Updated:  05/11/20 2215     Extra Tube hold for add-on     Comment: Auto resulted       Red Top [[id number]] Collected:  05/11/20 2100    Specimen:  Blood Updated:  05/11/20 2215     Extra Tube Hold for add-ons.     Comment: Auto resulted.       Comprehensive Metabolic Panel [[id number]]  (Abnormal) Collected:  05/11/20 2100    Specimen:  Blood Updated:  05/11/20 2131     Glucose 575 mg/dL      BUN 25 mg/dL      Creatinine 1.13 mg/dL      Sodium 138 mmol/L      Potassium 5.0 mmol/L      Chloride 97 mmol/L      CO2 24.0 mmol/L      Calcium 10.0 mg/dL      Total Protein 6.8 g/dL      Albumin 4.10 g/dL      ALT (SGPT) 37 U/L      AST (SGOT) 22 U/L      Alkaline Phosphatase 69 U/L      Total Bilirubin <0.2 mg/dL      eGFR Non African Amer 68 mL/min/1.73      Globulin 2.7 gm/dL      A/G Ratio 1.5 g/dL      BUN/Creatinine Ratio 22.1     Anion Gap 17.0 mmol/L     Narrative:       GFR Normal >60  Chronic Kidney Disease <60  Kidney Failure <15      Troponin [[id number]]  (Normal) Collected:  05/11/20 2100    Specimen:  Blood Updated:  05/11/20 2128      Troponin T <0.010 ng/mL     Narrative:       Troponin T Reference Range:  <= 0.03 ng/mL-   Negative for AMI  >0.03 ng/mL-     Abnormal for myocardial necrosis.  Clinicians would have to utilize clinical acumen, EKG, Troponin and serial changes to determine if it is an Acute Myocardial Infarction or myocardial injury due to an underlying chronic condition.       Results may be falsely decreased if patient taking Biotin.      BNP [[id number]]  (Normal) Collected:  05/11/20 2100    Specimen:  Blood Updated:  05/11/20 2128     proBNP 57.8 pg/mL     Narrative:       Among patients with dyspnea, NT-proBNP is highly sensitive for the detection of acute congestive heart failure. In addition NT-proBNP of <300 pg/ml effectively rules out acute congestive heart failure with 99% negative predictive value.    Results may be falsely decreased if patient taking Biotin.      CBC & Differential [[id number]] Collected:  05/11/20 2100    Specimen:  Blood Updated:  05/11/20 2111    Narrative:       The following orders were created for panel order CBC & Differential.  Procedure                               Abnormality         Status                     ---------                               -----------         ------                     CBC Auto Differential[[id number]]        Abnormal            Final result                 Please view results for these tests on the individual orders.    CBC Auto Differential [[id number]]  (Abnormal) Collected:  05/11/20 2100    Specimen:  Blood Updated:  05/11/20 2111     WBC 7.72 10*3/mm3      RBC 3.94 10*6/mm3      Hemoglobin 12.2 g/dL      Hematocrit 36.4 %      MCV 92.4 fL      MCH 31.0 pg      MCHC 33.5 g/dL      RDW 13.0 %      RDW-SD 43.8 fl      MPV 11.4 fL      Platelets 168 10*3/mm3      Neutrophil % 57.0 %      Lymphocyte % 32.0 %      Monocyte % 8.2 %      Eosinophil % 1.8 %      Basophil % 0.6 %      Immature Grans % 0.4 %      Neutrophils, Absolute 4.40 10*3/mm3      Lymphocytes, Absolute  2.47 10*3/mm3      Monocytes, Absolute 0.63 10*3/mm3      Eosinophils, Absolute 0.14 10*3/mm3      Basophils, Absolute 0.05 10*3/mm3      Immature Grans, Absolute 0.03 10*3/mm3      nRBC 0.0 /100 WBC         Imaging Results (Last 24 Hours)     Procedure Component Value Units Date/Time    XR Chest 1 View [[id number]] Collected:  05/11/20 2045     Updated:  05/11/20 2048    Narrative:       XR CHEST 1 VW-     Indication: Chest pain     Comparison: 02/16/2020     Findings:     Lung volumes are low. Cardiac silhouette is within normal limits and  stable. No pleural effusion, visible pneumothorax, or focal  consolidation. No acute osseous finding.       Impression:       Impression:     No acute findings.  This report was finalized on 05/11/2020 20:45 by Dr. Siddharth Carias MD.        I have personally reviewed and interpreted the radiology studies and ECG obtained at time of admission.     Assessment / Plan     Assessment:   Active Hospital Problems    Diagnosis   • **Chest pain   • Hypertension   • History of MI (myocardial infarction)   • Tobacco abuse   • Mixed hyperlipidemia   • IDDM (insulin dependent diabetes mellitus) (CMS/[facility])   • HIV (human immunodeficiency virus infection) (CMS/[facility])   • Gastroesophageal reflux disease without esophagitis   1.  Chest pain admit to floor monitor on telemetry rule out for myocardial infarction with serial enzymes and EKGs.  Check lipid panel in a.m.  Continue aspirin therapy.  If examinations are negative will obtain stress echo in the morning.  If any testing is abnormal consult cardiology.  2.  Type 2 diabetes with hyperglycemia patient is obviously very insulin resistant he is on extremely high dose of insulin.  We will continue his home meds within reason and give sliding scale insulin.  Check sugars every 4 hours.  Will check A1c.  We will also get diabetes education to see patient while he is here.  3.  Tobacco abuse encourage smoking cessation especially in light of poorly  controlled diabetes this was discussed at length with the patient.  He declines a nicotine patch.  4.  Anxiety patient is been given Vistaril he seems to be doing better continue with Klonopin as needed.  5.  HIV patient is on quadruple cocktail and is tolerating well no current plans except for continue his home medication.  6.  GERD patient had just eaten so this could also be contributing to this continue his PPI.  If pain recurs and stress testing is negative consider outpatient work-up for noncardiac chest pain with GI evaluation.    Patient seen after midnight         Code Status: Full     I discussed the patient's findings and my recommendations with the patient.  He designates his mother or his sister as a surrogate healthcare decision maker.    Estimated length of stay 1 night.    Patient seen and examined by me on May 12, 2020 at 12:30 AM.    Duy Hudson MD   05/12/20   01:11

## 2020-05-12 NOTE — ED NOTES
"PATIENT CALLED OUT STATES \"I THINK SOMETHING IS WRONG, MAYBE ITS MY ANXIETY\"  INFORMED PATIENT HIS VITALS WERE STABLE, HIS LABS LOOKED GOOD, AND HE HAD BEEN GIVEN ATARAX FOR HIS ANXIETY, INFORMED HIM I WOULD LET TRACY KLINE KNOW HIS CONCERNS.      Suly Page RN  05/11/20 [id number]    "

## 2020-05-12 NOTE — ED PROVIDER NOTES
"Subjective   History of Present Illness    Patient is a 54-year-old male presenting to ED with chest pain.  Patient reported approximately 30 minutes prior to arrival he was listening to his roommate complained when he suddenly developed a \"tight pulling pain\" in his left chest which radiated into his right arm.  Patient reported at this time he also developed nausea but denies any diaphoresis or emesis.  Patient reported the pain feels similar to when he had a heart attack in 2017 at which time he decided to present to the ER.  Patient reported he also has a history of anxiety however \"to be safe I came here.\"  Patient reported his medical history is also significant for insulin-dependent diabetes for which a normal sugar is \"somewhere around 250 but I usually do not use my insulin unless it gets over 300.\"  Patient reported he is also HIV positive however his counts have been undetectable.  Patient denies radiation of the chest pain into his abdomen, neck, jaw, or left upper extremity.  Patient denies any alleviating or worsening factors.  Patient denies any use of aspirin or nitroglycerin since his symptoms started.  Patient reported in 2017 he was advised to take a daily baby aspirin however he chooses not to do so.  Patient denies any recent illnesses including fevers, chills, diaphoresis, URI symptoms, cough, troubles breathing, or any /GI symptoms.  Patient denies any known recent sick contact. Patient did report that when he leans forward the pain alleviates a little.     Records reviewed show patient was last seen in the ED on 4/19/2020 at which time he was diagnosed with cystitis, balanoposthitis, hyperglycemia, and medical noncompliance.  Patient was discharged home at that time with Lotrimin ointment, fluconazole, and Bactrim.  Patient's last stress test was performed on 10/17/2017 which showed: LV ejection fraction normal at 70%, myocardial perfusion indicates a small size infarct located in the apex " with no significant ischemia noted.  Impressions consistent with low risk study.    Review of Systems   Constitutional: Negative.  Negative for chills, diaphoresis and fever.   HENT: Negative.  Negative for congestion, sinus pressure and sore throat.    Respiratory: Negative.  Negative for cough, chest tightness and shortness of breath.    Cardiovascular: Positive for chest pain (left chest wall).   Gastrointestinal: Positive for nausea. Negative for abdominal pain and vomiting.   Genitourinary: Negative.  Negative for dysuria, flank pain and hematuria.   Musculoskeletal: Negative.  Negative for arthralgias and myalgias.   Skin: Negative.  Negative for rash and wound.   Allergic/Immunologic: Positive for immunocompromised state.   Neurological: Negative.  Negative for dizziness, syncope, weakness and headaches.   Psychiatric/Behavioral: The patient is nervous/anxious.    All other systems reviewed and are negative.      Past Medical History:   Diagnosis Date   • Allergic rhinitis    • Anxiety    • Bursitis    • DDD (degenerative disc disease), cervical    • Depression    • HIV disease (CMS/HCC)    • HLD (hyperlipidemia)    • HSP (Henoch Schonlein purpura) (CMS/HCC)    • HTN (hypertension)    • Migraine    • Myocardial infarction (CMS/HCC)    • Osteoporosis    • Sleep apnea    • Type 2 diabetes mellitus (CMS/HCC)        Allergies   Allergen Reactions   • Amitriptyline Anaphylaxis   • Amitriptyline Hcl Anaphylaxis   • Darvon [Propoxyphene] Anaphylaxis   • Zithromax [Azithromycin] Anaphylaxis       Past Surgical History:   Procedure Laterality Date   • ANKLE SURGERY     • COLONOSCOPY  06/19/2009    Normal exam repeat in 10 years   • FOREARM SURGERY     • LYMPH NODE BIOPSY         Family History   Problem Relation Age of Onset   • Diabetes Mother    • Hypertension Mother    • Thyroid disease Mother    • Hypertension Father    • Thyroid disease Father    • Arrhythmia Father    • Diabetes Maternal Grandfather    • Heart  disease Maternal Grandfather    • Hypertension Maternal Grandfather    • Diabetes Paternal Grandmother    • Stroke Paternal Grandmother    • Heart disease Paternal Grandmother    • Hypertension Paternal Grandmother    • Thyroid disease Paternal Grandmother    • Hypertension Paternal Grandfather    • Hypertension Sister    • No Known Problems Brother        Social History     Socioeconomic History   • Marital status:      Spouse name: Not on file   • Number of children: Not on file   • Years of education: Not on file   • Highest education level: Not on file   Tobacco Use   • Smoking status: Current Every Day Smoker     Packs/day: 1.00     Types: Cigarettes     Start date: 1988   • Smokeless tobacco: Never Used   Substance and Sexual Activity   • Alcohol use: Yes     Comment: occasionally   • Drug use: No   • Sexual activity: Defer           Objective   Physical Exam   Constitutional: He is oriented to person, place, and time. He appears well-developed and well-nourished. He is cooperative. No distress.   HENT:   Head: Normocephalic and atraumatic.   Right Ear: External ear normal.   Left Ear: External ear normal.   Mouth/Throat: Uvula is midline, oropharynx is clear and moist and mucous membranes are normal. No oropharyngeal exudate, posterior oropharyngeal edema or posterior oropharyngeal erythema.   Eyes: Pupils are equal, round, and reactive to light. Conjunctivae, EOM and lids are normal. Right conjunctiva is not injected. Left conjunctiva is not injected. Right eye exhibits no nystagmus. Left eye exhibits no nystagmus.   Neck: Normal range of motion. Neck supple.   Cardiovascular: Normal rate, regular rhythm, normal heart sounds, intact distal pulses and normal pulses.   No murmur heard.  Pulses:       Radial pulses are 2+ on the right side, and 2+ on the left side.        Dorsalis pedis pulses are 2+ on the right side, and 2+ on the left side.        Posterior tibial pulses are 2+ on the right side, and  2+ on the left side.   Pulmonary/Chest: Effort normal and breath sounds normal. No respiratory distress. He has no decreased breath sounds. He has no wheezes. He has no rhonchi. He has no rales. He exhibits no bony tenderness.   Abdominal: Soft. Normal appearance and bowel sounds are normal. There is no tenderness. There is no guarding and no CVA tenderness.   Musculoskeletal: Normal range of motion. He exhibits no edema or tenderness.        Cervical back: Normal.        Thoracic back: Normal.        Lumbar back: Normal. He exhibits no spasm.        Right lower leg: Normal. He exhibits no tenderness and no edema.        Left lower leg: Normal. He exhibits no tenderness and no edema.   Neurological: He is alert and oriented to person, place, and time. He has normal strength. Gait normal.   Skin: Skin is warm and dry. Capillary refill takes less than 2 seconds. No rash noted. He is not diaphoretic.   Psychiatric: He has a normal mood and affect. His speech is normal and behavior is normal. Thought content normal.   Nursing note and vitals reviewed.      Procedures           ED Course    HEART Score: 4 (history moderately suspicious, age, risk factors and history of atherosclerotic disease)    ED Course as of May 15 1331   Mon May 11, 2020   2140 Hyperglycemia at 575. Insulin ordered.     [JS]   2156 Discussed case with Dr. Washington Candelaria.  Dr. Candelaria is in agreement with continued treatment plan as well as use of insulin for hyperglycemia.    [JS]   2302 Initial troponin negative.  BNP WNL at 57.Chest x-ray shows: No acute findings.    [JS]   2354 Repeat troponin negative.     [JS]   2359 Discussed lab and imaging findings with Dr. Candelaria.  Dr. Candelaria is in agreement with admission for cardiac rule out and observation.    [JS]   Tue May 12, 2020   0001 Upon reevaluation at this time patient resting in bed.  Patient reporting that he is still having some pain however it has slightly improved.  Discussed with  patient lab, imaging, and repeat cardiac enzyme findings.  Discussed with patient that at this time he needs to have an echo and an ultrasound performed for completion of his cardiac work-up.  Discussed with patient risk, benefits, and alternatives for inpatient versus outpatient setting of performing this examination.  Patient is appreciative and amenable at this time to being admitted for observation and cardiac rule out as he does not feel comfortable going home.  Patient has no further questions, concerns, or needs at this time.    [JS]   0010 Phone discussion with Dr. Hudson, hospitalist.  Dr. Hudson will kindly accept patient for admission for further cardiac evaluation and observation.  Dr. Hudson with no further request at this time.    [JS]      ED Course User Index  [JS] Endy Banks PA-C                                           MDM  Number of Diagnoses or Management Options  Chest pain, unspecified type:   Hyperglycemia:      Amount and/or Complexity of Data Reviewed  Clinical lab tests: ordered and reviewed  Tests in the radiology section of CPT®: ordered and reviewed  Decide to obtain previous medical records or to obtain history from someone other than the patient: yes  Review and summarize past medical records: yes  Discuss the patient with other providers: yes (Dr. Candelaria (attending), Dr. Hudson (hospitalist))    Patient Progress  Patient progress: stable      Final diagnoses:   Chest pain, unspecified type   Hyperglycemia            Endy Banks PA-C  05/15/20 2893

## 2020-05-12 NOTE — ED NOTES
PATIENT SITTING UP IN BED, SINGING TO MUSIC ON HIS PHONE, RATES PAIN 5/10.  NO ACUTE DISTRESS NOTED AT THIS TIME.  PATIENT ADMITTED TO ROOM 440, REPORT TO BE GIVEN AT BEDSIDE.      Suly Paeg RN  05/12/20 0101

## 2020-05-12 NOTE — PROGRESS NOTES
"He is a 54-year-old man with known history of HIV who was late this CD4 count is greater than 1600 and an undetectable viral load, hypertension, depression, diabetes mellitus type 2, tobacco abuse, prior MI and reflux.  He was admitted for evaluation of chest pain.  4sets of troponin are negative on admit. EKG showed normal sinus rhythm Q waves present on 2 and aVF without acute ST-T wave changes.  Prior EKG showed sinus rhythm with premature supraventricular complexes no gross acute ST-T wave changes.  Q waves present in 3 and aVF.  Chest x-ray showed no acute findings.  Low lung volumes.  No pleural effusion.  No consolidation.  Other studies includes a normal TSH at 1.51, free T4 of 1.15.  Normal white count with no left shift  Stress echocardiogram has been requested  He had prior stress testing dating back in October 2017.  The myocardial perfusion test was read as low risk study.  His ejection fraction is calculated at 70% at that time.    He was stressed by his roommate who was having \"severe bipolar attack\".  He started to have chest discomfort lateral to his sternum that radiates to his left arm.   His cardiac risk factors includes hypertension, hyperlipidemia, diabetes.  He claimed to have had prior MI but never had cardiac catheterization nor bypass surgery.    States that diabetes is poorly controlled (greater than 11% last time checked a long time ago) and normally takes insulin 100 units in the morning and 200 units in the evening.  He has a freestyle glucometer.    Pleasant man, no distress  Hemodynamically stable  Alert and oriented x3, coherent  Supple neck  No thyromegaly  Chest is clear to auscultation with normal respiratory effort  S1-S2 regular rate and rhythm no gallop or murmur  Obese abdomen, nontender, no obvious hepatosplenomegaly  No cyanosis clubbing or edema  Warm dry skin      Await stress echocardiogram.  If negative, he can go home.  A1c is 10.8  LDL is 66      Stress echocardiogram " apparently interpreted as high risk stress test.  Post-rest images show globally hypokinetic response with particularly severe hypokinesis of the inferior wall.  Cardiology will be consulted.    Chest pain -no recurrence; negative troponin  Abnormal stress test  Hyperlipidemia  Obesity  Hypertension  Diabetes mellitus 2  HIV disease  Tobacco abuse    Patient informed of above findings  Continue supportive care  Talon Rizo MD  05/12/20  16:07

## 2020-05-12 NOTE — ED NOTES
"PATIENT STATES \"I AM FEELING MUCH BETTER, I THINK IT WAS JUST MY ANXIETY\"     Suly Page, RN  05/12/20 0027    "

## 2020-05-12 NOTE — PLAN OF CARE
Problem: Patient Care Overview  Goal: Plan of Care Review  Outcome: Ongoing (interventions implemented as appropriate)  Flowsheets (Taken 5/12/2020 1034)  Progress: no change  Plan of Care Reviewed With: patient  Outcome Summary: Received DM consult for diet education. Pt has an A1c of 10.8. He reports his appetite is good, but he struggles financially and does not have a lot of money. He reports he often only eats one meal per day. He also admits that he does not always make the best food choices either. His one meal yesterday consisted of a Double Whopper with cheese and onion rings. Discussed the foods that elevate BS. Discussed the importance of a consistent meal pattern. Relayed to pt meal and snack options he could try. Pt also receives some foods from a food bank. He had questions about consuming fruits. Answered all of pt's questions. Encouraged medication compliance. Provided pt with RD contact information if further questions arise. He is NPO at present for plans for a stress test today. Will follow.

## 2020-05-12 NOTE — ED TRIAGE NOTES
PATIENT PRESENTS WITH LEFT SIDED CP THAT STARTED PTA. PATIENT REPORTS LEFT SIDED CP THAT RADIATES TO HIS LEFT AFT AND IS DESCRIBED AS DULL. PATIENT MARY ANY ASA, NITRO, OR ED MEDICATIONS.

## 2020-05-12 NOTE — CONSULTS
"Diabetes Education  Assessment/Teaching    Patient Name:  Donis Yi  YOB: 1965  MRN: [id number]  Admit Date:  5/11/2020      Assessment Date:  5/12/2020    Most Recent Value   General Information    Referral From:  A1c [10.80]   Height  177.8 cm (70\")   Height Method  Actual   Weight  125 kg (275 lb 9.2 oz)   Weight Method  Standing scale   Are you currently involved in an activity/exercise program?   No   How would you rate your current health?  good   Is patient pregnant?  n/a   Pregnancy Assessment   Diabetes History   What type of diabetes do you have?  Type 2   Length of Diabetes Diagnosis  10 + years   Current DM knowledge  good   Have you had diabetes education/teaching in the past?  yes   Do you test your blood sugar at home?  yes   Frequency of checks  QID   Meter type  Freestyle Eamon   Who performs the test?  Continuous glucose monitor   How would you rate your diabetes control?  fair   Education Preferences   What areas of diabetes would you like to learn about?  diet information, avoiding high blood sugar       Other Comments:  Patient presents with an A1c of 10.80.  He states it is improving from 3 months ago when it was 11.x.  PCP is Dr. Oksana Mares.  He also sees Dr. Griffin.  His last appointment was a couple months ago and has a follow up appointment coming up.      Patient expresses a need for education on carb counting.  Provided printed material and discusses carb counting.  Patient expresses understanding of information.  He has phone number for diabetes educator and is aware of the diabetes education classes if interested after discharge.      Electronically signed by:  Estrella Willson RN  05/12/20 14:21  "

## 2020-05-13 ENCOUNTER — TELEPHONE (OUTPATIENT)
Dept: INTERNAL MEDICINE | Age: 55
End: 2020-05-13

## 2020-05-13 VITALS
HEIGHT: 70 IN | RESPIRATION RATE: 18 BRPM | OXYGEN SATURATION: 97 % | TEMPERATURE: 97 F | SYSTOLIC BLOOD PRESSURE: 117 MMHG | DIASTOLIC BLOOD PRESSURE: 58 MMHG | WEIGHT: 278.6 LBS | BODY MASS INDEX: 39.88 KG/M2 | HEART RATE: 84 BPM

## 2020-05-13 PROBLEM — G47.33 OSA (OBSTRUCTIVE SLEEP APNEA): Status: ACTIVE | Noted: 2019-10-18

## 2020-05-13 PROBLEM — G57.62 MORTON'S NEUROMA, LEFT: Status: ACTIVE | Noted: 2019-07-29

## 2020-05-13 PROBLEM — Z21 HIV (HUMAN IMMUNODEFICIENCY VIRUS INFECTION) (HCC): Status: ACTIVE | Noted: 2019-07-29

## 2020-05-13 PROBLEM — K76.0 HEPATIC STEATOSIS: Status: ACTIVE | Noted: 2019-10-18

## 2020-05-13 PROBLEM — B20 HIV (HUMAN IMMUNODEFICIENCY VIRUS INFECTION) (HCC): Status: ACTIVE | Noted: 2019-07-29

## 2020-05-13 LAB
GLUCOSE BLDC GLUCOMTR-MCNC: 186 MG/DL (ref 70–130)
GLUCOSE BLDC GLUCOMTR-MCNC: 199 MG/DL (ref 70–130)
GLUCOSE BLDC GLUCOMTR-MCNC: 286 MG/DL (ref 70–130)

## 2020-05-13 PROCEDURE — 82962 GLUCOSE BLOOD TEST: CPT

## 2020-05-13 PROCEDURE — G0378 HOSPITAL OBSERVATION PER HR: HCPCS

## 2020-05-13 PROCEDURE — 25010000002 ENOXAPARIN PER 10 MG: Performed by: INTERNAL MEDICINE

## 2020-05-13 PROCEDURE — 63710000001 INSULIN LISPRO (HUMAN) PER 5 UNITS: Performed by: INTERNAL MEDICINE

## 2020-05-13 PROCEDURE — 96372 THER/PROPH/DIAG INJ SC/IM: CPT

## 2020-05-13 PROCEDURE — 99213 OFFICE O/P EST LOW 20 MIN: CPT | Performed by: INTERNAL MEDICINE

## 2020-05-13 RX ORDER — LOSARTAN POTASSIUM 25 MG/1
25 TABLET ORAL DAILY
Status: DISCONTINUED | OUTPATIENT
Start: 2020-05-13 | End: 2020-05-13 | Stop reason: HOSPADM

## 2020-05-13 RX ORDER — ASPIRIN 81 MG/1
81 TABLET ORAL DAILY
Qty: 30 TABLET | Refills: 0 | Status: SHIPPED | OUTPATIENT
Start: 2020-05-14 | End: 2020-06-18 | Stop reason: SDUPTHER

## 2020-05-13 RX ORDER — VARENICLINE TARTRATE 1 MG/1
1 TABLET, FILM COATED ORAL DAILY
Qty: 30 TABLET | Refills: 0 | Status: SHIPPED | OUTPATIENT
Start: 2020-05-13 | End: 2021-11-16

## 2020-05-13 RX ORDER — LOSARTAN POTASSIUM 25 MG/1
25 TABLET ORAL DAILY
Qty: 30 TABLET | Refills: 0 | Status: SHIPPED | OUTPATIENT
Start: 2020-05-14

## 2020-05-13 RX ORDER — NITROGLYCERIN 0.4 MG/1
0.4 TABLET SUBLINGUAL
Qty: 30 TABLET | Refills: 0 | Status: SHIPPED | OUTPATIENT
Start: 2020-05-13 | End: 2020-10-19

## 2020-05-13 RX ORDER — ISOSORBIDE MONONITRATE 30 MG/1
30 TABLET, EXTENDED RELEASE ORAL DAILY
Qty: 30 TABLET | Refills: 0 | Status: SHIPPED | OUTPATIENT
Start: 2020-05-13 | End: 2020-06-18 | Stop reason: SDUPTHER

## 2020-05-13 RX ADMIN — INSULIN LISPRO 4 UNITS: 100 INJECTION, SOLUTION INTRAVENOUS; SUBCUTANEOUS at 03:07

## 2020-05-13 RX ADMIN — TIZANIDINE 6 MG: 4 TABLET ORAL at 12:31

## 2020-05-13 RX ADMIN — INSULIN LISPRO 4 UNITS: 100 INJECTION, SOLUTION INTRAVENOUS; SUBCUTANEOUS at 05:19

## 2020-05-13 RX ADMIN — PREGABALIN 100 MG: 100 CAPSULE ORAL at 09:05

## 2020-05-13 RX ADMIN — HYDROCODONE BITARTRATE AND ACETAMINOPHEN 1 TABLET: 5; 325 TABLET ORAL at 03:13

## 2020-05-13 RX ADMIN — METFORMIN HYDROCHLORIDE 1000 MG: 500 TABLET ORAL at 09:06

## 2020-05-13 RX ADMIN — SODIUM CHLORIDE 100 ML/HR: 9 INJECTION, SOLUTION INTRAVENOUS at 05:12

## 2020-05-13 RX ADMIN — ATORVASTATIN CALCIUM 40 MG: 40 TABLET, FILM COATED ORAL at 09:06

## 2020-05-13 RX ADMIN — CALCIUM 500 MG: 500 TABLET ORAL at 09:06

## 2020-05-13 RX ADMIN — ARIPIPRAZOLE 2 MG: 2 TABLET ORAL at 09:06

## 2020-05-13 RX ADMIN — VITAMIN D 1000 UNITS: 25 TAB ORAL at 09:06

## 2020-05-13 RX ADMIN — MICONAZOLE NITRATE 1 APPLICATION: 20 CREAM TOPICAL at 09:08

## 2020-05-13 RX ADMIN — Medication 1 TABLET: at 09:06

## 2020-05-13 RX ADMIN — CETIRIZINE HYDROCHLORIDE 10 MG: 10 TABLET, FILM COATED ORAL at 09:06

## 2020-05-13 RX ADMIN — INSULIN LISPRO 12 UNITS: 100 INJECTION, SOLUTION INTRAVENOUS; SUBCUTANEOUS at 12:31

## 2020-05-13 RX ADMIN — ENOXAPARIN SODIUM 40 MG: 40 INJECTION SUBCUTANEOUS at 09:05

## 2020-05-13 RX ADMIN — PROPRANOLOL HYDROCHLORIDE 80 MG: 80 CAPSULE, EXTENDED RELEASE ORAL at 09:06

## 2020-05-13 RX ADMIN — ASPIRIN 81 MG: 81 TABLET ORAL at 09:06

## 2020-05-13 RX ADMIN — SODIUM CHLORIDE, PRESERVATIVE FREE 10 ML: 5 INJECTION INTRAVENOUS at 09:10

## 2020-05-13 RX ADMIN — LOSARTAN POTASSIUM 25 MG: 25 TABLET ORAL at 09:05

## 2020-05-13 NOTE — CONSULTS
"  [facility] HEART GROUP CONSULT NOTE    Patient Care Team:  Oksana Mares MD as PCP - General (Internal Medicine)  Tyron Ace PA as Physician Assistant (Otolaryngology)  Melvin Bonilla MD as Consulting Physician (Otolaryngology)  Bairon Tanner MD as Cardiologist (Cardiology)  Oksana Mares MD as Referring Physician (Internal Medicine)  Philip Bennett MD (Inactive) as Consulting Physician (Urology)  Suzanne Valenzuela MA as Medical Assistant  Byron Rosenberg MD as Consulting Physician (Gastroenterology)  Duy Hudson MD  REASON FOR REFERRAL: chest pain  Chief complaint: chest pain    Subjective     Patient is a 54 y.o. male smoker with hyperlipidemia, essential hypertension, and poorly controlled insulin-dependent diabetes mellitus, also with well-controlled HIV, who presented emergency department overnight for chest pain.  He had acute onset yesterday of left-sided chest discomfort described as a \"dullness and throbbing ache\" with radiation down the left arm and associated nausea and dyspnea without any associated diaphoresis.  He reports he has had numerous recurrences of the chest discomfort in the last 24 hours, usually with acute onset of 8/10 chest discomfort that lasted for about 30 minutes, before gradually going away.  Prior to my evaluation, he did call for assistance due to another episode of discomfort.  He was given 1 sublingual nitroglycerin for this, and reported rather prompt improvement in the pain to 4/10, with subsequent resolution completely a few minutes later.  Is not having any discomfort during my evaluation.    review of Systems   Review of Systems   Constitutional: Negative for appetite change, chills and fever.   HENT: Negative.    Eyes: Negative.    Respiratory: Negative for cough, shortness of breath and wheezing.    Cardiovascular: Positive for chest pain. Negative for palpitations and leg swelling.   Gastrointestinal: Negative for abdominal " pain and blood in stool.   Endocrine: Negative.    Genitourinary: Negative for flank pain and hematuria.   Musculoskeletal: Negative.    Skin: Negative.    Allergic/Immunologic: Negative.    Neurological: Negative.    Hematological: Negative for adenopathy. Does not bruise/bleed easily.   Psychiatric/Behavioral: Negative.        History  Past Medical History:   Diagnosis Date   • Allergic rhinitis    • Anxiety    • Bursitis    • DDD (degenerative disc disease), cervical    • Depression    • HIV disease (CMS/HCC)    • HLD (hyperlipidemia)    • HSP ([place] purpura) (CMS/HCC)    • HTN (hypertension)    • Migraine    • Myocardial infarction (CMS/HCC)    • Osteoporosis    • Sleep apnea    • Type 2 diabetes mellitus (CMS/HCC)      Past Surgical History:   Procedure Laterality Date   • ANKLE SURGERY     • COLONOSCOPY  06/19/2009    Normal exam repeat in 10 years   • FOREARM SURGERY     • LYMPH NODE BIOPSY       Family History   Problem Relation Age of Onset   • Diabetes Mother    • Hypertension Mother    • Thyroid disease Mother    • Hypertension Father    • Thyroid disease Father    • Arrhythmia Father    • Diabetes Maternal Grandfather    • Heart disease Maternal Grandfather    • Hypertension Maternal Grandfather    • Diabetes Paternal Grandmother    • Stroke Paternal Grandmother    • Heart disease Paternal Grandmother    • Hypertension Paternal Grandmother    • Thyroid disease Paternal Grandmother    • Hypertension Paternal Grandfather    • Hypertension Sister    • No Known Problems Brother      Social History     Tobacco Use   • Smoking status: Current Every Day Smoker     Packs/day: 1.00     Types: Cigarettes     Start date: 1988   • Smokeless tobacco: Never Used   Substance Use Topics   • Alcohol use: Yes     Comment: occasionally   • Drug use: No     Medications Prior to Admission   Medication Sig Dispense Refill Last Dose   • albuterol (PROVENTIL HFA;VENTOLIN HFA) 108 (90 BASE) MCG/ACT inhaler Inhale 2  puffs every 6 (six) hours as needed for wheezing.   Past Month at Unknown time   • ARIPiprazole (ABILIFY) 5 MG tablet Take 5 mg by mouth Daily.   5/11/2020 at Unknown time   • atorvastatin (LIPITOR) 40 MG tablet Take 40 mg by mouth Daily.  3 5/11/2020 at Unknown time   • calcium carbonate (OS-THEODORA) 600 MG tablet Take 600 mg by mouth Daily.   5/11/2020 at Unknown time   • Cholecalciferol (VITAMIN D3) 50 MCG (2000 UT) capsule Take 2,000 Units by mouth Daily.   5/11/2020 at Unknown time   • ciclopirox (LOPROX) 0.77 % cream Apply  topically to the appropriate area as directed 2 (Two) Times a Day. 30 g 5 5/11/2020 at Unknown time   • clonazePAM (KlonoPIN) 0.5 MG tablet Take 0.5 mg by mouth 2 (Two) Times a Day.   5/11/2020 at Unknown time   • denosumab (PROLIA) 60 MG/ML solution syringe Inject 60 mg under the skin Every 6 (Six) Months.   More than a month   • DEXILANT 60 MG capsule Take 60 mg by mouth Daily.  1 5/11/2020 at Unknown time   • Dulaglutide 0.75 MG/0.5ML solution pen-injector Inject 0.75 mg under the skin into the appropriate area as directed 1 (One) Time Per Week. 4 pen 11 5/9/2020 at Unknown time   • Pbkcuwa-Ejfsq-Mqppjvmr-TenofAF (GENVOYA) [phone] MG tablet Take 1 tablet by mouth daily.   5/11/2020 at Unknown time   • fluticasone (FLONASE) 50 MCG/ACT nasal spray 2 sprays into each nostril Daily. (Patient taking differently: 2 sprays into the nostril(s) as directed by provider Daily As Needed for Rhinitis.) 1 bottle 6 Past Week at Unknown time   • guaiFENesin (MUCINEX) 600 MG 12 hr tablet Take 600 mg by mouth 2 (two) times a day as needed for cough.   Past Month at Unknown time   • HYDROcodone-acetaminophen (NORCO) 5-325 MG per tablet Take 1 tablet by mouth Every 12 (Twelve) Hours As Needed for Moderate Pain  or Severe Pain .   5/11/2020 at Unknown time   • insulin aspart (NOVOLOG FLEXPEN) 100 UNIT/ML solution pen-injector sc pen Inject  under the skin into the appropriate area as directed 4 (Four)  Times a Day With Meals & at Bedtime. Inject 5 units for every 50 BG points above 150.   5/12/2020 at Unknown time   • Insulin Regular Human, Conc, (HumuLIN R U-500 KwikPen) 500 UNIT/ML solution pen-injector CONCENTRATED injection Inject  under the skin into the appropriate area as directed 2 (Two) Times a Day. 100 units at breakfast, 200 units at dinner.   5/11/2020 at Unknown time   • loperamide (IMODIUM) 2 MG capsule Take 2 mg by mouth 4 (Four) Times a Day As Needed for Diarrhea.   Past Week at Unknown time   • loratadine (CLARITIN) 10 MG tablet Take 10 mg by mouth daily.   5/11/2020 at Unknown time   • losartan (COZAAR) 100 MG tablet Take 100 mg by mouth daily.   5/11/2020 at Unknown time   • LYRICA 100 MG capsule Take 100 mg by mouth 2 (Two) Times a Day.   5/11/2020 at Unknown time   • meclizine (ANTIVERT) 25 MG tablet Take 25 mg by mouth Daily.   5/11/2020 at Unknown time   • Melatonin 5 MG capsule Take 5 mg by mouth every night at bedtime.  3 5/11/2020 at Unknown time   • metFORMIN (GLUCOPHAGE) 500 MG tablet Take 1,000 mg by mouth 2 (Two) Times a Day With Meals.  3 5/11/2020 at Unknown time   • neomycin-bacitracin-polymyxin (NEOSPORIN) 5-400-5000 ointment Apply 1 application topically to the appropriate area as directed 4 (Four) Times a Day As Needed for Irritation.   Past Week at Unknown time   • nystatin (MYCOSTATIN) 751642 UNIT/GM ointment Apply  topically to the appropriate area as directed 2 (Two) Times a Day.   5/11/2020 at Unknown time   • Prenatal Vit-Fe Fumarate-FA (PRENATAL PO) Take 1 tablet by mouth Daily.  3 5/11/2020 at Unknown time   • promethazine (PHENERGAN) 25 MG tablet Take 25 mg by mouth every 6 (six) hours as needed for nausea or vomiting.   Past Week at Unknown time   • propranolol LA (INDERAL LA) 80 MG 24 hr capsule Take 80 mg by mouth Daily.  5 5/11/2020 at Unknown time   • rizatriptan (MAXALT) 10 MG tablet Take 10 mg by mouth 1 (one) time as needed for migraine. May repeat in 2 hours if  needed   5/11/2020 at Unknown time   • tamsulosin (FLOMAX) 0.4 MG capsule 24 hr capsule Take 1 capsule by mouth Every Night. 90 capsule 3 5/11/2020 at Unknown time   • tiZANidine (ZANAFLEX) 4 MG tablet Take 4 mg by mouth Daily As Needed for Muscle Spasms.   5/11/2020 at Unknown time   • traZODone (DESYREL) 100 MG tablet Take 200 mg by mouth Every Night.  3 5/11/2020 at Unknown time   • Vortioxetine HBr (Trintellix) 20 MG tablet Take 20 mg by mouth Daily With Breakfast.   5/11/2020 at Unknown time     Allergies:  Amitriptyline; Amitriptyline hcl; Darvon [propoxyphene]; and Zithromax [azithromycin]    Objective     Vital Signs  Temp:  [97.5 °F (36.4 °C)-98.6 °F (37 °C)] 97.7 °F (36.5 °C)  Heart Rate:  [62-95] 79  Resp:  [16-22] 18  BP: ()/(46-67) 86/55    Physical Exam:  Physical Exam   Constitutional: No distress.   HENT:   Mouth/Throat: Oropharynx is clear. Pharynx is normal.   Eyes: Pupils are equal, round, and reactive to light. Conjunctivae are normal.   Neck: Normal range of motion and thyroid normal. Neck supple. No JVD present. No thyromegaly present.   Cardiovascular: Normal rate, regular rhythm, S1 normal, S2 normal, normal heart sounds, intact distal pulses and normal pulses.  No extrasystoles are present. PMI is not displaced. Exam reveals no gallop.   No murmur heard.  Pulmonary/Chest: Effort normal and breath sounds normal.   Abdominal: Soft. Bowel sounds are normal. He exhibits no distension. There is no splenomegaly or hepatomegaly. There is no tenderness.   Musculoskeletal: He exhibits tenderness (over left chest wall). He exhibits no edema.   Neurological: He is alert and oriented to person, place, and time.   Skin: Skin is warm and dry.     Results Review:   Lab Results (last 72 hours)     Procedure Component Value Units Date/Time    POC Glucose Once [[id number]]  (Abnormal) Collected:  05/12/20 1610    Specimen:  Blood Updated:  05/12/20 1631     Glucose 248 mg/dL      Comment: :  [id number] Renaldo LisaMeter ID: [id number]       POC Glucose Once [[id number]]  (Abnormal) Collected:  05/12/20 1055    Specimen:  Blood Updated:  05/12/20 1126     Glucose 188 mg/dL      Comment: : [id number] Renaldo LisaMeter ID: [id number]       POC Glucose Once [[id number]]  (Abnormal) Collected:  05/12/20 0739    Specimen:  Blood Updated:  05/12/20 0809     Glucose 194 mg/dL      Comment: : [id number] Renaldo LisaMeter ID: [id number]       Troponin [[id number]]  (Normal) Collected:  05/12/20 0713    Specimen:  Blood Updated:  05/12/20 0738     Troponin T <0.010 ng/mL     Narrative:       Troponin T Reference Range:  <= 0.03 ng/mL-   Negative for AMI  >0.03 ng/mL-     Abnormal for myocardial necrosis.  Clinicians would have to utilize clinical acumen, EKG, Troponin and serial changes to determine if it is an Acute Myocardial Infarction or myocardial injury due to an underlying chronic condition.       Results may be falsely decreased if patient taking Biotin.      POC Glucose Once [[id number]]  (Abnormal) Collected:  05/12/20 0620    Specimen:  Blood Updated:  05/12/20 0633     Glucose 238 mg/dL      Comment: : [id number] Adithya AmandaMeter ID: [id number]       POC Glucose Once [[id number]]  (Abnormal) Collected:  05/12/20 0302    Specimen:  Blood Updated:  05/12/20 0324     Glucose 276 mg/dL      Comment: : [id number] Merissastelmann AmandaMeter ID: [id number]       Troponin [[id number]]  (Normal) Collected:  05/12/20 0224    Specimen:  Blood Updated:  05/12/20 0249     Troponin T <0.010 ng/mL     Narrative:       Troponin T Reference Range:  <= 0.03 ng/mL-   Negative for AMI  >0.03 ng/mL-     Abnormal for myocardial necrosis.  Clinicians would have to utilize clinical acumen, EKG, Troponin and serial changes to determine if it is an Acute Myocardial Infarction or myocardial injury due to an underlying chronic condition.       Results may be falsely decreased if patient taking Biotin.      Comprehensive  Metabolic Panel [[id number]]  (Abnormal) Collected:  05/12/20 0224    Specimen:  Blood Updated:  05/12/20 0249     Glucose 335 mg/dL      BUN 24 mg/dL      Creatinine 1.10 mg/dL      Sodium 141 mmol/L      Potassium 4.2 mmol/L      Chloride 102 mmol/L      CO2 28.0 mmol/L      Calcium 9.8 mg/dL      Total Protein 6.8 g/dL      Albumin 4.10 g/dL      ALT (SGPT) 33 U/L      AST (SGOT) 19 U/L      Alkaline Phosphatase 57 U/L      Total Bilirubin <0.2 mg/dL      eGFR Non African Amer 70 mL/min/1.73      Globulin 2.7 gm/dL      A/G Ratio 1.5 g/dL      BUN/Creatinine Ratio 21.8     Anion Gap 11.0 mmol/L     Narrative:       GFR Normal >60  Chronic Kidney Disease <60  Kidney Failure <15      Lipid Panel [[id number]] Collected:  05/12/20 0224    Specimen:  Blood Updated:  05/12/20 0249     Total Cholesterol 129 mg/dL      Triglycerides 92 mg/dL      HDL Cholesterol 45 mg/dL      LDL Cholesterol  66 mg/dL      VLDL Cholesterol 18.4 mg/dL      LDL/HDL Ratio 1.46    Narrative:       Cholesterol Reference Ranges  (U.S. Department of Health and Human Services ATP III Classifications)    Desirable          <200 mg/dL  Borderline High    200-239 mg/dL  High Risk          >240 mg/dL      Triglyceride Reference Ranges  (U.S. Department of Health and Human Services ATP III Classifications)    Normal           <150 mg/dL  Borderline High  150-199 mg/dL  High             200-499 mg/dL  Very High        >500 mg/dL    HDL Reference Ranges  (U.S. Department of Health and Human Services ATP III Classifcations)    Low     <40 mg/dl (major risk factor for CHD)  High    >60 mg/dl ('negative' risk factor for CHD)        LDL Reference Ranges  (U.S. Department of Health and Human Services ATP III Classifcations)    Optimal          <100 mg/dL  Near Optimal     100-129 mg/dL  Borderline High  130-159 mg/dL  High             160-189 mg/dL  Very High        >189 mg/dL    T4, Free [[id number]]  (Normal) Collected:  05/11/20 2317    Specimen:  Blood  Updated:  05/12/20 0202     Free T4 1.15 ng/dL     Narrative:       Results may be falsely increased if patient taking Biotin.      TSH [[id number]]  (Normal) Collected:  05/11/20 2317    Specimen:  Blood Updated:  05/12/20 0202     TSH 1.510 uIU/mL      Comment: TSH results may be falsely decreased if patient taking Biotin.       Hemoglobin A1c [[id number]]  (Abnormal) Collected:  05/11/20 2100    Specimen:  Blood Updated:  05/12/20 0154     Hemoglobin A1C 10.80 %     Narrative:       Hemoglobin A1C Ranges:    Increased Risk for Diabetes  5.7% to 6.4%  Diabetes                     >= 6.5%  Diabetic Goal                < 7.0%    Phosphorus [[id number]]  (Normal) Collected:  05/11/20 2317    Specimen:  Blood Updated:  05/12/20 0152     Phosphorus 2.7 mg/dL     Magnesium [[id number]]  (Normal) Collected:  05/11/20 2317    Specimen:  Blood Updated:  05/12/20 0149     Magnesium 1.7 mg/dL     Troponin [[id number]]  (Normal) Collected:  05/11/20 2317    Specimen:  Blood Updated:  05/11/20 2341     Troponin T <0.010 ng/mL     Narrative:       Troponin T Reference Range:  <= 0.03 ng/mL-   Negative for AMI  >0.03 ng/mL-     Abnormal for myocardial necrosis.  Clinicians would have to utilize clinical acumen, EKG, Troponin and serial changes to determine if it is an Acute Myocardial Infarction or myocardial injury due to an underlying chronic condition.       Results may be falsely decreased if patient taking Biotin.      POC Glucose Once [[id number]]  (Abnormal) Collected:  05/11/20 2315    Specimen:  Blood Updated:  05/11/20 2327     Glucose 409 mg/dL      Comment: : [id number] Camilo LisaMeter ID: [id number]       POC Glucose Once [[id number]]  (Abnormal) Collected:  05/11/20 2314    Specimen:  Blood Updated:  05/11/20 2327     Glucose 415 mg/dL      Comment: : [id number] Camilo LisaMeter ID: [id number]       [place] Draw [[id number]] Collected:  05/11/20 2100    Specimen:  Blood Updated:  05/11/20 2215    Narrative:        The following orders were created for panel order [place] Draw.  Procedure                               Abnormality         Status                     ---------                               -----------         ------                     Light Blue Top[[id number]]                                   Final result               Green Top (Gel)[[id number]]                                  Final result               Lavender Top[[id number]]                                     Final result               Red Top[[id number]]                                          Final result                 Please view results for these tests on the individual orders.    Light Blue Top [[id number]] Collected:  05/11/20 2100    Specimen:  Blood Updated:  05/11/20 2215     Extra Tube hold for add-on     Comment: Auto resulted       Green Top (Gel) [[id number]] Collected:  05/11/20 2100    Specimen:  Blood Updated:  05/11/20 2215     Extra Tube Hold for add-ons.     Comment: Auto resulted.       Lavender Top [[id number]] Collected:  05/11/20 2100    Specimen:  Blood Updated:  05/11/20 2215     Extra Tube hold for add-on     Comment: Auto resulted       Red Top [[id number]] Collected:  05/11/20 2100    Specimen:  Blood Updated:  05/11/20 2215     Extra Tube Hold for add-ons.     Comment: Auto resulted.       Comprehensive Metabolic Panel [[id number]]  (Abnormal) Collected:  05/11/20 2100    Specimen:  Blood Updated:  05/11/20 2131     Glucose 575 mg/dL      BUN 25 mg/dL      Creatinine 1.13 mg/dL      Sodium 138 mmol/L      Potassium 5.0 mmol/L      Chloride 97 mmol/L      CO2 24.0 mmol/L      Calcium 10.0 mg/dL      Total Protein 6.8 g/dL      Albumin 4.10 g/dL      ALT (SGPT) 37 U/L      AST (SGOT) 22 U/L      Alkaline Phosphatase 69 U/L      Total Bilirubin <0.2 mg/dL      eGFR Non African Amer 68 mL/min/1.73      Globulin 2.7 gm/dL      A/G Ratio 1.5 g/dL      BUN/Creatinine Ratio 22.1     Anion Gap 17.0 mmol/L     Narrative:       GFR Normal  >60  Chronic Kidney Disease <60  Kidney Failure <15      Troponin [[id number]]  (Normal) Collected:  05/11/20 2100    Specimen:  Blood Updated:  05/11/20 2128     Troponin T <0.010 ng/mL     Narrative:       Troponin T Reference Range:  <= 0.03 ng/mL-   Negative for AMI  >0.03 ng/mL-     Abnormal for myocardial necrosis.  Clinicians would have to utilize clinical acumen, EKG, Troponin and serial changes to determine if it is an Acute Myocardial Infarction or myocardial injury due to an underlying chronic condition.       Results may be falsely decreased if patient taking Biotin.      BNP [[id number]]  (Normal) Collected:  05/11/20 2100    Specimen:  Blood Updated:  05/11/20 2128     proBNP 57.8 pg/mL     Narrative:       Among patients with dyspnea, NT-proBNP is highly sensitive for the detection of acute congestive heart failure. In addition NT-proBNP of <300 pg/ml effectively rules out acute congestive heart failure with 99% negative predictive value.    Results may be falsely decreased if patient taking Biotin.      CBC & Differential [[id number]] Collected:  05/11/20 2100    Specimen:  Blood Updated:  05/11/20 2111    Narrative:       The following orders were created for panel order CBC & Differential.  Procedure                               Abnormality         Status                     ---------                               -----------         ------                     CBC Auto Differential[[id number]]        Abnormal            Final result                 Please view results for these tests on the individual orders.    CBC Auto Differential [[id number]]  (Abnormal) Collected:  05/11/20 2100    Specimen:  Blood Updated:  05/11/20 2111     WBC 7.72 10*3/mm3      RBC 3.94 10*6/mm3      Hemoglobin 12.2 g/dL      Hematocrit 36.4 %      MCV 92.4 fL      MCH 31.0 pg      MCHC 33.5 g/dL      RDW 13.0 %      RDW-SD 43.8 fl      MPV 11.4 fL      Platelets 168 10*3/mm3      Neutrophil % 57.0 %      Lymphocyte % 32.0 %       Monocyte % 8.2 %      Eosinophil % 1.8 %      Basophil % 0.6 %      Immature Grans % 0.4 %      Neutrophils, Absolute 4.40 10*3/mm3      Lymphocytes, Absolute 2.47 10*3/mm3      Monocytes, Absolute 0.63 10*3/mm3      Eosinophils, Absolute 0.14 10*3/mm3      Basophils, Absolute 0.05 10*3/mm3      Immature Grans, Absolute 0.03 10*3/mm3      nRBC 0.0 /100 WBC           ECGs reviewed, my interpretation: All showed normal sinus rhythm, some with PACs, non-pathologic inferior Q waves.    CXR personally visualized, my interpretation: No acute findings  Results for orders placed during the hospital encounter of 05/11/20   Adult Stress Echo W/ Cont or Stress Agent if Necessary Per Protocol    Narrative · High risk stress test.  · Post-stress images show globally hypokinetic response, with particularly   severe hypokinesis of inferior wall.          Assessment/Plan       Chest pain    HIV (human immunodeficiency virus infection) (CMS/[facility])    Mixed hyperlipidemia    Tobacco abuse    IDDM (insulin dependent diabetes mellitus) (CMS/[facility])    Gastroesophageal reflux disease without esophagitis    Hypertension    History of MI (myocardial infarction)    Recommendations and plans:  -Patient provides a story of rather typical angina that is new in onset and therefore would be classified as unstable angina.  Some other components would actually argue that is noncardiac in nature (seemingly was reproduced by palpation on my exam).  However, stress test did appear abnormal to me and he has multiple CAD risk factors, poorly controlled diabetes chief among them.  -Keep n.p.o. at midnight for tentatively planned cardiac catheterization with Dr. Elian Walker tomorrow.  -Aggressive risk factor modification  -Regardless of further inpatient plans or treatments, should remain on aspirin 81 mg daily and high intensity statin therapy indefinitely    I discussed the patient's findings and my recommendations with patient.     Deon Bailey,  MD  05/12/20  19:58

## 2020-05-13 NOTE — PROGRESS NOTES
"    RE:  Donis Yi  :  1965    CC: chest pain         Subjective: Patient reports he is feeling well today.  He reports he is currently chest pain-free but does report that with palpation of the left side of his chest, the pain will return.  He notes a lot of stress in his home environment that he thinks may have contributed to the current episode.    ROS: Pertinent positives and negatives listed above.  All other systems reviewed and negative.    Objective:   /57 (BP Location: Right arm, Patient Position: Lying)   Pulse 96   Temp 98 °F (36.7 °C) (Oral)   Resp 18   Ht 177.8 cm (70\")   Wt 126 kg (278 lb 9.6 oz)   SpO2 98%   BMI 39.97 kg/m²   Temp:  [97.5 °F (36.4 °C)-98 °F (36.7 °C)] 98 °F (36.7 °C)  Heart Rate:  [62-96] 96  Resp:  [18-20] 18  BP: ()/(43-63) 107/57    Intake/Output Summary (Last 24 hours) at 2020 0854  Last data filed at 2020 0640  Gross per 24 hour   Intake --   Output 850 ml   Net -850 ml       Current Facility-Administered Medications:   •  acetaminophen (TYLENOL) tablet 650 mg, 650 mg, Oral, Q4H PRN **OR** acetaminophen (TYLENOL) 160 MG/5ML solution 650 mg, 650 mg, Oral, Q4H PRN **OR** acetaminophen (TYLENOL) suppository 650 mg, 650 mg, Rectal, Q4H PRN, Duy Hudson MD  •  albuterol (PROVENTIL) nebulizer solution 0.083% 2.5 mg/3mL, 2.5 mg, Nebulization, Q6H PRN, Duy Hudson MD  •  ARIPiprazole (ABILIFY) tablet 2 mg, 2 mg, Oral, Daily, Duy Hudson MD, 2 mg at 20 0851  •  [COMPLETED] aspirin chewable tablet 81 mg, 81 mg, Oral, Once, 81 mg at 20 0846 **AND** aspirin EC tablet 81 mg, 81 mg, Oral, Daily, Duy Hudson MD  •  atorvastatin (LIPITOR) tablet 40 mg, 40 mg, Oral, Daily, Duy Hudson MD, 40 mg at 20 0846  •  calcium carbonate (oyster shell) tablet 500 mg, 500 mg, Oral, Daily, Duy Hudson MD, 500 mg at 20 0846  •  cetirizine (zyrTEC) tablet 10 mg, 10 mg, Oral, Daily, Duy Hudson, " MD, 10 mg at 05/12/20 0846  •  cholecalciferol (VITAMIN D3) tablet 1,000 Units, 1,000 Units, Oral, Daily, Duy Hudson MD, 1,000 Units at 05/12/20 0851  •  clonazePAM (KlonoPIN) tablet 0.5 mg, 0.5 mg, Oral, TID PRN, Duy Hudson MD, 0.5 mg at 05/12/20 0851  •  dextrose (D50W) 25 g/ 50mL Intravenous Solution 25 g, 25 g, Intravenous, Q15 Min PRN, Duy Hudson MD  •  dextrose (GLUTOSE) oral gel 15 g, 15 g, Oral, Q15 Min PRN, Duy Hudson MD  •  Agjvjas-Abosx-Olhzajsg-TenofAF (GENVOYA) [phone] MG per tablet 1 tablet, 1 tablet, Oral, Daily, Duy Hudson MD  •  enoxaparin (LOVENOX) syringe 40 mg, 40 mg, Subcutaneous, Q24H, Duy Hudson MD, 40 mg at 05/12/20 0845  •  fluticasone (FLONASE) 50 MCG/ACT nasal spray 2 spray, 2 spray, Nasal, Daily PRN, Duy Hudson MD  •  glucagon (human recombinant) (GLUCAGEN DIAGNOSTIC) injection 1 mg, 1 mg, Subcutaneous, Q15 Min PRN, Duy Hudson MD  •  guaiFENesin (MUCINEX) 12 hr tablet 600 mg, 600 mg, Oral, BID PRN, Duy Hudson MD  •  HYDROcodone-acetaminophen (NORCO) 5-325 MG per tablet 1 tablet, 1 tablet, Oral, BID PRN, Duy Hudson MD, 1 tablet at 05/13/20 0313  •  insulin lispro (humaLOG) injection 0-24 Units, 0-24 Units, Subcutaneous, Q4H, Duy Hudson MD, 4 Units at 05/13/20 0519  •  loperamide (IMODIUM) capsule 2 mg, 2 mg, Oral, Daily PRN, Duy Hudson MD  •  losartan (COZAAR) tablet 25 mg, 25 mg, Oral, Daily, Elian Walker MD  •  meclizine (ANTIVERT) tablet 25 mg, 25 mg, Oral, Daily PRN, Duy Hudson MD  •  melatonin tablet 6 mg, 6 mg, Oral, Nightly, uDy Hudson MD, 6 mg at 05/12/20 2110  •  metFORMIN (GLUCOPHAGE) tablet 1,000 mg, 1,000 mg, Oral, BID With Meals, Duy Hudson MD, 1,000 mg at 05/12/20 1702  •  miconazole (MICOTIN) 2 % cream 1 application, 1 application, Topical, Q12H, Duy Hudson MD, 1 application at 05/12/20 2110  •  neomycin-bacitracin-polymyxin (NEOSPORIN)  ointment 1 application, 1 application, Topical, 4x Daily PRN, Duy Hudson MD  •  nitroglycerin (NITROSTAT) SL tablet 0.4 mg, 0.4 mg, Sublingual, Q5 Min PRN, Dyu Hudson MD, 0.4 mg at 05/12/20 1930  •  ondansetron (ZOFRAN) tablet 4 mg, 4 mg, Oral, Q6H PRN **OR** ondansetron (ZOFRAN) injection 4 mg, 4 mg, Intravenous, Q6H PRN, Duy Hudson MD  •  pantoprazole (PROTONIX) EC tablet 40 mg, 40 mg, Oral, BID AC, Duy Hudson MD, 40 mg at 05/12/20 1702  •  pregabalin (LYRICA) capsule 100 mg, 100 mg, Oral, BID, Duy Hudson MD, 100 mg at 05/12/20 2111  •  prenatal vitamin 28-0.8 tablet tablet 1 tablet, 1 tablet, Oral, Daily, Duy Hudson MD, 1 tablet at 05/12/20 0846  •  promethazine (PHENERGAN) tablet 25 mg, 25 mg, Oral, Q6H PRN, Duy Hudson MD  •  propranolol LA (INDERAL LA) 24 hr capsule 80 mg, 80 mg, Oral, Daily, Duy Hudson MD, 80 mg at 05/12/20 0846  •  sodium chloride 0.9 % flush 10 mL, 10 mL, Intravenous, PRN, Duy Hudson MD  •  sodium chloride 0.9 % flush 10 mL, 10 mL, Intravenous, Q12H, Duy Hudson MD, 10 mL at 05/12/20 2111  •  sodium chloride 0.9 % flush 10 mL, 10 mL, Intravenous, PRN, Duy Hudson MD  •  sodium chloride 0.9 % infusion, 100 mL/hr, Intravenous, Continuous, Duy uHdson MD, Last Rate: 100 mL/hr at 05/13/20 0512, 100 mL/hr at 05/13/20 0512  •  tamsulosin (FLOMAX) 24 hr capsule 0.4 mg, 0.4 mg, Oral, Nightly, Duy Hudson MD, 0.4 mg at 05/12/20 2110  •  tiZANidine (ZANAFLEX) tablet 6 mg, 6 mg, Oral, Q6H PRN, Duy Hudson MD  •  traZODone (DESYREL) tablet 200 mg, 200 mg, Oral, Nightly, Duy Hudson MD, 200 mg at 05/12/20 2110    Physical Exam:   Gen: Alert and oriented x3, no acute distress  Heart: Regular rate and rhythm, no murmurs, rubs, or gallops  Chest: Lungs clear to auscultation bilaterally, normal respiratory effort  Abd: Soft, non tender, bowel sounds are positive  Ext: No clubbing, cyanosis, or  edema     Lab Results (last 24 hours)     Procedure Component Value Units Date/Time    POC Glucose Once [[id number]]  (Abnormal) Collected:  05/13/20 0804    Specimen:  Blood Updated:  05/13/20 0835     Glucose 186 mg/dL      Comment: : [id number] Renaldo LisaMeter ID: [id number]       POC Glucose Once [[id number]]  (Abnormal) Collected:  05/13/20 0248    Specimen:  Blood Updated:  05/13/20 0259     Glucose 199 mg/dL      Comment: : [id number] Richard NatashaMeter ID: [id number]       POC Glucose Once [[id number]]  (Abnormal) Collected:  05/12/20 2013    Specimen:  Blood Updated:  05/12/20 2025     Glucose 289 mg/dL      Comment: : [id number] Richard NatashaMeter ID: [id number]       POC Glucose Once [[id number]]  (Abnormal) Collected:  05/12/20 1610    Specimen:  Blood Updated:  05/12/20 1631     Glucose 248 mg/dL      Comment: : [id number] Renaldo LisaMeter ID: [id number]       POC Glucose Once [[id number]]  (Abnormal) Collected:  05/12/20 1055    Specimen:  Blood Updated:  05/12/20 1126     Glucose 188 mg/dL      Comment: : [id number] Renaldo LisaMeter ID: [id number]           Imaging Results (Last 24 Hours)     ** No results found for the last 24 hours. **        -Hemoglobin A1c (5/11/2020) 10.8%  -Lipid panel (5/12/2020) total cholesterol 129, HDL 45, LDL 66, triglycerides 92  -Dobutamine stress echo (5/12/2020) globally hypokinetic response, particularly severe hypokinesis in the inferior wall, high risk    Assessment:   1.  Chest pain: The reproducibility of his chest pain to palpation indicates noncardiac etiology.  2.  Positive dobutamine stress echo  3.  HIV (human immunodeficiency virus infection)  4.  Mixed hyperlipidemia  5.  Tobacco abuse  6.  Diabetes mellitus type 2: Insulin-dependent.  7.  Obesity: Body mass index is 39.97 kg/m².   8.  Essential hypertension  9.  GERD      Plan:   -We discussed in detail the treatment options regarding his chest pain and abnormal stress echo.  After  discussing the risks, benefits, and alternatives of testing, patient elected for conservative medical therapy at this time.  -Continue aspirin, atorvastatin, sublingual nitroglycerin, and propanolol.  -Start Imdur  -Decrease losartan to 25 mg  -Start Chantix for smoking cessation  -Okay to discharge home  -Follow-up in cardiology clinic in 2-4 weeks

## 2020-05-13 NOTE — PLAN OF CARE
Problem: Patient Care Overview  Goal: Plan of Care Review  Outcome: Ongoing (interventions implemented as appropriate)  Flowsheets (Taken 5/13/2020 5986)  Progress: no change  Plan of Care Reviewed With: patient  Note:   Pt A&Ox4. C/o of midsternal chest pain at start of shift, PRN nitro given & EKG performed; relief with nitro. C/o pain in back, PRN norco given with relief. Glucose monitoring q4 hours, sliding scale coverage needed each time. NPO after midnight. Possible cath today. Sinus 73-83 w/ PVCs. Safety maintained.

## 2020-05-13 NOTE — DISCHARGE SUMMARY
[facility]  DISCHARGE SUMMARY       Date of Admission: 5/11/2020  Date of Discharge:  5/13/2020  Primary Care Physician: Oksana Mares MD    Discharge Diagnoses:  Active Hospital Problems    Diagnosis   • **Chest pain - non cardiac per cardiology   • Hypertension   • History of MI (myocardial infarction)   • Tobacco abuse   • Mixed hyperlipidemia   • IDDM (insulin dependent diabetes mellitus) (CMS/formerly [facility])   • HIV (human immunodeficiency virus infection) (CMS/formerly [facility])   • Gastroesophageal reflux disease without esophagitis   Obesity       Presenting Problem/History of Present Illness:  Chest pain [R07.9]  Chest pain [R07.9]         Hospital Course    He is a 54-year-old man with known history of HIV who was late this CD4 count is greater than 1600 and an undetectable viral load, hypertension, depression, diabetes mellitus type 2, tobacco abuse, prior MI and reflux.  He was admitted for evaluation of chest pain.  4sets of troponin are negative on admit. EKG showed normal sinus rhythm Q waves present on 2 and aVF without acute ST-T wave changes.  Prior EKG showed sinus rhythm with premature supraventricular complexes no gross acute ST-T wave changes.  Q waves present in 3 and aVF.  Chest x-ray showed no acute findings.  Low lung volumes.  No pleural effusion.  No consolidation.  Other studies includes a normal TSH at 1.51, free T4 of 1.15.  Normal white count with no left shift  Stress echocardiogram showed high risk stress test.  It showed globally hypokinetic response particularly of inferior wall.  Dr. Walker seen patient in consult.    In their discussion, they arrived at decision towards conservative management.  Imdur was added with prn nitro while Losartan was cut at 25 mg daily.  I emphasized smoking cessation and wrote him script for Chantix.    I emphasized the recommended follow up in cardiology clinic in 2-4 weeks.   Patient advised to seek medical attention if  any recurrence or increasing frequency of chest pain.  He verbalized great understanding.  Patient is at his best condition and medically appropriate for discharge.    Procedures Performed: None    Consults:   /Teressa Walker/Dr. Bailey    Pertinent Test Results:   Lab Results (last 72 hours)     Procedure Component Value Units Date/Time    POC Glucose Once [[id number]]  (Abnormal) Collected:  05/13/20 1156    Specimen:  Blood Updated:  05/13/20 1219     Glucose 286 mg/dL      Comment: : [id number] [place] LisaMeter ID: [id number]       POC Glucose Once [[id number]]  (Abnormal) Collected:  05/13/20 0804    Specimen:  Blood Updated:  05/13/20 0835     Glucose 186 mg/dL      Comment: : [id number] [place] LisaMeter ID: [id number]       POC Glucose Once [[id number]]  (Abnormal) Collected:  05/13/20 0248    Specimen:  Blood Updated:  05/13/20 0259     Glucose 199 mg/dL      Comment: : [id number] [place] NatashaMeter ID: [id number]       POC Glucose Once [[id number]]  (Abnormal) Collected:  05/12/20 2013    Specimen:  Blood Updated:  05/12/20 2025     Glucose 289 mg/dL      Comment: : [id number] [place] NatashaMeter ID: [id number]       POC Glucose Once [[id number]]  (Abnormal) Collected:  05/12/20 1610    Specimen:  Blood Updated:  05/12/20 1631     Glucose 248 mg/dL      Comment: : [id number] [place] LisaMeter ID: [id number]       POC Glucose Once [[id number]]  (Abnormal) Collected:  05/12/20 1055    Specimen:  Blood Updated:  05/12/20 1126     Glucose 188 mg/dL      Comment: : [id number] [place] LisaMeter ID: [id number]       POC Glucose Once [[id number]]  (Abnormal) Collected:  05/12/20 0739    Specimen:  Blood Updated:  05/12/20 0809     Glucose 194 mg/dL      Comment: : [id number] [place] LisaMeter ID: [id number]       Troponin [[id number]]  (Normal) Collected:  05/12/20 0713    Specimen:  Blood Updated:  05/12/20 0738     Troponin T <0.010 ng/mL     Narrative:       Troponin T Reference Range:  <= 0.03 ng/mL-    Negative for AMI  >0.03 ng/mL-     Abnormal for myocardial necrosis.  Clinicians would have to utilize clinical acumen, EKG, Troponin and serial changes to determine if it is an Acute Myocardial Infarction or myocardial injury due to an underlying chronic condition.       Results may be falsely decreased if patient taking Biotin.      POC Glucose Once [[id number]]  (Abnormal) Collected:  05/12/20 0620    Specimen:  Blood Updated:  05/12/20 0633     Glucose 238 mg/dL      Comment: : [id number] [place] AmandaMeter ID: [id number]       POC Glucose Once [[id number]]  (Abnormal) Collected:  05/12/20 0302    Specimen:  Blood Updated:  05/12/20 0324     Glucose 276 mg/dL      Comment: : [id number] [place] AmandaMeter ID: [id number]       Troponin [[id number]]  (Normal) Collected:  05/12/20 0224    Specimen:  Blood Updated:  05/12/20 0249     Troponin T <0.010 ng/mL     Narrative:       Troponin T Reference Range:  <= 0.03 ng/mL-   Negative for AMI  >0.03 ng/mL-     Abnormal for myocardial necrosis.  Clinicians would have to utilize clinical acumen, EKG, Troponin and serial changes to determine if it is an Acute Myocardial Infarction or myocardial injury due to an underlying chronic condition.       Results may be falsely decreased if patient taking Biotin.      Comprehensive Metabolic Panel [[id number]]  (Abnormal) Collected:  05/12/20 0224    Specimen:  Blood Updated:  05/12/20 0249     Glucose 335 mg/dL      BUN 24 mg/dL      Creatinine 1.10 mg/dL      Sodium 141 mmol/L      Potassium 4.2 mmol/L      Chloride 102 mmol/L      CO2 28.0 mmol/L      Calcium 9.8 mg/dL      Total Protein 6.8 g/dL      Albumin 4.10 g/dL      ALT (SGPT) 33 U/L      AST (SGOT) 19 U/L      Alkaline Phosphatase 57 U/L      Total Bilirubin <0.2 mg/dL      eGFR Non African Amer 70 mL/min/1.73      Globulin 2.7 gm/dL      A/G Ratio 1.5 g/dL      BUN/Creatinine Ratio 21.8     Anion Gap 11.0 mmol/L     Narrative:       GFR Normal  >60  Chronic Kidney Disease <60  Kidney Failure <15      Lipid Panel [[id number]] Collected:  05/12/20 0224    Specimen:  Blood Updated:  05/12/20 0249     Total Cholesterol 129 mg/dL      Triglycerides 92 mg/dL      HDL Cholesterol 45 mg/dL      LDL Cholesterol  66 mg/dL      VLDL Cholesterol 18.4 mg/dL      LDL/HDL Ratio 1.46    Narrative:       Cholesterol Reference Ranges  (U.S. Department of Health and Human Services ATP III Classifications)    Desirable          <200 mg/dL  Borderline High    200-239 mg/dL  High Risk          >240 mg/dL      Triglyceride Reference Ranges  (U.S. Department of Health and Human Services ATP III Classifications)    Normal           <150 mg/dL  Borderline High  150-199 mg/dL  High             200-499 mg/dL  Very High        >500 mg/dL    HDL Reference Ranges  (U.S. Department of Health and Human Services ATP III Classifcations)    Low     <40 mg/dl (major risk factor for CHD)  High    >60 mg/dl ('negative' risk factor for CHD)        LDL Reference Ranges  (U.S. Department of Health and Human Services ATP III Classifcations)    Optimal          <100 mg/dL  Near Optimal     100-129 mg/dL  Borderline High  130-159 mg/dL  High             160-189 mg/dL  Very High        >189 mg/dL    T4, Free [[id number]]  (Normal) Collected:  05/11/20 2317    Specimen:  Blood Updated:  05/12/20 0202     Free T4 1.15 ng/dL     Narrative:       Results may be falsely increased if patient taking Biotin.      TSH [[id number]]  (Normal) Collected:  05/11/20 2317    Specimen:  Blood Updated:  05/12/20 0202     TSH 1.510 uIU/mL      Comment: TSH results may be falsely decreased if patient taking Biotin.       Hemoglobin A1c [[id number]]  (Abnormal) Collected:  05/11/20 2100    Specimen:  Blood Updated:  05/12/20 0154     Hemoglobin A1C 10.80 %     Narrative:       Hemoglobin A1C Ranges:    Increased Risk for Diabetes  5.7% to 6.4%  Diabetes                     >= 6.5%  Diabetic Goal                < 7.0%     Phosphorus [[id number]]  (Normal) Collected:  05/11/20 2317    Specimen:  Blood Updated:  05/12/20 0152     Phosphorus 2.7 mg/dL     Magnesium [[id number]]  (Normal) Collected:  05/11/20 2317    Specimen:  Blood Updated:  05/12/20 0149     Magnesium 1.7 mg/dL     Troponin [[id number]]  (Normal) Collected:  05/11/20 2317    Specimen:  Blood Updated:  05/11/20 2341     Troponin T <0.010 ng/mL     Narrative:       Troponin T Reference Range:  <= 0.03 ng/mL-   Negative for AMI  >0.03 ng/mL-     Abnormal for myocardial necrosis.  Clinicians would have to utilize clinical acumen, EKG, Troponin and serial changes to determine if it is an Acute Myocardial Infarction or myocardial injury due to an underlying chronic condition.       Results may be falsely decreased if patient taking Biotin.      POC Glucose Once [[id number]]  (Abnormal) Collected:  05/11/20 2315    Specimen:  Blood Updated:  05/11/20 2327     Glucose 409 mg/dL      Comment: : [id number] [place] LisaMeter ID: [id number]       POC Glucose Once [[id number]]  (Abnormal) Collected:  05/11/20 2314    Specimen:  Blood Updated:  05/11/20 2327     Glucose 415 mg/dL      Comment: : [id number] IgnitAdaMeter ID: [id number]       [place] Draw [[id number]] Collected:  05/11/20 2100    Specimen:  Blood Updated:  05/11/20 2215    Narrative:       The following orders were created for panel order [place] Draw.  Procedure                               Abnormality         Status                     ---------                               -----------         ------                     Light Blue Top[[id number]]                                   Final result               Green Top (Gel)[[id number]]                                  Final result               Lavender Top[[id number]]                                     Final result               Red Top[[id number]]                                          Final result                 Please view results for these tests on the  individual orders.    Light Blue Top [[id number]] Collected:  05/11/20 2100    Specimen:  Blood Updated:  05/11/20 2215     Extra Tube hold for add-on     Comment: Auto resulted       Green Top (Gel) [[id number]] Collected:  05/11/20 2100    Specimen:  Blood Updated:  05/11/20 2215     Extra Tube Hold for add-ons.     Comment: Auto resulted.       Lavender Top [[id number]] Collected:  05/11/20 2100    Specimen:  Blood Updated:  05/11/20 2215     Extra Tube hold for add-on     Comment: Auto resulted       Red Top [[id number]] Collected:  05/11/20 2100    Specimen:  Blood Updated:  05/11/20 2215     Extra Tube Hold for add-ons.     Comment: Auto resulted.       Comprehensive Metabolic Panel [[id number]]  (Abnormal) Collected:  05/11/20 2100    Specimen:  Blood Updated:  05/11/20 2131     Glucose 575 mg/dL      BUN 25 mg/dL      Creatinine 1.13 mg/dL      Sodium 138 mmol/L      Potassium 5.0 mmol/L      Chloride 97 mmol/L      CO2 24.0 mmol/L      Calcium 10.0 mg/dL      Total Protein 6.8 g/dL      Albumin 4.10 g/dL      ALT (SGPT) 37 U/L      AST (SGOT) 22 U/L      Alkaline Phosphatase 69 U/L      Total Bilirubin <0.2 mg/dL      eGFR Non African Amer 68 mL/min/1.73      Globulin 2.7 gm/dL      A/G Ratio 1.5 g/dL      BUN/Creatinine Ratio 22.1     Anion Gap 17.0 mmol/L     Narrative:       GFR Normal >60  Chronic Kidney Disease <60  Kidney Failure <15      Troponin [[id number]]  (Normal) Collected:  05/11/20 2100    Specimen:  Blood Updated:  05/11/20 2128     Troponin T <0.010 ng/mL     Narrative:       Troponin T Reference Range:  <= 0.03 ng/mL-   Negative for AMI  >0.03 ng/mL-     Abnormal for myocardial necrosis.  Clinicians would have to utilize clinical acumen, EKG, Troponin and serial changes to determine if it is an Acute Myocardial Infarction or myocardial injury due to an underlying chronic condition.       Results may be falsely decreased if patient taking Biotin.      BNP [[id number]]  (Normal) Collected:   05/11/20 2100    Specimen:  Blood Updated:  05/11/20 2128     proBNP 57.8 pg/mL     Narrative:       Among patients with dyspnea, NT-proBNP is highly sensitive for the detection of acute congestive heart failure. In addition NT-proBNP of <300 pg/ml effectively rules out acute congestive heart failure with 99% negative predictive value.    Results may be falsely decreased if patient taking Biotin.      CBC & Differential [[id number]] Collected:  05/11/20 2100    Specimen:  Blood Updated:  05/11/20 2111    Narrative:       The following orders were created for panel order CBC & Differential.  Procedure                               Abnormality         Status                     ---------                               -----------         ------                     CBC Auto Differential[[id number]]        Abnormal            Final result                 Please view results for these tests on the individual orders.    CBC Auto Differential [[id number]]  (Abnormal) Collected:  05/11/20 2100    Specimen:  Blood Updated:  05/11/20 2111     WBC 7.72 10*3/mm3      RBC 3.94 10*6/mm3      Hemoglobin 12.2 g/dL      Hematocrit 36.4 %      MCV 92.4 fL      MCH 31.0 pg      MCHC 33.5 g/dL      RDW 13.0 %      RDW-SD 43.8 fl      MPV 11.4 fL      Platelets 168 10*3/mm3      Neutrophil % 57.0 %      Lymphocyte % 32.0 %      Monocyte % 8.2 %      Eosinophil % 1.8 %      Basophil % 0.6 %      Immature Grans % 0.4 %      Neutrophils, Absolute 4.40 10*3/mm3      Lymphocytes, Absolute 2.47 10*3/mm3      Monocytes, Absolute 0.63 10*3/mm3      Eosinophils, Absolute 0.14 10*3/mm3      Basophils, Absolute 0.05 10*3/mm3      Immature Grans, Absolute 0.03 10*3/mm3      nRBC 0.0 /100 WBC         Imaging Results (Last 72 Hours)     Procedure Component Value Units Date/Time    XR Chest 1 View [[id number]] Collected:  05/11/20 2045     Updated:  05/11/20 2048    Narrative:       XR CHEST 1 VW-     Indication: Chest pain     Comparison: 02/16/2020    "  Findings:     Lung volumes are low. Cardiac silhouette is within normal limits and  stable. No pleural effusion, visible pneumothorax, or focal  consolidation. No acute osseous finding.       Impression:       Impression:     No acute findings.  This report was finalized on 05/11/2020 20:45 by Dr. Siddharth Carias MD.          Condition on Discharge:   stable  Physical Exam on Discharge:  /58 (BP Location: Right arm, Patient Position: Lying)   Pulse 84   Temp 97 °F (36.1 °C) (Oral)   Resp 18   Ht 177.8 cm (70\")   Wt 126 kg (278 lb 9.6 oz)   SpO2 97%   BMI 39.97 kg/m²   Physical Exam  Pleasant man, no distress  Hemodynamically stable  Alert and oriented x3, coherent  Supple neck  No thyromegaly  Chest is clear to auscultation with normal respiratory effort  S1-S2 regular rate and rhythm no gallop or murmur  Obese abdomen, nontender, no obvious hepatosplenomegaly  No cyanosis clubbing or edema  Warm dry skin  Neurologically intact  + tremor right hand        Discharge Disposition:  Home or Self Care    Discharge Medications:     Discharge Medications      New Medications      Instructions Start Date   aspirin 81 MG EC tablet   81 mg, Oral, Daily   Start Date:  May 14, 2020     isosorbide mononitrate 30 MG 24 hr tablet  Commonly known as:  IMDUR   30 mg, Oral, Daily      nitroglycerin 0.4 MG SL tablet  Commonly known as:  NITROSTAT   0.4 mg, Sublingual, Every 5 Minutes PRN, Take no more than 3 doses in 15 minutes.      varenicline 1 MG tablet  Commonly known as:  Chantix Continuing Month Maryse   1 mg, Oral, Daily      varenicline 0.5 MG X 11 & 1 MG X 42 tablet  Commonly known as:  CHANTIX MARYSE   Take 0.5 mg one daily on days 1-3 and and 0.5 mg twice daily on days 4-7.Then 1 mg twice daily for a total of 12 weeks.         Changes to Medications      Instructions Start Date   fluticasone 50 MCG/ACT nasal spray  Commonly known as:  FLONASE  What changed:    · when to take this  · reasons to take this   2 sprays, " Nasal, Daily      losartan 25 MG tablet  Commonly known as:  COZAAR  What changed:    · medication strength  · how much to take   25 mg, Oral, Daily   Start Date:  May 14, 2020        Continue These Medications      Instructions Start Date   albuterol sulfate  (90 Base) MCG/ACT inhaler  Commonly known as:  PROVENTIL HFA;VENTOLIN HFA;PROAIR HFA   2 puffs, Inhalation, Every 6 Hours PRN      ARIPiprazole 5 MG tablet  Commonly known as:  ABILIFY   5 mg, Oral, Daily      atorvastatin 40 MG tablet  Commonly known as:  LIPITOR   40 mg, Oral, Daily      calcium carbonate 600 MG tablet  Commonly known as:  OS-THEODORA   600 mg, Oral, Daily      ciclopirox 0.77 % cream  Commonly known as:  LOPROX   Topical, 2 Times Daily      clonazePAM 0.5 MG tablet  Commonly known as:  KlonoPIN   0.5 mg, Oral, 2 Times Daily      Dexilant 60 MG capsule  Generic drug:  dexlansoprazole   60 mg, Oral, Daily      Dulaglutide 0.75 MG/0.5ML solution pen-injector   0.75 mg, Subcutaneous, Weekly      Genvoya 072-641-260-10 MG per tablet  Generic drug:  Tqddgsg-Jbmpk-Njtmsvdi-TenofAF   1 tablet, Oral, Daily      guaiFENesin 600 MG 12 hr tablet  Commonly known as:  MUCINEX   600 mg, Oral, 2 Times Daily PRN      HumuLIN R U-500 KwikPen 500 UNIT/ML solution pen-injector CONCENTRATED injection  Generic drug:  Insulin Regular Human (Conc)   Subcutaneous, 2 Times Daily, 100 units at breakfast, 200 units at dinner.       HYDROcodone-acetaminophen 5-325 MG per tablet  Commonly known as:  NORCO   1 tablet, Oral, Every 12 Hours PRN      insulin aspart 100 UNIT/ML solution pen-injector sc pen  Commonly known as:  novoLOG FLEXPEN   Subcutaneous, 4 Times Daily With Meals & Nightly, Inject 5 units for every 50 BG points above 150.      loperamide 2 MG capsule  Commonly known as:  IMODIUM   2 mg, Oral, 4 Times Daily PRN      loratadine 10 MG tablet  Commonly known as:  CLARITIN   10 mg, Oral, Daily      Lyrica 100 MG capsule  Generic drug:  pregabalin   100 mg,  Oral, 2 Times Daily      meclizine 25 MG tablet  Commonly known as:  ANTIVERT   25 mg, Oral, Daily      Melatonin 5 MG capsule   5 mg, Oral, Every Night at Bedtime      metFORMIN 500 MG tablet  Commonly known as:  GLUCOPHAGE   1,000 mg, Oral, 2 Times Daily With Meals      neomycin-bacitracin-polymyxin 5-400-5000 ointment   1 application, Topical, 4 Times Daily PRN      nystatin 402424 UNIT/GM ointment  Commonly known as:  MYCOSTATIN   Topical, 2 Times Daily      PRENATAL PO   1 tablet, Oral, Daily      Prolia 60 MG/ML solution syringe  Generic drug:  denosumab   60 mg, Subcutaneous, Every 6 Months      promethazine 25 MG tablet  Commonly known as:  PHENERGAN   25 mg, Oral, Every 6 Hours PRN      propranolol LA 80 MG 24 hr capsule  Commonly known as:  INDERAL LA   80 mg, Oral, Daily      rizatriptan 10 MG tablet  Commonly known as:  MAXALT   10 mg, Oral, Once As Needed, May repeat in 2 hours if needed      tamsulosin 0.4 MG capsule 24 hr capsule  Commonly known as:  FLOMAX   0.4 mg, Oral, Nightly      tiZANidine 4 MG tablet  Commonly known as:  ZANAFLEX   4 mg, Oral, Daily PRN      traZODone 100 MG tablet  Commonly known as:  DESYREL   200 mg, Oral, Nightly      Trintellix 20 MG tablet  Generic drug:  Vortioxetine HBr   20 mg, Oral, Daily With Breakfast      Vitamin D3 50 MCG (2000 UT) capsule   2,000 Units, Oral, Daily             Discharge Diet:   Diet Instructions     Diet: Consistent Carbohydrate, Cardiac; Thin      Discharge Diet:   Consistent Carbohydrate  Cardiac       Fluid Consistency:  Thin          Discharge Care Plan / Instructions: seek medial attention if any recurrence of chest pain, worsening chest pain.   Activity at Discharge:   Activity Instructions     Gradually Increase Activity Until at Pre-Hospitalization Level            Follow-up Appointments:  PCP within 1 wk  Dr. Walker in 2-4 wks  Test Results Pending at Discharge:      Talon Rizo MD  05/13/20  13:01    Time: > 30 mins    Part  of this note may be an electronic transcription/translation of spoken language to printed text using the Dragon Dictation System.

## 2020-05-13 NOTE — PROGRESS NOTES
Discharge Planning Assessment  [facility]     Patient Name: Donis Yi  MRN: [id number]  Today's Date: 5/13/2020    Admit Date: 5/11/2020    Discharge Needs Assessment     Row Name 05/13/20 1053       Living Environment    Lives With  friend(s)  (Pended)     Name(s) of Who Lives With Patient  Jaime Rodrigues  (Pended)     Current Living Arrangements  home/apartment/condo  (Pended)     Primary Care Provided by  self  (Pended)     Provides Primary Care For  no one  (Pended)     Quality of Family Relationships  helpful;involved;supportive  (Pended)        Resource/Environmental Concerns    Resource/Environmental Concerns  none  (Pended)     Transportation Concerns  car, none  (Pended)        Transition Planning    Patient/Family Anticipates Transition to  home  (Pended)     Patient/Family Anticipated Services at Transition  none  (Pended)     Transportation Anticipated  family or friend will provide  (Pended)        Discharge Needs Assessment    Readmission Within the Last 30 Days  no previous admission in last 30 days  (Pended)     Concerns to be Addressed  discharge planning  (Pended)     Equipment Currently Used at Home  cane, straight;bipap/cpap;wheelchair  (Pended)     Equipment Needed After Discharge  none  (Pended)     Discharge Coordination/Progress  Pt has rx coverage and a PCP. Pt lived at home w/friend prior to admission and will return home at d/c possibly today, 5/13/20. Pt currently has transportation home. Pt denies any DME or service needs at this time. SW will follow and assist w/any d/c needs that may arise.   (Pended)         Discharge Plan    No documentation.       Destination      Coordination has not been started for this encounter.      Durable Medical Equipment      Coordination has not been started for this encounter.      Dialysis/Infusion      Coordination has not been started for this encounter.      Home Medical Care      Coordination has not been started for this encounter.      Therapy       Coordination has not been started for this encounter.      Community Resources      Coordination has not been started for this encounter.          Demographic Summary    No documentation.       Functional Status    No documentation.       Psychosocial    No documentation.       Abuse/Neglect    No documentation.       Legal    No documentation.       Substance Abuse    No documentation.       Patient Forms    No documentation.           [facility]

## 2020-05-14 ENCOUNTER — READMISSION MANAGEMENT (OUTPATIENT)
Dept: CALL CENTER | Facility: HOSPITAL | Age: 55
End: 2020-05-14

## 2020-05-14 ENCOUNTER — VIRTUAL VISIT (OUTPATIENT)
Dept: INTERNAL MEDICINE | Age: 55
End: 2020-05-14
Payer: COMMERCIAL

## 2020-05-14 VITALS — BODY MASS INDEX: 39.8 KG/M2 | WEIGHT: 278 LBS | HEIGHT: 70 IN

## 2020-05-14 PROCEDURE — 99496 TRANSJ CARE MGMT HIGH F2F 7D: CPT | Performed by: INTERNAL MEDICINE

## 2020-05-14 PROCEDURE — 1111F DSCHRG MED/CURRENT MED MERGE: CPT | Performed by: INTERNAL MEDICINE

## 2020-05-14 NOTE — OUTREACH NOTE
Prep Survey      Responses   [facility] facility patient discharged from?  [place]   Is LACE score < 7 ?  No   Eligibility  Readm Mgmt   Discharge diagnosis  Chest pain, non-cardia, HTN, Hx MI, tobacco abuse, mixed HLD, IDDM, HIV, GERD, Obesity   COVID-19 Test Status  Not tested   Does the patient have one of the following disease processes/diagnoses(primary or secondary)?  Other   Does the patient have Home health ordered?  No   Is there a DME ordered?  No   Comments regarding appointments  Office will call.   Prep survey completed?  Yes          Hanna Solorzano RN

## 2020-05-15 ENCOUNTER — READMISSION MANAGEMENT (OUTPATIENT)
Dept: CALL CENTER | Facility: HOSPITAL | Age: 55
End: 2020-05-15

## 2020-05-15 PROBLEM — Z72.0 TOBACCO ABUSE: Status: ACTIVE | Noted: 2020-05-15

## 2020-05-15 ASSESSMENT — ENCOUNTER SYMPTOMS
CHEST TIGHTNESS: 1
EYE DISCHARGE: 0
FACIAL SWELLING: 0
ABDOMINAL PAIN: 0
EYE REDNESS: 0
COUGH: 0
SHORTNESS OF BREATH: 1
BACK PAIN: 1
ABDOMINAL DISTENTION: 0

## 2020-05-15 NOTE — OUTREACH NOTE
Medical Week 1 Survey      Responses   [facility] patient discharged from?  [place]   COVID-19 Test Status  Not tested   Does the patient have one of the following disease processes/diagnoses(primary or secondary)?  Other   Is there a successful TCM telephone encounter documented?  No   Week 1 attempt successful?  Yes   Call start time  0959   Call end time  1004   Discharge diagnosis  Chest pain, non-cardia, HTN, Hx MI, tobacco abuse, mixed HLD, IDDM, HIV, GERD, Obesity   Is patient permission given to speak with other caregiver?  Yes   List who call center can speak with  Jaime Rodrigues, roommate   Meds reviewed with patient/caregiver?  Yes   Is the patient having any side effects they believe may be caused by any medication additions or changes?  No   Does the patient have all medications ordered at discharge?  Yes   Is the patient taking all medications as directed (includes completed medication regime)?  Yes   Does the patient have a primary care provider?   Yes   Does the patient have an appointment with their PCP within 7 days of discharge?  No   What is preventing the patient from scheduling follow up appointments within 7 days of discharge?  Waiting on return call   Has the patient kept scheduled appointments due by today?  N/A   Has home health visited the patient within 72 hours of discharge?  N/A   Pulse Ox monitoring  None   Psychosocial issues?  No   Did the patient receive a copy of their discharge instructions?  Yes   Nursing interventions  Reviewed instructions with patient   What is the patient's perception of their health status since discharge?  Improving   Is the patient/caregiver able to teach back signs and symptoms related to disease process for when to call PCP?  Yes   Is the patient/caregiver able to teach back signs and symptoms related to disease process for when to call 911?  Yes   Is the patient/caregiver able to teach back the hierarchy of who to call/visit for symptoms/problems?  PCP, Specialist, Home health nurse, Urgent Care, ED, 911  Yes   If the patient is a current smoker, are they able to teach back resources for cessation?  Smoking cessation medications, Smoking cessation classes, Smoking cessation support groups, [id number]   Week 1 call completed?  Yes   Wrap up additional comments  Glucose 249 last night,. No chest pain.  No current questions or concerns.           Amy Gr RN

## 2020-05-15 NOTE — PROGRESS NOTES
Post-Discharge Transitional Care Management Services or Hospital Follow Up      Fabby Bravo   YOB: 1965    Date of Office Visit:  5/14/2020  Date of Hospital Admission:  This is a high level TCM visit date of Hospital admission May 11, 2020  Date of Hospital Discharge: May 13, 2020  Care management risk score Rising risk (score 2-5) and Complex Care (Scores >=6): 5     Non face to face  following discharge, date last encounter closed (first attempt may have been earlier): *No documented post hospital discharge outreach found in the last 14 days     Call initiated 2 business days of discharge: *No response recorded in the last 14 days    Patient Active Problem List   Diagnosis    Type 2 diabetes mellitus with neurologic complication ([facility]    Hypertension    Mixed hyperlipidemia    Bed wetting    HIV-1 infection, symptomatic ([facility]    Anemia    Gastroesophageal reflux disease without esophagitis    Acute pain of both knees    Acute sinusitis    Yeast infection    Deviated nasal septum    Other insomnia    Recurrent major depressive disorder, in remission ([facility]    Anxiety and depression    Hepatic steatosis    HIV (human immunodeficiency virus infection) ([facility]    Kaiser's neuroma, left    ALEKSANDR (obstructive sleep apnea)    Tobacco abuse       Allergies   Allergen Reactions    Darvon [Propoxyphene]     Elavil [Amitriptyline Hcl]     Zithromax [Azithromycin]        Medications listed as ordered at the time of discharge from hospital  Medications reviewed his losartan was decreased from 100 to 25 mg and Imdur and nitro were added    Medications marked \"taking\" at this time  No outpatient medications have been marked as taking for the 5/14/20 encounter (Virtual Visit) with Waylon Boast, MD.        Medications patient taking as of now reconciled against medications ordered at time of hospital discharge: yes    Chief Complaint   Patient presents with    [facility]

## 2020-05-19 RX ORDER — TRAZODONE HYDROCHLORIDE 100 MG/1
TABLET ORAL
Qty: 180 TABLET | Refills: 3 | Status: SHIPPED | OUTPATIENT
Start: 2020-05-19 | End: 2021-04-23

## 2020-05-20 ENCOUNTER — TELEPHONE (OUTPATIENT)
Dept: PODIATRY | Facility: CLINIC | Age: 55
End: 2020-05-20

## 2020-05-21 ENCOUNTER — READMISSION MANAGEMENT (OUTPATIENT)
Dept: CALL CENTER | Facility: HOSPITAL | Age: 55
End: 2020-05-21

## 2020-05-21 ENCOUNTER — APPOINTMENT (OUTPATIENT)
Dept: WOUND CARE | Facility: HOSPITAL | Age: 55
End: 2020-05-21

## 2020-05-21 NOTE — OUTREACH NOTE
Medical Week 2 Survey      Responses   [facility] patient discharged from?  [place]   COVID-19 Test Status  Not tested   Does the patient have one of the following disease processes/diagnoses(primary or secondary)?  Other   Week 2 attempt successful?  Yes   Call start time  1016   Discharge diagnosis  Chest pain, non-cardia, HTN, Hx MI, tobacco abuse, mixed HLD, IDDM, HIV, GERD, Obesity   Call end time  1018   Meds reviewed with patient/caregiver?  Yes   Is the patient taking all medications as directed (includes completed medication regime)?  Yes   Comments regarding appointments  Appt with Dr. Harper 5/21/20,  appt with Dr. Walker on 6/8/20   Does the patient have an appointment with their PCP within 7 days of discharge?  Yes   Comments regarding PCP  5/14/20   Has the patient kept scheduled appointments due by today?  Yes   Pulse Ox monitoring  None   What is the patient's perception of their health status since discharge?  Improving   Week 2 Call Completed?  Yes          Ayleen Zhao RN

## 2020-05-26 ENCOUNTER — VIRTUAL VISIT (OUTPATIENT)
Dept: INTERNAL MEDICINE | Age: 55
End: 2020-05-26
Payer: COMMERCIAL

## 2020-05-26 PROBLEM — R07.89 OTHER CHEST PAIN: Status: ACTIVE | Noted: 2017-10-06

## 2020-05-26 PROCEDURE — 99213 OFFICE O/P EST LOW 20 MIN: CPT | Performed by: INTERNAL MEDICINE

## 2020-05-26 RX ORDER — LOSARTAN POTASSIUM 25 MG/1
25 TABLET ORAL DAILY
Qty: 90 TABLET | Refills: 3
Start: 2020-05-26 | End: 2020-06-02

## 2020-05-26 RX ORDER — ISOSORBIDE MONONITRATE 30 MG/1
30 TABLET, EXTENDED RELEASE ORAL DAILY
Qty: 90 TABLET | Refills: 3
Start: 2020-05-26 | End: 2020-06-04

## 2020-05-26 ASSESSMENT — ENCOUNTER SYMPTOMS
ABDOMINAL DISTENTION: 0
EYE DISCHARGE: 0
COUGH: 0
ABDOMINAL PAIN: 0
SHORTNESS OF BREATH: 1
CHEST TIGHTNESS: 0
BACK PAIN: 1
FACIAL SWELLING: 0
EYE REDNESS: 0

## 2020-05-26 NOTE — PROGRESS NOTES
2020    TELEHEALTH EVALUATION -- Audio/Visual (During WHMJR-58 public health emergency)    HPI: This is a video visit to follow-up recent chest pain patient says he had one brief episode of chest pain started to take a nitro but it went away. We reviewed and updated his medication patient is in the bed says he feels okay. He denies chest pain other than one brief fleeting episode he denies fevers chills rashes cough or congestion. He says his blood pressure is running on the low side and he describes what sounds like good blood or control but that is not usual for him. Garrel Romberg (:  1965) has requested an audio/video evaluation for the following concern(s):    Chest pain, diabetes    Review of Systems   Constitutional: Positive for fatigue. Negative for chills and fever. HENT: Negative for congestion and facial swelling. Eyes: Negative for discharge and redness. Respiratory: Positive for shortness of breath. Negative for cough and chest tightness. Cardiovascular: Positive for chest pain. Gastrointestinal: Negative for abdominal distention and abdominal pain. Genitourinary: Negative for dysuria. Musculoskeletal: Positive for arthralgias and back pain. Skin: Negative for rash and wound. Neurological: Negative for headaches. Psychiatric/Behavioral: Positive for sleep disturbance. Negative for confusion and decreased concentration. Prior to Visit Medications    Medication Sig Taking?  Authorizing Provider   losartan (COZAAR) 25 MG tablet Take 1 tablet by mouth daily Yes Karen Ely MD   isosorbide mononitrate (IMDUR) 30 MG extended release tablet Take 1 tablet by mouth daily Yes Karen Ely MD   traZODone (DESYREL) 100 MG tablet Take 2 tablets by mouth nightly (Stop 150mg)  Karen Ely MD   propranolol (INDERAL LA) 80 MG extended release capsule 1 capsule by mouth daily  Karen Ely MD   clotrimazole (LOTRIMIN) 1 % external solution Apply 1 actuation obtained by patient/caregiver or practitioner observation)    Blood pressure- 117/68 Heart rate-    Respiratory rate-    Temperature-  Pulse oximetry-     Constitutional: [] Appears well-developed and well-nourished [] No apparent distress      [x] Abnormal- sleepy appearing  Mental status  [x] Alert and awake  [x] Oriented to person/place/time []Able to follow commands      Eyes:  EOM    []  Normal  [] Abnormal-  Sclera  [x]  Normal  [] Abnormal -         Discharge []  None visible  [] Abnormal -    HENT:   [] Normocephalic, atraumatic. [] Abnormal   [] Mouth/Throat: Mucous membranes are moist.     External Ears [] Normal  [] Abnormal-     Neck: [] No visualized mass     Pulmonary/Chest: [] Respiratory effort normal.  [] No visualized signs of difficulty breathing or respiratory distress        [] Abnormal-      Musculoskeletal:   [] Normal gait with no signs of ataxia         [] Normal range of motion of neck        [] Abnormal-       Neurological:        [x] No Facial Asymmetry (Cranial nerve 7 motor function) (limited exam to video visit)          [x] No gaze palsy        [] Abnormal-         Skin:        [x] No significant exanthematous lesions or discoloration noted on facial skin         [] Abnormal-            Psychiatric:       [x] Normal Affect [] No Hallucinations        [] Abnormal-     Other pertinent observable physical exam findings-     ASSESSMENT/PLAN:  1. Chest pain- if recurrent chest pain go to ER     2. Type 2 diabetes mellitus with diabetic polyneuropathy, with long-term current use of insulin (HCC)  Diabetes has been completely out-of-control he describes blood sugar readings in the 100 range we will see what the next A1c shows hopefully his A1c is better. 3. Essential hypertension  Stable and f/u - bp ok with decrease of cozaar to 25mg - watch closely     4. Recurrent major depressive disorder, in remission ([address]  stable    5.  Other insomnia  follow      Return in about 2 months (around 7/26/2020) for ov. Fabby Bravo is a 48 y.o. male being evaluated by a Virtual Visit (video visit) encounter to address concerns as mentioned above. A caregiver was present when appropriate. Due to this being a TeleHealth encounter (During HNDDD-42 public health emergency), evaluation of the following organ systems was limited: Vitals/Constitutional/EENT/Resp/CV/GI//MS/Neuro/Skin/Heme-Lymph-Imm. Pursuant to the emergency declaration under the [address] [place] [address] authority and the [employer] Act, this Virtual Visit was conducted with patient's (and/or legal guardian's) consent, to reduce the patient's risk of exposure to COVID-19 and provide necessary medical care. The patient (and/or legal guardian) has also been advised to contact this office for worsening conditions or problems, and seek emergency medical treatment and/or call 911 if deemed necessary. Patient identification was verified at the start of the visit: yes    Total time spent on this encounter:15'  Services were provided through a video synchronous discussion virtually to substitute for in-person clinic visit. Patient and provider were located at their individual homes. --Waylon Boast, MD on 5/26/2020 at 9:38 PM    An electronic signature was used to authenticate this note.

## 2020-05-28 ENCOUNTER — READMISSION MANAGEMENT (OUTPATIENT)
Dept: CALL CENTER | Facility: HOSPITAL | Age: 55
End: 2020-05-28

## 2020-05-28 NOTE — OUTREACH NOTE
Medical Week 3 Survey      Responses   [facility] patient discharged from?  [place]   COVID-19 Test Status  Not tested   Does the patient have one of the following disease processes/diagnoses(primary or secondary)?  Other   Week 3 attempt successful?  No   Unsuccessful attempts  Attempt 1          Donis Sidhu RN

## 2020-06-02 ENCOUNTER — READMISSION MANAGEMENT (OUTPATIENT)
Dept: CALL CENTER | Facility: HOSPITAL | Age: 55
End: 2020-06-02

## 2020-06-02 ENCOUNTER — TELEPHONE (OUTPATIENT)
Dept: ENDOCRINOLOGY | Facility: CLINIC | Age: 55
End: 2020-06-02

## 2020-06-02 NOTE — OUTREACH NOTE
Medical Week 3 Survey      Responses   [facility] patient discharged from?  [place]   COVID-19 Test Status  Not tested   Does the patient have one of the following disease processes/diagnoses(primary or secondary)?  Other   Week 3 attempt successful?  No   Unsuccessful attempts  Attempt 2          Vidya Vaughn RN

## 2020-06-02 NOTE — TELEPHONE ENCOUNTER
[facility] - [place], [place] - [address] - [phone]  - [phone] FX    Pt needs a new script for novalog   fax [phone]  Thank You

## 2020-06-11 RX ORDER — INSULIN HUMAN 500 [IU]/ML
INJECTION, SOLUTION SUBCUTANEOUS
Qty: 24 ML | Refills: 5 | Status: SHIPPED | OUTPATIENT
Start: 2020-06-11 | End: 2020-12-05

## 2020-06-11 RX ORDER — DULAGLUTIDE 0.75 MG/.5ML
INJECTION, SOLUTION SUBCUTANEOUS
Qty: 2 ML | Refills: 3 | Status: SHIPPED | OUTPATIENT
Start: 2020-06-11 | End: 2020-09-30

## 2020-06-12 RX ORDER — INSULIN HUMAN 500 [IU]/ML
INJECTION, SOLUTION SUBCUTANEOUS
Qty: 24 ML | Refills: 5 | Status: SHIPPED | OUTPATIENT
Start: 2020-06-12 | End: 2021-05-11 | Stop reason: ALTCHOICE

## 2020-06-12 RX ORDER — DULAGLUTIDE 0.75 MG/.5ML
INJECTION, SOLUTION SUBCUTANEOUS
Qty: 2 ML | Refills: 3 | Status: SHIPPED | OUTPATIENT
Start: 2020-06-12 | End: 2020-07-22

## 2020-06-18 ENCOUNTER — OFFICE VISIT (OUTPATIENT)
Dept: CARDIOLOGY | Facility: CLINIC | Age: 55
End: 2020-06-18

## 2020-06-18 ENCOUNTER — PREP FOR SURGERY (OUTPATIENT)
Dept: OTHER | Facility: HOSPITAL | Age: 55
End: 2020-06-18

## 2020-06-18 VITALS
BODY MASS INDEX: 37.65 KG/M2 | WEIGHT: 263 LBS | HEIGHT: 70 IN | HEART RATE: 113 BPM | OXYGEN SATURATION: 97 % | DIASTOLIC BLOOD PRESSURE: 80 MMHG | SYSTOLIC BLOOD PRESSURE: 140 MMHG

## 2020-06-18 DIAGNOSIS — R94.39 ABNORMAL STRESS TEST: Primary | ICD-10-CM

## 2020-06-18 DIAGNOSIS — E11.9 TYPE 2 DIABETES MELLITUS WITHOUT COMPLICATION, WITH LONG-TERM CURRENT USE OF INSULIN (HCC): ICD-10-CM

## 2020-06-18 DIAGNOSIS — Z21 ASYMPTOMATIC HIV INFECTION (HCC): ICD-10-CM

## 2020-06-18 DIAGNOSIS — Z79.4 TYPE 2 DIABETES MELLITUS WITHOUT COMPLICATION, WITH LONG-TERM CURRENT USE OF INSULIN (HCC): ICD-10-CM

## 2020-06-18 DIAGNOSIS — I10 ESSENTIAL HYPERTENSION: ICD-10-CM

## 2020-06-18 DIAGNOSIS — Z72.0 TOBACCO ABUSE: ICD-10-CM

## 2020-06-18 DIAGNOSIS — I20.8 STABLE ANGINA PECTORIS (HCC): ICD-10-CM

## 2020-06-18 DIAGNOSIS — E78.2 MIXED HYPERLIPIDEMIA: ICD-10-CM

## 2020-06-18 PROBLEM — R07.9 CHEST PAIN: Status: ACTIVE | Noted: 2020-06-18

## 2020-06-18 PROBLEM — I20.89 STABLE ANGINA PECTORIS: Status: ACTIVE | Noted: 2020-06-18

## 2020-06-18 PROCEDURE — 93000 ELECTROCARDIOGRAM COMPLETE: CPT | Performed by: INTERNAL MEDICINE

## 2020-06-18 PROCEDURE — 99214 OFFICE O/P EST MOD 30 MIN: CPT | Performed by: INTERNAL MEDICINE

## 2020-06-18 RX ORDER — SODIUM CHLORIDE 0.9 % (FLUSH) 0.9 %
10 SYRINGE (ML) INJECTION AS NEEDED
Status: CANCELLED | OUTPATIENT
Start: 2020-06-18

## 2020-06-18 RX ORDER — SODIUM CHLORIDE 0.9 % (FLUSH) 0.9 %
3 SYRINGE (ML) INJECTION EVERY 12 HOURS SCHEDULED
Status: CANCELLED | OUTPATIENT
Start: 2020-06-18

## 2020-06-18 RX ORDER — ASPIRIN 81 MG/1
81 TABLET ORAL DAILY
Status: CANCELLED | OUTPATIENT
Start: 2020-06-19

## 2020-06-18 RX ORDER — ASPIRIN 81 MG/1
324 TABLET, CHEWABLE ORAL ONCE
Status: CANCELLED | OUTPATIENT
Start: 2020-06-18 | End: 2020-06-18

## 2020-06-18 RX ORDER — ISOSORBIDE MONONITRATE 30 MG/1
30 TABLET, EXTENDED RELEASE ORAL DAILY
Qty: 90 TABLET | Refills: 3 | Status: SHIPPED | OUTPATIENT
Start: 2020-06-18 | End: 2020-10-19

## 2020-06-18 RX ORDER — ASPIRIN 81 MG/1
81 TABLET ORAL DAILY
Qty: 90 TABLET | Refills: 3 | Status: ON HOLD | OUTPATIENT
Start: 2020-06-18 | End: 2021-08-26

## 2020-06-18 RX ORDER — SODIUM CHLORIDE 9 MG/ML
1-3 INJECTION, SOLUTION INTRAVENOUS CONTINUOUS
Status: CANCELLED | OUTPATIENT
Start: 2020-06-18

## 2020-06-18 NOTE — H&P (VIEW-ONLY)
"     Subjective:     Encounter Date:06/18/2020      Patient ID: Donis Yi is a 54 y.o. male.    Chief Complaint: chest pain  History of Present Illness  54 y.o. male smoker with hyperlipidemia, essential hypertension, and poorly controlled insulin-dependent diabetes mellitus, also with well-controlled HIV, whom I met when he was admitted in May '20 after presenting to the emergency department with chest pain.  He had acute onset of left-sided chest discomfort the day before, described as a \"dullness and throbbing ache\" with radiation down the left arm and associated nausea and dyspnea without any associated diaphoresis.  He reported numerous recurrences of the chest discomfort in the preceding 24 hours, usually with acute onset of 8/10 chest discomfort that lasted for about 30 minutes, before gradually going away.  Prior to my evaluation, he did call for assistance due to another episode of discomfort.  He was given 1 sublingual nitroglycerin for this, and reported rather prompt improvement in the pain to 4/10, with subsequent resolution completely a few minutes later.  He was not having any discomfort during my evaluation.  Stress echocardiogram was abnormal.  He discussed pros and cons of proceeding with cardiac catheterization with my partner the following day, but ultimately they decided on initiating medical therapy to see if this would help with his symptoms and he was discharged home.  This included initiation of long-acting nitrate therapy to his beta-blocker.    Since discharge,he is still having CP as described but is improving in that less severe (6/10) and less frequent (daily).  Most of the time, pain is gone before he can take a SL NTG. However, he does report new symptoms of fatigue and exertional dyspnea. He reports SOA just walking from bedroom to bathroom (6 feet).  He reports these symptoms last up to 15 minutes, and are associated with palpitations.      He says he smoked his last " cigarette today. Has been started on Chantix.    The following portions of the patient's history were reviewed and updated as appropriate: allergies, current medications, past family history, past medical history, past social history, past surgical history and problem list.    Review of Systems   Constitution: Positive for malaise/fatigue.   Cardiovascular: Positive for chest pain and dyspnea on exertion. Negative for claudication, leg swelling, near-syncope, orthopnea, palpitations, paroxysmal nocturnal dyspnea and syncope.   Respiratory: Negative for shortness of breath.    Hematologic/Lymphatic: Does not bruise/bleed easily.           Current Outpatient Medications:   •  albuterol (PROVENTIL HFA;VENTOLIN HFA) 108 (90 BASE) MCG/ACT inhaler, Inhale 2 puffs every 6 (six) hours as needed for wheezing., Disp: , Rfl:   •  ARIPiprazole (ABILIFY) 5 MG tablet, Take 5 mg by mouth Daily., Disp: , Rfl:   •  aspirin 81 MG EC tablet, Take 1 tablet by mouth Daily., Disp: 90 tablet, Rfl: 3  •  atorvastatin (LIPITOR) 40 MG tablet, Take 40 mg by mouth Daily., Disp: , Rfl: 3  •  calcium carbonate (OS-THEODORA) 600 MG tablet, Take 600 mg by mouth Daily., Disp: , Rfl:   •  Cholecalciferol (VITAMIN D3) 50 MCG (2000 UT) capsule, Take 2,000 Units by mouth Daily., Disp: , Rfl:   •  ciclopirox (LOPROX) 0.77 % cream, Apply  topically to the appropriate area as directed 2 (Two) Times a Day., Disp: 30 g, Rfl: 5  •  clonazePAM (KlonoPIN) 0.5 MG tablet, Take 0.5 mg by mouth 2 (Two) Times a Day., Disp: , Rfl:   •  denosumab (PROLIA) 60 MG/ML solution syringe, Inject 60 mg under the skin Every 6 (Six) Months., Disp: , Rfl:   •  DEXILANT 60 MG capsule, Take 60 mg by mouth Daily., Disp: , Rfl: 1  •  Ppgsbht-Svnkj-Qvtmlbiq-TenofAF (GENVOYA) [phone] MG tablet, Take 1 tablet by mouth daily., Disp: , Rfl:   •  fluticasone (FLONASE) 50 MCG/ACT nasal spray, 2 sprays into each nostril Daily. (Patient taking differently: 2 sprays into the nostril(s) as  directed by provider Daily As Needed for Rhinitis.), Disp: 1 bottle, Rfl: 6  •  guaiFENesin (MUCINEX) 600 MG 12 hr tablet, Take 600 mg by mouth 2 (two) times a day as needed for cough., Disp: , Rfl:   •  HUMULIN R U-500 KWIKPEN 500 UNIT/ML solution pen-injector CONCENTRATED injection, inject 200 units TWICE DAILY with meals, Disp: 24 mL, Rfl: 5  •  HYDROcodone-acetaminophen (NORCO) 5-325 MG per tablet, Take 1 tablet by mouth Every 12 (Twelve) Hours As Needed for Moderate Pain  or Severe Pain ., Disp: , Rfl:   •  insulin aspart (novoLOG FLEXPEN) 100 UNIT/ML solution pen-injector sc pen, Inject 5 units for every 50 BG points above 150 up to 40 units at lunch and supper only according to sliding scale, Disp: 24 mL, Rfl: 11  •  isosorbide mononitrate (IMDUR) 30 MG 24 hr tablet, Take 1 tablet by mouth Daily., Disp: 90 tablet, Rfl: 3  •  loperamide (IMODIUM) 2 MG capsule, Take 2 mg by mouth 4 (Four) Times a Day As Needed for Diarrhea., Disp: , Rfl:   •  loratadine (CLARITIN) 10 MG tablet, Take 10 mg by mouth daily., Disp: , Rfl:   •  losartan (COZAAR) 25 MG tablet, Take 1 tablet by mouth Daily., Disp: 30 tablet, Rfl: 0  •  LYRICA 100 MG capsule, Take 100 mg by mouth 2 (Two) Times a Day., Disp: , Rfl:   •  meclizine (ANTIVERT) 25 MG tablet, Take 25 mg by mouth Daily., Disp: , Rfl:   •  Melatonin 5 MG capsule, Take 5 mg by mouth every night at bedtime., Disp: , Rfl: 3  •  metFORMIN (GLUCOPHAGE) 500 MG tablet, Take 1,000 mg by mouth 2 (Two) Times a Day With Meals., Disp: , Rfl: 3  •  neomycin-bacitracin-polymyxin (NEOSPORIN) 5-400-5000 ointment, Apply 1 application topically to the appropriate area as directed 4 (Four) Times a Day As Needed for Irritation., Disp: , Rfl:   •  nitroglycerin (NITROSTAT) 0.4 MG SL tablet, Place 1 tablet under the tongue Every 5 (Five) Minutes As Needed for Chest Pain (Only if SBP Greater Than 100). Take no more than 3 doses in 15 minutes., Disp: 30 tablet, Rfl: 0  •  nystatin (MYCOSTATIN) 223408  UNIT/GM ointment, Apply  topically to the appropriate area as directed 2 (Two) Times a Day., Disp: , Rfl:   •  Prenatal Vit-Fe Fumarate-FA (PRENATAL PO), Take 1 tablet by mouth Daily., Disp: , Rfl: 3  •  promethazine (PHENERGAN) 25 MG tablet, Take 25 mg by mouth every 6 (six) hours as needed for nausea or vomiting., Disp: , Rfl:   •  propranolol LA (INDERAL LA) 80 MG 24 hr capsule, Take 80 mg by mouth Daily., Disp: , Rfl: 5  •  rizatriptan (MAXALT) 10 MG tablet, Take 10 mg by mouth 1 (one) time as needed for migraine. May repeat in 2 hours if needed, Disp: , Rfl:   •  tamsulosin (FLOMAX) 0.4 MG capsule 24 hr capsule, Take 1 capsule by mouth Every Night., Disp: 90 capsule, Rfl: 3  •  tiZANidine (ZANAFLEX) 4 MG tablet, Take 4 mg by mouth Daily As Needed for Muscle Spasms., Disp: , Rfl:   •  traZODone (DESYREL) 100 MG tablet, Take 200 mg by mouth Every Night., Disp: , Rfl: 3  •  TRULICITY 0.75 MG/0.5ML solution pen-injector, Inject 0.75mg (0.5ml) under the skin AS DIRECTED every week, Disp: 2 mL, Rfl: 3  •  varenicline (Chantix Continuing Month Maryse) 1 MG tablet, Take 1 tablet by mouth Daily., Disp: 30 tablet, Rfl: 0  •  varenicline (CHANTIX MARYSE) 0.5 MG X 11 & 1 MG X 42 tablet, Take 0.5 mg one daily on days 1-3 and and 0.5 mg twice daily on days 4-7.Then 1 mg twice daily for a total of 12 weeks., Disp: 53 tablet, Rfl: 0  •  Vortioxetine HBr (Trintellix) 20 MG tablet, Take 20 mg by mouth Daily With Breakfast., Disp: , Rfl:        Objective:      Vitals:    06/18/20 1354   BP: 140/80   Pulse: 113   SpO2: 97%     Physical Exam   Constitutional: He is oriented to person, place, and time. He appears well-developed and well-nourished.   Neck: No JVD present.   Cardiovascular: Normal rate, regular rhythm, normal heart sounds and intact distal pulses.   No murmur heard.  Pulmonary/Chest: Effort normal and breath sounds normal.   Musculoskeletal: He exhibits no edema.   Neurological: He is alert and oriented to person, place,  and time.   Skin: Skin is warm and dry.       Lab Review:         ECG 12 Lead  Date/Time: 6/18/2020 5:09 PM  Performed by: Deon Bailey MD  Authorized by: Deon Bailey MD   Comparison: compared with previous ECG from 5/12/2020  Similar to previous ECG  Rhythm: sinus tachycardia  Rate: tachycardic  BPM: 113  Q waves: III and aVF    T inversion: III  T flattening: aVF  QRS axis: right    Clinical impression: abnormal EKG            Results for orders placed during the hospital encounter of 05/11/20   Adult Stress Echo W/ Cont or Stress Agent if Necessary Per Protocol    Narrative · High risk stress test.  · Post-stress images show globally hypokinetic response, with particularly   severe hypokinesis of inferior wall.        Lab Results   Component Value Date    CHOL 129 05/12/2020    CHOL 117 02/19/2020    CHOL 147 01/14/2019     Lab Results   Component Value Date    TRIG 92 05/12/2020    TRIG 141 02/19/2020    TRIG 133 09/09/2019     Lab Results   Component Value Date    HDL 45 05/12/2020    HDL 56 02/19/2020    HDL 48 (L) 09/09/2019     Lab Results   Component Value Date    LDL 66 05/12/2020    LDL 33 02/19/2020    LDL 51 09/09/2019     Lab Results   Component Value Date    VLDL 18.4 05/12/2020    VLDL 28.2 02/19/2020     Lab Results   Component Value Date    LDLHDL 1.46 05/12/2020    LDLHDL 0.59 02/19/2020    LDLHDL 1.60 01/14/2019         Assessment/Plan:     Problem List Items Addressed This Visit (all established)       Cardiovascular and Mediastinum    Mixed hyperlipidemia: Well-controlled    Hypertension: Borderline today    Unstable angina: on two anti-anginals.    Abnormal stress test - Primary: High risk, still with ongoing symptoms         Endocrine    Type 2 diabetes mellitus (CMS/HCC): Poorly controlled       Other    HIV (human immunodeficiency virus infection) (CMS/HCC): Well-controlled    Tobacco abuse: States intention of quitting today            Recommendations/plans:  Since patient is  still having daily angina despite 2 antianginals, along with worsening exertional dyspnea that is now severe and also profound fatigue, will proceed with diagnostic cardiac catheterization and possible intervention.  Continue aspirin, statin, beta-blocker, long-acting nitrate in the meantime.    Congratulated on smoking cessation efforts that he states are beginning immediately.      Deon Bailey MD  06/18/2020  17:12

## 2020-06-18 NOTE — PROGRESS NOTES
"     Subjective:     Encounter Date:06/18/2020      Patient ID: Donis Yi is a 54 y.o. male.    Chief Complaint: chest pain  History of Present Illness  54 y.o. male smoker with hyperlipidemia, essential hypertension, and poorly controlled insulin-dependent diabetes mellitus, also with well-controlled HIV, whom I met when he was admitted in May '20 after presenting to the emergency department with chest pain.  He had acute onset of left-sided chest discomfort the day before, described as a \"dullness and throbbing ache\" with radiation down the left arm and associated nausea and dyspnea without any associated diaphoresis.  He reported numerous recurrences of the chest discomfort in the preceding 24 hours, usually with acute onset of 8/10 chest discomfort that lasted for about 30 minutes, before gradually going away.  Prior to my evaluation, he did call for assistance due to another episode of discomfort.  He was given 1 sublingual nitroglycerin for this, and reported rather prompt improvement in the pain to 4/10, with subsequent resolution completely a few minutes later.  He was not having any discomfort during my evaluation.  Stress echocardiogram was abnormal.  He discussed pros and cons of proceeding with cardiac catheterization with my partner the following day, but ultimately they decided on initiating medical therapy to see if this would help with his symptoms and he was discharged home.  This included initiation of long-acting nitrate therapy to his beta-blocker.    Since discharge,he is still having CP as described but is improving in that less severe (6/10) and less frequent (daily).  Most of the time, pain is gone before he can take a SL NTG. However, he does report new symptoms of fatigue and exertional dyspnea. He reports SOA just walking from bedroom to bathroom (6 feet).  He reports these symptoms last up to 15 minutes, and are associated with palpitations.      He says he smoked his last " cigarette today. Has been started on Chantix.    The following portions of the patient's history were reviewed and updated as appropriate: allergies, current medications, past family history, past medical history, past social history, past surgical history and problem list.    Review of Systems   Constitution: Positive for malaise/fatigue.   Cardiovascular: Positive for chest pain and dyspnea on exertion. Negative for claudication, leg swelling, near-syncope, orthopnea, palpitations, paroxysmal nocturnal dyspnea and syncope.   Respiratory: Negative for shortness of breath.    Hematologic/Lymphatic: Does not bruise/bleed easily.           Current Outpatient Medications:   •  albuterol (PROVENTIL HFA;VENTOLIN HFA) 108 (90 BASE) MCG/ACT inhaler, Inhale 2 puffs every 6 (six) hours as needed for wheezing., Disp: , Rfl:   •  ARIPiprazole (ABILIFY) 5 MG tablet, Take 5 mg by mouth Daily., Disp: , Rfl:   •  aspirin 81 MG EC tablet, Take 1 tablet by mouth Daily., Disp: 90 tablet, Rfl: 3  •  atorvastatin (LIPITOR) 40 MG tablet, Take 40 mg by mouth Daily., Disp: , Rfl: 3  •  calcium carbonate (OS-THEODORA) 600 MG tablet, Take 600 mg by mouth Daily., Disp: , Rfl:   •  Cholecalciferol (VITAMIN D3) 50 MCG (2000 UT) capsule, Take 2,000 Units by mouth Daily., Disp: , Rfl:   •  ciclopirox (LOPROX) 0.77 % cream, Apply  topically to the appropriate area as directed 2 (Two) Times a Day., Disp: 30 g, Rfl: 5  •  clonazePAM (KlonoPIN) 0.5 MG tablet, Take 0.5 mg by mouth 2 (Two) Times a Day., Disp: , Rfl:   •  denosumab (PROLIA) 60 MG/ML solution syringe, Inject 60 mg under the skin Every 6 (Six) Months., Disp: , Rfl:   •  DEXILANT 60 MG capsule, Take 60 mg by mouth Daily., Disp: , Rfl: 1  •  Aebzqom-Jbccr-Cxhsorvy-TenofAF (GENVOYA) [phone] MG tablet, Take 1 tablet by mouth daily., Disp: , Rfl:   •  fluticasone (FLONASE) 50 MCG/ACT nasal spray, 2 sprays into each nostril Daily. (Patient taking differently: 2 sprays into the nostril(s) as  directed by provider Daily As Needed for Rhinitis.), Disp: 1 bottle, Rfl: 6  •  guaiFENesin (MUCINEX) 600 MG 12 hr tablet, Take 600 mg by mouth 2 (two) times a day as needed for cough., Disp: , Rfl:   •  HUMULIN R U-500 KWIKPEN 500 UNIT/ML solution pen-injector CONCENTRATED injection, inject 200 units TWICE DAILY with meals, Disp: 24 mL, Rfl: 5  •  HYDROcodone-acetaminophen (NORCO) 5-325 MG per tablet, Take 1 tablet by mouth Every 12 (Twelve) Hours As Needed for Moderate Pain  or Severe Pain ., Disp: , Rfl:   •  insulin aspart (novoLOG FLEXPEN) 100 UNIT/ML solution pen-injector sc pen, Inject 5 units for every 50 BG points above 150 up to 40 units at lunch and supper only according to sliding scale, Disp: 24 mL, Rfl: 11  •  isosorbide mononitrate (IMDUR) 30 MG 24 hr tablet, Take 1 tablet by mouth Daily., Disp: 90 tablet, Rfl: 3  •  loperamide (IMODIUM) 2 MG capsule, Take 2 mg by mouth 4 (Four) Times a Day As Needed for Diarrhea., Disp: , Rfl:   •  loratadine (CLARITIN) 10 MG tablet, Take 10 mg by mouth daily., Disp: , Rfl:   •  losartan (COZAAR) 25 MG tablet, Take 1 tablet by mouth Daily., Disp: 30 tablet, Rfl: 0  •  LYRICA 100 MG capsule, Take 100 mg by mouth 2 (Two) Times a Day., Disp: , Rfl:   •  meclizine (ANTIVERT) 25 MG tablet, Take 25 mg by mouth Daily., Disp: , Rfl:   •  Melatonin 5 MG capsule, Take 5 mg by mouth every night at bedtime., Disp: , Rfl: 3  •  metFORMIN (GLUCOPHAGE) 500 MG tablet, Take 1,000 mg by mouth 2 (Two) Times a Day With Meals., Disp: , Rfl: 3  •  neomycin-bacitracin-polymyxin (NEOSPORIN) 5-400-5000 ointment, Apply 1 application topically to the appropriate area as directed 4 (Four) Times a Day As Needed for Irritation., Disp: , Rfl:   •  nitroglycerin (NITROSTAT) 0.4 MG SL tablet, Place 1 tablet under the tongue Every 5 (Five) Minutes As Needed for Chest Pain (Only if SBP Greater Than 100). Take no more than 3 doses in 15 minutes., Disp: 30 tablet, Rfl: 0  •  nystatin (MYCOSTATIN) 163560  UNIT/GM ointment, Apply  topically to the appropriate area as directed 2 (Two) Times a Day., Disp: , Rfl:   •  Prenatal Vit-Fe Fumarate-FA (PRENATAL PO), Take 1 tablet by mouth Daily., Disp: , Rfl: 3  •  promethazine (PHENERGAN) 25 MG tablet, Take 25 mg by mouth every 6 (six) hours as needed for nausea or vomiting., Disp: , Rfl:   •  propranolol LA (INDERAL LA) 80 MG 24 hr capsule, Take 80 mg by mouth Daily., Disp: , Rfl: 5  •  rizatriptan (MAXALT) 10 MG tablet, Take 10 mg by mouth 1 (one) time as needed for migraine. May repeat in 2 hours if needed, Disp: , Rfl:   •  tamsulosin (FLOMAX) 0.4 MG capsule 24 hr capsule, Take 1 capsule by mouth Every Night., Disp: 90 capsule, Rfl: 3  •  tiZANidine (ZANAFLEX) 4 MG tablet, Take 4 mg by mouth Daily As Needed for Muscle Spasms., Disp: , Rfl:   •  traZODone (DESYREL) 100 MG tablet, Take 200 mg by mouth Every Night., Disp: , Rfl: 3  •  TRULICITY 0.75 MG/0.5ML solution pen-injector, Inject 0.75mg (0.5ml) under the skin AS DIRECTED every week, Disp: 2 mL, Rfl: 3  •  varenicline (Chantix Continuing Month Maryse) 1 MG tablet, Take 1 tablet by mouth Daily., Disp: 30 tablet, Rfl: 0  •  varenicline (CHANTIX MARYSE) 0.5 MG X 11 & 1 MG X 42 tablet, Take 0.5 mg one daily on days 1-3 and and 0.5 mg twice daily on days 4-7.Then 1 mg twice daily for a total of 12 weeks., Disp: 53 tablet, Rfl: 0  •  Vortioxetine HBr (Trintellix) 20 MG tablet, Take 20 mg by mouth Daily With Breakfast., Disp: , Rfl:        Objective:      Vitals:    06/18/20 1354   BP: 140/80   Pulse: 113   SpO2: 97%     Physical Exam   Constitutional: He is oriented to person, place, and time. He appears well-developed and well-nourished.   Neck: No JVD present.   Cardiovascular: Normal rate, regular rhythm, normal heart sounds and intact distal pulses.   No murmur heard.  Pulmonary/Chest: Effort normal and breath sounds normal.   Musculoskeletal: He exhibits no edema.   Neurological: He is alert and oriented to person, place,  and time.   Skin: Skin is warm and dry.       Lab Review:         ECG 12 Lead  Date/Time: 6/18/2020 5:09 PM  Performed by: Deon Bailey MD  Authorized by: Deon Bailey MD   Comparison: compared with previous ECG from 5/12/2020  Similar to previous ECG  Rhythm: sinus tachycardia  Rate: tachycardic  BPM: 113  Q waves: III and aVF    T inversion: III  T flattening: aVF  QRS axis: right    Clinical impression: abnormal EKG            Results for orders placed during the hospital encounter of 05/11/20   Adult Stress Echo W/ Cont or Stress Agent if Necessary Per Protocol    Narrative · High risk stress test.  · Post-stress images show globally hypokinetic response, with particularly   severe hypokinesis of inferior wall.        Lab Results   Component Value Date    CHOL 129 05/12/2020    CHOL 117 02/19/2020    CHOL 147 01/14/2019     Lab Results   Component Value Date    TRIG 92 05/12/2020    TRIG 141 02/19/2020    TRIG 133 09/09/2019     Lab Results   Component Value Date    HDL 45 05/12/2020    HDL 56 02/19/2020    HDL 48 (L) 09/09/2019     Lab Results   Component Value Date    LDL 66 05/12/2020    LDL 33 02/19/2020    LDL 51 09/09/2019     Lab Results   Component Value Date    VLDL 18.4 05/12/2020    VLDL 28.2 02/19/2020     Lab Results   Component Value Date    LDLHDL 1.46 05/12/2020    LDLHDL 0.59 02/19/2020    LDLHDL 1.60 01/14/2019         Assessment/Plan:     Problem List Items Addressed This Visit (all established)       Cardiovascular and Mediastinum    Mixed hyperlipidemia: Well-controlled    Hypertension: Borderline today    Unstable angina: on two anti-anginals.    Abnormal stress test - Primary: High risk, still with ongoing symptoms         Endocrine    Type 2 diabetes mellitus (CMS/HCC): Poorly controlled       Other    HIV (human immunodeficiency virus infection) (CMS/HCC): Well-controlled    Tobacco abuse: States intention of quitting today            Recommendations/plans:  Since patient is  still having daily angina despite 2 antianginals, along with worsening exertional dyspnea that is now severe and also profound fatigue, will proceed with diagnostic cardiac catheterization and possible intervention.  Continue aspirin, statin, beta-blocker, long-acting nitrate in the meantime.    Congratulated on smoking cessation efforts that he states are beginning immediately.      Deon Bailey MD  06/18/2020  17:12

## 2020-06-19 ENCOUNTER — TRANSCRIBE ORDERS (OUTPATIENT)
Dept: ADMINISTRATIVE | Facility: HOSPITAL | Age: 55
End: 2020-06-19

## 2020-06-19 DIAGNOSIS — Z01.818 PRE-OP TESTING: Primary | ICD-10-CM

## 2020-06-22 ENCOUNTER — LAB (OUTPATIENT)
Dept: LAB | Facility: HOSPITAL | Age: 55
End: 2020-06-22

## 2020-06-22 LAB — SARS-COV-2 N GENE RESP QL NAA+PROBE: NOT DETECTED

## 2020-06-22 PROCEDURE — 87635 SARS-COV-2 COVID-19 AMP PRB: CPT | Performed by: INTERNAL MEDICINE

## 2020-06-24 ENCOUNTER — HOSPITAL ENCOUNTER (OUTPATIENT)
Facility: HOSPITAL | Age: 55
Discharge: HOME | End: 2020-06-24
Attending: INTERNAL MEDICINE | Admitting: INTERNAL MEDICINE

## 2020-06-24 VITALS
HEART RATE: 91 BPM | RESPIRATION RATE: 17 BRPM | HEIGHT: 72 IN | OXYGEN SATURATION: 98 % | SYSTOLIC BLOOD PRESSURE: 101 MMHG | DIASTOLIC BLOOD PRESSURE: 60 MMHG | TEMPERATURE: 98 F | BODY MASS INDEX: 37.3 KG/M2 | WEIGHT: 275.4 LBS

## 2020-06-24 DIAGNOSIS — R94.39 ABNORMAL STRESS TEST: ICD-10-CM

## 2020-06-24 DIAGNOSIS — I20.8 STABLE ANGINA PECTORIS (HCC): ICD-10-CM

## 2020-06-24 LAB
ANION GAP SERPL CALCULATED.3IONS-SCNC: 12 MMOL/L (ref 5–15)
BUN BLD-MCNC: 24 MG/DL (ref 6–20)
BUN/CREAT SERPL: 17.1 (ref 7–25)
CALCIUM SPEC-SCNC: 9.4 MG/DL (ref 8.6–10.5)
CHLORIDE SERPL-SCNC: 102 MMOL/L (ref 98–107)
CO2 SERPL-SCNC: 25 MMOL/L (ref 22–29)
CREAT BLD-MCNC: 1.4 MG/DL (ref 0.76–1.27)
DEPRECATED RDW RBC AUTO: 46.2 FL (ref 37–54)
ERYTHROCYTE [DISTWIDTH] IN BLOOD BY AUTOMATED COUNT: 13.4 % (ref 12.3–15.4)
GFR SERPL CREATININE-BSD FRML MDRD: 53 ML/MIN/1.73
GLUCOSE BLD-MCNC: 402 MG/DL (ref 65–99)
HCT VFR BLD AUTO: 38.2 % (ref 37.5–51)
HGB BLD-MCNC: 12.7 G/DL (ref 13–17.7)
MCH RBC QN AUTO: 30.9 PG (ref 26.6–33)
MCHC RBC AUTO-ENTMCNC: 33.2 G/DL (ref 31.5–35.7)
MCV RBC AUTO: 92.9 FL (ref 79–97)
PLATELET # BLD AUTO: 175 10*3/MM3 (ref 140–450)
PMV BLD AUTO: 12.1 FL (ref 6–12)
POTASSIUM BLD-SCNC: 3.9 MMOL/L (ref 3.5–5.2)
RBC # BLD AUTO: 4.11 10*6/MM3 (ref 4.14–5.8)
SODIUM BLD-SCNC: 139 MMOL/L (ref 136–145)
WBC NRBC COR # BLD: 8.8 10*3/MM3 (ref 3.4–10.8)

## 2020-06-24 PROCEDURE — 93454 CORONARY ARTERY ANGIO S&I: CPT | Performed by: INTERNAL MEDICINE

## 2020-06-24 PROCEDURE — 25010000002 MIDAZOLAM PER 1 MG: Performed by: INTERNAL MEDICINE

## 2020-06-24 PROCEDURE — 80048 BASIC METABOLIC PNL TOTAL CA: CPT | Performed by: INTERNAL MEDICINE

## 2020-06-24 PROCEDURE — 25010000002 HEPARIN (PORCINE) PER 1000 UNITS: Performed by: INTERNAL MEDICINE

## 2020-06-24 PROCEDURE — C1769 GUIDE WIRE: HCPCS | Performed by: INTERNAL MEDICINE

## 2020-06-24 PROCEDURE — 25010000002 FENTANYL CITRATE (PF) 100 MCG/2ML SOLUTION: Performed by: INTERNAL MEDICINE

## 2020-06-24 PROCEDURE — 85027 COMPLETE CBC AUTOMATED: CPT | Performed by: INTERNAL MEDICINE

## 2020-06-24 PROCEDURE — 0 IOPAMIDOL PER 1 ML: Performed by: INTERNAL MEDICINE

## 2020-06-24 PROCEDURE — 99152 MOD SED SAME PHYS/QHP 5/>YRS: CPT | Performed by: INTERNAL MEDICINE

## 2020-06-24 PROCEDURE — 25010000002 DIPHENHYDRAMINE PER 50 MG: Performed by: INTERNAL MEDICINE

## 2020-06-24 PROCEDURE — C1894 INTRO/SHEATH, NON-LASER: HCPCS | Performed by: INTERNAL MEDICINE

## 2020-06-24 PROCEDURE — 99153 MOD SED SAME PHYS/QHP EA: CPT | Performed by: INTERNAL MEDICINE

## 2020-06-24 PROCEDURE — 25010000002 HEPARIN (PORCINE): Performed by: INTERNAL MEDICINE

## 2020-06-24 RX ORDER — SODIUM CHLORIDE 9 MG/ML
100 INJECTION, SOLUTION INTRAVENOUS CONTINUOUS
Status: DISCONTINUED | OUTPATIENT
Start: 2020-06-24 | End: 2020-06-24 | Stop reason: HOSPADM

## 2020-06-24 RX ORDER — FENTANYL CITRATE 50 UG/ML
INJECTION, SOLUTION INTRAMUSCULAR; INTRAVENOUS AS NEEDED
Status: DISCONTINUED | OUTPATIENT
Start: 2020-06-24 | End: 2020-06-24 | Stop reason: HOSPADM

## 2020-06-24 RX ORDER — SODIUM CHLORIDE 9 MG/ML
1-3 INJECTION, SOLUTION INTRAVENOUS CONTINUOUS
Status: DISCONTINUED | OUTPATIENT
Start: 2020-06-24 | End: 2020-06-24 | Stop reason: HOSPADM

## 2020-06-24 RX ORDER — ASPIRIN 81 MG/1
81 TABLET ORAL DAILY
Status: DISCONTINUED | OUTPATIENT
Start: 2020-06-25 | End: 2020-06-24 | Stop reason: HOSPADM

## 2020-06-24 RX ORDER — ASPIRIN 81 MG/1
324 TABLET, CHEWABLE ORAL ONCE
Status: DISCONTINUED | OUTPATIENT
Start: 2020-06-24 | End: 2020-06-24 | Stop reason: HOSPADM

## 2020-06-24 RX ORDER — ACETAMINOPHEN 325 MG/1
650 TABLET ORAL EVERY 4 HOURS PRN
Status: DISCONTINUED | OUTPATIENT
Start: 2020-06-24 | End: 2020-06-24 | Stop reason: HOSPADM

## 2020-06-24 RX ORDER — SODIUM CHLORIDE 0.9 % (FLUSH) 0.9 %
3 SYRINGE (ML) INJECTION EVERY 12 HOURS SCHEDULED
Status: DISCONTINUED | OUTPATIENT
Start: 2020-06-24 | End: 2020-06-24 | Stop reason: HOSPADM

## 2020-06-24 RX ORDER — MIDAZOLAM HYDROCHLORIDE 1 MG/ML
INJECTION INTRAMUSCULAR; INTRAVENOUS AS NEEDED
Status: DISCONTINUED | OUTPATIENT
Start: 2020-06-24 | End: 2020-06-24 | Stop reason: HOSPADM

## 2020-06-24 RX ORDER — DIPHENHYDRAMINE HYDROCHLORIDE 50 MG/ML
INJECTION INTRAMUSCULAR; INTRAVENOUS AS NEEDED
Status: DISCONTINUED | OUTPATIENT
Start: 2020-06-24 | End: 2020-06-24 | Stop reason: HOSPADM

## 2020-06-24 RX ORDER — SODIUM CHLORIDE 0.9 % (FLUSH) 0.9 %
10 SYRINGE (ML) INJECTION AS NEEDED
Status: DISCONTINUED | OUTPATIENT
Start: 2020-06-24 | End: 2020-06-24 | Stop reason: HOSPADM

## 2020-06-24 NOTE — INTERVAL H&P NOTE
H&P reviewed. The patient was examined and there are no changes to the H&P.   Patient reports he has had several other episodes of what he terms angina at rest since our office visit, that have subsided with sublingual nitroglycerin.     I discussed cardiac catheterization, the procedure, risks (including bleeding, infection, vascular damage [including minor oozing, bruising, bleeding, and up to and including the need for vascular surgery], contrast reaction, renal failure, respiratory failure, heart attack, stroke, arrhythmia and even death), benefits, and alternatives and the patient has voiced understanding and is willing to proceed.

## 2020-07-08 RX ORDER — FLUCONAZOLE 200 MG/1
200 TABLET ORAL DAILY
Qty: 3 TABLET | Refills: 0 | Status: SHIPPED | OUTPATIENT
Start: 2020-07-08 | End: 2021-04-22

## 2020-07-14 RX ORDER — CALCIUM CARBONATE/VITAMIN D3 600 MG-20
TABLET,CHEWABLE ORAL
Qty: 60 TABLET | Refills: 2 | Status: SHIPPED | OUTPATIENT
Start: 2020-07-14 | End: 2021-01-04

## 2020-07-16 ENCOUNTER — TELEPHONE (OUTPATIENT)
Dept: INTERNAL MEDICINE | Age: 55
End: 2020-07-16

## 2020-07-16 NOTE — TELEPHONE ENCOUNTER
He reports fluid in rt. Ear. No pain or fever. Has had a sore throat and headache. No drainage. Taking claritin daily.

## 2020-07-17 ENCOUNTER — OFFICE VISIT (OUTPATIENT)
Dept: ENDOCRINOLOGY | Facility: CLINIC | Age: 55
End: 2020-07-17

## 2020-07-17 VITALS
WEIGHT: 271.2 LBS | BODY MASS INDEX: 36.73 KG/M2 | DIASTOLIC BLOOD PRESSURE: 68 MMHG | HEIGHT: 72 IN | HEART RATE: 91 BPM | SYSTOLIC BLOOD PRESSURE: 110 MMHG | OXYGEN SATURATION: 96 %

## 2020-07-17 DIAGNOSIS — E78.2 MIXED DIABETIC HYPERLIPIDEMIA ASSOCIATED WITH TYPE 2 DIABETES MELLITUS (HCC): ICD-10-CM

## 2020-07-17 DIAGNOSIS — E11.69 MIXED DIABETIC HYPERLIPIDEMIA ASSOCIATED WITH TYPE 2 DIABETES MELLITUS (HCC): ICD-10-CM

## 2020-07-17 DIAGNOSIS — E11.59 HYPERTENSION ASSOCIATED WITH DIABETES (HCC): ICD-10-CM

## 2020-07-17 DIAGNOSIS — Z79.4 TYPE 2 DIABETES MELLITUS WITH HYPERGLYCEMIA, WITH LONG-TERM CURRENT USE OF INSULIN (HCC): Primary | ICD-10-CM

## 2020-07-17 DIAGNOSIS — I15.2 HYPERTENSION ASSOCIATED WITH DIABETES (HCC): ICD-10-CM

## 2020-07-17 DIAGNOSIS — E11.65 TYPE 2 DIABETES MELLITUS WITH HYPERGLYCEMIA, WITH LONG-TERM CURRENT USE OF INSULIN (HCC): Primary | ICD-10-CM

## 2020-07-17 PROCEDURE — 99214 OFFICE O/P EST MOD 30 MIN: CPT | Performed by: INTERNAL MEDICINE

## 2020-07-17 RX ORDER — DEXAMETHASONE 1 MG
1 TABLET ORAL ONCE
Qty: 1 TABLET | Refills: 0 | Status: SHIPPED | OUTPATIENT
Start: 2020-07-17 | End: 2020-07-17

## 2020-07-17 NOTE — PROGRESS NOTES
"Donis Yi is a 54 y.o. male who presents for  evaluation of   Chief Complaint   Patient presents with   • Follow-up     3 mo amanda type 2         Referring provider     Primary Care Provider    Oksana Mares MD    Duration 15 years    Timing - Diabetes is Constant    Quality -  poorly controlled      Severity -  severe    Complications - peripheral neuropathy    Current symptoms/problems  none     Alleviating Factors: Compliance      Aggravating Factors :    Side Effects  none    Current diet  in general, a \"healthy\" diet      Current exercise none    Current monitoring regimen: home blood tests - checking 4 x daily   Home blood sugar records: 60 - 500s       Hypoglycemia nocturnal and if skipped meals     Past Medical History:   Diagnosis Date   • Allergic rhinitis    • Anxiety    • Bursitis    • DDD (degenerative disc disease), cervical    • Depression    • HIV disease (CMS/HCC)    • HLD (hyperlipidemia)    • HSP (Henoch Schonlein purpura) (CMS/HCC)    • HTN (hypertension)    • Migraine    • Myocardial infarction (CMS/HCC)    • Osteoporosis    • Sleep apnea    • Type 2 diabetes mellitus (CMS/HCC)      Family History   Problem Relation Age of Onset   • Diabetes Mother    • Hypertension Mother    • Thyroid disease Mother    • Hypertension Father    • Thyroid disease Father    • Arrhythmia Father    • Diabetes Maternal Grandfather    • Heart disease Maternal Grandfather    • Hypertension Maternal Grandfather    • Diabetes Paternal Grandmother    • Stroke Paternal Grandmother    • Heart disease Paternal Grandmother    • Hypertension Paternal Grandmother    • Thyroid disease Paternal Grandmother    • Hypertension Paternal Grandfather    • Hypertension Sister    • No Known Problems Brother      Social History     Tobacco Use   • Smoking status: Current Every Day Smoker     Packs/day: 1.00     Types: Cigarettes     Start date: 1988   • Smokeless tobacco: Never Used   Substance Use Topics   • Alcohol use: Yes     " Comment: occasionally   • Drug use: No         Current Outpatient Medications:   •  albuterol (PROVENTIL HFA;VENTOLIN HFA) 108 (90 BASE) MCG/ACT inhaler, Inhale 2 puffs every 6 (six) hours as needed for wheezing., Disp: , Rfl:   •  ARIPiprazole (ABILIFY) 5 MG tablet, Take 5 mg by mouth Daily., Disp: , Rfl:   •  aspirin 81 MG EC tablet, Take 1 tablet by mouth Daily., Disp: 90 tablet, Rfl: 3  •  atorvastatin (LIPITOR) 40 MG tablet, Take 40 mg by mouth Daily., Disp: , Rfl: 3  •  calcium carbonate (OS-THEODORA) 600 MG tablet, Take 600 mg by mouth Daily., Disp: , Rfl:   •  Cholecalciferol (VITAMIN D3) 50 MCG (2000 UT) capsule, Take 2,000 Units by mouth Daily., Disp: , Rfl:   •  ciclopirox (LOPROX) 0.77 % cream, Apply  topically to the appropriate area as directed 2 (Two) Times a Day., Disp: 30 g, Rfl: 5  •  clonazePAM (KlonoPIN) 0.5 MG tablet, Take 0.5 mg by mouth 2 (Two) Times a Day., Disp: , Rfl:   •  denosumab (PROLIA) 60 MG/ML solution syringe, Inject 60 mg under the skin Every 6 (Six) Months., Disp: , Rfl:   •  DEXILANT 60 MG capsule, Take 60 mg by mouth Daily., Disp: , Rfl: 1  •  Fbyaras-Edzbq-Ectopxuu-TenofAF (GENVOYA) [phone] MG tablet, Take 1 tablet by mouth daily., Disp: , Rfl:   •  fluticasone (FLONASE) 50 MCG/ACT nasal spray, 2 sprays into each nostril Daily. (Patient taking differently: 2 sprays into the nostril(s) as directed by provider Daily As Needed for Rhinitis.), Disp: 1 bottle, Rfl: 6  •  guaiFENesin (MUCINEX) 600 MG 12 hr tablet, Take 600 mg by mouth 2 (two) times a day as needed for cough., Disp: , Rfl:   •  HUMULIN R U-500 KWIKPEN 500 UNIT/ML solution pen-injector CONCENTRATED injection, inject 200 units TWICE DAILY with meals, Disp: 24 mL, Rfl: 5  •  HYDROcodone-acetaminophen (NORCO) 5-325 MG per tablet, Take 1 tablet by mouth Every 12 (Twelve) Hours As Needed for Moderate Pain  or Severe Pain ., Disp: , Rfl:   •  insulin aspart (novoLOG FLEXPEN) 100 UNIT/ML solution pen-injector sc pen, Inject  5 units for every 50 BG points above 150 up to 40 units at lunch and supper only according to sliding scale, Disp: 24 mL, Rfl: 11  •  isosorbide mononitrate (IMDUR) 30 MG 24 hr tablet, Take 1 tablet by mouth Daily., Disp: 90 tablet, Rfl: 3  •  loperamide (IMODIUM) 2 MG capsule, Take 2 mg by mouth 4 (Four) Times a Day As Needed for Diarrhea., Disp: , Rfl:   •  loratadine (CLARITIN) 10 MG tablet, Take 10 mg by mouth daily., Disp: , Rfl:   •  losartan (COZAAR) 25 MG tablet, Take 1 tablet by mouth Daily., Disp: 30 tablet, Rfl: 0  •  LYRICA 100 MG capsule, Take 100 mg by mouth 2 (Two) Times a Day., Disp: , Rfl:   •  meclizine (ANTIVERT) 25 MG tablet, Take 25 mg by mouth Daily., Disp: , Rfl:   •  Melatonin 5 MG capsule, Take 5 mg by mouth every night at bedtime., Disp: , Rfl: 3  •  metFORMIN (GLUCOPHAGE) 500 MG tablet, Take 1,000 mg by mouth 2 (Two) Times a Day With Meals., Disp: , Rfl: 3  •  neomycin-bacitracin-polymyxin (NEOSPORIN) 5-400-5000 ointment, Apply 1 application topically to the appropriate area as directed 4 (Four) Times a Day As Needed for Irritation., Disp: , Rfl:   •  nitroglycerin (NITROSTAT) 0.4 MG SL tablet, Place 1 tablet under the tongue Every 5 (Five) Minutes As Needed for Chest Pain (Only if SBP Greater Than 100). Take no more than 3 doses in 15 minutes., Disp: 30 tablet, Rfl: 0  •  nystatin (MYCOSTATIN) 502832 UNIT/GM ointment, Apply  topically to the appropriate area as directed 2 (Two) Times a Day., Disp: , Rfl:   •  Prenatal Vit-Fe Fumarate-FA (PRENATAL PO), Take 1 tablet by mouth Daily., Disp: , Rfl: 3  •  promethazine (PHENERGAN) 25 MG tablet, Take 25 mg by mouth every 6 (six) hours as needed for nausea or vomiting., Disp: , Rfl:   •  propranolol LA (INDERAL LA) 80 MG 24 hr capsule, Take 80 mg by mouth Daily., Disp: , Rfl: 5  •  rizatriptan (MAXALT) 10 MG tablet, Take 10 mg by mouth 1 (one) time as needed for migraine. May repeat in 2 hours if needed, Disp: , Rfl:   •  tamsulosin (FLOMAX) 0.4  MG capsule 24 hr capsule, Take 1 capsule by mouth Every Night., Disp: 90 capsule, Rfl: 3  •  tiZANidine (ZANAFLEX) 4 MG tablet, Take 4 mg by mouth Daily As Needed for Muscle Spasms., Disp: , Rfl:   •  traZODone (DESYREL) 100 MG tablet, Take 200 mg by mouth Every Night., Disp: , Rfl: 3  •  TRULICITY 0.75 MG/0.5ML solution pen-injector, Inject 0.75mg (0.5ml) under the skin AS DIRECTED every week, Disp: 2 mL, Rfl: 3  •  varenicline (Chantix Continuing Month Maryse) 1 MG tablet, Take 1 tablet by mouth Daily., Disp: 30 tablet, Rfl: 0  •  varenicline (CHANTIX MARYSE) 0.5 MG X 11 & 1 MG X 42 tablet, Take 0.5 mg one daily on days 1-3 and and 0.5 mg twice daily on days 4-7.Then 1 mg twice daily for a total of 12 weeks., Disp: 53 tablet, Rfl: 0  •  Vortioxetine HBr (Trintellix) 20 MG tablet, Take 20 mg by mouth Daily With Breakfast., Disp: , Rfl:     Review of Systems    Review of Systems   Constitutional: Negative for activity change, appetite change, chills, diaphoresis, fatigue, fever and unexpected weight change.   HENT: Negative for congestion, dental problem, drooling, ear discharge, ear pain, facial swelling, mouth sores, postnasal drip, rhinorrhea, sinus pressure, sore throat, tinnitus, trouble swallowing and voice change.    Eyes: Negative for photophobia, pain, discharge, redness, itching and visual disturbance.   Respiratory: Negative for apnea, cough, choking, chest tightness, shortness of breath, wheezing and stridor.    Cardiovascular: Negative for chest pain, palpitations and leg swelling.   Gastrointestinal: Negative for abdominal distention, abdominal pain, constipation, diarrhea, nausea and vomiting.   Endocrine: Negative for cold intolerance, heat intolerance, polydipsia, polyphagia and polyuria.   Genitourinary: Negative for decreased urine volume, difficulty urinating, dysuria, flank pain, frequency, hematuria and urgency.   Musculoskeletal: Negative for arthralgias, back pain, gait problem, joint swelling,  "myalgias, neck pain and neck stiffness.   Skin: Negative for color change, pallor, rash and wound.   Allergic/Immunologic: Negative for immunocompromised state.   Neurological: Negative for dizziness, tremors, seizures, syncope, facial asymmetry, speech difficulty, weakness, light-headedness, numbness and headaches.   Hematological: Negative for adenopathy.   Psychiatric/Behavioral: Negative for agitation, behavioral problems, confusion, decreased concentration, dysphoric mood, hallucinations, self-injury, sleep disturbance and suicidal ideas. The patient is not nervous/anxious and is not hyperactive.         Objective:   /68 (BP Location: Left arm, Patient Position: Sitting, Cuff Size: Large Adult)   Pulse 91   Ht 182.9 cm (72\")   Wt 123 kg (271 lb 3.2 oz)   SpO2 96%   BMI 36.78 kg/m²     Physical Exam   Constitutional: He is oriented to person, place, and time. He appears well-developed and well-nourished. He is cooperative.   HENT:   Head: Normocephalic and atraumatic.   Right Ear: External ear normal.   Left Ear: External ear normal.   Nose: Nose normal.   Mouth/Throat: Oropharynx is clear and moist. No oropharyngeal exudate.   Eyes: Pupils are equal, round, and reactive to light. Conjunctivae and EOM are normal. No scleral icterus. Right eye exhibits normal extraocular motion. Left eye exhibits normal extraocular motion.   Neck: Neck supple. No JVD present. No muscular tenderness present. No tracheal deviation, no edema and no erythema present. No thyromegaly present.   Cardiovascular: Normal rate, regular rhythm, normal heart sounds and intact distal pulses. Exam reveals no gallop and no friction rub.   No murmur heard.  Pulmonary/Chest: Effort normal and breath sounds normal. No stridor. No respiratory distress. He has no decreased breath sounds. He has no wheezes. He has no rhonchi. He has no rales. He exhibits no tenderness.   Abdominal: Soft. Bowel sounds are normal. He exhibits no distension " and no mass. There is no hepatomegaly. There is no tenderness. There is no rebound and no guarding. No hernia.   Musculoskeletal: Normal range of motion. He exhibits no edema, tenderness or deformity.   Lymphadenopathy:     He has no cervical adenopathy.   Neurological: He is alert and oriented to person, place, and time. He has normal reflexes. No cranial nerve deficit. He exhibits normal muscle tone. Coordination normal.   Skin: Skin is warm. No rash noted. No erythema. No pallor.   Psychiatric: He has a normal mood and affect. His behavior is normal. Judgment and thought content normal.   Nursing note and vitals reviewed.          Lab Review          Assessment/Plan       ICD-10-CM ICD-9-CM   1. Type 2 diabetes mellitus with hyperglycemia, with long-term current use of insulin (CMS/HCC) E11.65 250.00    Z79.4 790.29     V58.67   2. Hypertension associated with diabetes (CMS/HCC) E11.59 250.80    I10 401.9   3. Mixed diabetic hyperlipidemia associated with type 2 diabetes mellitus (CMS/HCC) E11.69 250.80    E78.2 272.2         I reviewed and summarized records from Oksana Mares MD from present year  and I reviewed / ordered labs.   From review of records :    Patient has uncontrolled type 2 diabetes    Glycemic Management:   Lab Results   Component Value Date    HGBA1C 10.80 (H) 05/11/2020    HGBA1C 10.90 (H) 02/19/2020    HGBA1C 11.00 (H) 10/17/2019     Lab Results   Component Value Date    GLUCOSE 402 (C) 06/24/2020    BUN 24 (H) 06/24/2020    CREATININE 1.40 (H) 06/24/2020    EGFRIFNONA 53 (L) 06/24/2020    BCR 17.1 06/24/2020    K 3.9 06/24/2020    CO2 25.0 06/24/2020    CALCIUM 9.4 06/24/2020    ALBUMIN 4.10 05/12/2020    LABIL2 CANCELED 03/29/2016    AST 19 05/12/2020    ALT 33 05/12/2020    ANIONGAP 12.0 06/24/2020     Lab Results   Component Value Date    WBC 8.80 06/24/2020    HGB 12.7 (L) 06/24/2020    HCT 38.2 06/24/2020    MCV 92.9 06/24/2020     06/24/2020        U500 insulin      100 am and  200 mg pm     In addition , U100, up to 40 w meals     Total 4 injections per day     Metformin 1000 mg bid      Trulicity 0.75 mg weekly     Next appt add jardiance but be careful since has BPH     Has freestyle melba reader - not available     We focused on motivation, he has all the right medicines, he just needs to be compliant     Rule out cushing's      Lipid Management  Lab Results   Component Value Date    CHOL 129 05/12/2020    CHOL 117 02/19/2020    CHOL 147 01/14/2019     Lab Results   Component Value Date    TRIG 92 05/12/2020    TRIG 141 02/19/2020    TRIG 133 09/09/2019     Lab Results   Component Value Date    HDL 45 05/12/2020    HDL 56 02/19/2020    HDL 48 (L) 09/09/2019     No components found for: LDLCALC  Lab Results   Component Value Date    LDL 66 05/12/2020    LDL 33 02/19/2020    LDL 51 09/09/2019     No results found for: LDLDIRECT    lipitor 80 mg qhs     Blood Pressure Management:    Vitals:    07/17/20 1358   BP: 110/68   Pulse: 91   SpO2: 96%     Lab Results   Component Value Date    GLUCOSE 402 (C) 06/24/2020    CALCIUM 9.4 06/24/2020     06/24/2020    K 3.9 06/24/2020    CO2 25.0 06/24/2020     06/24/2020    BUN 24 (H) 06/24/2020    CREATININE 1.40 (H) 06/24/2020    EGFRIFNONA 53 (L) 06/24/2020    BCR 17.1 06/24/2020    ANIONGAP 12.0 06/24/2020               Microvascular Complication Monitoring:      Eye Exam Evaluation    -----------    Last Microalbumin-Proteinuria Assessment    No results found for: MALBCRERATIO    No results found for: UTPCR    -----------      Neuropathy on lyrica 100 bid          Weight Related:   Wt Readings from Last 3 Encounters:   07/17/20 123 kg (271 lb 3.2 oz)   06/24/20 125 kg (275 lb 6.4 oz)   06/18/20 119 kg (263 lb)     Body mass index is 36.78 kg/m².        Diet interventions: moderate (500 kCal/d) deficit diet.      Bone Health    Lab Results   Component Value Date    CALCIUM 9.4 06/24/2020    PHOS 2.7 05/11/2020    [id number] 42.4  02/19/2020       Thyroid Health    Lab Results   Component Value Date    TSH 1.510 05/11/2020    TSH 0.996 02/19/2020    TSH 0.896 02/16/2020              Other Diabetes Related Aspects       Lab Results   Component Value Date    [id number] 366 02/19/2020     Wanted viagra    Has unstable angina on imdur and ntg    Can't take       No orders of the defined types were placed in this encounter.        A copy of my note was sent to Oksana Mares MD    Please see my above opinion and suggestions.

## 2020-07-20 ENCOUNTER — HOSPITAL ENCOUNTER (EMERGENCY)
Facility: HOSPITAL | Age: 55
Discharge: HOME | End: 2020-07-20
Admitting: INTERNAL MEDICINE

## 2020-07-20 VITALS
TEMPERATURE: 98.3 F | SYSTOLIC BLOOD PRESSURE: 131 MMHG | WEIGHT: 272 LBS | BODY MASS INDEX: 36.84 KG/M2 | OXYGEN SATURATION: 100 % | RESPIRATION RATE: 16 BRPM | HEART RATE: 88 BPM | HEIGHT: 72 IN | DIASTOLIC BLOOD PRESSURE: 71 MMHG

## 2020-07-20 DIAGNOSIS — J30.9 ALLERGIC SINUSITIS: Primary | ICD-10-CM

## 2020-07-20 PROCEDURE — 99282 EMERGENCY DEPT VISIT SF MDM: CPT

## 2020-07-20 RX ORDER — METHYLPREDNISOLONE 4 MG/1
TABLET ORAL
Qty: 21 EACH | Refills: 0 | Status: SHIPPED | OUTPATIENT
Start: 2020-07-20 | End: 2020-11-02

## 2020-07-20 NOTE — DISCHARGE INSTRUCTIONS
Allergic Rhinitis, Adult  Allergic rhinitis is an allergic reaction that affects the mucous membrane inside the nose. It causes sneezing, a runny or stuffy nose, and the feeling of mucus going down the back of the throat (postnasal drip). Allergic rhinitis can be mild to severe.  There are two types of allergic rhinitis:  · Seasonal. This type is also called hay fever. It happens only during certain seasons.  · Perennial. This type can happen at any time of the year.  What are the causes?  This condition happens when the body's defense system (immune system) responds to certain harmless substances called allergens as though they were germs.   Seasonal allergic rhinitis is triggered by pollen, which can come from grasses, trees, and weeds. Perennial allergic rhinitis may be caused by:  · House dust mites.  · Pet dander.  · Mold spores.  What are the signs or symptoms?  Symptoms of this condition include:  · Sneezing.  · Runny or stuffy nose (nasal congestion).  · Postnasal drip.  · Itchy nose.  · Tearing of the eyes.  · Trouble sleeping.  · Daytime sleepiness.  How is this diagnosed?  This condition may be diagnosed based on:  · Your medical history.  · A physical exam.  · Tests to check for related conditions, such as:  ? Asthma.  ? Pink eye.  ? Ear infection.  ? Upper respiratory infection.  · Tests to find out which allergens trigger your symptoms. These may include skin or blood tests.  How is this treated?  There is no cure for this condition, but treatment can help control symptoms. Treatment may include:  · Taking medicines that block allergy symptoms, such as antihistamines. Medicine may be given as a shot, nasal spray, or pill.  · Avoiding the allergen.  · Desensitization. This treatment involves getting ongoing shots until your body becomes less sensitive to the allergen. This treatment may be done if other treatments do not help.  · If taking medicine and avoiding the allergen does not work, new, stronger  medicines may be prescribed.  Follow these instructions at home:  · Find out what you are allergic to. Common allergens include smoke, dust, and pollen.  · Avoid the things you are allergic to. These are some things you can do to help avoid allergens:  ? Replace carpet with wood, tile, or vinyl vonda. Carpet can trap dander and dust.  ? Do not smoke. Do not allow smoking in your home.  ? Change your heating and air conditioning filter at least once a month.  ? During allergy season:  § Keep windows closed as much as possible.  § Plan outdoor activities when pollen counts are lowest. This is usually during the evening hours.  § When coming indoors, change clothing and shower before sitting on furniture or bedding.  · Take over-the-counter and prescription medicines only as told by your health care provider.  · Keep all follow-up visits as told by your health care provider. This is important.  Contact a health care provider if:  · You have a fever.  · You develop a persistent cough.  · You make whistling sounds when you breathe (you wheeze).  · Your symptoms interfere with your normal daily activities.  Get help right away if:  · You have shortness of breath.  Summary  · This condition can be managed by taking medicines as directed and avoiding allergens.  · Contact your health care provider if you develop a persistent cough or fever.  · During allergy season, keep windows closed as much as possible.  This information is not intended to replace advice given to you by your health care provider. Make sure you discuss any questions you have with your health care provider.  Document Released: 09/12/2002 Document Revised: 11/30/2018 Document Reviewed: 01/25/2018  Elsevier Patient Education © 2020 [employer]

## 2020-07-20 NOTE — ED PROVIDER NOTES
Subjective   Patient presents to the emergency room with ear pain is gotten progressively worse the day today he states he feels like there is some water behind and just a pressure sensation worse on the right than on the left.          Review of Systems   Constitutional: Negative for chills and fever.   HENT: Positive for ear pain. Negative for congestion and sinus pressure.    Eyes: Negative for photophobia, pain and visual disturbance.   Respiratory: Negative for cough, chest tightness, shortness of breath and wheezing.    Cardiovascular: Negative for chest pain and palpitations.   Gastrointestinal: Negative for abdominal pain, diarrhea and nausea.   Endocrine: Negative for cold intolerance and heat intolerance.   Genitourinary: Negative for difficulty urinating and urgency.   Musculoskeletal: Negative for arthralgias, joint swelling and myalgias.   Skin: Negative for color change and wound.   Neurological: Negative for dizziness and headaches.   Hematological: Negative for adenopathy. Does not bruise/bleed easily.   Psychiatric/Behavioral: Negative for agitation, behavioral problems, confusion and decreased concentration.       Past Medical History:   Diagnosis Date   • Allergic rhinitis    • Anxiety    • Bursitis    • DDD (degenerative disc disease), cervical    • Depression    • HIV disease (CMS/HCC)    • HLD (hyperlipidemia)    • HSP (Henoch Schonlein purpura) (CMS/HCC)    • HTN (hypertension)    • Migraine    • Myocardial infarction (CMS/HCC)    • Osteoporosis    • Sleep apnea    • Type 2 diabetes mellitus (CMS/HCC)        Allergies   Allergen Reactions   • Amitriptyline Anaphylaxis   • Amitriptyline Hcl Anaphylaxis   • Darvon [Propoxyphene] Anaphylaxis   • Zithromax [Azithromycin] Anaphylaxis       Past Surgical History:   Procedure Laterality Date   • ANKLE SURGERY     • CARDIAC CATHETERIZATION N/A 6/24/2020    Procedure: Coronary angiography;  Surgeon: Deon Bailey MD;  Location:  [facility]  [facility];  Service: Cardiology;  Laterality: N/A;  11am start time   • CARDIAC CATHETERIZATION N/A 6/24/2020    Procedure: Percutaneous Coronary Intervention;  Surgeon: Deon Bailey MD;  Location:  [facility];  Service: Cardiology;  Laterality: N/A;   • COLONOSCOPY  06/19/2009    Normal exam repeat in 10 years   • FOREARM SURGERY     • LYMPH NODE BIOPSY         Family History   Problem Relation Age of Onset   • Diabetes Mother    • Hypertension Mother    • Thyroid disease Mother    • Hypertension Father    • Thyroid disease Father    • Arrhythmia Father    • Diabetes Maternal Grandfather    • Heart disease Maternal Grandfather    • Hypertension Maternal Grandfather    • Diabetes Paternal Grandmother    • Stroke Paternal Grandmother    • Heart disease Paternal Grandmother    • Hypertension Paternal Grandmother    • Thyroid disease Paternal Grandmother    • Hypertension Paternal Grandfather    • Hypertension Sister    • No Known Problems Brother        Social History     Socioeconomic History   • Marital status:      Spouse name: Not on file   • Number of children: Not on file   • Years of education: Not on file   • Highest education level: Not on file   Tobacco Use   • Smoking status: Current Every Day Smoker     Packs/day: 1.00     Types: Cigarettes     Start date: 1988   • Smokeless tobacco: Never Used   Substance and Sexual Activity   • Alcohol use: Yes     Comment: occasionally   • Drug use: No   • Sexual activity: Defer           Objective   Physical Exam   Constitutional: He is oriented to person, place, and time. He appears well-developed and well-nourished.   HENT:   Head: Normocephalic and atraumatic.   Mouth/Throat: Oropharynx is clear and moist.   Minimal bulging the right TM none in the left TM   Eyes: Pupils are equal, round, and reactive to light. EOM are normal.   Neck: Normal range of motion. Neck supple.   Cardiovascular: Normal rate, regular rhythm, normal heart sounds  and intact distal pulses.   Pulmonary/Chest: Effort normal and breath sounds normal.   Abdominal: Soft. Bowel sounds are normal. There is no tenderness.   Musculoskeletal: Normal range of motion. He exhibits no edema.   Neurological: He is alert and oriented to person, place, and time. He has normal reflexes. No cranial nerve deficit.   Skin: Skin is warm and dry. No rash noted.   Psychiatric: He has a normal mood and affect. His behavior is normal. Thought content normal.   Nursing note and vitals reviewed.      Procedures           ED Course                                           MDM    Final diagnoses:   Allergic sinusitis            Ernie Farr MD  07/20/20 0433

## 2020-07-21 ENCOUNTER — TELEPHONE (OUTPATIENT)
Dept: INTERNAL MEDICINE | Age: 55
End: 2020-07-21

## 2020-07-21 NOTE — TELEPHONE ENCOUNTER
Dr. Miriam Cueto's office called to report non fasting blood sugar was 685, A2c 13.3 - they are faxing over all lab results

## 2020-07-22 RX ORDER — DULAGLUTIDE 1.5 MG/.5ML
1.5 INJECTION, SOLUTION SUBCUTANEOUS WEEKLY
Qty: 5 PEN | Refills: 5 | Status: SHIPPED | OUTPATIENT
Start: 2020-07-22

## 2020-07-24 ENCOUNTER — NURSE TRIAGE (OUTPATIENT)
Dept: CALL CENTER | Facility: HOSPITAL | Age: 55
End: 2020-07-24

## 2020-07-24 ENCOUNTER — HOSPITAL ENCOUNTER (EMERGENCY)
Facility: HOSPITAL | Age: 55
Discharge: HOME | End: 2020-07-24
Admitting: EMERGENCY MEDICINE

## 2020-07-24 ENCOUNTER — APPOINTMENT (OUTPATIENT)
Dept: ULTRASOUND IMAGING | Facility: HOSPITAL | Age: 55
End: 2020-07-24

## 2020-07-24 VITALS
WEIGHT: 267 LBS | OXYGEN SATURATION: 97 % | HEART RATE: 83 BPM | DIASTOLIC BLOOD PRESSURE: 70 MMHG | BODY MASS INDEX: 36.16 KG/M2 | TEMPERATURE: 98.6 F | SYSTOLIC BLOOD PRESSURE: 118 MMHG | HEIGHT: 72 IN | RESPIRATION RATE: 16 BRPM

## 2020-07-24 DIAGNOSIS — R73.9 HYPERGLYCEMIA: ICD-10-CM

## 2020-07-24 DIAGNOSIS — N30.00 ACUTE CYSTITIS WITHOUT HEMATURIA: Primary | ICD-10-CM

## 2020-07-24 DIAGNOSIS — B49 FUNGAL INFECTION: ICD-10-CM

## 2020-07-24 DIAGNOSIS — N48.1 BALANITIS: ICD-10-CM

## 2020-07-24 LAB
ALBUMIN SERPL-MCNC: 4 G/DL (ref 3.5–5.2)
ALBUMIN/GLOB SERPL: 1.3 G/DL
ALP SERPL-CCNC: 67 U/L (ref 39–117)
ALT SERPL W P-5'-P-CCNC: 29 U/L (ref 1–41)
ANION GAP SERPL CALCULATED.3IONS-SCNC: 16 MMOL/L (ref 5–15)
APTT PPP: 24.7 SECONDS (ref 24.1–35)
ARTERIAL PATENCY WRIST A: ABNORMAL
AST SERPL-CCNC: 23 U/L (ref 1–40)
ATMOSPHERIC PRESS: 753 MMHG
BACTERIA UR QL AUTO: ABNORMAL /HPF
BASE EXCESS BLDA CALC-SCNC: -1.3 MMOL/L (ref 0–2)
BASOPHILS # BLD AUTO: 0.04 10*3/MM3 (ref 0–0.2)
BASOPHILS NFR BLD AUTO: 0.6 % (ref 0–1.5)
BDY SITE: ABNORMAL
BILIRUB SERPL-MCNC: 0.3 MG/DL (ref 0–1.2)
BILIRUB UR QL STRIP: NEGATIVE
BODY TEMPERATURE: 37 C
BUN SERPL-MCNC: 25 MG/DL (ref 6–20)
BUN/CREAT SERPL: 26.3 (ref 7–25)
CALCIUM SPEC-SCNC: 9.4 MG/DL (ref 8.6–10.5)
CHLORIDE SERPL-SCNC: 96 MMOL/L (ref 98–107)
CLARITY UR: CLEAR
CO2 SERPL-SCNC: 25 MMOL/L (ref 22–29)
COLOR UR: YELLOW
CREAT SERPL-MCNC: 0.95 MG/DL (ref 0.76–1.27)
DEPRECATED RDW RBC AUTO: 39.6 FL (ref 37–54)
EOSINOPHIL # BLD AUTO: 0.16 10*3/MM3 (ref 0–0.4)
EOSINOPHIL NFR BLD AUTO: 2.6 % (ref 0.3–6.2)
ERYTHROCYTE [DISTWIDTH] IN BLOOD BY AUTOMATED COUNT: 12.1 % (ref 12.3–15.4)
GFR SERPL CREATININE-BSD FRML MDRD: 83 ML/MIN/1.73
GLOBULIN UR ELPH-MCNC: 3.1 GM/DL
GLUCOSE BLDC GLUCOMTR-MCNC: 309 MG/DL (ref 70–130)
GLUCOSE BLDC GLUCOMTR-MCNC: 414 MG/DL (ref 70–130)
GLUCOSE SERPL-MCNC: 474 MG/DL (ref 65–99)
GLUCOSE UR STRIP-MCNC: ABNORMAL MG/DL
HCO3 BLDA-SCNC: 23.7 MMOL/L (ref 20–26)
HCT VFR BLD AUTO: 36.8 % (ref 37.5–51)
HGB BLD-MCNC: 12.6 G/DL (ref 13–17.7)
HGB UR QL STRIP.AUTO: NEGATIVE
HYALINE CASTS UR QL AUTO: ABNORMAL /LPF
IMM GRANULOCYTES # BLD AUTO: 0.05 10*3/MM3 (ref 0–0.05)
IMM GRANULOCYTES NFR BLD AUTO: 0.8 % (ref 0–0.5)
INR PPP: 0.99 (ref 0.91–1.09)
KETONES UR QL STRIP: ABNORMAL
LEUKOCYTE ESTERASE UR QL STRIP.AUTO: ABNORMAL
LYMPHOCYTES # BLD AUTO: 2.22 10*3/MM3 (ref 0.7–3.1)
LYMPHOCYTES NFR BLD AUTO: 35.6 % (ref 19.6–45.3)
Lab: ABNORMAL
MCH RBC QN AUTO: 30.8 PG (ref 26.6–33)
MCHC RBC AUTO-ENTMCNC: 34.2 G/DL (ref 31.5–35.7)
MCV RBC AUTO: 90 FL (ref 79–97)
MODALITY: ABNORMAL
MONOCYTES # BLD AUTO: 0.49 10*3/MM3 (ref 0.1–0.9)
MONOCYTES NFR BLD AUTO: 7.9 % (ref 5–12)
NEUTROPHILS NFR BLD AUTO: 3.28 10*3/MM3 (ref 1.7–7)
NEUTROPHILS NFR BLD AUTO: 52.5 % (ref 42.7–76)
NITRITE UR QL STRIP: POSITIVE
NRBC BLD AUTO-RTO: 0 /100 WBC (ref 0–0.2)
PCO2 BLDA: 40 MM HG (ref 35–45)
PCO2 TEMP ADJ BLD: 40 MM HG (ref 35–45)
PH BLDA: 7.38 PH UNITS (ref 7.35–7.45)
PH UR STRIP.AUTO: <=5 [PH] (ref 5–8)
PH, TEMP CORRECTED: 7.38 PH UNITS (ref 7.35–7.45)
PLATELET # BLD AUTO: 170 10*3/MM3 (ref 140–450)
PMV BLD AUTO: 11.4 FL (ref 6–12)
PO2 BLDA: 78.9 MM HG (ref 83–108)
PO2 TEMP ADJ BLD: 78.9 MM HG (ref 83–108)
POTASSIUM SERPL-SCNC: 4.4 MMOL/L (ref 3.5–5.2)
PROT SERPL-MCNC: 7.1 G/DL (ref 6–8.5)
PROT UR QL STRIP: NEGATIVE
PROTHROMBIN TIME: 12.7 SECONDS (ref 11.9–14.6)
RBC # BLD AUTO: 4.09 10*6/MM3 (ref 4.14–5.8)
RBC # UR: ABNORMAL /HPF
REF LAB TEST METHOD: ABNORMAL
SAO2 % BLDCOA: 96.1 % (ref 94–99)
SODIUM SERPL-SCNC: 137 MMOL/L (ref 136–145)
SP GR UR STRIP: >1.03 (ref 1–1.03)
SQUAMOUS #/AREA URNS HPF: ABNORMAL /HPF
UROBILINOGEN UR QL STRIP: ABNORMAL
VENTILATOR MODE: ABNORMAL
WBC # BLD AUTO: 6.24 10*3/MM3 (ref 3.4–10.8)
WBC UR QL AUTO: ABNORMAL /HPF

## 2020-07-24 PROCEDURE — 80053 COMPREHEN METABOLIC PANEL: CPT | Performed by: NURSE PRACTITIONER

## 2020-07-24 PROCEDURE — 99284 EMERGENCY DEPT VISIT MOD MDM: CPT

## 2020-07-24 PROCEDURE — 85025 COMPLETE CBC W/AUTO DIFF WBC: CPT | Performed by: NURSE PRACTITIONER

## 2020-07-24 PROCEDURE — 36600 WITHDRAWAL OF ARTERIAL BLOOD: CPT

## 2020-07-24 PROCEDURE — 87186 SC STD MICRODIL/AGAR DIL: CPT | Performed by: NURSE PRACTITIONER

## 2020-07-24 PROCEDURE — 85610 PROTHROMBIN TIME: CPT | Performed by: NURSE PRACTITIONER

## 2020-07-24 PROCEDURE — 25010000002 ONDANSETRON PER 1 MG: Performed by: NURSE PRACTITIONER

## 2020-07-24 PROCEDURE — 76870 US EXAM SCROTUM: CPT

## 2020-07-24 PROCEDURE — 85730 THROMBOPLASTIN TIME PARTIAL: CPT | Performed by: NURSE PRACTITIONER

## 2020-07-24 PROCEDURE — 82962 GLUCOSE BLOOD TEST: CPT

## 2020-07-24 PROCEDURE — 87205 SMEAR GRAM STAIN: CPT | Performed by: NURSE PRACTITIONER

## 2020-07-24 PROCEDURE — 82803 BLOOD GASES ANY COMBINATION: CPT

## 2020-07-24 PROCEDURE — 96375 TX/PRO/DX INJ NEW DRUG ADDON: CPT

## 2020-07-24 PROCEDURE — 63710000001 INSULIN REGULAR HUMAN PER 5 UNITS: Performed by: NURSE PRACTITIONER

## 2020-07-24 PROCEDURE — 87086 URINE CULTURE/COLONY COUNT: CPT | Performed by: NURSE PRACTITIONER

## 2020-07-24 PROCEDURE — 87147 CULTURE TYPE IMMUNOLOGIC: CPT | Performed by: NURSE PRACTITIONER

## 2020-07-24 PROCEDURE — 25010000002 CEFTRIAXONE PER 250 MG: Performed by: NURSE PRACTITIONER

## 2020-07-24 PROCEDURE — 87077 CULTURE AEROBIC IDENTIFY: CPT | Performed by: NURSE PRACTITIONER

## 2020-07-24 PROCEDURE — 87070 CULTURE OTHR SPECIMN AEROBIC: CPT | Performed by: NURSE PRACTITIONER

## 2020-07-24 PROCEDURE — 25010000003 HYDROMORPHONE 1 MG/ML SOLUTION: Performed by: NURSE PRACTITIONER

## 2020-07-24 PROCEDURE — 96365 THER/PROPH/DIAG IV INF INIT: CPT

## 2020-07-24 PROCEDURE — 81001 URINALYSIS AUTO W/SCOPE: CPT | Performed by: NURSE PRACTITIONER

## 2020-07-24 RX ORDER — SODIUM CHLORIDE 0.9 % (FLUSH) 0.9 %
10 SYRINGE (ML) INJECTION AS NEEDED
Status: DISCONTINUED | OUTPATIENT
Start: 2020-07-24 | End: 2020-07-24 | Stop reason: HOSPADM

## 2020-07-24 RX ORDER — CEFDINIR 300 MG/1
300 CAPSULE ORAL 2 TIMES DAILY
Qty: 14 CAPSULE | Refills: 0 | Status: SHIPPED | OUTPATIENT
Start: 2020-07-24 | End: 2020-07-31

## 2020-07-24 RX ORDER — ONDANSETRON 2 MG/ML
4 INJECTION INTRAMUSCULAR; INTRAVENOUS ONCE
Status: COMPLETED | OUTPATIENT
Start: 2020-07-24 | End: 2020-07-24

## 2020-07-24 RX ADMIN — HYDROMORPHONE HYDROCHLORIDE 1 MG: 1 INJECTION, SOLUTION INTRAMUSCULAR; INTRAVENOUS; SUBCUTANEOUS at 15:08

## 2020-07-24 RX ADMIN — SODIUM CHLORIDE 1000 ML: 9 INJECTION, SOLUTION INTRAVENOUS at 12:15

## 2020-07-24 RX ADMIN — SODIUM CHLORIDE, PRESERVATIVE FREE 10 ML: 5 INJECTION INTRAVENOUS at 14:27

## 2020-07-24 RX ADMIN — ONDANSETRON HYDROCHLORIDE 4 MG: 2 SOLUTION INTRAMUSCULAR; INTRAVENOUS at 15:09

## 2020-07-24 RX ADMIN — CEFTRIAXONE SODIUM 1 G: 1 INJECTION, POWDER, FOR SOLUTION INTRAMUSCULAR; INTRAVENOUS at 15:10

## 2020-07-24 RX ADMIN — HUMAN INSULIN 4 UNITS: 100 INJECTION, SOLUTION SUBCUTANEOUS at 14:25

## 2020-07-24 NOTE — ED NOTES
[employer] [facility] [employer] contacted for patient transport home. [employer] reports that wait will be at least one hour. Patient reports that will be fine. Taxi voucher provided to patient per [facility] case management. Voucher copied and placed in patient chart.      Renetta Galeana RN  07/24/20 0925

## 2020-07-24 NOTE — ED PROVIDER NOTES
Subjective   54 yom presents with multiple complaints.  He states he has a 'bad skin infection' in the folds of his abdomen and perineal area.  He states he has used nystatin to the areas with no improvement. He states he cannot retract his foreskin. He has been unable to do this for about a week.  He is a diabetic and states his BS runs 'around 200.'  He denies n/v/d.  He denies dysuria.  He has a large amount of red, excoriated skin to the abdomen folds, groin and perineal area.  He denies n/v/d or fever. He is HIV positive and sees infectious disease.          Review of Systems   Constitutional: Negative for activity change, appetite change, fatigue and fever.   HENT: Negative for congestion, ear pain, facial swelling and sore throat.    Eyes: Negative for discharge and visual disturbance.   Respiratory: Negative for apnea, chest tightness, shortness of breath, wheezing and stridor.    Cardiovascular: Negative for chest pain and palpitations.   Gastrointestinal: Negative for abdominal distention, abdominal pain, diarrhea, nausea and vomiting.   Genitourinary: Negative for difficulty urinating and dysuria.   Musculoskeletal: Negative for arthralgias and myalgias.   Skin: Positive for rash. Negative for wound.   Neurological: Negative for dizziness and seizures.   Psychiatric/Behavioral: Negative for agitation and confusion.       Past Medical History:   Diagnosis Date   • Allergic rhinitis    • Anxiety    • Bursitis    • DDD (degenerative disc disease), cervical    • Depression    • HIV disease (CMS/[facility])    • HLD (hyperlipidemia)    • HSP (Henoch Schonlein purpura) (CMS/[facility])    • HTN (hypertension)    • Migraine    • Myocardial infarction (CMS/[facility])    • Osteoporosis    • Sleep apnea    • Type 2 diabetes mellitus (CMS/[facility])        Allergies   Allergen Reactions   • Amitriptyline Anaphylaxis   • Amitriptyline Hcl Anaphylaxis   • Darvon [Propoxyphene] Anaphylaxis   • Zithromax [Azithromycin] Anaphylaxis       Past  Surgical History:   Procedure Laterality Date   • ANKLE SURGERY     • CARDIAC CATHETERIZATION N/A 6/24/2020    Procedure: Coronary angiography;  Surgeon: Deon Bailey MD;  Location:  [facility] C[facility]AS[facility];  Service: Cardiology;  Laterality: N/A;  11am start time   • CARDIAC CATHETERIZATION N/A 6/24/2020    Procedure: Percutaneous Coronary Intervention;  Surgeon: Deon Bailey MD;  Location:  [facility]ATH [facility];  Service: Cardiology;  Laterality: N/A;   • COLONOSCOPY  06/19/2009    Normal exam repeat in 10 years   • FOREARM SURGERY     • LYMPH NODE BIOPSY         Family History   Problem Relation Age of Onset   • Diabetes Mother    • Hypertension Mother    • Thyroid disease Mother    • Hypertension Father    • Thyroid disease Father    • Arrhythmia Father    • Diabetes Maternal Grandfather    • Heart disease Maternal Grandfather    • Hypertension Maternal Grandfather    • Diabetes Paternal Grandmother    • Stroke Paternal Grandmother    • Heart disease Paternal Grandmother    • Hypertension Paternal Grandmother    • Thyroid disease Paternal Grandmother    • Hypertension Paternal Grandfather    • Hypertension Sister    • No Known Problems Brother        Social History     Socioeconomic History   • Marital status:      Spouse name: Not on file   • Number of children: Not on file   • Years of education: Not on file   • Highest education level: Not on file   Tobacco Use   • Smoking status: Current Every Day Smoker     Packs/day: 0.50     Types: Cigarettes     Start date: 1988   • Smokeless tobacco: Never Used   Substance and Sexual Activity   • Alcohol use: Yes     Comment: occasionally   • Drug use: No   • Sexual activity: Defer           Objective   Physical Exam   Constitutional: He is oriented to person, place, and time. He appears well-developed.   HENT:   Head: Normocephalic.   Eyes: Pupils are equal, round, and reactive to light. EOM are normal.   Neck: Normal range of motion. Neck  supple.   Cardiovascular: Normal rate and regular rhythm.   No murmur heard.  Pulmonary/Chest: Effort normal and breath sounds normal.   Abdominal: Soft. Bowel sounds are normal.   Genitourinary: Right testis shows tenderness. Right testis shows no swelling. Left testis shows tenderness. Left testis shows no swelling. Uncircumcised.   Genitourinary Comments: The penis is uncircumcised.  There is no swelling or redness to the penis.  He does have tenderness to both testicles.  There is no redness or swelling.  The perineal area is red and excoriated.   Musculoskeletal: Normal range of motion.   Neurological: He is alert and oriented to person, place, and time.   Skin: Skin is warm and dry.        Red excoriated skin areas present in the the abdomen folds, groin and perineal area    Psychiatric: He has a normal mood and affect.   Nursing note and vitals reviewed.      Procedures           ED Course  ED Course as of Jul 24 1721 Fri Jul 24, 2020   1425 I attempted to pull back his foreskin.  His testicles are slightly swollen and tender to touch.  He has red excoriated skin in that area.  I was unable to pull it back.  The patient tried and he could not either.  He states he believes it is mostly d/t the skin.  We will complete an US.    [KS]   1503 Transfer of care to VANIA RAMSAY    [KS]   1614 I assumed the patient's care from SOBIA Mensah.  The patient's labs are reviewed.  He does have a urinary tract infection and was given Rocephin here in the ER as well as IV fluids.  Patient's blood sugar was initially elevated at 474.  He was given IV insulin.  His blood sugars now is 308.  The patient's white blood cell count is normal.  Hemoglobin hematocrit and platelet count is stable.    [LF]   1615 Testicular ultrasound shows no acute findings.    [LF]   1720 The pt was evaluated per this provider and Dr. Segura.  The patient has what appears to be a fungal infection and bowel otitis.  The patient will  be placed on Omnicef to treat his urinary tract infection.  He will also be given a prescription for miconazole cream to treat the balanitis.  We will do a wound culture of the groin area to check this further.  The patient will be referred to urology.  Patient is advised to follow-up his primary care provider on Monday or urology.  He is advised return the ER for any new or worsening symptoms.  Patient is comfortable with this plan of care to be discharged home at this time in stable condition.  Repeat glucose is 309.    [LF]      ED Course User Index  [KS] Tos, Tonygavin Lee, APRN  [LF] Jocy Vasquez, APRN                                 US Scrotum & Testicles   Final Result   No evidence of epididymitis, orchitis, torsion, or intratesticular mass.   This report was finalized on 07/24/2020 16:09 by Dr Rita Cantrell MD.        Labs Reviewed   COMPREHENSIVE METABOLIC PANEL - Abnormal; Notable for the following components:       Result Value    Glucose 474 (*)     BUN 25 (*)     Chloride 96 (*)     BUN/Creatinine Ratio 26.3 (*)     Anion Gap 16.0 (*)     All other components within normal limits    Narrative:     GFR Normal >60  Chronic Kidney Disease <60  Kidney Failure <15     URINALYSIS W/ CULTURE IF INDICATED - Abnormal; Notable for the following components:    Specific Gravity, UA >1.030 (*)     Glucose, UA >=1000 mg/dL (3+) (*)     Ketones, UA 15 mg/dL (1+) (*)     Leuk Esterase, UA Trace (*)     Nitrite, UA Positive (*)     All other components within normal limits   CBC WITH AUTO DIFFERENTIAL - Abnormal; Notable for the following components:    RBC 4.09 (*)     Hemoglobin 12.6 (*)     Hematocrit 36.8 (*)     RDW 12.1 (*)     Immature Grans % 0.8 (*)     All other components within normal limits   URINALYSIS, MICROSCOPIC ONLY - Abnormal; Notable for the following components:    RBC, UA 3-5 (*)     WBC, UA Too Numerous to Count (*)     Bacteria, UA 4+ (*)     All other components within normal  limits   BLOOD GAS, ARTERIAL - Abnormal; Notable for the following components:    pO2, Arterial 78.9 (*)     Base Excess, Arterial -1.3 (*)     pO2, Temperature Corrected 78.9 (*)     All other components within normal limits   POCT GLUCOSE FINGERSTICK - Abnormal; Notable for the following components:    Glucose 414 (*)     All other components within normal limits   POCT GLUCOSE FINGERSTICK - Abnormal; Notable for the following components:    Glucose 309 (*)     All other components within normal limits   PROTIME-INR - Normal   APTT - Normal   URINE CULTURE   WOUND CULTURE   BLOOD GAS, ARTERIAL   POCT GLUCOSE FINGERSTICK   POCT GLUCOSE FINGERSTICK   POCT GLUCOSE FINGERSTICK   CBC AND DIFFERENTIAL    Narrative:     The following orders were created for panel order CBC & Differential.  Procedure                               Abnormality         Status                     ---------                               -----------         ------                     CBC Auto Differential[[id number]]        Abnormal            Final result                 Please view results for these tests on the individual orders.               MDM  Number of Diagnoses or Management Options  Acute cystitis without hematuria: new and requires workup  Balanitis: new and requires workup  Fungal infection: new and requires workup  Hyperglycemia: new and requires workup     Amount and/or Complexity of Data Reviewed  Clinical lab tests: ordered and reviewed  Tests in the radiology section of CPT®: ordered and reviewed  Decide to obtain previous medical records or to obtain history from someone other than the patient: yes  Discuss the patient with other providers: yes    Patient Progress  Patient progress: stable      Final diagnoses:   Acute cystitis without hematuria   Hyperglycemia   Fungal infection   Jocy Higgins, APRN  07/24/20 [id number]

## 2020-07-24 NOTE — ED NOTES
Culture wound of groin sent to lab at time of discharge.      Renetta Galeana, RN  07/24/20 [id number]

## 2020-07-24 NOTE — TELEPHONE ENCOUNTER
"Caller states that his groin is edematous as well as his penis.  He is unable to retract the foreskin and void.    Reason for Disposition  • Patient sounds very sick or weak to the triager    Additional Information  • Negative: Followed a genital area injury  • Negative: Pain or burning with passing urine is main symptom  • Negative: Pain in scrotum or testicle is main symptom  • Negative: Swollen scrotum OR lump in the scrotum/groin area  • Negative: Pubic lice suspected  • Negative: [1] Blood from end of penis AND [2] large amount  • Negative: [1] Not circumcised AND [2] foreskin pulled back and stuck  • Negative: [1] Looks infected (e.g., draining sore, ulcer, rash is painful to touch) AND [2] fever > 100.4 F (38.0 C)  • Negative: [1] Unable to urinate (or only a few drops) > 4 hours AND [2] bladder feels very full (e.g., palpable bladder or strong urge to urinate)  • Negative: [1] Prolonged unwanted erection (i.e., not related to sexual interest) AND [2] lasting > 4 hours  • Negative: SEVERE pain (e.g., excruciating)    Answer Assessment - Initial Assessment Questions  1. SYMPTOM: \"What's the main symptom you're concerned about?\" (e.g., discharge from penis, rash, pain, itching, swelling)      Penile edema  2. LOCATION: \"Where is the edema located?\"      foreskin  3. ONSET: \"When did edema  start?\"       Woke up with it  4. PAIN: \"Is there any pain?\" If so, ask: \"How bad is it?\"  (Scale 1-10; or mild, moderate, severe)     mild  5. URINE: \"Any difficulty passing urine?\" If so, ask: \"When was the last time?\"      yes  6. CAUSE: \"What do you think is causing the symptoms?\"      Not sure  7. OTHER SYMPTOMS: \"Do you have any other symptoms?\" (e.g., fever, abdominal pain, blood in urine)      Groin edematous also    Protocols used: PENIS AND SCROTUM SYMPTOMS-ADULT-      "

## 2020-07-26 LAB
BACTERIA SPEC AEROBE CULT: ABNORMAL
GRAM STN SPEC: ABNORMAL
GRAM STN SPEC: ABNORMAL
STREP GROUPING: ABNORMAL

## 2020-07-27 RX ORDER — NYSTATIN 100000 U/G
OINTMENT TOPICAL
Qty: 60 G | Refills: 1 | Status: SHIPPED | OUTPATIENT
Start: 2020-07-27 | End: 2021-04-22

## 2020-07-27 RX ORDER — CEFDINIR 300 MG/1
300 CAPSULE ORAL 2 TIMES DAILY
Qty: 14 CAPSULE | Refills: 0 | Status: SHIPPED | OUTPATIENT
Start: 2020-07-27 | End: 2020-08-03

## 2020-07-27 NOTE — TELEPHONE ENCOUNTER
He called to let us know that he went to the ER on Friday and was dx with a uti and they gave him a paper script for Cefdinir 300mg bid for 7 days and he wasn't able to go to the pharmacy and would like for us to fax this in for him and he is also needing a refill on Nystatin ointment sent to [facility]. I sent both of these in for him.

## 2020-07-31 ENCOUNTER — TELEPHONE (OUTPATIENT)
Dept: VASCULAR SURGERY | Facility: CLINIC | Age: 55
End: 2020-07-31

## 2020-07-31 ENCOUNTER — OFFICE VISIT (OUTPATIENT)
Dept: INTERNAL MEDICINE | Age: 55
End: 2020-07-31
Payer: COMMERCIAL

## 2020-07-31 VITALS
OXYGEN SATURATION: 97 % | BODY MASS INDEX: 37.11 KG/M2 | HEIGHT: 72 IN | SYSTOLIC BLOOD PRESSURE: 120 MMHG | HEART RATE: 78 BPM | WEIGHT: 274 LBS | DIASTOLIC BLOOD PRESSURE: 64 MMHG

## 2020-07-31 PROBLEM — I25.2 HISTORY OF MI (MYOCARDIAL INFARCTION): Status: ACTIVE | Noted: 2020-05-12

## 2020-07-31 PROBLEM — R94.39 ABNORMAL STRESS TEST: Status: ACTIVE | Noted: 2020-06-18

## 2020-07-31 PROBLEM — E13.621 FOOT ULCER DUE TO SECONDARY DM (HCC): Status: ACTIVE | Noted: 2020-07-31

## 2020-07-31 PROBLEM — L97.509 FOOT ULCER DUE TO SECONDARY DM (HCC): Status: ACTIVE | Noted: 2020-07-31

## 2020-07-31 PROCEDURE — 99214 OFFICE O/P EST MOD 30 MIN: CPT | Performed by: INTERNAL MEDICINE

## 2020-07-31 RX ORDER — PREGABALIN 100 MG/1
CAPSULE ORAL
COMMUNITY
Start: 2020-07-27 | End: 2020-07-31 | Stop reason: ALTCHOICE

## 2020-07-31 RX ORDER — IBUPROFEN 600 MG/1
2 TABLET ORAL DAILY
Qty: 3 INHALER | Refills: 3 | Status: SHIPPED | OUTPATIENT
Start: 2020-07-31 | End: 2021-09-16 | Stop reason: SDUPTHER

## 2020-07-31 ASSESSMENT — ENCOUNTER SYMPTOMS
BACK PAIN: 1
ABDOMINAL DISTENTION: 0
ABDOMINAL PAIN: 0
EYE REDNESS: 0
CHEST TIGHTNESS: 0
EYE DISCHARGE: 0
SHORTNESS OF BREATH: 0
COUGH: 0
FACIAL SWELLING: 0

## 2020-07-31 NOTE — PROGRESS NOTES
Medical: Not on file     Non-medical: Not on file   Tobacco Use    Smoking status: Current Every Day Smoker    Smokeless tobacco: Never Used   Substance and Sexual Activity    Alcohol use: Yes    Drug use: No    Sexual activity: Not on file   Lifestyle    Physical activity     Days per week: Not on file     Minutes per session: Not on file    Stress: Not on file   Relationships    Social connections     Talks on phone: Not on file     Gets together: Not on file     Attends [employer] service: Not on file     Active member of club or organization: Not on file     Attends meetings of clubs or organizations: Not on file     Relationship status: Not on file    Intimate partner violence     Fear of current or ex partner: Not on file     Emotionally abused: Not on file     Physically abused: Not on file     Forced sexual activity: Not on file   Other Topics Concern    Not on file   Social History Narrative    Not on file       Allergies   Allergen Reactions    Darvon [Propoxyphene]     Elavil [Amitriptyline Hcl]     Zithromax [Azithromycin]        Current Outpatient Medications   Medication Sig Dispense Refill    NASONEX 50 MCG/ACT nasal spray 2 sprays by Nasal route daily 3 Inhaler 3    nystatin (MYCOSTATIN) 130640 UNIT/GM ointment apply topically to affected area TWICE DAILY 60 g 1    cefdinir (OMNICEF) 300 MG capsule Take 1 capsule by mouth 2 times daily for 7 days 14 capsule 0    Dulaglutide (TRULICITY) 1.5 EZ/7.7JG SOPN Inject 1.5 mg into the skin once a week 5 pen 5    CALTRATE 600+D3 SOFT 600-800 MG-UNIT CHEW chew 1 tablet DAILY 60 tablet 2    HUMULIN R U-500 KWIKPEN 500 UNIT/ML SOPN concentrated injection pen inject 200 units TWICE DAILY with meals 24 mL 5    isosorbide mononitrate (IMDUR) 30 MG extended release tablet Take 1 tablet by mouth Daily. 90 tablet 3    losartan (COZAAR) 25 MG tablet Take 1 tablet by mouth Daily.  90 tablet 3    ASPIRIN LOW DOSE 81 MG EC tablet Take 1 tablet by mouth Daily. 90 tablet 3    traZODone (DESYREL) 100 MG tablet Take 2 tablets by mouth nightly (Stop 150mg) 180 tablet 3    propranolol (INDERAL LA) 80 MG extended release capsule 1 capsule by mouth daily 90 capsule 3    clotrimazole (LOTRIMIN) 1 % external solution Apply 1 actuation topically 2 times daily      rizatriptan (MAXALT) 10 MG tablet Take 1 tablet by mouth once as needed for Migraine May repeat in 2 hours if needed 30 tablet 2    loratadine (CLARITIN) 10 MG tablet TAKE 1 TABLET BY MOUTH DAILY 30 tablet 5    Cholecalciferol (VITAMIN D3) 50 MCG (2000 UT) CAPS 1 capsule by mouth daily 100 capsule 3    UNIFINE PENTIPS 31G X 6 MM MISC for use FOUR TIMES DAILY 100 each 6    loperamide (IMODIUM) 2 MG capsule Take 1 capsule by mouth 4 times daily as needed for Diarrhea 30 capsule 5    DEXILANT 60 MG CPDR delayed release capsule TAKE 1 CAPSULE BY MOUTH DAILY 90 capsule 3    meclizine (ANTIVERT) 25 MG tablet TAKE 1 TABLET BY MOUTH DAILY 90 tablet 2    metFORMIN (GLUCOPHAGE) 500 MG tablet TAKE 2 TABLETS BY MOUTH TWICE DAILY WITH MEALS 360 tablet 3    atorvastatin (LIPITOR) 40 MG tablet Take 1 tablet by mouth daily 90 tablet 3    Multiple Vitamins-Minerals (THERAPEUTIC MULTIVITAMIN-MINERALS) tablet Take 1 tablet by mouth daily 100 tablet 3    Melatonin 5 MG CAPS Take 1 tab nightly 90 capsule 3    tamsulosin (FLOMAX) 0.4 MG capsule Take 0.4 mg by mouth daily      NOVOLOG FLEXPEN 100 UNIT/ML injection pen INJECT 70 UNITS WITH REGULAR MEALS, 80 UNITS WITH LARGER MEALS, AND 60 UNITS WITH SNACKS AS DIRECTED 105 mL 3    nystatin (MYCOSTATIN) 843927 UNIT/GM cream Apply topically 2 times daily Apply topically 2 times daily.  30 g 1    ARIPiprazole (ABILIFY) 5 MG tablet Take 5 mg by mouth daily       nicotine (NICODERM CQ) 21 MG/24HR Use 21mg patch daily for 2 weeks and then 14mg patch daily for 2 weeks and then 7mg patch daily for 2 weeks 42 patch 0    triamcinolone (KENALOG) 0.1 % cream Apply topically 2 times daily prn 60 g 0    Lancets MISC Use to check blood sugar 4 times daily   Dx  E11.9  Insulin dependent 400 each 3    VORTIoxetine HBr (TRINTELLIX) 20 MG TABS tablet Take 20 mg by mouth daily       glucose blood VI test strips (FREESTYLE LITE) strip 1 each by In Vitro route daily Test blood sugar 4 times daily 100 each 3    calcium carbonate 600 MG TABS tablet Take 600 mg by mouth      clonazePAM (KLONOPIN) 0.5 MG tablet Take 1 tablet by mouth 2 times daily as needed for Anxiety 60 tablet 5    Kvtpnkj-Cisuh-Jpfywtbb-TenofAF (GENVOYA) [phone] MG TABS Take 1 tablet by mouth daily      HYDROcodone-acetaminophen (NORCO) 5-325 MG per tablet Take 1 tablet by mouth 2 times daily as needed for Pain .  pregabalin (LYRICA) 75 MG capsule Take 75 mg by mouth 2 times daily       No current facility-administered medications for this visit. Review of Systems   Constitutional: Positive for fatigue. Negative for chills and fever. HENT: Negative for congestion and facial swelling. Eyes: Negative for discharge and redness. Respiratory: Negative for cough, chest tightness and shortness of breath. Cardiovascular: Negative for chest pain. Gastrointestinal: Negative for abdominal distention and abdominal pain. Genitourinary: Positive for penile swelling and scrotal swelling. Negative for dysuria. Musculoskeletal: Positive for arthralgias and back pain. Skin: Positive for wound. Negative for rash. Neurological: Positive for numbness. Negative for headaches. Psychiatric/Behavioral: Negative for confusion and decreased concentration.        /64 (Site: Left Upper Arm)   Pulse 78   Ht 6' (1.829 m)   Wt 274 lb (124.3 kg)   SpO2 97%   BMI 37.16 kg/m²   BP Readings from Last 7 Encounters:   07/31/20 120/64   02/10/20 120/66   09/09/19 120/70   07/25/19 109/68   06/17/19 124/66   06/03/19 (!) 110/58   02/08/19 122/68     Wt Readings from Last 7 Encounters:   07/31/20 274 lb (124.3 kg) 05/14/20 278 lb (126.1 kg)   04/20/20 272 lb (123.4 kg)   02/10/20 278 lb (126.1 kg)   09/09/19 291 lb (132 kg)   07/25/19 282 lb (127.9 kg)   06/17/19 275 lb (124.7 kg)     BMI Readings from Last 7 Encounters:   07/31/20 37.16 kg/m²   05/14/20 39.89 kg/m²   04/20/20 36.89 kg/m²   02/10/20 37.70 kg/m²   09/09/19 39.47 kg/m²   07/25/19 40.46 kg/m²   06/17/19 37.30 kg/m²     Resp Readings from Last 7 Encounters:   05/05/18 14       Physical Exam  Constitutional:       General: He is not in acute distress. Eyes:      General: No scleral icterus. Neck:      Musculoskeletal: Neck supple. Cardiovascular:      Heart sounds: Normal heart sounds. Pulmonary:      Breath sounds: Normal breath sounds. Genitourinary:     Comments: Swelling of the foreskin and mild scrotal swelling he does not have much erythema or any open wounds that we see. Lymphadenopathy:      Cervical: No cervical adenopathy. Skin:     Comments: Diabetic foot ulcer under the right metatarsal pad it is about the size of a dime with an opening he has dry thickened ulcers at the end of 2 of his digits almost like a dry gangrene appearance. Diminished pulses feet are warm and dry thickened toenails. Visual inspection:  Deformity/amputation: no surgical change but dry gangrene type change end of 2 digits right foot  Skin lesions/pre-ulcerative calluses: present - dime size metarsal wound right foot  Edema: right- 1+, left- 1+    Sensory exam:  Monofilament sensation: abnormal - diminished  (minimum of 5 random plantar locations tested, avoiding callused areas - > 1 area with absence of sensation is + for neuropathy)    Plus at least one of the following:  Pulses: abnormal - diminished,   Pinprick: Impaired  Proprioception: Impaired  Vibration (128 Hz): N/A    Results for orders placed or performed in visit on 02/10/20   POCT glycosylated hemoglobin (Hb A1C)   Result Value Ref Range    Hemoglobin A1C 10.1 %       ASSESSMENT/ PLAN:  1.  Type 2 diabetes mellitus with diabetic polyneuropathy, with long-term current use of insulin (HCC)  Diabetes 100% out-of-control as per Dr. Arias Pap note patient has the right medications maximal insulin maximal therapy he has to comply with taking the meds and following the diabetic diet or we will never have control. Right now he is having more intensive treatment with the dietitian diabetic educator. We explained to them he has to then continue with those recommendations this is an ongoing process that he has to take an active role in.  - CBC; Future  - Comprehensive Metabolic Panel; Future  - Hemoglobin A1C; Future  - TSH without Reflex; Future  - Lipid Panel; Future  - Microalbumin / Creatinine Urine Ratio; Future  - External Referral To Podiatry  - Diabetic Shoe    2. Balanitis  He will have ongoing issues with yeast if he does not get his blood sugar and control blood sugar was 685 the other day  - External Referral To Urology    3. Dysfunction of right eustachian tube    - NASONEX 50 MCG/ACT nasal spray; 2 sprays by Nasal route daily  Dispense: 3 Inhaler; Refill: 3    Patient is established with wound care at [facility] he said that they told him to call back to get back in his close friend has been having medical problems. I told the patient he needs to call [facility] wound care today and we put a urgent referral to Dr. Juancho Chan. Patient with a dime sized wound on the bottom of his right foot no pus no erythema the ends of 2 of his digits have just a small area of almost dry gangrene or covered ulceration. Patient has to get his blood sugar and control and we will send these referrals.

## 2020-08-06 ENCOUNTER — HOSPITAL ENCOUNTER (OUTPATIENT)
Dept: GENERAL RADIOLOGY | Facility: HOSPITAL | Age: 55
Discharge: HOME | End: 2020-08-06
Admitting: NURSE PRACTITIONER

## 2020-08-06 ENCOUNTER — TRANSCRIBE ORDERS (OUTPATIENT)
Dept: ADMINISTRATIVE | Facility: HOSPITAL | Age: 55
End: 2020-08-06

## 2020-08-06 ENCOUNTER — OFFICE VISIT (OUTPATIENT)
Dept: WOUND CARE | Facility: HOSPITAL | Age: 55
End: 2020-08-06

## 2020-08-06 DIAGNOSIS — E11.00 TYPE II DIABETES MELLITUS WITH HYPEROSMOLARITY, UNCONTROLLED (HCC): ICD-10-CM

## 2020-08-06 DIAGNOSIS — M86.9 DIABETIC FOOT ULCER WITH OSTEOMYELITIS (HCC): Primary | ICD-10-CM

## 2020-08-06 DIAGNOSIS — E11.65 TYPE II DIABETES MELLITUS WITH HYPEROSMOLARITY, UNCONTROLLED (HCC): ICD-10-CM

## 2020-08-06 DIAGNOSIS — M86.9 DIABETIC FOOT ULCER WITH OSTEOMYELITIS (HCC): ICD-10-CM

## 2020-08-06 DIAGNOSIS — L84 CORNS AND CALLOSITIES: ICD-10-CM

## 2020-08-06 DIAGNOSIS — L97.509 DIABETIC FOOT ULCER WITH OSTEOMYELITIS (HCC): Primary | ICD-10-CM

## 2020-08-06 DIAGNOSIS — E11.69 DIABETIC FOOT ULCER WITH OSTEOMYELITIS (HCC): ICD-10-CM

## 2020-08-06 DIAGNOSIS — L97.512 RIGHT FOOT ULCER, WITH FAT LAYER EXPOSED (HCC): ICD-10-CM

## 2020-08-06 DIAGNOSIS — B20 HUMAN IMMUNODEFICIENCY VIRUS (HIV) DISEASE (HCC): ICD-10-CM

## 2020-08-06 DIAGNOSIS — E11.621 DIABETIC FOOT ULCER WITH OSTEOMYELITIS (HCC): Primary | ICD-10-CM

## 2020-08-06 DIAGNOSIS — L97.509 DIABETIC FOOT ULCER WITH OSTEOMYELITIS (HCC): ICD-10-CM

## 2020-08-06 DIAGNOSIS — E11.69 DIABETIC FOOT ULCER WITH OSTEOMYELITIS (HCC): Primary | ICD-10-CM

## 2020-08-06 DIAGNOSIS — E11.621 DIABETIC FOOT ULCER WITH OSTEOMYELITIS (HCC): ICD-10-CM

## 2020-08-06 PROCEDURE — 73630 X-RAY EXAM OF FOOT: CPT

## 2020-08-06 PROCEDURE — 11042 DBRDMT SUBQ TIS 1ST 20SQCM/<: CPT | Performed by: NURSE PRACTITIONER

## 2020-08-06 PROCEDURE — 99214 OFFICE O/P EST MOD 30 MIN: CPT | Performed by: NURSE PRACTITIONER

## 2020-08-06 PROCEDURE — 97597 DBRDMT OPN WND 1ST 20 CM/<: CPT | Performed by: NURSE PRACTITIONER

## 2020-08-13 ENCOUNTER — APPOINTMENT (OUTPATIENT)
Dept: WOUND CARE | Facility: HOSPITAL | Age: 55
End: 2020-08-13

## 2020-08-18 ENCOUNTER — OFFICE VISIT (OUTPATIENT)
Dept: WOUND CARE | Facility: HOSPITAL | Age: 55
End: 2020-08-18

## 2020-08-18 DIAGNOSIS — N13.8 BPH WITH URINARY OBSTRUCTION: ICD-10-CM

## 2020-08-18 DIAGNOSIS — N40.1 BPH WITH URINARY OBSTRUCTION: ICD-10-CM

## 2020-08-18 PROCEDURE — 97597 DBRDMT OPN WND 1ST 20 CM/<: CPT | Performed by: NURSE PRACTITIONER

## 2020-08-18 RX ORDER — TAMSULOSIN HYDROCHLORIDE 0.4 MG/1
CAPSULE ORAL
Qty: 28 CAPSULE | Refills: 3 | Status: SHIPPED | OUTPATIENT
Start: 2020-08-18 | End: 2020-11-23 | Stop reason: SDUPTHER

## 2020-08-19 ENCOUNTER — DOCUMENTATION (OUTPATIENT)
Dept: ENDOCRINOLOGY | Facility: CLINIC | Age: 55
End: 2020-08-19

## 2020-08-25 ENCOUNTER — APPOINTMENT (OUTPATIENT)
Dept: WOUND CARE | Facility: HOSPITAL | Age: 55
End: 2020-08-25

## 2020-08-26 ENCOUNTER — APPOINTMENT (OUTPATIENT)
Dept: WOUND CARE | Facility: HOSPITAL | Age: 55
End: 2020-08-26

## 2020-08-27 ENCOUNTER — APPOINTMENT (OUTPATIENT)
Dept: WOUND CARE | Facility: HOSPITAL | Age: 55
End: 2020-08-27

## 2020-08-31 ENCOUNTER — APPOINTMENT (OUTPATIENT)
Dept: WOUND CARE | Facility: HOSPITAL | Age: 55
End: 2020-08-31

## 2020-09-02 ENCOUNTER — TELEPHONE (OUTPATIENT)
Dept: ENDOCRINOLOGY | Facility: CLINIC | Age: 55
End: 2020-09-02

## 2020-09-03 ENCOUNTER — APPOINTMENT (OUTPATIENT)
Dept: WOUND CARE | Facility: HOSPITAL | Age: 55
End: 2020-09-03

## 2020-09-03 RX ORDER — PEN NEEDLE, DIABETIC
NEEDLE, DISPOSABLE MISCELLANEOUS
Qty: 100 EACH | Refills: 6 | Status: SHIPPED | OUTPATIENT
Start: 2020-09-03 | End: 2021-03-17

## 2020-09-10 RX ORDER — PRENATAL WITH FERROUS FUM AND FOLIC ACID 3080; 920; 120; 400; 22; 1.84; 3; 20; 10; 1; 12; 200; 27; 25; 2 [IU]/1; [IU]/1; MG/1; [IU]/1; MG/1; MG/1; MG/1; MG/1; MG/1; MG/1; UG/1; MG/1; MG/1; MG/1; MG/1
TABLET ORAL
Qty: 100 TABLET | Refills: 3 | Status: SHIPPED | OUTPATIENT
Start: 2020-09-10 | End: 2021-02-05 | Stop reason: ALTCHOICE

## 2020-09-10 RX ORDER — ATORVASTATIN CALCIUM 40 MG/1
40 TABLET, FILM COATED ORAL DAILY
Qty: 90 TABLET | Refills: 3 | Status: SHIPPED | OUTPATIENT
Start: 2020-09-10 | End: 2021-08-10

## 2020-09-10 NOTE — TELEPHONE ENCOUNTER
Mihir Sheikh called requesting a refill of the below medication which has been pended for you:     Requested Prescriptions     Pending Prescriptions Disp Refills    atorvastatin (LIPITOR) 40 MG tablet [Pharmacy Med Name: atorvastatin 40 mg tablet] 90 tablet 3     Sig: Take 1 tablet by mouth daily    Prenatal Vit-Fe Fumarate-FA (PRENATAL VITAMIN) 27-1 MG TABS tablet [Pharmacy Med Name: Prenatal Vitamins Plus Low Iron 27 mg iron-1 mg tablet] 100 tablet 3     Sig: Take 1 tablet by mouth daily    metFORMIN (GLUCOPHAGE) 500 MG tablet [Pharmacy Med Name: metformin 500 mg tablet] 360 tablet 3     Sig: TAKE 2 TABLETS BY MOUTH TWICE DAILY WITH MEALS       Last Appointment Date: 7/31/2020  Next Appointment Date: 11/3/2020    Allergies   Allergen Reactions    Darvon [Propoxyphene]     Elavil [Amitriptyline Hcl]     Zithromax [Azithromycin]

## 2020-09-11 ENCOUNTER — OFFICE VISIT (OUTPATIENT)
Dept: WOUND CARE | Facility: HOSPITAL | Age: 55
End: 2020-09-11

## 2020-09-11 PROCEDURE — 11042 DBRDMT SUBQ TIS 1ST 20SQCM/<: CPT | Performed by: PODIATRIST

## 2020-09-14 RX ORDER — LOPERAMIDE HYDROCHLORIDE 2 MG/1
2 CAPSULE ORAL 4 TIMES DAILY PRN
Qty: 30 CAPSULE | Refills: 5 | Status: SHIPPED | OUTPATIENT
Start: 2020-09-14 | End: 2021-03-05

## 2020-09-25 ENCOUNTER — APPOINTMENT (OUTPATIENT)
Dept: WOUND CARE | Facility: HOSPITAL | Age: 55
End: 2020-09-25

## 2020-09-30 RX ORDER — DULAGLUTIDE 0.75 MG/.5ML
INJECTION, SOLUTION SUBCUTANEOUS
Qty: 2 ML | Refills: 3 | Status: SHIPPED | OUTPATIENT
Start: 2020-09-30 | End: 2021-01-20

## 2020-10-01 ENCOUNTER — TELEPHONE (OUTPATIENT)
Dept: UROLOGY | Facility: CLINIC | Age: 55
End: 2020-10-01

## 2020-10-15 ENCOUNTER — TELEPHONE (OUTPATIENT)
Dept: INTERNAL MEDICINE | Age: 55
End: 2020-10-15

## 2020-10-15 NOTE — TELEPHONE ENCOUNTER
He called and has had a sore throat and headache for 2 days. He will go to a walk in clinic to be evaluated.

## 2020-10-19 ENCOUNTER — TELEMEDICINE (OUTPATIENT)
Dept: ENDOCRINOLOGY | Facility: CLINIC | Age: 55
End: 2020-10-19

## 2020-10-19 ENCOUNTER — TELEPHONE (OUTPATIENT)
Dept: INTERNAL MEDICINE | Age: 55
End: 2020-10-19

## 2020-10-19 DIAGNOSIS — E78.2 MIXED DIABETIC HYPERLIPIDEMIA ASSOCIATED WITH TYPE 2 DIABETES MELLITUS (HCC): ICD-10-CM

## 2020-10-19 DIAGNOSIS — Z79.4 TYPE 2 DIABETES MELLITUS WITH HYPERGLYCEMIA, WITH LONG-TERM CURRENT USE OF INSULIN (HCC): Primary | ICD-10-CM

## 2020-10-19 DIAGNOSIS — E11.59 HYPERTENSION ASSOCIATED WITH DIABETES (HCC): ICD-10-CM

## 2020-10-19 DIAGNOSIS — E11.65 TYPE 2 DIABETES MELLITUS WITH HYPERGLYCEMIA, WITH LONG-TERM CURRENT USE OF INSULIN (HCC): Primary | ICD-10-CM

## 2020-10-19 DIAGNOSIS — I15.2 HYPERTENSION ASSOCIATED WITH DIABETES (HCC): ICD-10-CM

## 2020-10-19 DIAGNOSIS — E11.69 MIXED DIABETIC HYPERLIPIDEMIA ASSOCIATED WITH TYPE 2 DIABETES MELLITUS (HCC): ICD-10-CM

## 2020-10-19 DIAGNOSIS — E55.9 VITAMIN D DEFICIENCY: ICD-10-CM

## 2020-10-19 PROCEDURE — 99213 OFFICE O/P EST LOW 20 MIN: CPT | Performed by: INTERNAL MEDICINE

## 2020-10-19 RX ORDER — CEFDINIR 300 MG/1
300 CAPSULE ORAL 2 TIMES DAILY
Qty: 20 CAPSULE | Refills: 0 | Status: SHIPPED | OUTPATIENT
Start: 2020-10-19 | End: 2020-10-29

## 2020-10-19 RX ORDER — SILDENAFIL 50 MG/1
50 TABLET, FILM COATED ORAL AS NEEDED
Qty: 30 TABLET | Refills: 1 | Status: SHIPPED | OUTPATIENT
Start: 2020-10-19 | End: 2021-05-26 | Stop reason: SDUPTHER

## 2020-10-19 NOTE — TELEPHONE ENCOUNTER
Yes I sent antibiotic to [facility] because I want him to quarantine until gets a covid but can start abx in meantime  he could have covid-- he called last week with this--- if he can get to our drive through for testing by 6 pm today and then quarantine in meantime until covid test back (if cant get test today get as soon as can) --------I did send abx  Test our drive through or [facility]  Assume possible covid and quarantine until results

## 2020-10-19 NOTE — PROGRESS NOTES
" Donis Yi is a 54 y.o. male who presents for  evaluation of   Type 2 diabetes                                      This was a Telehealth Encounter. Benefits and Disadvantages of a Telehealth Visit were discussed and accepted by patient. .  Patient agreed to receive service through Telehealth visit as patient is being compliant with social distancing recommendations imparted by CDC.     You have chosen to receive care through a telehealth visit.  Do you consent to use a video/audio connection for your medical care today? Yes           Referring provider     Primary Care Provider    Oksana Mares MD    Duration 15 years    Timing - Diabetes is Constant    Quality -  poorly controlled      Severity -  severe    Complications - peripheral neuropathy    Current symptoms/problems  none     Alleviating Factors: Compliance      Aggravating Factors :    Side Effects  none    Current diet  in general, a \"healthy\" diet      Current exercise none    Current monitoring regimen: home blood tests - checking 4 x daily   Home blood sugar records: 60 - 500s       Hypoglycemia nocturnal and if skipped meals     Past Medical History:   Diagnosis Date   • Allergic rhinitis    • Anxiety    • Bursitis    • DDD (degenerative disc disease), cervical    • Depression    • HIV disease (CMS/HCC)    • HLD (hyperlipidemia)    • HSP (Henoch Schonlein purpura) (CMS/HCC)    • HTN (hypertension)    • Migraine    • Myocardial infarction (CMS/HCC)    • Osteoporosis    • Sleep apnea    • Type 2 diabetes mellitus (CMS/HCC)      Family History   Problem Relation Age of Onset   • Diabetes Mother    • Hypertension Mother    • Thyroid disease Mother    • Hypertension Father    • Thyroid disease Father    • Arrhythmia Father    • Diabetes Maternal Grandfather    • Heart disease Maternal Grandfather    • Hypertension Maternal Grandfather    • Diabetes Paternal Grandmother    • Stroke Paternal Grandmother    • Heart disease Paternal Grandmother    • " Hypertension Paternal Grandmother    • Thyroid disease Paternal Grandmother    • Hypertension Paternal Grandfather    • Hypertension Sister    • No Known Problems Brother      Social History     Tobacco Use   • Smoking status: Current Every Day Smoker     Packs/day: 0.50     Types: Cigarettes     Start date: 1988   • Smokeless tobacco: Never Used   Substance Use Topics   • Alcohol use: Yes     Comment: occasionally   • Drug use: No         Current Outpatient Medications:   •  albuterol (PROVENTIL HFA;VENTOLIN HFA) 108 (90 BASE) MCG/ACT inhaler, Inhale 2 puffs every 6 (six) hours as needed for wheezing., Disp: , Rfl:   •  ARIPiprazole (ABILIFY) 5 MG tablet, Take 5 mg by mouth Daily., Disp: , Rfl:   •  aspirin 81 MG EC tablet, Take 1 tablet by mouth Daily., Disp: 90 tablet, Rfl: 3  •  atorvastatin (LIPITOR) 40 MG tablet, Take 40 mg by mouth Daily., Disp: , Rfl: 3  •  calcium carbonate (OS-THEODORA) 600 MG tablet, Take 600 mg by mouth Daily., Disp: , Rfl:   •  Cholecalciferol (VITAMIN D3) 50 MCG (2000 UT) capsule, Take 2,000 Units by mouth Daily., Disp: , Rfl:   •  ciclopirox (LOPROX) 0.77 % cream, Apply  topically to the appropriate area as directed 2 (Two) Times a Day., Disp: 30 g, Rfl: 5  •  clonazePAM (KlonoPIN) 0.5 MG tablet, Take 0.5 mg by mouth 2 (Two) Times a Day., Disp: , Rfl:   •  denosumab (PROLIA) 60 MG/ML solution syringe, Inject 60 mg under the skin Every 6 (Six) Months., Disp: , Rfl:   •  DEXILANT 60 MG capsule, Take 60 mg by mouth Daily., Disp: , Rfl: 1  •  Xizafic-Nogvj-Pluiujyd-TenofAF (GENVOYA) [phone] MG tablet, Take 1 tablet by mouth daily., Disp: , Rfl:   •  fluticasone (FLONASE) 50 MCG/ACT nasal spray, 2 sprays into each nostril Daily. (Patient taking differently: 2 sprays into the nostril(s) as directed by provider Daily As Needed for Rhinitis.), Disp: 1 bottle, Rfl: 6  •  guaiFENesin (MUCINEX) 600 MG 12 hr tablet, Take 600 mg by mouth 2 (two) times a day as needed for cough., Disp: , Rfl:   •   HUMULIN R U-500 KWIKPEN 500 UNIT/ML solution pen-injector CONCENTRATED injection, inject 200 units TWICE DAILY with meals, Disp: 24 mL, Rfl: 5  •  HYDROcodone-acetaminophen (NORCO) 5-325 MG per tablet, Take 1 tablet by mouth Every 12 (Twelve) Hours As Needed for Moderate Pain  or Severe Pain ., Disp: , Rfl:   •  insulin aspart (novoLOG FLEXPEN) 100 UNIT/ML solution pen-injector sc pen, Inject 5 units for every 50 BG points above 150 up to 40 units at lunch and supper only according to sliding scale, Disp: 24 mL, Rfl: 11  •  isosorbide mononitrate (IMDUR) 30 MG 24 hr tablet, Take 1 tablet by mouth Daily., Disp: 90 tablet, Rfl: 3  •  loperamide (IMODIUM) 2 MG capsule, Take 2 mg by mouth 4 (Four) Times a Day As Needed for Diarrhea., Disp: , Rfl:   •  loratadine (CLARITIN) 10 MG tablet, Take 10 mg by mouth daily., Disp: , Rfl:   •  losartan (COZAAR) 25 MG tablet, Take 1 tablet by mouth Daily., Disp: 30 tablet, Rfl: 0  •  LYRICA 100 MG capsule, Take 100 mg by mouth 2 (Two) Times a Day., Disp: , Rfl:   •  meclizine (ANTIVERT) 25 MG tablet, Take 25 mg by mouth Daily., Disp: , Rfl:   •  Melatonin 5 MG capsule, Take 5 mg by mouth every night at bedtime., Disp: , Rfl: 3  •  metFORMIN (GLUCOPHAGE) 500 MG tablet, Take 1,000 mg by mouth 2 (Two) Times a Day With Meals., Disp: , Rfl: 3  •  methylPREDNISolone (MEDROL, MARYSE,) 4 MG tablet, Take as directed on package instructions., Disp: 21 each, Rfl: 0  •  neomycin-bacitracin-polymyxin (NEOSPORIN) 5-400-5000 ointment, Apply 1 application topically to the appropriate area as directed 4 (Four) Times a Day As Needed for Irritation., Disp: , Rfl:   •  nitroglycerin (NITROSTAT) 0.4 MG SL tablet, Place 1 tablet under the tongue Every 5 (Five) Minutes As Needed for Chest Pain (Only if SBP Greater Than 100). Take no more than 3 doses in 15 minutes., Disp: 30 tablet, Rfl: 0  •  nystatin (MYCOSTATIN) 920156 UNIT/GM ointment, Apply  topically to the appropriate area as directed 2 (Two) Times a  Day., Disp: , Rfl:   •  Prenatal Vit-Fe Fumarate-FA (PRENATAL PO), Take 1 tablet by mouth Daily., Disp: , Rfl: 3  •  promethazine (PHENERGAN) 25 MG tablet, Take 25 mg by mouth every 6 (six) hours as needed for nausea or vomiting., Disp: , Rfl:   •  propranolol LA (INDERAL LA) 80 MG 24 hr capsule, Take 80 mg by mouth Daily., Disp: , Rfl: 5  •  rizatriptan (MAXALT) 10 MG tablet, Take 10 mg by mouth 1 (one) time as needed for migraine. May repeat in 2 hours if needed, Disp: , Rfl:   •  tamsulosin (FLOMAX) 0.4 MG capsule 24 hr capsule, TAKE 1 CAPSULE BY MOUTH EVERY NIGHT AT BEDTIME, Disp: 28 capsule, Rfl: 3  •  tiZANidine (ZANAFLEX) 4 MG tablet, Take 4 mg by mouth Daily As Needed for Muscle Spasms., Disp: , Rfl:   •  traZODone (DESYREL) 100 MG tablet, Take 200 mg by mouth Every Night., Disp: , Rfl: 3  •  Trulicity 0.75 MG/0.5ML solution pen-injector, Inject 0.75mg (0.5ml) under the skin AS DIRECTED every week, Disp: 2 mL, Rfl: 3  •  varenicline (Chantix Continuing Month Maryse) 1 MG tablet, Take 1 tablet by mouth Daily., Disp: 30 tablet, Rfl: 0  •  varenicline (CHANTIX MARYSE) 0.5 MG X 11 & 1 MG X 42 tablet, Take 0.5 mg one daily on days 1-3 and and 0.5 mg twice daily on days 4-7.Then 1 mg twice daily for a total of 12 weeks., Disp: 53 tablet, Rfl: 0  •  Vortioxetine HBr (Trintellix) 20 MG tablet, Take 20 mg by mouth Daily With Breakfast., Disp: , Rfl:     Review of Systems    Review of Systems   Constitutional: Negative for activity change, appetite change, chills, diaphoresis, fatigue, fever and unexpected weight change.   HENT: Negative for congestion, dental problem, drooling, ear discharge, ear pain, facial swelling, mouth sores, postnasal drip, rhinorrhea, sinus pressure, sore throat, tinnitus, trouble swallowing and voice change.    Eyes: Negative for photophobia, pain, discharge, redness, itching and visual disturbance.   Respiratory: Negative for apnea, cough, choking, chest tightness, shortness of breath, wheezing  and stridor.    Cardiovascular: Negative for chest pain, palpitations and leg swelling.   Gastrointestinal: Negative for abdominal distention, abdominal pain, constipation, diarrhea, nausea and vomiting.   Endocrine: Negative for cold intolerance, heat intolerance, polydipsia, polyphagia and polyuria.   Genitourinary: Negative for decreased urine volume, difficulty urinating, dysuria, flank pain, frequency, hematuria and urgency.   Musculoskeletal: Negative for arthralgias, back pain, gait problem, joint swelling, myalgias, neck pain and neck stiffness.   Skin: Negative for color change, pallor, rash and wound.   Allergic/Immunologic: Negative for immunocompromised state.   Neurological: Negative for dizziness, tremors, seizures, syncope, facial asymmetry, speech difficulty, weakness, light-headedness, numbness and headaches.   Hematological: Negative for adenopathy.   Psychiatric/Behavioral: Negative for agitation, behavioral problems, confusion, decreased concentration, dysphoric mood, hallucinations, self-injury, sleep disturbance and suicidal ideas. The patient is not nervous/anxious and is not hyperactive.         Objective:   There were no vitals taken for this visit.    Physical Exam   Constitutional: He is oriented to person, place, and time. He appears well-developed. He is cooperative.   HENT:   Head: Normocephalic and atraumatic.   Right Ear: External ear normal.   Left Ear: External ear normal.   Nose: Nose normal.   Mouth/Throat: No oropharyngeal exudate.   Eyes: Pupils are equal, round, and reactive to light. Conjunctivae are normal. No scleral icterus. Right eye exhibits normal extraocular motion. Left eye exhibits normal extraocular motion.   Neck: Neck supple. No JVD present. No muscular tenderness present. No tracheal deviation, no edema and no erythema present. No thyromegaly present.   Cardiovascular: Normal rate, regular rhythm and normal heart sounds. Exam reveals no gallop and no friction rub.    No murmur heard.  Pulmonary/Chest: Effort normal and breath sounds normal. No stridor. No respiratory distress. He has no decreased breath sounds. He has no wheezes. He has no rhonchi. He has no rales. He exhibits no tenderness.   Abdominal: Soft. Bowel sounds are normal. He exhibits no distension and no mass. There is no hepatomegaly. There is no abdominal tenderness. There is no rebound and no guarding. No hernia.   Musculoskeletal: Normal range of motion. No tenderness or deformity.   Lymphadenopathy:     He has no cervical adenopathy.   Neurological: He is alert and oriented to person, place, and time. He has normal reflexes. No cranial nerve deficit. He exhibits normal muscle tone. Coordination normal.   Skin: Skin is warm. No rash noted. No erythema. No pallor.   Psychiatric: His behavior is normal. Judgment and thought content normal.   Nursing note and vitals reviewed.          Lab Review          Assessment/Plan       ICD-10-CM ICD-9-CM   1. Type 2 diabetes mellitus with hyperglycemia, with long-term current use of insulin (CMS/HCC)  E11.65 250.00    Z79.4 790.29     V58.67   2. Hypertension associated with diabetes (CMS/HCC)  E11.59 250.80    I10 401.9   3. Mixed diabetic hyperlipidemia associated with type 2 diabetes mellitus (CMS/HCC)  E11.69 250.80    E78.2 272.2         I reviewed and summarized records from Oksana Mares MD from present year  and I reviewed / ordered labs.   From review of records :    Patient has uncontrolled type 2 diabetes    Glycemic Management:   Lab Results   Component Value Date    HGBA1C 10.80 (H) 05/11/2020    HGBA1C 10.90 (H) 02/19/2020    HGBA1C 11.00 (H) 10/17/2019     Lab Results   Component Value Date    GLUCOSE 474 (C) 07/24/2020    BUN 25 (H) 07/24/2020    CREATININE 0.95 07/24/2020    EGFRIFNONA 83 07/24/2020    BCR 26.3 (H) 07/24/2020    K 4.4 07/24/2020    CO2 25.0 07/24/2020    CALCIUM 9.4 07/24/2020    ALBUMIN 4.00 07/24/2020    LABIL2 CANCELED 03/29/2016     AST 23 07/24/2020    ALT 29 07/24/2020    ANIONGAP 16.0 (H) 07/24/2020     Lab Results   Component Value Date    WBC 6.24 07/24/2020    HGB 12.6 (L) 07/24/2020    HCT 36.8 (L) 07/24/2020    MCV 90.0 07/24/2020     07/24/2020        U500 insulin      100 am and 200 mg pm   Now   200 bid       In addition , U100, up to 40 w meals  ( using only 15 )     Total 4 injections per day     Metformin 1000 mg bid      Trulicity 0.75 mg weekly     Next appt add jardiance but be careful since has BPH     Has freestyle melba reader - not available     We focused on motivation, he has all the right medicines, he just needs to be compliant     Rule out cushing's      Lipid Management  Lab Results   Component Value Date    CHOL 129 05/12/2020    CHOL 117 02/19/2020    CHOL 147 01/14/2019     Lab Results   Component Value Date    TRIG 92 05/12/2020    TRIG 141 02/19/2020    TRIG 133 09/09/2019     Lab Results   Component Value Date    HDL 45 05/12/2020    HDL 56 02/19/2020    HDL 48 (L) 09/09/2019     No components found for: LDLCALC  Lab Results   Component Value Date    LDL 66 05/12/2020    LDL 33 02/19/2020    LDL 51 09/09/2019     No results found for: LDLDIRECT    lipitor 80 mg qhs     Blood Pressure Management:    There were no vitals filed for this visit.  Lab Results   Component Value Date    GLUCOSE 474 (C) 07/24/2020    CALCIUM 9.4 07/24/2020     07/24/2020    K 4.4 07/24/2020    CO2 25.0 07/24/2020    CL 96 (L) 07/24/2020    BUN 25 (H) 07/24/2020    CREATININE 0.95 07/24/2020    EGFRIFNONA 83 07/24/2020    BCR 26.3 (H) 07/24/2020    ANIONGAP 16.0 (H) 07/24/2020               Microvascular Complication Monitoring:      Eye Exam Evaluation    -----------    Last Microalbumin-Proteinuria Assessment    No results found for: MALBCRERATIO    No results found for: UTPCR    -----------      Neuropathy on lyrica 100 bid          Weight Related:   Wt Readings from Last 3 Encounters:   07/24/20 121 kg (267 lb)   07/20/20  123 kg (272 lb)   07/17/20 123 kg (271 lb 3.2 oz)     There is no height or weight on file to calculate BMI.        Diet interventions: moderate (500 kCal/d) deficit diet.      Bone Health    Lab Results   Component Value Date    CALCIUM 9.4 07/24/2020    PHOS 2.7 05/11/2020    UXMV08IU 42.4 02/19/2020       Thyroid Health    Lab Results   Component Value Date    TSH 1.510 05/11/2020    TSH 0.996 02/19/2020    TSH 0.896 02/16/2020              Other Diabetes Related Aspects       Lab Results   Component Value Date    PUIHHPGK49 366 02/19/2020     Wanted viagra    No longer on imdur and nitroglycerin     Written this time but only 52 since on genvoya and flomax        No orders of the defined types were placed in this encounter.        A copy of my note was sent to Oksana Mares MD    Please see my above opinion and suggestions.        I spent 15 minutes reviewing patient electronic chart , reviewing medications , past history , active problems.   I provided advice regarding management of medical conditions, refilled prescriptions , ordered labs and arranged for future appointment.   Patient was advised to contact us if there were any unanswered questions or ongoing concerns.

## 2020-11-02 PROCEDURE — U0003 INFECTIOUS AGENT DETECTION BY NUCLEIC ACID (DNA OR RNA); SEVERE ACUTE RESPIRATORY SYNDROME CORONAVIRUS 2 (SARS-COV-2) (CORONAVIRUS DISEASE [COVID-19]), AMPLIFIED PROBE TECHNIQUE, MAKING USE OF HIGH THROUGHPUT TECHNOLOGIES AS DESCRIBED BY CMS-2020-01-R: HCPCS | Performed by: NURSE PRACTITIONER

## 2020-11-03 ENCOUNTER — VIRTUAL VISIT (OUTPATIENT)
Dept: INTERNAL MEDICINE | Age: 55
End: 2020-11-03
Payer: COMMERCIAL

## 2020-11-03 PROCEDURE — 99213 OFFICE O/P EST LOW 20 MIN: CPT | Performed by: INTERNAL MEDICINE

## 2020-11-03 RX ORDER — SILDENAFIL CITRATE 20 MG/1
20 TABLET ORAL DAILY PRN
Qty: 15 TABLET | Refills: 0 | Status: SHIPPED | OUTPATIENT
Start: 2020-11-03 | End: 2022-01-18 | Stop reason: ALTCHOICE

## 2020-11-03 NOTE — PROGRESS NOTES
11/3/2020    TELEHEALTH EVALUATION -- Audio/Visual (During IPRRY-70 public health emergency)    HPI: This is a Doxy virtual visit patient is located at his home I am located at the office. He is having some trouble with blood sugar getting too low in the morning and too high of the times a day. Difficult diabetes maintenance in control and difficulty being compliant with a scheduled diet plan of care. He denies headache chest pressure abdominal pain. We reviewed his record. bs running low in ams routinely under 150    Lisy José (:  1965) has requested an audio/video evaluation for the following concern(s):    Diabetes    Review of Systems   Constitutional: Positive for fatigue. Negative for chills and fever. HENT: Negative for congestion and facial swelling. Eyes: Negative for discharge and redness. Respiratory: Negative for cough, chest tightness and shortness of breath. Cardiovascular: Negative for chest pain. Gastrointestinal: Negative for abdominal distention and abdominal pain. Genitourinary: Negative for dysuria. Musculoskeletal: Positive for arthralgias and back pain. Skin: Negative for rash. Neurological: Positive for weakness and numbness. Negative for headaches. Psychiatric/Behavioral: Positive for dysphoric mood. Negative for confusion and decreased concentration. Prior to Visit Medications    Medication Sig Taking?  Authorizing Provider   sildenafil (REVATIO) 20 MG tablet Take 1 tablet by mouth daily as needed (ED) Yes James Jones MD   meclizine (ANTIVERT) 25 MG tablet Take 1 tablet by mouth daily as needed for Dizziness  James Jones MD   loperamide (IMODIUM) 2 MG capsule Take 1 capsule by mouth 4 times daily as needed for Diarrhea  James Jones MD   atorvastatin (LIPITOR) 40 MG tablet Take 1 tablet by mouth daily  James Jones MD   Prenatal Vit-Fe Fumarate-FA (PRENATAL VITAMIN) 27-1 MG TABS tablet Take 1 tablet by mouth daily  James Jones MD metFORMIN (GLUCOPHAGE) 500 MG tablet TAKE 2 TABLETS BY MOUTH TWICE DAILY WITH MEALS  Yaima Ferrari MD   UNIFINYANELI PENTIPS 31G X 6 MM MISC for use FOUR Dannie Gilford, MD   loratadine (CLARITIN) 10 MG tablet TAKE 1 TABLET BY MOUTH DAILY  Yaima Ferrari MD   Melatonin 5 MG CAPS TAKE 1 CAPSULE BY MOUTH AT BEDTIME  Yaima Ferrari MD   NASONEX 50 MCG/ACT nasal spray 2 sprays by Nasal route daily  Yaima Ferrari MD   nystatin (MYCOSTATIN) 419099 UNIT/GM ointment apply topically to affected area Morgan [address], MD   Dulaglutide (TRULICITY) 1.5 DT/3.6AD SOPN Inject 1.5 mg into the skin once a week  Yaima Ferrari MD   CALTRATE 600+D3 SOFT 600-800 MG-UNIT CHEW chew 1 tablet DAILY  Yaima Ferrari MD   HUMULIN R U-500 KWIKPEN 500 UNIT/ML SOPN concentrated injection pen inject 200 units TWICE DAILY with meals  Yaima Ferrari MD   losartan (COZAAR) 25 MG tablet Take 1 tablet by mouth Daily. Yaima Ferrari MD   ASPIRIN LOW DOSE 81 MG EC tablet Take 1 tablet by mouth Daily. Yaima Ferrari MD   isosorbide mononitrate (IMDUR) 30 MG extended release tablet Take 1 tablet by mouth Daily.   Yaima Ferrari MD   traZODone (DESYREL) 100 MG tablet Take 2 tablets by mouth nightly (Stop 150mg)  Yaima Ferrari MD   propranolol (INDERAL LA) 80 MG extended release capsule 1 capsule by mouth daily  Yaima Ferrari MD   clotrimazole (LOTRIMIN) 1 % external solution Apply 1 actuation topically 2 times daily  Historical Provider, MD   rizatriptan (MAXALT) 10 MG tablet Take 1 tablet by mouth once as needed for Migraine May repeat in 2 hours if needed  Yaima Ferrari MD   Cholecalciferol (VITAMIN D3) 50 MCG (2000 UT) CAPS 1 capsule by mouth daily  Yaima Ferrari MD   DEXILANT 60 MG CPDR delayed release capsule TAKE 1 CAPSULE BY MOUTH DAILY  Yaima Ferrari MD   tamsulosin (FLOMAX) 0.4 MG capsule Take 0.4 mg by mouth daily  Historical Provider, MD   NOVOLOG FLEXPEN 100 UNIT/ML injection pen INJECT 70 UNITS WITH REGULAR MEALS, 80 UNITS WITH LARGER MEALS, AND 60 UNITS WITH SNACKS AS DIRECTED  Angela Bloom MD   nystatin (MYCOSTATIN) 419079 UNIT/GM cream Apply topically 2 times daily Apply topically 2 times daily. MARCUS Grajeda   ARIPiprazole (ABILIFY) 5 MG tablet Take 5 mg by mouth daily   Historical Provider, MD   nicotine (NICODERM CQ) 21 MG/24HR Use 21mg patch daily for 2 weeks and then 14mg patch daily for 2 weeks and then 7mg patch daily for 2 weeks  Angela Bloom MD   triamcinolone (KENALOG) 0.1 % cream Apply topically 2 times daily prn  Angela Bloom MD   Lancets MISC Use to check blood sugar 4 times daily   Dx  E11.9  Insulin dependent  Angela Bloom MD   VORTIoxetine HBr (TRINTELLIX) 20 MG TABS tablet Take 20 mg by mouth daily   Historical Provider, MD   glucose blood VI test strips (FREESTYLE LITE) strip 1 each by In Vitro route daily Test blood sugar 4 times daily  Angela Bloom MD   calcium carbonate 600 MG TABS tablet Take 600 mg by mouth  Historical Provider, MD   clonazePAM (KLONOPIN) 0.5 MG tablet Take 1 tablet by mouth 2 times daily as needed for Omar MD Joshua   Ffghlez-Vhdfu-Jxxjzwyd-TenofAF (GENVOYA) [phone] MG TABS Take 1 tablet by mouth daily  Historical Provider, MD   HYDROcodone-acetaminophen (NORCO) 5-325 MG per tablet Take 1 tablet by mouth 2 times daily as needed for Pain . Historical Provider, MD   pregabalin (LYRICA) 75 MG capsule Take 75 mg by mouth 2 times daily  Historical Provider, MD       Social History     Tobacco Use    Smoking status: Current Every Day Smoker    Smokeless tobacco: Never Used   Substance Use Topics    Alcohol use:  Yes    Drug use: No      Allergies medications past medical history family history social history surgical history vaccines reviewed    PHYSICAL EXAMINATION:  [ INSTRUCTIONS:  \"[x]\" Indicates a positive item  \"[]\" Indicates a negative item  -- DELETE ALL ITEMS NOT EXAMINED]  Vital Signs: (As obtained by patient/caregiver or practitioner observation)    Blood pressure-  Heart rate-    Respiratory rate-    Temperature-  Pulse oximetry-     Constitutional: [x] Appears well-developed and well-nourished [] No apparent distress      [] Abnormal-   Mental status  [x] Alert and awake  [x] Oriented to person/place/time []Able to follow commands      Eyes:  EOM    []  Normal  [] Abnormal-  Sclera  [x]  Normal  [] Abnormal -         Discharge []  None visible  [] Abnormal -    HENT:   [x] Normocephalic, atraumatic. [] Abnormal   [] Mouth/Throat: Mucous membranes are moist.     External Ears [x] Normal  [] Abnormal-     Neck: [x] No visualized mass     Pulmonary/Chest: [x] Respiratory effort normal.  [] No visualized signs of difficulty breathing or respiratory distress        [] Abnormal-      Musculoskeletal:   [] Normal gait with no signs of ataxia         [] Normal range of motion of neck        [] Abnormal-       Neurological:        [x] No Facial Asymmetry (Cranial nerve 7 motor function) (limited exam to video visit)          [x] No gaze palsy        [] Abnormal-         Skin:        [x] No significant exanthematous lesions or discoloration noted on facial skin         [] Abnormal-            Psychiatric:       [] Normal Affect [] No Hallucinations        [x] Abnormal-  tired    Other pertinent observable physical exam findings-     ASSESSMENT/PLAN:  1. Type 2 diabetes mellitus with diabetic polyneuropathy, with long-term current use of insulin (HCC)  Diabetes has been in terrible control he has to have diabetic compliance and increased activity diet compliance medication compliance we emphasized and stressed this he has a lab order in place and also follows with endocrine and we highly recommend continued follow-up with endocrinology    2. Essential hypertension  All blood pressure stable continue current care and follow    3. Mixed hyperlipidemia  Statin therapy and follow    4.  HIV-1 infection, symptomatic ([address]  His HIV is managed and controlled follows with infectious disease      Return in about 3 months (around 2/12/2021) for annual.    Nguyen Buenrostro is a 48 y.o. male being evaluated by a Virtual Visit (video visit) encounter to address concerns as mentioned above. A caregiver was present when appropriate. Due to this being a TeleHealth encounter (During PBC-01 public health emergency), evaluation of the following organ systems was limited: Vitals/Constitutional/EENT/Resp/CV/GI//MS/Neuro/Skin/Heme-Lymph-Imm. Pursuant to the emergency declaration under the [address] [place] [address], [address] [place] [address] authority and the [employer] Act, this Virtual Visit was conducted with patient's (and/or legal guardian's) consent, to reduce the patient's risk of exposure to COVID-19 and provide necessary medical care. The patient (and/or legal guardian) has also been advised to contact this office for worsening conditions or problems, and seek emergency medical treatment and/or call 911 if deemed necessary. Patient identification was verified at the start of the visit: yes  Total time spent on this encounter:15'Services were provided through a video synchronous discussion virtually to substitute for in-person clinic visit. Patient and provider were located at their individual homes. --Brinda Perez MD on 11/15/2020 at 8:59 PM    An electronic signature was used to authenticate this note.

## 2020-11-07 RX ORDER — MECLIZINE HYDROCHLORIDE 25 MG/1
TABLET ORAL
Qty: 90 TABLET | Refills: 0 | Status: SHIPPED | OUTPATIENT
Start: 2020-11-07 | End: 2021-01-22

## 2020-11-15 ASSESSMENT — ENCOUNTER SYMPTOMS
BACK PAIN: 1
ABDOMINAL PAIN: 0
FACIAL SWELLING: 0
COUGH: 0
SHORTNESS OF BREATH: 0
ABDOMINAL DISTENTION: 0
EYE DISCHARGE: 0
CHEST TIGHTNESS: 0
EYE REDNESS: 0

## 2020-11-17 NOTE — PROGRESS NOTES
Subjective    Mr. Yi is 54 y.o. male    Chief Complaint: L UTS    History of Present Illness  Lower Urinary tract symptoms  Patient is here for her lower urinary tract symptoms. The patient is bothered by slow stream, hesitancy, intermittency, nocturia, urgency, sense of residual urine, frequency, dysuria.  Onset has been gradual.  The patient perceives the voided amount is Symptoms have been present for several years.  Severity is described as Severe.  Course has been worsening. The patient perceives the amount voided is normal for the urge. Previous  history includes no significant issues.  Previous treatment includes none.   The following portions of the patient's history were reviewed and updated as appropriate: allergies, current medications, past family history, past medical history, past social history, past surgical history and problem list.    Review of Systems   Constitutional: Negative for chills and fever.   Gastrointestinal: Negative for abdominal pain, anal bleeding and blood in stool.   Genitourinary: Positive for dysuria. Negative for frequency, hematuria and urgency.         Current Outpatient Medications:   •  albuterol (PROVENTIL HFA;VENTOLIN HFA) 108 (90 BASE) MCG/ACT inhaler, Inhale 2 puffs every 6 (six) hours as needed for wheezing., Disp: , Rfl:   •  ARIPiprazole (ABILIFY) 5 MG tablet, Take 5 mg by mouth Daily., Disp: , Rfl:   •  aspirin 81 MG EC tablet, Take 1 tablet by mouth Daily., Disp: 90 tablet, Rfl: 3  •  atorvastatin (LIPITOR) 40 MG tablet, Take 40 mg by mouth Daily., Disp: , Rfl: 3  •  calcium carbonate (OS-THEODORA) 600 MG tablet, Take 600 mg by mouth Daily., Disp: , Rfl:   •  Cholecalciferol (VITAMIN D3) 50 MCG (2000 UT) capsule, Take 2,000 Units by mouth Daily., Disp: , Rfl:   •  ciclopirox (LOPROX) 0.77 % cream, Apply  topically to the appropriate area as directed 2 (Two) Times a Day., Disp: 30 g, Rfl: 5  •  clonazePAM (KlonoPIN) 0.5 MG tablet, Take 0.5 mg by mouth 2 (Two)  Times a Day., Disp: , Rfl:   •  denosumab (PROLIA) 60 MG/ML solution syringe, Inject 60 mg under the skin Every 6 (Six) Months., Disp: , Rfl:   •  DEXILANT 60 MG capsule, Take 60 mg by mouth Daily., Disp: , Rfl: 1  •  Lkmlwvc-Wjrdd-Pyvteyxp-TenofAF (GENVOYA) 060-801-541-10 MG tablet, Take 1 tablet by mouth daily., Disp: , Rfl:   •  fluticasone (FLONASE) 50 MCG/ACT nasal spray, 2 sprays into each nostril Daily. (Patient taking differently: 2 sprays into the nostril(s) as directed by provider Daily As Needed for Rhinitis.), Disp: 1 bottle, Rfl: 6  •  gabapentin (NEURONTIN) 800 MG tablet, , Disp: , Rfl:   •  guaiFENesin (MUCINEX) 600 MG 12 hr tablet, Take 600 mg by mouth 2 (two) times a day as needed for cough., Disp: , Rfl:   •  HUMULIN R U-500 KWIKPEN 500 UNIT/ML solution pen-injector CONCENTRATED injection, inject 200 units TWICE DAILY with meals, Disp: 24 mL, Rfl: 5  •  HYDROcodone-acetaminophen (NORCO) 5-325 MG per tablet, Take 1 tablet by mouth Every 12 (Twelve) Hours As Needed for Moderate Pain  or Severe Pain ., Disp: , Rfl:   •  insulin aspart (novoLOG FLEXPEN) 100 UNIT/ML solution pen-injector sc pen, Inject 5 units for every 50 BG points above 150 up to 40 units at lunch and supper only according to sliding scale, Disp: 24 mL, Rfl: 11  •  loperamide (IMODIUM) 2 MG capsule, Take 2 mg by mouth 4 (Four) Times a Day As Needed for Diarrhea., Disp: , Rfl:   •  loratadine (CLARITIN) 10 MG tablet, Take 10 mg by mouth daily., Disp: , Rfl:   •  losartan (COZAAR) 25 MG tablet, Take 1 tablet by mouth Daily., Disp: 30 tablet, Rfl: 0  •  LYRICA 100 MG capsule, Take 100 mg by mouth 2 (Two) Times a Day., Disp: , Rfl:   •  meclizine (ANTIVERT) 25 MG tablet, Take 25 mg by mouth Daily., Disp: , Rfl:   •  Melatonin 5 MG capsule, Take 5 mg by mouth every night at bedtime., Disp: , Rfl: 3  •  metFORMIN (GLUCOPHAGE) 500 MG tablet, Take 1,000 mg by mouth 2 (Two) Times a Day With Meals., Disp: , Rfl: 3  •   neomycin-bacitracin-polymyxin (NEOSPORIN) 5-400-5000 ointment, Apply 1 application topically to the appropriate area as directed 4 (Four) Times a Day As Needed for Irritation., Disp: , Rfl:   •  nystatin (MYCOSTATIN) 901584 UNIT/GM ointment, Apply  topically to the appropriate area as directed 2 (Two) Times a Day., Disp: , Rfl:   •  Prenatal Vit-Fe Fumarate-FA (PRENATAL PO), Take 1 tablet by mouth Daily., Disp: , Rfl: 3  •  promethazine (PHENERGAN) 25 MG tablet, Take 25 mg by mouth every 6 (six) hours as needed for nausea or vomiting., Disp: , Rfl:   •  propranolol LA (INDERAL LA) 80 MG 24 hr capsule, Take 80 mg by mouth Daily., Disp: , Rfl: 5  •  rizatriptan (MAXALT) 10 MG tablet, Take 10 mg by mouth 1 (one) time as needed for migraine. May repeat in 2 hours if needed, Disp: , Rfl:   •  sildenafil (Viagra) 50 MG tablet, Take 1 tablet by mouth As Needed for Erectile Dysfunction., Disp: 30 tablet, Rfl: 1  •  tamsulosin (FLOMAX) 0.4 MG capsule 24 hr capsule, Take 2 capsules by mouth every night at bedtime., Disp: 180 capsule, Rfl: 3  •  tiZANidine (ZANAFLEX) 4 MG tablet, Take 4 mg by mouth Daily As Needed for Muscle Spasms., Disp: , Rfl:   •  traZODone (DESYREL) 100 MG tablet, Take 200 mg by mouth Every Night., Disp: , Rfl: 3  •  Trulicity 0.75 MG/0.5ML solution pen-injector, Inject 0.75mg (0.5ml) under the skin AS DIRECTED every week, Disp: 2 mL, Rfl: 3  •  varenicline (Chantix Continuing Month Maryse) 1 MG tablet, Take 1 tablet by mouth Daily., Disp: 30 tablet, Rfl: 0  •  varenicline (CHANTIX MARYSE) 0.5 MG X 11 & 1 MG X 42 tablet, Take 0.5 mg one daily on days 1-3 and and 0.5 mg twice daily on days 4-7.Then 1 mg twice daily for a total of 12 weeks., Disp: 53 tablet, Rfl: 0  •  Vortioxetine HBr (Trintellix) 20 MG tablet, Take 20 mg by mouth Daily With Breakfast., Disp: , Rfl:   •  clotrimazole-betamethasone (LOTRISONE) 1-0.05 % cream, Apply  topically to the appropriate area as directed 2 (Two) Times a Day., Disp: 15 g,  Rfl: 1    Past Medical History:   Diagnosis Date   • Allergic rhinitis    • Anxiety    • Bursitis    • DDD (degenerative disc disease), cervical    • Depression    • HIV (human immunodeficiency virus infection) (CMS/[facility])    • HIV disease (CMS/[facility])    • HLD (hyperlipidemia)    • HSP (Henoch Schonlein purpura) (CMS/[facility])    • HTN (hypertension)    • Migraine    • Myocardial infarction (CMS/[facility])    • Osteoporosis    • Sleep apnea    • Type 2 diabetes mellitus (CMS/[facility])        Past Surgical History:   Procedure Laterality Date   • ANKLE SURGERY     • CARDIAC CATHETERIZATION N/A 6/24/2020    Procedure: Coronary angiography;  Surgeon: Deon Bailey MD;  Location:  [facility] CATH INVASIVE [facility];  Service: Cardiology;  Laterality: N/A;  11am start time   • CARDIAC CATHETERIZATION N/A 6/24/2020    Procedure: Percutaneous Coronary Intervention;  Surgeon: Deon Bailey MD;  Location:  [facility] CATH INVASIVE LOC[facility];  Service: Cardiology;  Laterality: N/A;   • COLONOSCOPY  06/19/2009    Normal exam repeat in 10 years   • FOREARM SURGERY     • LYMPH NODE BIOPSY         Social History     Socioeconomic History   • Marital status:      Spouse name: Not on file   • Number of children: Not on file   • Years of education: Not on file   • Highest education level: Not on file   Tobacco Use   • Smoking status: Current Every Day Smoker     Packs/day: 0.50     Types: Cigarettes     Start date: 1988   • Smokeless tobacco: Never Used   Substance and Sexual Activity   • Alcohol use: Yes     Comment: occasionally   • Drug use: No   • Sexual activity: Defer       Family History   Problem Relation Age of Onset   • Diabetes Mother    • Hypertension Mother    • Thyroid disease Mother    • Hypertension Father    • Thyroid disease Father    • Arrhythmia Father    • Diabetes Maternal Grandfather    • Heart disease Maternal Grandfather    • Hypertension Maternal Grandfather    • Diabetes Paternal Grandmother    • Stroke Paternal Grandmother  "   • Heart disease Paternal Grandmother    • Hypertension Paternal Grandmother    • Thyroid disease Paternal Grandmother    • Hypertension Paternal Grandfather    • Hypertension Sister    • No Known Problems Brother        Objective    Temp 97.9 °F (36.6 °C) (Temporal)   Ht 179.1 cm (70.5\")   Wt 124 kg (272 lb 6.4 oz)   BMI 38.53 kg/m²     Physical Exam  Vitals signs reviewed.   Constitutional:       General: He is not in acute distress.     Appearance: He is well-developed. He is not diaphoretic.   HENT:      Nose: Nose normal.   Neck:      Musculoskeletal: Normal range of motion and neck supple.      Thyroid: No thyromegaly.      Trachea: No tracheal deviation.   Cardiovascular:      Rate and Rhythm: Normal rate and regular rhythm.      Comments: No significant edema or tenderness   Pulmonary:      Effort: No accessory muscle usage or respiratory distress.      Breath sounds: Normal breath sounds.   Abdominal:      General: Bowel sounds are normal. There is no distension.      Palpations: Abdomen is soft. There is no mass.      Tenderness: There is no abdominal tenderness. There is no guarding or rebound.      Hernia: No hernia is present.      Comments: Stool specimen is not indicated for my portion of the exam   Genitourinary:     Penis: No tenderness (no lesion or deformities).       Scrotum/Testes: Normal.         Right: Mass, tenderness or swelling not present.         Left: Mass, tenderness or swelling not present.      Prostate: Tender: no nodules.      Rectum: Guaiac result negative. No mass or tenderness. Normal anal tone.      Comments:  Buried penis unable to retract foreskin however there is no penile shaft visible.  The glans penis is visible.  . Epididymis without mass or tenderness. Vas Deferens is palpably normal.Anus and perineum without mass or tenderness.   Lymphadenopathy:      Cervical: No cervical adenopathy.      Lower Body: No right inguinal adenopathy. No left inguinal adenopathy. "   Skin:     General: Skin is warm and dry.      Coloration: Skin is not pale.      Findings: No rash.   Neurological:      Mental Status: He is alert and oriented to person, place, and time.   Psychiatric:         Behavior: Behavior normal.             Results for orders placed or performed during the hospital encounter of 11/02/20   COVID-19,[facility] ROUTINE, NP/OP SWAB IN TRANSPORT MEDIA OR ESWAB 72 HR TAT - Swab, Oropharynx    Specimen: Oropharynx; Swab   Result Value Ref Range    SARS-CoV-2, CHICHO Not Detected Not Detected     Assessment and Plan    Diagnoses and all orders for this visit:    1. Balanitis (Primary)  -     Cancel: POC Urinalysis Dipstick, Multipro  -     clotrimazole-betamethasone (LOTRISONE) 1-0.05 % cream; Apply  topically to the appropriate area as directed 2 (Two) Times a Day.  Dispense: 15 g; Refill: 1    2. Acquired buried penis    3. BPH with urinary obstruction  -     tamsulosin (FLOMAX) 0.4 MG capsule 24 hr capsule; Take 2 capsules by mouth every night at bedtime.  Dispense: 180 capsule; Refill: 3    4. Impotence of organic origin        Patient last seen in August 2019 for lower urinary tract symptoms.  I started him on Flomax and asked him to decrease his caffeine intake.    He has been having recurrent balanitis treated with antibiotics and antifungal cream.      Home to increase his Flomax to 0.8 as he is having some significant post void dribbling.    Continue Viagra for erectile dysfunction asked him to use a good Rx rome for the cheapest price.    Regarding the balanitis I given him Lotrisone cream to use.  He has significant suprapubic fat pad and has an acquired buried penis.  I do not think he is a good candidate for circumcision because of the possibility of having very poor wound healing.    He will follow up in 1 year with PA.

## 2020-11-23 ENCOUNTER — OFFICE VISIT (OUTPATIENT)
Dept: UROLOGY | Facility: CLINIC | Age: 55
End: 2020-11-23

## 2020-11-23 VITALS — WEIGHT: 272.4 LBS | HEIGHT: 71 IN | TEMPERATURE: 97.9 F | BODY MASS INDEX: 38.14 KG/M2

## 2020-11-23 DIAGNOSIS — N48.83 ACQUIRED BURIED PENIS: ICD-10-CM

## 2020-11-23 DIAGNOSIS — N48.1 BALANITIS: Primary | ICD-10-CM

## 2020-11-23 DIAGNOSIS — N13.8 BPH WITH URINARY OBSTRUCTION: ICD-10-CM

## 2020-11-23 DIAGNOSIS — N52.9 IMPOTENCE OF ORGANIC ORIGIN: ICD-10-CM

## 2020-11-23 DIAGNOSIS — N40.1 BPH WITH URINARY OBSTRUCTION: ICD-10-CM

## 2020-11-23 PROCEDURE — 99214 OFFICE O/P EST MOD 30 MIN: CPT | Performed by: UROLOGY

## 2020-11-23 RX ORDER — TAMSULOSIN HYDROCHLORIDE 0.4 MG/1
2 CAPSULE ORAL
Qty: 180 CAPSULE | Refills: 3 | Status: SHIPPED | OUTPATIENT
Start: 2020-11-23 | End: 2021-11-16

## 2020-11-23 RX ORDER — CLOTRIMAZOLE AND BETAMETHASONE DIPROPIONATE 10; .64 MG/G; MG/G
CREAM TOPICAL 2 TIMES DAILY
Qty: 15 G | Refills: 1 | Status: SHIPPED | OUTPATIENT
Start: 2020-11-23

## 2020-11-23 RX ORDER — GABAPENTIN 800 MG/1
800 TABLET ORAL 3 TIMES DAILY
Status: ON HOLD | COMMUNITY
Start: 2020-11-16 | End: 2022-01-04

## 2020-12-01 ENCOUNTER — HOSPITAL ENCOUNTER (OUTPATIENT)
Facility: HOSPITAL | Age: 55
Discharge: HOME | End: 2020-12-02
Attending: EMERGENCY MEDICINE | Admitting: SPECIALIST

## 2020-12-01 ENCOUNTER — ANESTHESIA EVENT (OUTPATIENT)
Dept: PERIOP | Facility: HOSPITAL | Age: 55
End: 2020-12-01

## 2020-12-01 ENCOUNTER — ANESTHESIA (OUTPATIENT)
Dept: PERIOP | Facility: HOSPITAL | Age: 55
End: 2020-12-01

## 2020-12-01 ENCOUNTER — APPOINTMENT (OUTPATIENT)
Dept: CT IMAGING | Facility: HOSPITAL | Age: 55
End: 2020-12-01

## 2020-12-01 DIAGNOSIS — L05.01 PILONIDAL ABSCESS: Primary | ICD-10-CM

## 2020-12-01 DIAGNOSIS — R73.9 HYPERGLYCEMIA: ICD-10-CM

## 2020-12-01 DIAGNOSIS — L05.91 PILONIDAL CYST: ICD-10-CM

## 2020-12-01 LAB
ALBUMIN SERPL-MCNC: 4.1 G/DL (ref 3.5–5.2)
ALBUMIN/GLOB SERPL: 1.2 G/DL
ALP SERPL-CCNC: 79 U/L (ref 39–117)
ALT SERPL W P-5'-P-CCNC: 30 U/L (ref 1–41)
ANION GAP SERPL CALCULATED.3IONS-SCNC: 11 MMOL/L (ref 5–15)
ANION GAP SERPL CALCULATED.3IONS-SCNC: 13 MMOL/L (ref 5–15)
AST SERPL-CCNC: 28 U/L (ref 1–40)
BASOPHILS # BLD AUTO: 0.04 10*3/MM3 (ref 0–0.2)
BASOPHILS NFR BLD AUTO: 0.4 % (ref 0–1.5)
BILIRUB SERPL-MCNC: 0.3 MG/DL (ref 0–1.2)
BUN SERPL-MCNC: 17 MG/DL (ref 6–20)
BUN SERPL-MCNC: 18 MG/DL (ref 6–20)
BUN/CREAT SERPL: 15.3 (ref 7–25)
BUN/CREAT SERPL: 21.4 (ref 7–25)
CALCIUM SPEC-SCNC: 9 MG/DL (ref 8.6–10.5)
CALCIUM SPEC-SCNC: 9.3 MG/DL (ref 8.6–10.5)
CHLORIDE SERPL-SCNC: 100 MMOL/L (ref 98–107)
CHLORIDE SERPL-SCNC: 99 MMOL/L (ref 98–107)
CO2 SERPL-SCNC: 25 MMOL/L (ref 22–29)
CO2 SERPL-SCNC: 27 MMOL/L (ref 22–29)
CREAT SERPL-MCNC: 0.84 MG/DL (ref 0.76–1.27)
CREAT SERPL-MCNC: 1.11 MG/DL (ref 0.76–1.27)
DEPRECATED RDW RBC AUTO: 40.5 FL (ref 37–54)
EOSINOPHIL # BLD AUTO: 0.16 10*3/MM3 (ref 0–0.4)
EOSINOPHIL NFR BLD AUTO: 1.6 % (ref 0.3–6.2)
ERYTHROCYTE [DISTWIDTH] IN BLOOD BY AUTOMATED COUNT: 12.6 % (ref 12.3–15.4)
GFR SERPL CREATININE-BSD FRML MDRD: 70 ML/MIN/1.73
GFR SERPL CREATININE-BSD FRML MDRD: 96 ML/MIN/1.73
GLOBULIN UR ELPH-MCNC: 3.4 GM/DL
GLUCOSE BLDC GLUCOMTR-MCNC: 320 MG/DL (ref 70–130)
GLUCOSE BLDC GLUCOMTR-MCNC: 326 MG/DL (ref 70–130)
GLUCOSE BLDC GLUCOMTR-MCNC: 394 MG/DL (ref 70–130)
GLUCOSE BLDC GLUCOMTR-MCNC: 406 MG/DL (ref 70–130)
GLUCOSE BLDC GLUCOMTR-MCNC: 418 MG/DL (ref 70–130)
GLUCOSE SERPL-MCNC: 462 MG/DL (ref 65–99)
GLUCOSE SERPL-MCNC: 533 MG/DL (ref 65–99)
HCT VFR BLD AUTO: 39.3 % (ref 37.5–51)
HGB BLD-MCNC: 13.5 G/DL (ref 13–17.7)
IMM GRANULOCYTES # BLD AUTO: 0.04 10*3/MM3 (ref 0–0.05)
IMM GRANULOCYTES NFR BLD AUTO: 0.4 % (ref 0–0.5)
LYMPHOCYTES # BLD AUTO: 1.83 10*3/MM3 (ref 0.7–3.1)
LYMPHOCYTES NFR BLD AUTO: 17.8 % (ref 19.6–45.3)
MCH RBC QN AUTO: 30.1 PG (ref 26.6–33)
MCHC RBC AUTO-ENTMCNC: 34.4 G/DL (ref 31.5–35.7)
MCV RBC AUTO: 87.7 FL (ref 79–97)
MONOCYTES # BLD AUTO: 0.67 10*3/MM3 (ref 0.1–0.9)
MONOCYTES NFR BLD AUTO: 6.5 % (ref 5–12)
NEUTROPHILS NFR BLD AUTO: 7.55 10*3/MM3 (ref 1.7–7)
NEUTROPHILS NFR BLD AUTO: 73.3 % (ref 42.7–76)
NRBC BLD AUTO-RTO: 0 /100 WBC (ref 0–0.2)
PLATELET # BLD AUTO: 225 10*3/MM3 (ref 140–450)
PMV BLD AUTO: 11.7 FL (ref 6–12)
POTASSIUM SERPL-SCNC: 4.2 MMOL/L (ref 3.5–5.2)
POTASSIUM SERPL-SCNC: 4.2 MMOL/L (ref 3.5–5.2)
PROT SERPL-MCNC: 7.5 G/DL (ref 6–8.5)
RBC # BLD AUTO: 4.48 10*6/MM3 (ref 4.14–5.8)
SARS-COV-2 RDRP RESP QL NAA+PROBE: NOT DETECTED
SODIUM SERPL-SCNC: 137 MMOL/L (ref 136–145)
SODIUM SERPL-SCNC: 138 MMOL/L (ref 136–145)
WBC # BLD AUTO: 10.29 10*3/MM3 (ref 3.4–10.8)

## 2020-12-01 PROCEDURE — A9270 NON-COVERED ITEM OR SERVICE: HCPCS | Performed by: SPECIALIST

## 2020-12-01 PROCEDURE — 63710000001 PROPRANOLOL LA 80 MG CAPSULE SUSTAINED-RELEASE 24 HR: Performed by: SPECIALIST

## 2020-12-01 PROCEDURE — 25010000002 ONDANSETRON PER 1 MG: Performed by: EMERGENCY MEDICINE

## 2020-12-01 PROCEDURE — 82962 GLUCOSE BLOOD TEST: CPT

## 2020-12-01 PROCEDURE — 96375 TX/PRO/DX INJ NEW DRUG ADDON: CPT

## 2020-12-01 PROCEDURE — 72193 CT PELVIS W/DYE: CPT

## 2020-12-01 PROCEDURE — 25010000002 SUCCINYLCHOLINE PER 20 MG: Performed by: NURSE ANESTHETIST, CERTIFIED REGISTERED

## 2020-12-01 PROCEDURE — 25010000002 HYDROMORPHONE PER 4 MG: Performed by: ANESTHESIOLOGY

## 2020-12-01 PROCEDURE — 96366 THER/PROPH/DIAG IV INF ADDON: CPT

## 2020-12-01 PROCEDURE — 88304 TISSUE EXAM BY PATHOLOGIST: CPT | Performed by: SPECIALIST

## 2020-12-01 PROCEDURE — 25010000002 MORPHINE PER 10 MG: Performed by: EMERGENCY MEDICINE

## 2020-12-01 PROCEDURE — 25010000002 IOPAMIDOL 61 % SOLUTION: Performed by: EMERGENCY MEDICINE

## 2020-12-01 PROCEDURE — 63710000001 TAMSULOSIN 0.4 MG CAPSULE: Performed by: SPECIALIST

## 2020-12-01 PROCEDURE — 63710000001 GABAPENTIN 300 MG CAPSULE: Performed by: SPECIALIST

## 2020-12-01 PROCEDURE — C9803 HOPD COVID-19 SPEC COLLECT: HCPCS

## 2020-12-01 PROCEDURE — 25010000002 PROPOFOL 10 MG/ML EMULSION: Performed by: NURSE ANESTHETIST, CERTIFIED REGISTERED

## 2020-12-01 PROCEDURE — 63710000001 NYSTATIN 100000 UNIT/GM OINTMENT 15 G TUBE: Performed by: SPECIALIST

## 2020-12-01 PROCEDURE — 80053 COMPREHEN METABOLIC PANEL: CPT | Performed by: EMERGENCY MEDICINE

## 2020-12-01 PROCEDURE — 63710000001 TRAZODONE 100 MG TABLET: Performed by: SPECIALIST

## 2020-12-01 PROCEDURE — 25010000003 CEFAZOLIN PER 500 MG: Performed by: NURSE ANESTHETIST, CERTIFIED REGISTERED

## 2020-12-01 PROCEDURE — G0378 HOSPITAL OBSERVATION PER HR: HCPCS

## 2020-12-01 PROCEDURE — 87635 SARS-COV-2 COVID-19 AMP PRB: CPT | Performed by: EMERGENCY MEDICINE

## 2020-12-01 PROCEDURE — 63710000001 CLONAZEPAM 0.5 MG TABLET: Performed by: SPECIALIST

## 2020-12-01 PROCEDURE — 25010000002 FENTANYL CITRATE (PF) 250 MCG/5ML SOLUTION: Performed by: NURSE ANESTHETIST, CERTIFIED REGISTERED

## 2020-12-01 PROCEDURE — 96365 THER/PROPH/DIAG IV INF INIT: CPT

## 2020-12-01 PROCEDURE — 99284 EMERGENCY DEPT VISIT MOD MDM: CPT

## 2020-12-01 PROCEDURE — 63710000001 INSULIN LISPRO (HUMAN) PER 5 UNITS: Performed by: SPECIALIST

## 2020-12-01 PROCEDURE — 85025 COMPLETE CBC W/AUTO DIFF WBC: CPT | Performed by: EMERGENCY MEDICINE

## 2020-12-01 PROCEDURE — 63710000001 ARIPIPRAZOLE 5 MG TABLET: Performed by: SPECIALIST

## 2020-12-01 PROCEDURE — 63710000001 INSULIN REGULAR HUMAN PER 5 UNITS: Performed by: EMERGENCY MEDICINE

## 2020-12-01 PROCEDURE — 25010000002 FENTANYL CITRATE (PF) 100 MCG/2ML SOLUTION: Performed by: ANESTHESIOLOGY

## 2020-12-01 PROCEDURE — 25010000002 MORPHINE SULFATE (PF) 2 MG/ML SOLUTION: Performed by: SPECIALIST

## 2020-12-01 RX ORDER — TAMSULOSIN HYDROCHLORIDE 0.4 MG/1
0.8 CAPSULE ORAL NIGHTLY
Status: DISCONTINUED | OUTPATIENT
Start: 2020-12-01 | End: 2020-12-02 | Stop reason: HOSPADM

## 2020-12-01 RX ORDER — SUCCINYLCHOLINE CHLORIDE 20 MG/ML
INJECTION INTRAMUSCULAR; INTRAVENOUS AS NEEDED
Status: DISCONTINUED | OUTPATIENT
Start: 2020-12-01 | End: 2020-12-01 | Stop reason: SURG

## 2020-12-01 RX ORDER — SODIUM CHLORIDE 0.9 % (FLUSH) 0.9 %
10 SYRINGE (ML) INJECTION AS NEEDED
Status: DISCONTINUED | OUTPATIENT
Start: 2020-12-01 | End: 2020-12-01 | Stop reason: HOSPADM

## 2020-12-01 RX ORDER — VARENICLINE TARTRATE 1 MG/1
1 TABLET, FILM COATED ORAL DAILY
Status: DISCONTINUED | OUTPATIENT
Start: 2020-12-02 | End: 2020-12-02 | Stop reason: HOSPADM

## 2020-12-01 RX ORDER — SODIUM CHLORIDE, SODIUM LACTATE, POTASSIUM CHLORIDE, CALCIUM CHLORIDE 600; 310; 30; 20 MG/100ML; MG/100ML; MG/100ML; MG/100ML
9 INJECTION, SOLUTION INTRAVENOUS CONTINUOUS
Status: DISCONTINUED | OUTPATIENT
Start: 2020-12-01 | End: 2020-12-02 | Stop reason: HOSPADM

## 2020-12-01 RX ORDER — LOSARTAN POTASSIUM 50 MG/1
25 TABLET ORAL DAILY
Status: DISCONTINUED | OUTPATIENT
Start: 2020-12-02 | End: 2020-12-02 | Stop reason: HOSPADM

## 2020-12-01 RX ORDER — NALOXONE HCL 0.4 MG/ML
0.04 VIAL (ML) INJECTION AS NEEDED
Status: DISCONTINUED | OUTPATIENT
Start: 2020-12-01 | End: 2020-12-01 | Stop reason: HOSPADM

## 2020-12-01 RX ORDER — SODIUM CHLORIDE 0.9 % (FLUSH) 0.9 %
10 SYRINGE (ML) INJECTION EVERY 12 HOURS SCHEDULED
Status: DISCONTINUED | OUTPATIENT
Start: 2020-12-01 | End: 2020-12-01 | Stop reason: HOSPADM

## 2020-12-01 RX ORDER — IBUPROFEN 600 MG/1
600 TABLET ORAL ONCE AS NEEDED
Status: DISCONTINUED | OUTPATIENT
Start: 2020-12-01 | End: 2020-12-01 | Stop reason: HOSPADM

## 2020-12-01 RX ORDER — CLONAZEPAM 0.5 MG/1
0.5 TABLET ORAL 2 TIMES DAILY
Status: DISCONTINUED | OUTPATIENT
Start: 2020-12-01 | End: 2020-12-02 | Stop reason: HOSPADM

## 2020-12-01 RX ORDER — SUMATRIPTAN 50 MG/1
50 TABLET, FILM COATED ORAL DAILY PRN
Status: DISCONTINUED | OUTPATIENT
Start: 2020-12-01 | End: 2020-12-02 | Stop reason: HOSPADM

## 2020-12-01 RX ORDER — PROPOFOL 10 MG/ML
VIAL (ML) INTRAVENOUS AS NEEDED
Status: DISCONTINUED | OUTPATIENT
Start: 2020-12-01 | End: 2020-12-01 | Stop reason: SURG

## 2020-12-01 RX ORDER — ALBUTEROL SULFATE 2.5 MG/3ML
2.5 SOLUTION RESPIRATORY (INHALATION) EVERY 6 HOURS PRN
Status: DISCONTINUED | OUTPATIENT
Start: 2020-12-01 | End: 2020-12-02 | Stop reason: HOSPADM

## 2020-12-01 RX ORDER — CEFAZOLIN SODIUM 1 G/3ML
INJECTION, POWDER, FOR SOLUTION INTRAMUSCULAR; INTRAVENOUS AS NEEDED
Status: DISCONTINUED | OUTPATIENT
Start: 2020-12-01 | End: 2020-12-01 | Stop reason: SURG

## 2020-12-01 RX ORDER — LIDOCAINE HYDROCHLORIDE 10 MG/ML
0.5 INJECTION, SOLUTION EPIDURAL; INFILTRATION; INTRACAUDAL; PERINEURAL ONCE AS NEEDED
Status: DISCONTINUED | OUTPATIENT
Start: 2020-12-01 | End: 2020-12-01 | Stop reason: HOSPADM

## 2020-12-01 RX ORDER — NICOTINE POLACRILEX 4 MG
15 LOZENGE BUCCAL
Status: DISCONTINUED | OUTPATIENT
Start: 2020-12-01 | End: 2020-12-01 | Stop reason: SDUPTHER

## 2020-12-01 RX ORDER — NICOTINE POLACRILEX 4 MG
15 LOZENGE BUCCAL
Status: DISCONTINUED | OUTPATIENT
Start: 2020-12-01 | End: 2020-12-02 | Stop reason: HOSPADM

## 2020-12-01 RX ORDER — OXYCODONE AND ACETAMINOPHEN 10; 325 MG/1; MG/1
1 TABLET ORAL ONCE AS NEEDED
Status: DISCONTINUED | OUTPATIENT
Start: 2020-12-01 | End: 2020-12-01 | Stop reason: HOSPADM

## 2020-12-01 RX ORDER — CLINDAMYCIN PHOSPHATE 600 MG/50ML
600 INJECTION INTRAVENOUS EVERY 8 HOURS
Status: DISCONTINUED | OUTPATIENT
Start: 2020-12-01 | End: 2020-12-02 | Stop reason: HOSPADM

## 2020-12-01 RX ORDER — FENTANYL CITRATE 50 UG/ML
INJECTION, SOLUTION INTRAMUSCULAR; INTRAVENOUS AS NEEDED
Status: DISCONTINUED | OUTPATIENT
Start: 2020-12-01 | End: 2020-12-01 | Stop reason: SURG

## 2020-12-01 RX ORDER — GABAPENTIN 300 MG/1
600 CAPSULE ORAL NIGHTLY
Status: DISCONTINUED | OUTPATIENT
Start: 2020-12-01 | End: 2020-12-02 | Stop reason: HOSPADM

## 2020-12-01 RX ORDER — DEXTROSE MONOHYDRATE 25 G/50ML
25 INJECTION, SOLUTION INTRAVENOUS
Status: DISCONTINUED | OUTPATIENT
Start: 2020-12-01 | End: 2020-12-01 | Stop reason: SDUPTHER

## 2020-12-01 RX ORDER — DEXTROSE MONOHYDRATE 25 G/50ML
12.5 INJECTION, SOLUTION INTRAVENOUS AS NEEDED
Status: DISCONTINUED | OUTPATIENT
Start: 2020-12-01 | End: 2020-12-01 | Stop reason: HOSPADM

## 2020-12-01 RX ORDER — LIDOCAINE HYDROCHLORIDE 40 MG/ML
SOLUTION TOPICAL AS NEEDED
Status: DISCONTINUED | OUTPATIENT
Start: 2020-12-01 | End: 2020-12-01 | Stop reason: SURG

## 2020-12-01 RX ORDER — ROCURONIUM BROMIDE 10 MG/ML
INJECTION, SOLUTION INTRAVENOUS AS NEEDED
Status: DISCONTINUED | OUTPATIENT
Start: 2020-12-01 | End: 2020-12-01 | Stop reason: SURG

## 2020-12-01 RX ORDER — ONDANSETRON 2 MG/ML
4 INJECTION INTRAMUSCULAR; INTRAVENOUS ONCE
Status: COMPLETED | OUTPATIENT
Start: 2020-12-01 | End: 2020-12-01

## 2020-12-01 RX ORDER — CLINDAMYCIN PHOSPHATE 600 MG/50ML
600 INJECTION INTRAVENOUS ONCE
Status: COMPLETED | OUTPATIENT
Start: 2020-12-01 | End: 2020-12-01

## 2020-12-01 RX ORDER — ONDANSETRON 2 MG/ML
4 INJECTION INTRAMUSCULAR; INTRAVENOUS EVERY 6 HOURS PRN
Status: DISCONTINUED | OUTPATIENT
Start: 2020-12-01 | End: 2020-12-02 | Stop reason: HOSPADM

## 2020-12-01 RX ORDER — LABETALOL HYDROCHLORIDE 5 MG/ML
5 INJECTION, SOLUTION INTRAVENOUS
Status: DISCONTINUED | OUTPATIENT
Start: 2020-12-01 | End: 2020-12-01 | Stop reason: HOSPADM

## 2020-12-01 RX ORDER — DEXTROSE MONOHYDRATE 25 G/50ML
25 INJECTION, SOLUTION INTRAVENOUS
Status: DISCONTINUED | OUTPATIENT
Start: 2020-12-01 | End: 2020-12-02 | Stop reason: HOSPADM

## 2020-12-01 RX ORDER — SODIUM CHLORIDE, SODIUM LACTATE, POTASSIUM CHLORIDE, CALCIUM CHLORIDE 600; 310; 30; 20 MG/100ML; MG/100ML; MG/100ML; MG/100ML
75 INJECTION, SOLUTION INTRAVENOUS CONTINUOUS
Status: DISCONTINUED | OUTPATIENT
Start: 2020-12-01 | End: 2020-12-02 | Stop reason: HOSPADM

## 2020-12-01 RX ORDER — MAGNESIUM HYDROXIDE 1200 MG/15ML
LIQUID ORAL AS NEEDED
Status: DISCONTINUED | OUTPATIENT
Start: 2020-12-01 | End: 2020-12-01 | Stop reason: HOSPADM

## 2020-12-01 RX ORDER — ARIPIPRAZOLE 5 MG/1
5 TABLET ORAL DAILY
Status: DISCONTINUED | OUTPATIENT
Start: 2020-12-01 | End: 2020-12-02 | Stop reason: HOSPADM

## 2020-12-01 RX ORDER — FLUMAZENIL 0.1 MG/ML
0.2 INJECTION INTRAVENOUS AS NEEDED
Status: DISCONTINUED | OUTPATIENT
Start: 2020-12-01 | End: 2020-12-01 | Stop reason: HOSPADM

## 2020-12-01 RX ORDER — TIZANIDINE 4 MG/1
4 TABLET ORAL DAILY PRN
Status: DISCONTINUED | OUTPATIENT
Start: 2020-12-01 | End: 2020-12-02 | Stop reason: HOSPADM

## 2020-12-01 RX ORDER — FENTANYL CITRATE 50 UG/ML
25 INJECTION, SOLUTION INTRAMUSCULAR; INTRAVENOUS
Status: DISCONTINUED | OUTPATIENT
Start: 2020-12-01 | End: 2020-12-01 | Stop reason: HOSPADM

## 2020-12-01 RX ORDER — NYSTATIN 100000 U/G
OINTMENT TOPICAL 2 TIMES DAILY
Status: DISCONTINUED | OUTPATIENT
Start: 2020-12-01 | End: 2020-12-02 | Stop reason: HOSPADM

## 2020-12-01 RX ORDER — ONDANSETRON 2 MG/ML
4 INJECTION INTRAMUSCULAR; INTRAVENOUS AS NEEDED
Status: DISCONTINUED | OUTPATIENT
Start: 2020-12-01 | End: 2020-12-01 | Stop reason: HOSPADM

## 2020-12-01 RX ORDER — VARENICLINE TARTRATE 1 MG/1
1 TABLET, FILM COATED ORAL DAILY
Status: DISCONTINUED | OUTPATIENT
Start: 2020-12-01 | End: 2020-12-01 | Stop reason: SDUPTHER

## 2020-12-01 RX ORDER — TRAZODONE HYDROCHLORIDE 100 MG/1
200 TABLET ORAL NIGHTLY
Status: DISCONTINUED | OUTPATIENT
Start: 2020-12-01 | End: 2020-12-02 | Stop reason: HOSPADM

## 2020-12-01 RX ORDER — PROPRANOLOL HYDROCHLORIDE 80 MG/1
80 CAPSULE, EXTENDED RELEASE ORAL DAILY
Status: DISCONTINUED | OUTPATIENT
Start: 2020-12-01 | End: 2020-12-02 | Stop reason: HOSPADM

## 2020-12-01 RX ORDER — HYDROMORPHONE HYDROCHLORIDE 1 MG/ML
0.5 INJECTION, SOLUTION INTRAMUSCULAR; INTRAVENOUS; SUBCUTANEOUS
Status: DISCONTINUED | OUTPATIENT
Start: 2020-12-01 | End: 2020-12-01 | Stop reason: HOSPADM

## 2020-12-01 RX ORDER — MORPHINE SULFATE 2 MG/ML
2 INJECTION, SOLUTION INTRAMUSCULAR; INTRAVENOUS
Status: DISCONTINUED | OUTPATIENT
Start: 2020-12-01 | End: 2020-12-02 | Stop reason: HOSPADM

## 2020-12-01 RX ADMIN — SODIUM CHLORIDE, POTASSIUM CHLORIDE, SODIUM LACTATE AND CALCIUM CHLORIDE 75 ML/HR: 600; 310; 30; 20 INJECTION, SOLUTION INTRAVENOUS at 21:26

## 2020-12-01 RX ADMIN — MORPHINE SULFATE 2 MG: 2 INJECTION, SOLUTION INTRAMUSCULAR; INTRAVENOUS at 21:27

## 2020-12-01 RX ADMIN — SODIUM CHLORIDE, POTASSIUM CHLORIDE, SODIUM LACTATE AND CALCIUM CHLORIDE 9 ML/HR: 600; 310; 30; 20 INJECTION, SOLUTION INTRAVENOUS at 16:04

## 2020-12-01 RX ADMIN — ARIPIPRAZOLE 5 MG: 5 TABLET ORAL at 21:26

## 2020-12-01 RX ADMIN — INSULIN LISPRO 14 UNITS: 100 INJECTION, SOLUTION INTRAVENOUS; SUBCUTANEOUS at 22:01

## 2020-12-01 RX ADMIN — LIDOCAINE HYDROCHLORIDE 1 EACH: 40 SOLUTION TOPICAL at 16:15

## 2020-12-01 RX ADMIN — FENTANYL CITRATE 25 MCG: 50 INJECTION INTRAMUSCULAR; INTRAVENOUS at 15:58

## 2020-12-01 RX ADMIN — IOPAMIDOL 100 ML: 612 INJECTION, SOLUTION INTRAVENOUS at 13:24

## 2020-12-01 RX ADMIN — TRAZODONE HYDROCHLORIDE 200 MG: 100 TABLET ORAL at 21:24

## 2020-12-01 RX ADMIN — ROCURONIUM BROMIDE 5 MG: 10 INJECTION INTRAVENOUS at 16:15

## 2020-12-01 RX ADMIN — SUCCINYLCHOLINE CHLORIDE 180 MG: 20 INJECTION, SOLUTION INTRAMUSCULAR; INTRAVENOUS at 16:15

## 2020-12-01 RX ADMIN — HUMAN INSULIN 8 UNITS: 100 INJECTION, SOLUTION SUBCUTANEOUS at 14:24

## 2020-12-01 RX ADMIN — INSULIN LISPRO 20 UNITS: 100 INJECTION, SOLUTION INTRAVENOUS; SUBCUTANEOUS at 23:29

## 2020-12-01 RX ADMIN — CLONAZEPAM 0.5 MG: 0.5 TABLET ORAL at 21:24

## 2020-12-01 RX ADMIN — PROPOFOL 200 MG: 10 INJECTION, EMULSION INTRAVENOUS at 16:15

## 2020-12-01 RX ADMIN — TAMSULOSIN HYDROCHLORIDE 0.8 MG: 0.4 CAPSULE ORAL at 21:24

## 2020-12-01 RX ADMIN — NYSTATIN: 100000 OINTMENT TOPICAL at 21:26

## 2020-12-01 RX ADMIN — HYDROMORPHONE HYDROCHLORIDE 0.5 MG: 1 INJECTION, SOLUTION INTRAMUSCULAR; INTRAVENOUS; SUBCUTANEOUS at 17:45

## 2020-12-01 RX ADMIN — GABAPENTIN 600 MG: 300 CAPSULE ORAL at 21:24

## 2020-12-01 RX ADMIN — FENTANYL CITRATE 100 MCG: 50 INJECTION INTRAMUSCULAR; INTRAVENOUS at 16:51

## 2020-12-01 RX ADMIN — PROPRANOLOL HYDROCHLORIDE 80 MG: 80 CAPSULE, EXTENDED RELEASE ORAL at 21:25

## 2020-12-01 RX ADMIN — FENTANYL CITRATE 150 MCG: 50 INJECTION INTRAMUSCULAR; INTRAVENOUS at 16:15

## 2020-12-01 RX ADMIN — MORPHINE SULFATE 4 MG: 4 INJECTION, SOLUTION INTRAMUSCULAR; INTRAVENOUS at 12:12

## 2020-12-01 RX ADMIN — CLINDAMYCIN IN 5 PERCENT DEXTROSE 600 MG: 12 INJECTION, SOLUTION INTRAVENOUS at 21:24

## 2020-12-01 RX ADMIN — ONDANSETRON HYDROCHLORIDE 4 MG: 2 SOLUTION INTRAMUSCULAR; INTRAVENOUS at 12:12

## 2020-12-01 RX ADMIN — LIDOCAINE HYDROCHLORIDE 100 MG: 20 INJECTION, SOLUTION INTRAVENOUS at 16:15

## 2020-12-01 RX ADMIN — CEFAZOLIN 2 G: 330 INJECTION, POWDER, FOR SOLUTION INTRAMUSCULAR; INTRAVENOUS at 16:27

## 2020-12-01 RX ADMIN — CLINDAMYCIN IN 5 PERCENT DEXTROSE 600 MG: 12 INJECTION, SOLUTION INTRAVENOUS at 12:12

## 2020-12-01 NOTE — ANESTHESIA PREPROCEDURE EVALUATION
Anesthesia Evaluation     Patient summary reviewed   NPO Solid Status: > 8 hours             Airway   Mallampati: II  TM distance: >3 FB  Neck ROM: full  Dental      Pulmonary    (+) a smoker, sleep apnea,   (-) COPD, asthma  Cardiovascular   Exercise tolerance: good (4-7 METS)    ECG reviewed    (+) hypertension, hyperlipidemia,   (-) pacemaker, past MI, angina, cardiac stents    ROS comment: Normal heart cath 6/2020    Neuro/Psych  (-) seizures, TIA, CVA  GI/Hepatic/Renal/Endo    (+) morbid obesity, GERD,  diabetes mellitus using insulin,   (-) liver disease, no renal disease    Musculoskeletal     Abdominal    Substance History      OB/GYN          Other                        Anesthesia Plan    ASA 3     general   Rapid sequence  intravenous induction     Anesthetic plan, all risks, benefits, and alternatives have been provided, discussed and informed consent has been obtained with: patient.

## 2020-12-01 NOTE — ANESTHESIA PROCEDURE NOTES
Airway  Urgency: elective    Date/Time: 12/1/2020 4:16 PM  Airway not difficult    General Information and Staff    Patient location during procedure: OR  CRNA: Nicko Leger CRNA    Indications and Patient Condition  Indications for airway management: airway protection    Preoxygenated: yes  Mask difficulty assessment: 1 - vent by mask    Final Airway Details  Final airway type: endotracheal airway      Successful airway: ETT  Cuffed: yes   Successful intubation technique: video laryngoscopy  Facilitating devices/methods: intubating stylet  Endotracheal tube insertion site: oral  Blade: Ruiz  Blade size: 4  ETT size (mm): 7.0  Cormack-Lehane Classification: grade IIa - partial view of glottis  Placement verified by: chest auscultation and capnometry   Cuff volume (mL): 8  Measured from: lips  ETT/EBT  to lips (cm): 23  Number of attempts at approach: 1  Assessment: lips, teeth, and gum same as pre-op and atraumatic intubation

## 2020-12-02 VITALS
BODY MASS INDEX: 39.8 KG/M2 | TEMPERATURE: 97.8 F | WEIGHT: 278 LBS | RESPIRATION RATE: 18 BRPM | HEART RATE: 96 BPM | DIASTOLIC BLOOD PRESSURE: 65 MMHG | SYSTOLIC BLOOD PRESSURE: 124 MMHG | OXYGEN SATURATION: 97 % | HEIGHT: 70 IN

## 2020-12-02 LAB
GLUCOSE BLDC GLUCOMTR-MCNC: 210 MG/DL (ref 70–130)
GLUCOSE BLDC GLUCOMTR-MCNC: 327 MG/DL (ref 70–130)

## 2020-12-02 PROCEDURE — A9270 NON-COVERED ITEM OR SERVICE: HCPCS | Performed by: SPECIALIST

## 2020-12-02 PROCEDURE — 25010000002 MORPHINE SULFATE (PF) 2 MG/ML SOLUTION: Performed by: SPECIALIST

## 2020-12-02 PROCEDURE — 63710000001 CLONAZEPAM 0.5 MG TABLET: Performed by: SPECIALIST

## 2020-12-02 PROCEDURE — 63710000001 LOSARTAN 50 MG TABLET: Performed by: SPECIALIST

## 2020-12-02 PROCEDURE — 63710000001 VARENICLINE 1 MG TABLET: Performed by: SPECIALIST

## 2020-12-02 PROCEDURE — 63710000001 INSULIN LISPRO (HUMAN) PER 5 UNITS: Performed by: SPECIALIST

## 2020-12-02 PROCEDURE — 82962 GLUCOSE BLOOD TEST: CPT

## 2020-12-02 PROCEDURE — 63710000001 PROPRANOLOL LA 80 MG CAPSULE SUSTAINED-RELEASE 24 HR: Performed by: SPECIALIST

## 2020-12-02 PROCEDURE — 25010000002 ENOXAPARIN PER 10 MG: Performed by: SPECIALIST

## 2020-12-02 PROCEDURE — 63710000001 ARIPIPRAZOLE 5 MG TABLET: Performed by: SPECIALIST

## 2020-12-02 RX ORDER — HYDROCODONE BITARTRATE AND ACETAMINOPHEN 7.5; 325 MG/1; MG/1
1 TABLET ORAL EVERY 4 HOURS PRN
Qty: 30 TABLET | Refills: 0 | Status: SHIPPED | OUTPATIENT
Start: 2020-12-02 | End: 2021-11-16

## 2020-12-02 RX ORDER — CLINDAMYCIN HYDROCHLORIDE 150 MG/1
150 CAPSULE ORAL 4 TIMES DAILY
Qty: 40 CAPSULE | Refills: 0 | Status: SHIPPED | OUTPATIENT
Start: 2020-12-02 | End: 2020-12-12

## 2020-12-02 RX ADMIN — MORPHINE SULFATE 2 MG: 2 INJECTION, SOLUTION INTRAMUSCULAR; INTRAVENOUS at 01:39

## 2020-12-02 RX ADMIN — CLINDAMYCIN IN 5 PERCENT DEXTROSE 600 MG: 12 INJECTION, SOLUTION INTRAVENOUS at 11:38

## 2020-12-02 RX ADMIN — INSULIN LISPRO 8 UNITS: 100 INJECTION, SOLUTION INTRAVENOUS; SUBCUTANEOUS at 08:20

## 2020-12-02 RX ADMIN — ENOXAPARIN SODIUM 40 MG: 40 INJECTION SUBCUTANEOUS at 08:21

## 2020-12-02 RX ADMIN — LOSARTAN POTASSIUM 25 MG: 50 TABLET, FILM COATED ORAL at 08:21

## 2020-12-02 RX ADMIN — CLONAZEPAM 0.5 MG: 0.5 TABLET ORAL at 08:21

## 2020-12-02 RX ADMIN — INSULIN LISPRO 16 UNITS: 100 INJECTION, SOLUTION INTRAVENOUS; SUBCUTANEOUS at 12:29

## 2020-12-02 RX ADMIN — ARIPIPRAZOLE 5 MG: 5 TABLET ORAL at 08:21

## 2020-12-02 RX ADMIN — MORPHINE SULFATE 2 MG: 2 INJECTION, SOLUTION INTRAMUSCULAR; INTRAVENOUS at 10:18

## 2020-12-02 RX ADMIN — VARENICLINE TARTRATE 1 MG: 1 TABLET, FILM COATED ORAL at 08:20

## 2020-12-02 RX ADMIN — NYSTATIN: 100000 OINTMENT TOPICAL at 08:22

## 2020-12-02 RX ADMIN — PROPRANOLOL HYDROCHLORIDE 80 MG: 80 CAPSULE, EXTENDED RELEASE ORAL at 08:21

## 2020-12-02 RX ADMIN — CLINDAMYCIN IN 5 PERCENT DEXTROSE 600 MG: 12 INJECTION, SOLUTION INTRAVENOUS at 04:29

## 2020-12-02 RX ADMIN — MORPHINE SULFATE 2 MG: 2 INJECTION, SOLUTION INTRAMUSCULAR; INTRAVENOUS at 06:22

## 2020-12-02 NOTE — ANESTHESIA POSTPROCEDURE EVALUATION
Patient: Donis Yi    Procedure Summary     Date: 12/01/20 Room / Location: [facility] OR [address] [place] [address] [facility] OR    Anesthesia Start: 1610 Anesthesia Stop: 1719    Procedure: EXCISION PILONIDAL ABSCESS (N/A Back) Diagnosis:     Surgeon: Ashlyn Posey MD Provider: Nicko Leger CRNA    Anesthesia Type: general ASA Status: 3          Anesthesia Type: general    Vitals  Vitals Value Taken Time   /79 12/01/20 1815   Temp 99 °F (37.2 °C) 12/01/20 1815   Pulse 93 12/01/20 1815   Resp 18 12/01/20 1815   SpO2 97 % 12/01/20 1815           Post Anesthesia Care and Evaluation    Patient location during evaluation: PACU  Level of consciousness: awake  Pain management: adequate  Airway patency: patent  Anesthetic complications: No anesthetic complications  PONV Status: none  Cardiovascular status: acceptable  Respiratory status: acceptable  Hydration status: acceptable

## 2020-12-02 NOTE — ANESTHESIA POSTPROCEDURE EVALUATION
"Patient: Donis Yi    Procedure Summary     Date: 12/01/20 Room / Location: [facility] OR [address] [place] [address] [facility] OR    Anesthesia Start: 1610 Anesthesia Stop: 1719    Procedure: EXCISION PILONIDAL ABSCESS (N/A Back) Diagnosis:     Surgeon: Ashlyn Posey MD Provider: Nicko Leger CRNA    Anesthesia Type: general ASA Status: 3          Anesthesia Type: general    Vitals  Vitals Value Taken Time   /79 12/01/20 1815   Temp 99 °F (37.2 °C) 12/01/20 1815   Pulse 93 12/01/20 1815   Resp 18 12/01/20 1815   SpO2 97 % 12/01/20 1815           Post Anesthesia Care and Evaluation    PONV Status: none  Comments: Patient d/c from PACU prior to anes eval based on Harleen score.  Please see RN notes for details of d/c criteria.    Blood pressure 139/71, pulse 92, temperature 98.5 °F (36.9 °C), temperature source Oral, resp. rate 18, height 177.8 cm (70\"), weight 126 kg (278 lb), SpO2 97 %.          "

## 2020-12-03 RX ORDER — DEXLANSOPRAZOLE 60 MG/1
CAPSULE, DELAYED RELEASE ORAL
Qty: 90 CAPSULE | Refills: 3 | Status: SHIPPED | OUTPATIENT
Start: 2020-12-03 | End: 2021-11-02

## 2020-12-04 ENCOUNTER — TELEPHONE (OUTPATIENT)
Dept: INTERNAL MEDICINE | Age: 55
End: 2020-12-04

## 2020-12-04 NOTE — TELEPHONE ENCOUNTER
[facility] Transitions Initial Follow Up Call    Outreach made within 2 business days of discharge: Yes    Patient: Bernardino Nugent   Patient : 1965   MRN: [id number] Reason for Admission: Admitted 20 for abscess   Discharge Date: 20    Discharge Diagnosis: Infected pilonidal cyst     Spoke with: attempted to reach patient, no answer. No voicemail set up to leave a message. I will reach out again later today.      Discharge department/facility: [facility]         Scheduled appointment with PCP within 7-14 days    Follow Up  Future Appointments   Date Time Provider Zohaib Santoro   2021  1:00 PM Lionel Gamez MD [facility]       Nilesh Lundy MA  ,,,,,,,,,,,,,,,,,,,,,,,,,,,,,,,,,,,,,,,,,,,,,,,,,,,,,nnnn

## 2020-12-04 NOTE — TELEPHONE ENCOUNTER
[facility] Transitions Initial Follow Up Call    Outreach made within 2 business days of discharge: Yes    Patient: Trino Brady      Patient : 1965   MRN: [id number] Reason for Admission: Admitted 20 for abscess   Discharge Date: 20        Discharge Diagnosis:   Infected pilonidal cyst                Spoke with: 2nd attempt to reach patient, no answer. Message left for patient to call the office to schedule hospital follow up.      Discharge department/facility: [facility]       Scheduled appointment with PCP within 7-14 days    Follow Up  Future Appointments   Date Time Provider Zohaib Santoro   2021  1:00 PM Crystal Bowles MD [address]

## 2020-12-05 LAB
CYTO UR: NORMAL
LAB AP CASE REPORT: NORMAL
PATH REPORT.FINAL DX SPEC: NORMAL
PATH REPORT.GROSS SPEC: NORMAL

## 2020-12-05 RX ORDER — INSULIN HUMAN 500 [IU]/ML
INJECTION, SOLUTION SUBCUTANEOUS
Qty: 24 ML | Refills: 5 | Status: SHIPPED | OUTPATIENT
Start: 2020-12-05 | End: 2021-02-25 | Stop reason: SDUPTHER

## 2020-12-07 ENCOUNTER — TELEPHONE (OUTPATIENT)
Dept: ADMINISTRATIVE | Age: 55
End: 2020-12-07

## 2020-12-07 NOTE — TELEPHONE ENCOUNTER
Pt. Is needing to schedule a post op visit. I did not have anything I could schedule with Dr. Ana Hamm. Please call to schedule.

## 2020-12-17 ENCOUNTER — VIRTUAL VISIT (OUTPATIENT)
Dept: INTERNAL MEDICINE | Age: 55
End: 2020-12-17
Payer: COMMERCIAL

## 2020-12-17 PROCEDURE — 99495 TRANSJ CARE MGMT MOD F2F 14D: CPT | Performed by: INTERNAL MEDICINE

## 2020-12-17 PROCEDURE — 1111F DSCHRG MED/CURRENT MED MERGE: CPT | Performed by: INTERNAL MEDICINE

## 2020-12-17 ASSESSMENT — ENCOUNTER SYMPTOMS
EYE DISCHARGE: 0
COUGH: 0
SHORTNESS OF BREATH: 0
ABDOMINAL PAIN: 0
ABDOMINAL DISTENTION: 0
EYE REDNESS: 0
CHEST TIGHTNESS: 0
BACK PAIN: 1
FACIAL SWELLING: 0

## 2020-12-17 NOTE — PROGRESS NOTES
Post-Discharge Transitional Care Management Services or Hospital Follow Up      Sonali Willis   YOB: 1965    Date of Office Visit:  12/17/2020  Date of Hospital Admission: 12/1/2020  Date of Hospital Discharge: 12/2/2020    Care management risk score Rising risk (score 2-5) and Complex Care (Scores >=6): 5     Non face to face  following discharge, date last encounter closed (first attempt may have been earlier): 12/4/2020 11:09 AM     Call initiated 2 business days of discharge: Yes    Patient Active Problem List   Diagnosis    Type 2 diabetes mellitus with neurologic complication ([facility]    Hypertension    Mixed hyperlipidemia    Bed wetting    Other chest pain    HIV-1 infection, symptomatic (HCC)    Anemia    Gastroesophageal reflux disease without esophagitis    Acute pain of both knees    Acute sinusitis    Yeast infection    Deviated nasal septum    Other insomnia    Recurrent major depressive disorder, in remission ([facility]    Anxiety and depression    Hepatic steatosis    HIV (human immunodeficiency virus infection) ([facility]    Kaiser's neuroma, left    ALEKSANDR (obstructive sleep apnea)    Tobacco abuse    Abnormal stress test    History of MI (myocardial infarction)    Foot ulcer due to secondary DM (HCC)       Allergies   Allergen Reactions    Darvon [Propoxyphene]     Elavil [Amitriptyline Hcl]     Zithromax [Azithromycin]        Medications listed as ordered at the time of discharge from hospital  Patient's reconciled and reviewed patient is now on Cleocin    Medications marked \"taking\" at this time  No outpatient medications have been marked as taking for the 12/17/20 encounter (Virtual Visit) with Chad Rome MD.        Medications patient taking as of now reconciled against medications ordered at time of hospital discharge: Yes  No chief complaint on file. HPI this is a Doxy moderate level TCM visit for admission for pilonidal cyst with resection ongoing antibiotics patient developed a rapid degree of pain and discomfort in his rectal area found to have a pilonidal cyst quite extensive underwent surgical treatment and is on ongoing antibiotic care having his wound packed he denies fevers or chills other symptoms are about the same his blood sugar still too high we discussed this. 100s 200s occasional 300    Inpatient course: Discharge summary reviewed- see chart. Interval history/Current status: Doing okay since his surgery wound sounds like it is healing he sees Dr. Birdie Covington Tuesday    There were no vitals filed for this visit. There is no height or weight on file to calculate BMI. Wt Readings from Last 3 Encounters:   07/31/20 274 lb (124.3 kg)   05/14/20 278 lb (126.1 kg)   04/20/20 272 lb (123.4 kg)     BP Readings from Last 3 Encounters:   07/31/20 120/64   02/10/20 120/66   09/09/19 120/70       Review of Systems   Constitutional: Positive for fatigue. Negative for chills and fever. HENT: Negative for congestion and facial swelling. Eyes: Negative for discharge and redness. Respiratory: Negative for cough, chest tightness and shortness of breath. Cardiovascular: Negative for chest pain. Gastrointestinal: Negative for abdominal distention and abdominal pain. Genitourinary: Negative for dysuria. Musculoskeletal: Positive for arthralgias and back pain. Skin: Negative for rash. Neurological: Positive for weakness and numbness. Negative for headaches. Psychiatric/Behavioral: Positive for dysphoric mood. Negative for confusion and decreased concentration. Physical Exam  Patient does not appear in any respiratory distress he is always a little chronically ill-appearing sclera anicteric no respiratory distress        Assessment/Plan:  1.  S/P surgical removal of pilonidal cyst Continue wound care and clindamycin follow-up with Dr schmitt on the 22nd    2. Type 2 diabetes mellitus with diabetic polyneuropathy, with long-term current use of insulin ([facility]  Follow and monitor and continue current care and monitoring   - CBC; Future  - Comprehensive Metabolic Panel; Future  - Hemoglobin A1C; Future  - TSH without Reflex; Future  - Microalbumin / Creatinine Urine Ratio; Future    3. Mixed hyperlipidemia  follow  - Lipid Panel; Future    4. Vitamin D deficiency    - Vitamin D 25 Hydroxy;  Future        Medical Decision Making: moderate     This is a virtual visit Doxy visit due to the coronavirus pandemic patient is located at his home I am located at my office

## 2020-12-22 ENCOUNTER — APPOINTMENT (OUTPATIENT)
Dept: GENERAL RADIOLOGY | Facility: HOSPITAL | Age: 55
End: 2020-12-22

## 2020-12-22 ENCOUNTER — TRANSCRIBE ORDERS (OUTPATIENT)
Dept: LAB | Facility: HOSPITAL | Age: 55
End: 2020-12-22

## 2020-12-22 ENCOUNTER — LAB (OUTPATIENT)
Dept: LAB | Facility: HOSPITAL | Age: 55
End: 2020-12-22

## 2020-12-22 ENCOUNTER — TELEPHONE (OUTPATIENT)
Dept: INTERNAL MEDICINE | Age: 55
End: 2020-12-22

## 2020-12-22 ENCOUNTER — HOSPITAL ENCOUNTER (EMERGENCY)
Facility: HOSPITAL | Age: 55
Discharge: HOME | End: 2020-12-22
Admitting: EMERGENCY MEDICINE

## 2020-12-22 VITALS
DIASTOLIC BLOOD PRESSURE: 68 MMHG | BODY MASS INDEX: 38.08 KG/M2 | WEIGHT: 272 LBS | TEMPERATURE: 98.1 F | SYSTOLIC BLOOD PRESSURE: 133 MMHG | HEIGHT: 71 IN | RESPIRATION RATE: 16 BRPM | OXYGEN SATURATION: 100 % | HEART RATE: 88 BPM

## 2020-12-22 DIAGNOSIS — E11.42 DIABETIC SENSORIMOTOR NEUROPATHY (HCC): Primary | ICD-10-CM

## 2020-12-22 DIAGNOSIS — Z79.4 ENCOUNTER FOR LONG-TERM (CURRENT) USE OF INSULIN (HCC): ICD-10-CM

## 2020-12-22 DIAGNOSIS — S92.255A CLOSED NONDISPLACED FRACTURE OF NAVICULAR BONE OF LEFT FOOT, INITIAL ENCOUNTER: Primary | ICD-10-CM

## 2020-12-22 DIAGNOSIS — E11.42 DIABETIC SENSORIMOTOR NEUROPATHY (HCC): ICD-10-CM

## 2020-12-22 LAB
25(OH)D3 SERPL-MCNC: 45.9 NG/ML (ref 30–100)
ALBUMIN SERPL-MCNC: 3.9 G/DL (ref 3.5–5.2)
ALBUMIN/GLOB SERPL: 1.1 G/DL
ALP SERPL-CCNC: 71 U/L (ref 39–117)
ALT SERPL W P-5'-P-CCNC: 23 U/L (ref 1–41)
ANION GAP SERPL CALCULATED.3IONS-SCNC: 9.9 MMOL/L (ref 5–15)
AST SERPL-CCNC: 21 U/L (ref 1–40)
BILIRUB SERPL-MCNC: 0.2 MG/DL (ref 0–1.2)
BUN SERPL-MCNC: 20 MG/DL (ref 6–20)
BUN/CREAT SERPL: 22 (ref 7–25)
CALCIUM SPEC-SCNC: 9.6 MG/DL (ref 8.6–10.5)
CHLORIDE SERPL-SCNC: 102 MMOL/L (ref 98–107)
CHOLEST SERPL-MCNC: 135 MG/DL (ref 0–200)
CO2 SERPL-SCNC: 28.1 MMOL/L (ref 22–29)
CREAT SERPL-MCNC: 0.91 MG/DL (ref 0.76–1.27)
DEPRECATED RDW RBC AUTO: 43.1 FL (ref 37–54)
ERYTHROCYTE [DISTWIDTH] IN BLOOD BY AUTOMATED COUNT: 13.3 % (ref 12.3–15.4)
GFR SERPL CREATININE-BSD FRML MDRD: 87 ML/MIN/1.73
GLOBULIN UR ELPH-MCNC: 3.4 GM/DL
GLUCOSE SERPL-MCNC: 210 MG/DL (ref 65–99)
HBA1C MFR BLD: 10.57 % (ref 4.8–5.6)
HCT VFR BLD AUTO: 35.5 % (ref 37.5–51)
HDLC SERPL-MCNC: 51 MG/DL (ref 40–60)
HGB BLD-MCNC: 12 G/DL (ref 13–17.7)
LDLC SERPL CALC-MCNC: 54 MG/DL (ref 0–100)
LDLC/HDLC SERPL: 0.95 {RATIO}
MCH RBC QN AUTO: 30.8 PG (ref 26.6–33)
MCHC RBC AUTO-ENTMCNC: 33.8 G/DL (ref 31.5–35.7)
MCV RBC AUTO: 91 FL (ref 79–97)
PLATELET # BLD AUTO: 221 10*3/MM3 (ref 140–450)
PMV BLD AUTO: 11.8 FL (ref 6–12)
POTASSIUM SERPL-SCNC: 4.4 MMOL/L (ref 3.5–5.2)
PROT SERPL-MCNC: 7.3 G/DL (ref 6–8.5)
RBC # BLD AUTO: 3.9 10*6/MM3 (ref 4.14–5.8)
SODIUM SERPL-SCNC: 140 MMOL/L (ref 136–145)
TRIGL SERPL-MCNC: 179 MG/DL (ref 0–150)
TSH SERPL DL<=0.05 MIU/L-ACNC: 1.62 UIU/ML (ref 0.27–4.2)
VLDLC SERPL-MCNC: 30 MG/DL (ref 5–40)
WBC # BLD AUTO: 9.2 10*3/MM3 (ref 3.4–10.8)

## 2020-12-22 PROCEDURE — 99283 EMERGENCY DEPT VISIT LOW MDM: CPT

## 2020-12-22 PROCEDURE — 36415 COLL VENOUS BLD VENIPUNCTURE: CPT

## 2020-12-22 PROCEDURE — 83036 HEMOGLOBIN GLYCOSYLATED A1C: CPT

## 2020-12-22 PROCEDURE — 73610 X-RAY EXAM OF ANKLE: CPT

## 2020-12-22 PROCEDURE — 73630 X-RAY EXAM OF FOOT: CPT

## 2020-12-22 PROCEDURE — 82306 VITAMIN D 25 HYDROXY: CPT

## 2020-12-22 PROCEDURE — 85027 COMPLETE CBC AUTOMATED: CPT

## 2020-12-22 PROCEDURE — 80053 COMPREHEN METABOLIC PANEL: CPT

## 2020-12-22 PROCEDURE — 84443 ASSAY THYROID STIM HORMONE: CPT

## 2020-12-22 PROCEDURE — 80061 LIPID PANEL: CPT

## 2020-12-22 RX ORDER — HYDROCODONE BITARTRATE AND ACETAMINOPHEN 5; 325 MG/1; MG/1
1 TABLET ORAL EVERY 6 HOURS PRN
Qty: 6 TABLET | Refills: 0 | Status: SHIPPED | OUTPATIENT
Start: 2020-12-22 | End: 2020-12-22 | Stop reason: SDUPTHER

## 2020-12-22 RX ORDER — HYDROCODONE BITARTRATE AND ACETAMINOPHEN 7.5; 325 MG/1; MG/1
1 TABLET ORAL ONCE
Status: COMPLETED | OUTPATIENT
Start: 2020-12-22 | End: 2020-12-22

## 2020-12-22 RX ORDER — HYDROCODONE BITARTRATE AND ACETAMINOPHEN 5; 325 MG/1; MG/1
1 TABLET ORAL EVERY 6 HOURS PRN
Qty: 6 TABLET | Refills: 0 | Status: SHIPPED | OUTPATIENT
Start: 2020-12-22 | End: 2020-12-25

## 2020-12-22 RX ADMIN — HYDROCODONE BITARTRATE AND ACETAMINOPHEN 1 TABLET: 7.5; 325 TABLET ORAL at 11:11

## 2020-12-22 NOTE — TELEPHONE ENCOUNTER
CARMELO - he called to let us know that he slipped on kitchen floor and broke a bone in lower left leg near the ankle.

## 2020-12-22 NOTE — DISCHARGE INSTRUCTIONS
Drink plenty of fluid.  I have given you additional pain medication to take in conjunction to what is already prescribed. Elevate.  Ice.  Keep splint in place until seen by orthopedics.  Non weight bearing until seen by orthopedics.  Call tomorrow for appointment with PCP and orthopedics. Return to ED if condition does not improve or worsens.

## 2020-12-22 NOTE — ED PROVIDER NOTES
Subjective   54 yom presents with c/o left ankle pain.  He states he was walking across his kitchen floor about a week ago when he tripped and fell.  He states he he has had pain since that time.Pain and swelling present to the medial aspect of the left ankle.  +PMS of the LLE.  The LLE is pink, warm and dry.  Strong pedal pulses present to the left foot.  There is no obvious deformity.            Review of Systems   Constitutional: Negative for activity change, appetite change, fatigue and fever.   HENT: Negative for congestion, ear pain, facial swelling and sore throat.    Eyes: Negative for discharge and visual disturbance.   Respiratory: Negative for apnea, chest tightness, shortness of breath, wheezing and stridor.    Cardiovascular: Negative for chest pain and palpitations.   Gastrointestinal: Negative for abdominal distention, abdominal pain, diarrhea, nausea and vomiting.   Genitourinary: Negative for difficulty urinating and dysuria.   Musculoskeletal: Negative for arthralgias and myalgias.   Skin: Negative for rash and wound.   Neurological: Negative for dizziness and seizures.   Psychiatric/Behavioral: Negative for agitation and confusion.       Past Medical History:   Diagnosis Date   • Allergic rhinitis    • Anxiety    • Bursitis    • DDD (degenerative disc disease), cervical    • Depression    • HIV (human immunodeficiency virus infection) (CMS/HCC)    • HIV disease (CMS/HCC)    • HLD (hyperlipidemia)    • HSP ([facility] Schonlein purpura) (CMS/[facility])    • HTN (hypertension)    • Migraine    • Myocardial infarction (CMS/HCC)    • Osteoporosis    • Sleep apnea    • Type 2 diabetes mellitus (CMS/HCC)        Allergies   Allergen Reactions   • Amitriptyline Anaphylaxis   • Amitriptyline Hcl Anaphylaxis   • Darvon [Propoxyphene] Anaphylaxis   • Zithromax [Azithromycin] Anaphylaxis       Past Surgical History:   Procedure Laterality Date   • ANKLE SURGERY     • CARDIAC CATHETERIZATION N/A 6/24/2020    Procedure:  Coronary angiography;  Surgeon: Deon Bailey MD;  Location:  [facility] CATH [facility]AS[facility];  Service: Cardiology;  Laterality: N/A;  11am start time   • CARDIAC CATHETERIZATION N/A 6/24/2020    Procedure: Percutaneous Coronary Intervention;  Surgeon: Deon Bailey MD;  Location:  [facility] CATH INV[facility];  Service: Cardiology;  Laterality: N/A;   • COLONOSCOPY  06/19/2009    Normal exam repeat in 10 years   • FOREARM SURGERY     • LYMPH NODE BIOPSY     • PILONIDAL CYSTECTOMY N/A 12/1/2020    Procedure: EXCISION PILONIDAL ABSCESS;  Surgeon: Ashlyn Posey MD;  Location:  [facility] OR;  Service: General;  Laterality: N/A;       Family History   Problem Relation Age of Onset   • Diabetes Mother    • Hypertension Mother    • Thyroid disease Mother    • Hypertension Father    • Thyroid disease Father    • Arrhythmia Father    • Diabetes Maternal Grandfather    • Heart disease Maternal Grandfather    • Hypertension Maternal Grandfather    • Diabetes Paternal Grandmother    • Stroke Paternal Grandmother    • Heart disease Paternal Grandmother    • Hypertension Paternal Grandmother    • Thyroid disease Paternal Grandmother    • Hypertension Paternal Grandfather    • Hypertension Sister    • No Known Problems Brother        Social History     Socioeconomic History   • Marital status:      Spouse name: Not on file   • Number of children: Not on file   • Years of education: Not on file   • Highest education level: Not on file   Tobacco Use   • Smoking status: Current Every Day Smoker     Packs/day: 0.50     Types: Cigarettes     Start date: 1988   • Smokeless tobacco: Never Used   Substance and Sexual Activity   • Alcohol use: Yes     Comment: occasionally   • Drug use: No   • Sexual activity: Defer           Objective   Physical Exam  Vitals signs and nursing note reviewed.   Constitutional:       Appearance: He is well-developed.   HENT:      Head: Normocephalic.   Eyes:      Pupils: Pupils are equal, round,  and reactive to light.   Neck:      Musculoskeletal: Normal range of motion and neck supple.   Cardiovascular:      Rate and Rhythm: Normal rate and regular rhythm.      Heart sounds: No murmur.   Pulmonary:      Effort: Pulmonary effort is normal.      Breath sounds: Normal breath sounds.   Abdominal:      General: Bowel sounds are normal.      Palpations: Abdomen is soft.   Musculoskeletal:      Left ankle: He exhibits swelling. He exhibits normal range of motion, no deformity and normal pulse. Tenderness.        Feet:       Comments: Pain and swelling present to the medial aspect of the left ankle.  +PMS of the LLE.  The LLE is pink, warm and dry.  Strong pedal pulses present to the left foot.  There is no obvious deformity.   Skin:     General: Skin is warm and dry.   Neurological:      Mental Status: He is alert and oriented to person, place, and time.         Splint - Cast - Strapping    Date/Time: 12/22/2020 12:40 PM  Performed by: Pito Pham APRN  Authorized by: Pito Pham APRN     Consent:     Consent obtained:  Verbal    Consent given by:  Patient    Risks discussed:  Discoloration, numbness, pain and swelling    Alternatives discussed:  No treatment  Pre-procedure details:     Sensation:  Normal    Skin color:  Pink  Procedure details:     Laterality:  Left    Location:  Ankle    Ankle:  L ankle    Strapping: yes      Splint type: posterior leg.    Supplies:  Ortho-Glass  Post-procedure details:     Pain:  Improved    Sensation:  Normal  Comments:      pink               ED Course  ED Course as of Dec 23 0898   Tue Dec 22, 2020   1210 I discussed the patient's history, assessment and testing results with Dr Chino. He will have an orthoglass splint placed and given crutches.  He is to be non weight bearing and f/u with orthopedics.  The patient states he sees Dr Hamilton.  He voiced understanding of both instructions and testing results.    [KS]   1437 The patient called and  states [facility] won't fill his new norco.  I sent 6 tablets to [facility] at the patient's request.    [KS]      ED Course User Index  [KS] Pito Pham APRN                                           [facility]  Number of Diagnoses or Management Options  Closed nondisplaced fracture of navicular bone of left foot, initial encounter: minor     Amount and/or Complexity of Data Reviewed  Clinical lab tests: ordered and reviewed  Tests in the radiology section of CPT®: reviewed and ordered  Discuss the patient with other providers: yes  Independent visualization of images, tracings, or specimens: yes    Risk of Complications, Morbidity, and/or Mortality  Presenting problems: minimal  Diagnostic procedures: minimal  Management options: minimal    Patient Progress  Patient progress: stable      Final diagnoses:   Closed nondisplaced fracture of navicular bone of left foot, initial encounter            Pito Pham APRN  12/23/20 0886

## 2021-01-04 RX ORDER — CALCIUM CARBONATE/VITAMIN D3 600 MG-20
TABLET,CHEWABLE ORAL
Qty: 60 TABLET | Refills: 2 | Status: SHIPPED | OUTPATIENT
Start: 2021-01-04 | End: 2021-02-05 | Stop reason: ALTCHOICE

## 2021-01-07 ENCOUNTER — TELEPHONE (OUTPATIENT)
Dept: INTERNAL MEDICINE | Age: 56
End: 2021-01-07

## 2021-01-07 NOTE — TELEPHONE ENCOUNTER
He is having so much shaking in his hands that he is having trouble holding a spoon or fork to eat. His dad and grandmother had the shakes and tremors too. Wants to know what he needs to do?

## 2021-01-09 NOTE — TELEPHONE ENCOUNTER
He has so many meds may be related to them.   Make sure he is still taking inderal LA and also bring all pill bottles to next appointment

## 2021-01-11 NOTE — TELEPHONE ENCOUNTER
I spoke with him and he said he is taking the Inderal.  He will bring in all pill bottles to next appt.

## 2021-01-20 RX ORDER — DULAGLUTIDE 0.75 MG/.5ML
INJECTION, SOLUTION SUBCUTANEOUS
Qty: 2 ML | Refills: 3 | Status: SHIPPED | OUTPATIENT
Start: 2021-01-20 | End: 2021-02-25

## 2021-01-22 RX ORDER — LORATADINE 10 MG/1
TABLET ORAL
Qty: 30 TABLET | Refills: 5 | Status: SHIPPED | OUTPATIENT
Start: 2021-01-22 | End: 2021-07-15

## 2021-01-22 RX ORDER — MECLIZINE HYDROCHLORIDE 25 MG/1
TABLET ORAL
Qty: 90 TABLET | Refills: 0 | Status: SHIPPED | OUTPATIENT
Start: 2021-01-22 | End: 2021-04-23

## 2021-02-01 ENCOUNTER — OFFICE VISIT (OUTPATIENT)
Dept: WOUND CARE | Facility: HOSPITAL | Age: 56
End: 2021-02-01

## 2021-02-01 ENCOUNTER — LAB (OUTPATIENT)
Dept: LAB | Facility: HOSPITAL | Age: 56
End: 2021-02-01

## 2021-02-01 ENCOUNTER — TRANSCRIBE ORDERS (OUTPATIENT)
Dept: LAB | Facility: HOSPITAL | Age: 56
End: 2021-02-01

## 2021-02-01 DIAGNOSIS — B20 HUMAN IMMUNODEFICIENCY VIRUS (HIV) DISEASE (HCC): Primary | ICD-10-CM

## 2021-02-01 DIAGNOSIS — B20 HUMAN IMMUNODEFICIENCY VIRUS (HIV) DISEASE (HCC): ICD-10-CM

## 2021-02-01 LAB
ALBUMIN SERPL-MCNC: 4 G/DL (ref 3.5–5.2)
ALBUMIN/GLOB SERPL: 1.1 G/DL
ALP SERPL-CCNC: 78 U/L (ref 39–117)
ALT SERPL W P-5'-P-CCNC: 24 U/L (ref 1–41)
ANION GAP SERPL CALCULATED.3IONS-SCNC: 9 MMOL/L (ref 5–15)
AST SERPL-CCNC: 30 U/L (ref 1–40)
BASOPHILS # BLD AUTO: 0.04 10*3/MM3 (ref 0–0.2)
BASOPHILS NFR BLD AUTO: 0.4 % (ref 0–1.5)
BILIRUB SERPL-MCNC: 0.2 MG/DL (ref 0–1.2)
BUN SERPL-MCNC: 17 MG/DL (ref 6–20)
BUN/CREAT SERPL: 21 (ref 7–25)
CALCIUM SPEC-SCNC: 9.8 MG/DL (ref 8.6–10.5)
CHLORIDE SERPL-SCNC: 102 MMOL/L (ref 98–107)
CO2 SERPL-SCNC: 31 MMOL/L (ref 22–29)
CREAT SERPL-MCNC: 0.81 MG/DL (ref 0.76–1.27)
DEPRECATED RDW RBC AUTO: 45.4 FL (ref 37–54)
EOSINOPHIL # BLD AUTO: 0.28 10*3/MM3 (ref 0–0.4)
EOSINOPHIL NFR BLD AUTO: 2.9 % (ref 0.3–6.2)
ERYTHROCYTE [DISTWIDTH] IN BLOOD BY AUTOMATED COUNT: 13.3 % (ref 12.3–15.4)
GFR SERPL CREATININE-BSD FRML MDRD: 100 ML/MIN/1.73
GLOBULIN UR ELPH-MCNC: 3.5 GM/DL
GLUCOSE SERPL-MCNC: 89 MG/DL (ref 65–99)
HCT VFR BLD AUTO: 38.6 % (ref 37.5–51)
HGB BLD-MCNC: 13 G/DL (ref 13–17.7)
IMM GRANULOCYTES # BLD AUTO: 0.05 10*3/MM3 (ref 0–0.05)
IMM GRANULOCYTES NFR BLD AUTO: 0.5 % (ref 0–0.5)
LYMPHOCYTES # BLD AUTO: 2.84 10*3/MM3 (ref 0.7–3.1)
LYMPHOCYTES NFR BLD AUTO: 29.2 % (ref 19.6–45.3)
MCH RBC QN AUTO: 31.4 PG (ref 26.6–33)
MCHC RBC AUTO-ENTMCNC: 33.7 G/DL (ref 31.5–35.7)
MCV RBC AUTO: 93.2 FL (ref 79–97)
MONOCYTES # BLD AUTO: 0.63 10*3/MM3 (ref 0.1–0.9)
MONOCYTES NFR BLD AUTO: 6.5 % (ref 5–12)
NEUTROPHILS NFR BLD AUTO: 5.9 10*3/MM3 (ref 1.7–7)
NEUTROPHILS NFR BLD AUTO: 60.5 % (ref 42.7–76)
NRBC BLD AUTO-RTO: 0 /100 WBC (ref 0–0.2)
PLATELET # BLD AUTO: 217 10*3/MM3 (ref 140–450)
PMV BLD AUTO: 10.9 FL (ref 6–12)
POTASSIUM SERPL-SCNC: 3.7 MMOL/L (ref 3.5–5.2)
PROT SERPL-MCNC: 7.5 G/DL (ref 6–8.5)
RBC # BLD AUTO: 4.14 10*6/MM3 (ref 4.14–5.8)
SODIUM SERPL-SCNC: 142 MMOL/L (ref 136–145)
WBC # BLD AUTO: 9.74 10*3/MM3 (ref 3.4–10.8)

## 2021-02-01 PROCEDURE — 82607 VITAMIN B-12: CPT | Performed by: INTERNAL MEDICINE

## 2021-02-01 PROCEDURE — 85025 COMPLETE CBC W/AUTO DIFF WBC: CPT | Performed by: INTERNAL MEDICINE

## 2021-02-01 PROCEDURE — 80061 LIPID PANEL: CPT | Performed by: INTERNAL MEDICINE

## 2021-02-01 PROCEDURE — G0463 HOSPITAL OUTPT CLINIC VISIT: HCPCS

## 2021-02-01 PROCEDURE — 99213 OFFICE O/P EST LOW 20 MIN: CPT | Performed by: PODIATRIST

## 2021-02-01 PROCEDURE — 36415 COLL VENOUS BLD VENIPUNCTURE: CPT | Performed by: INTERNAL MEDICINE

## 2021-02-01 PROCEDURE — 80053 COMPREHEN METABOLIC PANEL: CPT | Performed by: INTERNAL MEDICINE

## 2021-02-01 PROCEDURE — 82306 VITAMIN D 25 HYDROXY: CPT | Performed by: INTERNAL MEDICINE

## 2021-02-01 PROCEDURE — 87536 HIV-1 QUANT&REVRSE TRNSCRPJ: CPT

## 2021-02-01 PROCEDURE — 84443 ASSAY THYROID STIM HORMONE: CPT | Performed by: INTERNAL MEDICINE

## 2021-02-01 PROCEDURE — 83036 HEMOGLOBIN GLYCOSYLATED A1C: CPT | Performed by: INTERNAL MEDICINE

## 2021-02-01 PROCEDURE — 11042 DBRDMT SUBQ TIS 1ST 20SQCM/<: CPT | Performed by: PODIATRIST

## 2021-02-02 LAB
25(OH)D3 SERPL-MCNC: 59.2 NG/ML (ref 30–100)
CHOLEST SERPL-MCNC: 142 MG/DL (ref 0–200)
HBA1C MFR BLD: 10.83 % (ref 4.8–5.6)
HDLC SERPL-MCNC: 54 MG/DL (ref 40–60)
LDLC SERPL CALC-MCNC: 72 MG/DL (ref 0–100)
LDLC/HDLC SERPL: 1.32 {RATIO}
TRIGL SERPL-MCNC: 84 MG/DL (ref 0–150)
TSH SERPL DL<=0.05 MIU/L-ACNC: 1.53 UIU/ML (ref 0.27–4.2)
VIT B12 BLD-MCNC: 344 PG/ML (ref 211–946)
VLDLC SERPL-MCNC: 16 MG/DL (ref 5–40)

## 2021-02-03 LAB
HIV1 RNA # SERPL NAA+PROBE: <20 COPIES/ML
HIV1 RNA SERPL NAA+PROBE-LOG#: NORMAL {LOG_COPIES}/ML

## 2021-02-05 ENCOUNTER — OFFICE VISIT (OUTPATIENT)
Dept: INTERNAL MEDICINE | Age: 56
End: 2021-02-05
Payer: COMMERCIAL

## 2021-02-05 VITALS
BODY MASS INDEX: 37.11 KG/M2 | SYSTOLIC BLOOD PRESSURE: 126 MMHG | HEIGHT: 72 IN | HEART RATE: 98 BPM | DIASTOLIC BLOOD PRESSURE: 70 MMHG | WEIGHT: 274 LBS | OXYGEN SATURATION: 96 %

## 2021-02-05 DIAGNOSIS — E11.628 DIABETIC FOOT INFECTION (HCC): ICD-10-CM

## 2021-02-05 DIAGNOSIS — Z00.00 ANNUAL PHYSICAL EXAM: ICD-10-CM

## 2021-02-05 DIAGNOSIS — E11.42 TYPE 2 DIABETES MELLITUS WITH DIABETIC POLYNEUROPATHY, WITH LONG-TERM CURRENT USE OF INSULIN (HCC): ICD-10-CM

## 2021-02-05 DIAGNOSIS — E55.9 VITAMIN D DEFICIENCY: ICD-10-CM

## 2021-02-05 DIAGNOSIS — R25.1 TREMOR: Primary | ICD-10-CM

## 2021-02-05 DIAGNOSIS — Z79.4 TYPE 2 DIABETES MELLITUS WITH DIABETIC POLYNEUROPATHY, WITH LONG-TERM CURRENT USE OF INSULIN (HCC): ICD-10-CM

## 2021-02-05 DIAGNOSIS — G47.33 OSA (OBSTRUCTIVE SLEEP APNEA): ICD-10-CM

## 2021-02-05 DIAGNOSIS — S92.255D CLOSED NONDISPLACED FRACTURE OF NAVICULAR BONE OF LEFT FOOT WITH ROUTINE HEALING: ICD-10-CM

## 2021-02-05 DIAGNOSIS — L08.9 DIABETIC FOOT INFECTION (HCC): ICD-10-CM

## 2021-02-05 DIAGNOSIS — E78.2 MIXED HYPERLIPIDEMIA: ICD-10-CM

## 2021-02-05 DIAGNOSIS — E11.42 TYPE 2 DIABETES MELLITUS WITH DIABETIC POLYNEUROPATHY, WITH LONG-TERM CURRENT USE OF INSULIN (HCC): Primary | ICD-10-CM

## 2021-02-05 DIAGNOSIS — Z79.4 TYPE 2 DIABETES MELLITUS WITH DIABETIC POLYNEUROPATHY, WITH LONG-TERM CURRENT USE OF INSULIN (HCC): Primary | ICD-10-CM

## 2021-02-05 DIAGNOSIS — Z21 ASYMPTOMATIC HIV INFECTION (HCC): ICD-10-CM

## 2021-02-05 DIAGNOSIS — I10 ESSENTIAL HYPERTENSION: ICD-10-CM

## 2021-02-05 LAB
ALBUMIN SERPL-MCNC: 3.9 G/DL (ref 3.5–5.2)
ALP BLD-CCNC: 79 U/L (ref 40–130)
ALT SERPL-CCNC: 22 U/L (ref 5–41)
ANION GAP SERPL CALCULATED.3IONS-SCNC: 11 MMOL/L (ref 7–19)
AST SERPL-CCNC: 23 U/L (ref 5–40)
BILIRUB SERPL-MCNC: <0.2 MG/DL (ref 0.2–1.2)
BUN BLDV-MCNC: 16 MG/DL (ref 6–20)
CALCIUM SERPL-MCNC: 9.2 MG/DL (ref 8.6–10)
CHLORIDE BLD-SCNC: 102 MMOL/L (ref 98–111)
CO2: 25 MMOL/L (ref 22–29)
CREAT SERPL-MCNC: 0.8 MG/DL (ref 0.5–1.2)
GFR AFRICAN AMERICAN: >59
GFR NON-AFRICAN AMERICAN: >60
GLUCOSE BLD-MCNC: 256 MG/DL (ref 74–109)
HBA1C MFR BLD: 11 % (ref 4–6)
HCT VFR BLD CALC: 36.6 % (ref 42–52)
HEMOGLOBIN: 11.7 G/DL (ref 14–18)
MCH RBC QN AUTO: 30.5 PG (ref 27–31)
MCHC RBC AUTO-ENTMCNC: 32 G/DL (ref 33–37)
MCV RBC AUTO: 95.6 FL (ref 80–94)
PDW BLD-RTO: 13.4 % (ref 11.5–14.5)
PLATELET # BLD: 206 K/UL (ref 130–400)
PMV BLD AUTO: 11.6 FL (ref 9.4–12.4)
POTASSIUM SERPL-SCNC: 3.9 MMOL/L (ref 3.5–5)
RBC # BLD: 3.83 M/UL (ref 4.7–6.1)
SODIUM BLD-SCNC: 138 MMOL/L (ref 136–145)
TOTAL PROTEIN: 7.3 G/DL (ref 6.6–8.7)
WBC # BLD: 8.2 K/UL (ref 4.8–10.8)

## 2021-02-05 PROCEDURE — 99396 PREV VISIT EST AGE 40-64: CPT | Performed by: INTERNAL MEDICINE

## 2021-02-05 RX ORDER — EMPAGLIFLOZIN 10 MG/1
10 TABLET, FILM COATED ORAL DAILY
Qty: 30 TABLET | Refills: 3
Start: 2021-02-05 | End: 2022-01-18

## 2021-02-05 RX ORDER — GABAPENTIN 800 MG/1
800 TABLET ORAL 3 TIMES DAILY
COMMUNITY

## 2021-02-05 RX ORDER — M-VIT,TX,IRON,MINS/CALC/FOLIC 27MG-0.4MG
1 TABLET ORAL DAILY
COMMUNITY

## 2021-02-05 SDOH — ECONOMIC STABILITY: INCOME INSECURITY: HOW HARD IS IT FOR YOU TO PAY FOR THE VERY BASICS LIKE FOOD, HOUSING, MEDICAL CARE, AND HEATING?: SOMEWHAT HARD

## 2021-02-05 SDOH — ECONOMIC STABILITY: FOOD INSECURITY: WITHIN THE PAST 12 MONTHS, YOU WORRIED THAT YOUR FOOD WOULD RUN OUT BEFORE YOU GOT MONEY TO BUY MORE.: NEVER TRUE

## 2021-02-05 NOTE — PROGRESS NOTES
Chief Complaint   Patient presents with    Annual Exam    Diabetes       HPI: Pt is here today for annual exam and to f/u diabetes and other medical issues he has HIV hypertension hyperlipidemia obesity out-of-control diabetes we got it in better control for a while but once again in poor control he does follow with endocrinology also. [facility] navicular fracture at [facility]. NOw iwith issues with a wound. Recent surgery for pilonidal cyst reviewed those records also    Past Medical History:   Diagnosis Date    [facility] diabetes mellitus [facility] polyneuropathy [facility]     Depression     HIV-1 infection, symptomatic ([facility]     Hyperlipidemia     Hypertension     Male hypogonadism     Osteopenia     Tobacco abuse 5/15/2020    Type 2 diabetes mellitus without complication (HCC)        Past Surgical History:   Procedure Laterality Date    LYMPHADENECTOMY Left     thoracic       Family History   Problem Relation Age of Onset    High Blood Pressure Mother     Diabetes Mother     High Blood Pressure Father     Diabetes Maternal Grandfather        Social History     Socioeconomic History    Marital status:      Spouse name: Not on file    Number of children: Not on file    Years of education: Not on file    Highest education level: Not on file   Occupational History    Not on file   Social Needs    Financial resource strain: Somewhat hard    Food insecurity     Worry: Never true     Inability: Never true    Transportation needs     Medical: Not on file     Non-medical: Not on file   Tobacco Use    Smoking status: Current Every Day Smoker    Smokeless tobacco: Never Used   Substance and Sexual Activity    Alcohol use:  Yes    Drug use: No    Sexual activity: Not on file   Lifestyle    Physical activity     Days per week: Not on file     Minutes per session: Not on file    Stress: Not on file   Relationships    Social connections     Talks on phone: Not on file     Gets together: 1.5 MG/0.5ML SOPN Inject 1.5 mg into the skin once a week 5 pen 5    HUMULIN R U-500 KWIKPEN 500 UNIT/ML SOPN concentrated injection pen inject 200 units TWICE DAILY with meals 24 mL 5    losartan (COZAAR) 25 MG tablet Take 1 tablet by mouth Daily. 90 tablet 3    ASPIRIN LOW DOSE 81 MG EC tablet Take 1 tablet by mouth Daily. 90 tablet 3    traZODone (DESYREL) 100 MG tablet Take 2 tablets by mouth nightly (Stop 150mg) 180 tablet 3    propranolol (INDERAL LA) 80 MG extended release capsule 1 capsule by mouth daily 90 capsule 3    clotrimazole (LOTRIMIN) 1 % external solution Apply 1 actuation topically 2 times daily      Cholecalciferol (VITAMIN D3) 50 MCG (2000 UT) CAPS 1 capsule by mouth daily 100 capsule 3    tamsulosin (FLOMAX) 0.4 MG capsule Take 0.4 mg by mouth daily      NOVOLOG FLEXPEN 100 UNIT/ML injection pen INJECT 70 UNITS WITH REGULAR MEALS, 80 UNITS WITH LARGER MEALS, AND 60 UNITS WITH SNACKS AS DIRECTED 105 mL 3    nystatin (MYCOSTATIN) 685674 UNIT/GM cream Apply topically 2 times daily Apply topically 2 times daily.  30 g 1    ARIPiprazole (ABILIFY) 5 MG tablet Take 5 mg by mouth daily       nicotine (NICODERM CQ) 21 MG/24HR Use 21mg patch daily for 2 weeks and then 14mg patch daily for 2 weeks and then 7mg patch daily for 2 weeks 42 patch 0    triamcinolone (KENALOG) 0.1 % cream Apply topically 2 times daily prn 60 g 0    Lancets MISC Use to check blood sugar 4 times daily   Dx  E11.9  Insulin dependent 400 each 3    VORTIoxetine HBr (TRINTELLIX) 20 MG TABS tablet Take 20 mg by mouth daily       glucose blood VI test strips (FREESTYLE LITE) strip 1 each by In Vitro route daily Test blood sugar 4 times daily 100 each 3    clonazePAM (KLONOPIN) 0.5 MG tablet Take 1 tablet by mouth 2 times daily as needed for Anxiety 60 tablet 5    Eoymdjh-Nakhp-Hexzjssq-TenofAF (GENVOYA) [phone] MG TABS Take 1 tablet by mouth daily      HYDROcodone-acetaminophen (NORCO) 5-325 MG per tablet Take 1 tablet by mouth 2 times daily as needed for Pain .  pregabalin (LYRICA) 75 MG capsule Take 75 mg by mouth 2 times daily      rizatriptan (MAXALT) 10 MG tablet Take 1 tablet by mouth once as needed for Migraine May repeat in 2 hours if needed 30 tablet 2     No current facility-administered medications for this visit. Review of Systems   Constitutional: Positive for fatigue. Negative for chills and fever. HENT: Negative for congestion and facial swelling. Eyes: Negative for discharge and redness. Respiratory: Negative for cough, chest tightness and shortness of breath. Cardiovascular: Negative for chest pain. Gastrointestinal: Negative for abdominal distention and abdominal pain. Genitourinary: Negative for dysuria. Musculoskeletal: Positive for arthralgias and back pain. Skin: Negative for rash. Neurological: Positive for weakness. Negative for headaches. Psychiatric/Behavioral: Positive for dysphoric mood. Negative for confusion and decreased concentration. /70   Pulse 98   Ht 6' (1.829 m)   Wt 274 lb (124.3 kg)   SpO2 96%   BMI 37.16 kg/m²   BP Readings from Last 7 Encounters:   02/05/21 126/70   07/31/20 120/64   02/10/20 120/66   09/09/19 120/70   07/25/19 109/68   06/17/19 124/66   06/03/19 (!) 110/58     Wt Readings from Last 7 Encounters:   02/05/21 274 lb (124.3 kg)   07/31/20 274 lb (124.3 kg)   05/14/20 278 lb (126.1 kg)   04/20/20 272 lb (123.4 kg)   02/10/20 278 lb (126.1 kg)   09/09/19 291 lb (132 kg)   07/25/19 282 lb (127.9 kg)     BMI Readings from Last 7 Encounters:   02/05/21 37.16 kg/m²   07/31/20 37.16 kg/m²   05/14/20 39.89 kg/m²   04/20/20 36.89 kg/m²   02/10/20 37.70 kg/m²   09/09/19 39.47 kg/m²   07/25/19 40.46 kg/m²     Resp Readings from Last 7 Encounters:   05/05/18 14       Physical Exam  Constitutional:       General: He is not in acute distress. Appearance: He is well-developed. He is obese.    HENT:      Right Ear: External ear normal. Tympanic membrane is not injected. Left Ear: External ear normal. Tympanic membrane is not injected. Mouth/Throat:      Pharynx: No oropharyngeal exudate. Eyes:      General: No scleral icterus. Conjunctiva/sclera: Conjunctivae normal.   Neck:      Musculoskeletal: Neck supple. Thyroid: No thyroid mass or thyromegaly. Vascular: No carotid bruit. Cardiovascular:      Rate and Rhythm: Normal rate and regular rhythm. Heart sounds: S1 normal and S2 normal. No murmur. No S3 or S4 sounds. Pulmonary:      Effort: Pulmonary effort is normal. No respiratory distress. Breath sounds: Normal breath sounds. No wheezing or rales. Abdominal:      General: Bowel sounds are normal. There is no distension. Palpations: Abdomen is soft. There is no mass. Tenderness: There is no abdominal tenderness. Musculoskeletal:      Right lower leg: Edema present. Left lower leg: Edema present. Lymphadenopathy:      Cervical: No cervical adenopathy. Upper Body:      Right upper body: No supraclavicular adenopathy. Left upper body: No supraclavicular adenopathy. Skin:     Comments: Left foot with dressing and boot. Neurological:      Mental Status: He is alert and oriented to person, place, and time. Cranial Nerves: No cranial nerve deficit. Psychiatric:      Comments: Mood is ok.           Results for orders placed or performed in visit on 02/05/21   CBC   Result Value Ref Range    WBC 8.2 4.8 - 10.8 K/uL    RBC 3.83 (L) 4.70 - 6.10 M/uL    Hemoglobin 11.7 (L) 14.0 - 18.0 g/dL    Hematocrit 36.6 (L) 42.0 - 52.0 %    MCV 95.6 (H) 80.0 - 94.0 fL    MCH 30.5 27.0 - 31.0 pg    MCHC 32.0 (L) 33.0 - 37.0 g/dL    RDW 13.4 11.5 - 14.5 %    Platelets 067 873 - 687 K/uL    MPV 11.6 9.4 - 12.4 fL   Hemoglobin A1C   Result Value Ref Range    Hemoglobin A1C 11.0 (H) 4.0 - 6.0 %   Comprehensive Metabolic Panel   Result Value Ref Range    Sodium 138 136 - 145 mmol/L Potassium 3.9 3.5 - 5.0 mmol/L    Chloride 102 98 - 111 mmol/L    CO2 25 22 - 29 mmol/L    Anion Gap 11 7 - 19 mmol/L    Glucose 256 (H) 74 - 109 mg/dL    BUN 16 6 - 20 mg/dL    CREATININE 0.8 0.5 - 1.2 mg/dL    GFR Non-African American >60 >60    GFR African American >59 >59    Calcium 9.2 8.6 - 10.0 mg/dL    Total Protein 7.3 6.6 - 8.7 g/dL    Albumin 3.9 3.5 - 5.2 g/dL    Total Bilirubin <0.2 0.2 - 1.2 mg/dL    Alkaline Phosphatase 79 40 - 130 U/L    ALT 22 5 - 41 U/L    AST 23 5 - 40 U/L       ASSESSMENT/ PLAN:  1. Annual physical exam  Current medications labs vaccines reviewed keep up-to-date with routine medical care and follow-up call with any problems or complaints    2. Type 2 diabetes mellitus with diabetic polyneuropathy, with long-term current use of insulin (HCC)  Diabetes is terribly out of control he knows the importance of diabetic control he thought the number was going to be better needs to follow-up closely with the diabetic specialist and diabetic educators etc.  - CBC; Future  - Comprehensive Metabolic Panel; Future  - TSH without Reflex; Future  - Microalbumin / Creatinine Urine Ratio; Future  - Hemoglobin A1C; Future    3. Tremor  It will difficult to sort out tremor in the exam room today we will refer to neurology for their recommendations and follow-up he is on a lot of medications I explained part of his tremor may be multiple med related follow TSH other labs. Question familial tremor.  - External Referral To Neurology    4. Vitamin D deficiency  Recommend vitamin D supplementation check levels  - Vitamin D 25 Hydroxy; Future    5. Mixed hyperlipidemia  Statin therapy high-dose statin recommended continue current care and follow  - Lipid Panel; Future    6. Asymptomatic HIV infection ([address]  Managed by [facility] cares and HIV nondetectable at this time continue with current plan of care and follow-up per HIV clinic    7.  Essential hypertension  Pressure stable we reviewed blood pressure readings and other notes continue current plan of care and follow    8. ALEKSANDR (obstructive sleep apnea)  Patient is compliant with his CPAP device we encouraged continued compliance with nightly use he benefits from the use    9. Closed nondisplaced fracture of navicular bone of left foot with routine healing  We reviewed the records from [facility] ER and the x-rays we reviewed orthopedics notes from 12/31 and 1/25 keep close follow-up with orthopedics and follow their recommendations    10. Diabetic foot infection ([address]  Foot is wrapped current he is following with Dr. Cristiano Nunez keep close follow-up and proceed with his recommendations we reviewed care everywhere and the [facility] records    We recommend the COVID-19 vaccine when available to the patient. He has had diabetic foot exam at the podiatrist.  We think his meningococcal and hepatitis A vaccines are all up-to-date through [facility] cares. Colon cancer screening reviewed when the pandemic is settled will reassess.

## 2021-02-08 ENCOUNTER — APPOINTMENT (OUTPATIENT)
Dept: WOUND CARE | Facility: HOSPITAL | Age: 56
End: 2021-02-08

## 2021-02-15 ENCOUNTER — APPOINTMENT (OUTPATIENT)
Dept: WOUND CARE | Facility: HOSPITAL | Age: 56
End: 2021-02-15

## 2021-02-19 ENCOUNTER — APPOINTMENT (OUTPATIENT)
Dept: WOUND CARE | Facility: HOSPITAL | Age: 56
End: 2021-02-19

## 2021-02-19 PROBLEM — G43.019 INTRACTABLE MIGRAINE WITHOUT AURA: Status: ACTIVE | Noted: 2021-02-19

## 2021-02-19 RX ORDER — RIZATRIPTAN BENZOATE 10 MG/1
10 TABLET ORAL
Qty: 30 TABLET | Refills: 2 | Status: SHIPPED | OUTPATIENT
Start: 2021-02-19 | End: 2021-12-30

## 2021-02-21 PROBLEM — J01.90 ACUTE SINUSITIS: Status: RESOLVED | Noted: 2019-01-03 | Resolved: 2021-02-21

## 2021-02-21 PROBLEM — M25.562 ACUTE PAIN OF BOTH KNEES: Status: RESOLVED | Noted: 2018-11-08 | Resolved: 2021-02-21

## 2021-02-21 PROBLEM — L08.9 DIABETIC FOOT INFECTION (HCC): Status: ACTIVE | Noted: 2021-02-21

## 2021-02-21 PROBLEM — M25.561 ACUTE PAIN OF BOTH KNEES: Status: RESOLVED | Noted: 2018-11-08 | Resolved: 2021-02-21

## 2021-02-21 PROBLEM — N39.44 BED WETTING: Status: RESOLVED | Noted: 2017-10-06 | Resolved: 2021-02-21

## 2021-02-21 PROBLEM — E11.628 DIABETIC FOOT INFECTION (HCC): Status: ACTIVE | Noted: 2021-02-21

## 2021-02-21 PROBLEM — S92.255D CLOSED NONDISPLACED FRACTURE OF NAVICULAR BONE OF LEFT FOOT WITH ROUTINE HEALING: Status: ACTIVE | Noted: 2021-02-21

## 2021-02-21 ASSESSMENT — ENCOUNTER SYMPTOMS
BACK PAIN: 1
CHEST TIGHTNESS: 0
ABDOMINAL PAIN: 0
COUGH: 0
FACIAL SWELLING: 0
SHORTNESS OF BREATH: 0
EYE DISCHARGE: 0
EYE REDNESS: 0
ABDOMINAL DISTENTION: 0

## 2021-02-25 ENCOUNTER — OFFICE VISIT (OUTPATIENT)
Dept: ENDOCRINOLOGY | Facility: CLINIC | Age: 56
End: 2021-02-25

## 2021-02-25 VITALS
DIASTOLIC BLOOD PRESSURE: 80 MMHG | SYSTOLIC BLOOD PRESSURE: 128 MMHG | OXYGEN SATURATION: 98 % | WEIGHT: 283.3 LBS | BODY MASS INDEX: 39.66 KG/M2 | HEART RATE: 78 BPM | HEIGHT: 71 IN

## 2021-02-25 DIAGNOSIS — E11.65 TYPE 2 DIABETES MELLITUS WITH HYPERGLYCEMIA, WITH LONG-TERM CURRENT USE OF INSULIN (HCC): Primary | ICD-10-CM

## 2021-02-25 DIAGNOSIS — I15.2 HYPERTENSION ASSOCIATED WITH DIABETES (HCC): ICD-10-CM

## 2021-02-25 DIAGNOSIS — E11.59 HYPERTENSION ASSOCIATED WITH DIABETES (HCC): ICD-10-CM

## 2021-02-25 DIAGNOSIS — Z79.4 TYPE 2 DIABETES MELLITUS WITH HYPERGLYCEMIA, WITH LONG-TERM CURRENT USE OF INSULIN (HCC): Primary | ICD-10-CM

## 2021-02-25 DIAGNOSIS — E78.2 MIXED DIABETIC HYPERLIPIDEMIA ASSOCIATED WITH TYPE 2 DIABETES MELLITUS (HCC): ICD-10-CM

## 2021-02-25 DIAGNOSIS — E11.69 MIXED DIABETIC HYPERLIPIDEMIA ASSOCIATED WITH TYPE 2 DIABETES MELLITUS (HCC): ICD-10-CM

## 2021-02-25 DIAGNOSIS — E55.9 VITAMIN D DEFICIENCY: ICD-10-CM

## 2021-02-25 PROCEDURE — 95251 CONT GLUC MNTR ANALYSIS I&R: CPT | Performed by: INTERNAL MEDICINE

## 2021-02-25 PROCEDURE — 99214 OFFICE O/P EST MOD 30 MIN: CPT | Performed by: INTERNAL MEDICINE

## 2021-02-25 RX ORDER — INSULIN HUMAN 500 [IU]/ML
INJECTION, SOLUTION SUBCUTANEOUS
Qty: 9 PEN | Refills: 5 | Status: SHIPPED | OUTPATIENT
Start: 2021-02-25 | End: 2021-03-10

## 2021-02-25 NOTE — PROGRESS NOTES
" Donis Yi is a 55 y.o. male who presents for  evaluation of   Type 2 diabetes        Primary Care Provider    Oksana Mares MD    Duration 15 years    Timing - Diabetes is Constant    Quality -  poorly controlled      Severity -  severe    Complications - peripheral neuropathy    Current symptoms/problems  none     Alleviating Factors: Compliance      Aggravating Factors :    Side Effects  none    Current diet  in general, a \"healthy\" diet      Current exercise none    Current monitoring regimen: home blood tests - checking 4 x daily   Home blood sugar records: 60 - 500s       Hypoglycemia nocturnal and if skipped meals        Objective:   /80 (BP Location: Left arm, Patient Position: Sitting, Cuff Size: Large Adult)   Pulse 78   Ht 179.1 cm (70.5\")   Wt 129 kg (283 lb 4.8 oz)   SpO2 98%   BMI 40.07 kg/m²     Physical Exam   HENT:   Head: Normocephalic.   Neck: Normal range of motion. Neck supple.   Abdominal: Normal appearance.   Musculoskeletal:      Right lower leg: No edema.      Left lower leg: No edema.     Skin Integrity  -  He has callous right foot.He has callous left foot..  Neurological: He is alert.   Tremor    preulcerative callus         Lab Review          Assessment/Plan       ICD-10-CM ICD-9-CM   1. Type 2 diabetes mellitus with hyperglycemia, with long-term current use of insulin (CMS/[facility])  E11.65 250.00    Z79.4 790.29     V58.67   2. Hypertension associated with diabetes (CMS/[facility])  E11.59 250.80    I15.2 401.9   3. Mixed diabetic hyperlipidemia associated with type 2 diabetes mellitus (CMS/[facility])  E11.69 250.80    E78.2 272.2   4. Vitamin D deficiency  E55.9 268.9         I reviewed and summarized records from Oksana Mares MD from present year  and I reviewed / ordered labs.   From review of records :    Patient has uncontrolled type 2 diabetes    Glycemic Management:   Lab Results   Component Value Date    HGBA1C 11.0 (H) 02/05/2021    HGBA1C 10.83 (H) 02/01/2021    HGBA1C " 10.57 (H) 12/22/2020     Lab Results   Component Value Date    GLUCOSE 256 (H) 02/05/2021    BUN 16 02/05/2021    CREATININE 0.8 02/05/2021    EGFRIFNONA >60 02/05/2021    EGFRIFAFRI >59 02/05/2021    BCR 21.0 02/01/2021    K 3.9 02/05/2021    CO2 25 02/05/2021    CALCIUM 9.2 02/05/2021    ALBUMIN 3.9 02/05/2021    LABIL2 CANCELED 03/29/2016    AST 23 02/05/2021    ALT 22 02/05/2021    ANIONGAP 11 02/05/2021     Lab Results   Component Value Date    WBC 8.2 02/05/2021    HGB 11.7 (L) 02/05/2021    HCT 36.6 (L) 02/05/2021    MCV 95.6 (H) 02/05/2021     02/05/2021        U500 insulin      100 am and 200 mg pm   Now   200 bid --     CHANGE  AM  PM       In addition , U100, DOING 5 PER 50 ABOVE 150 , ADD 1 UNIT PER 5 GRAMS     Total 4 injections per day     Metformin 1000 mg bid      Trulicity 0.75 mg weekly --- INCREASE TO 1.5 MG WEEKLY     Next appt add jardiance but be careful since has BPH     Has freestyle melba     Report 34% in range for last 2 weeks, 3 % low, rest elevated    Conclusion , type 2 diabetes with hyperglycemia      Approve for omnipod    We will pump u500 and compliment with U 100 for meals     Rule out cushing's   Approve for  Insulin pump and or Continuous Glucose Sensor     #1  Patient has diabetes mellitus, insulin-dependent.    #2 He performs blood glucose testing at least 4 times daily and blood glucose log was brought to office with variability from .    #3  He is requiring  Basal insulin  and Prandial Insulin for a total of at least  4 injections or boluses per day and has been doing this for at least 6 months     #4 Patient tests blood sugars at least 4 times daily and makes frequent self-adjustments based on information provided by fingerstick and patient is injecting insulin at least 4 times daily. He has been doing this for more than 6 months . He tests frequently due to hypoglycemia and hyperglycemia.   He has frequent variability with activity     #5 I have  personally seen patient within the past 6 months    #6 We plan on seeing her every 2-3 months for continuous adjustment of her diabetes regimen     #7 patient has hypoglycemia with episodes of unawareness.    #8 patient has day-to-day variation in her mealtime which confounds the degree of insulin dosing with multiple daily injections.    #9 patient has completed diabetes education program with us.    #10 He has demonstrated the ability to self monitor her glucose.        #11 Patient is motivated in improving  diabetes control          Lipid Management  Lab Results   Component Value Date    CHOL 142 02/01/2021    CHOL 135 12/22/2020    CHOL 129 05/12/2020     Lab Results   Component Value Date    TRIG 84 02/01/2021    TRIG 179 (H) 12/22/2020    TRIG 92 05/12/2020     Lab Results   Component Value Date    HDL 54 02/01/2021    HDL 51 12/22/2020    HDL 45 05/12/2020     No components found for: LDLCALC  Lab Results   Component Value Date    LDL 72 02/01/2021    LDL 54 12/22/2020    LDL 66 05/12/2020     No results found for: LDLDIRECT    lipitor 80 mg qhs     Blood Pressure Management:    Vitals:    02/25/21 0851   BP: 128/80   Pulse: 78   SpO2: 98%     Lab Results   Component Value Date    GLUCOSE 256 (H) 02/05/2021    CALCIUM 9.2 02/05/2021     02/05/2021    K 3.9 02/05/2021    CO2 25 02/05/2021     02/05/2021    BUN 16 02/05/2021    CREATININE 0.8 02/05/2021    EGFRIFAFRI >59 02/05/2021    EGFRIFNONA >60 02/05/2021    BCR 21.0 02/01/2021    ANIONGAP 11 02/05/2021               Microvascular Complication Monitoring:      Eye Exam Evaluation    -----------    Last Microalbumin-Proteinuria Assessment    No results found for: MALBCRERATIO    No results found for: UTPCR    -----------      Neuropathy on lyrica 100 bid          Weight Related:   Wt Readings from Last 3 Encounters:   02/25/21 129 kg (283 lb 4.8 oz)   12/22/20 123 kg (272 lb)   12/01/20 126 kg (278 lb)     Body mass index is 40.07  kg/m².        Diet interventions: moderate (500 kCal/d) deficit diet.      Bone Health    Lab Results   Component Value Date    CALCIUM 9.2 02/05/2021    PHOS 2.7 05/11/2020    IDNT87LH 59.2 02/01/2021       Thyroid Health    Lab Results   Component Value Date    TSH 1.530 02/01/2021    TSH 1.620 12/22/2020    TSH 1.510 05/11/2020              Other Diabetes Related Aspects       Lab Results   Component Value Date    WQPOCOGK86 344 02/01/2021     Wanted viagra    No longer on imdur and nitroglycerin     Written this time but only 52 since on genvoya and flomax        No orders of the defined types were placed in this encounter.        A copy of my note was sent to Oksana Mares MD    Please see my above opinion and suggestions.

## 2021-02-26 ENCOUNTER — OFFICE VISIT (OUTPATIENT)
Dept: WOUND CARE | Facility: HOSPITAL | Age: 56
End: 2021-02-26

## 2021-02-26 ENCOUNTER — DOCUMENTATION (OUTPATIENT)
Dept: ENDOCRINOLOGY | Facility: CLINIC | Age: 56
End: 2021-02-26

## 2021-02-26 PROCEDURE — 97597 DBRDMT OPN WND 1ST 20 CM/<: CPT | Performed by: PODIATRIST

## 2021-03-01 ENCOUNTER — DOCUMENTATION (OUTPATIENT)
Dept: ENDOCRINOLOGY | Facility: CLINIC | Age: 56
End: 2021-03-01

## 2021-03-04 DIAGNOSIS — K52.1 DIARRHEA DUE TO DRUG: ICD-10-CM

## 2021-03-05 ENCOUNTER — APPOINTMENT (OUTPATIENT)
Dept: WOUND CARE | Facility: HOSPITAL | Age: 56
End: 2021-03-05

## 2021-03-05 RX ORDER — LOPERAMIDE HYDROCHLORIDE 2 MG/1
2 CAPSULE ORAL 4 TIMES DAILY PRN
Qty: 30 CAPSULE | Refills: 5 | Status: SHIPPED | OUTPATIENT
Start: 2021-03-05 | End: 2021-11-18

## 2021-03-08 ENCOUNTER — OFFICE VISIT (OUTPATIENT)
Dept: WOUND CARE | Facility: HOSPITAL | Age: 56
End: 2021-03-08

## 2021-03-08 ENCOUNTER — TELEPHONE (OUTPATIENT)
Dept: ENDOCRINOLOGY | Facility: CLINIC | Age: 56
End: 2021-03-08

## 2021-03-08 DIAGNOSIS — E11.621 TYPE 2 DIABETES MELLITUS WITH FOOT ULCER, UNSPECIFIED WHETHER LONG TERM INSULIN USE (HCC): ICD-10-CM

## 2021-03-08 DIAGNOSIS — L97.522 NON-PRESSURE CHRONIC ULCER OF OTHER PART OF LEFT FOOT WITH FAT LAYER EXPOSED (HCC): ICD-10-CM

## 2021-03-08 DIAGNOSIS — L84 CORNS AND CALLOSITIES: ICD-10-CM

## 2021-03-08 DIAGNOSIS — L97.512 NON-PRESSURE CHRONIC ULCER OF OTHER PART OF RIGHT FOOT WITH FAT LAYER EXPOSED (HCC): ICD-10-CM

## 2021-03-08 DIAGNOSIS — L97.509 TYPE 2 DIABETES MELLITUS WITH FOOT ULCER, UNSPECIFIED WHETHER LONG TERM INSULIN USE (HCC): ICD-10-CM

## 2021-03-08 PROCEDURE — 11056 PARNG/CUTG B9 HYPRKR LES 2-4: CPT

## 2021-03-08 PROCEDURE — 11056 PARNG/CUTG B9 HYPRKR LES 2-4: CPT | Performed by: PODIATRIST

## 2021-03-08 PROCEDURE — 99212 OFFICE O/P EST SF 10 MIN: CPT | Performed by: PODIATRIST

## 2021-03-08 RX ORDER — ACETAMINOPHEN 160 MG
TABLET,DISINTEGRATING ORAL
Qty: 100 CAPSULE | Refills: 3 | Status: SHIPPED | OUTPATIENT
Start: 2021-03-08 | End: 2022-02-22

## 2021-03-08 NOTE — TELEPHONE ENCOUNTER
Pavel    Pt called needs insulin for the new pump     [facility] into [facility] in [place], [place]     Pt lost his script for shoes and inserts and would be coming through town tomorrow can we have the scripts for him to     Amelia Dang nurse hayder him and let him know    [phone]

## 2021-03-10 ENCOUNTER — TELEPHONE (OUTPATIENT)
Dept: ENDOCRINOLOGY | Facility: CLINIC | Age: 56
End: 2021-03-10

## 2021-03-10 RX ORDER — INSULIN HUMAN 500 [IU]/ML
INJECTION, SOLUTION SUBCUTANEOUS
Qty: 40 ML | Refills: 11 | Status: SHIPPED | OUTPATIENT
Start: 2021-03-10 | End: 2021-03-16 | Stop reason: SDUPTHER

## 2021-03-10 NOTE — TELEPHONE ENCOUNTER
Pharmacy needs to know if he is still on trulicity and novolog? Call pharmacy back at [phone]

## 2021-03-11 ENCOUNTER — TELEPHONE (OUTPATIENT)
Dept: ENDOCRINOLOGY | Facility: CLINIC | Age: 56
End: 2021-03-11

## 2021-03-11 RX ORDER — SYRINGE,INSUL U-500,NDL,0.5ML 31GX15/64"
1 SYRINGE, EMPTY DISPOSABLE MISCELLANEOUS
Qty: 100 EACH | Refills: 11 | Status: SHIPPED | OUTPATIENT
Start: 2021-03-11 | End: 2021-04-10

## 2021-03-11 NOTE — TELEPHONE ENCOUNTER
[facility] - [place], [place] - [address] [facility] - [phone]  - [phone] FX      Left a vm stating that they are needing an order for the U500 syringes to be sent to them please

## 2021-03-11 NOTE — TELEPHONE ENCOUNTER
[facility] - [place], [place] - [address] [facility] - [phone]  - [phone] FX (Pharmacy)      Needs a call back to discuss Sumi ORTA

## 2021-03-15 ENCOUNTER — TELEPHONE (OUTPATIENT)
Dept: ENDOCRINOLOGY | Facility: CLINIC | Age: 56
End: 2021-03-15

## 2021-03-15 NOTE — TELEPHONE ENCOUNTER
Pt called to see if we had changed his insulin to whatever is approved for him pump . I told him the message was sent to  and we were awaiting his response

## 2021-03-15 NOTE — TELEPHONE ENCOUNTER
Pt called stating that his pharmacy refuses to have anything to do with this because it is not approved by the FDA to use Humalin U500 in the pump.     He is needing something else called into       [facility] - [place] - [address] [facility] - [phone]  - [phone]  [phone]    He is still waiting on the insulin so that he can get a pump training set up.

## 2021-03-16 RX ORDER — INSULIN HUMAN 500 [IU]/ML
INJECTION, SOLUTION SUBCUTANEOUS
Qty: 40 ML | Refills: 11 | Status: ON HOLD | OUTPATIENT
Start: 2021-03-16 | End: 2021-08-26

## 2021-03-17 RX ORDER — SYRINGE,INSUL U-500,NDL,0.5ML 31GX15/64"
1 SYRINGE, EMPTY DISPOSABLE MISCELLANEOUS
Qty: 100 EACH | Refills: 11 | Status: SHIPPED | OUTPATIENT
Start: 2021-03-17 | End: 2021-04-16

## 2021-03-17 RX ORDER — PEN NEEDLE, DIABETIC
NEEDLE, DISPOSABLE MISCELLANEOUS
Qty: 100 EACH | Refills: 6 | Status: SHIPPED | OUTPATIENT
Start: 2021-03-17

## 2021-03-26 RX ORDER — PROPRANOLOL HYDROCHLORIDE 80 MG/1
CAPSULE, EXTENDED RELEASE ORAL
Qty: 90 CAPSULE | Refills: 3 | Status: SHIPPED | OUTPATIENT
Start: 2021-03-26 | End: 2022-02-22

## 2021-03-27 ENCOUNTER — IMMUNIZATION (OUTPATIENT)
Dept: VACCINE CLINIC | Facility: HOSPITAL | Age: 56
End: 2021-03-27

## 2021-03-27 PROCEDURE — 0011A: CPT | Performed by: OBSTETRICS & GYNECOLOGY

## 2021-03-27 PROCEDURE — 91301 HC SARSCO02 VAC 100MCG/0.5ML IM: CPT | Performed by: OBSTETRICS & GYNECOLOGY

## 2021-03-29 ENCOUNTER — TELEPHONE (OUTPATIENT)
Dept: ENDOCRINOLOGY | Facility: CLINIC | Age: 56
End: 2021-03-29

## 2021-04-01 ENCOUNTER — TELEPHONE (OUTPATIENT)
Dept: ENDOCRINOLOGY | Facility: CLINIC | Age: 56
End: 2021-04-01

## 2021-04-01 RX ORDER — CALCIUM CARB/VITAMIN D3/VIT K1 500-100-40
TABLET,CHEWABLE ORAL
Qty: 120 EACH | Refills: 11 | Status: SHIPPED | OUTPATIENT
Start: 2021-04-01

## 2021-04-01 NOTE — TELEPHONE ENCOUNTER
Pt left a vm asking for insulin syringes to be sent to     [facility] - [place], [place] - [address] [facility] [facility] [phone]  - [phone] FX

## 2021-04-12 ENCOUNTER — TELEPHONE (OUTPATIENT)
Dept: ENDOCRINOLOGY | Facility: CLINIC | Age: 56
End: 2021-04-12

## 2021-04-12 NOTE — TELEPHONE ENCOUNTER
Pt was advised by pump  that he may need to follow up after a month of use, he is wanting to know if he needs to schedule.

## 2021-04-12 NOTE — TELEPHONE ENCOUNTER
Normally we do a 2-4 wk follow up. Pt has a 6 wk appt with Dr. Zhao he wanted to just keep and come to. Instructed pt we will keep this and if he is having hyperglycemia or hypoglycemia to call to evaluate if he needs to come in to download melba reader and omnipod PDM or if changes can be made over the phone. Pt verbalized understanding.

## 2021-04-16 ENCOUNTER — TELEPHONE (OUTPATIENT)
Dept: ENDOCRINOLOGY | Facility: CLINIC | Age: 56
End: 2021-04-16

## 2021-04-16 NOTE — TELEPHONE ENCOUNTER
Spoke with pt. Started on Omnipod Monday the 12th. U100 insulin.   BG is consistently in 200s. Lowest 179 highest in 300s.   Basal is 2.5 - increase to 3 un/hr.  Pt did this over the phone. Also, increased max basal to 4u/hr.    Pt verbalized understanding and will call back next week if hyperglycemia continues or if hypoglycemia occurs.

## 2021-04-22 DIAGNOSIS — B37.49 YEAST DERMATITIS OF PENIS: ICD-10-CM

## 2021-04-22 DIAGNOSIS — B37.42 CANDIDAL BALANITIS: ICD-10-CM

## 2021-04-22 RX ORDER — FLUCONAZOLE 200 MG/1
200 TABLET ORAL DAILY
Qty: 3 TABLET | Refills: 0 | Status: SHIPPED | OUTPATIENT
Start: 2021-04-22 | End: 2021-04-25

## 2021-04-22 RX ORDER — NYSTATIN 100000 U/G
OINTMENT TOPICAL
Qty: 60 G | Refills: 1 | Status: SHIPPED | OUTPATIENT
Start: 2021-04-22 | End: 2021-09-16

## 2021-04-23 RX ORDER — TRAZODONE HYDROCHLORIDE 100 MG/1
TABLET ORAL
Qty: 180 TABLET | Refills: 3 | Status: SHIPPED | OUTPATIENT
Start: 2021-04-23 | End: 2022-01-18 | Stop reason: SDUPTHER

## 2021-04-23 RX ORDER — MECLIZINE HYDROCHLORIDE 25 MG/1
TABLET ORAL
Qty: 90 TABLET | Refills: 0 | Status: SHIPPED | OUTPATIENT
Start: 2021-04-23 | End: 2021-07-15

## 2021-04-24 ENCOUNTER — IMMUNIZATION (OUTPATIENT)
Dept: VACCINE CLINIC | Facility: HOSPITAL | Age: 56
End: 2021-04-24

## 2021-04-24 PROCEDURE — 91301 HC SARSCO02 VAC 100MCG/0.5ML IM: CPT | Performed by: OBSTETRICS & GYNECOLOGY

## 2021-04-24 PROCEDURE — 0012A: CPT | Performed by: OBSTETRICS & GYNECOLOGY

## 2021-05-06 ENCOUNTER — APPOINTMENT (OUTPATIENT)
Dept: WOUND CARE | Facility: HOSPITAL | Age: 56
End: 2021-05-06

## 2021-05-07 ENCOUNTER — OFFICE VISIT (OUTPATIENT)
Dept: WOUND CARE | Facility: HOSPITAL | Age: 56
End: 2021-05-07

## 2021-05-07 ENCOUNTER — LAB REQUISITION (OUTPATIENT)
Dept: LAB | Facility: HOSPITAL | Age: 56
End: 2021-05-07

## 2021-05-07 DIAGNOSIS — E11.621 TYPE 2 DIABETES MELLITUS WITH FOOT ULCER, UNSPECIFIED WHETHER LONG TERM INSULIN USE (HCC): ICD-10-CM

## 2021-05-07 DIAGNOSIS — Z00.00 ENCOUNTER FOR GENERAL ADULT MEDICAL EXAMINATION WITHOUT ABNORMAL FINDINGS: ICD-10-CM

## 2021-05-07 DIAGNOSIS — L97.509 TYPE 2 DIABETES MELLITUS WITH FOOT ULCER, UNSPECIFIED WHETHER LONG TERM INSULIN USE (HCC): ICD-10-CM

## 2021-05-07 DIAGNOSIS — L03.032 CELLULITIS OF LEFT TOE: ICD-10-CM

## 2021-05-07 DIAGNOSIS — L97.522 NON-PRESSURE CHRONIC ULCER OF OTHER PART OF LEFT FOOT WITH FAT LAYER EXPOSED (HCC): ICD-10-CM

## 2021-05-07 DIAGNOSIS — B20 HUMAN IMMUNODEFICIENCY VIRUS (HIV) DISEASE (HCC): ICD-10-CM

## 2021-05-07 DIAGNOSIS — L97.512 NON-PRESSURE CHRONIC ULCER OF OTHER PART OF RIGHT FOOT WITH FAT LAYER EXPOSED (HCC): ICD-10-CM

## 2021-05-07 PROCEDURE — 87147 CULTURE TYPE IMMUNOLOGIC: CPT | Performed by: PODIATRIST

## 2021-05-07 PROCEDURE — 87075 CULTR BACTERIA EXCEPT BLOOD: CPT | Performed by: PODIATRIST

## 2021-05-07 PROCEDURE — 87070 CULTURE OTHR SPECIMN AEROBIC: CPT | Performed by: PODIATRIST

## 2021-05-07 PROCEDURE — 11042 DBRDMT SUBQ TIS 1ST 20SQCM/<: CPT | Performed by: PODIATRIST

## 2021-05-07 PROCEDURE — G0463 HOSPITAL OUTPT CLINIC VISIT: HCPCS

## 2021-05-07 PROCEDURE — 87176 TISSUE HOMOGENIZATION CULTR: CPT | Performed by: PODIATRIST

## 2021-05-07 PROCEDURE — 87186 SC STD MICRODIL/AGAR DIL: CPT | Performed by: PODIATRIST

## 2021-05-07 PROCEDURE — 87205 SMEAR GRAM STAIN: CPT | Performed by: PODIATRIST

## 2021-05-07 PROCEDURE — 99214 OFFICE O/P EST MOD 30 MIN: CPT | Performed by: PODIATRIST

## 2021-05-09 LAB
BACTERIA SPEC AEROBE CULT: ABNORMAL
BACTERIA SPEC AEROBE CULT: ABNORMAL
GRAM STN SPEC: ABNORMAL
GRAM STN SPEC: ABNORMAL
STREP GROUPING: ABNORMAL

## 2021-05-11 ENCOUNTER — OFFICE VISIT (OUTPATIENT)
Dept: INTERNAL MEDICINE | Age: 56
End: 2021-05-11
Payer: COMMERCIAL

## 2021-05-11 VITALS
DIASTOLIC BLOOD PRESSURE: 64 MMHG | WEIGHT: 274 LBS | OXYGEN SATURATION: 96 % | HEIGHT: 72 IN | SYSTOLIC BLOOD PRESSURE: 120 MMHG | BODY MASS INDEX: 37.11 KG/M2 | HEART RATE: 90 BPM

## 2021-05-11 DIAGNOSIS — Z79.4 TYPE 2 DIABETES MELLITUS WITH DIABETIC POLYNEUROPATHY, WITH LONG-TERM CURRENT USE OF INSULIN (HCC): Primary | ICD-10-CM

## 2021-05-11 DIAGNOSIS — Z79.4 TYPE 2 DIABETES MELLITUS WITH DIABETIC POLYNEUROPATHY, WITH LONG-TERM CURRENT USE OF INSULIN (HCC): ICD-10-CM

## 2021-05-11 DIAGNOSIS — E11.42 TYPE 2 DIABETES MELLITUS WITH DIABETIC POLYNEUROPATHY, WITH LONG-TERM CURRENT USE OF INSULIN (HCC): ICD-10-CM

## 2021-05-11 DIAGNOSIS — I10 ESSENTIAL HYPERTENSION: ICD-10-CM

## 2021-05-11 DIAGNOSIS — Z21 ASYMPTOMATIC HIV INFECTION (HCC): ICD-10-CM

## 2021-05-11 DIAGNOSIS — E11.42 TYPE 2 DIABETES MELLITUS WITH DIABETIC POLYNEUROPATHY, WITH LONG-TERM CURRENT USE OF INSULIN (HCC): Primary | ICD-10-CM

## 2021-05-11 DIAGNOSIS — E11.628 DIABETIC FOOT INFECTION (HCC): ICD-10-CM

## 2021-05-11 DIAGNOSIS — L08.9 DIABETIC FOOT INFECTION (HCC): ICD-10-CM

## 2021-05-11 DIAGNOSIS — E55.9 VITAMIN D DEFICIENCY: ICD-10-CM

## 2021-05-11 LAB
ALBUMIN SERPL-MCNC: 4.1 G/DL (ref 3.5–5.2)
ALP BLD-CCNC: 78 U/L (ref 40–130)
ALT SERPL-CCNC: 22 U/L (ref 5–41)
ANION GAP SERPL CALCULATED.3IONS-SCNC: 13 MMOL/L (ref 7–19)
AST SERPL-CCNC: 20 U/L (ref 5–40)
BILIRUB SERPL-MCNC: 0.3 MG/DL (ref 0.2–1.2)
BUN BLDV-MCNC: 15 MG/DL (ref 6–20)
CALCIUM SERPL-MCNC: 9.2 MG/DL (ref 8.6–10)
CHLORIDE BLD-SCNC: 101 MMOL/L (ref 98–111)
CHOLESTEROL, TOTAL: 137 MG/DL (ref 160–199)
CO2: 28 MMOL/L (ref 22–29)
CREAT SERPL-MCNC: 0.8 MG/DL (ref 0.5–1.2)
GFR AFRICAN AMERICAN: >59
GFR NON-AFRICAN AMERICAN: >60
GLUCOSE BLD-MCNC: 135 MG/DL (ref 74–109)
HBA1C MFR BLD: 9.1 % (ref 4–6)
HCT VFR BLD CALC: 38.5 % (ref 42–52)
HDLC SERPL-MCNC: 50 MG/DL (ref 55–121)
HEMOGLOBIN: 12.9 G/DL (ref 14–18)
LDL CHOLESTEROL CALCULATED: 69 MG/DL
MCH RBC QN AUTO: 30.7 PG (ref 27–31)
MCHC RBC AUTO-ENTMCNC: 33.5 G/DL (ref 33–37)
MCV RBC AUTO: 91.7 FL (ref 80–94)
PDW BLD-RTO: 12.9 % (ref 11.5–14.5)
PLATELET # BLD: 252 K/UL (ref 130–400)
PMV BLD AUTO: 11.3 FL (ref 9.4–12.4)
POTASSIUM SERPL-SCNC: 4 MMOL/L (ref 3.5–5)
RBC # BLD: 4.2 M/UL (ref 4.7–6.1)
SODIUM BLD-SCNC: 142 MMOL/L (ref 136–145)
TOTAL PROTEIN: 8.4 G/DL (ref 6.6–8.7)
TRIGL SERPL-MCNC: 89 MG/DL (ref 0–149)
TSH SERPL DL<=0.05 MIU/L-ACNC: 1.17 UIU/ML (ref 0.27–4.2)
VITAMIN D 25-HYDROXY: 66.4 NG/ML
WBC # BLD: 10.5 K/UL (ref 4.8–10.8)

## 2021-05-11 PROCEDURE — 99213 OFFICE O/P EST LOW 20 MIN: CPT | Performed by: INTERNAL MEDICINE

## 2021-05-11 NOTE — PROGRESS NOTES
Chief Complaint   Patient presents with    3 Month Follow-Up     he is using an insulin pump now    Diabetes     he has a wound on left foot, toe, treated by wound care - on PCN, doxycycline       HPI: Patient is here today to follow-up his diabetes and other medical issues since I last saw him he has developed a wound on his left foot it is being treated and followed by wound care at [facility].  Although the antibiotic was prescribed a few days ago he just started it. He denies fevers or chills. He does not have that much sensation in his feet. He got an insulin pump since I last saw him he feels like it is helping him with diabetes control    Past Medical History:   Diagnosis Date    Controlled diabetes mellitus with diabetic polyneuropathy [facility]     Depression     HIV-1 infection, symptomatic ([facility]     Hyperlipidemia     Hypertension     Male hypogonadism     Osteopenia     Tobacco abuse 5/15/2020    Type 2 diabetes mellitus without complication (HCC)        Past Surgical History:   Procedure Laterality Date    LYMPHADENECTOMY Left     thoracic       Family History   Problem Relation Age of Onset    High Blood Pressure Mother     Diabetes Mother     High Blood Pressure Father     Diabetes Maternal Grandfather        Social History     Socioeconomic History    Marital status:      Spouse name: Not on file    Number of children: Not on file    Years of education: Not on file    Highest education level: Not on file   Occupational History    Not on file   Tobacco Use    Smoking status: Current Every Day Smoker    Smokeless tobacco: Never Used   Vaping Use    Vaping Use: Never used   Substance and Sexual Activity    Alcohol use:  Yes    Drug use: No    Sexual activity: Not on file   Other Topics Concern    Not on file   Social History Narrative    Not on file     Social Determinants of Health     Financial Resource Strain: Medium Risk    Difficulty of Paying Living Expenses: Somewhat hard   Food Insecurity: No Food Insecurity    Worried About Running Out of Food in the Last Year: Never true    Ran Out of Food in the Last Year: Never true   Transportation Needs:     Lack of Transportation (Medical):  Lack of Transportation (Non-Medical):    Physical Activity:     Days of Exercise per Week:     Minutes of Exercise per Session:    Stress:     Feeling of Stress :    Social Connections:     Frequency of Communication with Friends and Family:     Frequency of Social Gatherings with Friends and Family:     Attends [employer] Services:     Active Member of Clubs or Organizations:     Attends Club or Organization Meetings:     Marital Status:    Intimate Partner Violence:     Fear of Current or Ex-Partner:     Emotionally Abused:     Physically Abused:     Sexually Abused: Allergies   Allergen Reactions    Darvon [Propoxyphene]     Elavil [Amitriptyline Hcl]     Zithromax [Azithromycin]        Current Outpatient Medications   Medication Sig Dispense Refill    insulin aspart (NOVOLOG) 100 UNIT/ML injection vial Inject into the skin 3 times daily (before meals) Use as directed in insulin pump      losartan (COZAAR) 25 MG tablet Take 1 tablet by mouth Daily. 90 tablet 3    ASPIRIN LOW DOSE 81 MG EC tablet Take 1 tablet by mouth Daily.  90 tablet 3    meclizine (ANTIVERT) 25 MG tablet Take 1 tablet by mouth daily as needed for Dizziness 90 tablet 0    traZODone (DESYREL) 100 MG tablet TAKE 2 TABLETS BY MOUTH NIGHTLY (STOP 150 MG) 180 tablet 3    nystatin (MYCOSTATIN) 383524 UNIT/GM ointment apply topically to affected area TWICE DAILY 60 g 1    propranolol (INDERAL LA) 80 MG extended release capsule TAKE 1 CAPSULE BY MOUTH DAILY 90 capsule 3    UNIFINE PENTIPS 31G X 6 MM MISC for use FOUR TIMES DAILY 100 each 6    Cholecalciferol (VITAMIN D3) 50 MCG (2000 UT) CAPS 1 capsule by mouth daily 100 capsule 3    loperamide (IMODIUM) 2 MG capsule Take 1 capsule by mouth 4 times daily as needed for Diarrhea 30 capsule 5    rizatriptan (MAXALT) 10 MG tablet Take 1 tablet by mouth once as needed for Migraine May repeat in 2 hours if needed 30 tablet 2    Multiple Vitamins-Minerals (THERAPEUTIC MULTIVITAMIN-MINERALS) tablet Take 1 tablet by mouth daily      gabapentin (NEURONTIN) 800 MG tablet Take 800 mg by mouth 3 times daily.  empagliflozin (JARDIANCE) 10 MG tablet Take 1 tablet by mouth daily 30 tablet 3    ALLERGY RELIEF 10 MG tablet TAKE 1 TABLET BY MOUTH DAILY 30 tablet 5    DEXILANT 60 MG CPDR delayed release capsule TAKE 1 CAPSULE BY MOUTH DAILY 90 capsule 3    sildenafil (REVATIO) 20 MG tablet Take 1 tablet by mouth daily as needed (ED) 15 tablet 0    atorvastatin (LIPITOR) 40 MG tablet Take 1 tablet by mouth daily 90 tablet 3    metFORMIN (GLUCOPHAGE) 500 MG tablet TAKE 2 TABLETS BY MOUTH TWICE DAILY WITH MEALS 360 tablet 3    Melatonin 5 MG CAPS TAKE 1 CAPSULE BY MOUTH AT BEDTIME 90 capsule 3    NASONEX 50 MCG/ACT nasal spray 2 sprays by Nasal route daily 3 Inhaler 3    Dulaglutide (TRULICITY) 1.5 JF/2.3SV SOPN Inject 1.5 mg into the skin once a week 5 pen 5    clotrimazole (LOTRIMIN) 1 % external solution Apply 1 actuation topically 2 times daily      tamsulosin (FLOMAX) 0.4 MG capsule Take 0.4 mg by mouth daily      nystatin (MYCOSTATIN) 248174 UNIT/GM cream Apply topically 2 times daily Apply topically 2 times daily.  30 g 1    ARIPiprazole (ABILIFY) 5 MG tablet Take 5 mg by mouth daily       nicotine (NICODERM CQ) 21 MG/24HR Use 21mg patch daily for 2 weeks and then 14mg patch daily for 2 weeks and then 7mg patch daily for 2 weeks 42 patch 0    triamcinolone (KENALOG) 0.1 % cream Apply topically 2 times daily prn 60 g 0    Lancets MISC Use to check blood sugar 4 times daily   Dx  E11.9  Insulin dependent 400 each 3    VORTIoxetine HBr (TRINTELLIX) 20 MG TABS tablet Take 20 mg by mouth daily       glucose blood VI test strips (FREESTYLE LITE) strip 1 each by In Vitro route daily Test blood sugar 4 times daily 100 each 3    clonazePAM (KLONOPIN) 0.5 MG tablet Take 1 tablet by mouth 2 times daily as needed for Anxiety 60 tablet 5    Rkjykzg-Zwltu-Dxifzbot-TenofAF (GENVOYA) [phone] MG TABS Take 1 tablet by mouth daily      HYDROcodone-acetaminophen (NORCO) 5-325 MG per tablet Take 1 tablet by mouth 2 times daily as needed for Pain .  pregabalin (LYRICA) 75 MG capsule Take 75 mg by mouth 2 times daily       No current facility-administered medications for this visit. Review of Systems    /64   Pulse 90   Ht 6' (1.829 m)   Wt 274 lb (124.3 kg)   SpO2 96%   BMI 37.16 kg/m²   BP Readings from Last 7 Encounters:   05/11/21 120/64   02/05/21 126/70   07/31/20 120/64   02/10/20 120/66   09/09/19 120/70   07/25/19 109/68   06/17/19 124/66     Wt Readings from Last 7 Encounters:   05/11/21 274 lb (124.3 kg)   02/05/21 274 lb (124.3 kg)   07/31/20 274 lb (124.3 kg)   05/14/20 278 lb (126.1 kg)   04/20/20 272 lb (123.4 kg)   02/10/20 278 lb (126.1 kg)   09/09/19 291 lb (132 kg)     BMI Readings from Last 7 Encounters:   05/11/21 37.16 kg/m²   02/05/21 37.16 kg/m²   07/31/20 37.16 kg/m²   05/14/20 39.89 kg/m²   04/20/20 36.89 kg/m²   02/10/20 37.70 kg/m²   09/09/19 39.47 kg/m²     Resp Readings from Last 7 Encounters:   05/05/18 14       Physical Exam  Constitutional:       General: He is not in acute distress. Eyes:      General: No scleral icterus. Cardiovascular:      Heart sounds: Normal heart sounds. Pulmonary:      Breath sounds: Normal breath sounds. Musculoskeletal:      Cervical back: Neck supple. Comments: Left foot is with material on it and with an ulcer. Little bit of drainage from the ulcer. Lymphadenopathy:      Cervical: No cervical adenopathy. Skin:     Findings: No rash.          Results for orders placed or performed in visit on 02/05/21   CBC   Result Value Ref Range    WBC 8.2 4.8 - 10.8 K/uL    RBC 3.83 (L) 4.70 - 6.10 M/uL    Hemoglobin 11.7 (L) 14.0 - 18.0 g/dL    Hematocrit 36.6 (L) 42.0 - 52.0 %    MCV 95.6 (H) 80.0 - 94.0 fL    MCH 30.5 27.0 - 31.0 pg    MCHC 32.0 (L) 33.0 - 37.0 g/dL    RDW 13.4 11.5 - 14.5 %    Platelets 516 153 - 825 K/uL    MPV 11.6 9.4 - 12.4 fL   Hemoglobin A1C   Result Value Ref Range    Hemoglobin A1C 11.0 (H) 4.0 - 6.0 %   Comprehensive Metabolic Panel   Result Value Ref Range    Sodium 138 136 - 145 mmol/L    Potassium 3.9 3.5 - 5.0 mmol/L    Chloride 102 98 - 111 mmol/L    CO2 25 22 - 29 mmol/L    Anion Gap 11 7 - 19 mmol/L    Glucose 256 (H) 74 - 109 mg/dL    BUN 16 6 - 20 mg/dL    CREATININE 0.8 0.5 - 1.2 mg/dL    GFR Non-African American >60 >60    GFR African American >59 >59    Calcium 9.2 8.6 - 10.0 mg/dL    Total Protein 7.3 6.6 - 8.7 g/dL    Albumin 3.9 3.5 - 5.2 g/dL    Total Bilirubin <0.2 0.2 - 1.2 mg/dL    Alkaline Phosphatase 79 40 - 130 U/L    ALT 22 5 - 41 U/L    AST 23 5 - 40 U/L       ASSESSMENT/ PLAN:  1. Type 2 diabetes mellitus with diabetic polyneuropathy, with long-term current use of insulin (HCC)  Patient's diabetes is in terrible control has to get in control he is at risk of losing his foot he is at risk of multiple complications. We will get lab while he is here today we need to reassess his A1c hopefully it has improved he says his blood sugars are doing better since he got the continuous monitor in the pump. - CBC; Future  - Comprehensive Metabolic Panel; Future  - Hemoglobin A1C; Future  - TSH without Reflex; Future  - Lipid Panel; Future    2. Vitamin D deficiency  Follow vitamin D levels supplementation monitor  - Vitamin D 25 Hydroxy; Future    3. Diabetic foot infection [facility])  He needs to get on the antibiotics right away keep very close follow-up with the wound care clinic monitor very closely I reviewed Dr. Humaira Boyd note from 5/7/2021 he wants the patient on penicillin doxycycline he has follow-up scheduled.   Explained to him possible side effects of the antibiotics but the extreme importance of following wound care's recommendations and treatment I would have concern he could possibly end up with a more aggressive wound really have to watch this closely. 4. Asymptomatic HIV infection [facility])  Patient follows with [facility] clinic regarding HIV in good control follow I reviewed Dr. Elena Cueto's note from January 14, 2021 continue his HIV care as directed by the [facility] clinic he understands the importance of this    5.  Essential hypertension  Blood pressure stable with his current meds and her and plan of care    We recommend the COVID-19 vaccine he has had both doses

## 2021-05-12 LAB — BACTERIA SPEC ANAEROBE CULT: NORMAL

## 2021-05-17 ENCOUNTER — APPOINTMENT (OUTPATIENT)
Dept: WOUND CARE | Facility: HOSPITAL | Age: 56
End: 2021-05-17

## 2021-05-18 DIAGNOSIS — I10 ESSENTIAL HYPERTENSION: ICD-10-CM

## 2021-05-18 RX ORDER — ASPIRIN 81 MG/1
TABLET, COATED ORAL
Qty: 90 TABLET | Refills: 3 | Status: SHIPPED | OUTPATIENT
Start: 2021-05-18 | End: 2021-09-16

## 2021-05-18 RX ORDER — LOSARTAN POTASSIUM 25 MG/1
25 TABLET ORAL DAILY
Qty: 90 TABLET | Refills: 3 | Status: SHIPPED | OUTPATIENT
Start: 2021-05-18 | End: 2022-04-19

## 2021-05-20 ENCOUNTER — APPOINTMENT (OUTPATIENT)
Dept: CT IMAGING | Facility: HOSPITAL | Age: 56
End: 2021-05-20

## 2021-05-20 ENCOUNTER — HOSPITAL ENCOUNTER (EMERGENCY)
Facility: HOSPITAL | Age: 56
Discharge: HOME | End: 2021-05-20
Admitting: EMERGENCY MEDICINE

## 2021-05-20 ENCOUNTER — APPOINTMENT (OUTPATIENT)
Dept: GENERAL RADIOLOGY | Facility: HOSPITAL | Age: 56
End: 2021-05-20

## 2021-05-20 ENCOUNTER — OFFICE VISIT (OUTPATIENT)
Dept: WOUND CARE | Facility: HOSPITAL | Age: 56
End: 2021-05-20

## 2021-05-20 VITALS
HEART RATE: 77 BPM | DIASTOLIC BLOOD PRESSURE: 70 MMHG | TEMPERATURE: 97.9 F | BODY MASS INDEX: 39.55 KG/M2 | WEIGHT: 267 LBS | OXYGEN SATURATION: 98 % | SYSTOLIC BLOOD PRESSURE: 131 MMHG | HEIGHT: 69 IN | RESPIRATION RATE: 17 BRPM

## 2021-05-20 DIAGNOSIS — M25.511 ACUTE PAIN OF RIGHT SHOULDER: Primary | ICD-10-CM

## 2021-05-20 DIAGNOSIS — L97.522 NON-PRESSURE CHRONIC ULCER OF OTHER PART OF LEFT FOOT WITH FAT LAYER EXPOSED (HCC): ICD-10-CM

## 2021-05-20 DIAGNOSIS — E11.621 TYPE 2 DIABETES MELLITUS WITH FOOT ULCER, UNSPECIFIED WHETHER LONG TERM INSULIN USE (HCC): ICD-10-CM

## 2021-05-20 DIAGNOSIS — B20 HUMAN IMMUNODEFICIENCY VIRUS (HIV) DISEASE (HCC): ICD-10-CM

## 2021-05-20 DIAGNOSIS — L97.512 NON-PRESSURE CHRONIC ULCER OF OTHER PART OF RIGHT FOOT WITH FAT LAYER EXPOSED (HCC): ICD-10-CM

## 2021-05-20 DIAGNOSIS — L97.509 TYPE 2 DIABETES MELLITUS WITH FOOT ULCER, UNSPECIFIED WHETHER LONG TERM INSULIN USE (HCC): ICD-10-CM

## 2021-05-20 PROCEDURE — 73030 X-RAY EXAM OF SHOULDER: CPT

## 2021-05-20 PROCEDURE — 96372 THER/PROPH/DIAG INJ SC/IM: CPT

## 2021-05-20 PROCEDURE — 99283 EMERGENCY DEPT VISIT LOW MDM: CPT

## 2021-05-20 PROCEDURE — 11042 DBRDMT SUBQ TIS 1ST 20SQCM/<: CPT | Performed by: NURSE PRACTITIONER

## 2021-05-20 PROCEDURE — 73200 CT UPPER EXTREMITY W/O DYE: CPT

## 2021-05-20 PROCEDURE — 25010000002 MORPHINE PER 10 MG: Performed by: EMERGENCY MEDICINE

## 2021-05-20 RX ORDER — HYDROCODONE BITARTRATE AND ACETAMINOPHEN 7.5; 325 MG/1; MG/1
1 TABLET ORAL ONCE
Status: COMPLETED | OUTPATIENT
Start: 2021-05-20 | End: 2021-05-20

## 2021-05-20 RX ORDER — MORPHINE SULFATE 10 MG/ML
6 INJECTION INTRAMUSCULAR; INTRAVENOUS; SUBCUTANEOUS ONCE
Status: COMPLETED | OUTPATIENT
Start: 2021-05-20 | End: 2021-05-20

## 2021-05-20 RX ADMIN — HYDROCODONE BITARTRATE AND ACETAMINOPHEN 1 TABLET: 7.5; 325 TABLET ORAL at 13:53

## 2021-05-20 RX ADMIN — MORPHINE SULFATE 6 MG: 10 INJECTION INTRAVENOUS at 17:39

## 2021-05-20 RX ADMIN — HYDROCODONE BITARTRATE AND ACETAMINOPHEN 1 TABLET: 7.5; 325 TABLET ORAL at 19:16

## 2021-05-26 ENCOUNTER — OFFICE VISIT (OUTPATIENT)
Dept: ENDOCRINOLOGY | Facility: CLINIC | Age: 56
End: 2021-05-26

## 2021-05-26 VITALS
SYSTOLIC BLOOD PRESSURE: 120 MMHG | OXYGEN SATURATION: 97 % | BODY MASS INDEX: 41.22 KG/M2 | HEIGHT: 69 IN | DIASTOLIC BLOOD PRESSURE: 74 MMHG | WEIGHT: 278.3 LBS | HEART RATE: 83 BPM

## 2021-05-26 DIAGNOSIS — E11.59 HYPERTENSION ASSOCIATED WITH DIABETES (HCC): ICD-10-CM

## 2021-05-26 DIAGNOSIS — E55.9 VITAMIN D DEFICIENCY: ICD-10-CM

## 2021-05-26 DIAGNOSIS — E11.69 MIXED DIABETIC HYPERLIPIDEMIA ASSOCIATED WITH TYPE 2 DIABETES MELLITUS (HCC): ICD-10-CM

## 2021-05-26 DIAGNOSIS — E78.2 MIXED DIABETIC HYPERLIPIDEMIA ASSOCIATED WITH TYPE 2 DIABETES MELLITUS (HCC): ICD-10-CM

## 2021-05-26 DIAGNOSIS — I15.2 HYPERTENSION ASSOCIATED WITH DIABETES (HCC): ICD-10-CM

## 2021-05-26 DIAGNOSIS — E11.65 TYPE 2 DIABETES MELLITUS WITH HYPERGLYCEMIA, WITH LONG-TERM CURRENT USE OF INSULIN (HCC): Primary | ICD-10-CM

## 2021-05-26 DIAGNOSIS — Z79.4 TYPE 2 DIABETES MELLITUS WITH HYPERGLYCEMIA, WITH LONG-TERM CURRENT USE OF INSULIN (HCC): Primary | ICD-10-CM

## 2021-05-26 PROCEDURE — 99214 OFFICE O/P EST MOD 30 MIN: CPT | Performed by: INTERNAL MEDICINE

## 2021-05-26 PROCEDURE — 95251 CONT GLUC MNTR ANALYSIS I&R: CPT | Performed by: INTERNAL MEDICINE

## 2021-05-26 RX ORDER — DULAGLUTIDE 4.5 MG/.5ML
4.5 INJECTION, SOLUTION SUBCUTANEOUS WEEKLY
Qty: 4 PEN | Refills: 11 | Status: SHIPPED | OUTPATIENT
Start: 2021-05-26 | End: 2022-05-13

## 2021-05-26 RX ORDER — DULAGLUTIDE 3 MG/.5ML
3 INJECTION, SOLUTION SUBCUTANEOUS WEEKLY
Qty: 4 PEN | Refills: 0 | Status: SHIPPED | OUTPATIENT
Start: 2021-05-26 | End: 2021-11-16

## 2021-05-26 RX ORDER — SILDENAFIL 50 MG/1
50 TABLET, FILM COATED ORAL AS NEEDED
Qty: 30 TABLET | Refills: 1 | Status: SHIPPED | OUTPATIENT
Start: 2021-05-26 | End: 2021-11-16

## 2021-05-26 NOTE — PROGRESS NOTES
" Donis Yi is a 55 y.o. male who presents for  evaluation of   Type 2 diabetes        Primary Care Provider    Oksana Mares MD    55 y o t2dm since age 41 y old complicated by neuropathy improved by use of eamon and omnipod.    His symptoms include low libido, thirst and hunger       PE  /74   Pulse 83   Ht 175.3 cm (69\")   Wt 126 kg (278 lb 4.8 oz)   SpO2 97%   BMI 41.10 kg/m²   AOx3  RRR  CTA  No edema  Callus   parkinosian tremor     Lab Review    Lab Results   Component Value Date    WBC 10.5 05/11/2021    HGB 12.9 (L) 05/11/2021    HCT 38.5 (L) 05/11/2021    MCV 91.7 05/11/2021     05/11/2021     Lab Results   Component Value Date    GLUCOSE 135 (H) 05/11/2021    BUN 15 05/11/2021    CREATININE 0.8 05/11/2021    EGFRIFNONA >60 05/11/2021    EGFRIFAFRI >59 05/11/2021    BCR 21.0 02/01/2021    K 4.0 05/11/2021    CO2 28 05/11/2021    CALCIUM 9.2 05/11/2021    ALBUMIN 4.1 05/11/2021    LABIL2 CANCELED 03/29/2016    AST 20 05/11/2021    ALT 22 05/11/2021           Assessment/Plan       ICD-10-CM ICD-9-CM   1. Type 2 diabetes mellitus with hyperglycemia, with long-term current use of insulin (CMS/formerly [facility])  E11.65 250.00    Z79.4 790.29     V58.67   2. Hypertension associated with diabetes (CMS/formerly [facility])  E11.59 250.80    I15.2 401.9   3. Mixed diabetic hyperlipidemia associated with type 2 diabetes mellitus (CMS/formerly [facility])  E11.69 250.80    E78.2 272.2   4. Vitamin D deficiency  E55.9 268.9         I reviewed and summarized records from Oksana Mares MD from present year  and I reviewed / ordered labs.   From review of records :    Patient has uncontrolled type 2 diabetes    Glycemic Management:   Lab Results   Component Value Date    HGBA1C 9.1 (H) 05/11/2021    HGBA1C 11.0 (H) 02/05/2021    HGBA1C 10.83 (H) 02/01/2021       Eamon 2 weeks reviewed      Metformin 1000 mg bid      Trulicity  1.5 MG WEEKLY - escalate all the way to 4.5 mg weekly     Hesitant about jardiance since thirst and BPH "     Omnipod U 100     Basal 3 units per hour    Carb Ratio 4 , not using , start doing     Correction 10     In the past U500 but not using anymore          Lipid Management  Lab Results   Component Value Date    CHOL 142 02/01/2021    CHLPL 137 (L) 05/11/2021    TRIG 89 05/11/2021    HDL 50 (L) 05/11/2021    LDL 69 05/11/2021       lipitor 80 mg qhs     Blood Pressure Management:    Vitals:    05/26/21 0959   BP: 120/74   Pulse: 83   SpO2: 97%     Controlled             Microvascular Complication Monitoring:      Eye Exam Evaluation  No retinopathy , has cataracts   -----------    Last Microalbumin-Proteinuria Assessment    No results found for: MALBCRERATIO    No results found for: UTPCR    -----------      Neuropathy on lyrica 100 bid   And neurontin per pain management           Bone Health    Lab Results   Component Value Date    CALCIUM 9.2 05/11/2021    PHOS 2.7 05/11/2020    [id number] 59.2 02/01/2021       Thyroid Health    Lab Results   Component Value Date    TSH 1.170 05/11/2021    TSH 1.530 02/01/2021    TSH 1.620 12/22/2020           Lab Results   Component Value Date    [id number] 344 02/01/2021     Wanted viagra    No longer on imdur and nitroglycerin     Written this time but only 52 since on genvoya and flomax        No orders of the defined types were placed in this encounter.        A copy of my note was sent to Oksana Mares MD    Please see my above opinion and suggestions.

## 2021-06-01 ENCOUNTER — HOSPITAL ENCOUNTER (EMERGENCY)
Facility: HOSPITAL | Age: 56
Discharge: HOME | End: 2021-06-01
Admitting: EMERGENCY MEDICINE

## 2021-06-01 ENCOUNTER — OFFICE VISIT (OUTPATIENT)
Dept: WOUND CARE | Facility: HOSPITAL | Age: 56
End: 2021-06-01

## 2021-06-01 VITALS
BODY MASS INDEX: 41.32 KG/M2 | RESPIRATION RATE: 17 BRPM | WEIGHT: 279 LBS | SYSTOLIC BLOOD PRESSURE: 155 MMHG | OXYGEN SATURATION: 97 % | DIASTOLIC BLOOD PRESSURE: 71 MMHG | HEART RATE: 97 BPM | TEMPERATURE: 98 F | HEIGHT: 69 IN

## 2021-06-01 DIAGNOSIS — E11.621 TYPE 2 DIABETES MELLITUS WITH FOOT ULCER, UNSPECIFIED WHETHER LONG TERM INSULIN USE (HCC): ICD-10-CM

## 2021-06-01 DIAGNOSIS — L97.522 NON-PRESSURE CHRONIC ULCER OF OTHER PART OF LEFT FOOT WITH FAT LAYER EXPOSED (HCC): ICD-10-CM

## 2021-06-01 DIAGNOSIS — K21.9 GASTROESOPHAGEAL REFLUX DISEASE WITHOUT ESOPHAGITIS: Primary | ICD-10-CM

## 2021-06-01 DIAGNOSIS — L97.509 TYPE 2 DIABETES MELLITUS WITH FOOT ULCER, UNSPECIFIED WHETHER LONG TERM INSULIN USE (HCC): ICD-10-CM

## 2021-06-01 DIAGNOSIS — E66.09 OTHER OBESITY DUE TO EXCESS CALORIES: ICD-10-CM

## 2021-06-01 DIAGNOSIS — B20 HUMAN IMMUNODEFICIENCY VIRUS (HIV) DISEASE (HCC): ICD-10-CM

## 2021-06-01 LAB
ALBUMIN SERPL-MCNC: 4.1 G/DL (ref 3.5–5.2)
ALBUMIN/GLOB SERPL: 1.1 G/DL
ALP SERPL-CCNC: 77 U/L (ref 39–117)
ALT SERPL W P-5'-P-CCNC: 35 U/L (ref 1–41)
ANION GAP SERPL CALCULATED.3IONS-SCNC: 10 MMOL/L (ref 5–15)
APTT PPP: 25 SECONDS (ref 24.1–35)
AST SERPL-CCNC: 28 U/L (ref 1–40)
BASOPHILS # BLD AUTO: 0.05 10*3/MM3 (ref 0–0.2)
BASOPHILS NFR BLD AUTO: 0.5 % (ref 0–1.5)
BILIRUB SERPL-MCNC: 0.3 MG/DL (ref 0–1.2)
BUN SERPL-MCNC: 14 MG/DL (ref 6–20)
BUN/CREAT SERPL: 17.7 (ref 7–25)
CALCIUM SPEC-SCNC: 9.7 MG/DL (ref 8.6–10.5)
CHLORIDE SERPL-SCNC: 100 MMOL/L (ref 98–107)
CO2 SERPL-SCNC: 29 MMOL/L (ref 22–29)
CREAT SERPL-MCNC: 0.79 MG/DL (ref 0.76–1.27)
DEPRECATED RDW RBC AUTO: 46.1 FL (ref 37–54)
EOSINOPHIL # BLD AUTO: 0.26 10*3/MM3 (ref 0–0.4)
EOSINOPHIL NFR BLD AUTO: 2.6 % (ref 0.3–6.2)
ERYTHROCYTE [DISTWIDTH] IN BLOOD BY AUTOMATED COUNT: 13.6 % (ref 12.3–15.4)
GFR SERPL CREATININE-BSD FRML MDRD: 103 ML/MIN/1.73
GLOBULIN UR ELPH-MCNC: 3.6 GM/DL
GLUCOSE SERPL-MCNC: 207 MG/DL (ref 65–99)
HCT VFR BLD AUTO: 40.4 % (ref 37.5–51)
HGB BLD-MCNC: 13.3 G/DL (ref 13–17.7)
IMM GRANULOCYTES # BLD AUTO: 0.04 10*3/MM3 (ref 0–0.05)
IMM GRANULOCYTES NFR BLD AUTO: 0.4 % (ref 0–0.5)
INR PPP: 1 (ref 0.91–1.09)
LYMPHOCYTES # BLD AUTO: 3.09 10*3/MM3 (ref 0.7–3.1)
LYMPHOCYTES NFR BLD AUTO: 31.1 % (ref 19.6–45.3)
MCH RBC QN AUTO: 30.2 PG (ref 26.6–33)
MCHC RBC AUTO-ENTMCNC: 32.9 G/DL (ref 31.5–35.7)
MCV RBC AUTO: 91.6 FL (ref 79–97)
MONOCYTES # BLD AUTO: 0.72 10*3/MM3 (ref 0.1–0.9)
MONOCYTES NFR BLD AUTO: 7.3 % (ref 5–12)
NEUTROPHILS NFR BLD AUTO: 5.77 10*3/MM3 (ref 1.7–7)
NEUTROPHILS NFR BLD AUTO: 58.1 % (ref 42.7–76)
NRBC BLD AUTO-RTO: 0 /100 WBC (ref 0–0.2)
PLATELET # BLD AUTO: 188 10*3/MM3 (ref 140–450)
PMV BLD AUTO: 11.2 FL (ref 6–12)
POTASSIUM SERPL-SCNC: 4.2 MMOL/L (ref 3.5–5.2)
PROT SERPL-MCNC: 7.7 G/DL (ref 6–8.5)
PROTHROMBIN TIME: 12.4 SECONDS (ref 11.5–13.4)
RBC # BLD AUTO: 4.41 10*6/MM3 (ref 4.14–5.8)
SODIUM SERPL-SCNC: 139 MMOL/L (ref 136–145)
TROPONIN T SERPL-MCNC: <0.01 NG/ML (ref 0–0.03)
TROPONIN T SERPL-MCNC: <0.01 NG/ML (ref 0–0.03)
WBC # BLD AUTO: 9.93 10*3/MM3 (ref 3.4–10.8)

## 2021-06-01 PROCEDURE — 11042 DBRDMT SUBQ TIS 1ST 20SQCM/<: CPT | Performed by: NURSE PRACTITIONER

## 2021-06-01 PROCEDURE — 93005 ELECTROCARDIOGRAM TRACING: CPT | Performed by: NURSE PRACTITIONER

## 2021-06-01 PROCEDURE — 93010 ELECTROCARDIOGRAM REPORT: CPT | Performed by: INTERNAL MEDICINE

## 2021-06-01 PROCEDURE — 84484 ASSAY OF TROPONIN QUANT: CPT | Performed by: NURSE PRACTITIONER

## 2021-06-01 PROCEDURE — 80053 COMPREHEN METABOLIC PANEL: CPT | Performed by: NURSE PRACTITIONER

## 2021-06-01 PROCEDURE — 63710000001 ONDANSETRON ODT 4 MG TABLET DISPERSIBLE: Performed by: NURSE PRACTITIONER

## 2021-06-01 PROCEDURE — 85025 COMPLETE CBC W/AUTO DIFF WBC: CPT | Performed by: NURSE PRACTITIONER

## 2021-06-01 PROCEDURE — 85610 PROTHROMBIN TIME: CPT | Performed by: NURSE PRACTITIONER

## 2021-06-01 PROCEDURE — 99283 EMERGENCY DEPT VISIT LOW MDM: CPT

## 2021-06-01 PROCEDURE — 85730 THROMBOPLASTIN TIME PARTIAL: CPT | Performed by: NURSE PRACTITIONER

## 2021-06-01 RX ORDER — SUCRALFATE ORAL 1 G/10ML
1 SUSPENSION ORAL 3 TIMES DAILY
Qty: 420 ML | Refills: 0 | Status: SHIPPED | OUTPATIENT
Start: 2021-06-01 | End: 2021-11-16

## 2021-06-01 RX ORDER — ONDANSETRON 4 MG/1
4 TABLET, ORALLY DISINTEGRATING ORAL ONCE
Status: COMPLETED | OUTPATIENT
Start: 2021-06-01 | End: 2021-06-01

## 2021-06-01 RX ORDER — ALUMINA, MAGNESIA, AND SIMETHICONE 2400; 2400; 240 MG/30ML; MG/30ML; MG/30ML
15 SUSPENSION ORAL ONCE
Status: COMPLETED | OUTPATIENT
Start: 2021-06-01 | End: 2021-06-01

## 2021-06-01 RX ORDER — LIDOCAINE HYDROCHLORIDE 20 MG/ML
15 SOLUTION OROPHARYNGEAL ONCE
Status: COMPLETED | OUTPATIENT
Start: 2021-06-01 | End: 2021-06-01

## 2021-06-01 RX ORDER — ONDANSETRON 4 MG/1
4 TABLET, ORALLY DISINTEGRATING ORAL EVERY 6 HOURS PRN
Qty: 12 TABLET | Refills: 0 | Status: SHIPPED | OUTPATIENT
Start: 2021-06-01

## 2021-06-01 RX ADMIN — LIDOCAINE HYDROCHLORIDE 15 ML: 20 SOLUTION ORAL; TOPICAL at 18:08

## 2021-06-01 RX ADMIN — ONDANSETRON 4 MG: 4 TABLET, ORALLY DISINTEGRATING ORAL at 18:08

## 2021-06-01 RX ADMIN — ALUMINUM HYDROXIDE, MAGNESIUM HYDROXIDE, AND DIMETHICONE 15 ML: 400; 400; 40 SUSPENSION ORAL at 18:08

## 2021-06-01 NOTE — ED TRIAGE NOTES
"PATIENT PRESENTS WITH CC OF GERD. PATIENT REPORTS THAT HE HAS BEEN HAVING SOME INCREASED REFLEX X3 DAYS. PATIENT REPORTS THAT HE WAS BEING SEEN BY HIS WOUND CARE NURSE AND SHE SUGGESTED THAT HE COME TO THE ER FOR EVALUATION BECAUSE HE COULD HAVE AN \"BLEEDING ULCER\". PATIENT DENIES COFFEE GROUND EMESIS OR DARK TARRY STOOLS.    "

## 2021-06-01 NOTE — ED PROVIDER NOTES
Subjective   Patient is a 55-year-old male with history significant for allergic rhinitis, anxiety, bursitis, degenerative disc disease, depression, HIV, hyperlipidemia, HSP, hypertension, migraine, MI, osteoporosis, sleep apnea, type 2 diabetes, GERD.  Patient states that for the past 3 days he has been suffering from worsening reflux.  He states today while he was at wound care his blood pressure was noted to be slightly elevated and his heart rate was up.  He admitted to the wound care staff he had been experiencing worsening heartburn describes having burning to his epigastric region that radiates into his throat.  Patient is on Dexilant for treatment of his reflux.  Patient admits he has been taking Mobic for the past several weeks due to his shoulder injury.  He has had nausea with episodes of vomiting.  Due to symptoms described he came to the ER for evaluation and treatment.          Review of Systems   Constitutional: Negative.  Negative for fever.   HENT: Negative.  Negative for congestion.    Eyes: Negative.    Respiratory: Negative for cough and shortness of breath.    Cardiovascular: Positive for chest pain.        Reports burning chest   Gastrointestinal: Positive for abdominal pain, nausea and vomiting.   Genitourinary: Negative.    Musculoskeletal: Negative.    Skin: Negative.    All other systems reviewed and are negative.      Past Medical History:   Diagnosis Date   • Allergic rhinitis    • Anxiety    • Bursitis    • DDD (degenerative disc disease), cervical    • Depression    • HIV (human immunodeficiency virus infection) (CMS/[facility])    • HIV disease (CMS/[facility])    • HLD (hyperlipidemia)    • HSP (Henoch Schonlein purpura) (CMS/[facility])    • HTN (hypertension)    • Migraine    • Myocardial infarction (CMS/[facility])    • Osteoporosis    • Sleep apnea    • Type 2 diabetes mellitus (CMS/[facility])        Allergies   Allergen Reactions   • Amitriptyline Anaphylaxis   • Amitriptyline Hcl Anaphylaxis   • Darvon  [Propoxyphene] Anaphylaxis   • Zithromax [Azithromycin] Anaphylaxis       Past Surgical History:   Procedure Laterality Date   • ANKLE SURGERY     • CARDIAC CATHETERIZATION N/A 6/24/2020    Procedure: Coronary angiography;  Surgeon: Deon Bailey MD;  Location:  [facility]ATH [facility];  Service: Cardiology;  Laterality: N/A;  11am start time   • CARDIAC CATHETERIZATION N/A 6/24/2020    Procedure: Percutaneous Coronary Intervention;  Surgeon: Deon Bailey MD;  Location:  [facility] CATH INVASIVE [facility];  Service: Cardiology;  Laterality: N/A;   • COLONOSCOPY  06/19/2009    Normal exam repeat in 10 years   • FOREARM SURGERY     • LYMPH NODE BIOPSY     • PILONIDAL CYSTECTOMY N/A 12/1/2020    Procedure: EXCISION PILONIDAL ABSCESS;  Surgeon: Ashlyn Posey MD;  Location:  [facility] OR;  Service: General;  Laterality: N/A;       Family History   Problem Relation Age of Onset   • Diabetes Mother    • Hypertension Mother    • Thyroid disease Mother    • Hypertension Father    • Thyroid disease Father    • Arrhythmia Father    • Diabetes Maternal Grandfather    • Heart disease Maternal Grandfather    • Hypertension Maternal Grandfather    • Diabetes Paternal Grandmother    • Stroke Paternal Grandmother    • Heart disease Paternal Grandmother    • Hypertension Paternal Grandmother    • Thyroid disease Paternal Grandmother    • Hypertension Paternal Grandfather    • Hypertension Sister    • No Known Problems Brother        Social History     Socioeconomic History   • Marital status:      Spouse name: Not on file   • Number of children: Not on file   • Years of education: Not on file   • Highest education level: Not on file   Tobacco Use   • Smoking status: Current Every Day Smoker     Packs/day: 0.50     Types: Cigarettes     Start date: 1988   • Smokeless tobacco: Never Used   Substance and Sexual Activity   • Alcohol use: Yes     Comment: occasionally   • Drug use: No   • Sexual activity: Defer            Objective   Physical Exam  Vitals and nursing note reviewed.   Constitutional:       General: He is not in acute distress.     Appearance: He is well-developed. He is not diaphoretic.   HENT:      Head: Atraumatic.      Right Ear: External ear normal.      Left Ear: External ear normal.      Nose: Nose normal.      Mouth/Throat:      Mouth: Mucous membranes are moist.      Pharynx: Oropharynx is clear.   Eyes:      General: No scleral icterus.     Extraocular Movements: Extraocular movements intact.      Conjunctiva/sclera: Conjunctivae normal.      Pupils: Pupils are equal, round, and reactive to light.   Neck:      Thyroid: No thyromegaly.      Vascular: No JVD.   Cardiovascular:      Rate and Rhythm: Normal rate and regular rhythm.      Heart sounds: Normal heart sounds. No murmur heard.     Pulmonary:      Effort: Pulmonary effort is normal. No respiratory distress.      Breath sounds: Normal breath sounds. No wheezing or rales.   Chest:      Chest wall: No tenderness.   Abdominal:      General: Bowel sounds are normal. There is no distension.      Palpations: Abdomen is soft. There is no mass.      Tenderness: There is no abdominal tenderness. There is no guarding or rebound.   Musculoskeletal:         General: Normal range of motion.      Cervical back: Normal range of motion and neck supple.   Lymphadenopathy:      Cervical: No cervical adenopathy.   Skin:     General: Skin is warm and dry.      Capillary Refill: Capillary refill takes less than 2 seconds.      Coloration: Skin is not pale.      Findings: No erythema or rash.   Neurological:      General: No focal deficit present.      Mental Status: He is alert and oriented to person, place, and time.      Cranial Nerves: No cranial nerve deficit.      Coordination: Coordination normal.      Deep Tendon Reflexes: Reflexes are normal and symmetric.   Psychiatric:         Mood and Affect: Mood normal.         Behavior: Behavior normal.          Thought Content: Thought content normal.         Judgment: Judgment normal.         Procedures           ED Course  ED Course as of Jun 01 2339   Tue Jun 01, 2021 2058 2 sets of cardiac markers are negative.  CBC and CMP are both unremarkable.  Patient reports GI cocktail helped for a short period of time and then slight burning returned however not to the same intensity.  He is on max dose of Dexilant.  We will prescribe Carafate and recommend follow-up with GI for further evaluation.  Patient expressed understanding.  He denies any chest pain on reevaluation.    [TW]      ED Course User Index  [TW] Beverly Molina APRN                                         HEART Score (for prediction of 6-week risk of major adverse cardiac event) reviewed and/or performed as part of the patient evaluation and treatment planning process.  The result associated with this review/performance is: 3       MDM  Number of Diagnoses or Management Options  Gastroesophageal reflux disease without esophagitis: new and requires workup     Amount and/or Complexity of Data Reviewed  Clinical lab tests: ordered and reviewed  Tests in the radiology section of CPT®: ordered and reviewed  Discuss the patient with other providers: yes    Risk of Complications, Morbidity, and/or Mortality  Presenting problems: moderate  Diagnostic procedures: moderate  Management options: moderate    Patient Progress  Patient progress: improved      Final diagnoses:   Gastroesophageal reflux disease without esophagitis       ED Disposition  ED Disposition     ED Disposition Condition Comment    Discharge Good           No follow-up provider specified.       Medication List      New Prescriptions    ondansetron ODT 4 MG disintegrating tablet  Commonly known as: ZOFRAN-ODT  Place 1 tablet on the tongue Every 6 (Six) Hours As Needed for Nausea.     sucralfate 1 GM/10ML suspension  Commonly known as: CARAFATE  Take 10 mL by mouth 3 (Three) Times a Day.         Changed    fluticasone 50 MCG/ACT nasal spray  Commonly known as: FLONASE  2 sprays into each nostril Daily.  What changed:   · when to take this  · reasons to take this           Where to Get Your Medications      These medications were sent to [facility] - [place], [place] - [address] - [phone]  - [phone]   [address] [address]    Phone: [phone]   · ondansetron ODT 4 MG disintegrating tablet  · sucralfate 1 GM/10ML suspension          Beverly Molina, APRN  06/01/21 7553

## 2021-06-02 LAB
QT INTERVAL: 376 MS
QT INTERVAL: 384 MS
QTC INTERVAL: 492 MS
QTC INTERVAL: 495 MS

## 2021-06-02 RX ORDER — MELOXICAM 15 MG/1
15 TABLET ORAL DAILY
COMMUNITY
End: 2021-07-01 | Stop reason: ALTCHOICE

## 2021-06-08 ENCOUNTER — OFFICE VISIT (OUTPATIENT)
Dept: WOUND CARE | Facility: HOSPITAL | Age: 56
End: 2021-06-08

## 2021-06-08 DIAGNOSIS — L97.522 NON-PRESSURE CHRONIC ULCER OF OTHER PART OF LEFT FOOT WITH FAT LAYER EXPOSED (HCC): ICD-10-CM

## 2021-06-08 DIAGNOSIS — E66.09 OTHER OBESITY DUE TO EXCESS CALORIES: ICD-10-CM

## 2021-06-08 DIAGNOSIS — B20 HUMAN IMMUNODEFICIENCY VIRUS (HIV) DISEASE (HCC): ICD-10-CM

## 2021-06-08 DIAGNOSIS — L97.509 TYPE 2 DIABETES MELLITUS WITH FOOT ULCER, UNSPECIFIED WHETHER LONG TERM INSULIN USE (HCC): ICD-10-CM

## 2021-06-08 DIAGNOSIS — E11.621 TYPE 2 DIABETES MELLITUS WITH FOOT ULCER, UNSPECIFIED WHETHER LONG TERM INSULIN USE (HCC): ICD-10-CM

## 2021-06-08 PROCEDURE — 97597 DBRDMT OPN WND 1ST 20 CM/<: CPT | Performed by: NURSE PRACTITIONER

## 2021-06-16 ENCOUNTER — APPOINTMENT (OUTPATIENT)
Dept: WOUND CARE | Facility: HOSPITAL | Age: 56
End: 2021-06-16

## 2021-06-18 ENCOUNTER — OFFICE VISIT (OUTPATIENT)
Dept: WOUND CARE | Facility: HOSPITAL | Age: 56
End: 2021-06-18

## 2021-06-18 PROCEDURE — G0463 HOSPITAL OUTPT CLINIC VISIT: HCPCS

## 2021-07-01 ENCOUNTER — TELEPHONE (OUTPATIENT)
Dept: INTERNAL MEDICINE | Age: 56
End: 2021-07-01

## 2021-07-01 NOTE — TELEPHONE ENCOUNTER
He has already stopped the Mobic, but has been taking BC Aspirin. He will stop taking those and see if that helps.

## 2021-07-01 NOTE — TELEPHONE ENCOUNTER
His reflux has gotten worse. He takes Dexilant once daily.   He has tried Protonix, Prevacid and Nexium in the past.  Please advise

## 2021-07-01 NOTE — TELEPHONE ENCOUNTER
Stop mobic --also stop any other NSAID do not take Advil Aleve ibuprofen. Stay on the [address] [place] [address] and let us see if stopping the Mobic helps I think it is probably more that we need to stop the medicine.   Let us know how he is in about 2 weeks after stopping Mobic

## 2021-07-07 ENCOUNTER — DOCUMENTATION (OUTPATIENT)
Dept: ENDOCRINOLOGY | Facility: CLINIC | Age: 56
End: 2021-07-07

## 2021-07-15 RX ORDER — LORATADINE 10 MG/1
TABLET ORAL
Qty: 30 TABLET | Refills: 5 | Status: SHIPPED | OUTPATIENT
Start: 2021-07-15 | End: 2021-12-30

## 2021-07-15 RX ORDER — MECLIZINE HYDROCHLORIDE 25 MG/1
TABLET ORAL
Qty: 90 TABLET | Refills: 0 | Status: SHIPPED | OUTPATIENT
Start: 2021-07-15 | End: 2021-10-07

## 2021-08-05 ENCOUNTER — OFFICE VISIT (OUTPATIENT)
Dept: GASTROENTEROLOGY | Facility: CLINIC | Age: 56
End: 2021-08-05

## 2021-08-05 VITALS
OXYGEN SATURATION: 97 % | HEART RATE: 98 BPM | BODY MASS INDEX: 38.5 KG/M2 | WEIGHT: 275 LBS | SYSTOLIC BLOOD PRESSURE: 128 MMHG | TEMPERATURE: 97.2 F | HEIGHT: 71 IN | DIASTOLIC BLOOD PRESSURE: 80 MMHG

## 2021-08-05 DIAGNOSIS — E11.9 CONTROLLED TYPE 2 DIABETES MELLITUS WITHOUT COMPLICATION, WITH LONG-TERM CURRENT USE OF INSULIN (HCC): ICD-10-CM

## 2021-08-05 DIAGNOSIS — Z12.11 ENCOUNTER FOR SCREENING FOR MALIGNANT NEOPLASM OF COLON: Primary | ICD-10-CM

## 2021-08-05 DIAGNOSIS — Z78.9 NONSMOKER: ICD-10-CM

## 2021-08-05 DIAGNOSIS — E66.9 OBESITY, UNSPECIFIED OBESITY SEVERITY, UNSPECIFIED OBESITY TYPE: ICD-10-CM

## 2021-08-05 DIAGNOSIS — R10.13 DYSPEPSIA: ICD-10-CM

## 2021-08-05 DIAGNOSIS — Z79.4 CONTROLLED TYPE 2 DIABETES MELLITUS WITHOUT COMPLICATION, WITH LONG-TERM CURRENT USE OF INSULIN (HCC): ICD-10-CM

## 2021-08-05 DIAGNOSIS — I10 HTN (HYPERTENSION), BENIGN: ICD-10-CM

## 2021-08-05 PROCEDURE — 99214 OFFICE O/P EST MOD 30 MIN: CPT | Performed by: CLINICAL NURSE SPECIALIST

## 2021-08-05 NOTE — H&P (VIEW-ONLY)
Donis Yi  1965 8/5/2021  Chief Complaint   Patient presents with   • GI Problem     Reflux and needs to schedule colonoscopy     Subjective   HPI  Donis Yi is a 55 y.o. male who presents as a referral for preventative maintenance. He has no complaints of nausea or vomiting. No change in bowels. No wt loss. No BRBPR. No melena. There is NO family hx for colon cancer. No abdominal pain.  He has reflux having to take Dexilant daily and he does have breakthrough a few times per week with burning and indigestion moderate for him. No dysphagia. No nausea or vomiting. No wt loss. No upper epigastric pain. No certain triggers.  No melena. He has not had Endoscopy.   Past Medical History:   Diagnosis Date   • Allergic rhinitis    • Anxiety    • Bursitis    • DDD (degenerative disc disease), cervical    • Depression    • HIV (human immunodeficiency virus infection) (CMS/[facility])    • HIV disease (CMS/HCC)    • HLD (hyperlipidemia)    • HSP (Henoch Schonlein purpura) (CMS/[facility])    • HTN (hypertension)    • Migraine    • Myocardial infarction (CMS/H[facility])    • Osteoporosis    • Sleep apnea    • Type 2 diabetes mellitus (CMS/HCC)      Past Surgical History:   Procedure Laterality Date   • ANKLE SURGERY     • CARDIAC CATHETERIZATION N/A 6/24/2020    Procedure: Coronary angiography;  Surgeon: Deon Bailey MD;  Location:  [facility] CATH [facility];  Service: Cardiology;  Laterality: N/A;  11am start time   • CARDIAC CATHETERIZATION N/A 6/24/2020    Procedure: Percutaneous Coronary Intervention;  Surgeon: Deon Bailey MD;  Location:  [facility] CATH [facility];  Service: Cardiology;  Laterality: N/A;   • COLONOSCOPY  06/19/2009    Normal exam repeat in 10 years   • FOREARM SURGERY     • LYMPH NODE BIOPSY     • PILONIDAL CYSTECTOMY N/A 12/1/2020    Procedure: EXCISION PILONIDAL ABSCESS;  Surgeon: Ashlyn Posey MD;  Location: [facility] OR;  Service: General;  Laterality: N/A;     Outpatient  Medications Marked as Taking for the 8/5/21 encounter (Office Visit) with Elda Wilson APRN   Medication Sig Dispense Refill   • albuterol (PROVENTIL HFA;VENTOLIN HFA) 108 (90 BASE) MCG/ACT inhaler Inhale 2 puffs every 6 (six) hours as needed for wheezing.     • ARIPiprazole (ABILIFY) 5 MG tablet Take 5 mg by mouth Daily.     • aspirin 81 MG EC tablet Take 1 tablet by mouth Daily. 90 tablet 3   • atorvastatin (LIPITOR) 40 MG tablet Take 40 mg by mouth Daily.  3   • calcium carbonate (OS-THEODORA) 600 MG tablet Take 600 mg by mouth Daily.     • Cholecalciferol (VITAMIN D3) 50 MCG (2000 UT) capsule Take 2,000 Units by mouth Daily.     • ciclopirox (LOPROX) 0.77 % cream Apply  topically to the appropriate area as directed 2 (Two) Times a Day. 30 g 5   • clonazePAM (KlonoPIN) 0.5 MG tablet Take 0.5 mg by mouth 2 (Two) Times a Day.     • clotrimazole-betamethasone (LOTRISONE) 1-0.05 % cream Apply  topically to the appropriate area as directed 2 (Two) Times a Day. 15 g 1   • denosumab (PROLIA) 60 MG/ML solution syringe Inject 60 mg under the skin Every 6 (Six) Months.     • DEXILANT 60 MG capsule Take 60 mg by mouth Daily.  1   • Dulaglutide (Trulicity) 3 MG/0.5ML solution pen-injector Inject 3 mg under the skin into the appropriate area as directed 1 (One) Time Per Week. After 4 weeks increase to 4.5 mg 4 pen 0   • Dulaglutide (Trulicity) 4.5 MG/0.5ML solution pen-injector Inject 4.5 mg under the skin into the appropriate area as directed 1 (One) Time Per Week. Start after 1 month of 3 mg 4 pen 11   • Peseejh-Qvmxv-Ofybnrcv-TenofAF (GENVOYA) 127-364-659-10 MG tablet Take 1 tablet by mouth daily.     • fluticasone (FLONASE) 50 MCG/ACT nasal spray 2 sprays into each nostril Daily. (Patient taking differently: 2 sprays into the nostril(s) as directed by provider Daily As Needed for Rhinitis.) 1 bottle 6   • gabapentin (NEURONTIN) 800 MG tablet 800 mg 3 (Three) Times a Day.     • guaiFENesin (MUCINEX) 600 MG 12 hr tablet  "Take 600 mg by mouth 2 (two) times a day as needed for cough.     • HYDROcodone-acetaminophen (NORCO) 7.5-325 MG per tablet Take 1 tablet by mouth Every 4 (Four) Hours As Needed for Moderate Pain  (Pain). 30 tablet 0   • insulin aspart (novoLOG FLEXPEN) 100 UNIT/ML solution pen-injector sc pen Up to 50 w meals 15 pen 11   • insulin aspart (novoLOG) 100 UNIT/ML injection Up to 150 units daily through pump 5 each 11   • insulin regular (HUMULIN R) 500 UNIT/ML CONCENTRATED injection Inject subcutaneously  up to 0.4 ml w bkfast, lunch and supper and up to 0.2 ml at night 40 mL 11   • Insulin Syringe 31G X 5/16\" 1 ML misc Use 4 times daily  , ICD10 code is E11.9 120 each 11   • loperamide (IMODIUM) 2 MG capsule Take 2 mg by mouth 4 (Four) Times a Day As Needed for Diarrhea.     • loratadine (CLARITIN) 10 MG tablet Take 10 mg by mouth daily.     • losartan (COZAAR) 25 MG tablet Take 1 tablet by mouth Daily. 30 tablet 0   • LYRICA 100 MG capsule Take 100 mg by mouth 2 (Two) Times a Day.     • meclizine (ANTIVERT) 25 MG tablet Take 25 mg by mouth Daily.     • Melatonin 5 MG capsule Take 5 mg by mouth every night at bedtime.  3   • metFORMIN (GLUCOPHAGE) 500 MG tablet Take 1,000 mg by mouth 2 (Two) Times a Day With Meals.  3   • neomycin-bacitracin-polymyxin (NEOSPORIN) 5-400-5000 ointment Apply 1 application topically to the appropriate area as directed 4 (Four) Times a Day As Needed for Irritation.     • nystatin (MYCOSTATIN) 542850 UNIT/GM ointment Apply  topically to the appropriate area as directed 2 (Two) Times a Day.     • ondansetron ODT (ZOFRAN-ODT) 4 MG disintegrating tablet Place 1 tablet on the tongue Every 6 (Six) Hours As Needed for Nausea. 12 tablet 0   • promethazine (PHENERGAN) 25 MG tablet Take 25 mg by mouth every 6 (six) hours as needed for nausea or vomiting.     • propranolol LA (INDERAL LA) 80 MG 24 hr capsule Take 80 mg by mouth Daily.  5   • rizatriptan (MAXALT) 10 MG tablet Take 10 mg by mouth 1 " (one) time as needed for migraine. May repeat in 2 hours if needed     • sildenafil (Viagra) 50 MG tablet Take 1 tablet by mouth As Needed for Erectile Dysfunction. 30 tablet 1   • sucralfate (CARAFATE) 1 GM/10ML suspension Take 10 mL by mouth 3 (Three) Times a Day. 420 mL 0   • tamsulosin (FLOMAX) 0.4 MG capsule 24 hr capsule Take 2 capsules by mouth every night at bedtime. 180 capsule 3   • tiZANidine (ZANAFLEX) 4 MG tablet Take 4 mg by mouth Daily As Needed for Muscle Spasms.     • traZODone (DESYREL) 100 MG tablet Take 200 mg by mouth Every Night.  3   • varenicline (Chantix Continuing Month Maryse) 1 MG tablet Take 1 tablet by mouth Daily. 30 tablet 0   • varenicline (CHANTIX MARYSE) 0.5 MG X 11 & 1 MG X 42 tablet Take 0.5 mg one daily on days 1-3 and and 0.5 mg twice daily on days 4-7.Then 1 mg twice daily for a total of 12 weeks. 53 tablet 0   • Vortioxetine HBr (Trintellix) 20 MG tablet Take 20 mg by mouth Daily With Breakfast.       Allergies   Allergen Reactions   • Amitriptyline Anaphylaxis   • Amitriptyline Hcl Anaphylaxis   • Darvon [Propoxyphene] Anaphylaxis   • Zithromax [Azithromycin] Anaphylaxis     Social History     Socioeconomic History   • Marital status:      Spouse name: Not on file   • Number of children: Not on file   • Years of education: Not on file   • Highest education level: Not on file   Tobacco Use   • Smoking status: Current Every Day Smoker     Packs/day: 0.50     Types: Cigarettes     Start date: 1988   • Smokeless tobacco: Never Used   Substance and Sexual Activity   • Alcohol use: Yes     Comment: occasionally   • Drug use: No   • Sexual activity: Defer     Family History   Problem Relation Age of Onset   • Diabetes Mother    • Hypertension Mother    • Thyroid disease Mother    • Hypertension Father    • Thyroid disease Father    • Arrhythmia Father    • Diabetes Maternal Grandfather    • Heart disease Maternal Grandfather    • Hypertension Maternal Grandfather    • Diabetes  "Paternal Grandmother    • Stroke Paternal Grandmother    • Heart disease Paternal Grandmother    • Hypertension Paternal Grandmother    • Thyroid disease Paternal Grandmother    • Hypertension Paternal Grandfather    • Hypertension Sister    • No Known Problems Brother    • Colon cancer Neg Hx    • Colon polyps Neg Hx      Health Maintenance   Topic Date Due   • Pneumococcal Vaccine 0-64 (1 of 4 - PCV13) Never done   • Hepatitis B (1 of 3 - Risk 3-dose series) Never done   • ANNUAL WELLNESS VISIT  Never done   • DIABETIC EYE EXAM  Never done   • DXA SCAN  06/12/2017   • COLORECTAL CANCER SCREENING  06/19/2019   • URINE MICROALBUMIN  11/08/2019   • DIABETIC FOOT EXAM  02/11/2021   • INFLUENZA VACCINE  10/01/2021   • HEMOGLOBIN A1C  11/11/2021   • LIPID PANEL  05/11/2022   • TDAP/TD VACCINES (3 - Td or Tdap) 06/03/2029   • HEPATITIS C SCREENING  Completed   • COVID-19 Vaccine  Completed   • ZOSTER VACCINE  Completed       REVIEW OF SYSTEMS  General: well appearing, no fever chills or sweats, no unexplained wt loss  HEENT: no acute visual or hearing disturbances  Cardiovascular: No chest pain or palpitations  Pulmonary: No shortness of breath, coughing, wheezing or hemoptysis  : No burning, urgency, hematuria, or dysuria  GI: reflux/indigestion  Musculoskeletal: No joint pain or stiffness  Peripheral: no edema  Skin: No lesions or rashes  Neuro: No dizziness, headaches, stroke, syncope  Endocrine: No hot or cold intolerances  Hematological: No blood dyscrasias    Objective   Vitals:    08/05/21 1259   BP: 128/80   Pulse: 98   Temp: 97.2 °F (36.2 °C)   SpO2: 97%   Weight: 125 kg (275 lb)   Height: 179.1 cm (70.5\")     Body mass index is 38.9 kg/m².  Patient's Body mass index is 38.9 kg/m². indicating that he is morbidly obese (BMI > 40 or > 35 with obesity - related health condition). Obesity-related health conditions include the following: hypertension and diabetes mellitus. Obesity is unchanged. BMI is is above " average; BMI management plan is completed. We discussed portion control and increasing exercise..      PHYSICAL EXAM  General: age appropriate well nourished well appearing, no acute distress  Head: normocephalic and atraumatic  Global assessment-supple  Neck-No JVD noted, no lymphadenopathy  Pulmonary-clear to auscultation bilaterally, normal respiratory effort  Cardiovascular-normal rate and rhythm, normal heart sounds, S1 and S2 noted  Abdomen-soft, non tender, non distended, normal bowel sounds all 4 quadrants, no hepatosplenomegaly noted  Extremities-No clubbing cyanosis or edema  Neuro-Non focal, converses appropriately, awake, alert, oriented    Assessment/Plan     Diagnoses and all orders for this visit:    1. Encounter for screening for malignant neoplasm of colon (Primary)  -     polyethylene glycol (GoLYTELY) 236 g solution; Take as directed by office instructions.  Dispense: 4000 mL; Refill: 0  -     Case Request; Standing  -     Follow Anesthesia Guidelines / Standing Orders; Future  -     Obtain Informed Consent; Future  -     Implement Anesthesia Orders Day of Procedure; Standing  -     Obtain Informed Consent; Standing  -     Verify Bowel Prep Was Successful; Standing  -     Case Request    2. Controlled type 2 diabetes mellitus without complication, with long-term current use of insulin (CMS/[facility])  Comments:  Hold the am of procedure    3. Nonsmoker    4. Obesity, unspecified obesity severity, unspecified obesity type    5. HTN (hypertension), benign  Comments:  cont BP medication the day of procedure    6. Dyspepsia    I discussed non pharmaceutical treatment of gerd.  This includes gradually losing weight to achieve ideal body wt., elevation of the head of bed by 4-6 inches, nothing to eat or drink 3 hours prior to lying down, avoiding tight clothing, stress reduction, tobacco cessation, reduction of alcohol intake, and dietary restrictions (avoiding caffeine, coffee, fatty foods, mints, chocolate,  spicy foods and tomato based sauces as much as possible).    Add Gaviscon as needed    ESOPHAGOGASTRODUODENOSCOPY WITH ANESTHESIA (N/A), COLONOSCOPY WITH ANESTHESIA (N/A)  Body mass index is 38.9 kg/m².    Patient instructions on prep prior to procedure provided to the patient.    All risks, benefits, alternatives, and indications of colonoscopy procedure have been discussed with the patient. Risks to include perforation of the colon requiring possible surgery or colostomy, risk of bleeding from biopsies or removal of colon tissue, possibility of missing a colon polyp or cancer, or adverse drug reaction.  Benefits to include the diagnosis and management of disease of the colon and rectum. Alternatives to include barium enema, radiographic evaluation, lab testing or no intervention. Pt verbalizes understanding and agrees.     Elda Wilson, APRN  2021  13:41 CDT      IF YOU SMOKE OR USE TOBACCO PLEASE READ THE FOLLOWIN minutes reading provided    Why is smoking bad for me?  Smoking increases the risk of heart disease, lung disease, vascular disease, stroke, and cancer.     If you smoke, STOP!    If you would like more information on quitting smoking, please visit the [employer] website: [email or web address]   This link will provide additional resources including the QUIT line and the Beat the Pack support groups.     For more information:    Quit Now [place]  QUIT-NOW  [email or web address]

## 2021-08-05 NOTE — PROGRESS NOTES
Donis Yi  1965 8/5/2021  Chief Complaint   Patient presents with   • GI Problem     Reflux and needs to schedule colonoscopy     Subjective   HPI  Donis Yi is a 55 y.o. male who presents as a referral for preventative maintenance. He has no complaints of nausea or vomiting. No change in bowels. No wt loss. No BRBPR. No melena. There is NO family hx for colon cancer. No abdominal pain.  He has reflux having to take Dexilant daily and he does have breakthrough a few times per week with burning and indigestion moderate for him. No dysphagia. No nausea or vomiting. No wt loss. No upper epigastric pain. No certain triggers.  No melena. He has not had Endoscopy.   Past Medical History:   Diagnosis Date   • Allergic rhinitis    • Anxiety    • Bursitis    • DDD (degenerative disc disease), cervical    • Depression    • HIV (human immunodeficiency virus infection) (CMS/HCC)    • HIV disease (CMS/HCC)    • HLD (hyperlipidemia)    • HSP (Henoch Schonlein purpura) (CMS/[facility])    • HTN (hypertension)    • Migraine    • Myocardial infarction (CMS/HCC)    • Osteoporosis    • Sleep apnea    • Type 2 diabetes mellitus (CMS/HCC)      Past Surgical History:   Procedure Laterality Date   • ANKLE SURGERY     • CARDIAC CATHETERIZATION N/A 6/24/2020    Procedure: Coronary angiography;  Surgeon: Deon Bailey MD;  Location:  [facility]ATH [facility]AS[facility]ATION;  Service: Cardiology;  Laterality: N/A;  11am start time   • CARDIAC CATHETERIZATION N/A 6/24/2020    Procedure: Percutaneous Coronary Intervention;  Surgeon: Deon Bailey MD;  Location:  [facility] CATH [facility];  Service: Cardiology;  Laterality: N/A;   • COLONOSCOPY  06/19/2009    Normal exam repeat in 10 years   • FOREARM SURGERY     • LYMPH NODE BIOPSY     • PILONIDAL CYSTECTOMY N/A 12/1/2020    Procedure: EXCISION PILONIDAL ABSCESS;  Surgeon: Ashlyn Posey MD;  Location[facility] OR;  Service: General;  Laterality: N/A;     Outpatient  Medications Marked as Taking for the 8/5/21 encounter (Office Visit) with Elda Wilson APRN   Medication Sig Dispense Refill   • albuterol (PROVENTIL HFA;VENTOLIN HFA) 108 (90 BASE) MCG/ACT inhaler Inhale 2 puffs every 6 (six) hours as needed for wheezing.     • ARIPiprazole (ABILIFY) 5 MG tablet Take 5 mg by mouth Daily.     • aspirin 81 MG EC tablet Take 1 tablet by mouth Daily. 90 tablet 3   • atorvastatin (LIPITOR) 40 MG tablet Take 40 mg by mouth Daily.  3   • calcium carbonate (OS-THEODORA) 600 MG tablet Take 600 mg by mouth Daily.     • Cholecalciferol (VITAMIN D3) 50 MCG (2000 UT) capsule Take 2,000 Units by mouth Daily.     • ciclopirox (LOPROX) 0.77 % cream Apply  topically to the appropriate area as directed 2 (Two) Times a Day. 30 g 5   • clonazePAM (KlonoPIN) 0.5 MG tablet Take 0.5 mg by mouth 2 (Two) Times a Day.     • clotrimazole-betamethasone (LOTRISONE) 1-0.05 % cream Apply  topically to the appropriate area as directed 2 (Two) Times a Day. 15 g 1   • denosumab (PROLIA) 60 MG/ML solution syringe Inject 60 mg under the skin Every 6 (Six) Months.     • DEXILANT 60 MG capsule Take 60 mg by mouth Daily.  1   • Dulaglutide (Trulicity) 3 MG/0.5ML solution pen-injector Inject 3 mg under the skin into the appropriate area as directed 1 (One) Time Per Week. After 4 weeks increase to 4.5 mg 4 pen 0   • Dulaglutide (Trulicity) 4.5 MG/0.5ML solution pen-injector Inject 4.5 mg under the skin into the appropriate area as directed 1 (One) Time Per Week. Start after 1 month of 3 mg 4 pen 11   • Ljldesg-Ktdme-Psqovhda-TenofAF (GENVOYA) [id number] MG tablet Take 1 tablet by mouth daily.     • fluticasone (FLONASE) 50 MCG/ACT nasal spray 2 sprays into each nostril Daily. (Patient taking differently: 2 sprays into the nostril(s) as directed by provider Daily As Needed for Rhinitis.) 1 bottle 6   • gabapentin (NEURONTIN) 800 MG tablet 800 mg 3 (Three) Times a Day.     • guaiFENesin (MUCINEX) 600 MG 12 hr tablet  "Take 600 mg by mouth 2 (two) times a day as needed for cough.     • HYDROcodone-acetaminophen (NORCO) 7.5-325 MG per tablet Take 1 tablet by mouth Every 4 (Four) Hours As Needed for Moderate Pain  (Pain). 30 tablet 0   • insulin aspart (novoLOG FLEXPEN) 100 UNIT/ML solution pen-injector sc pen Up to 50 w meals 15 pen 11   • insulin aspart (novoLOG) 100 UNIT/ML injection Up to 150 units daily through pump 5 each 11   • insulin regular (HUMULIN R) 500 UNIT/ML CONCENTRATED injection Inject subcutaneously  up to 0.4 ml w bkfast, lunch and supper and up to 0.2 ml at night 40 mL 11   • Insulin Syringe 31G X 5/16\" 1 ML misc Use 4 times daily  , ICD10 code is E11.9 120 each 11   • loperamide (IMODIUM) 2 MG capsule Take 2 mg by mouth 4 (Four) Times a Day As Needed for Diarrhea.     • loratadine (CLARITIN) 10 MG tablet Take 10 mg by mouth daily.     • losartan (COZAAR) 25 MG tablet Take 1 tablet by mouth Daily. 30 tablet 0   • LYRICA 100 MG capsule Take 100 mg by mouth 2 (Two) Times a Day.     • meclizine (ANTIVERT) 25 MG tablet Take 25 mg by mouth Daily.     • Melatonin 5 MG capsule Take 5 mg by mouth every night at bedtime.  3   • metFORMIN (GLUCOPHAGE) 500 MG tablet Take 1,000 mg by mouth 2 (Two) Times a Day With Meals.  3   • neomycin-bacitracin-polymyxin (NEOSPORIN) 5-400-5000 ointment Apply 1 application topically to the appropriate area as directed 4 (Four) Times a Day As Needed for Irritation.     • nystatin (MYCOSTATIN) 815253 UNIT/GM ointment Apply  topically to the appropriate area as directed 2 (Two) Times a Day.     • ondansetron ODT (ZOFRAN-ODT) 4 MG disintegrating tablet Place 1 tablet on the tongue Every 6 (Six) Hours As Needed for Nausea. 12 tablet 0   • promethazine (PHENERGAN) 25 MG tablet Take 25 mg by mouth every 6 (six) hours as needed for nausea or vomiting.     • propranolol LA (INDERAL LA) 80 MG 24 hr capsule Take 80 mg by mouth Daily.  5   • rizatriptan (MAXALT) 10 MG tablet Take 10 mg by mouth 1 " (one) time as needed for migraine. May repeat in 2 hours if needed     • sildenafil (Viagra) 50 MG tablet Take 1 tablet by mouth As Needed for Erectile Dysfunction. 30 tablet 1   • sucralfate (CARAFATE) 1 GM/10ML suspension Take 10 mL by mouth 3 (Three) Times a Day. 420 mL 0   • tamsulosin (FLOMAX) 0.4 MG capsule 24 hr capsule Take 2 capsules by mouth every night at bedtime. 180 capsule 3   • tiZANidine (ZANAFLEX) 4 MG tablet Take 4 mg by mouth Daily As Needed for Muscle Spasms.     • traZODone (DESYREL) 100 MG tablet Take 200 mg by mouth Every Night.  3   • varenicline (Chantix Continuing Month Maryse) 1 MG tablet Take 1 tablet by mouth Daily. 30 tablet 0   • varenicline (CHANTIX MARYSE) 0.5 MG X 11 & 1 MG X 42 tablet Take 0.5 mg one daily on days 1-3 and and 0.5 mg twice daily on days 4-7.Then 1 mg twice daily for a total of 12 weeks. 53 tablet 0   • Vortioxetine HBr (Trintellix) 20 MG tablet Take 20 mg by mouth Daily With Breakfast.       Allergies   Allergen Reactions   • Amitriptyline Anaphylaxis   • Amitriptyline Hcl Anaphylaxis   • Darvon [Propoxyphene] Anaphylaxis   • Zithromax [Azithromycin] Anaphylaxis     Social History     Socioeconomic History   • Marital status:      Spouse name: Not on file   • Number of children: Not on file   • Years of education: Not on file   • Highest education level: Not on file   Tobacco Use   • Smoking status: Current Every Day Smoker     Packs/day: 0.50     Types: Cigarettes     Start date: 1988   • Smokeless tobacco: Never Used   Substance and Sexual Activity   • Alcohol use: Yes     Comment: occasionally   • Drug use: No   • Sexual activity: Defer     Family History   Problem Relation Age of Onset   • Diabetes Mother    • Hypertension Mother    • Thyroid disease Mother    • Hypertension Father    • Thyroid disease Father    • Arrhythmia Father    • Diabetes Maternal Grandfather    • Heart disease Maternal Grandfather    • Hypertension Maternal Grandfather    • Diabetes  "Paternal Grandmother    • Stroke Paternal Grandmother    • Heart disease Paternal Grandmother    • Hypertension Paternal Grandmother    • Thyroid disease Paternal Grandmother    • Hypertension Paternal Grandfather    • Hypertension Sister    • No Known Problems Brother    • Colon cancer Neg Hx    • Colon polyps Neg Hx      Health Maintenance   Topic Date Due   • Pneumococcal Vaccine 0-64 (1 of 4 - PCV13) Never done   • Hepatitis B (1 of 3 - Risk 3-dose series) Never done   • ANNUAL WELLNESS VISIT  Never done   • DIABETIC EYE EXAM  Never done   • DXA SCAN  06/12/2017   • COLORECTAL CANCER SCREENING  06/19/2019   • URINE MICROALBUMIN  11/08/2019   • DIABETIC FOOT EXAM  02/11/2021   • INFLUENZA VACCINE  10/01/2021   • HEMOGLOBIN A1C  11/11/2021   • LIPID PANEL  05/11/2022   • TDAP/TD VACCINES (3 - Td or Tdap) 06/03/2029   • HEPATITIS C SCREENING  Completed   • COVID-19 Vaccine  Completed   • ZOSTER VACCINE  Completed       REVIEW OF SYSTEMS  General: well appearing, no fever chills or sweats, no unexplained wt loss  HEENT: no acute visual or hearing disturbances  Cardiovascular: No chest pain or palpitations  Pulmonary: No shortness of breath, coughing, wheezing or hemoptysis  : No burning, urgency, hematuria, or dysuria  GI: reflux/indigestion  Musculoskeletal: No joint pain or stiffness  Peripheral: no edema  Skin: No lesions or rashes  Neuro: No dizziness, headaches, stroke, syncope  Endocrine: No hot or cold intolerances  Hematological: No blood dyscrasias    Objective   Vitals:    08/05/21 1259   BP: 128/80   Pulse: 98   Temp: 97.2 °F (36.2 °C)   SpO2: 97%   Weight: 125 kg (275 lb)   Height: 179.1 cm (70.5\")     Body mass index is 38.9 kg/m².  Patient's Body mass index is 38.9 kg/m². indicating that he is morbidly obese (BMI > 40 or > 35 with obesity - related health condition). Obesity-related health conditions include the following: hypertension and diabetes mellitus. Obesity is unchanged. BMI is is above " average; BMI management plan is completed. We discussed portion control and increasing exercise..      PHYSICAL EXAM  General: age appropriate well nourished well appearing, no acute distress  Head: normocephalic and atraumatic  Global assessment-supple  Neck-No JVD noted, no lymphadenopathy  Pulmonary-clear to auscultation bilaterally, normal respiratory effort  Cardiovascular-normal rate and rhythm, normal heart sounds, S1 and S2 noted  Abdomen-soft, non tender, non distended, normal bowel sounds all 4 quadrants, no hepatosplenomegaly noted  Extremities-No clubbing cyanosis or edema  Neuro-Non focal, converses appropriately, awake, alert, oriented    Assessment/Plan     Diagnoses and all orders for this visit:    1. Encounter for screening for malignant neoplasm of colon (Primary)  -     polyethylene glycol (GoLYTELY) 236 g solution; Take as directed by office instructions.  Dispense: 4000 mL; Refill: 0  -     Case Request; Standing  -     Follow Anesthesia Guidelines / Standing Orders; Future  -     Obtain Informed Consent; Future  -     Implement Anesthesia Orders Day of Procedure; Standing  -     Obtain Informed Consent; Standing  -     Verify Bowel Prep Was Successful; Standing  -     Case Request    2. Controlled type 2 diabetes mellitus without complication, with long-term current use of insulin (CMS/[facility])  Comments:  Hold the am of procedure    3. Nonsmoker    4. Obesity, unspecified obesity severity, unspecified obesity type    5. HTN (hypertension), benign  Comments:  cont BP medication the day of procedure    6. Dyspepsia    I discussed non pharmaceutical treatment of gerd.  This includes gradually losing weight to achieve ideal body wt., elevation of the head of bed by 4-6 inches, nothing to eat or drink 3 hours prior to lying down, avoiding tight clothing, stress reduction, tobacco cessation, reduction of alcohol intake, and dietary restrictions (avoiding caffeine, coffee, fatty foods, mints, chocolate,  spicy foods and tomato based sauces as much as possible).    Add Gaviscon as needed    ESOPHAGOGASTRODUODENOSCOPY WITH ANESTHESIA (N/A), COLONOSCOPY WITH ANESTHESIA (N/A)  Body mass index is 38.9 kg/m².    Patient instructions on prep prior to procedure provided to the patient.    All risks, benefits, alternatives, and indications of colonoscopy procedure have been discussed with the patient. Risks to include perforation of the colon requiring possible surgery or colostomy, risk of bleeding from biopsies or removal of colon tissue, possibility of missing a colon polyp or cancer, or adverse drug reaction.  Benefits to include the diagnosis and management of disease of the colon and rectum. Alternatives to include barium enema, radiographic evaluation, lab testing or no intervention. Pt verbalizes understanding and agrees.     Elda Wilson, APRN  2021  13:41 CDT      IF YOU SMOKE OR USE TOBACCO PLEASE READ THE FOLLOWIN minutes reading provided    Why is smoking bad for me?  Smoking increases the risk of heart disease, lung disease, vascular disease, stroke, and cancer.     If you smoke, STOP!    If you would like more information on quitting smoking, please visit the [employer] website: [email or web address]   This link will provide additional resources including the QUIT line and the Beat the Pack support groups.     For more information:    Quit Now [place]  QUIT-NOW  [email or web address]

## 2021-08-10 DIAGNOSIS — E78.00 PURE HYPERCHOLESTEROLEMIA: ICD-10-CM

## 2021-08-10 RX ORDER — ATORVASTATIN CALCIUM 40 MG/1
40 TABLET, FILM COATED ORAL DAILY
Qty: 90 TABLET | Refills: 3 | Status: SHIPPED | OUTPATIENT
Start: 2021-08-10 | End: 2022-07-18

## 2021-08-20 ENCOUNTER — TRANSCRIBE ORDERS (OUTPATIENT)
Dept: LAB | Facility: HOSPITAL | Age: 56
End: 2021-08-20

## 2021-08-20 DIAGNOSIS — Z01.818 PREOPERATIVE TESTING: Primary | ICD-10-CM

## 2021-08-24 ENCOUNTER — LAB (OUTPATIENT)
Dept: LAB | Facility: HOSPITAL | Age: 56
End: 2021-08-24

## 2021-08-24 LAB — SARS-COV-2 ORF1AB RESP QL NAA+PROBE: NOT DETECTED

## 2021-08-24 PROCEDURE — C9803 HOPD COVID-19 SPEC COLLECT: HCPCS | Performed by: INTERNAL MEDICINE

## 2021-08-24 PROCEDURE — U0004 COV-19 TEST NON-CDC HGH THRU: HCPCS | Performed by: INTERNAL MEDICINE

## 2021-08-26 ENCOUNTER — HOSPITAL ENCOUNTER (OUTPATIENT)
Facility: HOSPITAL | Age: 56
Setting detail: HOSPITAL OUTPATIENT SURGERY
Discharge: HOME | End: 2021-08-26
Attending: INTERNAL MEDICINE | Admitting: INTERNAL MEDICINE

## 2021-08-26 ENCOUNTER — ANESTHESIA (OUTPATIENT)
Dept: GASTROENTEROLOGY | Facility: HOSPITAL | Age: 56
End: 2021-08-26

## 2021-08-26 ENCOUNTER — ANESTHESIA EVENT (OUTPATIENT)
Dept: GASTROENTEROLOGY | Facility: HOSPITAL | Age: 56
End: 2021-08-26

## 2021-08-26 VITALS
SYSTOLIC BLOOD PRESSURE: 126 MMHG | DIASTOLIC BLOOD PRESSURE: 68 MMHG | BODY MASS INDEX: 38.37 KG/M2 | HEIGHT: 70 IN | HEART RATE: 91 BPM | OXYGEN SATURATION: 96 % | TEMPERATURE: 97 F | RESPIRATION RATE: 20 BRPM | WEIGHT: 268 LBS

## 2021-08-26 DIAGNOSIS — Z12.11 ENCOUNTER FOR SCREENING FOR MALIGNANT NEOPLASM OF COLON: ICD-10-CM

## 2021-08-26 LAB — GLUCOSE BLDC GLUCOMTR-MCNC: 257 MG/DL (ref 70–130)

## 2021-08-26 PROCEDURE — 82962 GLUCOSE BLOOD TEST: CPT

## 2021-08-26 PROCEDURE — 25010000002 PROPOFOL 10 MG/ML EMULSION: Performed by: NURSE ANESTHETIST, CERTIFIED REGISTERED

## 2021-08-26 PROCEDURE — G0121 COLON CA SCRN NOT HI RSK IND: HCPCS | Performed by: INTERNAL MEDICINE

## 2021-08-26 PROCEDURE — 43235 EGD DIAGNOSTIC BRUSH WASH: CPT | Performed by: INTERNAL MEDICINE

## 2021-08-26 RX ORDER — SODIUM CHLORIDE 9 MG/ML
500 INJECTION, SOLUTION INTRAVENOUS CONTINUOUS PRN
Status: DISCONTINUED | OUTPATIENT
Start: 2021-08-26 | End: 2021-08-26 | Stop reason: HOSPADM

## 2021-08-26 RX ORDER — SODIUM CHLORIDE 0.9 % (FLUSH) 0.9 %
10 SYRINGE (ML) INJECTION AS NEEDED
Status: DISCONTINUED | OUTPATIENT
Start: 2021-08-26 | End: 2021-08-26 | Stop reason: HOSPADM

## 2021-08-26 RX ORDER — PROPOFOL 10 MG/ML
VIAL (ML) INTRAVENOUS AS NEEDED
Status: DISCONTINUED | OUTPATIENT
Start: 2021-08-26 | End: 2021-08-26 | Stop reason: SURG

## 2021-08-26 RX ORDER — LIDOCAINE HYDROCHLORIDE 20 MG/ML
INJECTION, SOLUTION EPIDURAL; INFILTRATION; INTRACAUDAL; PERINEURAL AS NEEDED
Status: DISCONTINUED | OUTPATIENT
Start: 2021-08-26 | End: 2021-08-26 | Stop reason: SURG

## 2021-08-26 RX ADMIN — PROPOFOL 400 MG: 10 INJECTION, EMULSION INTRAVENOUS at 11:38

## 2021-08-26 RX ADMIN — SODIUM CHLORIDE 500 ML: 9 INJECTION, SOLUTION INTRAVENOUS at 10:52

## 2021-08-26 RX ADMIN — LIDOCAINE HYDROCHLORIDE 100 MG: 20 INJECTION, SOLUTION EPIDURAL; INFILTRATION; INTRACAUDAL; PERINEURAL at 11:38

## 2021-08-26 NOTE — ANESTHESIA PREPROCEDURE EVALUATION
Anesthesia Evaluation     no history of anesthetic complications:  NPO Solid Status: > 8 hours  NPO Liquid Status: > 2 hours           Airway   Mallampati: IV  TM distance: >3 FB  Neck ROM: full  Possible difficult intubation  Dental      Pulmonary    (+) a smoker Current, sleep apnea,   Cardiovascular   Exercise tolerance: poor (<4 METS)    (+) hypertension, hyperlipidemia,     ROS comment: Normal heart cath 6/2020    Neuro/Psych  (+) headaches, psychiatric history Anxiety and Depression,     (-) seizures, TIA, CVA  GI/Hepatic/Renal/Endo    (+) obesity,  GERD,  liver disease fatty liver disease, diabetes mellitus,   (-) no renal disease    ROS Comment: HIV    Musculoskeletal     Abdominal    Substance History      OB/GYN          Other                        Anesthesia Plan    ASA 3     MAC     intravenous induction     Anesthetic plan, all risks, benefits, and alternatives have been provided, discussed and informed consent has been obtained with: patient.

## 2021-08-26 NOTE — ANESTHESIA POSTPROCEDURE EVALUATION
Patient: Donis Yi    Procedure Summary     Date: 08/26/21 Room / Location: [facility] ENDOSCOPY 5 / [facility]    Anesthesia Start: 1134 Anesthesia Stop: 1153    Procedures:       ESOPHAGOGASTRODUODENOSCOPY WITH ANESTHESIA (N/A )      COLONOSCOPY WITH ANESTHESIA (N/A ) Diagnosis:       Encounter for screening for malignant neoplasm of colon      (Encounter for screening for malignant neoplasm of colon [Z12.11])    Surgeons: Byron Rosenberg MD Provider: Philip Rendon CRNA    Anesthesia Type: MAC ASA Status: 3          Anesthesia Type: MAC    Vitals  Vitals Value Taken Time   /68 08/26/21 1211   Temp     Pulse 91 08/26/21 1213   Resp 20 08/26/21 1200   SpO2 96 % 08/26/21 1213   Vitals shown include unvalidated device data.        Post Anesthesia Care and Evaluation    Patient location during evaluation: PHASE II  Patient participation: complete - patient participated  Level of consciousness: awake  Pain management: adequate  Airway patency: patent  Anesthetic complications: No anesthetic complications  Respiratory status: acceptable  Hydration status: acceptable

## 2021-08-31 ENCOUNTER — TELEPHONE (OUTPATIENT)
Dept: INTERNAL MEDICINE | Age: 56
End: 2021-08-31

## 2021-08-31 DIAGNOSIS — A09 DIARRHEA OF INFECTIOUS ORIGIN: Primary | ICD-10-CM

## 2021-08-31 NOTE — TELEPHONE ENCOUNTER
Get a Covid swab since symptoms started after he had the test and he is immunosuppressed and he also needs a stool GI panel then we will decide what to do

## 2021-08-31 NOTE — TELEPHONE ENCOUNTER
He has had diarrhea since last Thursday, mostly watery, also sinus drainage. No fever or cough. He was tested for covid last Tuesday due to having colonoscopy and endoscopy done. He is taking imodium, but it seems to not be helping.

## 2021-09-02 ENCOUNTER — TELEPHONE (OUTPATIENT)
Dept: GASTROENTEROLOGY | Facility: CLINIC | Age: 56
End: 2021-09-02

## 2021-09-08 ENCOUNTER — TELEPHONE (OUTPATIENT)
Dept: INTERNAL MEDICINE | Age: 56
End: 2021-09-08

## 2021-09-08 ENCOUNTER — TELEPHONE (OUTPATIENT)
Dept: ENDOCRINOLOGY | Facility: CLINIC | Age: 56
End: 2021-09-08

## 2021-09-08 ENCOUNTER — DOCUMENTATION (OUTPATIENT)
Dept: ENDOCRINOLOGY | Facility: CLINIC | Age: 56
End: 2021-09-08

## 2021-09-08 RX ORDER — CEFDINIR 300 MG/1
300 CAPSULE ORAL 2 TIMES DAILY
Qty: 14 CAPSULE | Refills: 0 | Status: SHIPPED | OUTPATIENT
Start: 2021-09-08 | End: 2021-09-15

## 2021-09-08 NOTE — TELEPHONE ENCOUNTER
Pt c/o of sinus drainage,sore throat and cough. He got a Covid test and results were negative he states. Patient is requesting something called in to [employer]. He would like a call [phone].

## 2021-09-08 NOTE — TELEPHONE ENCOUNTER
[facility] needs script for CGMS     Fax [phone]     [phone] option 4

## 2021-09-10 NOTE — TELEPHONE ENCOUNTER
Pt states he is currently homeless so it is not a good time to reschedule. I will put him into recall to try to reach him in a few months. He also stated he would call when he would be able to come in.

## 2021-09-16 ENCOUNTER — OFFICE VISIT (OUTPATIENT)
Dept: INTERNAL MEDICINE | Age: 56
End: 2021-09-16
Payer: COMMERCIAL

## 2021-09-16 VITALS
DIASTOLIC BLOOD PRESSURE: 60 MMHG | OXYGEN SATURATION: 98 % | HEART RATE: 66 BPM | BODY MASS INDEX: 37.93 KG/M2 | HEIGHT: 72 IN | SYSTOLIC BLOOD PRESSURE: 102 MMHG | WEIGHT: 280 LBS

## 2021-09-16 DIAGNOSIS — Z79.4 TYPE 2 DIABETES MELLITUS WITH DIABETIC POLYNEUROPATHY, WITH LONG-TERM CURRENT USE OF INSULIN (HCC): Primary | ICD-10-CM

## 2021-09-16 DIAGNOSIS — H69.81 DYSFUNCTION OF RIGHT EUSTACHIAN TUBE: ICD-10-CM

## 2021-09-16 DIAGNOSIS — E55.9 VITAMIN D DEFICIENCY: ICD-10-CM

## 2021-09-16 DIAGNOSIS — E11.42 TYPE 2 DIABETES MELLITUS WITH DIABETIC POLYNEUROPATHY, WITH LONG-TERM CURRENT USE OF INSULIN (HCC): Primary | ICD-10-CM

## 2021-09-16 DIAGNOSIS — F41.9 ANXIETY AND DEPRESSION: ICD-10-CM

## 2021-09-16 DIAGNOSIS — F32.A ANXIETY AND DEPRESSION: ICD-10-CM

## 2021-09-16 DIAGNOSIS — B20 CURRENTLY ASYMPTOMATIC HIV INFECTION, WITH HISTORY OF HIV-RELATED ILLNESS (HCC): ICD-10-CM

## 2021-09-16 DIAGNOSIS — E78.2 MIXED HYPERLIPIDEMIA: ICD-10-CM

## 2021-09-16 PROBLEM — J31.0 CHRONIC RHINITIS: Status: ACTIVE | Noted: 2021-09-16

## 2021-09-16 PROCEDURE — 99214 OFFICE O/P EST MOD 30 MIN: CPT | Performed by: INTERNAL MEDICINE

## 2021-09-16 RX ORDER — MOMETASONE FUROATE 50 UG/1
2 SPRAY, METERED NASAL DAILY
Qty: 1 EACH | Refills: 2 | Status: SHIPPED | OUTPATIENT
Start: 2021-09-16 | End: 2021-11-30

## 2021-09-16 RX ORDER — IBUPROFEN 600 MG/1
2 TABLET ORAL DAILY
Qty: 1 EACH | Refills: 2 | Status: SHIPPED | OUTPATIENT
Start: 2021-09-16 | End: 2021-09-16 | Stop reason: SDUPTHER

## 2021-09-16 NOTE — PROGRESS NOTES
Running Out of Food in the Last Year: Never true    Ran Out of Food in the Last Year: Never true   Transportation Needs:     Lack of Transportation (Medical):  Lack of Transportation (Non-Medical):    Physical Activity:     Days of Exercise per Week:     Minutes of Exercise per Session:    Stress:     Feeling of Stress :    Social Connections:     Frequency of Communication with Friends and Family:     Frequency of Social Gatherings with Friends and Family:     Attends Scientologist Services:     Active Member of Clubs or Organizations:     Attends Club or Organization Meetings:     Marital Status:    Intimate Partner Violence:     Fear of Current or Ex-Partner:     Emotionally Abused:     Physically Abused:     Sexually Abused: Allergies   Allergen Reactions    Darvon [Propoxyphene]     Elavil [Amitriptyline Hcl]     Zithromax [Azithromycin]        Current Outpatient Medications   Medication Sig Dispense Refill    mometasone (NASONEX) 50 MCG/ACT nasal spray 2 sprays by Nasal route daily 1 each 2    metFORMIN (GLUCOPHAGE) 500 MG tablet TAKE 2 TABLETS BY MOUTH TWICE DAILY WITH MEALS 360 tablet 3    atorvastatin (LIPITOR) 40 MG tablet Take 1 tablet by mouth daily 90 tablet 3    meclizine (ANTIVERT) 25 MG tablet Take 1 tablet by mouth daily as needed for Dizziness 90 tablet 0    ALLERGY RELIEF 10 MG tablet TAKE 1 TABLET BY MOUTH DAILY 30 tablet 5    Melatonin 5 MG CAPS TAKE 1 CAPSULE BY MOUTH AT BEDTIME 90 capsule 3    losartan (COZAAR) 25 MG tablet Take 1 tablet by mouth Daily.  90 tablet 3    insulin aspart (NOVOLOG) 100 UNIT/ML injection vial Inject into the skin 3 times daily (before meals) Use as directed in insulin pump      traZODone (DESYREL) 100 MG tablet TAKE 2 TABLETS BY MOUTH NIGHTLY (STOP 150 MG) 180 tablet 3    propranolol (INDERAL LA) 80 MG extended release capsule TAKE 1 CAPSULE BY MOUTH DAILY 90 capsule 3    UNIFINE PENTIPS 31G X 6 MM MISC for use FOUR TIMES DAILY 100 each 6    Cholecalciferol (VITAMIN D3) 50 MCG (2000 UT) CAPS 1 capsule by mouth daily 100 capsule 3    loperamide (IMODIUM) 2 MG capsule Take 1 capsule by mouth 4 times daily as needed for Diarrhea 30 capsule 5    rizatriptan (MAXALT) 10 MG tablet Take 1 tablet by mouth once as needed for Migraine May repeat in 2 hours if needed 30 tablet 2    Multiple Vitamins-Minerals (THERAPEUTIC MULTIVITAMIN-MINERALS) tablet Take 1 tablet by mouth daily      gabapentin (NEURONTIN) 800 MG tablet Take 800 mg by mouth 3 times daily.  DEXILANT 60 MG CPDR delayed release capsule TAKE 1 CAPSULE BY MOUTH DAILY 90 capsule 3    Dulaglutide (TRULICITY) 1.5 WX/8.2SC SOPN Inject 1.5 mg into the skin once a week (Patient taking differently: Inject 4 mg into the skin once a week ) 5 pen 5    clotrimazole (LOTRIMIN) 1 % external solution Apply 1 actuation topically 2 times daily      tamsulosin (FLOMAX) 0.4 MG capsule Take 0.4 mg by mouth daily      nystatin (MYCOSTATIN) 447811 UNIT/GM cream Apply topically 2 times daily Apply topically 2 times daily. 30 g 1    ARIPiprazole (ABILIFY) 5 MG tablet Take 5 mg by mouth daily       triamcinolone (KENALOG) 0.1 % cream Apply topically 2 times daily prn 60 g 0    Lancets MISC Use to check blood sugar 4 times daily   Dx  E11.9  Insulin dependent 400 each 3    VORTIoxetine HBr (TRINTELLIX) 20 MG TABS tablet Take 20 mg by mouth daily       glucose blood VI test strips (FREESTYLE LITE) strip 1 each by In Vitro route daily Test blood sugar 4 times daily 100 each 3    clonazePAM (KLONOPIN) 0.5 MG tablet Take 1 tablet by mouth 2 times daily as needed for Anxiety 60 tablet 5    Ckgfbep-Tmokb-Mjrcvsuz-TenofAF (GENVOYA) [id number] MG TABS Take 1 tablet by mouth daily      HYDROcodone-acetaminophen (NORCO) 5-325 MG per tablet Take 1 tablet by mouth 2 times daily as needed for Pain .       pregabalin (LYRICA) 75 MG capsule Take 75 mg by mouth 2 times daily      empagliflozin (Ivelisse López) 10 MG tablet Take 1 tablet by mouth daily (Patient not taking: Reported on 9/16/2021) 30 tablet 3    sildenafil (REVATIO) 20 MG tablet Take 1 tablet by mouth daily as needed (ED) (Patient not taking: Reported on 9/16/2021) 15 tablet 0     No current facility-administered medications for this visit. Review of Systems    /60   Pulse 66   Ht 6' (1.829 m)   Wt 280 lb (127 kg)   SpO2 98%   BMI 37.97 kg/m²   BP Readings from Last 7 Encounters:   09/16/21 102/60   05/11/21 120/64   02/05/21 126/70   07/31/20 120/64   02/10/20 120/66   09/09/19 120/70   07/25/19 109/68     Wt Readings from Last 7 Encounters:   09/16/21 280 lb (127 kg)   05/11/21 274 lb (124.3 kg)   02/05/21 274 lb (124.3 kg)   07/31/20 274 lb (124.3 kg)   05/14/20 278 lb (126.1 kg)   04/20/20 272 lb (123.4 kg)   02/10/20 278 lb (126.1 kg)     BMI Readings from Last 7 Encounters:   09/16/21 37.97 kg/m²   05/11/21 37.16 kg/m²   02/05/21 37.16 kg/m²   07/31/20 37.16 kg/m²   05/14/20 39.89 kg/m²   04/20/20 36.89 kg/m²   02/10/20 37.70 kg/m²     Resp Readings from Last 7 Encounters:   05/05/18 14       Physical Exam  Constitutional:       General: He is not in acute distress. Appearance: He is obese. Comments: Very tired appearing today   Eyes:      General: No scleral icterus. Cardiovascular:      Heart sounds: Normal heart sounds. Pulmonary:      Breath sounds: Normal breath sounds. Musculoskeletal:      Cervical back: Neck supple. Right lower leg: No edema. Left lower leg: No edema. Lymphadenopathy:      Cervical: No cervical adenopathy. Skin:     Findings: No rash. Psychiatric:      Comments: Mood is discouraged and tired         Results for orders placed or performed in visit on 09/01/21   COVID-19   Result Value Ref Range    SARS-CoV-2, PCR Not Detected Not Detected       ASSESSMENT/ PLAN:  1.  Type 2 diabetes mellitus with diabetic polyneuropathy, with long-term current use of insulin (HCC)  Terrible control of his diabetes. He emphasizes multiple times regarding a repeat lab while he is here then we will call him with the A1c and make any further plan and forward it to his endocrinologist we got him established with endocrinology with very difficult control really needs to follow blood sugars closely with his monitor and make adjustments importance of diabetes control is well-known to the patient  - CBC; Future  - Comprehensive Metabolic Panel; Future  - Hemoglobin A1C; Future  - TSH without Reflex; Future  - Lipid Panel; Future    2. Dysfunction of right eustachian tube  Right earache on exam he is got some retraction of the right eardrum some dullness we went ahead and wrote for Nasonex to use to help open up his sinuses and indirectly eustachian tubes we will see if this helps  - mometasone (NASONEX) 50 MCG/ACT nasal spray; 2 sprays by Nasal route daily  Dispense: 1 each; Refill: 2    3. Mixed hyperlipidemia  High-dose statin therapy recommended--continue Lipitor 40 with a goal LDL under 70    4. Vitamin D deficiency  Vitamin D supplementation and monitor continue vitamin D therapy  - Vitamin D 25 Hydroxy; Future    5. Anxiety and depression  Under a lot of stress work with the [facility] clinic toward housing work with the social workers there if we can do anything to help let us know    6. Currently asymptomatic HIV infection, with history of HIV-related illness ([address]  Well-controlled goes to [facility] clinic HIV has not been an issue for some time well-controlled    Recommend the COVID-19 vaccine he will discuss with the clinic about getting his third dose.   He had moderna and only had his last dose at the end of April

## 2021-09-20 RX ORDER — VITAMIN A, VITAMIN C, VITAMIN D, VITAMIN E, THIAMINE, RIBOFLAVIN, NIACIN, VITAMIN B6, FOLIC ACID, VITAMIN B12, CALCIUM, IRON, ZINC, COPPER 4000; 120; 400; 22; 1.84; 3; 20; 10; 1; 12; 200; 27; 25; 2 [IU]/1; MG/1; [IU]/1; [IU]/1; MG/1; MG/1; MG/1; MG/1; MG/1; UG/1; MG/1; MG/1; MG/1; MG/1
TABLET ORAL
Qty: 100 TABLET | Refills: 3 | OUTPATIENT
Start: 2021-09-20

## 2021-10-06 ENCOUNTER — TELEPHONE (OUTPATIENT)
Dept: ENDOCRINOLOGY | Facility: CLINIC | Age: 56
End: 2021-10-06

## 2021-10-06 NOTE — TELEPHONE ENCOUNTER
Patient says Eamon is reading sugars at 63 and fingerstick is 194. Told to call [facility] and get a new sensor and in the meantime do fingersticks.

## 2021-10-06 NOTE — TELEPHONE ENCOUNTER
Patient called needing a call he states for the last 2 days the highest his sugar has been is 63 and lowest is in the 40s. Patient states he is feeling fine. He uses the amipod Dash. And does 72 units of Novlog a day. Please call back at [phone]

## 2021-10-07 RX ORDER — MECLIZINE HYDROCHLORIDE 25 MG/1
TABLET ORAL
Qty: 90 TABLET | Refills: 0 | Status: SHIPPED | OUTPATIENT
Start: 2021-10-07 | End: 2021-12-30

## 2021-10-19 ENCOUNTER — FLU SHOT (OUTPATIENT)
Dept: FAMILY MEDICINE CLINIC | Facility: CLINIC | Age: 56
End: 2021-10-19

## 2021-10-19 DIAGNOSIS — Z23 NEED FOR INFLUENZA VACCINATION: Primary | ICD-10-CM

## 2021-10-19 PROCEDURE — 90686 IIV4 VACC NO PRSV 0.5 ML IM: CPT | Performed by: FAMILY MEDICINE

## 2021-10-19 PROCEDURE — G0008 ADMIN INFLUENZA VIRUS VAC: HCPCS | Performed by: FAMILY MEDICINE

## 2021-11-02 DIAGNOSIS — K21.9 GASTROESOPHAGEAL REFLUX DISEASE WITHOUT ESOPHAGITIS: ICD-10-CM

## 2021-11-02 RX ORDER — DEXLANSOPRAZOLE 60 MG/1
CAPSULE, DELAYED RELEASE ORAL
Qty: 90 CAPSULE | Refills: 3 | Status: SHIPPED | OUTPATIENT
Start: 2021-11-02 | End: 2022-10-06

## 2021-11-15 ENCOUNTER — TELEPHONE (OUTPATIENT)
Dept: INTERNAL MEDICINE | Age: 56
End: 2021-11-15
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
## 2021-11-16 ENCOUNTER — OFFICE VISIT (OUTPATIENT)
Dept: ENDOCRINOLOGY | Facility: CLINIC | Age: 56
End: 2021-11-16

## 2021-11-16 VITALS
SYSTOLIC BLOOD PRESSURE: 110 MMHG | OXYGEN SATURATION: 99 % | BODY MASS INDEX: 37.22 KG/M2 | WEIGHT: 260 LBS | HEIGHT: 70 IN | DIASTOLIC BLOOD PRESSURE: 60 MMHG | HEART RATE: 87 BPM

## 2021-11-16 DIAGNOSIS — E11.59 HYPERTENSION ASSOCIATED WITH DIABETES (HCC): ICD-10-CM

## 2021-11-16 DIAGNOSIS — N40.1 BPH WITH URINARY OBSTRUCTION: ICD-10-CM

## 2021-11-16 DIAGNOSIS — E11.69 MIXED DIABETIC HYPERLIPIDEMIA ASSOCIATED WITH TYPE 2 DIABETES MELLITUS (HCC): ICD-10-CM

## 2021-11-16 DIAGNOSIS — E55.9 VITAMIN D DEFICIENCY: ICD-10-CM

## 2021-11-16 DIAGNOSIS — E78.2 MIXED DIABETIC HYPERLIPIDEMIA ASSOCIATED WITH TYPE 2 DIABETES MELLITUS (HCC): ICD-10-CM

## 2021-11-16 DIAGNOSIS — E11.65 TYPE 2 DIABETES MELLITUS WITH HYPERGLYCEMIA, WITH LONG-TERM CURRENT USE OF INSULIN (HCC): Primary | ICD-10-CM

## 2021-11-16 DIAGNOSIS — I15.2 HYPERTENSION ASSOCIATED WITH DIABETES (HCC): ICD-10-CM

## 2021-11-16 DIAGNOSIS — Z79.4 TYPE 2 DIABETES MELLITUS WITH HYPERGLYCEMIA, WITH LONG-TERM CURRENT USE OF INSULIN (HCC): Primary | ICD-10-CM

## 2021-11-16 DIAGNOSIS — N13.8 BPH WITH URINARY OBSTRUCTION: ICD-10-CM

## 2021-11-16 PROCEDURE — 99214 OFFICE O/P EST MOD 30 MIN: CPT | Performed by: INTERNAL MEDICINE

## 2021-11-16 RX ORDER — TAMSULOSIN HYDROCHLORIDE 0.4 MG/1
2 CAPSULE ORAL
Qty: 60 CAPSULE | Refills: 0 | Status: SHIPPED | OUTPATIENT
Start: 2021-11-16

## 2021-11-16 NOTE — PROGRESS NOTES
" Donis Yi is a 55 y.o. male who presents for  evaluation of   Type 2 diabetes        Primary Care Provider    Oksana Mares MD    55 y o t2dm since age 41 y old complicated by neuropathy improved by use of eamon and omnipod.    His symptoms include low libido, thirst and hunger       PE  /60   Pulse 87   Ht 177.8 cm (70\")   Wt 118 kg (260 lb)   SpO2 99%   BMI 37.31 kg/m²   AOx3  RRR  CTA  No edema  Callus   parkinosian tremor     Lab Review    Lab Results   Component Value Date    WBC 10.2 09/16/2021    HGB 13.3 (L) 09/16/2021    HCT 41.2 (L) 09/16/2021    MCV 95.8 (H) 09/16/2021     09/16/2021     Lab Results   Component Value Date    GLUCOSE 223 (H) 09/16/2021    BUN 18 09/16/2021    CREATININE 0.8 09/16/2021    EGFRIFNONA >60 09/16/2021    EGFRIFAFRI >59 09/16/2021    BCR 17.7 06/01/2021    K 4.5 09/16/2021    CO2 21 (L) 09/16/2021    CALCIUM 9.6 09/16/2021    ALBUMIN 4.2 09/16/2021    LABIL2 CANCELED 03/29/2016    AST 31 09/16/2021    ALT 34 09/16/2021           Assessment/Plan       ICD-10-CM ICD-9-CM   1. Type 2 diabetes mellitus with hyperglycemia, with long-term current use of insulin (HCC)  E11.65 250.00    Z79.4 790.29     V58.67   2. Hypertension associated with diabetes (HCC)  E11.59 250.80    I15.2 401.9   3. Mixed diabetic hyperlipidemia associated with type 2 diabetes mellitus ([facility])  E11.69 250.80    E78.2 272.2   4. Vitamin D deficiency  E55.9 268.9         I reviewed and summarized records from Oksana Mares MD from present year  and I reviewed / ordered labs.   From review of records :    Patient has uncontrolled type 2 diabetes    Glycemic Management:   Lab Results   Component Value Date    HGBA1C 10.6 (H) 09/16/2021    HGBA1C 9.1 (H) 05/11/2021    HGBA1C 11.0 (H) 02/05/2021       Eamon 2 , didn't bring to appt       Metformin 1000 mg bid      Trulicity  1.5 MG WEEKLY - escalate all the way to 4.5 mg weekly     Hesitant about jardiance since thirst and BPH     Omnipod " U 100     Basal 3 units per hour    Carb Ratio 4 , not using , start doing     Correction 10     In the past U500 but not using anymore          Lipid Management  Lab Results   Component Value Date    CHOL 142 02/01/2021    CHLPL 146 (L) 09/16/2021    TRIG 151 (H) 09/16/2021    HDL 51 (L) 09/16/2021    LDL 65 09/16/2021       lipitor 80 mg qhs     Blood Pressure Management:    Vitals:    11/16/21 0843   BP: 110/60   Pulse: 87   SpO2: 99%     Controlled             Microvascular Complication Monitoring:      Eye Exam Evaluation  No retinopathy , has cataracts   -----------    Last Microalbumin-Proteinuria Assessment    No results found for: MALBCRERATIO    No results found for: UTPCR    -----------      Neuropathy on lyrica 100 bid   And neurontin per pain management           Bone Health    Lab Results   Component Value Date    CALCIUM 9.6 09/16/2021    PHOS 2.7 05/11/2020    [id number] 50.4 09/16/2021       Thyroid Health    Lab Results   Component Value Date    TSH 1.020 09/16/2021    TSH 1.170 05/11/2021    TSH 1.530 02/01/2021           Lab Results   Component Value Date    [id number] 344 02/01/2021     Wanted viagra    No longer on imdur and nitroglycerin     Written this time but only 50 since on genvoya and flomax        No orders of the defined types were placed in this encounter.        A copy of my note was sent to Oksana Mares MD    Please see my above opinion and suggestions.

## 2021-11-17 ENCOUNTER — TELEPHONE (OUTPATIENT)
Dept: INTERNAL MEDICINE | Age: 56
End: 2021-11-17

## 2021-11-18 ENCOUNTER — TELEPHONE (OUTPATIENT)
Dept: INTERNAL MEDICINE | Age: 56
End: 2021-11-18

## 2021-11-18 DIAGNOSIS — E11.42 TYPE 2 DIABETES MELLITUS WITH DIABETIC POLYNEUROPATHY, WITH LONG-TERM CURRENT USE OF INSULIN (HCC): ICD-10-CM

## 2021-11-18 DIAGNOSIS — Z79.4 TYPE 2 DIABETES MELLITUS WITH DIABETIC POLYNEUROPATHY, WITH LONG-TERM CURRENT USE OF INSULIN (HCC): ICD-10-CM

## 2021-11-18 DIAGNOSIS — K52.1 DIARRHEA DUE TO DRUG: ICD-10-CM

## 2021-11-18 DIAGNOSIS — M47.26 OSTEOARTHRITIS OF SPINE WITH RADICULOPATHY, LUMBAR REGION: Primary | ICD-10-CM

## 2021-11-18 RX ORDER — LOPERAMIDE HYDROCHLORIDE 2 MG/1
2 CAPSULE ORAL 4 TIMES DAILY PRN
Qty: 30 CAPSULE | Refills: 1 | Status: SHIPPED | OUTPATIENT
Start: 2021-11-18 | End: 2021-12-30

## 2021-11-18 NOTE — TELEPHONE ENCOUNTER
He usually gets Lyrica from pain management and he missed several appointments with them and they discharged him. Wants to know if Dr. Roman Kumar will prescribe this for him?

## 2021-11-30 DIAGNOSIS — H69.81 DYSFUNCTION OF RIGHT EUSTACHIAN TUBE: ICD-10-CM

## 2021-11-30 RX ORDER — MOMETASONE FUROATE 50 UG/1
2 SPRAY, METERED NASAL DAILY
Qty: 17 G | Refills: 2 | Status: SHIPPED | OUTPATIENT
Start: 2021-11-30 | End: 2022-02-22

## 2021-12-02 NOTE — TELEPHONE ENCOUNTER
I spoke with him and he wants to see pain management in [facility]. He will call back with the referral information.

## 2021-12-02 NOTE — TELEPHONE ENCOUNTER
Patient wants referral to be sent to [facility], fax [phone]. They come to [facility].

## 2021-12-07 ENCOUNTER — TRANSCRIBE ORDERS (OUTPATIENT)
Dept: LAB | Facility: HOSPITAL | Age: 56
End: 2021-12-07

## 2021-12-07 ENCOUNTER — LAB (OUTPATIENT)
Dept: LAB | Facility: HOSPITAL | Age: 56
End: 2021-12-07

## 2021-12-07 DIAGNOSIS — E78.5 HYPERLIPIDEMIA, UNSPECIFIED HYPERLIPIDEMIA TYPE: ICD-10-CM

## 2021-12-07 DIAGNOSIS — B20 HUMAN IMMUNODEFICIENCY VIRUS (HIV) DISEASE (HCC): Primary | ICD-10-CM

## 2021-12-07 DIAGNOSIS — I10 ESSENTIAL HYPERTENSION, MALIGNANT: ICD-10-CM

## 2021-12-07 DIAGNOSIS — E13.9 DM (DIABETES MELLITUS), SECONDARY (HCC): ICD-10-CM

## 2021-12-08 ENCOUNTER — TELEPHONE (OUTPATIENT)
Dept: INTERNAL MEDICINE | Age: 56
End: 2021-12-08

## 2021-12-08 DIAGNOSIS — R25.1 TREMOR: Primary | ICD-10-CM
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
## 2021-12-08 NOTE — TELEPHONE ENCOUNTER
This [facility] called and spoke to the pt to get a little more information. Pts hand tremors are getting worse, he can hardly hold a spoon to eat. Pt was last seen here in office on 09/16/2021 and has a follow up on 01/04/2022. Pt stated that Dr. Wil Monsalve stated for him to call so we could get it started before his appointment. Pt would like to see someone in [address] [facility] or [address] [place] [address].

## 2021-12-09 ENCOUNTER — LAB (OUTPATIENT)
Dept: LAB | Facility: HOSPITAL | Age: 56
End: 2021-12-09

## 2021-12-09 DIAGNOSIS — E78.5 HYPERLIPIDEMIA, UNSPECIFIED HYPERLIPIDEMIA TYPE: ICD-10-CM

## 2021-12-09 DIAGNOSIS — B20 HUMAN IMMUNODEFICIENCY VIRUS (HIV) DISEASE (HCC): ICD-10-CM

## 2021-12-09 DIAGNOSIS — I10 ESSENTIAL HYPERTENSION, MALIGNANT: ICD-10-CM

## 2021-12-09 DIAGNOSIS — E13.9 DM (DIABETES MELLITUS), SECONDARY (HCC): ICD-10-CM

## 2021-12-09 PROCEDURE — 87536 HIV-1 QUANT&REVRSE TRNSCRPJ: CPT

## 2021-12-09 PROCEDURE — 86360 T CELL ABSOLUTE COUNT/RATIO: CPT

## 2021-12-09 PROCEDURE — 86359 T CELLS TOTAL COUNT: CPT

## 2021-12-09 PROCEDURE — 85025 COMPLETE CBC W/AUTO DIFF WBC: CPT

## 2021-12-09 PROCEDURE — 36415 COLL VENOUS BLD VENIPUNCTURE: CPT

## 2021-12-10 LAB
BASOPHILS # BLD AUTO: 0.05 10*3/MM3 (ref 0–0.2)
BASOPHILS # BLD AUTO: 0.1 X10E3/UL (ref 0–0.2)
BASOPHILS NFR BLD AUTO: 0.5 % (ref 0–1.5)
BASOPHILS NFR BLD AUTO: 1 %
CD3 CELLS # BLD: 2194 /UL (ref 622–2402)
CD3 CELLS NFR BLD: 84.4 % (ref 57.5–86.2)
CD3+CD4+ CELLS # BLD: 1495 /UL (ref 359–1519)
CD3+CD4+ CELLS NFR BLD: 57.5 % (ref 30.8–58.5)
CD3+CD4+ CELLS/CD3+CD8+ CLL BLD: 2.22 % (ref 0.92–3.72)
CD3+CD8+ CELLS # BLD: 673 /UL (ref 109–897)
CD3+CD8+ CELLS NFR BLD: 25.9 % (ref 12–35.5)
DEPRECATED RDW RBC AUTO: 43.7 FL (ref 37–54)
EOSINOPHIL # BLD AUTO: 0.29 10*3/MM3 (ref 0–0.4)
EOSINOPHIL # BLD AUTO: 0.3 X10E3/UL (ref 0–0.4)
EOSINOPHIL NFR BLD AUTO: 2.9 % (ref 0.3–6.2)
EOSINOPHIL NFR BLD AUTO: 3 %
ERYTHROCYTE [DISTWIDTH] IN BLOOD BY AUTOMATED COUNT: 12.5 % (ref 11.6–15.4)
ERYTHROCYTE [DISTWIDTH] IN BLOOD BY AUTOMATED COUNT: 12.8 % (ref 12.3–15.4)
HCT VFR BLD AUTO: 40 % (ref 37.5–51)
HCT VFR BLD AUTO: 40.6 % (ref 37.5–51)
HGB BLD-MCNC: 13.7 G/DL (ref 13–17.7)
HGB BLD-MCNC: 13.7 G/DL (ref 13–17.7)
HIV1 RNA # SERPL NAA+PROBE: 30 COPIES/ML
HIV1 RNA SERPL NAA+PROBE-LOG#: 1.48 LOG10COPY/ML
IMM GRANULOCYTES # BLD AUTO: 0.07 10*3/MM3 (ref 0–0.05)
IMM GRANULOCYTES # BLD AUTO: 0.1 X10E3/UL (ref 0–0.1)
IMM GRANULOCYTES NFR BLD AUTO: 0.7 % (ref 0–0.5)
IMM GRANULOCYTES NFR BLD AUTO: 1 %
LYMPHOCYTES # BLD AUTO: 2.6 X10E3/UL (ref 0.7–3.1)
LYMPHOCYTES # BLD AUTO: 2.62 10*3/MM3 (ref 0.7–3.1)
LYMPHOCYTES NFR BLD AUTO: 26.5 % (ref 19.6–45.3)
LYMPHOCYTES NFR BLD AUTO: 27 %
MCH RBC QN AUTO: 31.6 PG (ref 26.6–33)
MCH RBC QN AUTO: 31.9 PG (ref 26.6–33)
MCHC RBC AUTO-ENTMCNC: 33.7 G/DL (ref 31.5–35.7)
MCHC RBC AUTO-ENTMCNC: 34.3 G/DL (ref 31.5–35.7)
MCV RBC AUTO: 93.2 FL (ref 79–97)
MCV RBC AUTO: 94 FL (ref 79–97)
MONOCYTES # BLD AUTO: 0.6 X10E3/UL (ref 0.1–0.9)
MONOCYTES # BLD AUTO: 0.63 10*3/MM3 (ref 0.1–0.9)
MONOCYTES NFR BLD AUTO: 6 %
MONOCYTES NFR BLD AUTO: 6.4 % (ref 5–12)
NEUTROPHILS # BLD AUTO: 6 X10E3/UL (ref 1.4–7)
NEUTROPHILS NFR BLD AUTO: 6.24 10*3/MM3 (ref 1.7–7)
NEUTROPHILS NFR BLD AUTO: 62 %
NEUTROPHILS NFR BLD AUTO: 63 % (ref 42.7–76)
NRBC BLD AUTO-RTO: 0 /100 WBC (ref 0–0.2)
PLATELET # BLD AUTO: 241 X10E3/UL (ref 150–450)
PLATELET # BLD AUTO: 243 10*3/MM3 (ref 140–450)
PMV BLD AUTO: 12 FL (ref 6–12)
RBC # BLD AUTO: 4.29 10*6/MM3 (ref 4.14–5.8)
RBC # BLD AUTO: 4.34 X10E6/UL (ref 4.14–5.8)
WBC # BLD AUTO: 9.6 X10E3/UL (ref 3.4–10.8)
WBC NRBC COR # BLD: 9.9 10*3/MM3 (ref 3.4–10.8)

## 2021-12-13 ENCOUNTER — TELEPHONE (OUTPATIENT)
Dept: INTERNAL MEDICINE | Age: 56
End: 2021-12-13

## 2021-12-13 DIAGNOSIS — N13.8 BPH WITH URINARY OBSTRUCTION: ICD-10-CM

## 2021-12-13 DIAGNOSIS — N40.1 BPH WITH URINARY OBSTRUCTION: ICD-10-CM

## 2021-12-13 RX ORDER — TAMSULOSIN HYDROCHLORIDE 0.4 MG/1
2 CAPSULE ORAL
Qty: 56 CAPSULE | OUTPATIENT
Start: 2021-12-13

## 2021-12-13 NOTE — TELEPHONE ENCOUNTER
S/w Dr. Mendez  office, he will not accept this pt because he is retiring and thinks pt needs long-term care. Also, he does not accept Medicaid.

## 2021-12-27 DIAGNOSIS — N40.1 BPH WITH URINARY OBSTRUCTION: ICD-10-CM

## 2021-12-27 DIAGNOSIS — N13.8 BPH WITH URINARY OBSTRUCTION: ICD-10-CM

## 2021-12-27 RX ORDER — TAMSULOSIN HYDROCHLORIDE 0.4 MG/1
2 CAPSULE ORAL
Qty: 56 CAPSULE | OUTPATIENT
Start: 2021-12-27

## 2021-12-30 DIAGNOSIS — K52.1 DIARRHEA DUE TO DRUG: ICD-10-CM

## 2021-12-30 RX ORDER — MECLIZINE HYDROCHLORIDE 25 MG/1
TABLET ORAL
Qty: 30 TABLET | Refills: 1 | Status: SHIPPED | OUTPATIENT
Start: 2021-12-30 | End: 2022-02-22

## 2021-12-30 RX ORDER — RIZATRIPTAN BENZOATE 10 MG/1
10 TABLET ORAL
Qty: 9 TABLET | Refills: 1 | Status: SHIPPED | OUTPATIENT
Start: 2021-12-30 | End: 2022-02-22

## 2021-12-30 RX ORDER — LOPERAMIDE HYDROCHLORIDE 2 MG/1
2 CAPSULE ORAL 4 TIMES DAILY PRN
Qty: 30 CAPSULE | Refills: 1 | Status: SHIPPED | OUTPATIENT
Start: 2021-12-30 | End: 2022-02-22

## 2021-12-30 RX ORDER — LORATADINE 10 MG/1
TABLET ORAL
Qty: 30 TABLET | Refills: 1 | Status: SHIPPED | OUTPATIENT
Start: 2021-12-30 | End: 2022-02-22

## 2022-01-04 ENCOUNTER — HOSPITAL ENCOUNTER (INPATIENT)
Facility: HOSPITAL | Age: 57
LOS: 4 days | Discharge: HOME | End: 2022-01-08
Attending: FAMILY MEDICINE | Admitting: FAMILY MEDICINE

## 2022-01-04 ENCOUNTER — OFFICE VISIT (OUTPATIENT)
Dept: INTERNAL MEDICINE | Age: 57
End: 2022-01-04
Payer: COMMERCIAL

## 2022-01-04 VITALS
OXYGEN SATURATION: 97 % | DIASTOLIC BLOOD PRESSURE: 74 MMHG | SYSTOLIC BLOOD PRESSURE: 130 MMHG | HEART RATE: 83 BPM | WEIGHT: 263 LBS | HEIGHT: 72 IN | BODY MASS INDEX: 35.62 KG/M2

## 2022-01-04 DIAGNOSIS — F43.11 ACUTE POST-TRAUMATIC STRESS DISORDER: ICD-10-CM

## 2022-01-04 DIAGNOSIS — I96 GANGRENE OF TOE: ICD-10-CM

## 2022-01-04 DIAGNOSIS — E11.42 TYPE 2 DIABETES MELLITUS WITH DIABETIC POLYNEUROPATHY, WITH LONG-TERM CURRENT USE OF INSULIN (HCC): Primary | ICD-10-CM

## 2022-01-04 DIAGNOSIS — Z79.4 TYPE 2 DIABETES MELLITUS WITH DIABETIC POLYNEUROPATHY, WITH LONG-TERM CURRENT USE OF INSULIN (HCC): ICD-10-CM

## 2022-01-04 DIAGNOSIS — E78.2 MIXED HYPERLIPIDEMIA: ICD-10-CM

## 2022-01-04 DIAGNOSIS — I96 GANGRENE OF TOE OF RIGHT FOOT (HCC): ICD-10-CM

## 2022-01-04 DIAGNOSIS — E11.42 TYPE 2 DIABETES MELLITUS WITH DIABETIC POLYNEUROPATHY, WITH LONG-TERM CURRENT USE OF INSULIN (HCC): ICD-10-CM

## 2022-01-04 DIAGNOSIS — M86.9 OSTEOMYELITIS OF FOURTH TOE OF RIGHT FOOT: Primary | ICD-10-CM

## 2022-01-04 DIAGNOSIS — Z79.4 TYPE 2 DIABETES MELLITUS WITH DIABETIC POLYNEUROPATHY, WITH LONG-TERM CURRENT USE OF INSULIN (HCC): Primary | ICD-10-CM

## 2022-01-04 PROBLEM — Z21 HIV (HUMAN IMMUNODEFICIENCY VIRUS INFECTION): Status: ACTIVE | Noted: 2022-01-04

## 2022-01-04 PROBLEM — E11.65 TYPE 2 DIABETES MELLITUS WITH HYPERGLYCEMIA, WITH LONG-TERM CURRENT USE OF INSULIN (HCC): Status: ACTIVE | Noted: 2019-10-18

## 2022-01-04 PROBLEM — B20 HIV (HUMAN IMMUNODEFICIENCY VIRUS INFECTION): Status: ACTIVE | Noted: 2022-01-04

## 2022-01-04 PROBLEM — R52 ACUTE PAIN: Status: ACTIVE | Noted: 2022-01-04

## 2022-01-04 LAB
ALBUMIN SERPL-MCNC: 3.9 G/DL (ref 3.5–5.2)
ALP BLD-CCNC: 74 U/L (ref 40–130)
ALT SERPL-CCNC: 17 U/L (ref 5–41)
ANION GAP SERPL CALCULATED.3IONS-SCNC: 11 MMOL/L (ref 5–15)
ANION GAP SERPL CALCULATED.3IONS-SCNC: 11 MMOL/L (ref 7–19)
AST SERPL-CCNC: 14 U/L (ref 5–40)
BILIRUB SERPL-MCNC: 0.4 MG/DL (ref 0.2–1.2)
BUN BLDV-MCNC: 15 MG/DL (ref 6–20)
BUN SERPL-MCNC: 14 MG/DL (ref 6–20)
BUN/CREAT SERPL: 18.2 (ref 7–25)
C-REACTIVE PROTEIN: 9.03 MG/DL (ref 0–0.5)
CALCIUM SERPL-MCNC: 9.1 MG/DL (ref 8.6–10)
CALCIUM SPEC-SCNC: 8.7 MG/DL (ref 8.6–10.5)
CHLORIDE BLD-SCNC: 101 MMOL/L (ref 98–111)
CHLORIDE SERPL-SCNC: 102 MMOL/L (ref 98–107)
CHOLESTEROL, TOTAL: 144 MG/DL (ref 160–199)
CO2 SERPL-SCNC: 25 MMOL/L (ref 22–29)
CO2: 28 MMOL/L (ref 22–29)
CREAT SERPL-MCNC: 0.77 MG/DL (ref 0.76–1.27)
CREAT SERPL-MCNC: 0.8 MG/DL (ref 0.5–1.2)
CRP SERPL-MCNC: 9.76 MG/DL (ref 0–0.5)
D-LACTATE SERPL-SCNC: 1.4 MMOL/L (ref 0.5–2)
DEPRECATED RDW RBC AUTO: 40.3 FL (ref 37–54)
ERYTHROCYTE [DISTWIDTH] IN BLOOD BY AUTOMATED COUNT: 12.1 % (ref 12.3–15.4)
ERYTHROCYTE [SEDIMENTATION RATE] IN BLOOD: 56 MM/HR (ref 0–20)
GFR AFRICAN AMERICAN: >59
GFR NON-AFRICAN AMERICAN: >60
GFR SERPL CREATININE-BSD FRML MDRD: 105 ML/MIN/1.73
GLUCOSE BLD-MCNC: 321 MG/DL (ref 74–109)
GLUCOSE BLDC GLUCOMTR-MCNC: 232 MG/DL (ref 70–130)
GLUCOSE BLDC GLUCOMTR-MCNC: 281 MG/DL (ref 70–130)
GLUCOSE SERPL-MCNC: 246 MG/DL (ref 65–99)
HBA1C MFR BLD: 9.6 % (ref 4–6)
HCT VFR BLD AUTO: 33.5 % (ref 37.5–51)
HCT VFR BLD CALC: 37.5 % (ref 42–52)
HDLC SERPL-MCNC: 49 MG/DL (ref 55–121)
HEMOGLOBIN: 12 G/DL (ref 14–18)
HGB BLD-MCNC: 11.2 G/DL (ref 13–17.7)
LDL CHOLESTEROL CALCULATED: 80 MG/DL
MCH RBC QN AUTO: 30.4 PG (ref 26.6–33)
MCH RBC QN AUTO: 31 PG (ref 27–31)
MCHC RBC AUTO-ENTMCNC: 32 G/DL (ref 33–37)
MCHC RBC AUTO-ENTMCNC: 33.4 G/DL (ref 31.5–35.7)
MCV RBC AUTO: 90.8 FL (ref 79–97)
MCV RBC AUTO: 96.9 FL (ref 80–94)
PDW BLD-RTO: 12.2 % (ref 11.5–14.5)
PLATELET # BLD AUTO: 212 10*3/MM3 (ref 140–450)
PLATELET # BLD: 244 K/UL (ref 130–400)
PMV BLD AUTO: 11.2 FL (ref 6–12)
PMV BLD AUTO: 11.2 FL (ref 9.4–12.4)
POTASSIUM SERPL-SCNC: 3.7 MMOL/L (ref 3.5–5.2)
POTASSIUM SERPL-SCNC: 4.2 MMOL/L (ref 3.5–5)
PROCALCITONIN SERPL-MCNC: 0.07 NG/ML (ref 0–0.25)
RBC # BLD AUTO: 3.69 10*6/MM3 (ref 4.14–5.8)
RBC # BLD: 3.87 M/UL (ref 4.7–6.1)
SARS-COV-2 RNA PNL SPEC NAA+PROBE: NOT DETECTED
SEDIMENTATION RATE, ERYTHROCYTE: 87 MM/HR (ref 0–15)
SODIUM BLD-SCNC: 140 MMOL/L (ref 136–145)
SODIUM SERPL-SCNC: 138 MMOL/L (ref 136–145)
TOTAL PROTEIN: 7.7 G/DL (ref 6.6–8.7)
TRIGL SERPL-MCNC: 75 MG/DL (ref 0–149)
TSH SERPL DL<=0.05 MIU/L-ACNC: 1.46 UIU/ML (ref 0.27–4.2)
WBC # BLD: 17.3 K/UL (ref 4.8–10.8)
WBC NRBC COR # BLD: 15.86 10*3/MM3 (ref 3.4–10.8)

## 2022-01-04 PROCEDURE — 25010000002 PIPERACILLIN SOD-TAZOBACTAM PER 1 G: Performed by: NURSE PRACTITIONER

## 2022-01-04 PROCEDURE — 63710000001 INSULIN LISPRO (HUMAN) PER 5 UNITS: Performed by: NURSE PRACTITIONER

## 2022-01-04 PROCEDURE — 84145 PROCALCITONIN (PCT): CPT | Performed by: NURSE PRACTITIONER

## 2022-01-04 PROCEDURE — 85027 COMPLETE CBC AUTOMATED: CPT | Performed by: NURSE PRACTITIONER

## 2022-01-04 PROCEDURE — 83605 ASSAY OF LACTIC ACID: CPT | Performed by: NURSE PRACTITIONER

## 2022-01-04 PROCEDURE — 87040 BLOOD CULTURE FOR BACTERIA: CPT | Performed by: NURSE PRACTITIONER

## 2022-01-04 PROCEDURE — 82962 GLUCOSE BLOOD TEST: CPT

## 2022-01-04 PROCEDURE — 80048 BASIC METABOLIC PNL TOTAL CA: CPT | Performed by: NURSE PRACTITIONER

## 2022-01-04 PROCEDURE — 87635 SARS-COV-2 COVID-19 AMP PRB: CPT | Performed by: NURSE PRACTITIONER

## 2022-01-04 PROCEDURE — 85652 RBC SED RATE AUTOMATED: CPT | Performed by: NURSE PRACTITIONER

## 2022-01-04 PROCEDURE — 99214 OFFICE O/P EST MOD 30 MIN: CPT | Performed by: INTERNAL MEDICINE

## 2022-01-04 PROCEDURE — 94799 UNLISTED PULMONARY SVC/PX: CPT

## 2022-01-04 PROCEDURE — 25010000002 VANCOMYCIN 10 G RECONSTITUTED SOLUTION: Performed by: FAMILY MEDICINE

## 2022-01-04 PROCEDURE — 86140 C-REACTIVE PROTEIN: CPT | Performed by: NURSE PRACTITIONER

## 2022-01-04 RX ORDER — ONDANSETRON 4 MG/1
4 TABLET, FILM COATED ORAL EVERY 6 HOURS PRN
Status: DISCONTINUED | OUTPATIENT
Start: 2022-01-04 | End: 2022-01-08 | Stop reason: HOSPADM

## 2022-01-04 RX ORDER — ARIPIPRAZOLE 5 MG/1
5 TABLET ORAL DAILY
Status: DISCONTINUED | OUTPATIENT
Start: 2022-01-04 | End: 2022-01-08 | Stop reason: HOSPADM

## 2022-01-04 RX ORDER — CETIRIZINE HYDROCHLORIDE 10 MG/1
10 TABLET ORAL DAILY
Status: DISCONTINUED | OUTPATIENT
Start: 2022-01-05 | End: 2022-01-08 | Stop reason: HOSPADM

## 2022-01-04 RX ORDER — SODIUM CHLORIDE, SODIUM LACTATE, POTASSIUM CHLORIDE, CALCIUM CHLORIDE 600; 310; 30; 20 MG/100ML; MG/100ML; MG/100ML; MG/100ML
75 INJECTION, SOLUTION INTRAVENOUS CONTINUOUS
Status: DISCONTINUED | OUTPATIENT
Start: 2022-01-04 | End: 2022-01-08 | Stop reason: HOSPADM

## 2022-01-04 RX ORDER — SODIUM CHLORIDE 0.9 % (FLUSH) 0.9 %
10 SYRINGE (ML) INJECTION EVERY 12 HOURS SCHEDULED
Status: DISCONTINUED | OUTPATIENT
Start: 2022-01-04 | End: 2022-01-08 | Stop reason: HOSPADM

## 2022-01-04 RX ORDER — MOMETASONE FUROATE 50 UG/1
2 SPRAY, METERED NASAL DAILY
COMMUNITY

## 2022-01-04 RX ORDER — PRAZOSIN HYDROCHLORIDE 1 MG/1
2 CAPSULE ORAL NIGHTLY
COMMUNITY

## 2022-01-04 RX ORDER — PROMETHAZINE HYDROCHLORIDE 25 MG/1
25 TABLET ORAL EVERY 6 HOURS PRN
Status: DISCONTINUED | OUTPATIENT
Start: 2022-01-04 | End: 2022-01-08 | Stop reason: HOSPADM

## 2022-01-04 RX ORDER — SODIUM CHLORIDE 0.9 % (FLUSH) 0.9 %
10 SYRINGE (ML) INJECTION AS NEEDED
Status: DISCONTINUED | OUTPATIENT
Start: 2022-01-04 | End: 2022-01-08 | Stop reason: HOSPADM

## 2022-01-04 RX ORDER — TRAZODONE HYDROCHLORIDE 100 MG/1
200 TABLET ORAL NIGHTLY
Status: DISCONTINUED | OUTPATIENT
Start: 2022-01-04 | End: 2022-01-08 | Stop reason: HOSPADM

## 2022-01-04 RX ORDER — CLONAZEPAM 0.5 MG/1
0.5 TABLET ORAL 2 TIMES DAILY PRN
Status: DISCONTINUED | OUTPATIENT
Start: 2022-01-04 | End: 2022-01-08 | Stop reason: HOSPADM

## 2022-01-04 RX ORDER — DEXTROSE MONOHYDRATE 25 G/50ML
25 INJECTION, SOLUTION INTRAVENOUS
Status: DISCONTINUED | OUTPATIENT
Start: 2022-01-04 | End: 2022-01-08 | Stop reason: HOSPADM

## 2022-01-04 RX ORDER — ACETAMINOPHEN 325 MG/1
650 TABLET ORAL EVERY 4 HOURS PRN
Status: DISCONTINUED | OUTPATIENT
Start: 2022-01-04 | End: 2022-01-08 | Stop reason: HOSPADM

## 2022-01-04 RX ORDER — PRAZOSIN HYDROCHLORIDE 1 MG/1
1 CAPSULE ORAL EVERY MORNING
COMMUNITY
End: 2022-02-18

## 2022-01-04 RX ORDER — TAMSULOSIN HYDROCHLORIDE 0.4 MG/1
0.4 CAPSULE ORAL NIGHTLY
Status: DISCONTINUED | OUTPATIENT
Start: 2022-01-04 | End: 2022-01-08 | Stop reason: HOSPADM

## 2022-01-04 RX ORDER — ATORVASTATIN CALCIUM 40 MG/1
40 TABLET, FILM COATED ORAL NIGHTLY
Status: DISCONTINUED | OUTPATIENT
Start: 2022-01-04 | End: 2022-01-08 | Stop reason: HOSPADM

## 2022-01-04 RX ORDER — ONDANSETRON 2 MG/ML
4 INJECTION INTRAMUSCULAR; INTRAVENOUS EVERY 6 HOURS PRN
Status: DISCONTINUED | OUTPATIENT
Start: 2022-01-04 | End: 2022-01-08 | Stop reason: HOSPADM

## 2022-01-04 RX ORDER — GABAPENTIN 400 MG/1
800 CAPSULE ORAL 3 TIMES DAILY
Status: DISCONTINUED | OUTPATIENT
Start: 2022-01-04 | End: 2022-01-08 | Stop reason: HOSPADM

## 2022-01-04 RX ORDER — HYDROCODONE BITARTRATE AND ACETAMINOPHEN 5; 325 MG/1; MG/1
1 TABLET ORAL EVERY 4 HOURS PRN
Status: DISCONTINUED | OUTPATIENT
Start: 2022-01-04 | End: 2022-01-08 | Stop reason: HOSPADM

## 2022-01-04 RX ORDER — NICOTINE POLACRILEX 4 MG
15 LOZENGE BUCCAL
Status: DISCONTINUED | OUTPATIENT
Start: 2022-01-04 | End: 2022-01-08 | Stop reason: HOSPADM

## 2022-01-04 RX ORDER — LOSARTAN POTASSIUM 50 MG/1
25 TABLET ORAL DAILY
Status: DISCONTINUED | OUTPATIENT
Start: 2022-01-04 | End: 2022-01-08 | Stop reason: HOSPADM

## 2022-01-04 RX ORDER — TERAZOSIN 5 MG/1
5 CAPSULE ORAL NIGHTLY
Status: DISCONTINUED | OUTPATIENT
Start: 2022-01-04 | End: 2022-01-08 | Stop reason: HOSPADM

## 2022-01-04 RX ORDER — HYDROCODONE BITARTRATE AND ACETAMINOPHEN 5; 325 MG/1; MG/1
1 TABLET ORAL 2 TIMES DAILY PRN
COMMUNITY
End: 2022-08-04

## 2022-01-04 RX ORDER — ACETAMINOPHEN 160 MG/5ML
650 SOLUTION ORAL EVERY 4 HOURS PRN
Status: DISCONTINUED | OUTPATIENT
Start: 2022-01-04 | End: 2022-01-08 | Stop reason: HOSPADM

## 2022-01-04 RX ORDER — SUMATRIPTAN 50 MG/1
50 TABLET, FILM COATED ORAL ONCE AS NEEDED
Status: DISCONTINUED | OUTPATIENT
Start: 2022-01-04 | End: 2022-01-08 | Stop reason: HOSPADM

## 2022-01-04 RX ORDER — PROPRANOLOL HYDROCHLORIDE 80 MG/1
80 CAPSULE, EXTENDED RELEASE ORAL DAILY
Status: DISCONTINUED | OUTPATIENT
Start: 2022-01-04 | End: 2022-01-08 | Stop reason: HOSPADM

## 2022-01-04 RX ORDER — PANTOPRAZOLE SODIUM 40 MG/1
40 TABLET, DELAYED RELEASE ORAL 2 TIMES DAILY
Refills: 1 | Status: DISCONTINUED | OUTPATIENT
Start: 2022-01-04 | End: 2022-01-08 | Stop reason: HOSPADM

## 2022-01-04 RX ORDER — ACETAMINOPHEN 650 MG/1
650 SUPPOSITORY RECTAL EVERY 4 HOURS PRN
Status: DISCONTINUED | OUTPATIENT
Start: 2022-01-04 | End: 2022-01-08 | Stop reason: HOSPADM

## 2022-01-04 RX ORDER — LANOLIN ALCOHOL/MO/W.PET/CERES
6 CREAM (GRAM) TOPICAL NIGHTLY PRN
Status: DISCONTINUED | OUTPATIENT
Start: 2022-01-04 | End: 2022-01-08 | Stop reason: HOSPADM

## 2022-01-04 RX ADMIN — HYDROCODONE BITARTRATE AND ACETAMINOPHEN 1 TABLET: 5; 325 TABLET ORAL at 18:00

## 2022-01-04 RX ADMIN — SODIUM CHLORIDE, POTASSIUM CHLORIDE, SODIUM LACTATE AND CALCIUM CHLORIDE 75 ML/HR: 600; 310; 30; 20 INJECTION, SOLUTION INTRAVENOUS at 17:27

## 2022-01-04 RX ADMIN — GABAPENTIN 800 MG: 400 CAPSULE ORAL at 21:02

## 2022-01-04 RX ADMIN — Medication 2000 MG: at 18:00

## 2022-01-04 RX ADMIN — INSULIN LISPRO 5 UNITS: 100 INJECTION, SOLUTION INTRAVENOUS; SUBCUTANEOUS at 21:02

## 2022-01-04 RX ADMIN — INSULIN LISPRO 8 UNITS: 100 INJECTION, SOLUTION INTRAVENOUS; SUBCUTANEOUS at 18:03

## 2022-01-04 RX ADMIN — LOSARTAN POTASSIUM 25 MG: 50 TABLET, FILM COATED ORAL at 18:36

## 2022-01-04 RX ADMIN — ACETAMINOPHEN 650 MG: 325 TABLET ORAL at 21:02

## 2022-01-04 RX ADMIN — PROPRANOLOL HYDROCHLORIDE 80 MG: 80 CAPSULE, EXTENDED RELEASE ORAL at 18:37

## 2022-01-04 RX ADMIN — TAMSULOSIN HYDROCHLORIDE 0.4 MG: 0.4 CAPSULE ORAL at 21:02

## 2022-01-04 RX ADMIN — TAZOBACTAM SODIUM AND PIPERACILLIN SODIUM 3.38 G: 375; 3 INJECTION, SOLUTION INTRAVENOUS at 17:29

## 2022-01-04 RX ADMIN — ARIPIPRAZOLE 5 MG: 5 TABLET ORAL at 18:37

## 2022-01-04 RX ADMIN — TAZOBACTAM SODIUM AND PIPERACILLIN SODIUM 3.38 G: 375; 3 INJECTION, SOLUTION INTRAVENOUS at 22:10

## 2022-01-04 RX ADMIN — TERAZOSIN HYDROCHLORIDE 5 MG: 5 CAPSULE ORAL at 21:02

## 2022-01-04 RX ADMIN — TRAZODONE HYDROCHLORIDE 200 MG: 100 TABLET ORAL at 21:02

## 2022-01-04 RX ADMIN — ATORVASTATIN CALCIUM 40 MG: 40 TABLET, FILM COATED ORAL at 21:02

## 2022-01-04 RX ADMIN — PANTOPRAZOLE SODIUM 40 MG: 40 TABLET, DELAYED RELEASE ORAL at 21:02

## 2022-01-04 ASSESSMENT — PATIENT HEALTH QUESTIONNAIRE - PHQ9
SUM OF ALL RESPONSES TO PHQ QUESTIONS 1-9: 9
6. FEELING BAD ABOUT YOURSELF - OR THAT YOU ARE A FAILURE OR HAVE LET YOURSELF OR YOUR FAMILY DOWN: 0
3. TROUBLE FALLING OR STAYING ASLEEP: 1
2. FEELING DOWN, DEPRESSED OR HOPELESS: 3
9. THOUGHTS THAT YOU WOULD BE BETTER OFF DEAD, OR OF HURTING YOURSELF: 0
SUM OF ALL RESPONSES TO PHQ QUESTIONS 1-9: 9
4. FEELING TIRED OR HAVING LITTLE ENERGY: 1
SUM OF ALL RESPONSES TO PHQ9 QUESTIONS 1 & 2: 3
7. TROUBLE CONCENTRATING ON THINGS, SUCH AS READING THE NEWSPAPER OR WATCHING TELEVISION: 2
1. LITTLE INTEREST OR PLEASURE IN DOING THINGS: 0
10. IF YOU CHECKED OFF ANY PROBLEMS, HOW DIFFICULT HAVE THESE PROBLEMS MADE IT FOR YOU TO DO YOUR WORK, TAKE CARE OF THINGS AT HOME, OR GET ALONG WITH OTHER PEOPLE: 1
5. POOR APPETITE OR OVEREATING: 1
8. MOVING OR SPEAKING SO SLOWLY THAT OTHER PEOPLE COULD HAVE NOTICED. OR THE OPPOSITE, BEING SO FIGETY OR RESTLESS THAT YOU HAVE BEEN MOVING AROUND A LOT MORE THAN USUAL: 1
SUM OF ALL RESPONSES TO PHQ QUESTIONS 1-9: 9
SUM OF ALL RESPONSES TO PHQ QUESTIONS 1-9: 9
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
## 2022-01-04 NOTE — PLAN OF CARE
Goal Outcome Evaluation:              Outcome Summary: Pt AxOx4,  Arrived as direct admit for Dr. Mares's office.  BLE red, dorsalis pedis pulses palpated, Rt foot fourth toe, necrotic.   Up w/ standby assist.  Insulin pump in LLQ.  Glasses at bedside.  SCD's in place.
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
## 2022-01-05 ENCOUNTER — APPOINTMENT (OUTPATIENT)
Dept: GENERAL RADIOLOGY | Facility: HOSPITAL | Age: 57
End: 2022-01-05

## 2022-01-05 LAB
ALBUMIN SERPL-MCNC: 3.4 G/DL (ref 3.5–5.2)
ALBUMIN/GLOB SERPL: 1.1 G/DL
ALP SERPL-CCNC: 68 U/L (ref 39–117)
ALT SERPL W P-5'-P-CCNC: 16 U/L (ref 1–41)
ANION GAP SERPL CALCULATED.3IONS-SCNC: 10 MMOL/L (ref 5–15)
AST SERPL-CCNC: 15 U/L (ref 1–40)
BASOPHILS # BLD AUTO: 0.04 10*3/MM3 (ref 0–0.2)
BASOPHILS NFR BLD AUTO: 0.3 % (ref 0–1.5)
BILIRUB SERPL-MCNC: 0.7 MG/DL (ref 0–1.2)
BUN SERPL-MCNC: 12 MG/DL (ref 6–20)
BUN/CREAT SERPL: 16.9 (ref 7–25)
CALCIUM SPEC-SCNC: 8.6 MG/DL (ref 8.6–10.5)
CHLORIDE SERPL-SCNC: 101 MMOL/L (ref 98–107)
CO2 SERPL-SCNC: 26 MMOL/L (ref 22–29)
CREAT SERPL-MCNC: 0.71 MG/DL (ref 0.76–1.27)
CRP SERPL-MCNC: 11.87 MG/DL (ref 0–0.5)
DEPRECATED RDW RBC AUTO: 40.8 FL (ref 37–54)
EOSINOPHIL # BLD AUTO: 0.19 10*3/MM3 (ref 0–0.4)
EOSINOPHIL NFR BLD AUTO: 1.3 % (ref 0.3–6.2)
ERYTHROCYTE [DISTWIDTH] IN BLOOD BY AUTOMATED COUNT: 12.1 % (ref 12.3–15.4)
ERYTHROCYTE [SEDIMENTATION RATE] IN BLOOD: 39 MM/HR (ref 0–20)
GFR SERPL CREATININE-BSD FRML MDRD: 116 ML/MIN/1.73
GLOBULIN UR ELPH-MCNC: 3 GM/DL
GLUCOSE BLDC GLUCOMTR-MCNC: 297 MG/DL (ref 70–130)
GLUCOSE BLDC GLUCOMTR-MCNC: 303 MG/DL (ref 70–130)
GLUCOSE BLDC GLUCOMTR-MCNC: 336 MG/DL (ref 70–130)
GLUCOSE SERPL-MCNC: 259 MG/DL (ref 65–99)
HCT VFR BLD AUTO: 32.9 % (ref 37.5–51)
HGB BLD-MCNC: 10.9 G/DL (ref 13–17.7)
IMM GRANULOCYTES # BLD AUTO: 0.12 10*3/MM3 (ref 0–0.05)
IMM GRANULOCYTES NFR BLD AUTO: 0.8 % (ref 0–0.5)
LYMPHOCYTES # BLD AUTO: 2.26 10*3/MM3 (ref 0.7–3.1)
LYMPHOCYTES NFR BLD AUTO: 15.1 % (ref 19.6–45.3)
MCH RBC QN AUTO: 30.4 PG (ref 26.6–33)
MCHC RBC AUTO-ENTMCNC: 33.1 G/DL (ref 31.5–35.7)
MCV RBC AUTO: 91.6 FL (ref 79–97)
MONOCYTES # BLD AUTO: 1.06 10*3/MM3 (ref 0.1–0.9)
MONOCYTES NFR BLD AUTO: 7.1 % (ref 5–12)
NEUTROPHILS NFR BLD AUTO: 11.3 10*3/MM3 (ref 1.7–7)
NEUTROPHILS NFR BLD AUTO: 75.4 % (ref 42.7–76)
NRBC BLD AUTO-RTO: 0 /100 WBC (ref 0–0.2)
PLATELET # BLD AUTO: 225 10*3/MM3 (ref 140–450)
PMV BLD AUTO: 11.1 FL (ref 6–12)
POTASSIUM SERPL-SCNC: 4.1 MMOL/L (ref 3.5–5.2)
PROT SERPL-MCNC: 6.4 G/DL (ref 6–8.5)
RBC # BLD AUTO: 3.59 10*6/MM3 (ref 4.14–5.8)
SODIUM SERPL-SCNC: 137 MMOL/L (ref 136–145)
WBC NRBC COR # BLD: 14.97 10*3/MM3 (ref 3.4–10.8)

## 2022-01-05 PROCEDURE — 86140 C-REACTIVE PROTEIN: CPT | Performed by: NURSE PRACTITIONER

## 2022-01-05 PROCEDURE — 99221 1ST HOSP IP/OBS SF/LOW 40: CPT | Performed by: NURSE PRACTITIONER

## 2022-01-05 PROCEDURE — 85652 RBC SED RATE AUTOMATED: CPT | Performed by: NURSE PRACTITIONER

## 2022-01-05 PROCEDURE — 25010000002 VANCOMYCIN 10 G RECONSTITUTED SOLUTION: Performed by: FAMILY MEDICINE

## 2022-01-05 PROCEDURE — 63710000001 INSULIN LISPRO (HUMAN) PER 5 UNITS: Performed by: NURSE PRACTITIONER

## 2022-01-05 PROCEDURE — 80053 COMPREHEN METABOLIC PANEL: CPT | Performed by: NURSE PRACTITIONER

## 2022-01-05 PROCEDURE — 73620 X-RAY EXAM OF FOOT: CPT

## 2022-01-05 PROCEDURE — 82962 GLUCOSE BLOOD TEST: CPT

## 2022-01-05 PROCEDURE — 85025 COMPLETE CBC W/AUTO DIFF WBC: CPT | Performed by: NURSE PRACTITIONER

## 2022-01-05 PROCEDURE — 25010000002 PIPERACILLIN SOD-TAZOBACTAM PER 1 G: Performed by: NURSE PRACTITIONER

## 2022-01-05 RX ADMIN — TERAZOSIN HYDROCHLORIDE 5 MG: 5 CAPSULE ORAL at 19:45

## 2022-01-05 RX ADMIN — PANTOPRAZOLE SODIUM 40 MG: 40 TABLET, DELAYED RELEASE ORAL at 08:12

## 2022-01-05 RX ADMIN — INSULIN LISPRO 10 UNITS: 100 INJECTION, SOLUTION INTRAVENOUS; SUBCUTANEOUS at 12:25

## 2022-01-05 RX ADMIN — ARIPIPRAZOLE 5 MG: 5 TABLET ORAL at 08:12

## 2022-01-05 RX ADMIN — ACETAMINOPHEN 650 MG: 325 TABLET ORAL at 14:26

## 2022-01-05 RX ADMIN — TRAZODONE HYDROCHLORIDE 200 MG: 100 TABLET ORAL at 19:44

## 2022-01-05 RX ADMIN — PROPRANOLOL HYDROCHLORIDE 80 MG: 80 CAPSULE, EXTENDED RELEASE ORAL at 08:12

## 2022-01-05 RX ADMIN — INSULIN LISPRO 8 UNITS: 100 INJECTION, SOLUTION INTRAVENOUS; SUBCUTANEOUS at 08:12

## 2022-01-05 RX ADMIN — TAZOBACTAM SODIUM AND PIPERACILLIN SODIUM 3.38 G: 375; 3 INJECTION, SOLUTION INTRAVENOUS at 05:43

## 2022-01-05 RX ADMIN — PANTOPRAZOLE SODIUM 40 MG: 40 TABLET, DELAYED RELEASE ORAL at 19:46

## 2022-01-05 RX ADMIN — LOSARTAN POTASSIUM 25 MG: 50 TABLET, FILM COATED ORAL at 08:12

## 2022-01-05 RX ADMIN — CETIRIZINE HYDROCHLORIDE 10 MG: 10 TABLET ORAL at 08:12

## 2022-01-05 RX ADMIN — INSULIN LISPRO 10 UNITS: 100 INJECTION, SOLUTION INTRAVENOUS; SUBCUTANEOUS at 19:45

## 2022-01-05 RX ADMIN — GABAPENTIN 800 MG: 400 CAPSULE ORAL at 08:12

## 2022-01-05 RX ADMIN — GABAPENTIN 800 MG: 400 CAPSULE ORAL at 16:25

## 2022-01-05 RX ADMIN — TAZOBACTAM SODIUM AND PIPERACILLIN SODIUM 3.38 G: 375; 3 INJECTION, SOLUTION INTRAVENOUS at 13:31

## 2022-01-05 RX ADMIN — TAMSULOSIN HYDROCHLORIDE 0.4 MG: 0.4 CAPSULE ORAL at 19:45

## 2022-01-05 RX ADMIN — HYDROCODONE BITARTRATE AND ACETAMINOPHEN 1 TABLET: 5; 325 TABLET ORAL at 19:46

## 2022-01-05 RX ADMIN — TAZOBACTAM SODIUM AND PIPERACILLIN SODIUM 3.38 G: 375; 3 INJECTION, SOLUTION INTRAVENOUS at 23:23

## 2022-01-05 RX ADMIN — INSULIN LISPRO 12 UNITS: 100 INJECTION, SOLUTION INTRAVENOUS; SUBCUTANEOUS at 17:50

## 2022-01-05 RX ADMIN — VANCOMYCIN HYDROCHLORIDE 1250 MG: 10 INJECTION, POWDER, LYOPHILIZED, FOR SOLUTION INTRAVENOUS at 16:25

## 2022-01-05 RX ADMIN — GABAPENTIN 800 MG: 400 CAPSULE ORAL at 19:45

## 2022-01-05 RX ADMIN — ATORVASTATIN CALCIUM 40 MG: 40 TABLET, FILM COATED ORAL at 19:46

## 2022-01-05 RX ADMIN — ELVITEGRAVIR, COBICISTAT, EMTRICITABINE, AND TENOFOVIR ALAFENAMIDE 1 TABLET: 150; 150; 200; 10 TABLET ORAL at 19:46

## 2022-01-05 RX ADMIN — VANCOMYCIN HYDROCHLORIDE 1250 MG: 10 INJECTION, POWDER, LYOPHILIZED, FOR SOLUTION INTRAVENOUS at 03:57

## 2022-01-05 RX ADMIN — HYDROCODONE BITARTRATE AND ACETAMINOPHEN 1 TABLET: 5; 325 TABLET ORAL at 05:43

## 2022-01-05 NOTE — PROGRESS NOTES
"Pharmacy Dosing Service  Pharmacokinetics  Vancomycin Follow-up Evaluation    Assessment/Action/Plan:  Current Order: Vancomycin 1250 mg IVPB every 12 hours  Current end date:1/11/22  Levels: vancomycin trough ordered for tomorrow morning prior to 4 AM dose  Additional antimicrobial agent(s): Zosyn    Active Hospital Problems    Diagnosis  POA    Gangrene of toe ([facility]) [I96]  Yes    HIV (human immunodeficiency virus infection) ([facility]) [B20]  Unknown    Acute pain [R52]  Unknown    Anxiety and depression [F41.9, F32.A]  Yes    Tobacco abuse [Z72.0]  Yes    Type 2 diabetes mellitus with hyperglycemia, with long-term current use of insulin ([facility]) [E11.65, Z79.4]  Not Applicable     Calculations reviewed. Trough ordered prior to 4th dose due at 4 AM 01/06/22 , will re-evaluate regimen at that time    Pharmacy will continue to follow daily and adjust dose accordingly.     Subjective:  Donis Yi is a 56 y.o. male currently on Vancomycin 1250 mg IV every 12 hours for the treatment of gangrene of toe, day 2 of treatment.    AUC Model Data:  Regimen: 1250 mg IV every 12 hours.  Exposure target: AUC24 (range)400-600 mg/L.hr   AUC24,ss: 448 mg/L.hr  PAUC*: 58 %  Ctrough,ss: 11.3 mg/L  Pconc*: 25 %  Tox.: 7 %      Objective:  Ht: 180.3 cm (71\"); Wt: 119 kg (263 lb)  Estimated Creatinine Clearance: 156.1 mL/min (A) (by C-G formula based on SCr of 0.71 mg/dL (L)).   Creatinine   Date Value Ref Range Status   01/05/2022 0.71 (L) 0.76 - 1.27 mg/dL Final   01/04/2022 0.77 0.76 - 1.27 mg/dL Final   01/04/2022 0.8 0.5 - 1.2 mg/dL Final   09/16/2021 0.8 0.5 - 1.2 mg/dL Final   06/01/2021 0.79 0.76 - 1.27 mg/dL Final   05/11/2021 0.8 0.5 - 1.2 mg/dL Final      Lab Results   Component Value Date    WBC 14.97 (H) 01/05/2022    WBC 15.86 (H) 01/04/2022    WBC 17.3 (H) 01/04/2022       No results found for: FLORENCIO TELLEZ VANCORANDOM    Culture Results:  Microbiology Results (last 10 days)       Procedure Component Value - " Date/Time    COVID PRE-OP / PRE-PROCEDURE SCREENING ORDER (NO ISOLATION) - Swab, Nasal Cavity [[id number]]  (Normal) Collected: 01/04/22 1525    Lab Status: Final result Specimen: Swab from Nasal Cavity Updated: 01/04/22 1622    Narrative:      The following orders were created for panel order COVID PRE-OP / PRE-PROCEDURE SCREENING ORDER (NO ISOLATION) - Swab, Nasal Cavity.  Procedure                               Abnormality         Status                     ---------                               -----------         ------                     COVID-19,[facility]...[[id number]]  Normal              Final result                 Please view results for these tests on the individual orders.    COVID-19,[facility] IN-HOUSE,Nasal Swab No Transport Media 3-4 HR TAT - Swab, Nasal Cavity [[id number]]  (Normal) Collected: 01/04/22 1525    Lab Status: Final result Specimen: Swab from Nasal Cavity Updated: 01/04/22 1622     COVID19 Not Detected    Narrative:      Fact sheet for providers: https://www.fda.gov/media/830228/download     Fact sheet for patients: https://www.fda.gov/media/182917/download    Test performed by PCR.    Consider negative results in combination with clinical observations, patient history, and epidemiological information.  Fact sheet for providers: https://www.fda.gov/media/174204/download     Fact sheet for patients: https://www.fda.gov/media/847447/download    Test performed by PCR.    Consider negative results in combination with clinical observations, patient history, and epidemiological information.            Melvin Nassar, PharmD   01/05/22 15:50 CST

## 2022-01-05 NOTE — CONSULTS
[facility]  INPATIENT WOUND CONSULTATION    Today's Date: 01/05/22    Patient Name: Donis Yi  MRN: [id number]  CSN: [id number]  PCP: Oksana Mares MD  Referring Provider:   Consulting Provider (From admission, onward)    Start Ordered     Status Ordering Provider    01/04/22 1557 01/04/22 1556  Inpatient Wound Care Provider Consult  Once        Specialty:  Wound Care  Provider:  Estrella Willson APRN Acknowledged ROBINSON, MAURICE S         Attending Provider: Sterling Barfield DO  Length of Stay: 1    SUBJECTIVE   Chief Complaint: Right toe gangrene    HPI: Donis Yi, a 56 y.o.male, presents with a past medical history of mellitus, HIV, hypertension, and osteoporosis.  A full past medical history as listed below.  Patient was [facility] for admission for IV antibiotics and further evaluation of fourth toe of right foot.  Patient presents with ulceration of the right fourth toe that he states was been present for approximately 1 week.  Patient states he does have pain of this area but neuropathy appears to be present.      Visit Dx:  No diagnosis found.  Patient Active Problem List   Diagnosis   • Chronic sinus complaints   • Allergic rhinitis   • ETD (eustachian tube dysfunction)   • Hearing loss, sensorineural   • Deviated nasal septum   • Age related osteoporosis   • Osteoporosis   • Bennett's neuroma, left   • HIV (human immunodeficiency virus infection) (Formerly [facility])   • Acute UTI   • Mixed hyperlipidemia   • SRINIVAS (obstructive sleep apnea)   • Tobacco abuse   • Hepatic steatosis   • Transaminitis   • Type 2 diabetes mellitus with hyperglycemia, with long-term current use of insulin (Formerly [facility])   • Chest pain   • Anemia   • Anxiety and depression   • Gastroesophageal reflux disease without esophagitis   • HIV-1 infection, symptomatic (Formerly [facility])   • Hypertension   • Other insomnia   • History of MI (myocardial infarction)   • Abnormal stress test   • Stable angina pectoris (Formerly [facility])   •  Chest pain   • Pilonidal abscess   • Encounter for screening for malignant neoplasm of colon   • Gangrene of toe (HCC)   • HIV (human immunodeficiency virus infection) (HCC)   • Acute pain       History:   Past Medical History:   Diagnosis Date   • Allergic rhinitis    • Anxiety    • Bursitis    • DDD (degenerative disc disease), cervical    • Depression    • HIV (human immunodeficiency virus infection) (HCC)    • HIV disease (HCC)    • HLD (hyperlipidemia)    • HSP ([place] purpura) (HCC)    • HTN (hypertension)    • Migraine    • Myocardial infarction (HCC)    • Osteoporosis    • Sleep apnea    • Type 2 diabetes mellitus (HCC)      Past Surgical History:   Procedure Laterality Date   • ANKLE SURGERY     • CARDIAC CATHETERIZATION N/A 6/24/2020    Procedure: Coronary angiography;  Surgeon: Deon Bailey MD;  Location: [facility] CATH INVASIVE LOCATION;  Service: Cardiology;  Laterality: N/A;  11am start time   • CARDIAC CATHETERIZATION N/A 6/24/2020    Procedure: Percutaneous Coronary Intervention;  Surgeon: Deon Bailey MD;  Location: [facility] CATH INVASIVE LOCATION;  Service: Cardiology;  Laterality: N/A;   • COLONOSCOPY  06/19/2009    Normal exam repeat in 10 years   • COLONOSCOPY N/A 8/26/2021    Procedure: COLONOSCOPY WITH ANESTHESIA;  Surgeon: Byron Rosenberg MD;  Location: [facility] ENDOSCOPY;  Service: Gastroenterology;  Laterality: N/A;  pre screen  post poor prep  dr yg gutiérrez   • ENDOSCOPY N/A 8/26/2021    Procedure: ESOPHAGOGASTRODUODENOSCOPY WITH ANESTHESIA;  Surgeon: Byron Rosenberg MD;  Location: [facility] ENDOSCOPY;  Service: Gastroenterology;  Laterality: N/A;  pre GERD  post normal  dr yg gutiérrez   • FOREARM SURGERY     • LYMPH NODE BIOPSY     • PILONIDAL CYSTECTOMY N/A 12/1/2020    Procedure: EXCISION PILONIDAL ABSCESS;  Surgeon: Ashlyn Posey MD;  Location: [facility] OR;  Service: General;  Laterality: N/A;     Social History     Socioeconomic History   • Marital status:     Tobacco Use   • Smoking status: Current Every Day Smoker     Packs/day: 0.50     Types: Cigarettes     Start date: 1988   • Smokeless tobacco: Never Used   Substance and Sexual Activity   • Alcohol use: Yes     Comment: occasionally   • Drug use: No   • Sexual activity: Defer     Family History   Problem Relation Age of Onset   • Diabetes Mother    • Hypertension Mother    • Thyroid disease Mother    • Hypertension Father    • Thyroid disease Father    • Arrhythmia Father    • Diabetes Maternal Grandfather    • Heart disease Maternal Grandfather    • Hypertension Maternal Grandfather    • Diabetes Paternal Grandmother    • Stroke Paternal Grandmother    • Heart disease Paternal Grandmother    • Hypertension Paternal Grandmother    • Thyroid disease Paternal Grandmother    • Hypertension Paternal Grandfather    • Hypertension Sister    • No Known Problems Brother    • Colon cancer Neg Hx    • Colon polyps Neg Hx        Allergies:  Allergies   Allergen Reactions   • Amitriptyline Anaphylaxis   • Amitriptyline Hcl Anaphylaxis   • Darvon [Propoxyphene] Anaphylaxis   • Zithromax [Azithromycin] Anaphylaxis       Medications:    Current Facility-Administered Medications:   •  acetaminophen (TYLENOL) tablet 650 mg, 650 mg, Oral, Q4H PRN, 650 mg at 01/04/22 2102 **OR** acetaminophen (TYLENOL) 160 MG/5ML solution 650 mg, 650 mg, Oral, Q4H PRN **OR** acetaminophen (TYLENOL) suppository 650 mg, 650 mg, Rectal, Q4H PRN, Juli Horton, SOBIA  •  ARIPiprazole (ABILIFY) tablet 5 mg, 5 mg, Oral, Daily, Juli Horton, APRN, 5 mg at 01/05/22 0812  •  atorvastatin (LIPITOR) tablet 40 mg, 40 mg, Oral, Nightly, Juli Horton APRN, 40 mg at 01/04/22 2102  •  cetirizine (zyrTEC) tablet 10 mg, 10 mg, Oral, Daily, Juli Horton, APRN, 10 mg at 01/05/22 0812  •  clonazePAM (KlonoPIN) tablet 0.5 mg, 0.5 mg, Oral, BID PRN, Juli Horton, APRN  •  dextrose (D50W) (25 g/50 mL) IV injection 25 g, 25 g, Intravenous, Q15 Min  PRN, Juli Horton, APRN  •  dextrose (GLUTOSE) oral gel 15 g, 15 g, Oral, Q15 Min PRN, Juli Horton, APRN  •  Jflpbjc-Dpmpb-Rmcsudws-TenofAF (GENVOYA) 750-577-925-10 MG per tablet 1 tablet, 1 tablet, Oral, Daily, Juli Horton, APRN  •  gabapentin (NEURONTIN) capsule 800 mg, 800 mg, Oral, TID, Juli Horton, APRN, 800 mg at 01/05/22 0812  •  glucagon (human recombinant) (GLUCAGEN DIAGNOSTIC) injection 1 mg, 1 mg, Subcutaneous, Q15 Min PRN, Juli Horton, APRN  •  HYDROcodone-acetaminophen (NORCO) 5-325 MG per tablet 1 tablet, 1 tablet, Oral, Q4H PRN, Juli Horton, APRN, 1 tablet at 01/05/22 0543  •  insulin lispro (humaLOG) injection 0-14 Units, 0-14 Units, Subcutaneous, 4x Daily With Meals & Nightly, Jlui Horton APRN, 8 Units at 01/05/22 0812  •  lactated ringers infusion, 75 mL/hr, Intravenous, Continuous, Juli Horton APRN, Last Rate: 75 mL/hr at 01/04/22 1727, 75 mL/hr at 01/04/22 1727  •  losartan (COZAAR) tablet 25 mg, 25 mg, Oral, Daily, Juli Horton, APRN, 25 mg at 01/05/22 0812  •  melatonin tablet 6 mg, 6 mg, Oral, Nightly PRN, Juli Horton, APRN  •  ondansetron (ZOFRAN) tablet 4 mg, 4 mg, Oral, Q6H PRN **OR** ondansetron (ZOFRAN) injection 4 mg, 4 mg, Intravenous, Q6H PRN, Juli Horton APRN  •  pantoprazole (PROTONIX) EC tablet 40 mg, 40 mg, Oral, BID, Juli Horton, APRN, 40 mg at 01/05/22 0812  •  piperacillin-tazobactam (ZOSYN) 3.375 g in iso-osmotic dextrose 50 ml (premix), 3.375 g, Intravenous, Q8H, Juli Horotn, APRN, 3.375 g at 01/05/22 0543  •  promethazine (PHENERGAN) tablet 25 mg, 25 mg, Oral, Q6H PRN, Juli Horton, APRN  •  propranolol LA (INDERAL LA) 24 hr capsule 80 mg, 80 mg, Oral, Daily, Juli Horton, APRN, 80 mg at 01/05/22 0812  •  sodium chloride 0.9 % flush 10 mL, 10 mL, Intravenous, Q12H, Juli Horton, APRN  •  sodium chloride 0.9 % flush 10 mL, 10 mL, Intravenous, PRN, Juli Horton, APRN  •   SUMAtriptan (IMITREX) tablet 50 mg, 50 mg, Oral, Once PRN, Juli Horton, APRN  •  tamsulosin (FLOMAX) 24 hr capsule 0.4 mg, 0.4 mg, Oral, Nightly, Juli Horton, APRN, 0.4 mg at 01/04/22 2102  •  terazosin (HYTRIN) capsule 5 mg, 5 mg, Oral, Nightly, Juli Horton, APRN, 5 mg at 01/04/22 2102  •  traZODone (DESYREL) tablet 200 mg, 200 mg, Oral, Nightly, Juli Horton, APRN, 200 mg at 01/04/22 2102  •  vancomycin 1250 mg/250 mL 0.9% NS IVPB ([facility]), 1,250 mg, Intravenous, Q12H, Sterling Barfield DO, 1,250 mg at 01/05/22 0357    Review of Systems:   Review of Systems   Constitutional: Negative for chills and fever.   HENT: Negative for sinus pressure and sinus pain.    Respiratory: Negative for cough and shortness of breath.    Cardiovascular: Negative for chest pain and palpitations.   Gastrointestinal: Negative for abdominal distention and abdominal pain.   Genitourinary: Negative for frequency and urgency.   Musculoskeletal: Positive for gait problem and myalgias. Negative for arthralgias.   Skin: Positive for color change and wound.   Neurological: Positive for numbness. Negative for dizziness.   Psychiatric/Behavioral: Negative for agitation and confusion.         OBJECTIVE     Vitals:    01/05/22 0729   BP: 109/61   Pulse: 83   Resp: 20   Temp: 97.8 °F (36.6 °C)   SpO2: 95%       PHYSICAL EXAM  Physical Exam  Vitals and nursing note reviewed.   Constitutional:       General: He is awake.      Appearance: He is obese.   HENT:      Head: Normocephalic and atraumatic.   Eyes:      General: Lids are normal. Gaze aligned appropriately.   Cardiovascular:      Rate and Rhythm: Normal rate and regular rhythm.   Pulmonary:      Effort: Pulmonary effort is normal. No respiratory distress.   Abdominal:      General: Abdomen is protuberant.      Palpations: Abdomen is soft.   Musculoskeletal:      Cervical back: Normal range of motion and neck supple.        Feet:    Feet:      Comments: Diabetic ulceration  of right fourth toe-erythema present of right foot with swelling of fourth toe.  Open ulceration assist of distal fourth probes to bone.  Purulent drainage expressed from the site.  Periwound area is macerated.  Skin:     General: Skin is warm and dry.      Findings: Erythema and wound present.   Neurological:      Mental Status: He is alert and oriented to person, place, and time.   Psychiatric:         Attention and Perception: Attention normal.         Behavior: Behavior is cooperative.                      Results Review:  Lab Results (last 48 hours)     Procedure Component Value Units Date/Time    Comprehensive Metabolic Panel [[id number]]  (Abnormal) Collected: 01/05/22 0623    Specimen: Blood Updated: 01/05/22 0713     Glucose 259 mg/dL      BUN 12 mg/dL      Creatinine 0.71 mg/dL      Sodium 137 mmol/L      Potassium 4.1 mmol/L      Chloride 101 mmol/L      CO2 26.0 mmol/L      Calcium 8.6 mg/dL      Total Protein 6.4 g/dL      Albumin 3.40 g/dL      ALT (SGPT) 16 U/L      AST (SGOT) 15 U/L      Alkaline Phosphatase 68 U/L      Total Bilirubin 0.7 mg/dL      eGFR Non African Amer 116 mL/min/1.73      Globulin 3.0 gm/dL      A/G Ratio 1.1 g/dL      BUN/Creatinine Ratio 16.9     Anion Gap 10.0 mmol/L     Narrative:      GFR Normal >60  Chronic Kidney Disease <60  Kidney Failure <15      C-reactive Protein [[id number]]  (Abnormal) Collected: 01/05/22 0623    Specimen: Blood Updated: 01/05/22 0713     C-Reactive Protein 11.87 mg/dL     Sedimentation Rate [[id number]]  (Abnormal) Collected: 01/05/22 0623    Specimen: Blood Updated: 01/05/22 0659     Sed Rate 39 mm/hr     CBC Auto Differential [[id number]]  (Abnormal) Collected: 01/05/22 0623    Specimen: Blood Updated: 01/05/22 0652     WBC 14.97 10*3/mm3      RBC 3.59 10*6/mm3      Hemoglobin 10.9 g/dL      Hematocrit 32.9 %      MCV 91.6 fL      MCH 30.4 pg      MCHC 33.1 g/dL      RDW 12.1 %      RDW-SD 40.8 fl      MPV 11.1 fL      Platelets 225 10*3/mm3       "Neutrophil % 75.4 %      Lymphocyte % 15.1 %      Monocyte % 7.1 %      Eosinophil % 1.3 %      Basophil % 0.3 %      Immature Grans % 0.8 %      Neutrophils, Absolute 11.30 10*3/mm3      Lymphocytes, Absolute 2.26 10*3/mm3      Monocytes, Absolute 1.06 10*3/mm3      Eosinophils, Absolute 0.19 10*3/mm3      Basophils, Absolute 0.04 10*3/mm3      Immature Grans, Absolute 0.12 10*3/mm3      nRBC 0.0 /100 WBC     POC Glucose Once [[id number]]  (Abnormal) Collected: 01/04/22 2052    Specimen: Blood Updated: 01/04/22 2104     Glucose 232 mg/dL      Comment: : [id number] Carissa De SouzaerMeter ID: [id number]       Blood Culture - Blood, Hand, Right [[id number]] Collected: 01/04/22 1700    Specimen: Blood from Hand, Right Updated: 01/04/22 1812    POC Glucose Once [[id number]]  (Abnormal) Collected: 01/04/22 1720    Specimen: Blood Updated: 01/04/22 1731     Glucose 281 mg/dL      Comment: : [id number] Gonzalo Reyes SMeter ID: [id number]       Procalcitonin [[id number]]  (Normal) Collected: 01/04/22 1602    Specimen: Blood Updated: 01/04/22 1648     Procalcitonin 0.07 ng/mL     Narrative:      As a Marker for Sepsis (Non-Neonates):     1. <0.5 ng/mL represents a low risk of severe sepsis and/or septic shock.  2. >2 ng/mL represents a high risk of severe sepsis and/or septic shock.    As a Marker for Lower Respiratory Tract Infections that require antibiotic therapy:  PCT on Admission     Antibiotic Therapy             6-12 Hrs later  >0.5                          Strongly Recommended            >0.25 - <0.5             Recommended  0.1 - 0.25                  Discouraged                       Remeasure/reassess PCT  <0.1                         Strongly Discouraged         Remeasure/reassess PCT      As 28 day mortality risk marker: \"Change in Procalcitonin Result\" (>80% or <=80%) if Day 0 (or Day 1) and Day 4 values are available. Refer to http://www.[facility]-pct-calculator.com/    Change in PCT <=80 %   A decrease of PCT " levels below or equal to 80% defines a positive change in PCT test result representing a higher risk for 28-day all-cause mortality of patients diagnosed with severe sepsis or septic shock.    Change in PCT >80 %   A decrease of PCT levels of more than 80% defines a negative change in PCT result representing a lower risk for 28-day all-cause mortality of patients diagnosed with severe sepsis or septic shock.                C-reactive Protein [[id number]]  (Abnormal) Collected: 01/04/22 1602    Specimen: Blood Updated: 01/04/22 1641     C-Reactive Protein 9.76 mg/dL     Basic Metabolic Panel [[id number]]  (Abnormal) Collected: 01/04/22 1602    Specimen: Blood Updated: 01/04/22 1641     Glucose 246 mg/dL      BUN 14 mg/dL      Creatinine 0.77 mg/dL      Sodium 138 mmol/L      Potassium 3.7 mmol/L      Chloride 102 mmol/L      CO2 25.0 mmol/L      Calcium 8.7 mg/dL      eGFR Non African Amer 105 mL/min/1.73      BUN/Creatinine Ratio 18.2     Anion Gap 11.0 mmol/L     Narrative:      GFR Normal >60  Chronic Kidney Disease <60  Kidney Failure <15      Lactic Acid, Plasma [[id number]]  (Normal) Collected: 01/04/22 1602    Specimen: Blood Updated: 01/04/22 1638     Lactate 1.4 mmol/L     Blood Culture - Blood, Hand, Right [[id number]] Collected: 01/04/22 1558    Specimen: Blood from Hand, Right Updated: 01/04/22 1634    Sedimentation Rate [[id number]]  (Abnormal) Collected: 01/04/22 1602    Specimen: Blood Updated: 01/04/22 1633     Sed Rate 56 mm/hr     CBC (No Diff) [[id number]]  (Abnormal) Collected: 01/04/22 1602    Specimen: Blood Updated: 01/04/22 1625     WBC 15.86 10*3/mm3      RBC 3.69 10*6/mm3      Hemoglobin 11.2 g/dL      Hematocrit 33.5 %      MCV 90.8 fL      MCH 30.4 pg      MCHC 33.4 g/dL      RDW 12.1 %      RDW-SD 40.3 fl      MPV 11.2 fL      Platelets 212 10*3/mm3     COVID PRE-OP / PRE-PROCEDURE SCREENING ORDER (NO ISOLATION) - Swab, Nasal Cavity [[id number]]  (Normal) Collected: 01/04/22 1525    Specimen:  Swab from Nasal Cavity Updated: 01/04/22 1622    Narrative:      The following orders were created for panel order COVID PRE-OP / PRE-PROCEDURE SCREENING ORDER (NO ISOLATION) - Swab, Nasal Cavity.  Procedure                               Abnormality         Status                     ---------                               -----------         ------                     COVID-19,[facility] IN-[facility]...[[id number]]  Normal              Final result                 Please view results for these tests on the individual orders.    COVID-19,[facility] IN-HOUSE,Nasal Swab No Transport Media 3-4 HR TAT - Swab, Nasal Cavity [[id number]]  (Normal) Collected: 01/04/22 1525    Specimen: Swab from Nasal Cavity Updated: 01/04/22 1622     COVID19 Not Detected    Narrative:      Fact sheet for providers: https://www.fda.gov/media/944069/download     Fact sheet for patients: https://www.fda.gov/media/328335/download    Test performed by PCR.    Consider negative results in combination with clinical observations, patient history, and epidemiological information.  Fact sheet for providers: https://www.fda.gov/media/639566/download     Fact sheet for patients: https://www.fda.gov/media/011149/download    Test performed by PCR.    Consider negative results in combination with clinical observations, patient history, and epidemiological information.        Imaging Results (Last 72 Hours)     ** No results found for the last 72 hours. **             ASSESSMENT/PLAN       Examination and evaluation of wound(s) was performed.    DIAGNOSIS:   Diabetic ulcer of right 4th toe  Type 2 diabetes mellitus with hyperglycemia, with long-term current use of insulin  Tobacco abuse  HIV  Acute pain      PLAN:     Start     Ordered    01/05/22 2000  Wound Care  Every 12 Hours        Question Answer Comment   Wound Locations Right 4th toe    Wound Care Instructions Clean with NS.  Apply Betadine soaked gauze to wound.  Wrap with Kerlix.        01/05/22 1118   XR  Foot 2 View Right [[id number]] (Order [id number])  Order  Date: 1/5/2022 Department: [address] Ordering/Authorizing: Estrella Willson APRN                    Discussed findings and treatment plan including risks, benefits, and treatment options with patient in detail. Patient agreed with treatment plan.      This document has been electronically signed by SOBIA Brenner on 1/5/2022 11:12 CST

## 2022-01-05 NOTE — PLAN OF CARE
Goal Outcome Evaluation:      A&Ox4. Wound to right toe no drainage noted, cleansed with NS, applied betadine soaked gauze to wound and wrapped with kerlix. Up with standby assist. SCDs. Voiding. Baseline N/T in feet. VSS. Safety maintained

## 2022-01-05 NOTE — PROGRESS NOTES
UPDATE 01/05/22 - we were able to retrieve an early refill on this patient's Genvoya from the local pharmacy. His supply is here in the hospital and can be continued inpatient. His supply of medication should be sent home with him at discharge as the refill belongs to him. For questions, please call central pharmacy. Thank you.  -Melvin Nassar, PharmD  01/05/22  16:21 CST       Pharmacy Note  Non-formulary Home Medication: Genvoya    Genvoya (Elvitegravir/Cobicistat/Emtricitabine/Tenofovir Alafenamide) is a non-formulary home med that has been reordered on admission. Patient reports he is unable to provide his own supply of Genvoya at this time. Genvoya is not available at [facility] either.     Pharmacy stocks the following components which may provide a viable alternative regimen while inpatient:    - Dolutegravir (Tivicay) 50 mg tablet  - Ritonavir (Norvir) 100 mg tablet  - Emtricitabine (Emtriva) 200 mg capsule  - Tenofovir Alafenamide (Vemlidy) 25 mg tablet    Infectious disease service has already been consulted by hospitalist and can provide further input and dosing recommendations.   Please contact Pharmacy [phone] if additional information needed.       Robson Marti, PharmD  01/04/22 19:19 CST

## 2022-01-05 NOTE — PLAN OF CARE
Problem: Adult Inpatient Plan of Care  Goal: Plan of Care Review  Outcome: Ongoing, Progressing  Flowsheets (Taken 1/5/2022 2930)  Progress: no change  Plan of Care Reviewed With: patient  Outcome Summary: Pt is A&Ox4. Slept well most of the night. Wound to right toe with no drainage. Up with standby assist. SCDs. Voiding. Pt removed insulin pump. Baseline N/T in feet. VSS. Safety maintained.

## 2022-01-05 NOTE — CASE MANAGEMENT/SOCIAL WORK
Discharge Planning Assessment  [facility]     Patient Name: Donis Yi  MRN: [id number]  Today's Date: 1/5/2022    Admit Date: 1/4/2022     Discharge Needs Assessment     Row Name 01/05/22 0927       Living Environment    Lives With significant other    Name(s) of Who Lives With Patient JUSTA MOREIRA    Current Living Arrangements home/apartment/condo    Primary Care Provided by spouse/significant other    Provides Primary Care For no one, unable/limited ability to care for self    Caregiving Concerns DIAB/GANGRENE TOE    Family Caregiver if Needed significant other; parent(s)    Family Caregiver Names DWAYNE YI, MOTHER;  ANKUR DIAZB, SPOUSE    Quality of Family Relationships helpful; involved; supportive    Able to Return to Prior Arrangements other (see comments)  TEMPORARY PLACEMENT D/T [facility]CTIM, [facility]    Living Arrangement Comments [facility] RESIDENT D/T REGINALD       Resource/Environmental Concerns    Resource/Environmental Concerns none    Financial Concerns other (see comments)  TEMPORARY ARRANGEMENTS D/T [facility]    Transportation Concerns car, none       Transition Planning    Patient/Family Anticipates Transition to home with family; home with help/services; inpatient rehabilitation facility    Patient/Family Anticipated Services at Transition home health care; rehabilitation services    Transportation Anticipated family or friend will provide       Discharge Needs Assessment    Readmission Within the Last 30 Days no previous admission in last 30 days    Equipment Currently Used at Home none; other (see comments)  DESTROYED BY REGINALD    Concerns to be Addressed denies needs/concerns at this time    Concerns Comments [facility]CTIM    Anticipated Changes Related to Illness none    Equipment Needed After Discharge rollator; shower chair; quad cane    Outpatient/Agency/Support Group Needs skilled nursing facility; homecare agency    Discharge Facility/Level of Care Needs home  with home health; rehabilitation facility    Discharge Coordination/Progress PATIENT CURRENTLY AT [facility], LOST EVERYTHING IN [facility] INCLUDING DME ROLLATOR/ QUAD CANE.  WILL FOLLOW FOR DC NEEDS. HOME HEALTH VS SHORT TERM PLACEMENT FOR WOUND CARE. SW TO FOLLOW FOR REFERRALS. PATIENT HAS PCP AND RX COVERAGE.               Discharge Plan    No documentation.               Continued Care and Services - Admitted Since 1/4/2022    Coordination has not been started for this encounter.          Demographic Summary    No documentation.                Functional Status    No documentation.                Psychosocial    No documentation.                Abuse/Neglect    No documentation.                Legal    No documentation.                Substance Abuse    No documentation.                Patient Forms    No documentation.                   Edith Grace RN
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
## 2022-01-06 ENCOUNTER — ANESTHESIA EVENT (OUTPATIENT)
Dept: PERIOP | Facility: HOSPITAL | Age: 57
End: 2022-01-06

## 2022-01-06 ENCOUNTER — APPOINTMENT (OUTPATIENT)
Dept: GENERAL RADIOLOGY | Facility: HOSPITAL | Age: 57
End: 2022-01-06

## 2022-01-06 ENCOUNTER — ANESTHESIA (OUTPATIENT)
Dept: PERIOP | Facility: HOSPITAL | Age: 57
End: 2022-01-06

## 2022-01-06 PROBLEM — F43.11 ACUTE POST-TRAUMATIC STRESS DISORDER: Status: ACTIVE | Noted: 2022-01-06

## 2022-01-06 PROBLEM — M86.9 OSTEOMYELITIS OF FOURTH TOE OF RIGHT FOOT: Status: ACTIVE | Noted: 2022-01-06

## 2022-01-06 PROBLEM — I96 GANGRENE OF TOE OF RIGHT FOOT (HCC): Status: ACTIVE | Noted: 2022-01-06

## 2022-01-06 LAB
ANION GAP SERPL CALCULATED.3IONS-SCNC: 9 MMOL/L (ref 5–15)
BASOPHILS # BLD AUTO: 0.04 10*3/MM3 (ref 0–0.2)
BASOPHILS NFR BLD AUTO: 0.4 % (ref 0–1.5)
BUN SERPL-MCNC: 14 MG/DL (ref 6–20)
BUN/CREAT SERPL: 16.9 (ref 7–25)
CALCIUM SPEC-SCNC: 8.7 MG/DL (ref 8.6–10.5)
CHLORIDE SERPL-SCNC: 99 MMOL/L (ref 98–107)
CO2 SERPL-SCNC: 29 MMOL/L (ref 22–29)
CREAT SERPL-MCNC: 0.83 MG/DL (ref 0.76–1.27)
DEPRECATED RDW RBC AUTO: 41.8 FL (ref 37–54)
EOSINOPHIL # BLD AUTO: 0.23 10*3/MM3 (ref 0–0.4)
EOSINOPHIL NFR BLD AUTO: 2.1 % (ref 0.3–6.2)
ERYTHROCYTE [DISTWIDTH] IN BLOOD BY AUTOMATED COUNT: 11.9 % (ref 12.3–15.4)
GFR SERPL CREATININE-BSD FRML MDRD: 97 ML/MIN/1.73
GLUCOSE BLDC GLUCOMTR-MCNC: 156 MG/DL (ref 70–130)
GLUCOSE BLDC GLUCOMTR-MCNC: 177 MG/DL (ref 70–130)
GLUCOSE BLDC GLUCOMTR-MCNC: 260 MG/DL (ref 70–130)
GLUCOSE BLDC GLUCOMTR-MCNC: 266 MG/DL (ref 70–130)
GLUCOSE BLDC GLUCOMTR-MCNC: 346 MG/DL (ref 70–130)
GLUCOSE SERPL-MCNC: 391 MG/DL (ref 65–99)
HCT VFR BLD AUTO: 33.1 % (ref 37.5–51)
HGB BLD-MCNC: 11.1 G/DL (ref 13–17.7)
IMM GRANULOCYTES # BLD AUTO: 0.11 10*3/MM3 (ref 0–0.05)
IMM GRANULOCYTES NFR BLD AUTO: 1 % (ref 0–0.5)
LYMPHOCYTES # BLD AUTO: 2.35 10*3/MM3 (ref 0.7–3.1)
LYMPHOCYTES NFR BLD AUTO: 21.4 % (ref 19.6–45.3)
MCH RBC QN AUTO: 31.6 PG (ref 26.6–33)
MCHC RBC AUTO-ENTMCNC: 33.5 G/DL (ref 31.5–35.7)
MCV RBC AUTO: 94.3 FL (ref 79–97)
MONOCYTES # BLD AUTO: 0.67 10*3/MM3 (ref 0.1–0.9)
MONOCYTES NFR BLD AUTO: 6.1 % (ref 5–12)
NEUTROPHILS NFR BLD AUTO: 69 % (ref 42.7–76)
NEUTROPHILS NFR BLD AUTO: 7.57 10*3/MM3 (ref 1.7–7)
NRBC BLD AUTO-RTO: 0 /100 WBC (ref 0–0.2)
PLATELET # BLD AUTO: 223 10*3/MM3 (ref 140–450)
PMV BLD AUTO: 10.9 FL (ref 6–12)
POTASSIUM SERPL-SCNC: 4.2 MMOL/L (ref 3.5–5.2)
RBC # BLD AUTO: 3.51 10*6/MM3 (ref 4.14–5.8)
SODIUM SERPL-SCNC: 137 MMOL/L (ref 136–145)
VANCOMYCIN TROUGH SERPL-MCNC: 8.8 MCG/ML (ref 5–20)
WBC NRBC COR # BLD: 10.97 10*3/MM3 (ref 3.4–10.8)

## 2022-01-06 PROCEDURE — 63710000001 INSULIN LISPRO (HUMAN) PER 5 UNITS: Performed by: NURSE PRACTITIONER

## 2022-01-06 PROCEDURE — 99233 SBSQ HOSP IP/OBS HIGH 50: CPT | Performed by: NURSE PRACTITIONER

## 2022-01-06 PROCEDURE — 94799 UNLISTED PULMONARY SVC/PX: CPT

## 2022-01-06 PROCEDURE — 88305 TISSUE EXAM BY PATHOLOGIST: CPT | Performed by: PODIATRIST

## 2022-01-06 PROCEDURE — 0Y6V0Z0 DETACHMENT AT RIGHT 4TH TOE, COMPLETE, OPEN APPROACH: ICD-10-PCS | Performed by: PODIATRIST

## 2022-01-06 PROCEDURE — 80048 BASIC METABOLIC PNL TOTAL CA: CPT | Performed by: NURSE PRACTITIONER

## 2022-01-06 PROCEDURE — 63710000001 INSULIN DETEMIR PER 5 UNITS: Performed by: NURSE PRACTITIONER

## 2022-01-06 PROCEDURE — 25010000002 FENTANYL CITRATE (PF) 50 MCG/ML SOLUTION: Performed by: ANESTHESIOLOGY

## 2022-01-06 PROCEDURE — 73660 X-RAY EXAM OF TOE(S): CPT

## 2022-01-06 PROCEDURE — 80202 ASSAY OF VANCOMYCIN: CPT | Performed by: FAMILY MEDICINE

## 2022-01-06 PROCEDURE — 25010000002 VANCOMYCIN 10 G RECONSTITUTED SOLUTION: Performed by: FAMILY MEDICINE

## 2022-01-06 PROCEDURE — 87205 SMEAR GRAM STAIN: CPT | Performed by: PODIATRIST

## 2022-01-06 PROCEDURE — 87176 TISSUE HOMOGENIZATION CULTR: CPT | Performed by: PODIATRIST

## 2022-01-06 PROCEDURE — 82962 GLUCOSE BLOOD TEST: CPT

## 2022-01-06 PROCEDURE — 87147 CULTURE TYPE IMMUNOLOGIC: CPT | Performed by: PODIATRIST

## 2022-01-06 PROCEDURE — 25010000002 PIPERACILLIN SOD-TAZOBACTAM PER 1 G: Performed by: NURSE PRACTITIONER

## 2022-01-06 PROCEDURE — 99221 1ST HOSP IP/OBS SF/LOW 40: CPT | Performed by: PODIATRIST

## 2022-01-06 PROCEDURE — 87070 CULTURE OTHR SPECIMN AEROBIC: CPT | Performed by: PODIATRIST

## 2022-01-06 PROCEDURE — 88311 DECALCIFY TISSUE: CPT | Performed by: PODIATRIST

## 2022-01-06 PROCEDURE — 85025 COMPLETE CBC W/AUTO DIFF WBC: CPT | Performed by: NURSE PRACTITIONER

## 2022-01-06 PROCEDURE — 25010000002 PROPOFOL 10 MG/ML EMULSION: Performed by: NURSE ANESTHETIST, CERTIFIED REGISTERED

## 2022-01-06 PROCEDURE — 28820 AMPUTATION OF TOE: CPT | Performed by: PODIATRIST

## 2022-01-06 RX ORDER — LIDOCAINE HYDROCHLORIDE 10 MG/ML
0.5 INJECTION, SOLUTION EPIDURAL; INFILTRATION; INTRACAUDAL; PERINEURAL ONCE AS NEEDED
Status: DISCONTINUED | OUTPATIENT
Start: 2022-01-06 | End: 2022-01-06 | Stop reason: HOSPADM

## 2022-01-06 RX ORDER — HYDROMORPHONE HYDROCHLORIDE 1 MG/ML
0.5 INJECTION, SOLUTION INTRAMUSCULAR; INTRAVENOUS; SUBCUTANEOUS
Status: DISCONTINUED | OUTPATIENT
Start: 2022-01-06 | End: 2022-01-06 | Stop reason: HOSPADM

## 2022-01-06 RX ORDER — ACETAMINOPHEN 650 MG
TABLET, EXTENDED RELEASE ORAL AS NEEDED
Status: DISCONTINUED | OUTPATIENT
Start: 2022-01-06 | End: 2022-01-06 | Stop reason: HOSPADM

## 2022-01-06 RX ORDER — FLUMAZENIL 0.1 MG/ML
0.2 INJECTION INTRAVENOUS AS NEEDED
Status: DISCONTINUED | OUTPATIENT
Start: 2022-01-06 | End: 2022-01-06 | Stop reason: HOSPADM

## 2022-01-06 RX ORDER — DEXMEDETOMIDINE HYDROCHLORIDE 100 UG/ML
INJECTION, SOLUTION INTRAVENOUS AS NEEDED
Status: DISCONTINUED | OUTPATIENT
Start: 2022-01-06 | End: 2022-01-06 | Stop reason: SURG

## 2022-01-06 RX ORDER — OXYCODONE AND ACETAMINOPHEN 10; 325 MG/1; MG/1
1 TABLET ORAL ONCE AS NEEDED
Status: DISCONTINUED | OUTPATIENT
Start: 2022-01-06 | End: 2022-01-06 | Stop reason: HOSPADM

## 2022-01-06 RX ORDER — FENTANYL CITRATE 50 UG/ML
25 INJECTION, SOLUTION INTRAMUSCULAR; INTRAVENOUS
Status: DISCONTINUED | OUTPATIENT
Start: 2022-01-06 | End: 2022-01-06 | Stop reason: HOSPADM

## 2022-01-06 RX ORDER — NALOXONE HCL 0.4 MG/ML
0.04 VIAL (ML) INJECTION AS NEEDED
Status: DISCONTINUED | OUTPATIENT
Start: 2022-01-06 | End: 2022-01-06 | Stop reason: HOSPADM

## 2022-01-06 RX ORDER — SODIUM CHLORIDE 0.9 % (FLUSH) 0.9 %
3-10 SYRINGE (ML) INJECTION AS NEEDED
Status: DISCONTINUED | OUTPATIENT
Start: 2022-01-06 | End: 2022-01-06 | Stop reason: HOSPADM

## 2022-01-06 RX ORDER — SODIUM CHLORIDE 0.9 % (FLUSH) 0.9 %
3 SYRINGE (ML) INJECTION EVERY 12 HOURS SCHEDULED
Status: DISCONTINUED | OUTPATIENT
Start: 2022-01-06 | End: 2022-01-06 | Stop reason: HOSPADM

## 2022-01-06 RX ORDER — PROPOFOL 10 MG/ML
VIAL (ML) INTRAVENOUS AS NEEDED
Status: DISCONTINUED | OUTPATIENT
Start: 2022-01-06 | End: 2022-01-06 | Stop reason: SURG

## 2022-01-06 RX ORDER — LIDOCAINE HYDROCHLORIDE 20 MG/ML
INJECTION, SOLUTION EPIDURAL; INFILTRATION; INTRACAUDAL; PERINEURAL AS NEEDED
Status: DISCONTINUED | OUTPATIENT
Start: 2022-01-06 | End: 2022-01-06 | Stop reason: SURG

## 2022-01-06 RX ORDER — BUPIVACAINE HCL/0.9 % NACL/PF 0.1 %
2 PLASTIC BAG, INJECTION (ML) EPIDURAL ONCE
Status: DISCONTINUED | OUTPATIENT
Start: 2022-01-06 | End: 2022-01-06 | Stop reason: HOSPADM

## 2022-01-06 RX ORDER — ONDANSETRON 2 MG/ML
4 INJECTION INTRAMUSCULAR; INTRAVENOUS AS NEEDED
Status: DISCONTINUED | OUTPATIENT
Start: 2022-01-06 | End: 2022-01-06 | Stop reason: HOSPADM

## 2022-01-06 RX ORDER — SODIUM CHLORIDE, SODIUM LACTATE, POTASSIUM CHLORIDE, CALCIUM CHLORIDE 600; 310; 30; 20 MG/100ML; MG/100ML; MG/100ML; MG/100ML
100 INJECTION, SOLUTION INTRAVENOUS CONTINUOUS
Status: DISCONTINUED | OUTPATIENT
Start: 2022-01-06 | End: 2022-01-08 | Stop reason: HOSPADM

## 2022-01-06 RX ORDER — LABETALOL HYDROCHLORIDE 5 MG/ML
5 INJECTION, SOLUTION INTRAVENOUS
Status: DISCONTINUED | OUTPATIENT
Start: 2022-01-06 | End: 2022-01-06 | Stop reason: HOSPADM

## 2022-01-06 RX ORDER — BUPIVACAINE HYDROCHLORIDE 5 MG/ML
INJECTION, SOLUTION PERINEURAL AS NEEDED
Status: DISCONTINUED | OUTPATIENT
Start: 2022-01-06 | End: 2022-01-06 | Stop reason: HOSPADM

## 2022-01-06 RX ORDER — MAGNESIUM HYDROXIDE 1200 MG/15ML
LIQUID ORAL AS NEEDED
Status: DISCONTINUED | OUTPATIENT
Start: 2022-01-06 | End: 2022-01-06 | Stop reason: HOSPADM

## 2022-01-06 RX ADMIN — ARIPIPRAZOLE 5 MG: 5 TABLET ORAL at 09:38

## 2022-01-06 RX ADMIN — LIDOCAINE HYDROCHLORIDE 100 MG: 20 INJECTION, SOLUTION EPIDURAL; INFILTRATION; INTRACAUDAL; PERINEURAL at 15:48

## 2022-01-06 RX ADMIN — SODIUM CHLORIDE, POTASSIUM CHLORIDE, SODIUM LACTATE AND CALCIUM CHLORIDE 100 ML/HR: 600; 310; 30; 20 INJECTION, SOLUTION INTRAVENOUS at 17:35

## 2022-01-06 RX ADMIN — GABAPENTIN 800 MG: 400 CAPSULE ORAL at 17:37

## 2022-01-06 RX ADMIN — LOSARTAN POTASSIUM 25 MG: 50 TABLET, FILM COATED ORAL at 09:39

## 2022-01-06 RX ADMIN — TERAZOSIN HYDROCHLORIDE 5 MG: 5 CAPSULE ORAL at 21:25

## 2022-01-06 RX ADMIN — INSULIN LISPRO 10 UNITS: 100 INJECTION, SOLUTION INTRAVENOUS; SUBCUTANEOUS at 08:15

## 2022-01-06 RX ADMIN — GABAPENTIN 800 MG: 400 CAPSULE ORAL at 21:25

## 2022-01-06 RX ADMIN — TAZOBACTAM SODIUM AND PIPERACILLIN SODIUM 3.38 G: 375; 3 INJECTION, SOLUTION INTRAVENOUS at 21:25

## 2022-01-06 RX ADMIN — INSULIN DETEMIR 20 UNITS: 100 INJECTION, SOLUTION SUBCUTANEOUS at 21:23

## 2022-01-06 RX ADMIN — INSULIN LISPRO 12 UNITS: 100 INJECTION, SOLUTION INTRAVENOUS; SUBCUTANEOUS at 12:35

## 2022-01-06 RX ADMIN — TAZOBACTAM SODIUM AND PIPERACILLIN SODIUM 3.38 G: 375; 3 INJECTION, SOLUTION INTRAVENOUS at 05:00

## 2022-01-06 RX ADMIN — PROPOFOL 100 MG: 10 INJECTION, EMULSION INTRAVENOUS at 15:48

## 2022-01-06 RX ADMIN — ACETAMINOPHEN 650 MG: 325 TABLET ORAL at 21:22

## 2022-01-06 RX ADMIN — ATORVASTATIN CALCIUM 40 MG: 40 TABLET, FILM COATED ORAL at 21:24

## 2022-01-06 RX ADMIN — VANCOMYCIN HYDROCHLORIDE 1250 MG: 10 INJECTION, POWDER, LYOPHILIZED, FOR SOLUTION INTRAVENOUS at 04:59

## 2022-01-06 RX ADMIN — INSULIN LISPRO 4 UNITS: 100 INJECTION, SOLUTION INTRAVENOUS; SUBCUTANEOUS at 18:22

## 2022-01-06 RX ADMIN — VANCOMYCIN HYDROCHLORIDE 1500 MG: 10 INJECTION, POWDER, LYOPHILIZED, FOR SOLUTION INTRAVENOUS at 12:34

## 2022-01-06 RX ADMIN — HYDROCODONE BITARTRATE AND ACETAMINOPHEN 1 TABLET: 5; 325 TABLET ORAL at 09:39

## 2022-01-06 RX ADMIN — TAZOBACTAM SODIUM AND PIPERACILLIN SODIUM 3.38 G: 375; 3 INJECTION, SOLUTION INTRAVENOUS at 14:09

## 2022-01-06 RX ADMIN — HYDROCODONE BITARTRATE AND ACETAMINOPHEN 1 TABLET: 5; 325 TABLET ORAL at 01:56

## 2022-01-06 RX ADMIN — FENTANYL CITRATE 25 MCG: 50 INJECTION INTRAMUSCULAR; INTRAVENOUS at 15:22

## 2022-01-06 RX ADMIN — ELVITEGRAVIR, COBICISTAT, EMTRICITABINE, AND TENOFOVIR ALAFENAMIDE 1 TABLET: 150; 150; 200; 10 TABLET ORAL at 21:25

## 2022-01-06 RX ADMIN — PANTOPRAZOLE SODIUM 40 MG: 40 TABLET, DELAYED RELEASE ORAL at 21:25

## 2022-01-06 RX ADMIN — PROPRANOLOL HYDROCHLORIDE 80 MG: 80 CAPSULE, EXTENDED RELEASE ORAL at 09:38

## 2022-01-06 RX ADMIN — HYDROCODONE BITARTRATE AND ACETAMINOPHEN 1 TABLET: 5; 325 TABLET ORAL at 19:31

## 2022-01-06 RX ADMIN — GABAPENTIN 800 MG: 400 CAPSULE ORAL at 09:39

## 2022-01-06 RX ADMIN — DEXMEDETOMIDINE 50 MCG: 100 INJECTION, SOLUTION, CONCENTRATE INTRAVENOUS at 15:55

## 2022-01-06 RX ADMIN — TAMSULOSIN HYDROCHLORIDE 0.4 MG: 0.4 CAPSULE ORAL at 21:24

## 2022-01-06 RX ADMIN — PANTOPRAZOLE SODIUM 40 MG: 40 TABLET, DELAYED RELEASE ORAL at 09:38

## 2022-01-06 RX ADMIN — TRAZODONE HYDROCHLORIDE 200 MG: 100 TABLET ORAL at 21:24

## 2022-01-06 RX ADMIN — CETIRIZINE HYDROCHLORIDE 10 MG: 10 TABLET ORAL at 09:39

## 2022-01-06 RX ADMIN — SODIUM CHLORIDE, POTASSIUM CHLORIDE, SODIUM LACTATE AND CALCIUM CHLORIDE 75 ML/HR: 600; 310; 30; 20 INJECTION, SOLUTION INTRAVENOUS at 01:56

## 2022-01-06 NOTE — PROGRESS NOTES
Pharmacy Dosing Service  Antimicrobial  Vancomycin    Assessment/Plan:  Current dosage: Vancomycin 1250 mg IVPB every 12 hours  Indication: SSTI  Start date: 01/04/22  Current end date: 01/11/22    Other antimicrobial agent(s): Zosyn    Level(s): 01/06/2022 03:09 trough = 8.8 mcg/mL, 10h 44m post-dose 1250 mg    01/06/22 trough returned below therapeutic range. Dosage adjusted based on pharmacokinetic model data: 1500 mg IV every 12 hours    Patient has multiple risk factors for vancomycin-associated nephrotoxicity, including concurrent Zosyn tx, obesity, and type 2 diabetes. Patient will require more frequent monitoring of vancomycin levels. Pharmacy will continue to follow daily.     Subjective:  Donis Yi is a 56 y.o. male admitted 1/4/2022  1:37 PM.      Anti-Infectives (From admission, onward)      Ordered     Dose/Rate Route Frequency Start Stop    01/06/22 0816  vancomycin 1500 mg/500 mL 0.9% NS IVPB ([facility])        Ordering Provider: Sterling Barfield DO    15 mg/kg × 92.8 kg (Adjusted)  over 180 Minutes Intravenous Every 12 Hours 01/06/22 1300 01/12/22 0059    01/05/22 1627  Govoixh-Ijtuy-Uvgwntfd-TenofAF (GENVOYA) [phone] MG per tablet 1 tablet        Ordering Provider: Juli Horton APRN    1 tablet Oral Nightly 01/05/22 2200      01/04/22 1512  piperacillin-tazobactam (ZOSYN) 3.375 g in iso-osmotic dextrose 50 ml (premix)        Ordering Provider: Juli Horton APRN    3.375 g  over 4 Hours Intravenous Every 8 Hours 01/04/22 2200 01/11/22 2159    01/04/22 1523  vancomycin 2000 mg/500 mL 0.9% NS IVPB ([facility])        Ordering Provider: Sterling Barfield DO    2,000 mg  over 180 Minutes Intravenous Once 01/04/22 1630 01/04/22 2100    01/04/22 1512  piperacillin-tazobactam (ZOSYN) 3.375 g in iso-osmotic dextrose 50 ml (premix)        Ordering Provider: Juli Horton APRN    3.375 g  over 30 Minutes Intravenous Once 01/04/22 1600 01/04/22 9459              Past Medical /  "Surgical / Social History reviewed.     Objective:  Ht: 180.3 cm (71\"); Wt: 119 kg (263 lb) Body mass index is 36.68 kg/m².      BUN   Date Value Ref Range Status   01/05/2022 12 6 - 20 mg/dL Final   01/04/2022 14 6 - 20 mg/dL Final   01/04/2022 15 6 - 20 mg/dL Final   09/16/2021 18 6 - 20 mg/dL Final   06/01/2021 14 6 - 20 mg/dL Final   05/11/2021 15 6 - 20 mg/dL Final      Creatinine   Date Value Ref Range Status   01/05/2022 0.71 (L) 0.76 - 1.27 mg/dL Final   01/04/2022 0.77 0.76 - 1.27 mg/dL Final   01/04/2022 0.8 0.5 - 1.2 mg/dL Final   09/16/2021 0.8 0.5 - 1.2 mg/dL Final   06/01/2021 0.79 0.76 - 1.27 mg/dL Final   05/11/2021 0.8 0.5 - 1.2 mg/dL Final      Lab Results   Component Value Date    WBC 10.97 (H) 01/06/2022    WBC 14.97 (H) 01/05/2022    WBC 15.86 (H) 01/04/2022      Lab Results   Component Value Date    ALBUMIN 3.40 (L) 01/05/2022       Lab Results   Component Value Date    VANCOTROUGH 8.80 01/06/2022         Culture Results:  Microbiology Results (last 10 days)       Procedure Component Value - Date/Time    Blood Culture - Blood, Hand, Right [[id number]]  (Normal) Collected: 01/04/22 1700    Lab Status: Preliminary result Specimen: Blood from Hand, Right Updated: 01/05/22 1815     Blood Culture No growth at 24 hours    Blood Culture - Blood, Hand, Right [[id number]]  (Normal) Collected: 01/04/22 1558    Lab Status: Preliminary result Specimen: Blood from Hand, Right Updated: 01/05/22 1645     Blood Culture No growth at 24 hours    COVID PRE-OP / PRE-PROCEDURE SCREENING ORDER (NO ISOLATION) - Swab, Nasal Cavity [[id number]]  (Normal) Collected: 01/04/22 1525    Lab Status: Final result Specimen: Swab from Nasal Cavity Updated: 01/04/22 9172    Narrative:      The following orders were created for panel order COVID PRE-OP / PRE-PROCEDURE SCREENING ORDER (NO ISOLATION) - Swab, Nasal Cavity.  Procedure                               Abnormality         Status                     ---------                  "              -----------         ------                     COVID-19,[facility] [employer] IN-[facility]...[[id number]]  Normal              Final result                 Please view results for these tests on the individual orders.    COVID-19,[facility] [employer] IN-HOUSE,Nasal Swab No Transport Media 3-4 HR TAT - Swab, Nasal Cavity [[id number]]  (Normal) Collected: 01/04/22 1525    Lab Status: Final result Specimen: Swab from Nasal Cavity Updated: 01/04/22 1622     COVID19 Not Detected    Narrative:      Fact sheet for providers: https://www.fda.gov/media/678339/download     Fact sheet for patients: https://www.fda.gov/media/707128/download    Test performed by PCR.    Consider negative results in combination with clinical observations, patient history, and epidemiological information.  Fact sheet for providers: https://www.fda.gov/media/680315/download     Fact sheet for patients: https://www.fda.gov/media/791459/download    Test performed by PCR.    Consider negative results in combination with clinical observations, patient history, and epidemiological information.            Robson Marti, PharmD  01/06/22 08:29 CST

## 2022-01-06 NOTE — PLAN OF CARE
Goal Outcome Evaluation:  Plan of Care Reviewed With: other (see comments)      Progress: no change  Outcome Summary: Oral intake not available to review at this time as well as no po fluid intake for today. CCHO diet. Boost Glucose control added BID. Increased ntn needs to aid in wound healing. May benefit from MVI. Cont to follow for plan of care.

## 2022-01-06 NOTE — OP NOTE
POST-OPERATIVE NOTE  Patient: Donis Yi  MRN: [id number]    YOB: 1965  Age: 56 y.o.  Sex: male  Unit:  PAD OR Room/Bed: PAD OR/MAIN OR Location: [facility]    Date of Operation: 1/6/2022     Pre-Operative Diagnosis:  Osteomyelitis of fourth toe of right foot (HCC) [M86.9]  Gangrene of toe (HCC) [I96]     Post-Operative Diagnosis:  1. Same as pre-operative    Operative Procedures:  1. 4th Toe  Amputation - Right Foot    Surgeon: Surgeon(s) and Role:     * Pacheco Harper DPM - Primary    Anesthesia: MAC    Hemostasis: Anatomic Dissection, Ankle Tourniquet, Electric cauterization    Estimated Blood Loss: minimal    Pathology:   Specimens     ID Source Type Tests Collected By Collected At Frozen?    1 Toe, Right Tissue · TISSUE / BONE CULTURE   Pacheco Harper DPM 1/6/22 1603     Description: RIGHT FOURTH TOE TISSUE FOR CULTURE    A Toe, Right Tissue · TISSUE PATHOLOGY EXAM   Pacheco Harper DPM 1/6/22 1605     Description: RIGHT FOURTH TOE          Materials: Nothing was implanted during the procedure    Injectables: 15mL 0.5% Marcaine Plain    Fluids: See anesthesia log.    Drains: None    Complications: None    Post-Op Condition: Stable. Patient tolerated procedure and anesthesia well. Patient left the operating room with vital signs stable and vascular status intact.     Operative Findings: Consistent with pre-operative diagnosis.    Indications for Procedure: This 56 y.o. patient presents with acute infection of the right fourth toe.  Patient require surgical amputation of the toe for elimination of infection. The patient has been NPO for greater than 8 hours. The patient is ready for surgical intervention.    DESCRIPTION OF PROCEDURE  Under mild sedation, patient was brought into the operating room and place on the operating room table in supine position. Pre-operative antibiotic was given. A pneumatic ankle tourniquet was placed about the patient's right ankle. The patient was  placed under MAC anesthesia, then local block was performed at the surgical site using the above mentioned local anesthesia. The foot and ankle were then scrubbed, prepped and draped in the usual aseptic manner.  The foot was elevated for period of roughly 5 minutes and the tourniquet was inflated.    Attention was then directed to the right fourth toe where a modified racquet type incision was made about the digit.  The incision was made full-thickness down to the level of bone.  There is moderate purulence to the distal digit.  No malodor was noted.  Dissection was continued to the MTPJ and due to extent of infection, the toe was disarticulated at the site and will be sent to pathology.  A section of tissue will be sent to microbiology for culture.  The head of the fourth metatarsal was inspected and noted to be intact.  No significant proximal infection was appreciated.  All nonviable tissue was debrided from the wound.  Electrocautery was utilized for hemostasis.  The wound was then flushed with copious amounts sterile saline via pulse lavage.  Deep and subcutaneous tissues reapproximated utilizing 3-0 Vicryl with care being taken to cover the exposed metatarsal head.  Skin was reapproximated coapted utilizing 4-0 nylon in simple suturing technique.  The tourniquet was deflated during closure and hemostasis had been obtained.  Capillary refill remained to all remaining toes.    A bandage consisting of Adaptic, Betadine soaked gauze, Kerlix, ABD, and Coban was applied.    The patient tolerated the procedures and anesthesia well.  He was transferred to the recovery room with vital signs stable and vascular status intact to the right lower extremity.  After period of postoperative monitoring, the patient will be transferred back to the floor under care of the hospitalist team.  He is to be nonweightbearing to the surgical foot.  He will need to remain on antibiotic therapy until complete resolution of infection.

## 2022-01-06 NOTE — ANESTHESIA PREPROCEDURE EVALUATION
Anesthesia Evaluation     Patient summary reviewed   no history of anesthetic complications:  NPO Solid Status: > 8 hours  NPO Liquid Status: > 8 hours           Airway   Mallampati: II  TM distance: >3 FB  Neck ROM: full  No difficulty expected  Dental      Pulmonary    (+) asthma,sleep apnea,   Cardiovascular     (+) hypertension, past MI , hyperlipidemia,   (-) cardiac stents      Neuro/Psych  (-) seizures, TIA, CVA  GI/Hepatic/Renal/Endo    (+) obesity,   diabetes mellitus using insulin,   (-) liver disease, no renal disease    Musculoskeletal         ROS comment: OSteomyelitis  Abdominal    Substance History      OB/GYN          Other        ROS/Med Hx Other: HIV                  Anesthesia Plan    ASA 3     MAC     intravenous induction     Anesthetic plan, all risks, benefits, and alternatives have been provided, discussed and informed consent has been obtained with: patient.

## 2022-01-06 NOTE — PROGRESS NOTES
[facility]  INPATIENT WOUND CARE    PROGRESS NOTE    Today's Date: 01/06/22    Patient Name: Donis Yi  MRN: [id number]  [facility]: [id number]  PCP: Oksana Mares MD  Referring Provider: Sterling Barfield DO  Attending Provider: Sterling Barfield DO  Length of Stay: 2    SUBJECTIVE   Chief Complaint: Right fourth toe wound    HPI: Patient's x-rays resulted with osseous changes of the right fourth toe including soft tissue edema and gas.  This appears to be highly suspicious for acute osteomyelitis.  Consult was placed yesterday afternoon for Dr. Harper.  I agree with this consult.        Visit Dx:  No diagnosis found.  Patient Active Problem List   Diagnosis   • Chronic sinus complaints   • Allergic rhinitis   • ETD (eustachian tube dysfunction)   • Hearing loss, sensorineural   • Deviated nasal septum   • Age related osteoporosis   • Osteoporosis   • Bennett's neuroma, left   • HIV (human immunodeficiency virus infection) ([facility])   • Acute UTI   • Mixed hyperlipidemia   • SRINIVAS (obstructive sleep apnea)   • Tobacco abuse   • Hepatic steatosis   • Transaminitis   • Type 2 diabetes mellitus with hyperglycemia, with long-term current use of insulin ([facility])   • Chest pain   • Anemia   • Anxiety and depression   • Gastroesophageal reflux disease without esophagitis   • HIV-1 infection, symptomatic ([facility])   • Hypertension   • Other insomnia   • History of MI (myocardial infarction)   • Abnormal stress test   • Stable angina pectoris ([facility])   • Chest pain   • Pilonidal abscess   • Encounter for screening for malignant neoplasm of colon   • Gangrene of toe ([facility])   • HIV (human immunodeficiency virus infection) ([facility])   • Acute pain       History:   Past Medical History:   Diagnosis Date   • Allergic rhinitis    • Anxiety    • Bursitis    • DDD (degenerative disc disease), cervical    • Depression    • HIV (human immunodeficiency virus infection) ([facility])    • HIV disease ([facility])    • HLD (hyperlipidemia)    • HSP  (Henoch Schonlein purpura) ([facility])    • HTN (hypertension)    • Migraine    • Myocardial infarction ([facility])    • Osteoporosis    • Sleep apnea    • Type 2 diabetes mellitus ([facility])      Past Surgical History:   Procedure Laterality Date   • ANKLE SURGERY     • CARDIAC CATHETERIZATION N/A 6/24/2020    Procedure: Coronary angiography;  Surgeon: Deon Bailey MD;  Location: [facility] CATH INVASIVE LOCATION;  Service: Cardiology;  Laterality: N/A;  11am start time   • CARDIAC CATHETERIZATION N/A 6/24/2020    Procedure: Percutaneous Coronary Intervention;  Surgeon: Deon Bailey MD;  Location: [facility] CATH INVASIVE LOCATION;  Service: Cardiology;  Laterality: N/A;   • COLONOSCOPY  06/19/2009    Normal exam repeat in 10 years   • COLONOSCOPY N/A 8/26/2021    Procedure: COLONOSCOPY WITH ANESTHESIA;  Surgeon: Byron Rosenberg MD;  Location: [facility] ENDOSCOPY;  Service: Gastroenterology;  Laterality: N/A;  pre screen  post poor prep  dr yg gutiérrez   • ENDOSCOPY N/A 8/26/2021    Procedure: ESOPHAGOGASTRODUODENOSCOPY WITH ANESTHESIA;  Surgeon: Byron Rosenberg MD;  Location: [facility] ENDOSCOPY;  Service: Gastroenterology;  Laterality: N/A;  pre GERD  post normal  dr yg gutiérrez   • FOREARM SURGERY     • LYMPH NODE BIOPSY     • PILONIDAL CYSTECTOMY N/A 12/1/2020    Procedure: EXCISION PILONIDAL ABSCESS;  Surgeon: Ashlyn Posey MD;  Location: [facility] OR;  Service: General;  Laterality: N/A;     Social History     Socioeconomic History   • Marital status:    Tobacco Use   • Smoking status: Current Every Day Smoker     Packs/day: 0.50     Types: Cigarettes     Start date: 1988   • Smokeless tobacco: Never Used   Substance and Sexual Activity   • Alcohol use: Yes     Comment: occasionally   • Drug use: No   • Sexual activity: Defer       Allergies:  Allergies   Allergen Reactions   • Amitriptyline Anaphylaxis   • Amitriptyline Hcl Anaphylaxis   • Darvon [Propoxyphene] Anaphylaxis   • Zithromax [Azithromycin] Anaphylaxis        Medications:    Current Facility-Administered Medications:   •  acetaminophen (TYLENOL) tablet 650 mg, 650 mg, Oral, Q4H PRN, 650 mg at 01/05/22 1426 **OR** acetaminophen (TYLENOL) 160 MG/5ML solution 650 mg, 650 mg, Oral, Q4H PRN **OR** acetaminophen (TYLENOL) suppository 650 mg, 650 mg, Rectal, Q4H PRN, Juli Horton, APRN  •  ARIPiprazole (ABILIFY) tablet 5 mg, 5 mg, Oral, Daily, Juli Horton, APRN, 5 mg at 01/06/22 0938  •  atorvastatin (LIPITOR) tablet 40 mg, 40 mg, Oral, Nightly, Juli Horton, APRN, 40 mg at 01/05/22 1946  •  cetirizine (zyrTEC) tablet 10 mg, 10 mg, Oral, Daily, Juli Horton, APRN, 10 mg at 01/06/22 0939  •  clonazePAM (KlonoPIN) tablet 0.5 mg, 0.5 mg, Oral, BID PRN, Juli Horton, APRN  •  dextrose (D50W) (25 g/50 mL) IV injection 25 g, 25 g, Intravenous, Q15 Min PRN, Juli Horton, APRN  •  dextrose (GLUTOSE) oral gel 15 g, 15 g, Oral, Q15 Min PRN, Juli Horton, APRN  •  Ifwzdxl-Evztc-Squhbspt-TenofAF (GENVOYA) [phone] MG per tablet 1 tablet, 1 tablet, Oral, Nightly, Juli Horton, APRN, 1 tablet at 01/05/22 1946  •  gabapentin (NEURONTIN) capsule 800 mg, 800 mg, Oral, TID, Juli Horton, APRN, 800 mg at 01/06/22 0939  •  glucagon (human recombinant) (GLUCAGEN DIAGNOSTIC) injection 1 mg, 1 mg, Subcutaneous, Q15 Min PRN, Juli Horton, APRN  •  HYDROcodone-acetaminophen (NORCO) 5-325 MG per tablet 1 tablet, 1 tablet, Oral, Q4H PRN, Juli Horton, APRN, 1 tablet at 01/06/22 0939  •  insulin lispro (humaLOG) injection 0-14 Units, 0-14 Units, Subcutaneous, 4x Daily With Meals & Nightly, Juli Horton APRN, 10 Units at 01/06/22 0815  •  lactated ringers infusion, 75 mL/hr, Intravenous, Continuous, Juli Horton, SOBIA, Last Rate: 75 mL/hr at 01/06/22 0156, 75 mL/hr at 01/06/22 0156  •  losartan (COZAAR) tablet 25 mg, 25 mg, Oral, Daily, Juli Horton APRN, 25 mg at 01/06/22 0939  •  melatonin tablet 6 mg, 6 mg, Oral,  Nightly PRN, Juli Horton, APRN  •  ondansetron (ZOFRAN) tablet 4 mg, 4 mg, Oral, Q6H PRN **OR** ondansetron (ZOFRAN) injection 4 mg, 4 mg, Intravenous, Q6H PRN, Juli Horton, APRN  •  pantoprazole (PROTONIX) EC tablet 40 mg, 40 mg, Oral, BID, Juli Horton, APRN, 40 mg at 01/06/22 0938  •  piperacillin-tazobactam (ZOSYN) 3.375 g in iso-osmotic dextrose 50 ml (premix), 3.375 g, Intravenous, Q8H, Juli Horton, APRN, 3.375 g at 01/06/22 0500  •  promethazine (PHENERGAN) tablet 25 mg, 25 mg, Oral, Q6H PRN, Juli Horton, APRN  •  propranolol LA (INDERAL LA) 24 hr capsule 80 mg, 80 mg, Oral, Daily, Juli Horton, APRN, 80 mg at 01/06/22 0938  •  sodium chloride 0.9 % flush 10 mL, 10 mL, Intravenous, Q12H, Juli Horton, APRN  •  sodium chloride 0.9 % flush 10 mL, 10 mL, Intravenous, PRN, Juli Horton, APRN  •  SUMAtriptan (IMITREX) tablet 50 mg, 50 mg, Oral, Once PRN, Juli Horton, APRN  •  tamsulosin (FLOMAX) 24 hr capsule 0.4 mg, 0.4 mg, Oral, Nightly, Juli Horton, APRN, 0.4 mg at 01/05/22 1945  •  terazosin (HYTRIN) capsule 5 mg, 5 mg, Oral, Nightly, Juli Horton, APRN, 5 mg at 01/05/22 1945  •  traZODone (DESYREL) tablet 200 mg, 200 mg, Oral, Nightly, Juli Horton, APRN, 200 mg at 01/05/22 1944  •  vancomycin 1500 mg/500 mL 0.9% NS IVPB ([facility]), 15 mg/kg (Adjusted), Intravenous, Q12H, Barfield, Sterling S, DO    Review of Systems:  Review of Systems   Constitutional: Negative for chills and fever.   HENT: Negative for sinus pressure and sinus pain.    Respiratory: Negative for cough and shortness of breath.    Cardiovascular: Negative for chest pain and palpitations.   Gastrointestinal: Negative for abdominal distention and abdominal pain.   Genitourinary: Negative for frequency and urgency.   Musculoskeletal: Positive for gait problem and myalgias. Negative for arthralgias.   Skin: Positive for color change and wound.   Neurological: Positive for numbness.  Negative for dizziness.   Psychiatric/Behavioral: Negative for agitation and confusion.     OBJECTIVE     Vitals:    01/06/22 0740   BP: 131/64   Pulse: 74   Resp: 18   Temp: 98.5 °F (36.9 °C)   SpO2: 95%       PHYSICAL EXAM  Physical Exam  Vitals and nursing note reviewed.   Constitutional:       General: He is awake.      Appearance: He is obese.   HENT:      Head: Normocephalic and atraumatic.   Eyes:      General: Lids are normal. Gaze aligned appropriately.   Cardiovascular:      Rate and Rhythm: Normal rate and regular rhythm.   Pulmonary:      Effort: Pulmonary effort is normal. No respiratory distress.   Abdominal:      General: Abdomen is protuberant.      Palpations: Abdomen is soft.   Musculoskeletal:      Cervical back: Normal range of motion and neck supple.        Feet:    Feet:      Comments: Diabetic ulceration of right fourth toe-erythema present of right foot with swelling of fourth toe.  Open ulceration assist of distal fourth probes to bone.  Purulent drainage expressed from the site.  Periwound area is macerated.  Skin:     General: Skin is warm and dry.      Findings: Erythema and wound present.   Neurological:      Mental Status: He is alert and oriented to person, place, and time.   Psychiatric:         Attention and Perception: Attention normal.         Behavior: Behavior is cooperative.         Results Review:  Lab Results (last 48 hours)     Procedure Component Value Units Date/Time    Basic Metabolic Panel [[id number]]  (Abnormal) Collected: 01/06/22 0803    Specimen: Blood Updated: 01/06/22 0834     Glucose 391 mg/dL      BUN 14 mg/dL      Creatinine 0.83 mg/dL      Sodium 137 mmol/L      Potassium 4.2 mmol/L      Chloride 99 mmol/L      CO2 29.0 mmol/L      Calcium 8.7 mg/dL      eGFR Non African Amer 97 mL/min/1.73      BUN/Creatinine Ratio 16.9     Anion Gap 9.0 mmol/L     Narrative:      GFR Normal >60  Chronic Kidney Disease <60  Kidney Failure <15      CBC & Differential [[id number]]   (Abnormal) Collected: 01/06/22 0803    Specimen: Blood Updated: 01/06/22 0813    Narrative:      The following orders were created for panel order CBC & Differential.  Procedure                               Abnormality         Status                     ---------                               -----------         ------                     CBC Auto Differential[[id number]]        Abnormal            Final result                 Please view results for these tests on the individual orders.    CBC Auto Differential [[id number]]  (Abnormal) Collected: 01/06/22 0803    Specimen: Blood Updated: 01/06/22 0813     WBC 10.97 10*3/mm3      RBC 3.51 10*6/mm3      Hemoglobin 11.1 g/dL      Hematocrit 33.1 %      MCV 94.3 fL      MCH 31.6 pg      MCHC 33.5 g/dL      RDW 11.9 %      RDW-SD 41.8 fl      MPV 10.9 fL      Platelets 223 10*3/mm3      Neutrophil % 69.0 %      Lymphocyte % 21.4 %      Monocyte % 6.1 %      Eosinophil % 2.1 %      Basophil % 0.4 %      Immature Grans % 1.0 %      Neutrophils, Absolute 7.57 10*3/mm3      Lymphocytes, Absolute 2.35 10*3/mm3      Monocytes, Absolute 0.67 10*3/mm3      Eosinophils, Absolute 0.23 10*3/mm3      Basophils, Absolute 0.04 10*3/mm3      Immature Grans, Absolute 0.11 10*3/mm3      nRBC 0.0 /100 WBC     POC Glucose Once [[id number]]  (Abnormal) Collected: 01/06/22 0756    Specimen: Blood Updated: 01/06/22 0812     Glucose 346 mg/dL      Comment: : [id number] Kristal ButtsMeter ID: [id number]       Vancomycin, Trough Please collect 30-60 minutes prior to dose due 01/06/22 at 0400 [[id number]]  (Normal) Collected: 01/06/22 0309    Specimen: Blood Updated: 01/06/22 0347     Vancomycin Trough 8.80 mcg/mL     POC Glucose Once [[id number]]  (Abnormal) Collected: 01/05/22 1937    Specimen: Blood Updated: 01/05/22 2023     Glucose 336 mg/dL      Comment: : [id number] Leila ConleyMeter ID: [id number]       Blood Culture - Blood, Hand, Right [[id number]]  (Normal) Collected: 01/04/22 7880     Specimen: Blood from Hand, Right Updated: 01/05/22 1815     Blood Culture No growth at 24 hours    Blood Culture - Blood, Hand, Right [[id number]]  (Normal) Collected: 01/04/22 1558    Specimen: Blood from Hand, Right Updated: 01/05/22 1645     Blood Culture No growth at 24 hours    POC Glucose Once [[id number]]  (Abnormal) Collected: 01/05/22 1632    Specimen: Blood Updated: 01/05/22 1643     Glucose 297 mg/dL      Comment: : ANITHA Tushar EniramellMeter ID: [id number]       POC Glucose Once [[id number]]  (Abnormal) Collected: 01/05/22 1221    Specimen: Blood Updated: 01/05/22 1233     Glucose 303 mg/dL      Comment: : NaverusBRITTANY Tushar EniramellMeter ID: [id number]       Comprehensive Metabolic Panel [[id number]]  (Abnormal) Collected: 01/05/22 0623    Specimen: Blood Updated: 01/05/22 0713     Glucose 259 mg/dL      BUN 12 mg/dL      Creatinine 0.71 mg/dL      Sodium 137 mmol/L      Potassium 4.1 mmol/L      Chloride 101 mmol/L      CO2 26.0 mmol/L      Calcium 8.6 mg/dL      Total Protein 6.4 g/dL      Albumin 3.40 g/dL      ALT (SGPT) 16 U/L      AST (SGOT) 15 U/L      Alkaline Phosphatase 68 U/L      Total Bilirubin 0.7 mg/dL      eGFR Non African Amer 116 mL/min/1.73      Globulin 3.0 gm/dL      A/G Ratio 1.1 g/dL      BUN/Creatinine Ratio 16.9     Anion Gap 10.0 mmol/L     Narrative:      GFR Normal >60  Chronic Kidney Disease <60  Kidney Failure <15      C-reactive Protein [[id number]]  (Abnormal) Collected: 01/05/22 0623    Specimen: Blood Updated: 01/05/22 0713     C-Reactive Protein 11.87 mg/dL     Sedimentation Rate [[id number]]  (Abnormal) Collected: 01/05/22 0623    Specimen: Blood Updated: 01/05/22 0659     Sed Rate 39 mm/hr     CBC Auto Differential [[id number]]  (Abnormal) Collected: 01/05/22 0623    Specimen: Blood Updated: 01/05/22 0652     WBC 14.97 10*3/mm3      RBC 3.59 10*6/mm3      Hemoglobin 10.9 g/dL      Hematocrit 32.9 %      MCV 91.6 fL      MCH 30.4 pg      MCHC 33.1 g/dL       "RDW 12.1 %      RDW-SD 40.8 fl      MPV 11.1 fL      Platelets 225 10*3/mm3      Neutrophil % 75.4 %      Lymphocyte % 15.1 %      Monocyte % 7.1 %      Eosinophil % 1.3 %      Basophil % 0.3 %      Immature Grans % 0.8 %      Neutrophils, Absolute 11.30 10*3/mm3      Lymphocytes, Absolute 2.26 10*3/mm3      Monocytes, Absolute 1.06 10*3/mm3      Eosinophils, Absolute 0.19 10*3/mm3      Basophils, Absolute 0.04 10*3/mm3      Immature Grans, Absolute 0.12 10*3/mm3      nRBC 0.0 /100 WBC     POC Glucose Once [[id number]]  (Abnormal) Collected: 01/04/22 2052    Specimen: Blood Updated: 01/04/22 2104     Glucose 232 mg/dL      Comment: : [id number] Carissa TannerMeter ID: [id number]       POC Glucose Once [[id number]]  (Abnormal) Collected: 01/04/22 1720    Specimen: Blood Updated: 01/04/22 1731     Glucose 281 mg/dL      Comment: : [id number] Gonzalo Reyes SMeter ID: [id number]       Procalcitonin [[id number]]  (Normal) Collected: 01/04/22 1602    Specimen: Blood Updated: 01/04/22 1648     Procalcitonin 0.07 ng/mL     Narrative:      As a Marker for Sepsis (Non-Neonates):     1. <0.5 ng/mL represents a low risk of severe sepsis and/or septic shock.  2. >2 ng/mL represents a high risk of severe sepsis and/or septic shock.    As a Marker for Lower Respiratory Tract Infections that require antibiotic therapy:  PCT on Admission     Antibiotic Therapy             6-12 Hrs later  >0.5                          Strongly Recommended            >0.25 - <0.5             Recommended  0.1 - 0.25                  Discouraged                       Remeasure/reassess PCT  <0.1                         Strongly Discouraged         Remeasure/reassess PCT      As 28 day mortality risk marker: \"Change in Procalcitonin Result\" (>80% or <=80%) if Day 0 (or Day 1) and Day 4 values are available. Refer to http://www.[facility]-pct-calculator.com/    Change in PCT <=80 %   A decrease of PCT levels below or equal to 80% defines a positive " change in PCT test result representing a higher risk for 28-day all-cause mortality of patients diagnosed with severe sepsis or septic shock.    Change in PCT >80 %   A decrease of PCT levels of more than 80% defines a negative change in PCT result representing a lower risk for 28-day all-cause mortality of patients diagnosed with severe sepsis or septic shock.                C-reactive Protein [[id number]]  (Abnormal) Collected: 01/04/22 1602    Specimen: Blood Updated: 01/04/22 1641     C-Reactive Protein 9.76 mg/dL     Basic Metabolic Panel [[id number]]  (Abnormal) Collected: 01/04/22 1602    Specimen: Blood Updated: 01/04/22 1641     Glucose 246 mg/dL      BUN 14 mg/dL      Creatinine 0.77 mg/dL      Sodium 138 mmol/L      Potassium 3.7 mmol/L      Chloride 102 mmol/L      CO2 25.0 mmol/L      Calcium 8.7 mg/dL      eGFR Non African Amer 105 mL/min/1.73      BUN/Creatinine Ratio 18.2     Anion Gap 11.0 mmol/L     Narrative:      GFR Normal >60  Chronic Kidney Disease <60  Kidney Failure <15      Lactic Acid, Plasma [[id number]]  (Normal) Collected: 01/04/22 1602    Specimen: Blood Updated: 01/04/22 1638     Lactate 1.4 mmol/L     Sedimentation Rate [[id number]]  (Abnormal) Collected: 01/04/22 1602    Specimen: Blood Updated: 01/04/22 1633     Sed Rate 56 mm/hr     CBC (No Diff) [[id number]]  (Abnormal) Collected: 01/04/22 1602    Specimen: Blood Updated: 01/04/22 1625     WBC 15.86 10*3/mm3      RBC 3.69 10*6/mm3      Hemoglobin 11.2 g/dL      Hematocrit 33.5 %      MCV 90.8 fL      MCH 30.4 pg      MCHC 33.4 g/dL      RDW 12.1 %      RDW-SD 40.3 fl      MPV 11.2 fL      Platelets 212 10*3/mm3     COVID PRE-OP / PRE-PROCEDURE SCREENING ORDER (NO ISOLATION) - Swab, Nasal Cavity [[id number]]  (Normal) Collected: 01/04/22 1525    Specimen: Swab from Nasal Cavity Updated: 01/04/22 1622    Narrative:      The following orders were created for panel order COVID PRE-OP / PRE-PROCEDURE SCREENING ORDER (NO ISOLATION) - Swab,  Nasal Cavity.  Procedure                               Abnormality         Status                     ---------                               -----------         ------                     COVID-19,[facility] [employer] [facility]...[[id number]]  Normal              Final result                 Please view results for these tests on the individual orders.    COVID-19,[facility] [employer] IN-HOUSE,Nasal Swab No Transport Media 3-4 HR TAT - Swab, Nasal Cavity [[id number]]  (Normal) Collected: 01/04/22 1525    Specimen: Swab from Nasal Cavity Updated: 01/04/22 1622     COVID19 Not Detected    Narrative:      Fact sheet for providers: https://www.fda.gov/media/524054/download     Fact sheet for patients: https://www.fda.gov/media/908670/download    Test performed by PCR.    Consider negative results in combination with clinical observations, patient history, and epidemiological information.  Fact sheet for providers: https://www.fda.gov/media/287354/download     Fact sheet for patients: https://www.fda.gov/media/563726/download    Test performed by PCR.    Consider negative results in combination with clinical observations, patient history, and epidemiological information.        Imaging Results (Last 72 Hours)     Procedure Component Value Units Date/Time    XR Foot 2 View Right [[id number]] Collected: 01/05/22 1528     Updated: 01/05/22 1534    Narrative:      EXAM: XR FOOT 2 VW RIGHT-     INDICATION: Concern for osteomyelitis     COMPARISON: None available.     FINDINGS:     Destructive osseous changes in the fourth distal phalanx are noted with  surrounding soft tissue edema and gas. No other focal destructive  osseous changes or area of osseous demineralization. Lisfranc plane is  maintained. No acute fracture or dislocation. Partially imaged distal  fibular plate and screw fixation. Calcaneal height is maintained. Mild  midfoot arthritic change.       Impression:         Destructive osseous changes in the distal fourth phalanx with  surrounding  soft tissue edema and gas. Findings are highly suspicious  for acute osteomyelitis in the absence of trauma.  This report was finalized on 01/05/2022 15:31 by Dr. Siddharth Carias MD.             ASSESSMENT/PLAN       Examination and evaluation of wound(s) was performed.    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  Due to findings of x-ray results, agree with this consult.    Suggest to continue topical care as ordered.      Discussed findings and treatment plan including risks, benefits, and treatment options with patient in detail. Patient agreed with treatment plan.      This document has been electronically signed by SOBIA Brenner on 1/6/2022 10:17 CST       Time spent in face-to-face evaluation, chart review, planning and education 35 minutes with greater than 50% of time spent with patient and in coordination of care.

## 2022-01-06 NOTE — PLAN OF CARE
Problem: Adult Inpatient Plan of Care  Goal: Plan of Care Review  Outcome: Ongoing, Progressing  Flowsheets (Taken 1/6/2022 0511)  Progress: no change  Plan of Care Reviewed With: patient  Outcome Summary: Pt is A&oX4. PRN pain meds given with good relief. Slept well most of the night. Baseline numbness. Dressing to right foot changed. VSS. Safety maintained.

## 2022-01-06 NOTE — CASE MANAGEMENT/SOCIAL WORK
Continued Stay Note   [place]     Patient Name: Donis Yi  MRN: [id number]  Today's Date: 1/6/2022    Admit Date: 1/4/2022     Discharge Plan     Row Name 01/06/22 1630       Plan    Plan Comments Pt is currently off floor for amputation. Will continue to follow for dc planning needs.               Discharge Codes    No documentation.                     AMARILIS White

## 2022-01-06 NOTE — CONSULTS
[facility] - PODIATRY    Today's Date: 01/06/22    Patient Name: Donis Yi  MRN: [id number]  CSN: [id number]  PCP: Oksana Mares MD  Referring Provider: Oksana Mares MD  Attending Provider: Sterling Barfield DO  Length of Stay: 2    SUBJECTIVE   Chief Complaint: Right fourth toe infection    HPI: Donis Yi, a 56 y.o.male, consulted to podiatry for evaluation of right fourth toe.  Patient has history of DM2, anxiety, DDD, depression, HIV, HLD, HSP, HTN, MI, migraine, osteoporosis, sleep apnea.  Relates that 4 to 5 days ago, he noticed redness, swelling, and drainage from his right fourth toe.  The area failed to improve.  He went to his PCP which suggested admission for IV antibiotics and further evaluation.  Admits to recent fever and chills but none currently.  Notes that he has moderate neuropathy to both feet.  Patient is currently receiving IV antibiotics.    Past Medical History:   Diagnosis Date   • Allergic rhinitis    • Anxiety    • Bursitis    • DDD (degenerative disc disease), cervical    • Depression    • HIV (human immunodeficiency virus infection) (HCC)    • HIV disease (HCC)    • HLD (hyperlipidemia)    • HSP ([place] purpura) (HCC)    • HTN (hypertension)    • Migraine    • Myocardial infarction (HCC)    • Osteoporosis    • Sleep apnea    • Type 2 diabetes mellitus (HCC)      Past Surgical History:   Procedure Laterality Date   • ANKLE SURGERY     • CARDIAC CATHETERIZATION N/A 6/24/2020    Procedure: Coronary angiography;  Surgeon: Deon Bailey MD;  Location:  [facility] CATH INVASIVE LOCATION;  Service: Cardiology;  Laterality: N/A;  11am start time   • CARDIAC CATHETERIZATION N/A 6/24/2020    Procedure: Percutaneous Coronary Intervention;  Surgeon: Deon Bailey MD;  Location:  [facility] CATH INVASIVE LOCATION;  Service: Cardiology;  Laterality: N/A;   • COLONOSCOPY  06/19/2009    Normal exam repeat in 10 years   • COLONOSCOPY N/A 8/26/2021    Procedure:  COLONOSCOPY WITH ANESTHESIA;  Surgeon: Byron Rosenberg MD;  Location: [facility] ENDOSCOPY;  Service: Gastroenterology;  Laterality: N/A;  pre screen  post poor prep  dr yg gutiérrez   • ENDOSCOPY N/A 8/26/2021    Procedure: ESOPHAGOGASTRODUODENOSCOPY WITH ANESTHESIA;  Surgeon: Byron Rosenberg MD;  Location: [facility] ENDOSCOPY;  Service: Gastroenterology;  Laterality: N/A;  pre GERD  post normal  dr yg gutiérrez   • FOREARM SURGERY     • LYMPH NODE BIOPSY     • PILONIDAL CYSTECTOMY N/A 12/1/2020    Procedure: EXCISION PILONIDAL ABSCESS;  Surgeon: Ashlyn Posey MD;  Location: [facility] OR;  Service: General;  Laterality: N/A;     Family History   Problem Relation Age of Onset   • Diabetes Mother    • Hypertension Mother    • Thyroid disease Mother    • Hypertension Father    • Thyroid disease Father    • Arrhythmia Father    • Diabetes Maternal Grandfather    • Heart disease Maternal Grandfather    • Hypertension Maternal Grandfather    • Diabetes Paternal Grandmother    • Stroke Paternal Grandmother    • Heart disease Paternal Grandmother    • Hypertension Paternal Grandmother    • Thyroid disease Paternal Grandmother    • Hypertension Paternal Grandfather    • Hypertension Sister    • No Known Problems Brother    • Colon cancer Neg Hx    • Colon polyps Neg Hx      Social History     Socioeconomic History   • Marital status:    Tobacco Use   • Smoking status: Current Every Day Smoker     Packs/day: 0.50     Types: Cigarettes     Start date: 1988   • Smokeless tobacco: Never Used   Substance and Sexual Activity   • Alcohol use: Yes     Comment: occasionally   • Drug use: No   • Sexual activity: Defer     Allergies   Allergen Reactions   • Amitriptyline Anaphylaxis   • Amitriptyline Hcl Anaphylaxis   • Darvon [Propoxyphene] Anaphylaxis   • Zithromax [Azithromycin] Anaphylaxis     Current Facility-Administered Medications   Medication Dose Route Frequency Provider Last Rate Last Admin   • acetaminophen (TYLENOL)  tablet 650 mg  650 mg Oral Q4H PRN Juli Horton, APRN   650 mg at 01/05/22 1426    Or   • acetaminophen (TYLENOL) 160 MG/5ML solution 650 mg  650 mg Oral Q4H PRN Juli Horton, APRN        Or   • acetaminophen (TYLENOL) suppository 650 mg  650 mg Rectal Q4H PRN Juli Horton, APRN       • ARIPiprazole (ABILIFY) tablet 5 mg  5 mg Oral Daily Juli Horton, APRN   5 mg at 01/06/22 0938   • atorvastatin (LIPITOR) tablet 40 mg  40 mg Oral Nightly Juli Horton, APRN   40 mg at 01/05/22 1946   • cetirizine (zyrTEC) tablet 10 mg  10 mg Oral Daily Juli Horton, APRN   10 mg at 01/06/22 0939   • clonazePAM (KlonoPIN) tablet 0.5 mg  0.5 mg Oral BID PRN Juli Horton, APRN       • dextrose (D50W) (25 g/50 mL) IV injection 25 g  25 g Intravenous Q15 Min PRN Juli Horton, APRN       • dextrose (GLUTOSE) oral gel 15 g  15 g Oral Q15 Min PRN Juli Horton, APRN       • Fhxstms-Jlqpf-Zfijzrse-TenofAF (GENVOYA) 634-000-602-10 MG per tablet 1 tablet  1 tablet Oral Nightly Juil Horton, APRN   1 tablet at 01/05/22 1946   • gabapentin (NEURONTIN) capsule 800 mg  800 mg Oral TID Juli Horton APRN   800 mg at 01/06/22 0939   • glucagon (human recombinant) (GLUCAGEN DIAGNOSTIC) injection 1 mg  1 mg Subcutaneous Q15 Min PRN Juli Horton, APRN       • HYDROcodone-acetaminophen (NORCO) 5-325 MG per tablet 1 tablet  1 tablet Oral Q4H PRN Juli Horton, APRN   1 tablet at 01/06/22 0939   • insulin detemir (LEVEMIR) injection 20 Units  20 Units Subcutaneous Nightly Elissa Alexis, APRN       • insulin lispro (humaLOG) injection 0-24 Units  0-24 Units Subcutaneous TID AC Elissa Alexis, APRN   12 Units at 01/06/22 1235   • lactated ringers infusion  75 mL/hr Intravenous Continuous Juli Horotn APRN 75 mL/hr at 01/06/22 0156 75 mL/hr at 01/06/22 0156   • losartan (COZAAR) tablet 25 mg  25 mg Oral Daily Juil Horton APRN   25 mg at 01/06/22 0939   • melatonin tablet 6 mg  6 mg Oral  Nightly PRN Juli Horton, APRKIRSTIN       • ondansetron (ZOFRAN) tablet 4 mg  4 mg Oral Q6H PRN Juli Horton, APRKIRSTIN        Or   • ondansetron (ZOFRAN) injection 4 mg  4 mg Intravenous Q6H PRN Juli Horton, APRN       • pantoprazole (PROTONIX) EC tablet 40 mg  40 mg Oral BID Juli Horton, APRN   40 mg at 01/06/22 0938   • piperacillin-tazobactam (ZOSYN) 3.375 g in iso-osmotic dextrose 50 ml (premix)  3.375 g Intravenous Q8H Juli Horton, APRN   3.375 g at 01/06/22 0500   • promethazine (PHENERGAN) tablet 25 mg  25 mg Oral Q6H PRN Juli Horton, APRKIRSTIN       • propranolol LA (INDERAL LA) 24 hr capsule 80 mg  80 mg Oral Daily Juli Horton, APRN   80 mg at 01/06/22 0938   • sodium chloride 0.9 % flush 10 mL  10 mL Intravenous Q12H Juli Horton, APRN       • sodium chloride 0.9 % flush 10 mL  10 mL Intravenous PRN Juli Horton, APRN       • SUMAtriptan (IMITREX) tablet 50 mg  50 mg Oral Once PRN Juli Horton, APRKIRSTIN       • tamsulosin (FLOMAX) 24 hr capsule 0.4 mg  0.4 mg Oral Nightly Juli Horton APRN   0.4 mg at 01/05/22 1945   • terazosin (HYTRIN) capsule 5 mg  5 mg Oral Nightly Juli Horton APRN   5 mg at 01/05/22 1945   • traZODone (DESYREL) tablet 200 mg  200 mg Oral Nightly Juli Horton, APRN   200 mg at 01/05/22 1944   • vancomycin 1500 mg/500 mL 0.9% NS IVPB ([facility])  15 mg/kg (Adjusted) Intravenous Q12H Sterling Barfield DO   1,500 mg at 01/06/22 1234     Review of Systems    OBJECTIVE     Vitals:    01/06/22 1149   BP: 107/46   Pulse: 65   Resp: 18   Temp: 98.1 °F (36.7 °C)   SpO2: 97%       PHYSICAL EXAM  GEN:   A&Ox3, NAD. Pt presents in hospital bed. Accompanied by nursing staff.     Physical Exam  Vitals reviewed.   Constitutional:       Appearance: Normal appearance. He is obese.   HENT:      Head: Normocephalic and atraumatic.      Right Ear: Tympanic membrane normal.      Left Ear: Tympanic membrane normal.      Nose: Nose normal.   Eyes:       Extraocular Movements: Extraocular movements intact.      Pupils: Pupils are equal, round, and reactive to light.   Cardiovascular:      Rate and Rhythm: Normal rate and regular rhythm.      Pulses: Normal pulses.           Dorsalis pedis pulses are 2+ on the right side and 2+ on the left side.        Posterior tibial pulses are 2+ on the right side and 2+ on the left side.      Heart sounds: Normal heart sounds.   Pulmonary:      Effort: Pulmonary effort is normal.      Breath sounds: Normal breath sounds.   Abdominal:      General: Bowel sounds are normal.      Palpations: Abdomen is soft.   Musculoskeletal:      Right foot: Bunion present.      Left foot: Bunion present.   Feet:      Right foot:      Protective Sensation: 2 sites sensed.      Skin integrity: Ulcer, erythema and warmth present.      Toenail Condition: Right toenails are abnormally thick.      Left foot:      Protective Sensation: 2 sites sensed.      Skin integrity: Warmth present.      Toenail Condition: Left toenails are abnormally thick.   Neurological:      General: No focal deficit present.      Mental Status: He is alert and oriented to person, place, and time.   Psychiatric:         Mood and Affect: Mood normal.         Behavior: Behavior normal.         Thought Content: Thought content normal.         Judgment: Judgment normal.         Foot/Ankle Exam:       General:   Diabetic Foot Exam Performed    Appearance: appears stated age and healthy and obesity    Orientation: AAOx3    Affect: appropriate      VASCULAR      Right Foot Vascularity   Dorsalis pedis:  2+  Posterior tibial:  2+  Skin Temperature: warm    Edema Gradin+ and pitting  CFT:  3  Pedal Hair Growth:  Present  Varicosities: none       Left Foot Vascularity   Dorsalis pedis:  2+  Posterior tibial:  2+  Skin Temperature: warm    Edema Grading:  Trace  CFT:  3  Pedal Hair Growth:  Present  Varicosities: none        NEUROLOGIC     Right Foot Neurologic   Light touch  sensation:  Diminished  Vibratory sensation:  Diminished  Hot/Cold sensation: diminished    Protective Sensation using [place]ine-Laura Monofilament:  2     Left Foot Neurologic   Light touch sensation:  Diminished  Vibratory sensation:  Diminished  Hot/cold sensation: diminished    Protective Sensation using [place]bine-Laura Monofilament:  2     MUSCULOSKELETAL      Right Foot Musculoskeletal   Ecchymosis:  None  Tenderness: toe 4    Arch:  Normal  Hammertoe:  Second toe  Hallux valgus: Yes    Hallux limitus: No       Left Foot Musculoskeletal   Ecchymosis:  None  Arch:  Normal  Hammertoe:  Second toe  Hallux valgus: Yes    Hallux limitus: No       MUSCLE STRENGTH     Right Foot Muscle Strength   Foot dorsiflexion:  5  Foot plantar flexion:  5  Foot inversion:  5  Foot eversion:  5     Left Foot Muscle Strength   Foot dorsiflexion:  5  Foot plantar flexion:  5  Foot inversion:  5  Foot eversion:  5     RANGE OF MOTION      Right Foot Range of Motion   Foot and ankle ROM within normal limits       Left Foot Range of Motion   Foot and ankle ROM within normal limits       DERMATOLOGIC     Right Foot Dermatologic   Skin: cellulitis, drainage, erythema and ulcer    Nails: abnormally thick, subungual debris and dystrophic nails       Left Foot Dermatologic   Nails: abnormally thick, subungual debris and dystrophic nails        Right Foot Additional Comments Right fourth toe is edematous and erythematous.  There is a necrotic portion at the distal lateral aspect with purulent drainage.  Mild crepitus distally.  Erythema extends to the MTPJ.                 RADIOLOGY/NUCLEAR:  XR Foot 2 View Right    Result Date: 1/5/2022  Narrative: EXAM: XR FOOT 2 VW RIGHT-  INDICATION: Concern for osteomyelitis  COMPARISON: None available.  FINDINGS:  Destructive osseous changes in the fourth distal phalanx are noted with surrounding soft tissue edema and gas. No other focal destructive osseous changes or area of osseous  demineralization. Lisfranc plane is maintained. No acute fracture or dislocation. Partially imaged distal fibular plate and screw fixation. Calcaneal height is maintained. Mild midfoot arthritic change.      Impression:  Destructive osseous changes in the distal fourth phalanx with surrounding soft tissue edema and gas. Findings are highly suspicious for acute osteomyelitis in the absence of trauma. This report was finalized on 01/05/2022 15:31 by Dr. Siddharth Carias MD.      LABORATORY/CULTURE RESULTS:  Results from last 7 days   Lab Units 01/06/22  0803 01/05/22  0623 01/04/22  1602   WBC 10*3/mm3 10.97* 14.97* 15.86*   HEMOGLOBIN g/dL 11.1* 10.9* 11.2*   HEMATOCRIT % 33.1* 32.9* 33.5*   PLATELETS 10*3/mm3 223 225 212     Results from last 7 days   Lab Units 01/06/22  0803 01/05/22  0623 01/04/22  1602 01/04/22  1307   SODIUM mmol/L 137 137 138 140   POTASSIUM mmol/L 4.2 4.1 3.7 4.2   CHLORIDE mmol/L 99 101 102 101   TOTAL CO2 mmol/L  --   --   --  28   CO2 mmol/L 29.0 26.0 25.0  --    BUN mg/dL 14 12 14 15   CREATININE mg/dL 0.83 0.71* 0.77 0.8   CALCIUM mg/dL 8.7 8.6 8.7 9.1   BILIRUBIN mg/dL  --  0.7  --  0.4   ALK PHOS U/L  --  68  --  74   ALT (SGPT) U/L  --  16  --  17   AST (SGOT) U/L  --  15  --  14   GLUCOSE mg/dL 391* 259* 246* 321*         Microbiology Results (last 10 days)     Procedure Component Value - Date/Time    Blood Culture - Blood, Hand, Right [[id number]]  (Normal) Collected: 01/04/22 1700    Lab Status: Preliminary result Specimen: Blood from Hand, Right Updated: 01/05/22 1815     Blood Culture No growth at 24 hours    Blood Culture - Blood, Hand, Right [[id number]]  (Normal) Collected: 01/04/22 1558    Lab Status: Preliminary result Specimen: Blood from Hand, Right Updated: 01/05/22 1645     Blood Culture No growth at 24 hours    COVID PRE-OP / PRE-PROCEDURE SCREENING ORDER (NO ISOLATION) - Swab, Nasal Cavity [[id number]]  (Normal) Collected: 01/04/22 1525    Lab Status: Final result Specimen:  Swab from Nasal Cavity Updated: 01/04/22 1622    Narrative:      The following orders were created for panel order COVID PRE-OP / PRE-PROCEDURE SCREENING ORDER (NO ISOLATION) - Swab, Nasal Cavity.  Procedure                               Abnormality         Status                     ---------                               -----------         ------                     COVID-19,[facility] [employer] [facility]...[[id number]]  Normal              Final result                 Please view results for these tests on the individual orders.    COVID-19,[facility] [employer] IN-HOUSE,Nasal Swab No Transport Media 3-4 HR TAT - Swab, Nasal Cavity [[id number]]  (Normal) Collected: 01/04/22 1525    Lab Status: Final result Specimen: Swab from Nasal Cavity Updated: 01/04/22 1622     COVID19 Not Detected    Narrative:      Fact sheet for providers: https://www.fda.gov/media/131013/download     Fact sheet for patients: https://www.fda.gov/media/053055/download    Test performed by PCR.    Consider negative results in combination with clinical observations, patient history, and epidemiological information.  Fact sheet for providers: https://www.fda.gov/media/347691/download     Fact sheet for patients: https://www.fda.gov/media/107429/download    Test performed by PCR.    Consider negative results in combination with clinical observations, patient history, and epidemiological information.          PATHOLOGY RESULTS:       ASSESSMENT/PLAN   1.  Acute osteomyelitis and gas gangrene - fourth toe, right foot  2.  DM2  3.  HIV    Comprehensive lower extremity examination and evaluation was performed.  Reviewed imaging, labs, and provider documentation.  Given the extent of the infection, I believe the best course of action would be to proceed with a right foot partial fourth toe versus complete fourth toe versus partial fourth ray amputation.  Patient is in agreement.   All options, benefits, nurse associate with surgery, discussed with patient including but not  limited to: Standard risk of anesthesia, pain, bleeding, infection, nonhealing/dehiscence, deformity, loss of limb or life.  Pre and postoperative courses were discussed in detail.  Patient should continue antibiotic therapy.    N.p.o.  To the OR this afternoon.    Thank you for consult.      Lab Frequency Next Occurrence   Follow Anesthesia Guidelines / Standing Orders Once 08/05/2021   Obtain Informed Consent Once 08/10/2021   Diet: Once 08/26/2021   Advance Diet as Tolerated Once 08/26/2021       This document has been electronically signed by Pacheco Harper DPM on January 6, 2022 12:48 CST

## 2022-01-06 NOTE — PROGRESS NOTES
[facility] Medicine Services  INPATIENT PROGRESS NOTE    Patient Name: Donis Yi  Date of Admission: 1/4/2022  Today's Date: 01/06/22  Length of Stay: 2  Primary Care Physician: Oksana Mares MD    Subjective   Chief Complaint: Osteomyelitis of fourth toe of right foot  HPI   Patient seen and examined at bedside this morning.  He was sitting up in bed resting comfortably.  Dr. Harper planning to evaluate patient today with possible plans to take to the OR for amputation of fourth toe right foot.  Afebrile and leukocytosis improving.  Is asking if pain medication is available.    Review of Systems   All pertinent negatives and positives are as above. All other systems have been reviewed and are negative unless otherwise stated.     Objective    Temp:  [98 °F (36.7 °C)-98.6 °F (37 °C)] 98.5 °F (36.9 °C)  Heart Rate:  [74-81] 74  Resp:  [16-18] 18  BP: (117-131)/(56-69) 131/64  Physical Exam  Vitals reviewed.   Constitutional:       General: He is not in acute distress.     Appearance: He is not toxic-appearing.      Comments: Lying in bed.  No acute distress.  On room air.  No family at bedside.  Discussed with his nurse, Beckie.  HENT:      Head: Normocephalic and atraumatic.      Mouth/Throat:      Mouth: Mucous membranes are moist.      Pharynx: Oropharynx is clear.   Eyes:      Extraocular Movements: Extraocular movements intact.      Conjunctiva/sclera: Conjunctivae normal.      Pupils: Pupils are equal, round, and reactive to light.   Cardiovascular:      Rate and Rhythm: Normal rate and regular rhythm.      Pulses: Normal pulses.      Heart sounds: No murmur heard.       Pulmonary:      Effort: Pulmonary effort is normal. No respiratory distress.      Breath sounds: Normal breath sounds. No wheezing or rhonchi.   Abdominal:      General: Bowel sounds are normal. There is no distension.      Palpations: Abdomen is soft.      Tenderness: There is no abdominal  tenderness.   Musculoskeletal:         General: No swelling or tenderness. Normal range of motion.      Cervical back: Normal range of motion and neck supple. No muscular tenderness.   Skin:     General: Skin is warm and dry.      Findings: No erythema or rash.      Comments: Please see picture of wound below   Neurological:      General: No focal deficit present.      Mental Status: He is alert and oriented to person, place, and time.      Cranial Nerves: No cranial nerve deficit.      Motor: No weakness.   Psychiatric:         Mood and Affect: Mood normal.         Behavior: Behavior normal.             Results Review:  I have reviewed the labs, radiology results, and diagnostic studies.    Laboratory Data:   Results from last 7 days   Lab Units 01/06/22  0803 01/05/22  0623 01/04/22  1602   WBC 10*3/mm3 10.97* 14.97* 15.86*   HEMOGLOBIN g/dL 11.1* 10.9* 11.2*   HEMATOCRIT % 33.1* 32.9* 33.5*   PLATELETS 10*3/mm3 223 225 212        Results from last 7 days   Lab Units 01/06/22  0803 01/05/22  0623 01/04/22  1602 01/04/22  1307   SODIUM mmol/L 137 137 138 140   POTASSIUM mmol/L 4.2 4.1 3.7 4.2   CHLORIDE mmol/L 99 101 102 101   TOTAL CO2 mmol/L  --   --   --  28   CO2 mmol/L 29.0 26.0 25.0  --    BUN mg/dL 14 12 14 15   CREATININE mg/dL 0.83 0.71* 0.77 0.8   CALCIUM mg/dL 8.7 8.6 8.7 9.1   BILIRUBIN mg/dL  --  0.7  --  0.4   ALK PHOS U/L  --  68  --  74   ALT (SGPT) U/L  --  16  --  17   AST (SGOT) U/L  --  15  --  14   GLUCOSE mg/dL 391* 259* 246* 321*     Radiology Data:   Imaging Results (Last 24 Hours)     Procedure Component Value Units Date/Time    XR Foot 2 View Right [[id number]] Collected: 01/05/22 1528     Updated: 01/05/22 1534    Narrative:      EXAM: XR FOOT 2 VW RIGHT-     INDICATION: Concern for osteomyelitis     COMPARISON: None available.     FINDINGS:     Destructive osseous changes in the fourth distal phalanx are noted with  surrounding soft tissue edema and gas. No other focal  destructive  osseous changes or area of osseous demineralization. Lisfranc plane is  maintained. No acute fracture or dislocation. Partially imaged distal  fibular plate and screw fixation. Calcaneal height is maintained. Mild  midfoot arthritic change.       Impression:         Destructive osseous changes in the distal fourth phalanx with  surrounding soft tissue edema and gas. Findings are highly suspicious  for acute osteomyelitis in the absence of trauma.  This report was finalized on 01/05/2022 15:31 by Dr. Siddharth Carias MD.          I have reviewed the patient's current medications.     Assessment/Plan     Active Hospital Problems    Diagnosis    • **Osteomyelitis of fourth toe of right foot (HCC)    • Gangrene of toe (HCC)    • HIV (human immunodeficiency virus infection) ([facility])    • Acute pain    • Anxiety and depression    • Tobacco abuse    • Type 2 diabetes mellitus with hyperglycemia, with long-term current use of insulin ([facility])      Plan:  1.  Continue broad-spectrum antibiotics, IV Vancomycin and Zosyn.  Blood cultures with no growth to date.     2.  X-ray of right foot showed findings highly suspicious for acute osteomyelitis.  Dr. Harper plans to evaluate patient today.  N.p.o. in anticipation of possible surgery this afternoon.     3.  Infectious disease consulted.  He has a history of HIV and takes Genvoya.     4.  Hemoglobin A1c 9.6.  Continue Accu-Cheks, sliding scale insulin and Levemir-titrating as needed.  Normally he is on an insulin pump on outpatient basis.     5.  Sequential compression devices for DVT prophylaxis.    6.  Work-up ongoing.     Discharge Planning: Indeterminate at present.  Podiatry evaluated patient with plans for possible surgery this afternoon.    Electronically signed by SOBIA Olsen, 01/06/22, 10:25 CST.     Anesthesia Type: 1% lidocaine without epinephrine and a 1:10 solution of 8.4% sodium bicarbonate

## 2022-01-06 NOTE — BRIEF OP NOTE
AMPUTATION DIGIT  Progress Note    Donis Yi  1/6/2022    Pre-op Diagnosis:   Osteomyelitis of fourth toe of right foot (HCC) [M86.9]  Gangrene of toe (HCC) [I96]       Post-Op Diagnosis Codes:     * Osteomyelitis of fourth toe of right foot (HCC) [M86.9]     * Gangrene of toe (HCC) [I96]    Procedure/CPT® Codes:    Procedure(s):  1. 4th Toe  Amputation - Right Foot    Surgeon(s):  Pacheco Harper DPM    Anesthesia: Choice    Staff:   Circulator: Christina Gamble RN  Scrub Person: Charisse Anaya       Estimated Blood Loss: minimal    Urine Voided: * No values recorded between 1/6/2022  3:42 PM and 1/6/2022  4:21 PM *    Specimens:                Specimens     ID Source Type Tests Collected By Collected At Frozen?    1 Toe, Right Tissue · TISSUE / BONE CULTURE   Pacheco Harper DPM 1/6/22 1603     Description: RIGHT FOURTH TOE TISSUE FOR CULTURE    A Toe, Right Tissue · TISSUE PATHOLOGY EXAM   Pacheco Harper DPM 1/6/22 1605     Description: RIGHT FOURTH TOE                Drains: * No LDAs found *    Findings: Consistent with pre-operative diagnoses.     Complications: None         Pacheco Harper DPM     Date: 1/6/2022  Time: 16:26 CST

## 2022-01-07 LAB
GLUCOSE BLDC GLUCOMTR-MCNC: 248 MG/DL (ref 70–130)
GLUCOSE BLDC GLUCOMTR-MCNC: 304 MG/DL (ref 70–130)
GLUCOSE BLDC GLUCOMTR-MCNC: 324 MG/DL (ref 70–130)
GLUCOSE BLDC GLUCOMTR-MCNC: 324 MG/DL (ref 70–130)

## 2022-01-07 PROCEDURE — 63710000001 PROMETHAZINE PER 25 MG: Performed by: PODIATRIST

## 2022-01-07 PROCEDURE — 63710000001 INSULIN LISPRO (HUMAN) PER 5 UNITS: Performed by: PODIATRIST

## 2022-01-07 PROCEDURE — 25010000002 CEFTAROLINE FOSAMIL PER 10 MG: Performed by: INTERNAL MEDICINE

## 2022-01-07 PROCEDURE — 63710000001 INSULIN LISPRO (HUMAN) PER 5 UNITS: Performed by: NURSE PRACTITIONER

## 2022-01-07 PROCEDURE — 82962 GLUCOSE BLOOD TEST: CPT

## 2022-01-07 PROCEDURE — 63710000001 INSULIN LISPRO (HUMAN) PER 5 UNITS: Performed by: FAMILY MEDICINE

## 2022-01-07 PROCEDURE — 99232 SBSQ HOSP IP/OBS MODERATE 35: CPT | Performed by: PODIATRIST

## 2022-01-07 PROCEDURE — 25010000002 PIPERACILLIN SOD-TAZOBACTAM PER 1 G: Performed by: NURSE PRACTITIONER

## 2022-01-07 PROCEDURE — 25010000002 VANCOMYCIN 10 G RECONSTITUTED SOLUTION: Performed by: FAMILY MEDICINE

## 2022-01-07 PROCEDURE — 63710000001 INSULIN DETEMIR PER 5 UNITS: Performed by: PODIATRIST

## 2022-01-07 RX ORDER — TIZANIDINE 4 MG/1
4 TABLET ORAL EVERY 8 HOURS PRN
Status: DISCONTINUED | OUTPATIENT
Start: 2022-01-07 | End: 2022-01-08 | Stop reason: HOSPADM

## 2022-01-07 RX ORDER — TIZANIDINE 4 MG/1
4 TABLET ORAL EVERY 8 HOURS PRN
COMMUNITY

## 2022-01-07 RX ADMIN — TAMSULOSIN HYDROCHLORIDE 0.4 MG: 0.4 CAPSULE ORAL at 20:00

## 2022-01-07 RX ADMIN — LOSARTAN POTASSIUM 25 MG: 50 TABLET, FILM COATED ORAL at 08:13

## 2022-01-07 RX ADMIN — HYDROCODONE BITARTRATE AND ACETAMINOPHEN 1 TABLET: 5; 325 TABLET ORAL at 14:19

## 2022-01-07 RX ADMIN — TAZOBACTAM SODIUM AND PIPERACILLIN SODIUM 3.38 G: 375; 3 INJECTION, SOLUTION INTRAVENOUS at 05:37

## 2022-01-07 RX ADMIN — PANTOPRAZOLE SODIUM 40 MG: 40 TABLET, DELAYED RELEASE ORAL at 20:03

## 2022-01-07 RX ADMIN — GABAPENTIN 800 MG: 400 CAPSULE ORAL at 08:13

## 2022-01-07 RX ADMIN — ARIPIPRAZOLE 5 MG: 5 TABLET ORAL at 12:04

## 2022-01-07 RX ADMIN — PANTOPRAZOLE SODIUM 40 MG: 40 TABLET, DELAYED RELEASE ORAL at 12:04

## 2022-01-07 RX ADMIN — TRAZODONE HYDROCHLORIDE 200 MG: 100 TABLET ORAL at 20:00

## 2022-01-07 RX ADMIN — GABAPENTIN 800 MG: 400 CAPSULE ORAL at 20:00

## 2022-01-07 RX ADMIN — VANCOMYCIN HYDROCHLORIDE 1500 MG: 10 INJECTION, POWDER, LYOPHILIZED, FOR SOLUTION INTRAVENOUS at 00:59

## 2022-01-07 RX ADMIN — INSULIN DETEMIR 20 UNITS: 100 INJECTION, SOLUTION SUBCUTANEOUS at 20:02

## 2022-01-07 RX ADMIN — PROPRANOLOL HYDROCHLORIDE 80 MG: 80 CAPSULE, EXTENDED RELEASE ORAL at 08:13

## 2022-01-07 RX ADMIN — SODIUM CHLORIDE, PRESERVATIVE FREE 10 ML: 5 INJECTION INTRAVENOUS at 20:00

## 2022-01-07 RX ADMIN — INSULIN LISPRO 16 UNITS: 100 INJECTION, SOLUTION INTRAVENOUS; SUBCUTANEOUS at 16:38

## 2022-01-07 RX ADMIN — PROMETHAZINE HYDROCHLORIDE 25 MG: 25 TABLET ORAL at 16:38

## 2022-01-07 RX ADMIN — CETIRIZINE HYDROCHLORIDE 10 MG: 10 TABLET ORAL at 12:04

## 2022-01-07 RX ADMIN — INSULIN LISPRO 8 UNITS: 100 INJECTION, SOLUTION INTRAVENOUS; SUBCUTANEOUS at 08:11

## 2022-01-07 RX ADMIN — TIZANIDINE 4 MG: 4 TABLET ORAL at 18:49

## 2022-01-07 RX ADMIN — ATORVASTATIN CALCIUM 40 MG: 40 TABLET, FILM COATED ORAL at 20:00

## 2022-01-07 RX ADMIN — TERAZOSIN HYDROCHLORIDE 5 MG: 5 CAPSULE ORAL at 20:01

## 2022-01-07 RX ADMIN — SODIUM CHLORIDE, POTASSIUM CHLORIDE, SODIUM LACTATE AND CALCIUM CHLORIDE 75 ML/HR: 600; 310; 30; 20 INJECTION, SOLUTION INTRAVENOUS at 12:04

## 2022-01-07 RX ADMIN — HYDROCODONE BITARTRATE AND ACETAMINOPHEN 1 TABLET: 5; 325 TABLET ORAL at 00:59

## 2022-01-07 RX ADMIN — GABAPENTIN 800 MG: 400 CAPSULE ORAL at 16:38

## 2022-01-07 RX ADMIN — INSULIN LISPRO 16 UNITS: 100 INJECTION, SOLUTION INTRAVENOUS; SUBCUTANEOUS at 21:09

## 2022-01-07 RX ADMIN — HYDROCODONE BITARTRATE AND ACETAMINOPHEN 1 TABLET: 5; 325 TABLET ORAL at 05:37

## 2022-01-07 RX ADMIN — CEFTAROLINE FOSAMIL 600 MG: 600 POWDER, FOR SOLUTION INTRAVENOUS at 14:44

## 2022-01-07 RX ADMIN — INSULIN LISPRO 12 UNITS: 100 INJECTION, SOLUTION INTRAVENOUS; SUBCUTANEOUS at 12:04

## 2022-01-07 RX ADMIN — ELVITEGRAVIR, COBICISTAT, EMTRICITABINE, AND TENOFOVIR ALAFENAMIDE 1 TABLET: 150; 150; 200; 10 TABLET ORAL at 20:00

## 2022-01-07 NOTE — PLAN OF CARE
Goal Outcome Evaluation:  Plan of Care Reviewed With: patient           Outcome Summary: Patient is alert and oriented, WALLACE, NWB to RLE. PO SFP x 2 today.   IV infusing, voiding, SCD in place. Fall risk, safety maintained, bed alarm on, call light within reach.  c/o of nausea x 1 and was medicated

## 2022-01-07 NOTE — CASE MANAGEMENT/SOCIAL WORK
Continued Stay Note   Shakila     Patient Name: Donis Yi  MRN: [id number]  Today's Date: 1/7/2022    Admit Date: 1/4/2022     Discharge Plan     Row Name 01/07/22 6366       Plan    Plan Comments Events noted. Pt does plan to dc home when medically stable. Noted pt may be able to transition to oral abx in 1-2 days. Will continue to follow.               Discharge Codes    No documentation.                     AMARILIS White

## 2022-01-07 NOTE — PROGRESS NOTES
Infectious Diseases Progress Note    Patient:  Donis Yi  YOB: 1965  MRN: [id number]   Admit date: 1/4/2022   Admitting Physician: Sterling Barfield DO  Primary Care Physician: Oksana Mares MD    Chief Complaint/Interval History: He had right fourth toe amputation yesterday.  Per review of the operative note it appears the skin was closed.  He is without fever.  No diarrhea or rash.  Tolerating Genvoya without side effects.    Intake/Output Summary (Last 24 hours) at 1/7/2022 1244  Last data filed at 1/7/2022 1139  Gross per 24 hour   Intake 600 ml   Output 1650 ml   Net -1050 ml     Allergies:   Allergies   Allergen Reactions   • Amitriptyline Anaphylaxis   • Amitriptyline Hcl Anaphylaxis   • Darvon [Propoxyphene] Anaphylaxis   • Zithromax [Azithromycin] Anaphylaxis     Current Scheduled Medications:   ARIPiprazole, 5 mg, Oral, Daily  atorvastatin, 40 mg, Oral, Nightly  cetirizine, 10 mg, Oral, Daily  Gfstymh-Agkzi-Mgegimkm-TenofAF, 1 tablet, Oral, Nightly  gabapentin, 800 mg, Oral, TID  insulin detemir, 20 Units, Subcutaneous, Nightly  insulin lispro, 0-24 Units, Subcutaneous, TID AC  losartan, 25 mg, Oral, Daily  pantoprazole, 40 mg, Oral, BID  piperacillin-tazobactam, 3.375 g, Intravenous, Q8H  propranolol LA, 80 mg, Oral, Daily  sodium chloride, 10 mL, Intravenous, Q12H  tamsulosin, 0.4 mg, Oral, Nightly  terazosin, 5 mg, Oral, Nightly  traZODone, 200 mg, Oral, Nightly  vancomycin, 15 mg/kg (Adjusted), Intravenous, Q12H      Current PRN Medications:  •  acetaminophen **OR** acetaminophen **OR** acetaminophen  •  clonazePAM  •  dextrose  •  dextrose  •  glucagon (human recombinant)  •  HYDROcodone-acetaminophen  •  melatonin  •  ondansetron **OR** ondansetron  •  promethazine  •  sodium chloride  •  SUMAtriptan    Review of Systems no cardiopulmonary symptoms    Vital Signs:  /78 (BP Location: Left arm, Patient Position: Sitting)   Pulse 80   Temp 98 °F (36.7 °C) (Axillary)   " Resp 20   Ht 180.3 cm (71\")   Wt 119 kg (263 lb)   SpO2 96%   BMI 36.68 kg/m²     Physical Exam  Vital signs - reviewed.  Line/IV site - No erythema, warmth, induration, or tenderness.  General alert, pleasant, no distress  Right foot operative dressing clean and dry    Lab Results:  CBC:   Results from last 7 days   Lab Units 01/06/22 0803 01/05/22 0623 01/04/22  1602 01/04/22  1307   WBC 10*3/mm3 10.97* 14.97* 15.86* 17.3*   HEMOGLOBIN g/dL 11.1* 10.9* 11.2* 12.0*   HEMATOCRIT % 33.1* 32.9* 33.5* 37.5*   PLATELETS 10*3/mm3 223 225 212 244     BMP:  Results from last 7 days   Lab Units 01/06/22 0803 01/05/22 0623 01/05/22 0623 01/04/22  1602 01/04/22  1602 01/04/22  1307   SODIUM mmol/L 137  --  137  --  138 140   POTASSIUM mmol/L 4.2  --  4.1  --  3.7 4.2   CHLORIDE mmol/L 99  --  101  --  102 101   TOTAL CO2 mmol/L  --   --   --   --   --  28   CO2 mmol/L 29.0  --  26.0  --  25.0  --    BUN mg/dL 14  --  12  --  14 15   CREATININE mg/dL 0.83  --  0.71*  --  0.77 0.8   GLUCOSE mg/dL 391*   < > 259*   < > 246* 321*   CALCIUM mg/dL 8.7  --  8.6  --  8.7 9.1   ALT (SGPT) U/L  --   --  16  --   --  17    < > = values in this interval not displayed.     Culture Results:   Blood Culture   Date Value Ref Range Status   01/04/2022 No growth at 2 days  Preliminary   01/04/2022 No growth at 2 days  Preliminary     Tissue/bone culture:  Gram Stain  Many (4+) WBCs seen      Few (2+) Gram positive cocci        Radiology:   X-rays of foot yesterday:  IMPRESSION:  Postoperative changes with amputation of the fourth toe, as  described.  This report was finalized on 01/06/2022 16:59 by Dr. Al Dutton MD.    Additional Studies Reviewed: None    Impression:   1.  Right fourth toe osteomyelitis-he has undergone amputation.  All infected bone should have been removed.  He is receiving antibiotic treatment to resolve any residual cellulitis.  2.  HIV infection-stable-continue Genvoya    Recommendations:   Discontinue " vancomycin  Discontinue Zosyn  Begin treatment with ceftaroline  If cellulitis resolved/improved when operative dressing change completed, antibiotics may be able to be discontinued or transition to p.o. treatment.  Do not feel he will need extended course of IV treatment  Dr. Carmela bowie and will see again Saturday or Sunday    Nicko Nicole MD

## 2022-01-07 NOTE — PLAN OF CARE
Goal Outcome Evaluation:  Plan of Care Reviewed With: patient        Progress: no change  Outcome Summary: A&Ox4. VSS. Medicated for c/o pain with relief noted. Drsg to right foot CDI. No N/v changes. Voiding without difficulty. Resting well.

## 2022-01-07 NOTE — PLAN OF CARE
Goal Outcome Evaluation:  Plan of Care Reviewed With: patient           Outcome Summary: Alert and oriented x4. VSS. To OR for right 4th toe amputation. Pain well controlled from block. Tolerating diet. No c/o n/v. Plan to discharge to home when medically stable. Social work following. Plan of care continues.

## 2022-01-07 NOTE — ANESTHESIA POSTPROCEDURE EVALUATION
"Patient: Donis Yi    Procedure Summary     Date: 01/06/22 Room / Location:  [facility] OR 06 /  [facility] OR    Anesthesia Start: 1544 Anesthesia Stop: 1631    Procedure: 4th Toe  Amputation - Right Foot (Right Toes) Diagnosis:       Osteomyelitis of fourth toe of right foot (HCC)      Gangrene of toe (HCC)      (Osteomyelitis of fourth toe of right foot (HCC) [M86.9])      (Gangrene of toe (HCC) [I96])    Surgeons: Pacheco Harper DPM Provider: Tyron Dykes CRNA    Anesthesia Type: general ASA Status: 3          Anesthesia Type: general    Vitals  Vitals Value Taken Time   /59 01/06/22 1700   Temp 97.5 °F (36.4 °C) 01/06/22 1700   Pulse 66 01/06/22 1703   Resp 18 01/06/22 1700   SpO2 95 % 01/06/22 1703   Vitals shown include unvalidated device data.        Post Anesthesia Care and Evaluation    Patient location during evaluation: PACU  Patient participation: complete - patient participated  Level of consciousness: awake and alert  Pain management: adequate  Airway patency: patent  Anesthetic complications: No anesthetic complications    Cardiovascular status: acceptable  Respiratory status: acceptable  Hydration status: acceptable    Comments: Blood pressure 135/65, pulse 74, temperature 97 °F (36.1 °C), temperature source Axillary, resp. rate 20, height 180.3 cm (71\"), weight 119 kg (263 lb), SpO2 95 %.    Pt discharged from PACU based on jagdeep score >8      "

## 2022-01-07 NOTE — PROGRESS NOTES
[facility] Medicine Services  INPATIENT PROGRESS NOTE    Patient Name: Donis Yi  Date of Admission: 1/4/2022  Today's Date: 01/07/22  Length of Stay: 3  Primary Care Physician: Oksana Mares MD    Subjective   Chief Complaint: Osteomyelitis of fourth toe of right foot  HPI   He was lying in bed resting comfortably.  Currently rates right foot pain as 7 out of 10 which is tolerable.  Afebrile.  He underwent fourth toe amputation right foot yesterday per Dr. Harper.  He remains on IV antibiotics for residual infection.  Dr. Mares has transitioned IV vancomycin and Zosyn to ceftaroline.    Review of Systems   All pertinent negatives and positives are as above. All other systems have been reviewed and are negative unless otherwise stated.     Objective    Temp:  [97 °F (36.1 °C)-98.6 °F (37 °C)] 98 °F (36.7 °C)  Heart Rate:  [63-80] 80  Resp:  [14-20] 20  BP: ()/(50-78) 141/78  Physical Exam  Vitals reviewed.   Constitutional:       General: He is not in acute distress.     Appearance: He is not toxic-appearing.      Comments: Lying in bed.  No acute distress.  On room air.  No family at bedside.  HENT:      Head: Normocephalic and atraumatic.      Mouth/Throat:      Mouth: Mucous membranes are moist.      Pharynx: Oropharynx is clear.   Eyes:      Extraocular Movements: Extraocular movements intact.      Conjunctiva/sclera: Conjunctivae normal.      Pupils: Pupils are equal, round, and reactive to light.   Cardiovascular:      Rate and Rhythm: Normal rate and regular rhythm.      Pulses: Normal pulses.      Heart sounds: No murmur heard.       Pulmonary:      Effort: Pulmonary effort is normal. No respiratory distress.      Breath sounds: Normal breath sounds. No wheezing or rhonchi.   Abdominal:      General: Bowel sounds are normal. There is no distension.      Palpations: Abdomen is soft.      Tenderness: There is no abdominal tenderness.   Musculoskeletal:          General: No swelling or tenderness. Normal range of motion.      Cervical back: Normal range of motion and neck supple. No muscular tenderness.   Skin:     General: Skin is warm and dry.      Findings: No erythema or rash.      Comments: Dressing to right foot dry/intact  Neurological:      General: No focal deficit present.      Mental Status: He is alert and oriented to person, place, and time.      Cranial Nerves: No cranial nerve deficit.      Motor: No weakness.   Psychiatric:         Mood and Affect: Mood normal.         Behavior: Behavior normal.     Results Review:  I have reviewed the labs, radiology results, and diagnostic studies.    Laboratory Data:   Results from last 7 days   Lab Units 01/06/22  0803 01/05/22  0623 01/04/22  1602   WBC 10*3/mm3 10.97* 14.97* 15.86*   HEMOGLOBIN g/dL 11.1* 10.9* 11.2*   HEMATOCRIT % 33.1* 32.9* 33.5*   PLATELETS 10*3/mm3 223 225 212        Results from last 7 days   Lab Units 01/06/22  0803 01/05/22  0623 01/04/22  1602 01/04/22  1307   SODIUM mmol/L 137 137 138 140   POTASSIUM mmol/L 4.2 4.1 3.7 4.2   CHLORIDE mmol/L 99 101 102 101   TOTAL CO2 mmol/L  --   --   --  28   CO2 mmol/L 29.0 26.0 25.0  --    BUN mg/dL 14 12 14 15   CREATININE mg/dL 0.83 0.71* 0.77 0.8   CALCIUM mg/dL 8.7 8.6 8.7 9.1   BILIRUBIN mg/dL  --  0.7  --  0.4   ALK PHOS U/L  --  68  --  74   ALT (SGPT) U/L  --  16  --  17   AST (SGOT) U/L  --  15  --  14   GLUCOSE mg/dL 391* 259* 246* 321*       Culture Data:   Microbiology Results (last 10 days)     Procedure Component Value - Date/Time    Tissue / Bone Culture - Tissue, Toe, Right [[id number]] Collected: 01/06/22 1603    Lab Status: Preliminary result Specimen: Tissue from Toe, Right Updated: 01/06/22 9933     Gram Stain Many (4+) WBCs seen      Few (2+) Gram positive cocci    Blood Culture - Blood, Hand, Right [[id number]]  (Normal) Collected: 01/04/22 1700    Lab Status: Preliminary result Specimen: Blood from Hand, Right Updated: 01/06/22  1815     Blood Culture No growth at 2 days    Blood Culture - Blood, Hand, Right [[id number]]  (Normal) Collected: 01/04/22 1558    Lab Status: Preliminary result Specimen: Blood from Hand, Right Updated: 01/06/22 1645     Blood Culture No growth at 2 days    COVID PRE-OP / PRE-PROCEDURE SCREENING ORDER (NO ISOLATION) - Swab, Nasal Cavity [[id number]]  (Normal) Collected: 01/04/22 1525    Lab Status: Final result Specimen: Swab from Nasal Cavity Updated: 01/04/22 1622    Narrative:      The following orders were created for panel order COVID PRE-OP / PRE-PROCEDURE SCREENING ORDER (NO ISOLATION) - Swab, Nasal Cavity.  Procedure                               Abnormality         Status                     ---------                               -----------         ------                     COVID-19,[facility]...[[id number]]  Normal              Final result                 Please view results for these tests on the individual orders.    COVID-19,[facility] IN-HOUSE,Nasal Swab No Transport Media 3-4 HR TAT - Swab, Nasal Cavity [[id number]]  (Normal) Collected: 01/04/22 1525    Lab Status: Final result Specimen: Swab from Nasal Cavity Updated: 01/04/22 1622     COVID19 Not Detected    Narrative:      Fact sheet for providers: https://www.fda.gov/media/248141/download     Fact sheet for patients: https://www.fda.gov/media/245338/download    Test performed by PCR.    Consider negative results in combination with clinical observations, patient history, and epidemiological information.  Fact sheet for providers: https://www.fda.gov/media/133430/download     Fact sheet for patients: https://www.fda.gov/media/364165/download    Test performed by PCR.    Consider negative results in combination with clinical observations, patient history, and epidemiological information.          Radiology Data:   Imaging Results (Last 24 Hours)     Procedure Component Value Units Date/Time    XR Toe 2+ View Right [[id number]] Collected:  01/06/22 1657     Updated: 01/06/22 1702    Narrative:      EXAMINATION:  XR TOE 2+ VW RIGHT-  1/6/2022 4:54 PM CST     HISTORY: Postop. M86.9-Osteomyelitis, unspecified; L60-[id number], not  elsewhere classified.      COMPARISON: 1/5/2022.     TECHNIQUE: 3 views were obtained of the right toes.     FINDINGS: There has been amputation of the fourth toe. There is air  within the soft tissues. There is bandaging material overlying the  operative bed, obscuring detail in this region. There are hammertoe  deformities of the second, third and fifth toes. No other bone  destruction is identified.       Impression:      Postoperative changes with amputation of the fourth toe, as  described.  This report was finalized on 01/06/2022 16:59 by Dr. Al Dutton MD.          I have reviewed the patient's current medications.     Assessment/Plan     Active Hospital Problems    Diagnosis    • **Osteomyelitis of fourth toe of right foot (HCC)    • Gangrene of toe (HCC)    • HIV (human immunodeficiency virus infection) (Formerly [facility])    • Acute pain    • Anxiety and depression    • Tobacco abuse    • Type 2 diabetes mellitus with hyperglycemia, with long-term current use of insulin (Formerly [facility])      Plan:  1.  Podiatry, Dr. Harper evaluating patient.  X-ray of right foot showed findings highly suspicious for acute osteomyelitis.  He underwent right fourth toe amputation on 1/6 per Dr. Harper.  Nonweightbearing to the surgical foot.  He will need to remain on antibiotic therapy until complete resolution of infection.     2.  Infectious disease evaluating patient, Dr. Mares.  He has a history of HIV and takes Genvoya.  He remains on IV antibiotics for residual infection.  Dr. Mares has transitioned IV vancomycin and Zosyn to IV ceftaroline.  May be able to transition to oral antibiotic in 1 to 2 days.     3.  Hemoglobin A1c 9.6.  Continue Accu-Cheks, sliding scale insulin and Levemir-titrating as needed.  Normally he is on an insulin pump on  outpatient basis.     4.  Sequential compression devices for DVT prophylaxis.     Discharge Planning: I expect the patient to be discharged to home in 1 to 2 days.    Electronically signed by SOBIA Olsen, 01/07/22, 12:52 CST.

## 2022-01-07 NOTE — PROGRESS NOTES
[facility] - PODIATRY    Today's Date: 01/07/22    Patient Name: Donis Yi  MRN: [id number]  CSN: [id number]  PCP: Oksana Mares MD  Referring Provider: Oksana Mares MD  Attending Provider: Sterling Barfield DO  Length of Stay: 3    SUBJECTIVE   Chief Complaint: Fourth toe amputation    HPI: Donis Yi, a 56 y.o.male.  1 day status post right fourth toe amputation.  He is left the bandage clean, dry, and intact.  Relates that his pain is a 7 out of 10 but this is roughly his baseline.  Denies drainage from the bandage.  No complaints.  He is remained on IV antibiotic therapy.  Denies any constitutional symptoms.     Past Medical History:   Diagnosis Date   • Allergic rhinitis    • Anxiety    • Bursitis    • DDD (degenerative disc disease), cervical    • Depression    • HIV (human immunodeficiency virus infection) ([facility])    • HIV disease ([facility])    • HLD (hyperlipidemia)    • HSP (Henoch Schonlein purpura) ([facility])    • HTN (hypertension)    • Migraine    • Myocardial infarction ([facility])    • Osteoporosis    • Sleep apnea    • Type 2 diabetes mellitus ([facility])      Past Surgical History:   Procedure Laterality Date   • AMPUTATION DIGIT Right 1/6/2022    Procedure: 4th Toe  Amputation - Right Foot;  Surgeon: Pacheco Harper DPM;  Location: [facility] OR;  Service: Podiatry;  Laterality: Right;   • ANKLE SURGERY     • CARDIAC CATHETERIZATION N/A 6/24/2020    Procedure: Coronary angiography;  Surgeon: Deon Bailey MD;  Location:  [facility] CATH INVASIVE LOCATION;  Service: Cardiology;  Laterality: N/A;  11am start time   • CARDIAC CATHETERIZATION N/A 6/24/2020    Procedure: Percutaneous Coronary Intervention;  Surgeon: Deon Bailey MD;  Location:  [facility] CATH INVASIVE LOCATION;  Service: Cardiology;  Laterality: N/A;   • COLONOSCOPY  06/19/2009    Normal exam repeat in 10 years   • COLONOSCOPY N/A 8/26/2021    Procedure: COLONOSCOPY WITH ANESTHESIA;  Surgeon: Byron Rosenberg MD;  Location:   [facility] ENDOSCOPY;  Service: Gastroenterology;  Laterality: N/A;  pre screen  post poor prep  dr yg gutiérrez   • ENDOSCOPY N/A 8/26/2021    Procedure: ESOPHAGOGASTRODUODENOSCOPY WITH ANESTHESIA;  Surgeon: Byron Rosenberg MD;  Location: [facility] ENDOSCOPY;  Service: Gastroenterology;  Laterality: N/A;  pre GERD  post normal  dr yg gutiérrez   • FOREARM SURGERY     • LYMPH NODE BIOPSY     • PILONIDAL CYSTECTOMY N/A 12/1/2020    Procedure: EXCISION PILONIDAL ABSCESS;  Surgeon: Ashlyn Posey MD;  Location: [facility] OR;  Service: General;  Laterality: N/A;     Family History   Problem Relation Age of Onset   • Diabetes Mother    • Hypertension Mother    • Thyroid disease Mother    • Hypertension Father    • Thyroid disease Father    • Arrhythmia Father    • Diabetes Maternal Grandfather    • Heart disease Maternal Grandfather    • Hypertension Maternal Grandfather    • Diabetes Paternal Grandmother    • Stroke Paternal Grandmother    • Heart disease Paternal Grandmother    • Hypertension Paternal Grandmother    • Thyroid disease Paternal Grandmother    • Hypertension Paternal Grandfather    • Hypertension Sister    • No Known Problems Brother    • Colon cancer Neg Hx    • Colon polyps Neg Hx      Social History     Socioeconomic History   • Marital status:    Tobacco Use   • Smoking status: Current Every Day Smoker     Packs/day: 0.50     Types: Cigarettes     Start date: 1988   • Smokeless tobacco: Never Used   Substance and Sexual Activity   • Alcohol use: Yes     Comment: occasionally   • Drug use: No   • Sexual activity: Defer     Allergies   Allergen Reactions   • Amitriptyline Anaphylaxis   • Amitriptyline Hcl Anaphylaxis   • Darvon [Propoxyphene] Anaphylaxis   • Zithromax [Azithromycin] Anaphylaxis     Current Facility-Administered Medications   Medication Dose Route Frequency Provider Last Rate Last Admin   • acetaminophen (TYLENOL) tablet 650 mg  650 mg Oral Q4H PRN Pacheco Harper DPM   650 mg at  01/06/22 2122    Or   • acetaminophen (TYLENOL) 160 MG/5ML solution 650 mg  650 mg Oral Q4H PRN Pacheco Harper DPM        Or   • acetaminophen (TYLENOL) suppository 650 mg  650 mg Rectal Q4H PRN Pacheco Harper DPM       • ARIPiprazole (ABILIFY) tablet 5 mg  5 mg Oral Daily Pacheco Harper DPM   5 mg at 01/07/22 1204   • atorvastatin (LIPITOR) tablet 40 mg  40 mg Oral Nightly Pacheco Harper DPM   40 mg at 01/06/22 2124   • ceftaroline (TEFLARO) 600 mg in sodium chloride 0.9 % 100 mL IVPB-VTB  600 mg Intravenous Q12H Nicko Mares MD   600 mg at 01/07/22 1444   • cetirizine (zyrTEC) tablet 10 mg  10 mg Oral Daily Pacheco Harper DPM   10 mg at 01/07/22 1204   • clonazePAM (KlonoPIN) tablet 0.5 mg  0.5 mg Oral BID PRN Pacheco Harper DPM       • dextrose (D50W) (25 g/50 mL) IV injection 25 g  25 g Intravenous Q15 Min PRN Pacheco Harper DPM       • dextrose (GLUTOSE) oral gel 15 g  15 g Oral Q15 Min PRN Pacheco Harper DPM       • Nhabcyc-Dwwby-Llcwgkbi-TenofAF (GENVOYA) [phone] MG per tablet 1 tablet  1 tablet Oral Nightly Pacheco Harper DPM   1 tablet at 01/06/22 2125   • gabapentin (NEURONTIN) capsule 800 mg  800 mg Oral TID Pacheco Harper DPM   800 mg at 01/07/22 0813   • glucagon (human recombinant) (GLUCAGEN DIAGNOSTIC) injection 1 mg  1 mg Subcutaneous Q15 Min PRN Pacheco Harper DPM       • HYDROcodone-acetaminophen (NORCO) 5-325 MG per tablet 1 tablet  1 tablet Oral Q4H PRN Pacheco Harper DPM   1 tablet at 01/07/22 1419   • insulin detemir (LEVEMIR) injection 20 Units  20 Units Subcutaneous Nightly Pacheco Harper DPM   20 Units at 01/06/22 2123   • insulin lispro (humaLOG) injection 0-24 Units  0-24 Units Subcutaneous TID AC Pacheco Harper DPM   12 Units at 01/07/22 1204   • lactated ringers infusion  75 mL/hr Intravenous Continuous Pacheco Harper DPM 75 mL/hr at 01/07/22 1204 75 mL/hr at 01/07/22 1204   • lactated ringers infusion  100 mL/hr  Intravenous Continuous Pacheco Harper DPALDO 100 mL/hr at 01/06/22 1735 100 mL/hr at 01/06/22 1735   • losartan (COZAAR) tablet 25 mg  25 mg Oral Daily Pacheco Harper DPM   25 mg at 01/07/22 0813   • melatonin tablet 6 mg  6 mg Oral Nightly PRN Pacheco Harper DPM       • ondansetron (ZOFRAN) tablet 4 mg  4 mg Oral Q6H PRN Pacheco Harper DPM        Or   • ondansetron (ZOFRAN) injection 4 mg  4 mg Intravenous Q6H PRN Pacheco Harper DPM       • pantoprazole (PROTONIX) EC tablet 40 mg  40 mg Oral BID Pacheco Harper DPM   40 mg at 01/07/22 1204   • promethazine (PHENERGAN) tablet 25 mg  25 mg Oral Q6H PRN Pacheco Harper DPM       • propranolol LA (INDERAL LA) 24 hr capsule 80 mg  80 mg Oral Daily Pacheco Harper DPM   80 mg at 01/07/22 0813   • sodium chloride 0.9 % flush 10 mL  10 mL Intravenous Q12H Pacheco Harper DPM       • sodium chloride 0.9 % flush 10 mL  10 mL Intravenous PRN Pacheco Harper DPM       • SUMAtriptan (IMITREX) tablet 50 mg  50 mg Oral Once PRN Pacheco Harper DPM       • tamsulosin (FLOMAX) 24 hr capsule 0.4 mg  0.4 mg Oral Nightly Pacheco Harper DPM   0.4 mg at 01/06/22 2124   • terazosin (HYTRIN) capsule 5 mg  5 mg Oral Nightly Pacheco Harper DPM   5 mg at 01/06/22 2125   • traZODone (DESYREL) tablet 200 mg  200 mg Oral Nightly Pacheco Harper, DPM   200 mg at 01/06/22 2124     Review of Systems   Constitutional: Negative for chills and fever.   HENT: Negative for congestion.    Respiratory: Negative for shortness of breath.    Cardiovascular: Negative for chest pain and leg swelling.   Gastrointestinal: Negative for constipation, diarrhea, nausea and vomiting.   Musculoskeletal:        Foot pain   Skin: Positive for wound.   Neurological: Positive for numbness.       OBJECTIVE     Vitals:    01/07/22 1535   BP: 122/57   Pulse: 71   Resp: 18   Temp: 99 °F (37.2 °C)   SpO2: 97%       PHYSICAL EXAM  GEN:   A&Ox3, NAD. Pt presents in hospital bed.  Accompanied by none.     Foot/Ankle Exam:       General:   Diabetic Foot Exam Performed    Appearance: appears stated age and healthy and obesity    Orientation: AAOx3    Affect: appropriate      VASCULAR      Right Foot Vascularity   Dorsalis pedis:  2+  Posterior tibial:  2+  Skin Temperature: warm    Edema Grading:  Trace  CFT:  3  Pedal Hair Growth:  Present  Varicosities: none       Left Foot Vascularity   Dorsalis pedis:  2+  Posterior tibial:  2+  Skin Temperature: warm    Edema Grading:  None  CFT:  3  Pedal Hair Growth:  Present  Varicosities: none        NEUROLOGIC     Right Foot Neurologic   Light touch sensation:  Diminished  Vibratory sensation:  Diminished  Hot/Cold sensation: diminished    Protective Sensation using [place]o-Laura Monofilament:  2     Left Foot Neurologic   Light touch sensation:  Diminished  Vibratory sensation:  Diminished  Hot/cold sensation: diminished    Protective Sensation using Chimayo-Laura Monofilament:  2     MUSCULOSKELETAL      Right Foot Musculoskeletal    Amputation   Toes amputated: fourth toe  Ecchymosis:  None  Tenderness comment:  Mildly to surgical site  Arch:  Normal  Hammertoe:  Second toe  Hallux valgus: Yes    Hallux limitus: No       Left Foot Musculoskeletal   Ecchymosis:  None  Arch:  Normal  Hammertoe:  Second toe  Hallux valgus: Yes    Hallux limitus: No       MUSCLE STRENGTH     Right Foot Muscle Strength   Foot dorsiflexion:  5  Foot plantar flexion:  5  Foot inversion:  5  Foot eversion:  5     Left Foot Muscle Strength   Foot dorsiflexion:  5  Foot plantar flexion:  5  Foot inversion:  5  Foot eversion:  5     RANGE OF MOTION      Right Foot Range of Motion   Foot and ankle ROM within normal limits       Left Foot Range of Motion   Foot and ankle ROM within normal limits       DERMATOLOGIC     Right Foot Dermatologic   Skin: erythema    Skin: no right foot cellulitis and no right foot drainage    Nails: abnormally thick, subungual debris and  dystrophic nails       Left Foot Dermatologic   Skin: skin intact    Nails: abnormally thick, subungual debris and dystrophic nails        Right Foot Additional Comments Fourth toe amputation site with sutures intact.  No active drainage.  No purulence or malodor.  No signs of dehiscence.  Mild residual erythema to the dorsal foot.             RADIOLOGY/NUCLEAR:  XR Foot 2 View Right    Result Date: 1/5/2022  Narrative: EXAM: XR FOOT 2 VW RIGHT-  INDICATION: Concern for osteomyelitis  COMPARISON: None available.  FINDINGS:  Destructive osseous changes in the fourth distal phalanx are noted with surrounding soft tissue edema and gas. No other focal destructive osseous changes or area of osseous demineralization. Lisfranc plane is maintained. No acute fracture or dislocation. Partially imaged distal fibular plate and screw fixation. Calcaneal height is maintained. Mild midfoot arthritic change.      Impression:  Destructive osseous changes in the distal fourth phalanx with surrounding soft tissue edema and gas. Findings are highly suspicious for acute osteomyelitis in the absence of trauma. This report was finalized on 01/05/2022 15:31 by Dr. Siddharth Carias MD.    XR Toe 2+ View Right    Result Date: 1/6/2022  Narrative: EXAMINATION:  XR TOE 2+ VW RIGHT-  1/6/2022 4:54 PM CST  HISTORY: Postop. M86.9-Osteomyelitis, unspecified; Z71-Uraespgs, not elsewhere classified.  COMPARISON: 1/5/2022.  TECHNIQUE: 3 views were obtained of the right toes.  FINDINGS: There has been amputation of the fourth toe. There is air within the soft tissues. There is bandaging material overlying the operative bed, obscuring detail in this region. There are hammertoe deformities of the second, third and fifth toes. No other bone destruction is identified.      Impression: Postoperative changes with amputation of the fourth toe, as described. This report was finalized on 01/06/2022 16:59 by Dr. Al Dutton MD.      LABORATORY/CULTURE  RESULTS:  Results from last 7 days   Lab Units 01/06/22  0803 01/05/22  0623 01/04/22  1602   WBC 10*3/mm3 10.97* 14.97* 15.86*   HEMOGLOBIN g/dL 11.1* 10.9* 11.2*   HEMATOCRIT % 33.1* 32.9* 33.5*   PLATELETS 10*3/mm3 223 225 212     Results from last 7 days   Lab Units 01/06/22  0803 01/05/22  0623 01/04/22  1602 01/04/22  1307   SODIUM mmol/L 137 137 138 140   POTASSIUM mmol/L 4.2 4.1 3.7 4.2   CHLORIDE mmol/L 99 101 102 101   TOTAL CO2 mmol/L  --   --   --  28   CO2 mmol/L 29.0 26.0 25.0  --    BUN mg/dL 14 12 14 15   CREATININE mg/dL 0.83 0.71* 0.77 0.8   CALCIUM mg/dL 8.7 8.6 8.7 9.1   BILIRUBIN mg/dL  --  0.7  --  0.4   ALK PHOS U/L  --  68  --  74   ALT (SGPT) U/L  --  16  --  17   AST (SGOT) U/L  --  15  --  14   GLUCOSE mg/dL 391* 259* 246* 321*         Microbiology Results (last 10 days)     Procedure Component Value - Date/Time    Tissue / Bone Culture - Tissue, Toe, Right [[id number]] Collected: 01/06/22 1603    Lab Status: Preliminary result Specimen: Tissue from Toe, Right Updated: 01/07/22 1453     Tissue Culture Growth present, too young to evaluate     Gram Stain Many (4+) WBCs seen      Few (2+) Gram positive cocci    Blood Culture - Blood, Hand, Right [[id number]]  (Normal) Collected: 01/04/22 1700    Lab Status: Preliminary result Specimen: Blood from Hand, Right Updated: 01/06/22 1815     Blood Culture No growth at 2 days    Blood Culture - Blood, Hand, Right [[id number]]  (Normal) Collected: 01/04/22 1558    Lab Status: Preliminary result Specimen: Blood from Hand, Right Updated: 01/06/22 1645     Blood Culture No growth at 2 days    COVID PRE-OP / PRE-PROCEDURE SCREENING ORDER (NO ISOLATION) - Swab, Nasal Cavity [[id number]]  (Normal) Collected: 01/04/22 1525    Lab Status: Final result Specimen: Swab from Nasal Cavity Updated: 01/04/22 1622    Narrative:      The following orders were created for panel order COVID PRE-OP / PRE-PROCEDURE SCREENING ORDER (NO ISOLATION) - Swab, Nasal  Cavity.  Procedure                               Abnormality         Status                     ---------                               -----------         ------                     COVID-19,[facility]...[[id number]]  Normal              Final result                 Please view results for these tests on the individual orders.    COVID-19,[facility] IN-HOUSE,Nasal Swab No Transport Media 3-4 HR TAT - Swab, Nasal Cavity [[id number]]  (Normal) Collected: 01/04/22 1525    Lab Status: Final result Specimen: Swab from Nasal Cavity Updated: 01/04/22 1622     COVID19 Not Detected    Narrative:      Fact sheet for providers: https://www.fda.gov/media/851710/download     Fact sheet for patients: https://www.fda.gov/media/229930/download    Test performed by PCR.    Consider negative results in combination with clinical observations, patient history, and epidemiological information.  Fact sheet for providers: https://www.fda.gov/media/490658/download     Fact sheet for patients: https://www.fda.gov/media/876874/download    Test performed by PCR.    Consider negative results in combination with clinical observations, patient history, and epidemiological information.          PATHOLOGY RESULTS:       ASSESSMENT/PLAN   1.  Acute osteomyelitis and gas gangrene, fourth toe right foot - 1 day s/p 4th toe amputation  2.  DM2  3.  HIV    Reviewed labs and imaging.   Bandage removed and surgical site evaluated.  Overall healing well with resolving signs of infection.  Clinical photo taken.  Reapplied similar bandage.  If patient is still in house, will change again on Monday.    While surgical site is healing, does have moderate surrounding erythema.  Suggest continuation of IV antibiotics over the weekend until C&S returns from surgical culture.    Ideally, nonweightbearing to the right foot.      Lab Frequency Next Occurrence   Follow Anesthesia Guidelines / Standing Orders Once 08/05/2021   Obtain Informed Consent Once 08/10/2021    Diet: Once 08/26/2021   Advance Diet as Tolerated Once 08/26/2021       This document has been electronically signed by Pacheco Harper DPM on January 7, 2022 16:21 CST

## 2022-01-07 NOTE — PLAN OF CARE
Goal Outcome Evaluation:  Plan of Care Reviewed With: other (see comments) (RN)      Progress: no change  Outcome Summary: Ntn follow up. Oral intake 100% of two meals. CCHO diet. Boost Glucose control added daily with lunch. Cont to follow for plan of care.

## 2022-01-08 VITALS
SYSTOLIC BLOOD PRESSURE: 137 MMHG | OXYGEN SATURATION: 94 % | DIASTOLIC BLOOD PRESSURE: 70 MMHG | RESPIRATION RATE: 18 BRPM | BODY MASS INDEX: 36.82 KG/M2 | WEIGHT: 263 LBS | TEMPERATURE: 99 F | HEIGHT: 71 IN | HEART RATE: 83 BPM

## 2022-01-08 LAB
ANION GAP SERPL CALCULATED.3IONS-SCNC: 5 MMOL/L (ref 5–15)
BUN SERPL-MCNC: 18 MG/DL (ref 6–20)
BUN/CREAT SERPL: 21.4 (ref 7–25)
CALCIUM SPEC-SCNC: 9.1 MG/DL (ref 8.6–10.5)
CHLORIDE SERPL-SCNC: 102 MMOL/L (ref 98–107)
CO2 SERPL-SCNC: 31 MMOL/L (ref 22–29)
CREAT SERPL-MCNC: 0.84 MG/DL (ref 0.76–1.27)
DEPRECATED RDW RBC AUTO: 40 FL (ref 37–54)
ERYTHROCYTE [DISTWIDTH] IN BLOOD BY AUTOMATED COUNT: 11.8 % (ref 12.3–15.4)
GFR SERPL CREATININE-BSD FRML MDRD: 95 ML/MIN/1.73
GLUCOSE BLDC GLUCOMTR-MCNC: 344 MG/DL (ref 70–130)
GLUCOSE BLDC GLUCOMTR-MCNC: 353 MG/DL (ref 70–130)
GLUCOSE BLDC GLUCOMTR-MCNC: 353 MG/DL (ref 70–130)
GLUCOSE BLDC GLUCOMTR-MCNC: 421 MG/DL (ref 70–130)
GLUCOSE SERPL-MCNC: 364 MG/DL (ref 65–99)
HCT VFR BLD AUTO: 32.2 % (ref 37.5–51)
HGB BLD-MCNC: 10.8 G/DL (ref 13–17.7)
MCH RBC QN AUTO: 31.1 PG (ref 26.6–33)
MCHC RBC AUTO-ENTMCNC: 33.5 G/DL (ref 31.5–35.7)
MCV RBC AUTO: 92.8 FL (ref 79–97)
PLATELET # BLD AUTO: 245 10*3/MM3 (ref 140–450)
PMV BLD AUTO: 10.8 FL (ref 6–12)
POTASSIUM SERPL-SCNC: 4.4 MMOL/L (ref 3.5–5.2)
RBC # BLD AUTO: 3.47 10*6/MM3 (ref 4.14–5.8)
SODIUM SERPL-SCNC: 138 MMOL/L (ref 136–145)
WBC NRBC COR # BLD: 8.17 10*3/MM3 (ref 3.4–10.8)

## 2022-01-08 PROCEDURE — 63710000001 INSULIN LISPRO (HUMAN) PER 5 UNITS: Performed by: FAMILY MEDICINE

## 2022-01-08 PROCEDURE — 25010000002 CEFTAROLINE FOSAMIL PER 10 MG: Performed by: INTERNAL MEDICINE

## 2022-01-08 PROCEDURE — 80048 BASIC METABOLIC PNL TOTAL CA: CPT | Performed by: NURSE PRACTITIONER

## 2022-01-08 PROCEDURE — 85027 COMPLETE CBC AUTOMATED: CPT | Performed by: NURSE PRACTITIONER

## 2022-01-08 PROCEDURE — 82962 GLUCOSE BLOOD TEST: CPT

## 2022-01-08 RX ORDER — AMOXICILLIN AND CLAVULANATE POTASSIUM 875; 125 MG/1; MG/1
1 TABLET, FILM COATED ORAL 2 TIMES DAILY
Qty: 14 TABLET | Refills: 0 | Status: SHIPPED | OUTPATIENT
Start: 2022-01-08 | End: 2022-02-18

## 2022-01-08 RX ADMIN — HYDROCODONE BITARTRATE AND ACETAMINOPHEN 1 TABLET: 5; 325 TABLET ORAL at 08:05

## 2022-01-08 RX ADMIN — INSULIN LISPRO 20 UNITS: 100 INJECTION, SOLUTION INTRAVENOUS; SUBCUTANEOUS at 02:02

## 2022-01-08 RX ADMIN — CETIRIZINE HYDROCHLORIDE 10 MG: 10 TABLET ORAL at 08:19

## 2022-01-08 RX ADMIN — GABAPENTIN 800 MG: 400 CAPSULE ORAL at 08:18

## 2022-01-08 RX ADMIN — PROPRANOLOL HYDROCHLORIDE 80 MG: 80 CAPSULE, EXTENDED RELEASE ORAL at 08:18

## 2022-01-08 RX ADMIN — CEFTAROLINE FOSAMIL 600 MG: 600 POWDER, FOR SOLUTION INTRAVENOUS at 02:09

## 2022-01-08 RX ADMIN — SODIUM CHLORIDE, PRESERVATIVE FREE 10 ML: 5 INJECTION INTRAVENOUS at 08:19

## 2022-01-08 RX ADMIN — ARIPIPRAZOLE 5 MG: 5 TABLET ORAL at 08:19

## 2022-01-08 RX ADMIN — CEFTAROLINE FOSAMIL 600 MG: 600 POWDER, FOR SOLUTION INTRAVENOUS at 14:33

## 2022-01-08 RX ADMIN — PANTOPRAZOLE SODIUM 40 MG: 40 TABLET, DELAYED RELEASE ORAL at 08:18

## 2022-01-08 RX ADMIN — HYDROCODONE BITARTRATE AND ACETAMINOPHEN 1 TABLET: 5; 325 TABLET ORAL at 02:12

## 2022-01-08 RX ADMIN — LOSARTAN POTASSIUM 25 MG: 50 TABLET, FILM COATED ORAL at 08:19

## 2022-01-08 RX ADMIN — INSULIN LISPRO 16 UNITS: 100 INJECTION, SOLUTION INTRAVENOUS; SUBCUTANEOUS at 08:18

## 2022-01-08 RX ADMIN — ACETAMINOPHEN 650 MG: 325 TABLET ORAL at 14:33

## 2022-01-08 RX ADMIN — INSULIN LISPRO 20 UNITS: 100 INJECTION, SOLUTION INTRAVENOUS; SUBCUTANEOUS at 12:07

## 2022-01-08 NOTE — DISCHARGE SUMMARY
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      Insulin Syringe 31G X 5/16\" 1 ML misc   Use 4 times daily  , ICD10 code is E11.9      loperamide 2 MG capsule  Commonly known as: IMODIUM   2 mg, Oral, 4 Times Daily PRN      loratadine 10 MG tablet  Commonly known as: CLARITIN   10 mg, Oral, Daily      losartan 25 MG tablet  Commonly known as: COZAAR   25 mg, Oral, Daily      meclizine 25 MG tablet  Commonly known as: ANTIVERT   25 mg, Oral, Daily      Melatonin 5 MG capsule   5 mg, Oral, Every Night at Bedtime      metFORMIN 500 MG tablet  Commonly known as: GLUCOPHAGE   1,000 mg, Oral, 2 Times Daily With Meals      Nasonex 50 MCG/ACT nasal spray  Generic drug: mometasone   2 sprays, Nasal, Daily      ondansetron ODT 4 MG disintegrating tablet  Commonly known as: ZOFRAN-ODT   4 mg, Translingual, Every 6 Hours PRN      prazosin 1 MG capsule  Commonly known as: MINIPRESS   2 mg, Oral, Nightly, Patient unsure of dosage       prazosin 1 MG capsule  Commonly known as: MINIPRESS   1 mg, Oral, Every Morning      promethazine 25 MG tablet  Commonly known as: PHENERGAN   25 mg, Oral, Every 6 Hours PRN      propranolol LA 80 MG 24 hr capsule  Commonly known as: INDERAL LA   80 mg, Oral, Daily      rizatriptan 10 MG " tablet  Commonly known as: MAXALT   10 mg, Oral, Once As Needed, May repeat in 2 hours if needed      tiZANidine 4 MG tablet  Commonly known as: ZANAFLEX   4 mg, Oral, Every 8 Hours PRN      traZODone 100 MG tablet  Commonly known as: DESYREL   200 mg, Oral, Nightly      Trintellix 20 MG tablet  Generic drug: Vortioxetine HBr   20 mg, Oral, Daily With Breakfast      Vitamin D3 50 MCG (2000 UT) capsule   2,000 Units, Oral, Daily             Discharge Diet:   Diet Instructions     Diet: Consistent Carbohydrate; Thin      Discharge Diet: Consistent Carbohydrate    Fluid Consistency: Thin          Discharge Care Plan / Instructions:   Discharge home    Activity at Discharge:   Activity Instructions     Activity as Tolerated            Follow-up Appointments:  Follow-up with primary care physician next week  Follow-up with Dr. Harper on Monday for dressing change  Follow-up with infectious diseases next week       Electronically signed by Sterling Barfield DO, 01/08/22, 15:53 CST.    Time: Discharge Over 30 min    Part of this note may be an electronic transcription/translation of spoken language to printed text using the Dragon Dictation system.

## 2022-01-08 NOTE — PLAN OF CARE
Goal Outcome Evaluation:   Patient is alert and oriented. Denies shortness of breath or chest pain. Safety precautions in place. IV patent to left arm. Right foot dressing dry and intact. Capillary refill to 3rd and 5th toes brisk. Patient states there is some numbness at amputation site. Wearing scds. Patient has been hyperglycemic this shift, coverage given at bedtime and additionally at 2pm per physician order. Per patient he had eaten olive garden for dinner.

## 2022-01-08 NOTE — PROGRESS NOTES
INFECTIOUS DISEASES PROGRESS NOTE    Patient:  Donis Yi  YOB: 1965  MRN: [id number]   Admit date: 1/4/2022   Admitting Physician: Sterling Barfield DO  Primary Care Physician: Oksana Mares MD    Chief Complaint: toe amuptation        Interval History: Patient s/p 4 th toe amputation for gangrene. Glucose 300-400s today     Allergies:   Allergies   Allergen Reactions   • Amitriptyline Anaphylaxis   • Amitriptyline Hcl Anaphylaxis   • Darvon [Propoxyphene] Anaphylaxis   • Zithromax [Azithromycin] Anaphylaxis       Current Meds:     Current Facility-Administered Medications:   •  acetaminophen (TYLENOL) tablet 650 mg, 650 mg, Oral, Q4H PRN, 650 mg at 01/06/22 2122 **OR** acetaminophen (TYLENOL) 160 MG/5ML solution 650 mg, 650 mg, Oral, Q4H PRN **OR** acetaminophen (TYLENOL) suppository 650 mg, 650 mg, Rectal, Q4H PRN, Pacheco Harper, DPM  •  ARIPiprazole (ABILIFY) tablet 5 mg, 5 mg, Oral, Daily, Pacheco Harper DPM, 5 mg at 01/08/22 0819  •  atorvastatin (LIPITOR) tablet 40 mg, 40 mg, Oral, Nightly, Pacheco Harper DPM, 40 mg at 01/07/22 2000  •  ceftaroline (TEFLARO) 600 mg in sodium chloride 0.9 % 100 mL IVPB-VTB, 600 mg, Intravenous, Q12H, Nicko Mares MD, 600 mg at 01/08/22 0209  •  cetirizine (zyrTEC) tablet 10 mg, 10 mg, Oral, Daily, Pacheco Harper, DPM, 10 mg at 01/08/22 0819  •  clonazePAM (KlonoPIN) tablet 0.5 mg, 0.5 mg, Oral, BID PRN, Pacheco Harper, DPM  •  dextrose (D50W) (25 g/50 mL) IV injection 25 g, 25 g, Intravenous, Q15 Min PRN, Pacheco Harper DPM  •  dextrose (GLUTOSE) oral gel 15 g, 15 g, Oral, Q15 Min PRN, Pacheco Harper, DPM  •  Nxdbjnn-Wrtqh-Caqzfaho-TenofAF (GENVOYA) [phone] MG per tablet 1 tablet, 1 tablet, Oral, Nightly, Pacheco Harper DPM, 1 tablet at 01/07/22 2000  •  gabapentin (NEURONTIN) capsule 800 mg, 800 mg, Oral, TID, Pacheco Harper DPM, 800 mg at 01/08/22 0818  •  glucagon (human recombinant) (GLUCAGEN DIAGNOSTIC)  injection 1 mg, 1 mg, Subcutaneous, Q15 Min PRN, Jeanne Harperck A, DPM  •  HYDROcodone-acetaminophen (NORCO) 5-325 MG per tablet 1 tablet, 1 tablet, Oral, Q4H PRN, Jeanne Harperck A, DPM, 1 tablet at 01/08/22 0805  •  insulin detemir (LEVEMIR) injection 20 Units, 20 Units, Subcutaneous, Nightly, Jeanne Harperck JOSE, DPM, 20 Units at 01/07/22 2002  •  insulin lispro (humaLOG) injection 0-24 Units, 0-24 Units, Subcutaneous, TID AC, Sterling Barfield, DO, 16 Units at 01/08/22 0818  •  lactated ringers infusion, 75 mL/hr, Intravenous, Continuous, Jeanne Harperck A, DPM, Last Rate: 75 mL/hr at 01/07/22 1204, 75 mL/hr at 01/07/22 1204  •  lactated ringers infusion, 100 mL/hr, Intravenous, Continuous, Jeanne Harperck A, DPM, Last Rate: 100 mL/hr at 01/06/22 1735, 100 mL/hr at 01/06/22 1735  •  losartan (COZAAR) tablet 25 mg, 25 mg, Oral, Daily, Jeanne Harperck A, DPM, 25 mg at 01/08/22 0819  •  melatonin tablet 6 mg, 6 mg, Oral, Nightly PRN, Leroy, Pacheco A, DPM  •  ondansetron (ZOFRAN) tablet 4 mg, 4 mg, Oral, Q6H PRN **OR** ondansetron (ZOFRAN) injection 4 mg, 4 mg, Intravenous, Q6H PRN, LeroyJeanneck A, DPM  •  pantoprazole (PROTONIX) EC tablet 40 mg, 40 mg, Oral, BID, Jeanne Harperck A, DPM, 40 mg at 01/08/22 0818  •  promethazine (PHENERGAN) tablet 25 mg, 25 mg, Oral, Q6H PRN, Leroy, [place] A, DPM, 25 mg at 01/07/22 1638  •  propranolol LA (INDERAL LA) 24 hr capsule 80 mg, 80 mg, Oral, Daily, Pacheco Harper DPM, 80 mg at 01/08/22 0818  •  sodium chloride 0.9 % flush 10 mL, 10 mL, Intravenous, Q12H, Pacheco Harper, DPM, 10 mL at 01/08/22 0819  •  sodium chloride 0.9 % flush 10 mL, 10 mL, Intravenous, PRN, Pacheco Harper DPM  •  SUMAtriptan (IMITREX) tablet 50 mg, 50 mg, Oral, Once PRN, Pacheco Harper DPM  •  tamsulosin (FLOMAX) 24 hr capsule 0.4 mg, 0.4 mg, Oral, Nightly, Pacheco Harper DPM, 0.4 mg at 01/07/22 2000  •  terazosin (HYTRIN) capsule 5 mg, 5 mg, Oral, Nightly, Pacheco Harper,  "DPM, 5 mg at 22  •  tiZANidine (ZANAFLEX) tablet 4 mg, 4 mg, Oral, Q8H PRN, Sterling Barfield, DO, 4 mg at 22 1849  •  traZODone (DESYREL) tablet 200 mg, 200 mg, Oral, Nightly, Pacheco Harper, DPM, 200 mg at 22      Review of Systems   Constitutional: Negative for fever.   Skin: Positive for wound.       Objective     Vital Signs:  Temp (24hrs), Av.5 °F (36.9 °C), Min:97.4 °F (36.3 °C), Max:99 °F (37.2 °C)      /70 (BP Location: Right arm, Patient Position: Lying)   Pulse 83   Temp 99 °F (37.2 °C) (Oral)   Resp 18   Ht 180.3 cm (71\")   Wt 119 kg (263 lb)   SpO2 94%   BMI 36.68 kg/m²         Physical Exam  Vitals reviewed.   Constitutional:       General: He is not in acute distress.     Appearance: He is obese.   HENT:      Head: Normocephalic and atraumatic.   Eyes:      General: No scleral icterus.  Pulmonary:      Effort: Pulmonary effort is normal. No respiratory distress.   Skin:     General: Skin is warm and dry.      Comments: Dressing in place RLE -  C/D/I   Neurological:      Mental Status: He is alert.                   Results Review:    I reviewed the patient's new clinical results.    Lab Results:  CBC:   Lab Results   Lab 22  1307 22  1602 22  0623 22  0803 22  0505   WBC 17.3* 15.86* 14.97* 10.97* 8.17   HEMOGLOBIN 12.0* 11.2* 10.9* 11.1* 10.8*   HEMATOCRIT 37.5* 33.5* 32.9* 33.1* 32.2*   PLATELETS 244 212 225 223 245         CMP:   Lab Results   Lab 22  1307 22  1602 22  0623 22  0803 22  0505   SODIUM 140   < > 137 137 138   POTASSIUM 4.2   < > 4.1 4.2 4.4   CHLORIDE 101   < > 101 99 102   TOTAL CO2 28  --   --   --   --    CO2  --    < > 26.0 29.0 31.0*   BUN 15   < > 12 14 18   CREATININE 0.8   < > 0.71* 0.83 0.84   CALCIUM 9.1   < > 8.6 8.7 9.1   BILIRUBIN 0.4  --  0.7  --   --    ALK PHOS 74  --  68  --   --    ALT (SGPT) 17  --  16  --   --    AST (SGOT) 14  --  15  --   --    GLUCOSE " 321*   < > 259* 391* 364*    < > = values in this interval not displayed.       Estimated Creatinine Clearance: 132 mL/min (by C-G formula based on SCr of 0.84 mg/dL).    Culture Results:    Microbiology Results (last 10 days)     Procedure Component Value - Date/Time    Tissue / Bone Culture - Tissue, Toe, Right [[id number]]  (Abnormal) Collected: 01/06/22 1603    Lab Status: Preliminary result Specimen: Tissue from Toe, Right Updated: 01/08/22 1108     Tissue Culture Scant growth (1+) Streptococcus, Beta Hemolytic, Group G     Comment: This organism is considered to be universally susceptible to penicillin.  No further antibiotic testing will be performed. If Clindamycin or Erythromycin is the drug of choice, notify the laboratory within 7 days to request susceptibility testing.        Gram Stain Many (4+) WBCs seen      Few (2+) Gram positive cocci    Blood Culture - Blood, Hand, Right [[id number]]  (Normal) Collected: 01/04/22 1700    Lab Status: Preliminary result Specimen: Blood from Hand, Right Updated: 01/07/22 1815     Blood Culture No growth at 3 days    Blood Culture - Blood, Hand, Right [[id number]]  (Normal) Collected: 01/04/22 1558    Lab Status: Preliminary result Specimen: Blood from Hand, Right Updated: 01/07/22 1645     Blood Culture No growth at 3 days    COVID PRE-OP / PRE-PROCEDURE SCREENING ORDER (NO ISOLATION) - Swab, Nasal Cavity [[id number]]  (Normal) Collected: 01/04/22 1525    Lab Status: Final result Specimen: Swab from Nasal Cavity Updated: 01/04/22 1622    Narrative:      The following orders were created for panel order COVID PRE-OP / PRE-PROCEDURE SCREENING ORDER (NO ISOLATION) - Swab, Nasal Cavity.  Procedure                               Abnormality         Status                     ---------                               -----------         ------                     COVID-19,[facility]...[[id number]]  Normal              Final result                 Please view results for these  tests on the individual orders.    COVID-19,[facility] IN-HOUSE,Nasal Swab No Transport Media 3-4 HR TAT - Swab, Nasal Cavity [[id number]]  (Normal) Collected: 01/04/22 1525    Lab Status: Final result Specimen: Swab from Nasal Cavity Updated: 01/04/22 1622     COVID19 Not Detected    Narrative:      Fact sheet for providers: https://www.fda.gov/media/786580/download     Fact sheet for patients: https://www.fda.gov/media/509989/download    Test performed by PCR.    Consider negative results in combination with clinical observations, patient history, and epidemiological information.  Fact sheet for providers: https://www.fda.gov/media/849027/download     Fact sheet for patients: https://www.fda.gov/media/084691/download    Test performed by PCR.    Consider negative results in combination with clinical observations, patient history, and epidemiological information.               Radiology:   Imaging Results (Last 72 Hours)     Procedure Component Value Units Date/Time    XR Toe 2+ View Right [[id number]] Collected: 01/06/22 1657     Updated: 01/06/22 1702    Narrative:      EXAMINATION:  XR TOE 2+ VW RIGHT-  1/6/2022 4:54 PM CST     HISTORY: Postop. M86.9-Osteomyelitis, unspecified; P39-Fwiagdru, not  elsewhere classified.      COMPARISON: 1/5/2022.     TECHNIQUE: 3 views were obtained of the right toes.     FINDINGS: There has been amputation of the fourth toe. There is air  within the soft tissues. There is bandaging material overlying the  operative bed, obscuring detail in this region. There are hammertoe  deformities of the second, third and fifth toes. No other bone  destruction is identified.       Impression:      Postoperative changes with amputation of the fourth toe, as  described.  This report was finalized on 01/06/2022 16:59 by Dr. Al Dutton MD.    XR Foot 2 View Right [[id number]] Collected: 01/05/22 1528     Updated: 01/05/22 1534    Narrative:      EXAM: XR FOOT 2 VW RIGHT-     INDICATION: Concern  for osteomyelitis     COMPARISON: None available.     FINDINGS:     Destructive osseous changes in the fourth distal phalanx are noted with  surrounding soft tissue edema and gas. No other focal destructive  osseous changes or area of osseous demineralization. Lisfranc plane is  maintained. No acute fracture or dislocation. Partially imaged distal  fibular plate and screw fixation. Calcaneal height is maintained. Mild  midfoot arthritic change.       Impression:         Destructive osseous changes in the distal fourth phalanx with  surrounding soft tissue edema and gas. Findings are highly suspicious  for acute osteomyelitis in the absence of trauma.  This report was finalized on 01/05/2022 15:31 by Dr. Siddharth Carias MD.          Assessment/Plan     Active Hospital Problems    Diagnosis    • **Osteomyelitis of fourth toe of right foot (HCC)    • Gangrene of toe ([facility])    • HIV (human immunodeficiency virus infection) ([facility])    • Acute pain    • Anxiety and depression    • Tobacco abuse    • Type 2 diabetes mellitus with hyperglycemia, with long-term current use of insulin ([facility])      Group B strep    RECOMMENDATION:   · Continue abx  · Dressing changes per Dr Harper  · Diabetes management per attending  · Continue voya        Ilsa Crenshaw MD  01/08/22  11:29 CST

## 2022-01-08 NOTE — PLAN OF CARE
Goal Outcome Evaluation:medicated x2 today for c/o foot pain. Up to bsc several times today. Right foot dressing CDI. Abx continue. Safety maintained dc plan is home on po abx in a few days. Continue to monitor

## 2022-01-09 ENCOUNTER — READMISSION MANAGEMENT (OUTPATIENT)
Dept: CALL CENTER | Facility: HOSPITAL | Age: 57
End: 2022-01-09

## 2022-01-09 LAB
BACTERIA SPEC AEROBE CULT: ABNORMAL
BACTERIA SPEC AEROBE CULT: NORMAL
BACTERIA SPEC AEROBE CULT: NORMAL
GRAM STN SPEC: ABNORMAL
GRAM STN SPEC: ABNORMAL

## 2022-01-09 NOTE — PLAN OF CARE
Goal Outcome Evaluation: medicated x2 for c/o pain inright foot with good relief. Iv dcd per orders. Dc instructions given and explained to pt. Pt verbalized no questions or concerns at time of dc. Safety maintained

## 2022-01-09 NOTE — OUTREACH NOTE
Prep Survey      Responses   Restoration facility patient discharged from? [facility]   Is LACE score < 7 ? No   Emergency Room discharge w/ pulse ox? No   Eligibility Readm Mgmt   Discharge diagnosis Osteomyelitis of fourth toe of right foot              1/6 4th Toe  Amputation - Right Foot   Does the patient have one of the following disease processes/diagnoses(primary or secondary)? General Surgery   Does the patient have Home health ordered? No   Is there a DME ordered? No   Prep survey completed? Yes          Alexsandra Randhawa RN

## 2022-01-10 ENCOUNTER — OFFICE VISIT (OUTPATIENT)
Dept: PODIATRY | Facility: CLINIC | Age: 57
End: 2022-01-10

## 2022-01-10 VITALS — BODY MASS INDEX: 36.93 KG/M2 | OXYGEN SATURATION: 98 % | HEIGHT: 71 IN | WEIGHT: 263.8 LBS | HEART RATE: 77 BPM

## 2022-01-10 DIAGNOSIS — B20 HIV INFECTION, UNSPECIFIED SYMPTOM STATUS: ICD-10-CM

## 2022-01-10 DIAGNOSIS — E11.65 TYPE 2 DIABETES MELLITUS WITH HYPERGLYCEMIA, WITH LONG-TERM CURRENT USE OF INSULIN: ICD-10-CM

## 2022-01-10 DIAGNOSIS — Z98.890 S/P FOOT SURGERY: Primary | ICD-10-CM

## 2022-01-10 DIAGNOSIS — Z79.4 TYPE 2 DIABETES MELLITUS WITH HYPERGLYCEMIA, WITH LONG-TERM CURRENT USE OF INSULIN: ICD-10-CM

## 2022-01-10 DIAGNOSIS — S98.131A AMPUTATED TOE, RIGHT: ICD-10-CM

## 2022-01-10 DIAGNOSIS — Z72.0 TOBACCO ABUSE: ICD-10-CM

## 2022-01-10 PROCEDURE — 99024 POSTOP FOLLOW-UP VISIT: CPT | Performed by: PODIATRIST

## 2022-01-10 NOTE — PROGRESS NOTES
Psychiatric - PODIATRY    Today's Date: 01/10/22    Patient Name: Donis Yi  MRN: [id number]  CSN: [id number]  PCP: Oksana Mares MD  Referring Provider: No ref. provider found    SUBJECTIVE     Chief Complaint   Patient presents with   • Follow-up     pcp01/04/2022 Dressing Change and Re-Evaluation- pt states been in pain, was sent home with no pain meds he states- pain 8/10- pt presents with wrapped right foot, 4th to removed   • Diabetes     433mg/dl BG last night     HPI: Donis Yi, a 56 y.o.male, comes to clinic as a(n) established patient 4 days s/p right 4th toe amputation. Patient has h/o DM2, anxiety, DDD, depression, HIV, HLD, HSP, HTN, MI, migraine, osteoporosis, sleep apnea. Patient is IDDM with last stated BG level of 433mg/dl. Patient was discharged from the hospital 2 days ago. I had advised patient to be NWB to the surgical foot and he was supposed to have a surgical shoe dispensed. Unfortunately he was not given a surgical shoe and was told by other providers that he could be heel touch to the foot. He has kept the bandage intact.  Admits pain at 8/10 level and described as aching, nagging, numbness and tingling. He has continued his abx as prescribed. Denies any constitutional symptoms. No other pedal complaints at this time.    Past Medical History:   Diagnosis Date   • Allergic rhinitis    • Anxiety    • Bursitis    • DDD (degenerative disc disease), cervical    • Depression    • HIV (human immunodeficiency virus infection) ([facility])    • HIV disease (HCC)    • HLD (hyperlipidemia)    • HSP ([place] purpura) ([facility])    • HTN (hypertension)    • Migraine    • Myocardial infarction (HCC)    • Osteoporosis    • Sleep apnea    • Type 2 diabetes mellitus (HCC)      Past Surgical History:   Procedure Laterality Date   • AMPUTATION DIGIT Right 1/6/2022    Procedure: 4th Toe  Amputation - Right Foot;  Surgeon: Pacheco Harper DPM;  Location: [facility] OR;  Service:  Podiatry;  Laterality: Right;   • ANKLE SURGERY     • CARDIAC CATHETERIZATION N/A 6/24/2020    Procedure: Coronary angiography;  Surgeon: Deon Bailey MD;  Location: [facility] CATH INVASIVE LOCATION;  Service: Cardiology;  Laterality: N/A;  11am start time   • CARDIAC CATHETERIZATION N/A 6/24/2020    Procedure: Percutaneous Coronary Intervention;  Surgeon: Deon Bailey MD;  Location:  [facility] CATH INVASIVE LOCATION;  Service: Cardiology;  Laterality: N/A;   • COLONOSCOPY  06/19/2009    Normal exam repeat in 10 years   • COLONOSCOPY N/A 8/26/2021    Procedure: COLONOSCOPY WITH ANESTHESIA;  Surgeon: Byron Rosenberg MD;  Location: [facility] ENDOSCOPY;  Service: Gastroenterology;  Laterality: N/A;  pre screen  post poor prep  dr yg gutiérrez   • ENDOSCOPY N/A 8/26/2021    Procedure: ESOPHAGOGASTRODUODENOSCOPY WITH ANESTHESIA;  Surgeon: Byron Rosenberg MD;  Location: [facility] ENDOSCOPY;  Service: Gastroenterology;  Laterality: N/A;  pre GERD  post normal  dr yg gutiérrez   • FOREARM SURGERY     • LYMPH NODE BIOPSY     • PILONIDAL CYSTECTOMY N/A 12/1/2020    Procedure: EXCISION PILONIDAL ABSCESS;  Surgeon: Ashlyn Posey MD;  Location: [facility] OR;  Service: General;  Laterality: N/A;     Family History   Problem Relation Age of Onset   • Diabetes Mother    • Hypertension Mother    • Thyroid disease Mother    • Hypertension Father    • Thyroid disease Father    • Arrhythmia Father    • Diabetes Maternal Grandfather    • Heart disease Maternal Grandfather    • Hypertension Maternal Grandfather    • Diabetes Paternal Grandmother    • Stroke Paternal Grandmother    • Heart disease Paternal Grandmother    • Hypertension Paternal Grandmother    • Thyroid disease Paternal Grandmother    • Hypertension Paternal Grandfather    • Hypertension Sister    • No Known Problems Brother    • Colon cancer Neg Hx    • Colon polyps Neg Hx      Social History     Socioeconomic History   • Marital status:    Tobacco Use   • Smoking  status: Current Every Day Smoker     Packs/day: 0.50     Types: Cigarettes     Start date: 1988   • Smokeless tobacco: Never Used   Vaping Use   • Vaping Use: Never used   Substance and Sexual Activity   • Alcohol use: Yes     Comment: occasionally   • Drug use: No   • Sexual activity: Defer     Allergies   Allergen Reactions   • Amitriptyline Anaphylaxis   • Amitriptyline Hcl Anaphylaxis   • Darvon [Propoxyphene] Anaphylaxis   • Zithromax [Azithromycin] Anaphylaxis     Current Outpatient Medications   Medication Sig Dispense Refill   • albuterol (PROVENTIL HFA;VENTOLIN HFA) 108 (90 BASE) MCG/ACT inhaler Inhale 2 puffs every 6 (six) hours as needed for wheezing.     • amoxicillin-clavulanate (Augmentin) 875-125 MG per tablet Take 1 tablet by mouth 2 (Two) Times a Day. 14 tablet 0   • ARIPiprazole (ABILIFY) 5 MG tablet Take 5 mg by mouth Daily.     • atorvastatin (LIPITOR) 40 MG tablet Take 40 mg by mouth Daily.  3   • Cholecalciferol (VITAMIN D3) 50 MCG (2000 UT) capsule Take 2,000 Units by mouth Daily.     • ciclopirox (LOPROX) 0.77 % cream Apply  topically to the appropriate area as directed 2 (Two) Times a Day. (Patient taking differently: Apply 1 application topically to the appropriate area as directed 2 (Two) Times a Day As Needed.) 30 g 5   • clonazePAM (KlonoPIN) 0.5 MG tablet Take 0.5 mg by mouth 2 (Two) Times a Day As Needed for Anxiety.     • clotrimazole-betamethasone (LOTRISONE) 1-0.05 % cream Apply  topically to the appropriate area as directed 2 (Two) Times a Day. 15 g 1   • DEXILANT 60 MG capsule Take 60 mg by mouth Daily.  1   • Dulaglutide (Trulicity) 4.5 MG/0.5ML solution pen-injector Inject 4.5 mg under the skin into the appropriate area as directed 1 (One) Time Per Week. Start after 1 month of 3 mg (Patient taking differently: Inject 4.5 mg under the skin into the appropriate area as directed 1 (One) Time Per Week. Due Tuesday) 4 pen 11   • Lpqmbvu-Sllhs-Fjnslczo-TenofAF (GENVOYA) [phone]  "MG tablet Take 1 tablet by mouth daily.     • fluticasone (FLONASE) 50 MCG/ACT nasal spray 2 sprays into each nostril Daily. (Patient taking differently: 2 sprays into the nostril(s) as directed by provider Daily As Needed for Rhinitis.) 1 bottle 6   • HYDROcodone-acetaminophen (NORCO) 5-325 MG per tablet Take 1 tablet by mouth 2 (Two) Times a Day As Needed.     • insulin aspart (novoLOG) 100 UNIT/ML injection Up to 150 units daily through pump 5 each 11   • Insulin Syringe 31G X 5/16\" 1 ML misc Use 4 times daily  , ICD10 code is E11.9 120 each 11   • loperamide (IMODIUM) 2 MG capsule Take 2 mg by mouth 4 (Four) Times a Day As Needed for Diarrhea.     • loratadine (CLARITIN) 10 MG tablet Take 10 mg by mouth daily.     • losartan (COZAAR) 25 MG tablet Take 1 tablet by mouth Daily. 30 tablet 0   • meclizine (ANTIVERT) 25 MG tablet Take 25 mg by mouth Daily.     • Melatonin 5 MG capsule Take 5 mg by mouth every night at bedtime.  3   • metFORMIN (GLUCOPHAGE) 500 MG tablet Take 1,000 mg by mouth 2 (Two) Times a Day With Meals.  3   • mometasone (Nasonex) 50 MCG/ACT nasal spray 2 sprays into the nostril(s) as directed by provider Daily.     • ondansetron ODT (ZOFRAN-ODT) 4 MG disintegrating tablet Place 1 tablet on the tongue Every 6 (Six) Hours As Needed for Nausea. 12 tablet 0   • prazosin (MINIPRESS) 1 MG capsule Take 2 mg by mouth Every Night. Patient unsure of dosage     • prazosin (MINIPRESS) 1 MG capsule Take 1 mg by mouth Every Morning.     • promethazine (PHENERGAN) 25 MG tablet Take 25 mg by mouth every 6 (six) hours as needed for nausea or vomiting.     • propranolol LA (INDERAL LA) 80 MG 24 hr capsule Take 80 mg by mouth Daily.  5   • rizatriptan (MAXALT) 10 MG tablet Take 10 mg by mouth 1 (one) time as needed for migraine. May repeat in 2 hours if needed     • tamsulosin (FLOMAX) 0.4 MG capsule 24 hr capsule TAKE 2 CAPSULES BY MOUTH EVERY NIGHT AT BEDTIME. (Patient taking differently: Take 1 capsule by mouth " every night at bedtime.) 60 capsule 0   • tiZANidine (ZANAFLEX) 4 MG tablet Take 4 mg by mouth Every 8 (Eight) Hours As Needed for Muscle Spasms.     • traZODone (DESYREL) 100 MG tablet Take 200 mg by mouth Every Night.  3   • Vortioxetine HBr (Trintellix) 20 MG tablet Take 20 mg by mouth Daily With Breakfast.       No current facility-administered medications for this visit.     Review of Systems   Constitutional: Negative for chills and fever.   HENT: Negative for congestion.    Respiratory: Negative for shortness of breath.    Cardiovascular: Negative for chest pain and leg swelling.   Gastrointestinal: Negative for constipation, diarrhea, nausea and vomiting.   Musculoskeletal:        Foot pain   Skin: Positive for wound.   Neurological: Positive for tremors and numbness.       OBJECTIVE     Vitals:    01/10/22 1308   Pulse: 77   SpO2: 98%       PHYSICAL EXAM  GEN:   Accompanied by none.     Foot/Ankle Exam:       General:   Diabetic Foot Exam Performed    Appearance: appears stated age and healthy and obesity    Orientation: AAOx3    Affect: appropriate    Gait: unimpaired    Assistance: independent    Shoe Gear:  Casual shoes    VASCULAR      Right Foot Vascularity   Dorsalis pedis:  2+  Posterior tibial:  2+  Skin Temperature: warm    Edema Grading:  Trace  CFT:  3  Pedal Hair Growth:  Present  Varicosities: none       Left Foot Vascularity   Dorsalis pedis:  2+  Posterior tibial:  2+  Skin Temperature: warm    Edema Grading:  None  CFT:  3  Pedal Hair Growth:  Present  Varicosities: none        NEUROLOGIC     Right Foot Neurologic   Light touch sensation:  Diminished  Vibratory sensation:  Diminished  Hot/Cold sensation: diminished    Protective Sensation using [place] Monofilament:  2     Left Foot Neurologic   Light touch sensation:  Diminished  Vibratory sensation:  Diminished  Hot/cold sensation: diminished    Protective Sensation using [place] Monofilament:  2     MUSCULOSKELETAL       Right Foot Musculoskeletal    Amputation   Toes amputated: fourth toe  Ecchymosis:  None  Tenderness comment:  Mildly to surgical site  Arch:  Normal  Hammertoe:  Second toe  Hallux valgus: Yes    Hallux limitus: No       Left Foot Musculoskeletal   Ecchymosis:  None  Arch:  Normal  Hammertoe:  Second toe  Hallux valgus: Yes    Hallux limitus: No       MUSCLE STRENGTH     Right Foot Muscle Strength   Foot dorsiflexion:  5  Foot plantar flexion:  5  Foot inversion:  5  Foot eversion:  5     Left Foot Muscle Strength   Foot dorsiflexion:  5  Foot plantar flexion:  5  Foot inversion:  5  Foot eversion:  5     RANGE OF MOTION      Right Foot Range of Motion   Foot and ankle ROM within normal limits       Left Foot Range of Motion   Foot and ankle ROM within normal limits       DERMATOLOGIC     Right Foot Dermatologic   Skin: drainage and erythema    Skin: no right foot cellulitis    Nails: abnormally thick, subungual debris and dystrophic nails       Left Foot Dermatologic   Skin: skin intact    Nails: abnormally thick, subungual debris and dystrophic nails        Right Foot Additional Comments Fourth toe amputation site with sutures intact.  Incision appears to have additional strain, swelling and mild drainage. No purulence or malodor.  Mild residual erythema to the dorsal foot.      RADIOLOGY/NUCLEAR:  XR Foot 2 View Right    Result Date: 1/5/2022  Narrative: EXAM: XR FOOT 2 VW RIGHT-  INDICATION: Concern for osteomyelitis  COMPARISON: None available.  FINDINGS:  Destructive osseous changes in the fourth distal phalanx are noted with surrounding soft tissue edema and gas. No other focal destructive osseous changes or area of osseous demineralization. Lisfranc plane is maintained. No acute fracture or dislocation. Partially imaged distal fibular plate and screw fixation. Calcaneal height is maintained. Mild midfoot arthritic change.      Impression:  Destructive osseous changes in the distal fourth phalanx with  surrounding soft tissue edema and gas. Findings are highly suspicious for acute osteomyelitis in the absence of trauma. This report was finalized on 01/05/2022 15:31 by Dr. Siddharth Carias MD.    XR Toe 2+ View Right    Result Date: 1/6/2022  Narrative: EXAMINATION:  XR TOE 2+ VW RIGHT-  1/6/2022 4:54 PM CST  HISTORY: Postop. M86.9-Osteomyelitis, unspecified; N17-Ttrtebvi, not elsewhere classified.  COMPARISON: 1/5/2022.  TECHNIQUE: 3 views were obtained of the right toes.  FINDINGS: There has been amputation of the fourth toe. There is air within the soft tissues. There is bandaging material overlying the operative bed, obscuring detail in this region. There are hammertoe deformities of the second, third and fifth toes. No other bone destruction is identified.      Impression: Postoperative changes with amputation of the fourth toe, as described. This report was finalized on 01/06/2022 16:59 by Dr. Al Dutton MD.      LABORATORY/CULTURE RESULTS:      PATHOLOGY RESULTS:       ASSESSMENT/PLAN     Diagnoses and all orders for this visit:    1. S/P foot surgery (Primary)    2. Amputated toe, right (HCC)    3. Type 2 diabetes mellitus with hyperglycemia, with long-term current use of insulin (HCC)    4. HIV infection, unspecified symptom status (formerly [facility])    5. Tobacco abuse      Comprehensive lower extremity examination and evaluation was performed.  Discussed findings and treatment plan including risks, benefits, and treatment options with patient in detail. Patient agreed with treatment plan.  Reviewed at home diabetic foot care including daily foot checks   Bandage removed and surgical site evaluated.  Worsening of surgical site noted, most likely attributed to weight bearing and having foot in a dependent position.   Reapplied similar bandage.  Dispensed surgical shoe  Rx: Adult crutches.  Advised to be NWB and elevate foot when at rest.   Continue abx until completion  Tobacco cessation and BG controlled needed for  wound healing.  An After Visit Summary was printed and given to the patient at discharge, including (if requested) any available informative/educational handouts regarding diagnosis, treatment, or medications. All questions were answered to patient/family satisfaction. Should symptoms fail to improve or worsen they agree to call or return to clinic or to go to the Emergency Department. Discussed the importance of following up with any needed screening tests/labs/specialist appointments and any requested follow-up recommended by me today. Importance of maintaining follow-up discussed and patient accepts that missed appointments can delay diagnosis and potentially lead to worsening of conditions.  Return in about 1 week (around 1/17/2022) for Follow-up with Dr. Harper., or sooner if acute issues arise.    Lab Frequency Next Occurrence   Follow Anesthesia Guidelines / Standing Orders Once 08/05/2021   Obtain Informed Consent Once 08/10/2021   Diet: Once 08/26/2021   Advance Diet as Tolerated Once 08/26/2021       This document has been electronically signed by Pacheco Harper DPM on January 10, 2022 13:50 CST

## 2022-01-10 NOTE — PAYOR COMM NOTE
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      Motor: No weakness.   Psychiatric:         Mood and Affect: Mood normal.         Behavior: Behavior normal.    Discharge Disposition:  Home or Self Care    Discharge Medications:     Discharge Medications      New Medications      Instructions Start Date   amoxicillin-clavulanate 875-125 MG per tablet  Commonly known as: Augmentin   1 tablet, Oral, 2 Times Daily         Changes to Medications      Instructions Start Date   ciclopirox 0.77 % cream  Commonly known as: LOPROX  What changed:   · how much to take  · when to take this  · reasons to take this   Topical, 2 Times Daily      fluticasone 50 MCG/ACT nasal spray  Commonly known as: FLONASE  What changed:   · when to take this  · reasons to take this   2 sprays, Nasal, Daily      tamsulosin 0.4 MG capsule 24 hr capsule  Commonly known as: FLOMAX  What changed: how much to take   0.8 mg, Oral, Every Night at Bedtime      Trulicity 4.5 MG/0.5ML solution pen-injector  Generic drug: Dulaglutide  What changed: additional instructions   4.5  "mg, Subcutaneous, Weekly, Start after 1 month of 3 mg         Continue These Medications      Instructions Start Date   albuterol sulfate  (90 Base) MCG/ACT inhaler  Commonly known as: PROVENTIL HFA;VENTOLIN HFA;PROAIR HFA   2 puffs, Inhalation, Every 6 Hours PRN      ARIPiprazole 5 MG tablet  Commonly known as: ABILIFY   5 mg, Oral, Daily      atorvastatin 40 MG tablet  Commonly known as: LIPITOR   40 mg, Oral, Daily      clonazePAM 0.5 MG tablet  Commonly known as: KlonoPIN   0.5 mg, Oral, 2 Times Daily PRN      clotrimazole-betamethasone 1-0.05 % cream  Commonly known as: LOTRISONE   Topical, 2 Times Daily      Dexilant 60 MG capsule  Generic drug: dexlansoprazole   60 mg, Oral, Daily      Genvoya [phone] MG per tablet  Generic drug: Hbuynax-Sgsdg-Cablufsn-TenofAF   1 tablet, Oral, Daily      HYDROcodone-acetaminophen 5-325 MG per tablet  Commonly known as: NORCO   1 tablet, Oral, 2 Times Daily PRN      insulin aspart 100 UNIT/ML injection  Commonly known as: novoLOG   Up to 150 units daily through pump      Insulin Syringe 31G X 5/16\" 1 ML misc   Use 4 times daily  , ICD10 code is E11.9      loperamide 2 MG capsule  Commonly known as: IMODIUM   2 mg, Oral, 4 Times Daily PRN      loratadine 10 MG tablet  Commonly known as: CLARITIN   10 mg, Oral, Daily      losartan 25 MG tablet  Commonly known as: COZAAR   25 mg, Oral, Daily      meclizine 25 MG tablet  Commonly known as: ANTIVERT   25 mg, Oral, Daily      Melatonin 5 MG capsule   5 mg, Oral, Every Night at Bedtime      metFORMIN 500 MG tablet  Commonly known as: GLUCOPHAGE   1,000 mg, Oral, 2 Times Daily With Meals      Nasonex 50 MCG/ACT nasal spray  Generic drug: mometasone   2 sprays, Nasal, Daily      ondansetron ODT 4 MG disintegrating tablet  Commonly known as: ZOFRAN-ODT   4 mg, Translingual, Every 6 Hours PRN      prazosin 1 MG capsule  Commonly known as: MINIPRESS   2 mg, Oral, Nightly, Patient unsure of dosage       prazosin 1 MG " capsule  Commonly known as: MINIPRESS   1 mg, Oral, Every Morning      promethazine 25 MG tablet  Commonly known as: PHENERGAN   25 mg, Oral, Every 6 Hours PRN      propranolol LA 80 MG 24 hr capsule  Commonly known as: INDERAL LA   80 mg, Oral, Daily      rizatriptan 10 MG tablet  Commonly known as: MAXALT   10 mg, Oral, Once As Needed, May repeat in 2 hours if needed      tiZANidine 4 MG tablet  Commonly known as: ZANAFLEX   4 mg, Oral, Every 8 Hours PRN      traZODone 100 MG tablet  Commonly known as: DESYREL   200 mg, Oral, Nightly      Trintellix 20 MG tablet  Generic drug: Vortioxetine HBr   20 mg, Oral, Daily With Breakfast      Vitamin D3 50 MCG (2000 UT) capsule   2,000 Units, Oral, Daily             Discharge Diet:   Diet Instructions     Diet: Consistent Carbohydrate; Thin      Discharge Diet: Consistent Carbohydrate    Fluid Consistency: Thin          Discharge Care Plan / Instructions:   Discharge home    Activity at Discharge:   Activity Instructions     Activity as Tolerated            Follow-up Appointments:  Follow-up with primary care physician next week  Follow-up with Dr. Harper on Monday for dressing change  Follow-up with infectious diseases next week       Electronically signed by Sterling Atkins DO, 01/08/22, 15:53 CST.    Time: Discharge Over 30 min    Part of this note may be an electronic transcription/translation of spoken language to printed text using the Dragon Dictation system.        Electronically signed by Sterling Atkins DO at 01/08/22 1559       Discharge Order (From admission, onward)     Start     Ordered    01/08/22 1550  Discharge patient  Once        Expected Discharge Date: 01/08/22    Discharge Disposition: Home or Self Care    Physician of Record for Attribution - Please select from Treatment Team: STERLING ATKINS [[id number]]    Review needed by CMO to determine Physician of Record: No       Question Answer Comment   Physician of Record for Attribution - Please  select from Treatment Team MAYURI ATKINS S    Review needed by CMO to determine Physician of Record No        01/08/22 1558

## 2022-01-11 ENCOUNTER — TELEPHONE (OUTPATIENT)
Dept: INTERNAL MEDICINE | Age: 57
End: 2022-01-11

## 2022-01-11 DIAGNOSIS — I96 GANGRENE OF TOE OF RIGHT FOOT (HCC): Primary | ICD-10-CM

## 2022-01-11 DIAGNOSIS — G89.18 POST-OP PAIN: Primary | ICD-10-CM

## 2022-01-11 RX ORDER — HYDROCODONE BITARTRATE AND ACETAMINOPHEN 5; 325 MG/1; MG/1
1 TABLET ORAL EVERY 6 HOURS PRN
Refills: 0 | Status: CANCELLED | OUTPATIENT
Start: 2022-01-11 | End: 2022-01-18

## 2022-01-11 RX ORDER — HYDROCODONE BITARTRATE AND ACETAMINOPHEN 5; 325 MG/1; MG/1
1 TABLET ORAL 2 TIMES DAILY PRN
Qty: 30 TABLET | Refills: 0 | Status: CANCELLED | OUTPATIENT
Start: 2022-01-11 | End: 2022-02-10

## 2022-01-11 RX ORDER — HYDROCODONE BITARTRATE AND ACETAMINOPHEN 5; 325 MG/1; MG/1
1 TABLET ORAL 2 TIMES DAILY PRN
Qty: 30 TABLET | Refills: 0 | Status: SHIPPED | OUTPATIENT
Start: 2022-01-11 | End: 2022-02-10

## 2022-01-11 NOTE — TELEPHONE ENCOUNTER
Mr. Mansoor Delgado was discharge from [facility] from toe amputation. He states he was not prescribed anything for pain. He has hydrocodone on his medication list, however he states he is out of it. He currently does not have anything to take for the pain and is in a considerable amount of pain. Please advise. ..

## 2022-01-11 NOTE — TELEPHONE ENCOUNTER
Run a mani on him and find out if he is in with pain management yet in [facility] --I can send in temporary supply  Does he want at [address] or closer ot where he lives  --

## 2022-01-11 NOTE — TELEPHONE ENCOUNTER
I spoke with him and he has not been able to get into pain management of [facility] yet.   He is going to call me with another number to reach them for referral.

## 2022-01-11 NOTE — TELEPHONE ENCOUNTER
[facility] Initial Follow Up Call    Outreach made within 2 business days of discharge: Yes    Patient: Aimee Dao   Patient : 1965  MRN: [id number]   Reason for Admission: Admitted 2022 for osteomyelitis of fourth toe of right foot, gangrene of toe  Discharge Date: 2022  Discharge Diagnoses: Active Hospital Problems   Diagnosis    Osteomyelitis of fourth toe of right foot (HCC)    Gangrene of toe (HCC)    HIV (human immunodeficiency virus infection) ([facility]    Acute pain    Anxiety and depression    Tobacco abuse    Type 2 diabetes mellitus with hyperglycemia, with long-term current use of insulin ([facility]        Spoke with: patient     Discharge department/facility: [place] [address] Patient Contact:  Was patient able to fill all prescriptions: Yes  Was patient instructed to bring all medications to the follow-up visit: Yes  Is patient taking all medications as directed in the discharge summary? Yes  Does patient understand their discharge instructions: Yes  Does patient have questions or concerns that need addressed prior to 7-14 day follow up office visit: no    I spoke with Mr. Ceasar Mata, he states he doing ok. He reports his pain level is tolerable as long as he is keeping his foot elevated, however when he puts his foot down the pain becomes severe. He describes it as a throbbing pain. He states he was not given anything for pain. He has hydrocodone on his medication list, but states he does not have any. He followed up with Podiatry yesterday. He states Dr. Jeff Mazariegos will be seeing him weekly to do his dressing changes. He was instructed not to change the dressing in between visits. He will follow up again next Tuesday. I have scheduled him to see Dr. Malachi Salazar this same day as he has a long drive to come in and doing both on the same day is easier for him. He denies any needs at this time.  He has his discharge antibiotic and is taking it as directed. I have sent a message to Dr. Asif Holbrook regarding pain medication. Scheduled appointment with PCP within 7-14 days    Follow Up  No future appointments.     Lesly Lopez MA

## 2022-01-12 ENCOUNTER — READMISSION MANAGEMENT (OUTPATIENT)
Dept: CALL CENTER | Facility: HOSPITAL | Age: 57
End: 2022-01-12

## 2022-01-12 NOTE — OUTREACH NOTE
General Surgery Week 1 Survey      Responses   [facility] patient discharged from? [place]   Does the patient have one of the following disease processes/diagnoses(primary or secondary)? General Surgery   Week 1 attempt successful? Yes   Call start time 1635   Call end time 1638   General alerts for this patient Home was destroyed in [place]. [place]   Discharge diagnosis Osteomyelitis of fourth toe of right foot              1/6 4th Toe  Amputation - Right Foot   Is patient permission given to speak with other caregiver? Yes   List who call center can speak with Mother   Meds reviewed with patient/caregiver? Yes   Is the patient having any side effects they believe may be caused by any medication additions or changes? No   Does the patient have all medications related to this admission filled (includes all antibiotics, pain medications, etc.) Yes   Is the patient taking all medications as directed (includes completed medication regime)? Yes   Does the patient have a follow up appointment scheduled with their surgeon? Yes   Has the patient kept scheduled appointments due by today? Yes   Psychosocial issues? Yes   Psychosocial comments staying lodge in [place]. [place] destroyed apartment he was living in.    Comments Right foot incision covered and only MD will do dressing changes weekly.    Did the patient receive a copy of their discharge instructions? Yes   Nursing interventions Reviewed instructions with patient   What is the patient's perception of their health status since discharge? Improving   Nursing interventions Nurse provided patient education   Is the patient /caregiver able to teach back basic post-op care? Continue use of incentive spirometry at least 1 week post discharge,  Practice 'cough and deep breath'   Is the patient/caregiver able to teach back signs and symptoms of incisional infection? Increased redness, swelling or pain at the incisonal site,   Increased drainage or bleeding   Is the patient/caregiver able to teach back steps to recovery at home? Rest and rebuild strength, gradually increase activity,  Set small, achievable goals for return to baseline health   Is the patient/caregiver able to teach back the hierarchy of who to call/visit for symptoms/problems? PCP, Specialist, Home health nurse, Urgent Care, ED, 911 Yes   Week 1 call completed? Yes          Justa Bear RN

## 2022-01-14 NOTE — PROGRESS NOTES
[facility] - PODIATRY    Today's Date: 01/18/22    Patient Name: Donis Yi  MRN: [id number]  CSN: [id number]  PCP: Oksana Mares MD  Referring Provider: No ref. provider found    SUBJECTIVE     Chief Complaint   Patient presents with   • Follow-up     pcp01/04/2022 1 wk fu, 2 week PO Osteomyelitis of fourth toe of right foot (HCC)- pt states doing good, some pain at times but tolorable- pt denies pain at present- pt presents in push chair, crutches, walking shoe and wrapped right foot PO   • Diabetes     hasnt checked     HPI: Donis Yi, a 56 y.o.male, comes to clinic as a(n) established patient presenting for diabetic foot exam and complaining of fungal toenails and worsening callus. Patient has h/o anxiety, DDD, depression, HIV, HLD, HSP, HTN, Migraine, MI, Osteoporosis, Sleep Apnea, DM2. Patient is IDDM and unsure of last BG level. Last A1C of 10.1%. Admits to numbness in feet. Denies open wounds or sores. Relates that his toenails are long, thick and crumbly. He has trouble caring for them himself. Relates the callus of his right foot has come back and is worse. Denies drainage. Admits pain at 7-8/10 level and described as burning, aching, numbness and tingling. Has not been wearing his diabetic shoes. Denies any constitutional symptoms. No other pedal complaints at this time.    Past Medical History:   Diagnosis Date   • Allergic rhinitis    • Anxiety    • Bursitis    • DDD (degenerative disc disease), cervical    • Depression    • HIV (human immunodeficiency virus infection) (HCC)    • HIV disease (HCC)    • HLD (hyperlipidemia)    • HSP ([place] purpura) (HCC)    • HTN (hypertension)    • Migraine    • Myocardial infarction (HCC)    • Osteoporosis    • Sleep apnea    • Type 2 diabetes mellitus (HCC)      Past Surgical History:   Procedure Laterality Date   • AMPUTATION DIGIT Right 1/6/2022    Procedure: 4th Toe  Amputation - Right Foot;  Surgeon: Pacheco Harper,  DPM;  Location:  [facility] OR;  Service: Podiatry;  Laterality: Right;   • ANKLE SURGERY     • CARDIAC CATHETERIZATION N/A 6/24/2020    Procedure: Coronary angiography;  Surgeon: Deon Bailey MD;  Location:  [facility] CATH INVASIVE LOCATION;  Service: Cardiology;  Laterality: N/A;  11am start time   • CARDIAC CATHETERIZATION N/A 6/24/2020    Procedure: Percutaneous Coronary Intervention;  Surgeon: Deon Bailey MD;  Location:  [facility] CATH INVASIVE LOCATION;  Service: Cardiology;  Laterality: N/A;   • COLONOSCOPY  06/19/2009    Normal exam repeat in 10 years   • COLONOSCOPY N/A 8/26/2021    Procedure: COLONOSCOPY WITH ANESTHESIA;  Surgeon: Byron Rosenberg MD;  Location: [facility] ENDOSCOPY;  Service: Gastroenterology;  Laterality: N/A;  pre screen  post poor prep  dr yg gutiérrez   • ENDOSCOPY N/A 8/26/2021    Procedure: ESOPHAGOGASTRODUODENOSCOPY WITH ANESTHESIA;  Surgeon: Byron Rosenberg MD;  Location: [facility] ENDOSCOPY;  Service: Gastroenterology;  Laterality: N/A;  pre GERD  post normal  dr yg gutiérrez   • FOREARM SURGERY     • LYMPH NODE BIOPSY     • PILONIDAL CYSTECTOMY N/A 12/1/2020    Procedure: EXCISION PILONIDAL ABSCESS;  Surgeon: Ashlyn Posey MD;  Location: [facility] OR;  Service: General;  Laterality: N/A;     Family History   Problem Relation Age of Onset   • Diabetes Mother    • Hypertension Mother    • Thyroid disease Mother    • Hypertension Father    • Thyroid disease Father    • Arrhythmia Father    • Diabetes Maternal Grandfather    • Heart disease Maternal Grandfather    • Hypertension Maternal Grandfather    • Diabetes Paternal Grandmother    • Stroke Paternal Grandmother    • Heart disease Paternal Grandmother    • Hypertension Paternal Grandmother    • Thyroid disease Paternal Grandmother    • Hypertension Paternal Grandfather    • Hypertension Sister    • No Known Problems Brother    • Colon cancer Neg Hx    • Colon polyps Neg Hx      Social History     Socioeconomic History   • Marital status:     Tobacco Use   • Smoking status: Current Every Day Smoker     Packs/day: 0.25     Types: Cigarettes     Start date: 1988   • Smokeless tobacco: Never Used   Vaping Use   • Vaping Use: Never used   Substance and Sexual Activity   • Alcohol use: Yes     Comment: occasionally   • Drug use: No   • Sexual activity: Defer     Allergies   Allergen Reactions   • Amitriptyline Anaphylaxis   • Amitriptyline Hcl Anaphylaxis   • Darvon [Propoxyphene] Anaphylaxis   • Zithromax [Azithromycin] Anaphylaxis     Current Outpatient Medications   Medication Sig Dispense Refill   • albuterol (PROVENTIL HFA;VENTOLIN HFA) 108 (90 BASE) MCG/ACT inhaler Inhale 2 puffs every 6 (six) hours as needed for wheezing.     • amoxicillin-clavulanate (Augmentin) 875-125 MG per tablet Take 1 tablet by mouth 2 (Two) Times a Day. 14 tablet 0   • ARIPiprazole (ABILIFY) 5 MG tablet Take 5 mg by mouth Daily.     • atorvastatin (LIPITOR) 40 MG tablet Take 40 mg by mouth Daily.  3   • Cholecalciferol (VITAMIN D3) 50 MCG (2000 UT) capsule Take 2,000 Units by mouth Daily.     • ciclopirox (LOPROX) 0.77 % cream Apply  topically to the appropriate area as directed 2 (Two) Times a Day. (Patient taking differently: Apply 1 application topically to the appropriate area as directed 2 (Two) Times a Day As Needed.) 30 g 5   • clonazePAM (KlonoPIN) 0.5 MG tablet Take 0.5 mg by mouth 2 (Two) Times a Day As Needed for Anxiety.     • clotrimazole-betamethasone (LOTRISONE) 1-0.05 % cream Apply  topically to the appropriate area as directed 2 (Two) Times a Day. 15 g 1   • DEXILANT 60 MG capsule Take 60 mg by mouth Daily.  1   • Dulaglutide (Trulicity) 4.5 MG/0.5ML solution pen-injector Inject 4.5 mg under the skin into the appropriate area as directed 1 (One) Time Per Week. Start after 1 month of 3 mg (Patient taking differently: Inject 4.5 mg under the skin into the appropriate area as directed 1 (One) Time Per Week. Due Tuesday) 4 pen 11   •  "Bowornu-Hpjsw-Xhgrshyp-TenofAF (GENVOYA) [phone] MG tablet Take 1 tablet by mouth daily.     • fluticasone (FLONASE) 50 MCG/ACT nasal spray 2 sprays into each nostril Daily. (Patient taking differently: 2 sprays into the nostril(s) as directed by provider Daily As Needed for Rhinitis.) 1 bottle 6   • HYDROcodone-acetaminophen (NORCO) 5-325 MG per tablet Take 1 tablet by mouth 2 (Two) Times a Day As Needed.     • insulin aspart (novoLOG) 100 UNIT/ML injection Up to 150 units daily through pump 5 each 11   • Insulin Syringe 31G X 5/16\" 1 ML misc Use 4 times daily  , ICD10 code is E11.9 120 each 11   • loperamide (IMODIUM) 2 MG capsule Take 2 mg by mouth 4 (Four) Times a Day As Needed for Diarrhea.     • loratadine (CLARITIN) 10 MG tablet Take 10 mg by mouth daily.     • losartan (COZAAR) 25 MG tablet Take 1 tablet by mouth Daily. 30 tablet 0   • meclizine (ANTIVERT) 25 MG tablet Take 25 mg by mouth Daily.     • Melatonin 5 MG capsule Take 5 mg by mouth every night at bedtime.  3   • metFORMIN (GLUCOPHAGE) 500 MG tablet Take 1,000 mg by mouth 2 (Two) Times a Day With Meals.  3   • mometasone (Nasonex) 50 MCG/ACT nasal spray 2 sprays into the nostril(s) as directed by provider Daily.     • ondansetron ODT (ZOFRAN-ODT) 4 MG disintegrating tablet Place 1 tablet on the tongue Every 6 (Six) Hours As Needed for Nausea. 12 tablet 0   • prazosin (MINIPRESS) 1 MG capsule Take 2 mg by mouth Every Night. Patient unsure of dosage     • prazosin (MINIPRESS) 1 MG capsule Take 1 mg by mouth Every Morning.     • promethazine (PHENERGAN) 25 MG tablet Take 25 mg by mouth every 6 (six) hours as needed for nausea or vomiting.     • propranolol LA (INDERAL LA) 80 MG 24 hr capsule Take 80 mg by mouth Daily.  5   • rizatriptan (MAXALT) 10 MG tablet Take 10 mg by mouth 1 (one) time as needed for migraine. May repeat in 2 hours if needed     • tamsulosin (FLOMAX) 0.4 MG capsule 24 hr capsule TAKE 2 CAPSULES BY MOUTH EVERY NIGHT AT " BEDTIME. (Patient taking differently: Take 1 capsule by mouth every night at bedtime.) 60 capsule 0   • tiZANidine (ZANAFLEX) 4 MG tablet Take 4 mg by mouth Every 8 (Eight) Hours As Needed for Muscle Spasms.     • traZODone (DESYREL) 100 MG tablet Take 200 mg by mouth Every Night.  3   • Vortioxetine HBr (Trintellix) 20 MG tablet Take 20 mg by mouth Daily With Breakfast.       No current facility-administered medications for this visit.     Review of Systems   Constitutional: Negative for chills and fever.   HENT: Negative for congestion.    Respiratory: Negative for shortness of breath.    Cardiovascular: Negative for chest pain and leg swelling.   Gastrointestinal: Negative for constipation, diarrhea, nausea and vomiting.   Musculoskeletal:        Foot pain   Skin: Positive for wound.   Neurological: Positive for numbness.       OBJECTIVE     Vitals:    01/18/22 1126   Pulse: 79   SpO2: 96%       PHYSICAL EXAM  GEN:   Accompanied by other.     Foot/Ankle Exam:       General:   Diabetic Foot Exam Performed    Appearance: appears stated age and healthy    Orientation: AAOx3    Affect: appropriate    Gait: unimpaired    Assistance: independent    Shoe Gear:  Casual shoes    VASCULAR      Right Foot Vascularity   Dorsalis pedis:  2+  Posterior tibial:  2+  Skin Temperature: warm    Edema Grading:  None  CFT:  3  Pedal Hair Growth:  Present  Varicosities: none       Left Foot Vascularity   Dorsalis pedis:  2+  Posterior tibial:  2+  Skin Temperature: warm    Edema Grading:  None  CFT:  3  Pedal Hair Growth:  Present  Varicosities: none        NEUROLOGIC     Right Foot Neurologic   Light touch sensation:  Diminished  Vibratory sensation:  Diminished  Hot/Cold sensation: diminished    Protective Sensation using [place]-Laura Monofilament:  2     Left Foot Neurologic   Light touch sensation:  Diminished  Vibratory sensation:  Diminished  Hot/cold sensation: diminished    Protective Sensation using [place]-Laura  Monofilament:  2     MUSCULOSKELETAL      Right Foot Musculoskeletal   Ecchymosis:  None  Tenderness: right foot callus and toenails    Arch:  Normal  Hammertoe:  Second toe  Hallux valgus: Yes    Hallux limitus: No       Left Foot Musculoskeletal   Ecchymosis:  None  Tenderness: neuroma and toenails    Tenderness comment:  3rd interspace  Arch:  Normal  Hammertoe:  Second toe  Hallux valgus: Yes    Hallux limitus: No       MUSCLE STRENGTH     Right Foot Muscle Strength   Foot dorsiflexion:  5  Foot plantar flexion:  5  Foot inversion:  5  Foot eversion:  5     Left Foot Muscle Strength   Foot dorsiflexion:  5  Foot plantar flexion:  5  Foot inversion:  5  Foot eversion:  5     RANGE OF MOTION      Right Foot Range of Motion   Foot and ankle ROM within normal limits       Left Foot Range of Motion   Foot and ankle ROM within normal limits       DERMATOLOGIC     Right Foot Dermatologic   Skin: ulcer    Skin: no right foot cellulitis, no right foot drainage and no right foot redness    Nails: abnormally thick, subungual debris and dystrophic nails       Left Foot Dermatologic   Nails: abnormally thick, subungual debris and dystrophic nails       Image:       RADIOLOGY/NUCLEAR:  XR Foot 2 View Right    Result Date: 1/5/2022  Narrative: EXAM: XR FOOT 2 VW RIGHT-  INDICATION: Concern for osteomyelitis  COMPARISON: None available.  FINDINGS:  Destructive osseous changes in the fourth distal phalanx are noted with surrounding soft tissue edema and gas. No other focal destructive osseous changes or area of osseous demineralization. Lisfranc plane is maintained. No acute fracture or dislocation. Partially imaged distal fibular plate and screw fixation. Calcaneal height is maintained. Mild midfoot arthritic change.      Impression:  Destructive osseous changes in the distal fourth phalanx with surrounding soft tissue edema and gas. Findings are highly suspicious for acute osteomyelitis in the absence of trauma. This report  was finalized on 01/05/2022 15:31 by Dr. Siddharth Carias MD.    XR Toe 2+ View Right    Result Date: 1/6/2022  Narrative: EXAMINATION:  XR TOE 2+ VW RIGHT-  1/6/2022 4:54 PM CST  HISTORY: Postop. M86.9-Osteomyelitis, unspecified; Q22-Gbxrmebf, not elsewhere classified.  COMPARISON: 1/5/2022.  TECHNIQUE: 3 views were obtained of the right toes.  FINDINGS: There has been amputation of the fourth toe. There is air within the soft tissues. There is bandaging material overlying the operative bed, obscuring detail in this region. There are hammertoe deformities of the second, third and fifth toes. No other bone destruction is identified.      Impression: Postoperative changes with amputation of the fourth toe, as described. This report was finalized on 01/06/2022 16:59 by Dr. Al Dutton MD.      LABORATORY/CULTURE RESULTS:      PATHOLOGY RESULTS:       ASSESSMENT/PLAN     Diagnoses and all orders for this visit:    1. S/P foot surgery (Primary)    2. Amputated toe, right (HCC)    3. HIV infection, unspecified symptom status ([facility])    4. Tobacco abuse      Comprehensive lower extremity examination and evaluation was performed.  Discussed findings and treatment plan including risks, benefits, and treatment options with patient in detail. Patient agreed with treatment plan.  After verbal consent obtained, nail(s) x10 debrided of length and thickness with nail nipper without incidence  Patient may maintain nails and calluses at home utilizing emery board or pumice stone between visits as needed  Reviewed at home diabetic foot care including daily foot checks  After verbal consent obtained, excisional debridement performed to remove skin, slough, non-viable, and subQ tissue down to level of healthy bleeding tissue with dermal curette and/or sharp instrumentation. Hemostasis achieved with compression.   Apply antibiotic ointment and bandage daily. Referral to [facility].   Wear diabetic shoes daily.  Tobacco cessation and BG control  needed.   An After Visit Summary was printed and given to the patient at discharge, including (if requested) any available informative/educational handouts regarding diagnosis, treatment, or medications. All questions were answered to patient/family satisfaction. Should symptoms fail to improve or worsen they agree to call or return to clinic or to go to the Emergency Department. Discussed the importance of following up with any needed screening tests/labs/specialist appointments and any requested follow-up recommended by me today. Importance of maintaining follow-up discussed and patient accepts that missed appointments can delay diagnosis and potentially lead to worsening of conditions.  Return in about 1 week (around 1/25/2022) for Follow-up with Dr. Harper., or sooner if acute issues arise.        This document has been electronically signed by Pacheco Harper DPM on January 18, 2022 11:38 CST

## 2022-01-18 ENCOUNTER — OFFICE VISIT (OUTPATIENT)
Dept: PODIATRY | Facility: CLINIC | Age: 57
End: 2022-01-18

## 2022-01-18 ENCOUNTER — OFFICE VISIT (OUTPATIENT)
Dept: INTERNAL MEDICINE | Age: 57
End: 2022-01-18
Payer: COMMERCIAL

## 2022-01-18 VITALS
WEIGHT: 263 LBS | HEIGHT: 72 IN | OXYGEN SATURATION: 96 % | SYSTOLIC BLOOD PRESSURE: 130 MMHG | DIASTOLIC BLOOD PRESSURE: 80 MMHG | HEART RATE: 86 BPM | BODY MASS INDEX: 35.62 KG/M2

## 2022-01-18 VITALS — HEART RATE: 79 BPM | HEIGHT: 71 IN | WEIGHT: 263 LBS | OXYGEN SATURATION: 96 % | BODY MASS INDEX: 36.82 KG/M2

## 2022-01-18 DIAGNOSIS — B20 HIV INFECTION, UNSPECIFIED SYMPTOM STATUS: ICD-10-CM

## 2022-01-18 DIAGNOSIS — E11.42 TYPE 2 DIABETES MELLITUS WITH DIABETIC POLYNEUROPATHY, WITH LONG-TERM CURRENT USE OF INSULIN (HCC): ICD-10-CM

## 2022-01-18 DIAGNOSIS — R25.1 TREMOR: ICD-10-CM

## 2022-01-18 DIAGNOSIS — S98.131A AMPUTATED TOE, RIGHT: ICD-10-CM

## 2022-01-18 DIAGNOSIS — Z79.4 TYPE 2 DIABETES MELLITUS WITH DIABETIC POLYNEUROPATHY, WITH LONG-TERM CURRENT USE OF INSULIN (HCC): ICD-10-CM

## 2022-01-18 DIAGNOSIS — I96 GANGRENE OF TOE OF RIGHT FOOT (HCC): Primary | ICD-10-CM

## 2022-01-18 DIAGNOSIS — Z72.0 TOBACCO ABUSE: ICD-10-CM

## 2022-01-18 DIAGNOSIS — B20 HIV INFECTION, UNSPECIFIED SYMPTOM STATUS (HCC): ICD-10-CM

## 2022-01-18 DIAGNOSIS — Z98.890 S/P FOOT SURGERY: Primary | ICD-10-CM

## 2022-01-18 DIAGNOSIS — F41.9 ANXIETY AND DEPRESSION: ICD-10-CM

## 2022-01-18 DIAGNOSIS — F32.A ANXIETY AND DEPRESSION: ICD-10-CM

## 2022-01-18 PROCEDURE — 1111F DSCHRG MED/CURRENT MED MERGE: CPT | Performed by: INTERNAL MEDICINE

## 2022-01-18 PROCEDURE — 99495 TRANSJ CARE MGMT MOD F2F 14D: CPT | Performed by: INTERNAL MEDICINE

## 2022-01-18 PROCEDURE — 99024 POSTOP FOLLOW-UP VISIT: CPT | Performed by: PODIATRIST

## 2022-01-18 RX ORDER — PRAZOSIN HYDROCHLORIDE 5 MG/1
5 CAPSULE ORAL NIGHTLY
COMMUNITY

## 2022-01-18 RX ORDER — TRAZODONE HYDROCHLORIDE 100 MG/1
200 TABLET ORAL NIGHTLY
Qty: 180 TABLET | Refills: 3 | Status: SHIPPED | OUTPATIENT
Start: 2022-01-18
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
## 2022-01-18 NOTE — PROGRESS NOTES
[facility] - PODIATRY    Today's Date: 01/18/22    Patient Name: Donis Yi  MRN: [id number]  CSN: [id number]  PCP: Oksana Mares MD  Referring Provider: No ref. provider found    SUBJECTIVE     Chief Complaint   Patient presents with   • Follow-up     pcp01/04/2022 1 wk fu, 2 week PO Osteomyelitis of fourth toe of right foot (HCC)- pt states doing good, some pain at times but tolorable- pt denies pain at present- pt presents in push chair, crutches, walking shoe and wrapped right foot PO   • Diabetes     hasnt checked     HPI: Donis Yi, a 56 y.o.male, comes to clinic as a(n) established patient 12. Patient has h/o DM2, anxiety, DDD, depression, HIV, HLD, HSP, HTN, MI, migraine, osteoporosis, sleep apnea. Patient is IDDM and unsure of last BG level. He has kept his bandage c/d/i. Relates that he has remained NWB.  Denies pain. He has continued his abx as prescribed. Denies any constitutional symptoms. No other pedal complaints at this time.    Past Medical History:   Diagnosis Date   • Allergic rhinitis    • Anxiety    • Bursitis    • DDD (degenerative disc disease), cervical    • Depression    • HIV (human immunodeficiency virus infection) ([facility])    • HIV disease (HCC)    • HLD (hyperlipidemia)    • HSP (Henoch Schonlein purpura) ([facility])    • HTN (hypertension)    • Migraine    • Myocardial infarction (HCC)    • Osteoporosis    • Sleep apnea    • Type 2 diabetes mellitus (HCC)      Past Surgical History:   Procedure Laterality Date   • AMPUTATION DIGIT Right 1/6/2022    Procedure: 4th Toe  Amputation - Right Foot;  Surgeon: Pacheco Harper DPM;  Location:  [facility] OR;  Service: Podiatry;  Laterality: Right;   • ANKLE SURGERY     • CARDIAC CATHETERIZATION N/A 6/24/2020    Procedure: Coronary angiography;  Surgeon: Deon Bailey MD;  Location:  [facility] CATH INVASIVE LOCATION;  Service: Cardiology;  Laterality: N/A;  11am start time   • CARDIAC CATHETERIZATION N/A 6/24/2020     Procedure: Percutaneous Coronary Intervention;  Surgeon: Deon Bailey MD;  Location: [facility] CATH INVASIVE LOCATION;  Service: Cardiology;  Laterality: N/A;   • COLONOSCOPY  06/19/2009    Normal exam repeat in 10 years   • COLONOSCOPY N/A 8/26/2021    Procedure: COLONOSCOPY WITH ANESTHESIA;  Surgeon: Byron Rosenberg MD;  Location: [facility] ENDOSCOPY;  Service: Gastroenterology;  Laterality: N/A;  pre screen  post poor prep  dr yg gutiérrez   • ENDOSCOPY N/A 8/26/2021    Procedure: ESOPHAGOGASTRODUODENOSCOPY WITH ANESTHESIA;  Surgeon: Byorn Rosenberg MD;  Location: [facility] ENDOSCOPY;  Service: Gastroenterology;  Laterality: N/A;  pre GERD  post normal  dr yg gutiérrez   • FOREARM SURGERY     • LYMPH NODE BIOPSY     • PILONIDAL CYSTECTOMY N/A 12/1/2020    Procedure: EXCISION PILONIDAL ABSCESS;  Surgeon: Ashlyn Posey MD;  Location: [facility] OR;  Service: General;  Laterality: N/A;     Family History   Problem Relation Age of Onset   • Diabetes Mother    • Hypertension Mother    • Thyroid disease Mother    • Hypertension Father    • Thyroid disease Father    • Arrhythmia Father    • Diabetes Maternal Grandfather    • Heart disease Maternal Grandfather    • Hypertension Maternal Grandfather    • Diabetes Paternal Grandmother    • Stroke Paternal Grandmother    • Heart disease Paternal Grandmother    • Hypertension Paternal Grandmother    • Thyroid disease Paternal Grandmother    • Hypertension Paternal Grandfather    • Hypertension Sister    • No Known Problems Brother    • Colon cancer Neg Hx    • Colon polyps Neg Hx      Social History     Socioeconomic History   • Marital status:    Tobacco Use   • Smoking status: Current Every Day Smoker     Packs/day: 0.25     Types: Cigarettes     Start date: 1988   • Smokeless tobacco: Never Used   Vaping Use   • Vaping Use: Never used   Substance and Sexual Activity   • Alcohol use: Yes     Comment: occasionally   • Drug use: No   • Sexual activity: Defer      Allergies   Allergen Reactions   • Amitriptyline Anaphylaxis   • Amitriptyline Hcl Anaphylaxis   • Darvon [Propoxyphene] Anaphylaxis   • Zithromax [Azithromycin] Anaphylaxis     Current Outpatient Medications   Medication Sig Dispense Refill   • albuterol (PROVENTIL HFA;VENTOLIN HFA) 108 (90 BASE) MCG/ACT inhaler Inhale 2 puffs every 6 (six) hours as needed for wheezing.     • amoxicillin-clavulanate (Augmentin) 875-125 MG per tablet Take 1 tablet by mouth 2 (Two) Times a Day. 14 tablet 0   • ARIPiprazole (ABILIFY) 5 MG tablet Take 5 mg by mouth Daily.     • atorvastatin (LIPITOR) 40 MG tablet Take 40 mg by mouth Daily.  3   • Cholecalciferol (VITAMIN D3) 50 MCG (2000 UT) capsule Take 2,000 Units by mouth Daily.     • ciclopirox (LOPROX) 0.77 % cream Apply  topically to the appropriate area as directed 2 (Two) Times a Day. (Patient taking differently: Apply 1 application topically to the appropriate area as directed 2 (Two) Times a Day As Needed.) 30 g 5   • clonazePAM (KlonoPIN) 0.5 MG tablet Take 0.5 mg by mouth 2 (Two) Times a Day As Needed for Anxiety.     • clotrimazole-betamethasone (LOTRISONE) 1-0.05 % cream Apply  topically to the appropriate area as directed 2 (Two) Times a Day. 15 g 1   • DEXILANT 60 MG capsule Take 60 mg by mouth Daily.  1   • Dulaglutide (Trulicity) 4.5 MG/0.5ML solution pen-injector Inject 4.5 mg under the skin into the appropriate area as directed 1 (One) Time Per Week. Start after 1 month of 3 mg (Patient taking differently: Inject 4.5 mg under the skin into the appropriate area as directed 1 (One) Time Per Week. Due Tuesday) 4 pen 11   • Kdpjcdl-Zmyup-Kzzyuimn-TenofAF (GENVOYA) [phone] MG tablet Take 1 tablet by mouth daily.     • fluticasone (FLONASE) 50 MCG/ACT nasal spray 2 sprays into each nostril Daily. (Patient taking differently: 2 sprays into the nostril(s) as directed by provider Daily As Needed for Rhinitis.) 1 bottle 6   • HYDROcodone-acetaminophen (NORCO) 5-325  "MG per tablet Take 1 tablet by mouth 2 (Two) Times a Day As Needed.     • insulin aspart (novoLOG) 100 UNIT/ML injection Up to 150 units daily through pump 5 each 11   • Insulin Syringe 31G X 5/16\" 1 ML misc Use 4 times daily  , ICD10 code is E11.9 120 each 11   • loperamide (IMODIUM) 2 MG capsule Take 2 mg by mouth 4 (Four) Times a Day As Needed for Diarrhea.     • loratadine (CLARITIN) 10 MG tablet Take 10 mg by mouth daily.     • losartan (COZAAR) 25 MG tablet Take 1 tablet by mouth Daily. 30 tablet 0   • meclizine (ANTIVERT) 25 MG tablet Take 25 mg by mouth Daily.     • Melatonin 5 MG capsule Take 5 mg by mouth every night at bedtime.  3   • metFORMIN (GLUCOPHAGE) 500 MG tablet Take 1,000 mg by mouth 2 (Two) Times a Day With Meals.  3   • mometasone (Nasonex) 50 MCG/ACT nasal spray 2 sprays into the nostril(s) as directed by provider Daily.     • ondansetron ODT (ZOFRAN-ODT) 4 MG disintegrating tablet Place 1 tablet on the tongue Every 6 (Six) Hours As Needed for Nausea. 12 tablet 0   • prazosin (MINIPRESS) 1 MG capsule Take 2 mg by mouth Every Night. Patient unsure of dosage     • prazosin (MINIPRESS) 1 MG capsule Take 1 mg by mouth Every Morning.     • promethazine (PHENERGAN) 25 MG tablet Take 25 mg by mouth every 6 (six) hours as needed for nausea or vomiting.     • propranolol LA (INDERAL LA) 80 MG 24 hr capsule Take 80 mg by mouth Daily.  5   • rizatriptan (MAXALT) 10 MG tablet Take 10 mg by mouth 1 (one) time as needed for migraine. May repeat in 2 hours if needed     • tamsulosin (FLOMAX) 0.4 MG capsule 24 hr capsule TAKE 2 CAPSULES BY MOUTH EVERY NIGHT AT BEDTIME. (Patient taking differently: Take 1 capsule by mouth every night at bedtime.) 60 capsule 0   • tiZANidine (ZANAFLEX) 4 MG tablet Take 4 mg by mouth Every 8 (Eight) Hours As Needed for Muscle Spasms.     • traZODone (DESYREL) 100 MG tablet Take 200 mg by mouth Every Night.  3   • Vortioxetine HBr (Trintellix) 20 MG tablet Take 20 mg by mouth " Daily With Breakfast.       No current facility-administered medications for this visit.     Review of Systems   Constitutional: Negative for chills and fever.   HENT: Negative for congestion.    Respiratory: Negative for shortness of breath.    Cardiovascular: Negative for chest pain and leg swelling.   Gastrointestinal: Negative for constipation, diarrhea, nausea and vomiting.   Musculoskeletal:        Foot pain   Skin: Positive for wound.   Neurological: Positive for tremors and numbness.       OBJECTIVE     Vitals:    01/18/22 1126   Pulse: 79   SpO2: 96%       PHYSICAL EXAM  GEN:   Accompanied by none.     Foot/Ankle Exam:       General:   Diabetic Foot Exam Performed    Appearance: appears stated age and healthy and obesity    Orientation: AAOx3    Affect: appropriate    Assistance: crutches and wheelchair    Shoe Gear:  Surgical shoe    VASCULAR      Right Foot Vascularity   Dorsalis pedis:  2+  Posterior tibial:  2+  Skin Temperature: warm    Edema Grading:  Trace  CFT:  3  Pedal Hair Growth:  Present  Varicosities: none       Left Foot Vascularity   Dorsalis pedis:  2+  Posterior tibial:  2+  Skin Temperature: warm    Edema Grading:  None  CFT:  3  Pedal Hair Growth:  Present  Varicosities: none        NEUROLOGIC     Right Foot Neurologic   Light touch sensation:  Diminished  Vibratory sensation:  Diminished  Hot/Cold sensation: diminished    Protective Sensation using [place] Monofilament:  2     Left Foot Neurologic   Light touch sensation:  Diminished  Vibratory sensation:  Diminished  Hot/cold sensation: diminished    Protective Sensation using [place] Monofilament:  2     MUSCULOSKELETAL      Right Foot Musculoskeletal    Amputation   Toes amputated: fourth toe  Ecchymosis:  None  Tenderness: none    Arch:  Normal  Hammertoe:  Second toe  Hallux valgus: Yes    Hallux limitus: No       Left Foot Musculoskeletal   Ecchymosis:  None  Tenderness: none    Arch:  Normal  Hammertoe:  Second  toe  Hallux valgus: Yes    Hallux limitus: No       MUSCLE STRENGTH     Right Foot Muscle Strength   Foot dorsiflexion:  5  Foot plantar flexion:  5  Foot inversion:  5  Foot eversion:  5     Left Foot Muscle Strength   Foot dorsiflexion:  5  Foot plantar flexion:  5  Foot inversion:  5  Foot eversion:  5     RANGE OF MOTION      Right Foot Range of Motion   Foot and ankle ROM within normal limits       Left Foot Range of Motion   Foot and ankle ROM within normal limits       DERMATOLOGIC     Right Foot Dermatologic   Skin: drainage and erythema    Skin: no right foot cellulitis    Nails: abnormally thick, subungual debris and dystrophic nails       Left Foot Dermatologic   Skin: skin intact    Nails: abnormally thick, subungual debris and dystrophic nails        Right Foot Additional Comments Fourth toe amputation site with sutures intact. Overall noticeable improvement since last visit. Almost complete resolution of erythema. Slight serous drainage. Incision has some residual healing left.      RADIOLOGY/NUCLEAR:  XR Foot 2 View Right    Result Date: 1/5/2022  Narrative: EXAM: XR FOOT 2 VW RIGHT-  INDICATION: Concern for osteomyelitis  COMPARISON: None available.  FINDINGS:  Destructive osseous changes in the fourth distal phalanx are noted with surrounding soft tissue edema and gas. No other focal destructive osseous changes or area of osseous demineralization. Lisfranc plane is maintained. No acute fracture or dislocation. Partially imaged distal fibular plate and screw fixation. Calcaneal height is maintained. Mild midfoot arthritic change.      Impression:  Destructive osseous changes in the distal fourth phalanx with surrounding soft tissue edema and gas. Findings are highly suspicious for acute osteomyelitis in the absence of trauma. This report was finalized on 01/05/2022 15:31 by Dr. Siddharth Carias MD.    XR Toe 2+ View Right    Result Date: 1/6/2022  Narrative: EXAMINATION:  XR TOE 2+ VW RIGHT-  1/6/2022  4:54 PM CST  HISTORY: Postop. M86.9-Osteomyelitis, unspecified; [id number], not elsewhere classified.  COMPARISON: 1/5/2022.  TECHNIQUE: 3 views were obtained of the right toes.  FINDINGS: There has been amputation of the fourth toe. There is air within the soft tissues. There is bandaging material overlying the operative bed, obscuring detail in this region. There are hammertoe deformities of the second, third and fifth toes. No other bone destruction is identified.      Impression: Postoperative changes with amputation of the fourth toe, as described. This report was finalized on 01/06/2022 16:59 by Dr. Al Dutton MD.      LABORATORY/CULTURE RESULTS:      PATHOLOGY RESULTS:       ASSESSMENT/PLAN     Diagnoses and all orders for this visit:    1. S/P foot surgery (Primary)    2. Amputated toe, right (HCC)    3. HIV infection, unspecified symptom status (HCC)    4. Tobacco abuse      Comprehensive lower extremity examination and evaluation was performed.  Discussed findings and treatment plan including risks, benefits, and treatment options with patient in detail. Patient agreed with treatment plan.  Reviewed at home diabetic foot care including daily foot checks   Bandage removed and surgical site evaluated. Noted improvement but has yet to fully heal.   Reapplied similar bandage.  Continue surgical shoe  Advised to be NWB and elevate foot when at rest.   Continue abx until completion  Tobacco cessation and BG controlled needed for wound healing.  An After Visit Summary was printed and given to the patient at discharge, including (if requested) any available informative/educational handouts regarding diagnosis, treatment, or medications. All questions were answered to patient/family satisfaction. Should symptoms fail to improve or worsen they agree to call or return to clinic or to go to the Emergency Department. Discussed the importance of following up with any needed screening tests/labs/specialist  appointments and any requested follow-up recommended by me today. Importance of maintaining follow-up discussed and patient accepts that missed appointments can delay diagnosis and potentially lead to worsening of conditions.  Return in about 1 week (around 1/25/2022) for Follow-up with Dr. Harper., or sooner if acute issues arise.    Lab Frequency Next Occurrence   Follow Anesthesia Guidelines / Standing Orders Once 08/05/2021   Obtain Informed Consent Once 08/10/2021   Diet: Once 08/26/2021   Advance Diet as Tolerated Once 08/26/2021       This document has been electronically signed by Pacheco Harper DPM on January 18, 2022 11:38 CST

## 2022-01-19 ENCOUNTER — READMISSION MANAGEMENT (OUTPATIENT)
Dept: CALL CENTER | Facility: HOSPITAL | Age: 57
End: 2022-01-19

## 2022-01-20 NOTE — OUTREACH NOTE
General Surgery Week 2 Survey      Responses   [facility] patient discharged from? [place]   Does the patient have one of the following disease processes/diagnoses(primary or secondary)? General Surgery   Week 2 attempt successful? No   Unsuccessful attempts Attempt 1          Ericka Reno LPN

## 2022-01-21 NOTE — PROGRESS NOTES
[facility] - PODIATRY    Today's Date: 01/25/22    Patient Name: Donis Yi  MRN: [id number]  CSN: [id number]  PCP: Oksana Mares MD  Referring Provider: No ref. provider found    SUBJECTIVE     Chief Complaint   Patient presents with   • Follow-up     pcp01/04/2022 1 WK FU - pt states having phantom pain, doing okay - pt pain 5/10 - pt presents bandage, on crutches,   • Diabetes     278mg/dl this am      HPI: Donis Yi, a 56 y.o.male, comes to clinic as a(n) established patient for post-op appt 3 weeks s/p 4th toe amputation - right foot. Patient has h/o DM2, anxiety, DDD, depression, HIV, HLD, HSP, HTN, MI, migraine, osteoporosis, sleep apnea. Patient is IDDM with last stated BG level of 278mg/dl. He has kept his bandage c/d/i. Relates that he has attempted to limit weightbearing whenever possible. He did come in a wheelchair today.  Admits pain at 5/10 level and described as aching, numbness and tingling. He has continued his abx as prescribed. Denies any constitutional symptoms. No other pedal complaints at this time.    Past Medical History:   Diagnosis Date   • Allergic rhinitis    • Anxiety    • Bursitis    • DDD (degenerative disc disease), cervical    • Depression    • HIV (human immunodeficiency virus infection) ([facility])    • HIV disease (HCC)    • HLD (hyperlipidemia)    • HSP ([place] purpura) ([facility])    • HTN (hypertension)    • Migraine    • Myocardial infarction (HCC)    • Osteoporosis    • Sleep apnea    • Type 2 diabetes mellitus (HCC)      Past Surgical History:   Procedure Laterality Date   • AMPUTATION DIGIT Right 1/6/2022    Procedure: 4th Toe  Amputation - Right Foot;  Surgeon: Pacheco Harper DPM;  Location: [facility] OR;  Service: Podiatry;  Laterality: Right;   • ANKLE SURGERY     • CARDIAC CATHETERIZATION N/A 6/24/2020    Procedure: Coronary angiography;  Surgeon: Deon Bailey MD;  Location:  [facility] CATH INVASIVE LOCATION;  Service: Cardiology;   Laterality: N/A;  11am start time   • CARDIAC CATHETERIZATION N/A 6/24/2020    Procedure: Percutaneous Coronary Intervention;  Surgeon: Deon Bailey MD;  Location: [facility] CATH INVASIVE LOCATION;  Service: Cardiology;  Laterality: N/A;   • COLONOSCOPY  06/19/2009    Normal exam repeat in 10 years   • COLONOSCOPY N/A 8/26/2021    Procedure: COLONOSCOPY WITH ANESTHESIA;  Surgeon: Byron Rosenberg MD;  Location: [facility] ENDOSCOPY;  Service: Gastroenterology;  Laterality: N/A;  pre screen  post poor prep  dr yg gutiérrez   • ENDOSCOPY N/A 8/26/2021    Procedure: ESOPHAGOGASTRODUODENOSCOPY WITH ANESTHESIA;  Surgeon: Byron Rosenberg MD;  Location: [facility] ENDOSCOPY;  Service: Gastroenterology;  Laterality: N/A;  pre GERD  post normal  dr yg gutiérrez   • FOREARM SURGERY     • LYMPH NODE BIOPSY     • PILONIDAL CYSTECTOMY N/A 12/1/2020    Procedure: EXCISION PILONIDAL ABSCESS;  Surgeon: Ashlyn Posey MD;  Location: [facility] OR;  Service: General;  Laterality: N/A;     Family History   Problem Relation Age of Onset   • Diabetes Mother    • Hypertension Mother    • Thyroid disease Mother    • Hypertension Father    • Thyroid disease Father    • Arrhythmia Father    • Diabetes Maternal Grandfather    • Heart disease Maternal Grandfather    • Hypertension Maternal Grandfather    • Diabetes Paternal Grandmother    • Stroke Paternal Grandmother    • Heart disease Paternal Grandmother    • Hypertension Paternal Grandmother    • Thyroid disease Paternal Grandmother    • Hypertension Paternal Grandfather    • Hypertension Sister    • No Known Problems Brother    • Colon cancer Neg Hx    • Colon polyps Neg Hx      Social History     Socioeconomic History   • Marital status:    Tobacco Use   • Smoking status: Current Every Day Smoker     Packs/day: 0.25     Types: Cigarettes     Start date: 1988   • Smokeless tobacco: Never Used   Vaping Use   • Vaping Use: Never used   Substance and Sexual Activity   • Alcohol use: Yes      Comment: occasionally   • Drug use: No   • Sexual activity: Defer     Allergies   Allergen Reactions   • Amitriptyline Anaphylaxis   • Amitriptyline Hcl Anaphylaxis   • Darvon [Propoxyphene] Anaphylaxis   • Zithromax [Azithromycin] Anaphylaxis     Current Outpatient Medications   Medication Sig Dispense Refill   • albuterol (PROVENTIL HFA;VENTOLIN HFA) 108 (90 BASE) MCG/ACT inhaler Inhale 2 puffs every 6 (six) hours as needed for wheezing.     • amoxicillin-clavulanate (Augmentin) 875-125 MG per tablet Take 1 tablet by mouth 2 (Two) Times a Day. 14 tablet 0   • ARIPiprazole (ABILIFY) 5 MG tablet Take 5 mg by mouth Daily.     • atorvastatin (LIPITOR) 40 MG tablet Take 40 mg by mouth Daily.  3   • Cholecalciferol (VITAMIN D3) 50 MCG (2000 UT) capsule Take 2,000 Units by mouth Daily.     • ciclopirox (LOPROX) 0.77 % cream Apply  topically to the appropriate area as directed 2 (Two) Times a Day. (Patient taking differently: Apply 1 application topically to the appropriate area as directed 2 (Two) Times a Day As Needed.) 30 g 5   • clonazePAM (KlonoPIN) 0.5 MG tablet Take 0.5 mg by mouth 2 (Two) Times a Day As Needed for Anxiety.     • clotrimazole-betamethasone (LOTRISONE) 1-0.05 % cream Apply  topically to the appropriate area as directed 2 (Two) Times a Day. 15 g 1   • DEXILANT 60 MG capsule Take 60 mg by mouth Daily.  1   • Dulaglutide (Trulicity) 4.5 MG/0.5ML solution pen-injector Inject 4.5 mg under the skin into the appropriate area as directed 1 (One) Time Per Week. Start after 1 month of 3 mg (Patient taking differently: Inject 4.5 mg under the skin into the appropriate area as directed 1 (One) Time Per Week. Due Tuesday) 4 pen 11   • Oilyfzb-Pgdiy-Jajtvjba-TenofAF (GENVOYA) [phone] MG tablet Take 1 tablet by mouth daily.     • fluticasone (FLONASE) 50 MCG/ACT nasal spray 2 sprays into each nostril Daily. (Patient taking differently: 2 sprays into the nostril(s) as directed by provider Daily As Needed for  "Rhinitis.) 1 bottle 6   • HYDROcodone-acetaminophen (NORCO) 5-325 MG per tablet Take 1 tablet by mouth 2 (Two) Times a Day As Needed.     • insulin aspart (novoLOG) 100 UNIT/ML injection Up to 150 units daily through pump 5 each 11   • Insulin Syringe 31G X 5/16\" 1 ML misc Use 4 times daily  , ICD10 code is E11.9 120 each 11   • loperamide (IMODIUM) 2 MG capsule Take 2 mg by mouth 4 (Four) Times a Day As Needed for Diarrhea.     • loratadine (CLARITIN) 10 MG tablet Take 10 mg by mouth daily.     • losartan (COZAAR) 25 MG tablet Take 1 tablet by mouth Daily. 30 tablet 0   • meclizine (ANTIVERT) 25 MG tablet Take 25 mg by mouth Daily.     • Melatonin 5 MG capsule Take 5 mg by mouth every night at bedtime.  3   • metFORMIN (GLUCOPHAGE) 500 MG tablet Take 1,000 mg by mouth 2 (Two) Times a Day With Meals.  3   • mometasone (Nasonex) 50 MCG/ACT nasal spray 2 sprays into the nostril(s) as directed by provider Daily.     • ondansetron ODT (ZOFRAN-ODT) 4 MG disintegrating tablet Place 1 tablet on the tongue Every 6 (Six) Hours As Needed for Nausea. 12 tablet 0   • prazosin (MINIPRESS) 1 MG capsule Take 2 mg by mouth Every Night. Patient unsure of dosage     • prazosin (MINIPRESS) 1 MG capsule Take 1 mg by mouth Every Morning.     • promethazine (PHENERGAN) 25 MG tablet Take 25 mg by mouth every 6 (six) hours as needed for nausea or vomiting.     • propranolol LA (INDERAL LA) 80 MG 24 hr capsule Take 80 mg by mouth Daily.  5   • rizatriptan (MAXALT) 10 MG tablet Take 10 mg by mouth 1 (one) time as needed for migraine. May repeat in 2 hours if needed     • tamsulosin (FLOMAX) 0.4 MG capsule 24 hr capsule TAKE 2 CAPSULES BY MOUTH EVERY NIGHT AT BEDTIME. (Patient taking differently: Take 1 capsule by mouth every night at bedtime.) 60 capsule 0   • tiZANidine (ZANAFLEX) 4 MG tablet Take 4 mg by mouth Every 8 (Eight) Hours As Needed for Muscle Spasms.     • traZODone (DESYREL) 100 MG tablet Take 200 mg by mouth Every Night.  3   • " Vortioxetine HBr (Trintellix) 20 MG tablet Take 20 mg by mouth Daily With Breakfast.       No current facility-administered medications for this visit.     Review of Systems   Constitutional: Negative for chills and fever.   HENT: Negative for congestion.    Respiratory: Negative for shortness of breath.    Cardiovascular: Negative for chest pain and leg swelling.   Gastrointestinal: Negative for constipation, diarrhea, nausea and vomiting.   Musculoskeletal:        Foot pain   Skin: Positive for wound.   Neurological: Positive for tremors and numbness.       OBJECTIVE     Vitals:    01/25/22 1342   BP: 130/80   Pulse: 107   SpO2: 94%       PHYSICAL EXAM  GEN:   Accompanied by none.     Foot/Ankle Exam:       General:   Diabetic Foot Exam Performed    Appearance: appears stated age and healthy and obesity    Orientation: AAOx3    Affect: appropriate    Assistance: crutches and wheelchair    Shoe Gear:  Surgical shoe    VASCULAR      Right Foot Vascularity   Dorsalis pedis:  2+  Posterior tibial:  2+  Skin Temperature: warm    Edema Grading:  Trace  CFT:  3  Pedal Hair Growth:  Present  Varicosities: none       Left Foot Vascularity   Dorsalis pedis:  2+  Posterior tibial:  2+  Skin Temperature: warm    Edema Grading:  None  CFT:  3  Pedal Hair Growth:  Present  Varicosities: none        NEUROLOGIC     Right Foot Neurologic   Light touch sensation:  Diminished  Vibratory sensation:  Diminished  Hot/Cold sensation: diminished    Protective Sensation using [place]aura Monofilament:  2     Left Foot Neurologic   Light touch sensation:  Diminished  Vibratory sensation:  Diminished  Hot/cold sensation: diminished    Protective Sensation using [place]aura Monofilament:  2     MUSCULOSKELETAL      Right Foot Musculoskeletal    Amputation   Toes amputated: fourth toe  Ecchymosis:  None  Tenderness: none    Arch:  Normal  Hammertoe:  Second toe  Hallux valgus: Yes    Hallux limitus: No       Left Foot  Musculoskeletal   Ecchymosis:  None  Tenderness: none    Arch:  Normal  Hammertoe:  Second toe  Hallux valgus: Yes    Hallux limitus: No       MUSCLE STRENGTH     Right Foot Muscle Strength   Foot dorsiflexion:  5  Foot plantar flexion:  5  Foot inversion:  5  Foot eversion:  5     Left Foot Muscle Strength   Foot dorsiflexion:  5  Foot plantar flexion:  5  Foot inversion:  5  Foot eversion:  5     RANGE OF MOTION      Right Foot Range of Motion   Foot and ankle ROM within normal limits       Left Foot Range of Motion   Foot and ankle ROM within normal limits       DERMATOLOGIC     Right Foot Dermatologic   Skin: erythema and ulcer    Skin: no right foot cellulitis and no right foot drainage    Nails: abnormally thick, subungual debris and dystrophic nails       Left Foot Dermatologic   Skin: skin intact    Nails: abnormally thick, subungual debris and dystrophic nails        Right Foot Additional Comments Fourth toe amputation site with sutures intact. After removal of sutures, residual wound noted measuring 2.0cm x 0.5cm x 0.1cm. No signs of acute infection. No complete dehiscence noted.      RADIOLOGY/NUCLEAR:  XR Foot 2 View Right    Result Date: 1/5/2022  Narrative: EXAM: XR FOOT 2 VW RIGHT-  INDICATION: Concern for osteomyelitis  COMPARISON: None available.  FINDINGS:  Destructive osseous changes in the fourth distal phalanx are noted with surrounding soft tissue edema and gas. No other focal destructive osseous changes or area of osseous demineralization. Lisfranc plane is maintained. No acute fracture or dislocation. Partially imaged distal fibular plate and screw fixation. Calcaneal height is maintained. Mild midfoot arthritic change.      Impression:  Destructive osseous changes in the distal fourth phalanx with surrounding soft tissue edema and gas. Findings are highly suspicious for acute osteomyelitis in the absence of trauma. This report was finalized on 01/05/2022 15:31 by Dr. Siddharth Carias MD.    XR  Toe 2+ View Right    Result Date: 1/6/2022  Narrative: EXAMINATION:  XR TOE 2+ VW RIGHT-  1/6/2022 4:54 PM CST  HISTORY: Postop. M86.9-Osteomyelitis, unspecified; W63-Fjhlhfsa, not elsewhere classified.  COMPARISON: 1/5/2022.  TECHNIQUE: 3 views were obtained of the right toes.  FINDINGS: There has been amputation of the fourth toe. There is air within the soft tissues. There is bandaging material overlying the operative bed, obscuring detail in this region. There are hammertoe deformities of the second, third and fifth toes. No other bone destruction is identified.      Impression: Postoperative changes with amputation of the fourth toe, as described. This report was finalized on 01/06/2022 16:59 by Dr. Al Dutton MD.      LABORATORY/CULTURE RESULTS:      PATHOLOGY RESULTS:       ASSESSMENT/PLAN     Diagnoses and all orders for this visit:    1. S/P foot surgery (Primary)    2. Amputated toe, right (HCC)    3. HIV infection, unspecified symptom status (HCC)    4. Tobacco abuse    5. Open wound of right foot, subsequent encounter      Comprehensive lower extremity examination and evaluation was performed.  Discussed findings and treatment plan including risks, benefits, and treatment options with patient in detail. Patient agreed with treatment plan.  Reviewed at home diabetic foot care including daily foot checks   Bandage removed and surgical site evaluated. Sutures removed. Small wound remains.  If wound fails to heal, will refer to wound care center.  Reapplied similar bandage.  Continue surgical shoe  Advised to be NWB and elevate foot when at rest.   Tobacco cessation and BG controlled needed for wound healing.  An After Visit Summary was printed and given to the patient at discharge, including (if requested) any available informative/educational handouts regarding diagnosis, treatment, or medications. All questions were answered to patient/family satisfaction. Should symptoms fail to improve or worsen  they agree to call or return to clinic or to go to the Emergency Department. Discussed the importance of following up with any needed screening tests/labs/specialist appointments and any requested follow-up recommended by me today. Importance of maintaining follow-up discussed and patient accepts that missed appointments can delay diagnosis and potentially lead to worsening of conditions.  Return in about 1 week (around 2/1/2022) for Follow-up with Dr. Harper., or sooner if acute issues arise.    Lab Frequency Next Occurrence   Follow Anesthesia Guidelines / Standing Orders Once 08/05/2021   Obtain Informed Consent Once 08/10/2021   Diet: Once 08/26/2021   Advance Diet as Tolerated Once 08/26/2021       This document has been electronically signed by Pacheco Harper DPM on January 25, 2022 17:03 CST

## 2022-01-24 ENCOUNTER — READMISSION MANAGEMENT (OUTPATIENT)
Dept: CALL CENTER | Facility: HOSPITAL | Age: 57
End: 2022-01-24

## 2022-01-24 NOTE — OUTREACH NOTE
"General Surgery Week 2 Survey      Responses   [facility] patient discharged from? [place]   Does the patient have one of the following disease processes/diagnoses(primary or secondary)? General Surgery   Week 2 attempt successful? Yes   Call start time 1529   Call end time 1533   Discharge diagnosis Osteomyelitis of fourth toe of right foot              1/6 4th Toe  Amputation - Right Foot   Meds reviewed with patient/caregiver? Yes   Is the patient taking all medications as directed (includes completed medication regime)? Yes   Medication comments 1 day left of antibx   Has the patient kept scheduled appointments due by today? Yes   Psychosocial comments May have found a home   What is the patient's perception of their health status since discharge? Improving   Nursing interventions Nurse provided patient education   Is the patient/caregiver able to teach back signs and symptoms of incisional infection? Increased redness, swelling or pain at the incisonal site,  Increased drainage or bleeding,  Fever   Is the patient/caregiver able to teach back steps to recovery at home? Rest and rebuild strength, gradually increase activity   Is the patient/caregiver able to teach back the hierarchy of who to call/visit for symptoms/problems? PCP, Specialist, Home health nurse, Urgent Care, ED, 911 Yes   Additional teach back comments Pt doing better and his \"no toe\" pain has been bothering him at times.    Week 2 call completed? Yes   Wrap up additional comments G, ucose still running high at times--enc him to watch CHO intake.          Vidya Vaughn RN  "

## 2022-01-25 ENCOUNTER — OFFICE VISIT (OUTPATIENT)
Dept: PODIATRY | Facility: CLINIC | Age: 57
End: 2022-01-25

## 2022-01-25 VITALS
OXYGEN SATURATION: 94 % | BODY MASS INDEX: 36.68 KG/M2 | SYSTOLIC BLOOD PRESSURE: 130 MMHG | DIASTOLIC BLOOD PRESSURE: 80 MMHG | HEIGHT: 71 IN | HEART RATE: 107 BPM

## 2022-01-25 DIAGNOSIS — B20 HIV INFECTION, UNSPECIFIED SYMPTOM STATUS: ICD-10-CM

## 2022-01-25 DIAGNOSIS — S91.301D OPEN WOUND OF RIGHT FOOT, SUBSEQUENT ENCOUNTER: ICD-10-CM

## 2022-01-25 DIAGNOSIS — S98.131A AMPUTATED TOE, RIGHT: ICD-10-CM

## 2022-01-25 DIAGNOSIS — Z98.890 S/P FOOT SURGERY: Primary | ICD-10-CM

## 2022-01-25 DIAGNOSIS — Z72.0 TOBACCO ABUSE: ICD-10-CM

## 2022-01-25 PROCEDURE — 99212 OFFICE O/P EST SF 10 MIN: CPT | Performed by: PODIATRIST

## 2022-02-01 ENCOUNTER — OFFICE VISIT (OUTPATIENT)
Dept: PODIATRY | Facility: CLINIC | Age: 57
End: 2022-02-01

## 2022-02-01 ENCOUNTER — READMISSION MANAGEMENT (OUTPATIENT)
Dept: CALL CENTER | Facility: HOSPITAL | Age: 57
End: 2022-02-01

## 2022-02-01 VITALS
BODY MASS INDEX: 36.82 KG/M2 | DIASTOLIC BLOOD PRESSURE: 62 MMHG | HEART RATE: 88 BPM | OXYGEN SATURATION: 98 % | WEIGHT: 263 LBS | SYSTOLIC BLOOD PRESSURE: 128 MMHG | HEIGHT: 71 IN

## 2022-02-01 DIAGNOSIS — S91.301D OPEN WOUND OF RIGHT FOOT, SUBSEQUENT ENCOUNTER: Primary | ICD-10-CM

## 2022-02-01 DIAGNOSIS — B20 HIV INFECTION, UNSPECIFIED SYMPTOM STATUS: ICD-10-CM

## 2022-02-01 DIAGNOSIS — S98.131A AMPUTATED TOE, RIGHT: ICD-10-CM

## 2022-02-01 DIAGNOSIS — Z72.0 TOBACCO ABUSE: ICD-10-CM

## 2022-02-01 PROCEDURE — 11042 DBRDMT SUBQ TIS 1ST 20SQCM/<: CPT | Performed by: PODIATRIST

## 2022-02-01 NOTE — OUTREACH NOTE
General Surgery Week 3 Survey      Responses   [facility] patient discharged from? [place]   Does the patient have one of the following disease processes/diagnoses(primary or secondary)? General Surgery   Week 3 attempt successful? Yes   Call start time 1609   Call end time 1610   General alerts for this patient Home was destroyed in [place]. Living in [place].   Discharge diagnosis Osteomyelitis of fourth toe of right foot              1/6 4th Toe  Amputation - Right Foot   Is the patient taking all medications as directed (includes completed medication regime)? Yes   Does the patient have a follow up appointment scheduled with their surgeon? Yes   Has the patient kept scheduled appointments due by today? Yes   Comments currently in Dr. Harper's office during call   Has home health visited the patient within 72 hours of discharge? N/A   Psychosocial issues? No   What is the patient's perception of their health status since discharge? Improving   Is the patient/caregiver able to teach back the hierarchy of who to call/visit for symptoms/problems? PCP, Specialist, Home health nurse, Urgent Care, ED, 911 Yes   Week 3 call completed? Yes   Wrap up additional comments Quick call because he was in surgeons office, doing fine, no questions.          Julia Bonilla RN

## 2022-02-03 PROBLEM — R25.1 TREMOR: Status: ACTIVE | Noted: 2022-02-03

## 2022-02-07 ENCOUNTER — TELEPHONE (OUTPATIENT)
Dept: INTERNAL MEDICINE | Age: 57
End: 2022-02-07

## 2022-02-07 ENCOUNTER — OFFICE VISIT (OUTPATIENT)
Dept: WOUND CARE | Facility: HOSPITAL | Age: 57
End: 2022-02-07

## 2022-02-07 DIAGNOSIS — E11.621 TYPE 2 DIABETES MELLITUS WITH FOOT ULCER, UNSPECIFIED WHETHER LONG TERM INSULIN USE: ICD-10-CM

## 2022-02-07 DIAGNOSIS — Z72.0 TOBACCO USE: ICD-10-CM

## 2022-02-07 DIAGNOSIS — L97.512 NON-PRESSURE CHRONIC ULCER OF OTHER PART OF RIGHT FOOT WITH FAT LAYER EXPOSED: ICD-10-CM

## 2022-02-07 DIAGNOSIS — B20 HUMAN IMMUNODEFICIENCY VIRUS (HIV) DISEASE: ICD-10-CM

## 2022-02-07 DIAGNOSIS — L97.509 TYPE 2 DIABETES MELLITUS WITH FOOT ULCER, UNSPECIFIED WHETHER LONG TERM INSULIN USE: ICD-10-CM

## 2022-02-07 PROCEDURE — 11042 DBRDMT SUBQ TIS 1ST 20SQCM/<: CPT | Performed by: PODIATRIST

## 2022-02-07 PROCEDURE — G0463 HOSPITAL OUTPT CLINIC VISIT: HCPCS

## 2022-02-07 RX ORDER — FLUCONAZOLE 200 MG/1
200 TABLET ORAL DAILY
Qty: 5 TABLET | Refills: 0 | Status: SHIPPED | OUTPATIENT
Start: 2022-02-07 | End: 2022-02-12

## 2022-02-07 RX ORDER — NYSTATIN 100000 U/G
CREAM TOPICAL
Qty: 30 G | Refills: 1 | Status: SHIPPED | OUTPATIENT
Start: 2022-02-07 | End: 2022-09-27 | Stop reason: SDUPTHER

## 2022-02-10 ENCOUNTER — READMISSION MANAGEMENT (OUTPATIENT)
Dept: CALL CENTER | Facility: HOSPITAL | Age: 57
End: 2022-02-10

## 2022-02-10 NOTE — OUTREACH NOTE
"General Surgery Week 4 Survey      Responses   [facility] patient discharged from? [place]   Does the patient have one of the following disease processes/diagnoses(primary or secondary)? General Surgery   Week 4 attempt successful? Yes   Call start time 1104   Call end time 1106   General alerts for this patient Home was destroyed in [place]. [place].   Discharge diagnosis Osteomyelitis of fourth toe of right foot              1/6 4th Toe  Amputation - Right Foot   Is the patient taking all medications as directed (includes completed medication regime)? Yes   Has the patient kept scheduled appointments due by today? Yes   Is the patient still receiving Home Health Services? N/A   Psychosocial issues? No   What is the patient's perception of their health status since discharge? Improving   Nursing interventions Nurse provided patient education   Is the patient/caregiver able to teach back the hierarchy of who to call/visit for symptoms/problems? PCP, Specialist, Home health nurse, Urgent Care, ED, 911 Yes   Week 4 call completed? Yes   Would the patient like one additional call? No   Graduated Yes   Is the patient interested in additional calls from an ambulatory ?  NOTE:  applies to high risk patients requiring additional follow-up. No   Did the patient feel the follow up calls were helpful during their recovery period? Yes   Was the number of calls appropriate? Yes   Does the patient have an Advance Directive or Living Will? Yes   Is the patient/caregiver familiar with Advance Care Planning? Yes   Would the patient like more information on Advance Care Planning? No   Wrap up additional comments Pt. states he is improving. \"getting better every day.\"          Hanna Solorzano RN  "

## 2022-02-14 ENCOUNTER — OFFICE VISIT (OUTPATIENT)
Dept: WOUND CARE | Facility: HOSPITAL | Age: 57
End: 2022-02-14

## 2022-02-14 DIAGNOSIS — L97.512 NON-PRESSURE CHRONIC ULCER OF OTHER PART OF RIGHT FOOT WITH FAT LAYER EXPOSED: ICD-10-CM

## 2022-02-14 DIAGNOSIS — E11.621 TYPE 2 DIABETES MELLITUS WITH FOOT ULCER, UNSPECIFIED WHETHER LONG TERM INSULIN USE: ICD-10-CM

## 2022-02-14 DIAGNOSIS — B20 HUMAN IMMUNODEFICIENCY VIRUS (HIV) DISEASE: ICD-10-CM

## 2022-02-14 DIAGNOSIS — L97.509 TYPE 2 DIABETES MELLITUS WITH FOOT ULCER, UNSPECIFIED WHETHER LONG TERM INSULIN USE: ICD-10-CM

## 2022-02-14 DIAGNOSIS — Z72.0 TOBACCO USE: ICD-10-CM

## 2022-02-14 PROCEDURE — 11042 DBRDMT SUBQ TIS 1ST 20SQCM/<: CPT | Performed by: PODIATRIST

## 2022-02-18 ENCOUNTER — OFFICE VISIT (OUTPATIENT)
Dept: ENDOCRINOLOGY | Facility: CLINIC | Age: 57
End: 2022-02-18

## 2022-02-18 VITALS
WEIGHT: 261 LBS | DIASTOLIC BLOOD PRESSURE: 50 MMHG | HEART RATE: 102 BPM | SYSTOLIC BLOOD PRESSURE: 130 MMHG | OXYGEN SATURATION: 94 % | HEIGHT: 70 IN | BODY MASS INDEX: 37.37 KG/M2

## 2022-02-18 DIAGNOSIS — E55.9 VITAMIN D DEFICIENCY: ICD-10-CM

## 2022-02-18 DIAGNOSIS — E11.65 TYPE 2 DIABETES MELLITUS WITH HYPERGLYCEMIA, WITH LONG-TERM CURRENT USE OF INSULIN: Primary | ICD-10-CM

## 2022-02-18 DIAGNOSIS — E11.59 HYPERTENSION ASSOCIATED WITH DIABETES: ICD-10-CM

## 2022-02-18 DIAGNOSIS — E11.69 MIXED DIABETIC HYPERLIPIDEMIA ASSOCIATED WITH TYPE 2 DIABETES MELLITUS: ICD-10-CM

## 2022-02-18 DIAGNOSIS — E78.2 MIXED DIABETIC HYPERLIPIDEMIA ASSOCIATED WITH TYPE 2 DIABETES MELLITUS: ICD-10-CM

## 2022-02-18 DIAGNOSIS — I15.2 HYPERTENSION ASSOCIATED WITH DIABETES: ICD-10-CM

## 2022-02-18 DIAGNOSIS — Z79.4 TYPE 2 DIABETES MELLITUS WITH HYPERGLYCEMIA, WITH LONG-TERM CURRENT USE OF INSULIN: Primary | ICD-10-CM

## 2022-02-18 PROCEDURE — 95251 CONT GLUC MNTR ANALYSIS I&R: CPT | Performed by: INTERNAL MEDICINE

## 2022-02-18 PROCEDURE — 99214 OFFICE O/P EST MOD 30 MIN: CPT | Performed by: INTERNAL MEDICINE

## 2022-02-18 NOTE — PROGRESS NOTES
" Donis Yi is a 56 y.o. male who presents for  evaluation of   Type 2 diabetes        Primary Care Provider    Oksana Mares MD    56 y.o. male  t2dm since age 41 y old complicated by neuropathy improved by use of eamon and omnipod.    States glucose better controlled after meeting with Ms. Tanner    Has been dx with Parkinson's   Lost 4th toe right foot        PE  /50   Pulse 102   Ht 177.8 cm (70\")   Wt 118 kg (261 lb)   SpO2 94%   BMI 37.45 kg/m²   AOx3  RRR  CTA  No edema  Callus   parkinosian tremor   Right 4th toe amputation    Lab Review    Lab Results   Component Value Date    WBC 8.17 01/08/2022    HGB 10.8 (L) 01/08/2022    HCT 32.2 (L) 01/08/2022    MCV 92.8 01/08/2022     01/08/2022     Lab Results   Component Value Date    GLUCOSE 364 (H) 01/08/2022    BUN 18 01/08/2022    CREATININE 0.84 01/08/2022    EGFRIFNONA 95 01/08/2022    EGFRIFAFRI >59 01/04/2022    BCR 21.4 01/08/2022    K 4.4 01/08/2022    CO2 31.0 (H) 01/08/2022    CALCIUM 9.1 01/08/2022    ALBUMIN 3.40 (L) 01/05/2022    LABIL2 CANCELED 03/29/2016    AST 15 01/05/2022    ALT 16 01/05/2022           Assessment/Plan       ICD-10-CM ICD-9-CM   1. Type 2 diabetes mellitus with hyperglycemia, with long-term current use of insulin (HCC)  E11.65 250.00    Z79.4 790.29     V58.67   2. Hypertension associated with diabetes (HCC)  E11.59 250.80    I15.2 401.9   3. Mixed diabetic hyperlipidemia associated with type 2 diabetes mellitus (HCC)  E11.69 250.80    E78.2 272.2   4. Vitamin D deficiency  E55.9 268.9         I reviewed and summarized records from Oksana Mares MD from present year  and I reviewed / ordered labs.   From review of records :    Patient has uncontrolled type 2 diabetes    Glycemic Management:   Lab Results   Component Value Date    HGBA1C 9.6 (H) 01/04/2022    HGBA1C 10.6 (H) 09/16/2021    HGBA1C 9.1 (H) 05/11/2021       Eamon 14    2 week review     In target 39%  2%   Type 2 dm w hyperglycemia but last " week, perfect and barely bolusing since as long as he keeps it low carb , he needs no insulin       Metformin 1000 mg bid      Trulicity  4.5 mg weekly       Hesitant about jardiance since thirst and BPH     Omnipod U 100     Basal 3 units per hour    Carb Ratio 4 , not using, we agreed 60 grams or less , trulicity will cover. Do a carb ratio of 1:5 but substract the first 50 grams     Correction 20, target 120     In the past U500 but not using anymore     Needs to upgrade to melba 2 to benefit from alarms in case glucose rises more than 240 ,  Also has had some hypoglycemia w unawareness and needs to be notified.      Lipid Management  Lab Results   Component Value Date    CHOL 142 02/01/2021    CHLPL 144 (L) 01/04/2022    TRIG 75 01/04/2022    HDL 49 (L) 01/04/2022    LDL 80 01/04/2022       lipitor 80 mg qhs     Blood Pressure Management:    Vitals:    02/18/22 1427   BP: 130/50   Pulse: 102   SpO2: 94%     Controlled             Microvascular Complication Monitoring:      Eye Exam Evaluation  No retinopathy , has cataracts   -----------    Last Microalbumin-Proteinuria Assessment    No results found for: MALBCRERATIO    No results found for: UTPCR    -----------      Neuropathy on lyrica 100 bid   And neurontin per pain management           Bone Health    Lab Results   Component Value Date    CALCIUM 9.1 01/08/2022    PHOS 2.7 05/11/2020    TBSE94SB 50.4 09/16/2021       Thyroid Health    Lab Results   Component Value Date    TSH 1.460 01/04/2022    TSH 1.020 09/16/2021    TSH 1.170 05/11/2021           Lab Results   Component Value Date    LEGOQLJD39 344 02/01/2021     Wanted viagra    No longer on imdur and nitroglycerin     Written this time but only 50 since on genvoya and flomax        No orders of the defined types were placed in this encounter.        A copy of my note was sent to Oksana Mares MD    Please see my above opinion and suggestions.

## 2022-02-22 ENCOUNTER — OFFICE VISIT (OUTPATIENT)
Dept: WOUND CARE | Facility: HOSPITAL | Age: 57
End: 2022-02-22

## 2022-02-22 DIAGNOSIS — L97.512 NON-PRESSURE CHRONIC ULCER OF OTHER PART OF RIGHT FOOT WITH FAT LAYER EXPOSED: ICD-10-CM

## 2022-02-22 DIAGNOSIS — K52.1 DIARRHEA DUE TO DRUG: ICD-10-CM

## 2022-02-22 DIAGNOSIS — Z72.0 TOBACCO USE: ICD-10-CM

## 2022-02-22 DIAGNOSIS — B20 HUMAN IMMUNODEFICIENCY VIRUS (HIV) DISEASE: ICD-10-CM

## 2022-02-22 DIAGNOSIS — E11.621 TYPE 2 DIABETES MELLITUS WITH FOOT ULCER, UNSPECIFIED WHETHER LONG TERM INSULIN USE: ICD-10-CM

## 2022-02-22 DIAGNOSIS — H69.81 DYSFUNCTION OF RIGHT EUSTACHIAN TUBE: ICD-10-CM

## 2022-02-22 DIAGNOSIS — L97.509 TYPE 2 DIABETES MELLITUS WITH FOOT ULCER, UNSPECIFIED WHETHER LONG TERM INSULIN USE: ICD-10-CM

## 2022-02-22 PROCEDURE — 11042 DBRDMT SUBQ TIS 1ST 20SQCM/<: CPT | Performed by: NURSE PRACTITIONER

## 2022-02-22 RX ORDER — RIZATRIPTAN BENZOATE 10 MG/1
10 TABLET ORAL
Qty: 9 TABLET | Refills: 1 | Status: SHIPPED | OUTPATIENT
Start: 2022-02-22 | End: 2022-04-19

## 2022-02-22 RX ORDER — MECLIZINE HYDROCHLORIDE 25 MG/1
TABLET ORAL
Qty: 30 TABLET | Refills: 1 | Status: SHIPPED | OUTPATIENT
Start: 2022-02-22 | End: 2022-04-19

## 2022-02-22 RX ORDER — PROPRANOLOL HYDROCHLORIDE 80 MG/1
CAPSULE, EXTENDED RELEASE ORAL
Qty: 90 CAPSULE | Refills: 1 | Status: SHIPPED | OUTPATIENT
Start: 2022-02-22 | End: 2022-08-15

## 2022-02-22 RX ORDER — LOPERAMIDE HYDROCHLORIDE 2 MG/1
2 CAPSULE ORAL 4 TIMES DAILY PRN
Qty: 30 CAPSULE | Refills: 1 | Status: SHIPPED | OUTPATIENT
Start: 2022-02-22 | End: 2022-04-22

## 2022-02-22 RX ORDER — ACETAMINOPHEN 160 MG
TABLET,DISINTEGRATING ORAL
Qty: 100 CAPSULE | Refills: 3 | Status: SHIPPED | OUTPATIENT
Start: 2022-02-22

## 2022-02-22 RX ORDER — LORATADINE 10 MG/1
TABLET ORAL
Qty: 30 TABLET | Refills: 1 | Status: SHIPPED | OUTPATIENT
Start: 2022-02-22 | End: 2022-04-19

## 2022-02-22 RX ORDER — MOMETASONE FUROATE 50 UG/1
2 SPRAY, METERED NASAL DAILY
Qty: 17 G | Refills: 1 | Status: SHIPPED | OUTPATIENT
Start: 2022-02-22 | End: 2022-04-19

## 2022-03-01 ENCOUNTER — OFFICE VISIT (OUTPATIENT)
Dept: WOUND CARE | Facility: HOSPITAL | Age: 57
End: 2022-03-01

## 2022-03-01 ENCOUNTER — OFFICE VISIT (OUTPATIENT)
Dept: INTERNAL MEDICINE | Age: 57
End: 2022-03-01
Payer: COMMERCIAL

## 2022-03-01 VITALS
BODY MASS INDEX: 36.44 KG/M2 | DIASTOLIC BLOOD PRESSURE: 80 MMHG | HEIGHT: 72 IN | OXYGEN SATURATION: 97 % | WEIGHT: 269 LBS | SYSTOLIC BLOOD PRESSURE: 140 MMHG | HEART RATE: 100 BPM

## 2022-03-01 DIAGNOSIS — E11.42 TYPE 2 DIABETES MELLITUS WITH DIABETIC POLYNEUROPATHY, WITH LONG-TERM CURRENT USE OF INSULIN (HCC): Primary | ICD-10-CM

## 2022-03-01 DIAGNOSIS — I10 PRIMARY HYPERTENSION: ICD-10-CM

## 2022-03-01 DIAGNOSIS — Z79.4 TYPE 2 DIABETES MELLITUS WITH DIABETIC POLYNEUROPATHY, WITH LONG-TERM CURRENT USE OF INSULIN (HCC): Primary | ICD-10-CM

## 2022-03-01 DIAGNOSIS — L97.509 FOOT ULCER DUE TO SECONDARY DM (HCC): ICD-10-CM

## 2022-03-01 DIAGNOSIS — B20 HUMAN IMMUNODEFICIENCY VIRUS (HIV) DISEASE: ICD-10-CM

## 2022-03-01 DIAGNOSIS — L97.509 TYPE 2 DIABETES MELLITUS WITH FOOT ULCER, UNSPECIFIED WHETHER LONG TERM INSULIN USE: ICD-10-CM

## 2022-03-01 DIAGNOSIS — I96 GANGRENE OF TOE OF RIGHT FOOT (HCC): ICD-10-CM

## 2022-03-01 DIAGNOSIS — Z12.5 SCREENING FOR PROSTATE CANCER: ICD-10-CM

## 2022-03-01 DIAGNOSIS — L97.512 NON-PRESSURE CHRONIC ULCER OF OTHER PART OF RIGHT FOOT WITH FAT LAYER EXPOSED: ICD-10-CM

## 2022-03-01 DIAGNOSIS — R25.1 TREMOR: ICD-10-CM

## 2022-03-01 DIAGNOSIS — E13.621 FOOT ULCER DUE TO SECONDARY DM (HCC): ICD-10-CM

## 2022-03-01 DIAGNOSIS — Z72.0 TOBACCO USE: ICD-10-CM

## 2022-03-01 DIAGNOSIS — E11.621 TYPE 2 DIABETES MELLITUS WITH FOOT ULCER, UNSPECIFIED WHETHER LONG TERM INSULIN USE: ICD-10-CM

## 2022-03-01 PROCEDURE — 99213 OFFICE O/P EST LOW 20 MIN: CPT | Performed by: INTERNAL MEDICINE

## 2022-03-01 PROCEDURE — 11042 DBRDMT SUBQ TIS 1ST 20SQCM/<: CPT | Performed by: SURGERY

## 2022-03-01 SDOH — ECONOMIC STABILITY: FOOD INSECURITY: WITHIN THE PAST 12 MONTHS, THE FOOD YOU BOUGHT JUST DIDN'T LAST AND YOU DIDN'T HAVE MONEY TO GET MORE.: NEVER TRUE

## 2022-03-01 SDOH — ECONOMIC STABILITY: FOOD INSECURITY: WITHIN THE PAST 12 MONTHS, YOU WORRIED THAT YOUR FOOD WOULD RUN OUT BEFORE YOU GOT MONEY TO BUY MORE.: NEVER TRUE

## 2022-03-01 ASSESSMENT — SOCIAL DETERMINANTS OF HEALTH (SDOH): HOW HARD IS IT FOR YOU TO PAY FOR THE VERY BASICS LIKE FOOD, HOUSING, MEDICAL CARE, AND HEATING?: SOMEWHAT HARD
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
## 2022-03-02 ENCOUNTER — LAB (OUTPATIENT)
Dept: LAB | Facility: HOSPITAL | Age: 57
End: 2022-03-02

## 2022-03-02 ENCOUNTER — TRANSCRIBE ORDERS (OUTPATIENT)
Dept: ADMINISTRATIVE | Facility: HOSPITAL | Age: 57
End: 2022-03-02

## 2022-03-02 DIAGNOSIS — Z79.899 ENCOUNTER FOR LONG-TERM CURRENT USE OF MEDICATION: Primary | ICD-10-CM

## 2022-03-02 DIAGNOSIS — Z79.899 ENCOUNTER FOR LONG-TERM CURRENT USE OF MEDICATION: ICD-10-CM

## 2022-03-02 LAB
AMPHET+METHAMPHET UR QL: NEGATIVE
AMPHETAMINES UR QL: NEGATIVE
BARBITURATES UR QL SCN: NEGATIVE
BENZODIAZ UR QL SCN: NEGATIVE
BUPRENORPHINE SERPL-MCNC: NEGATIVE NG/ML
CANNABINOIDS SERPL QL: NEGATIVE
COCAINE UR QL: NEGATIVE
METHADONE UR QL SCN: NEGATIVE
OPIATES UR QL: NEGATIVE
OXYCODONE UR QL SCN: NEGATIVE
PCP UR QL SCN: NEGATIVE
PROPOXYPH UR QL: NEGATIVE
TRICYCLICS UR QL SCN: NEGATIVE

## 2022-03-02 PROCEDURE — 80306 DRUG TEST PRSMV INSTRMNT: CPT

## 2022-03-04 ENCOUNTER — OFFICE VISIT (OUTPATIENT)
Dept: ENDOCRINOLOGY | Facility: CLINIC | Age: 57
End: 2022-03-04

## 2022-03-04 VITALS
DIASTOLIC BLOOD PRESSURE: 70 MMHG | WEIGHT: 265 LBS | SYSTOLIC BLOOD PRESSURE: 120 MMHG | HEIGHT: 70 IN | HEART RATE: 95 BPM | BODY MASS INDEX: 37.94 KG/M2 | OXYGEN SATURATION: 98 %

## 2022-03-04 DIAGNOSIS — I15.2 HYPERTENSION ASSOCIATED WITH DIABETES: ICD-10-CM

## 2022-03-04 DIAGNOSIS — E55.9 VITAMIN D DEFICIENCY: ICD-10-CM

## 2022-03-04 DIAGNOSIS — E78.2 MIXED DIABETIC HYPERLIPIDEMIA ASSOCIATED WITH TYPE 2 DIABETES MELLITUS: ICD-10-CM

## 2022-03-04 DIAGNOSIS — E11.65 TYPE 2 DIABETES MELLITUS WITH HYPERGLYCEMIA, WITH LONG-TERM CURRENT USE OF INSULIN: Primary | ICD-10-CM

## 2022-03-04 DIAGNOSIS — E11.59 HYPERTENSION ASSOCIATED WITH DIABETES: ICD-10-CM

## 2022-03-04 DIAGNOSIS — E11.69 MIXED DIABETIC HYPERLIPIDEMIA ASSOCIATED WITH TYPE 2 DIABETES MELLITUS: ICD-10-CM

## 2022-03-04 DIAGNOSIS — Z79.4 TYPE 2 DIABETES MELLITUS WITH HYPERGLYCEMIA, WITH LONG-TERM CURRENT USE OF INSULIN: Primary | ICD-10-CM

## 2022-03-04 PROCEDURE — 99214 OFFICE O/P EST MOD 30 MIN: CPT | Performed by: INTERNAL MEDICINE

## 2022-03-04 PROCEDURE — 95251 CONT GLUC MNTR ANALYSIS I&R: CPT | Performed by: INTERNAL MEDICINE

## 2022-03-04 NOTE — PROGRESS NOTES
" Donis Yi is a 56 y.o. male who presents for  evaluation of   Type 2 diabetes        Primary Care Provider    Oksana Mares MD    56 y.o. male  t2dm since age 41 y old complicated by neuropathy improved by use of eamon and omnipod.    States glucose better controlled after meeting with Ms. Tanner    Has been dx with Parkinson's   Lost 4th toe right foot        PE  /70   Pulse 95   Ht 177.8 cm (70\")   Wt 120 kg (265 lb)   SpO2 98%   BMI 38.02 kg/m²   AOx3  RRR  CTA  No edema  Callus   parkinosian tremor   Right 4th toe amputation    Lab Review    Lab Results   Component Value Date    WBC 8.17 01/08/2022    HGB 10.8 (L) 01/08/2022    HCT 32.2 (L) 01/08/2022    MCV 92.8 01/08/2022     01/08/2022     Lab Results   Component Value Date    GLUCOSE 364 (H) 01/08/2022    BUN 18 01/08/2022    CREATININE 0.84 01/08/2022    EGFRIFNONA 95 01/08/2022    EGFRIFAFRI >59 01/04/2022    BCR 21.4 01/08/2022    K 4.4 01/08/2022    CO2 31.0 (H) 01/08/2022    CALCIUM 9.1 01/08/2022    ALBUMIN 3.40 (L) 01/05/2022    LABIL2 CANCELED 03/29/2016    AST 15 01/05/2022    ALT 16 01/05/2022           Assessment/Plan       ICD-10-CM ICD-9-CM   1. Type 2 diabetes mellitus with hyperglycemia, with long-term current use of insulin (HCC)  E11.65 250.00    Z79.4 790.29     V58.67   2. Hypertension associated with diabetes (HCC)  E11.59 250.80    I15.2 401.9   3. Mixed diabetic hyperlipidemia associated with type 2 diabetes mellitus (HCC)  E11.69 250.80    E78.2 272.2   4. Vitamin D deficiency  E55.9 268.9         I reviewed and summarized records from Oksana Mares MD from present year  and I reviewed / ordered labs.   From review of records :    Patient has uncontrolled type 2 diabetes    Glycemic Management:   Lab Results   Component Value Date    HGBA1C 9.6 (H) 01/04/2022    HGBA1C 10.6 (H) 09/16/2021    HGBA1C 9.1 (H) 05/11/2021       Eamon   2 week review  FROM FEB 14 TO fEB 27     In target 39%- IMPROVED TO " 57%    MUCH IMPROVEMENT, but still type 2 diabetes  hypeglycemia  Eamon sent for scanning     Metformin 1000 mg bid      Trulicity  4.5 mg weekly       Hesitant about jardiance since thirst and BPH     Omnipod U 100     Basal 3 units per hour    Carb Ratio 4 , not using, we agreed 60 grams or less , trulicity will cover. Do a carb ratio of 1:5 but substract the first 60 grams   CHANGE TO 1:4    Correction 20, target 120     In the past U500 but not using anymore     Needs to upgrade to eamon 2 to benefit from alarms in case glucose rises more than 240 ,  Also has had some hypoglycemia w unawareness and needs to be notified.   SENSOR IS FALLING, BUY MASTISOL      Lipid Management  Lab Results   Component Value Date    CHOL 142 02/01/2021    CHLPL 144 (L) 01/04/2022    TRIG 75 01/04/2022    HDL 49 (L) 01/04/2022    LDL 80 01/04/2022       lipitor 80 mg qhs     Blood Pressure Management:    Vitals:    03/04/22 1445   BP: 120/70   Pulse: 95   SpO2: 98%     Controlled             Microvascular Complication Monitoring:      Eye Exam Evaluation  No retinopathy , has cataracts   -----------    Last Microalbumin-Proteinuria Assessment    No results found for: MALBCRERATIO    No results found for: UTPCR    -----------      Neuropathy on lyrica 100 bid   And neurontin per pain management           Bone Health    Lab Results   Component Value Date    CALCIUM 9.1 01/08/2022    PHOS 2.7 05/11/2020    [id number] 50.4 09/16/2021       Thyroid Health    Lab Results   Component Value Date    TSH 1.460 01/04/2022    TSH 1.020 09/16/2021    TSH 1.170 05/11/2021           Lab Results   Component Value Date    [id number] 344 02/01/2021     Wanted viagra    No longer on imdur and nitroglycerin     Written this time but only 52 since on genvoya and flomax        No orders of the defined types were placed in this encounter.        A copy of my note was sent to Oksana Mares MD    Please see my above opinion and suggestions.

## 2022-03-07 ENCOUNTER — OFFICE VISIT (OUTPATIENT)
Dept: WOUND CARE | Facility: HOSPITAL | Age: 57
End: 2022-03-07

## 2022-03-07 DIAGNOSIS — Z72.0 TOBACCO USE: ICD-10-CM

## 2022-03-07 DIAGNOSIS — E11.621 TYPE 2 DIABETES MELLITUS WITH FOOT ULCER, UNSPECIFIED WHETHER LONG TERM INSULIN USE: ICD-10-CM

## 2022-03-07 DIAGNOSIS — L97.512 NON-PRESSURE CHRONIC ULCER OF OTHER PART OF RIGHT FOOT WITH FAT LAYER EXPOSED: ICD-10-CM

## 2022-03-07 DIAGNOSIS — B20 HUMAN IMMUNODEFICIENCY VIRUS (HIV) DISEASE: ICD-10-CM

## 2022-03-07 DIAGNOSIS — L97.509 TYPE 2 DIABETES MELLITUS WITH FOOT ULCER, UNSPECIFIED WHETHER LONG TERM INSULIN USE: ICD-10-CM

## 2022-03-07 PROCEDURE — 11042 DBRDMT SUBQ TIS 1ST 20SQCM/<: CPT | Performed by: PODIATRIST

## 2022-03-08 ENCOUNTER — DOCUMENTATION (OUTPATIENT)
Dept: ENDOCRINOLOGY | Facility: CLINIC | Age: 57
End: 2022-03-08

## 2022-03-15 ENCOUNTER — OFFICE VISIT (OUTPATIENT)
Dept: WOUND CARE | Facility: HOSPITAL | Age: 57
End: 2022-03-15

## 2022-03-15 DIAGNOSIS — L97.509 TYPE 2 DIABETES MELLITUS WITH FOOT ULCER, UNSPECIFIED WHETHER LONG TERM INSULIN USE: ICD-10-CM

## 2022-03-15 DIAGNOSIS — E11.621 TYPE 2 DIABETES MELLITUS WITH FOOT ULCER, UNSPECIFIED WHETHER LONG TERM INSULIN USE: ICD-10-CM

## 2022-03-15 DIAGNOSIS — Z89.421 ACQUIRED ABSENCE OF OTHER RIGHT TOE(S): ICD-10-CM

## 2022-03-15 DIAGNOSIS — L97.512 NON-PRESSURE CHRONIC ULCER OF OTHER PART OF RIGHT FOOT WITH FAT LAYER EXPOSED: ICD-10-CM

## 2022-03-15 DIAGNOSIS — B20 HUMAN IMMUNODEFICIENCY VIRUS (HIV) DISEASE: ICD-10-CM

## 2022-03-15 PROCEDURE — G0463 HOSPITAL OUTPT CLINIC VISIT: HCPCS

## 2022-03-15 PROCEDURE — 99212 OFFICE O/P EST SF 10 MIN: CPT | Performed by: NURSE PRACTITIONER

## 2022-04-19 DIAGNOSIS — I10 ESSENTIAL HYPERTENSION: ICD-10-CM

## 2022-04-19 DIAGNOSIS — H69.81 DYSFUNCTION OF RIGHT EUSTACHIAN TUBE: ICD-10-CM

## 2022-04-19 DIAGNOSIS — K52.1 DIARRHEA DUE TO DRUG: ICD-10-CM

## 2022-04-22 RX ORDER — LOPERAMIDE HYDROCHLORIDE 2 MG/1
2 CAPSULE ORAL 4 TIMES DAILY PRN
Qty: 30 CAPSULE | Refills: 1 | Status: SHIPPED | OUTPATIENT
Start: 2022-04-22 | End: 2022-06-17

## 2022-04-22 RX ORDER — LOSARTAN POTASSIUM 25 MG/1
25 TABLET ORAL DAILY
Qty: 90 TABLET | Refills: 3 | Status: SHIPPED | OUTPATIENT
Start: 2022-04-22

## 2022-04-22 RX ORDER — MOMETASONE FUROATE 50 UG/1
SPRAY, METERED NASAL
Qty: 17 G | Refills: 3 | Status: SHIPPED | OUTPATIENT
Start: 2022-04-22 | End: 2022-08-15

## 2022-04-22 RX ORDER — RIZATRIPTAN BENZOATE 10 MG/1
10 TABLET ORAL
Qty: 9 TABLET | Refills: 2 | Status: SHIPPED | OUTPATIENT
Start: 2022-04-22 | End: 2022-07-18

## 2022-04-22 RX ORDER — MECLIZINE HYDROCHLORIDE 25 MG/1
TABLET ORAL
Qty: 30 TABLET | Refills: 1 | Status: SHIPPED | OUTPATIENT
Start: 2022-04-22 | End: 2022-06-17

## 2022-04-22 RX ORDER — LORATADINE 10 MG/1
TABLET ORAL
Qty: 30 TABLET | Refills: 5 | Status: SHIPPED | OUTPATIENT
Start: 2022-04-22 | End: 2022-10-06

## 2022-05-02 NOTE — PROGRESS NOTES
[facility] - PODIATRY    Today's Date: 05/03/22    Patient Name: Donis Yi  MRN: [id number]  CSN: [id number]  PCP: Oksana Mares MD  Referring Provider: No ref. provider found    SUBJECTIVE     Chief Complaint   Patient presents with   • Follow-up     Oksana Mares MD pcp01/18/2022 3 month f/u diabetic - pt states doing ok, no complaint s- pt denies pain - pt presents routine diabetic nail and foot care, long thick yellow toenails,    • Diabetes     257mg/dl this am      HPI: Donis Yi, a 56 y.o.male, comes to clinic as a(n) established patient presenting for diabetic foot exam and complaining of thickened, irregular toenails. Patient has h/o DM2, anxiety, DDD, depression, HIV, HLD, HSP, HTN, MI, migraine, osteoporosis, sleep apnea. Patient is IDDM with last stated BG level of 113mg/dl. Was 257 mg/dl this morning. States that toenails are long, thick, discolored, and irregular and that he is unable to care for nails himself. Denies new open wounds or sores.  Denies pain today. Relates previous treatment(s) including care by podiatry. Denies any constitutional symptoms. No other pedal complaints at this time.    Past Medical History:   Diagnosis Date   • Allergic rhinitis    • Anxiety    • Bursitis    • DDD (degenerative disc disease), cervical    • Depression    • HIV (human immunodeficiency virus infection) (HCC)    • HIV disease (HCC)    • HLD (hyperlipidemia)    • HSP ([place] purpura) ([facility])    • HTN (hypertension)    • Migraine    • Myocardial infarction (HCC)    • Osteoporosis    • Sleep apnea    • Type 2 diabetes mellitus (HCC)      Past Surgical History:   Procedure Laterality Date   • AMPUTATION DIGIT Right 1/6/2022    Procedure: 4th Toe  Amputation - Right Foot;  Surgeon: Pacheco Harper DPM;  Location: [facility] OR;  Service: Podiatry;  Laterality: Right;   • ANKLE SURGERY     • CARDIAC CATHETERIZATION N/A 6/24/2020    Procedure: Coronary angiography;  Surgeon:  Deon Bailey MD;  Location: [facility] CATH INVASIVE LOCATION;  Service: Cardiology;  Laterality: N/A;  11am start time   • CARDIAC CATHETERIZATION N/A 6/24/2020    Procedure: Percutaneous Coronary Intervention;  Surgeon: Deon Bailey MD;  Location: [facility] CATH INVASIVE LOCATION;  Service: Cardiology;  Laterality: N/A;   • COLONOSCOPY  06/19/2009    Normal exam repeat in 10 years   • COLONOSCOPY N/A 8/26/2021    Procedure: COLONOSCOPY WITH ANESTHESIA;  Surgeon: Byron Rosenberg MD;  Location: [facility] ENDOSCOPY;  Service: Gastroenterology;  Laterality: N/A;  pre screen  post poor prep  dr yg gutiérrez   • ENDOSCOPY N/A 8/26/2021    Procedure: ESOPHAGOGASTRODUODENOSCOPY WITH ANESTHESIA;  Surgeon: Byron Rosenberg MD;  Location: [facility] ENDOSCOPY;  Service: Gastroenterology;  Laterality: N/A;  pre GERD  post normal  dr yg gutiérrez   • FOREARM SURGERY     • LYMPH NODE BIOPSY     • PILONIDAL CYSTECTOMY N/A 12/1/2020    Procedure: EXCISION PILONIDAL ABSCESS;  Surgeon: Ashlyn Posey MD;  Location: [facility] OR;  Service: General;  Laterality: N/A;     Family History   Problem Relation Age of Onset   • Diabetes Mother    • Hypertension Mother    • Thyroid disease Mother    • Hypertension Father    • Thyroid disease Father    • Arrhythmia Father    • Diabetes Maternal Grandfather    • Heart disease Maternal Grandfather    • Hypertension Maternal Grandfather    • Diabetes Paternal Grandmother    • Stroke Paternal Grandmother    • Heart disease Paternal Grandmother    • Hypertension Paternal Grandmother    • Thyroid disease Paternal Grandmother    • Hypertension Paternal Grandfather    • Hypertension Sister    • No Known Problems Brother    • Colon cancer Neg Hx    • Colon polyps Neg Hx      Social History     Socioeconomic History   • Marital status:    Tobacco Use   • Smoking status: Current Every Day Smoker     Packs/day: 0.25     Types: Cigarettes     Start date: 1988   • Smokeless tobacco: Never Used   Vaping  Use   • Vaping Use: Never used   Substance and Sexual Activity   • Alcohol use: Yes     Comment: occasionally   • Drug use: No   • Sexual activity: Defer     Allergies   Allergen Reactions   • Amitriptyline Anaphylaxis   • Amitriptyline Hcl Anaphylaxis   • Darvon [Propoxyphene] Anaphylaxis   • Zithromax [Azithromycin] Anaphylaxis     Current Outpatient Medications   Medication Sig Dispense Refill   • albuterol (PROVENTIL HFA;VENTOLIN HFA) 108 (90 BASE) MCG/ACT inhaler Inhale 2 puffs every 6 (six) hours as needed for wheezing.     • atorvastatin (LIPITOR) 40 MG tablet Take 40 mg by mouth Daily.  3   • Cholecalciferol (VITAMIN D3) 50 MCG (2000 UT) capsule Take 2,000 Units by mouth Daily.     • ciclopirox (LOPROX) 0.77 % cream Apply  topically to the appropriate area as directed 2 (Two) Times a Day. (Patient taking differently: Apply 1 application topically to the appropriate area as directed 2 (Two) Times a Day As Needed.) 30 g 5   • clonazePAM (KlonoPIN) 0.5 MG tablet Take 0.5 mg by mouth 2 (Two) Times a Day As Needed for Anxiety.     • clotrimazole-betamethasone (LOTRISONE) 1-0.05 % cream Apply  topically to the appropriate area as directed 2 (Two) Times a Day. 15 g 1   • DEXILANT 60 MG capsule Take 60 mg by mouth Daily.  1   • Dulaglutide (Trulicity) 4.5 MG/0.5ML solution pen-injector Inject 4.5 mg under the skin into the appropriate area as directed 1 (One) Time Per Week. Start after 1 month of 3 mg (Patient taking differently: Inject 4.5 mg under the skin into the appropriate area as directed 1 (One) Time Per Week. Due Tuesday) 4 pen 11   • Qmngxos-Lzzet-Iinpvpjl-TenofAF (GENVOYA) 995-165-952-10 MG per tablet Take 1 tablet by mouth daily.     • fluticasone (FLONASE) 50 MCG/ACT nasal spray 2 sprays into each nostril Daily. (Patient taking differently: 2 sprays into the nostril(s) as directed by provider Daily As Needed for Rhinitis.) 1 bottle 6   • HYDROcodone-acetaminophen (NORCO) 5-325 MG per tablet Take 1  "tablet by mouth 2 (Two) Times a Day As Needed.     • insulin aspart (NovoLOG) 100 UNIT/ML injection Up to 150 units daily through pump 50 mL 11   • Insulin Syringe 31G X 5/16\" 1 ML misc Use 4 times daily  , ICD10 code is E11.9 120 each 11   • loperamide (IMODIUM) 2 MG capsule Take 2 mg by mouth 4 (Four) Times a Day As Needed for Diarrhea.     • loratadine (CLARITIN) 10 MG tablet Take 10 mg by mouth daily.     • losartan (COZAAR) 25 MG tablet Take 1 tablet by mouth Daily. 30 tablet 0   • meclizine (ANTIVERT) 25 MG tablet Take 25 mg by mouth Daily.     • Melatonin 5 MG capsule Take 5 mg by mouth every night at bedtime.  3   • metFORMIN (GLUCOPHAGE) 500 MG tablet Take 1,000 mg by mouth 2 (Two) Times a Day With Meals.  3   • mometasone (NASONEX) 50 MCG/ACT nasal spray 2 sprays into the nostril(s) as directed by provider Daily.     • ondansetron ODT (ZOFRAN-ODT) 4 MG disintegrating tablet Place 1 tablet on the tongue Every 6 (Six) Hours As Needed for Nausea. 12 tablet 0   • prazosin (MINIPRESS) 1 MG capsule Take 2 mg by mouth Every Night. Patient unsure of dosage     • promethazine (PHENERGAN) 25 MG tablet Take 25 mg by mouth every 6 (six) hours as needed for nausea or vomiting.     • propranolol LA (INDERAL LA) 80 MG 24 hr capsule Take 80 mg by mouth Daily.  5   • rizatriptan (MAXALT) 10 MG tablet Take 10 mg by mouth 1 (One) Time As Needed for Migraine. May repeat in 2 hours if needed     • tamsulosin (FLOMAX) 0.4 MG capsule 24 hr capsule TAKE 2 CAPSULES BY MOUTH EVERY NIGHT AT BEDTIME. (Patient taking differently: Take 1 capsule by mouth every night at bedtime.) 60 capsule 0   • tiZANidine (ZANAFLEX) 4 MG tablet Take 4 mg by mouth Every 8 (Eight) Hours As Needed for Muscle Spasms.     • traZODone (DESYREL) 100 MG tablet Take 200 mg by mouth Every Night.  3   • Vortioxetine HBr 20 MG tablet Take 20 mg by mouth Daily With Breakfast.       No current facility-administered medications for this visit.     Review of Systems "   Constitutional: Negative for chills and fever.   HENT: Negative for congestion.    Respiratory: Negative for shortness of breath.    Cardiovascular: Negative for chest pain and leg swelling.   Gastrointestinal: Negative for constipation, diarrhea, nausea and vomiting.   Musculoskeletal: Positive for arthralgias.        Foot pain   Skin: Negative for wound.   Neurological: Positive for tremors and numbness.       OBJECTIVE     Vitals:    05/03/22 1353   BP: 115/80   Pulse: 77   SpO2: 98%       PHYSICAL EXAM  GEN:   Accompanied by none.     Foot/Ankle Exam:       General:   Diabetic Foot Exam Performed    Appearance: appears stated age and healthy and obesity    Orientation: AAOx3    Affect: appropriate    Gait: unimpaired    Assistance: independent    Shoe Gear:  Casual shoes    VASCULAR      Right Foot Vascularity   Dorsalis pedis:  2+  Posterior tibial:  2+  Skin Temperature: warm    Edema Grading:  Trace  CFT:  3  Pedal Hair Growth:  Present  Varicosities: none       Left Foot Vascularity   Dorsalis pedis:  2+  Posterior tibial:  2+  Skin Temperature: warm    Edema Grading:  None  CFT:  3  Pedal Hair Growth:  Present  Varicosities: none        NEUROLOGIC     Right Foot Neurologic   Light touch sensation:  Diminished  Vibratory sensation:  Diminished  Hot/Cold sensation: diminished    Protective Sensation using [place]-Laura Monofilament:  2     Left Foot Neurologic   Light touch sensation:  Diminished  Vibratory sensation:  Diminished  Hot/cold sensation: diminished    Protective Sensation using [place]ifax-Laura Monofilament:  2     MUSCULOSKELETAL      Right Foot Musculoskeletal    Amputation   Toes amputated: fourth toe  Ecchymosis:  None  Tenderness: none    Arch:  Normal  Hammertoe:  Second toe  Hallux valgus: Yes    Hallux limitus: No       Left Foot Musculoskeletal   Ecchymosis:  None  Tenderness: none    Arch:  Normal  Hammertoe:  Second toe  Hallux valgus: Yes    Hallux limitus: No       MUSCLE  STRENGTH     Right Foot Muscle Strength   Foot dorsiflexion:  5  Foot plantar flexion:  5  Foot inversion:  5  Foot eversion:  5     Left Foot Muscle Strength   Foot dorsiflexion:  5  Foot plantar flexion:  5  Foot inversion:  5  Foot eversion:  5     RANGE OF MOTION      Right Foot Range of Motion   Foot and ankle ROM within normal limits       Left Foot Range of Motion   Foot and ankle ROM within normal limits       DERMATOLOGIC     Right Foot Dermatologic   Skin: skin intact    Nails: abnormally thick, subungual debris and dystrophic nails       Left Foot Dermatologic   Skin: skin intact    Nails: abnormally thick, subungual debris and dystrophic nails        RADIOLOGY/NUCLEAR:  No results found.    LABORATORY/CULTURE RESULTS:      PATHOLOGY RESULTS:       ASSESSMENT/PLAN     Diagnoses and all orders for this visit:    1. Onychomycosis (Primary)    2. Amputated toe, right (HCC)    3. HIV infection, unspecified symptom status (HCC)    4. Type 2 diabetes mellitus with hyperglycemia, with long-term current use of insulin (HCC)    5. Tobacco abuse      Comprehensive lower extremity examination and evaluation was performed.  Discussed findings and treatment plan including risks, benefits, and treatment options with patient in detail. Patient agreed with treatment plan.  After verbal consent obtained, nail(s) x9 debrided of length and thickness with nail nipper without incidence  Patient may maintain nails and calluses at home utilizing emery board or pumice stone between visits as needed  Reviewed at home diabetic foot care including daily foot checks   Continue routine monitoring of blood glucose and follow with PCP for management.  Tobacco cessation needed   After Visit Summary was printed and given to the patient at discharge, including (if requested) any available informative/educational handouts regarding diagnosis, treatment, or medications. All questions were answered to patient/family satisfaction. Should  symptoms fail to improve or worsen they agree to call or return to clinic or to go to the Emergency Department. Discussed the importance of following up with any needed screening tests/labs/specialist appointments and any requested follow-up recommended by me today. Importance of maintaining follow-up discussed and patient accepts that missed appointments can delay diagnosis and potentially lead to worsening of conditions.  Return in about 3 months (around 8/3/2022)., or sooner if acute issues arise.    Lab Frequency Next Occurrence   Follow Anesthesia Guidelines / Standing Orders Once 08/05/2021   Obtain Informed Consent Once 08/10/2021   Diet: Once 08/26/2021   Advance Diet as Tolerated Once 08/26/2021       This document has been electronically signed by Pacheco Harper DPM on May 3, 2022 14:03 CDT

## 2022-05-03 ENCOUNTER — OFFICE VISIT (OUTPATIENT)
Dept: PODIATRY | Facility: CLINIC | Age: 57
End: 2022-05-03

## 2022-05-03 VITALS
BODY MASS INDEX: 37.94 KG/M2 | OXYGEN SATURATION: 98 % | HEART RATE: 77 BPM | HEIGHT: 70 IN | SYSTOLIC BLOOD PRESSURE: 115 MMHG | WEIGHT: 265 LBS | DIASTOLIC BLOOD PRESSURE: 80 MMHG

## 2022-05-03 DIAGNOSIS — B35.1 ONYCHOMYCOSIS: Primary | ICD-10-CM

## 2022-05-03 DIAGNOSIS — E11.65 TYPE 2 DIABETES MELLITUS WITH HYPERGLYCEMIA, WITH LONG-TERM CURRENT USE OF INSULIN: ICD-10-CM

## 2022-05-03 DIAGNOSIS — Z79.4 TYPE 2 DIABETES MELLITUS WITH HYPERGLYCEMIA, WITH LONG-TERM CURRENT USE OF INSULIN: ICD-10-CM

## 2022-05-03 DIAGNOSIS — S98.131A AMPUTATED TOE, RIGHT: ICD-10-CM

## 2022-05-03 DIAGNOSIS — Z72.0 TOBACCO ABUSE: ICD-10-CM

## 2022-05-03 DIAGNOSIS — B20 HIV INFECTION, UNSPECIFIED SYMPTOM STATUS: ICD-10-CM

## 2022-05-03 PROCEDURE — 11721 DEBRIDE NAIL 6 OR MORE: CPT | Performed by: PODIATRIST

## 2022-05-13 RX ORDER — DULAGLUTIDE 4.5 MG/.5ML
4.5 INJECTION, SOLUTION SUBCUTANEOUS WEEKLY
Qty: 2 ML | Refills: 11 | Status: SHIPPED | OUTPATIENT
Start: 2022-05-13

## 2022-06-09 ENCOUNTER — OFFICE VISIT (OUTPATIENT)
Dept: INTERNAL MEDICINE | Age: 57
End: 2022-06-09
Payer: COMMERCIAL

## 2022-06-09 VITALS
OXYGEN SATURATION: 97 % | BODY MASS INDEX: 34.13 KG/M2 | HEIGHT: 72 IN | DIASTOLIC BLOOD PRESSURE: 70 MMHG | WEIGHT: 252 LBS | HEART RATE: 83 BPM | SYSTOLIC BLOOD PRESSURE: 124 MMHG

## 2022-06-09 DIAGNOSIS — F41.9 ANXIETY AND DEPRESSION: ICD-10-CM

## 2022-06-09 DIAGNOSIS — Z79.4 TYPE 2 DIABETES MELLITUS WITH DIABETIC POLYNEUROPATHY, WITH LONG-TERM CURRENT USE OF INSULIN (HCC): ICD-10-CM

## 2022-06-09 DIAGNOSIS — E11.42 TYPE 2 DIABETES MELLITUS WITH DIABETIC POLYNEUROPATHY, WITH LONG-TERM CURRENT USE OF INSULIN (HCC): Primary | ICD-10-CM

## 2022-06-09 DIAGNOSIS — R25.1 TREMOR: ICD-10-CM

## 2022-06-09 DIAGNOSIS — Z87.891 PERSONAL HISTORY OF TOBACCO USE: ICD-10-CM

## 2022-06-09 DIAGNOSIS — G43.911 INTRACTABLE MIGRAINE WITH STATUS MIGRAINOSUS, UNSPECIFIED MIGRAINE TYPE: ICD-10-CM

## 2022-06-09 DIAGNOSIS — F33.40 RECURRENT MAJOR DEPRESSIVE DISORDER, IN REMISSION (HCC): ICD-10-CM

## 2022-06-09 DIAGNOSIS — F32.A ANXIETY AND DEPRESSION: ICD-10-CM

## 2022-06-09 DIAGNOSIS — E11.42 TYPE 2 DIABETES MELLITUS WITH DIABETIC POLYNEUROPATHY, WITH LONG-TERM CURRENT USE OF INSULIN (HCC): ICD-10-CM

## 2022-06-09 DIAGNOSIS — Z79.4 TYPE 2 DIABETES MELLITUS WITH DIABETIC POLYNEUROPATHY, WITH LONG-TERM CURRENT USE OF INSULIN (HCC): Primary | ICD-10-CM

## 2022-06-09 DIAGNOSIS — I10 PRIMARY HYPERTENSION: ICD-10-CM

## 2022-06-09 DIAGNOSIS — B20 HIV INFECTION, UNSPECIFIED SYMPTOM STATUS (HCC): ICD-10-CM

## 2022-06-09 PROBLEM — M86.9 OSTEOMYELITIS OF FOURTH TOE OF RIGHT FOOT (HCC): Status: ACTIVE | Noted: 2022-01-06

## 2022-06-09 LAB
ALBUMIN SERPL-MCNC: 4.4 G/DL (ref 3.5–5.2)
ALP BLD-CCNC: 69 U/L (ref 40–130)
ALT SERPL-CCNC: 28 U/L (ref 5–41)
ANION GAP SERPL CALCULATED.3IONS-SCNC: 14 MMOL/L (ref 7–19)
AST SERPL-CCNC: 23 U/L (ref 5–40)
BILIRUB SERPL-MCNC: 0.4 MG/DL (ref 0.2–1.2)
BUN BLDV-MCNC: 15 MG/DL (ref 6–20)
CALCIUM SERPL-MCNC: 9.9 MG/DL (ref 8.6–10)
CHLORIDE BLD-SCNC: 100 MMOL/L (ref 98–111)
CHOLESTEROL, TOTAL: 151 MG/DL (ref 160–199)
CO2: 28 MMOL/L (ref 22–29)
CREAT SERPL-MCNC: 0.9 MG/DL (ref 0.5–1.2)
GFR AFRICAN AMERICAN: >59
GFR NON-AFRICAN AMERICAN: >60
GLUCOSE BLD-MCNC: 204 MG/DL (ref 74–109)
HBA1C MFR BLD: 10.7 % (ref 4–6)
HCT VFR BLD CALC: 45 % (ref 42–52)
HDLC SERPL-MCNC: 51 MG/DL (ref 55–121)
HEMOGLOBIN: 14.8 G/DL (ref 14–18)
LDL CHOLESTEROL CALCULATED: 75 MG/DL
MCH RBC QN AUTO: 31.3 PG (ref 27–31)
MCHC RBC AUTO-ENTMCNC: 32.9 G/DL (ref 33–37)
MCV RBC AUTO: 95.1 FL (ref 80–94)
PDW BLD-RTO: 12.9 % (ref 11.5–14.5)
PLATELET # BLD: 202 K/UL (ref 130–400)
PMV BLD AUTO: 12.5 FL (ref 9.4–12.4)
POTASSIUM SERPL-SCNC: 4.6 MMOL/L (ref 3.5–5)
RBC # BLD: 4.73 M/UL (ref 4.7–6.1)
SODIUM BLD-SCNC: 142 MMOL/L (ref 136–145)
TOTAL PROTEIN: 7.6 G/DL (ref 6.6–8.7)
TRIGL SERPL-MCNC: 123 MG/DL (ref 0–149)
TSH SERPL DL<=0.05 MIU/L-ACNC: 1.22 UIU/ML (ref 0.27–4.2)
VITAMIN D 25-HYDROXY: 54.2 NG/ML
WBC # BLD: 10.4 K/UL (ref 4.8–10.8)

## 2022-06-09 PROCEDURE — 99214 OFFICE O/P EST MOD 30 MIN: CPT | Performed by: INTERNAL MEDICINE

## 2022-06-09 PROCEDURE — G0296 VISIT TO DETERM LDCT ELIG: HCPCS | Performed by: INTERNAL MEDICINE

## 2022-06-09 PROCEDURE — 3046F HEMOGLOBIN A1C LEVEL >9.0%: CPT | Performed by: INTERNAL MEDICINE

## 2022-06-09 RX ORDER — TOPIRAMATE 25 MG/1
25 TABLET ORAL 2 TIMES DAILY
Qty: 180 TABLET | Refills: 1 | Status: SHIPPED | OUTPATIENT
Start: 2022-06-09 | End: 2022-10-04 | Stop reason: SDUPTHER
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
## 2022-06-17 DIAGNOSIS — K52.1 DIARRHEA DUE TO DRUG: ICD-10-CM

## 2022-06-17 RX ORDER — MECLIZINE HYDROCHLORIDE 25 MG/1
TABLET ORAL
Qty: 30 TABLET | Refills: 1 | Status: SHIPPED | OUTPATIENT
Start: 2022-06-17 | End: 2022-08-15

## 2022-06-17 RX ORDER — LOPERAMIDE HYDROCHLORIDE 2 MG/1
2 CAPSULE ORAL 4 TIMES DAILY PRN
Qty: 30 CAPSULE | Refills: 1 | Status: SHIPPED | OUTPATIENT
Start: 2022-06-17 | End: 2022-08-15

## 2022-06-27 ENCOUNTER — OFFICE VISIT (OUTPATIENT)
Dept: ENDOCRINOLOGY | Facility: CLINIC | Age: 57
End: 2022-06-27

## 2022-06-27 VITALS
SYSTOLIC BLOOD PRESSURE: 120 MMHG | HEIGHT: 71 IN | OXYGEN SATURATION: 100 % | DIASTOLIC BLOOD PRESSURE: 70 MMHG | BODY MASS INDEX: 36.96 KG/M2 | HEART RATE: 74 BPM | WEIGHT: 264 LBS

## 2022-06-27 DIAGNOSIS — E11.65 TYPE 2 DIABETES MELLITUS WITH HYPERGLYCEMIA, WITH LONG-TERM CURRENT USE OF INSULIN: Primary | ICD-10-CM

## 2022-06-27 DIAGNOSIS — I15.2 HYPERTENSION ASSOCIATED WITH DIABETES: ICD-10-CM

## 2022-06-27 DIAGNOSIS — Z79.4 TYPE 2 DIABETES MELLITUS WITH HYPERGLYCEMIA, WITH LONG-TERM CURRENT USE OF INSULIN: Primary | ICD-10-CM

## 2022-06-27 DIAGNOSIS — E55.9 VITAMIN D DEFICIENCY: ICD-10-CM

## 2022-06-27 DIAGNOSIS — E11.59 HYPERTENSION ASSOCIATED WITH DIABETES: ICD-10-CM

## 2022-06-27 DIAGNOSIS — E11.69 MIXED DIABETIC HYPERLIPIDEMIA ASSOCIATED WITH TYPE 2 DIABETES MELLITUS: ICD-10-CM

## 2022-06-27 DIAGNOSIS — E78.2 MIXED DIABETIC HYPERLIPIDEMIA ASSOCIATED WITH TYPE 2 DIABETES MELLITUS: ICD-10-CM

## 2022-06-27 PROBLEM — E13.621 FOOT ULCER DUE TO SECONDARY DM: Status: ACTIVE | Noted: 2020-07-31

## 2022-06-27 PROBLEM — B20 CURRENTLY ASYMPTOMATIC HIV INFECTION, WITH HISTORY OF HIV-RELATED ILLNESS: Status: ACTIVE | Noted: 2022-06-27

## 2022-06-27 PROBLEM — F43.11 ACUTE POST-TRAUMATIC STRESS DISORDER: Status: ACTIVE | Noted: 2022-01-06

## 2022-06-27 PROBLEM — E78.00 HYPERCHOLESTEROLEMIA: Status: ACTIVE | Noted: 2022-06-27

## 2022-06-27 PROBLEM — S92.255D CLOSED NONDISPLACED FRACTURE OF NAVICULAR BONE OF LEFT FOOT WITH ROUTINE HEALING: Status: ACTIVE | Noted: 2021-02-21

## 2022-06-27 PROBLEM — L97.509 FOOT ULCER DUE TO SECONDARY DM: Status: ACTIVE | Noted: 2020-07-31

## 2022-06-27 PROBLEM — I96 GANGRENE OF TOE OF RIGHT FOOT (HCC): Status: ACTIVE | Noted: 2022-01-06

## 2022-06-27 PROBLEM — L08.9 DIABETIC FOOT INFECTION: Status: ACTIVE | Noted: 2021-02-21

## 2022-06-27 PROBLEM — R25.1 TREMOR: Status: ACTIVE | Noted: 2022-02-03

## 2022-06-27 PROBLEM — I10 PRIMARY HYPERTENSION: Status: ACTIVE | Noted: 2022-06-27

## 2022-06-27 PROBLEM — G43.019 INTRACTABLE MIGRAINE WITHOUT AURA: Status: ACTIVE | Noted: 2021-02-19

## 2022-06-27 PROBLEM — E11.628 DIABETIC FOOT INFECTION: Status: ACTIVE | Noted: 2021-02-21

## 2022-06-27 LAB
EXPIRATION DATE: ABNORMAL
HBA1C MFR BLD: 9.8 %
Lab: ABNORMAL

## 2022-06-27 PROCEDURE — 99214 OFFICE O/P EST MOD 30 MIN: CPT | Performed by: INTERNAL MEDICINE

## 2022-06-27 PROCEDURE — 95251 CONT GLUC MNTR ANALYSIS I&R: CPT | Performed by: INTERNAL MEDICINE

## 2022-06-27 RX ORDER — SILDENAFIL 50 MG/1
50 TABLET, FILM COATED ORAL AS NEEDED
Qty: 20 TABLET | Refills: 11 | Status: SHIPPED | OUTPATIENT
Start: 2022-06-27 | End: 2023-06-27

## 2022-06-27 NOTE — PROGRESS NOTES
" Donis Yi is a 56 y.o. male who presents for  evaluation of   Type 2 diabetes        Primary Care Provider    Oksana Mares MD    56 y.o. male  t2dm since age 41 y old complicated by neuropathy improved by use of eamon and omnipod.    States glucose better controlled after meeting with Ms. Tanner    Has been dx with Parkinson's   Lost 4th toe right foot        PE  /70   Pulse 74   Ht 180.3 cm (71\")   Wt 120 kg (264 lb)   SpO2 100%   BMI 36.82 kg/m²   AOx3  RRR  CTA  No edema  Callus   parkinosian tremor   Right 4th toe amputation    Lab Review    Lab Results   Component Value Date    WBC 10.4 06/09/2022    HGB 14.8 06/09/2022    HCT 45.0 06/09/2022    MCV 95.1 (H) 06/09/2022     06/09/2022     Lab Results   Component Value Date    GLUCOSE 204 (H) 06/09/2022    BUN 15 06/09/2022    CREATININE 0.9 06/09/2022    EGFRIFNONA >60 06/09/2022    EGFRIFAFRI >59 06/09/2022    BCR 21.4 01/08/2022    K 4.6 06/09/2022    CO2 28 06/09/2022    CALCIUM 9.9 06/09/2022    ALBUMIN 4.4 06/09/2022    LABIL2 CANCELED 03/29/2016    AST 23 06/09/2022    ALT 28 06/09/2022           Assessment & Plan       ICD-10-CM ICD-9-CM   1. Type 2 diabetes mellitus with hyperglycemia, with long-term current use of insulin (HCC)  E11.65 250.00    Z79.4 790.29     V58.67   2. Hypertension associated with diabetes ([facility])  E11.59 250.80    I15.2 401.9   3. Mixed diabetic hyperlipidemia associated with type 2 diabetes mellitus ([facility])  E11.69 250.80    E78.2 272.2   4. Vitamin D deficiency  E55.9 268.9         I reviewed and summarized records from Oksana Mares MD from present year  and I reviewed / ordered labs.   From review of records :    Patient has uncontrolled type 2 diabetes    Glycemic Management:   Lab Results   Component Value Date    HGBA1C 9.8 06/27/2022    HGBA1C 10.7 (H) 06/09/2022    HGBA1C 9.6 (H) 01/04/2022       Eamon , sent for scanning    TIR 81 %  2% low       Metformin 1000 mg bid      Trulicity  4.5 mg weekly "       Hesitant about jardiance since thirst and BPH     Omnipod U 100     Basal 3 units per hour    Carb Ratio, first 60 grams are free then 1:4    Correction 20, target 120     In the past U500 but not using anymore     Sensor no longer falling on mastisol      Lipid Management  Lab Results   Component Value Date    CHOL 142 02/01/2021    CHLPL 151 (L) 06/09/2022    TRIG 123 06/09/2022    HDL 51 (L) 06/09/2022    LDL 75 06/09/2022       lipitor 80 mg qhs     Blood Pressure Management:    Vitals:    06/27/22 1604   BP: 120/70   Pulse: 74   SpO2: 100%     Controlled             Microvascular Complication Monitoring:      Eye Exam Evaluation  No retinopathy , has cataracts   -----------    Last Microalbumin-Proteinuria Assessment    No results found for: MALBCRERATIO    No results found for: UTPCR    -----------      Neuropathy on lyrica 100 bid   And neurontin per pain management           Bone Health    Lab Results   Component Value Date    CALCIUM 9.9 06/09/2022    PHOS 2.7 05/11/2020    SQQQ96EH 54.2 06/09/2022       Thyroid Health    Lab Results   Component Value Date    TSH 1.220 06/09/2022    TSH 1.460 01/04/2022    TSH 1.020 09/16/2021           Lab Results   Component Value Date    [id number] 344 02/01/2021     Wanted viagra    No longer on imdur and nitroglycerin     Written this time but only 52 since on genvoya and flomax        No orders of the defined types were placed in this encounter.        A copy of my note was sent to Oksana Mares MD    Please see my above opinion and suggestions.

## 2022-06-29 PROBLEM — E55.9 VITAMIN D DEFICIENCY: Status: ACTIVE | Noted: 2022-06-29

## 2022-07-05 ENCOUNTER — TELEPHONE (OUTPATIENT)
Dept: INTERNAL MEDICINE | Age: 57
End: 2022-07-05

## 2022-07-05 DIAGNOSIS — R19.7 DIARRHEA OF PRESUMED INFECTIOUS ORIGIN: Primary | ICD-10-CM

## 2022-07-05 NOTE — TELEPHONE ENCOUNTER
He needs stool gi panel (? Could we mail an order or else come to [place] and stay long enough to get sample)

## 2022-07-06 ENCOUNTER — TRANSCRIBE ORDERS (OUTPATIENT)
Dept: LAB | Facility: HOSPITAL | Age: 57
End: 2022-07-06

## 2022-07-06 NOTE — TELEPHONE ENCOUNTER
I faxed his lab order to [facility] in [facility].   [phone]    7/6/22

## 2022-07-11 ENCOUNTER — TRANSCRIBE ORDERS (OUTPATIENT)
Dept: LAB | Facility: HOSPITAL | Age: 57
End: 2022-07-11

## 2022-07-11 DIAGNOSIS — R19.7 DIARRHEA OF PRESUMED INFECTIOUS ORIGIN: Primary | ICD-10-CM

## 2022-07-13 DIAGNOSIS — E78.00 PURE HYPERCHOLESTEROLEMIA: ICD-10-CM

## 2022-07-18 RX ORDER — ATORVASTATIN CALCIUM 40 MG/1
40 TABLET, FILM COATED ORAL DAILY
Qty: 90 TABLET | Refills: 2 | Status: SHIPPED | OUTPATIENT
Start: 2022-07-18

## 2022-07-18 RX ORDER — RIZATRIPTAN BENZOATE 10 MG/1
10 TABLET ORAL
Qty: 9 TABLET | Refills: 2 | Status: SHIPPED | OUTPATIENT
Start: 2022-07-18 | End: 2022-10-06

## 2022-07-25 ENCOUNTER — TELEPHONE (OUTPATIENT)
Dept: INTERNAL MEDICINE | Age: 57
End: 2022-07-25

## 2022-07-25 NOTE — TELEPHONE ENCOUNTER
Patient is having sharp stomach pains and having issues in the bathroom. Patient stated he would like to speak with nurse.

## 2022-07-25 NOTE — TELEPHONE ENCOUNTER
I spoke with him. He said he had diarrhea and that went away then he got constipated and now he is having some stool leakage. He will get some Mag Citrate and drink half a bottle now and other half later if no results and report back to us.

## 2022-07-27 ENCOUNTER — TELEPHONE (OUTPATIENT)
Dept: INTERNAL MEDICINE | Age: 57
End: 2022-07-27

## 2022-07-27 RX ORDER — SACCHAROMYCES BOULARDII 250 MG
CAPSULE ORAL
Qty: 60 CAPSULE | Refills: 3 | Status: SHIPPED | OUTPATIENT
Start: 2022-07-27 | End: 2022-11-02

## 2022-08-02 NOTE — PROGRESS NOTES
[facility] - PODIATRY    Today's Date: 08/04/22    Patient Name: Donis Yi  MRN: [id number]  CSN: [id number]  PCP: Oksana Mares MD  Referring Provider: No ref. provider found    SUBJECTIVE     Chief Complaint   Patient presents with   • Follow-up     Oksana Mares MD pcp01/18/2022 3 month fu diabetic foot care- pt states  sore/callus outside edge of left foot for about 2 months now. Needs nails trimmed. Thinks his neuropathy getting worse, has been off the meds for while and going to talk to PCP about starting up again- pt pain 8/10 at worst, mostly callus outside of left foot- pt presents with callus outside of left foot, under both great toes in ball area, thick long nails   • Diabetes     194mg/dl BG at present     HPI: Donis Yi, a 56 y.o.male, comes to clinic as a(n) established patient presenting for diabetic foot exam and complaining of thickened, irregular toenails. Patient has h/o DM2, anxiety, DDD, depression, HIV, HLD, HSP, HTN, MI, migraine, osteoporosis, sleep apnea. Patient is IDDM with last stated BG level of 194mg/dl. Last A1C was greater than 10%.  States that toenails are long, thick, discolored, and irregular and that he is unable to care for nails himself. Notes that he has developed painful callused area to the left plantar 5th met head.  Denies pain today but reports neuropathic pain of 8/10. Relates previous treatment(s) including care by podiatry. Denies any constitutional symptoms. No other pedal complaints at this time.    Past Medical History:   Diagnosis Date   • Allergic rhinitis    • Anxiety    • Bursitis    • DDD (degenerative disc disease), cervical    • Depression    • HIV (human immunodeficiency virus infection) (HCC)    • HIV disease (HCC)    • HLD (hyperlipidemia)    • HSP ([place] purpura) ([facility])    • HTN (hypertension)    • Migraine    • Myocardial infarction (HCC)    • Osteoporosis    • Sleep apnea    • Type 2 diabetes mellitus (HCC)       Past Surgical History:   Procedure Laterality Date   • AMPUTATION DIGIT Right 1/6/2022    Procedure: 4th Toe  Amputation - Right Foot;  Surgeon: Pacheco Harper DPM;  Location: [facility] OR;  Service: Podiatry;  Laterality: Right;   • ANKLE SURGERY     • CARDIAC CATHETERIZATION N/A 6/24/2020    Procedure: Coronary angiography;  Surgeon: Deon Bailey MD;  Location: [facility] CATH INVASIVE LOCATION;  Service: Cardiology;  Laterality: N/A;  11am start time   • CARDIAC CATHETERIZATION N/A 6/24/2020    Procedure: Percutaneous Coronary Intervention;  Surgeon: Deon Bailey MD;  Location: [facility] CATH INVASIVE LOCATION;  Service: Cardiology;  Laterality: N/A;   • COLONOSCOPY  06/19/2009    Normal exam repeat in 10 years   • COLONOSCOPY N/A 8/26/2021    Procedure: COLONOSCOPY WITH ANESTHESIA;  Surgeon: Byron Rosenberg MD;  Location: [facility] ENDOSCOPY;  Service: Gastroenterology;  Laterality: N/A;  pre screen  post poor prep  dr yg gutiérrez   • ENDOSCOPY N/A 8/26/2021    Procedure: ESOPHAGOGASTRODUODENOSCOPY WITH ANESTHESIA;  Surgeon: Byron Rosenberg MD;  Location: [facility] ENDOSCOPY;  Service: Gastroenterology;  Laterality: N/A;  pre GERD  post normal  dr yg gutiérrez   • FOREARM SURGERY     • LYMPH NODE BIOPSY     • PILONIDAL CYSTECTOMY N/A 12/1/2020    Procedure: EXCISION PILONIDAL ABSCESS;  Surgeon: Ashlyn Posey MD;  Location: [facility] OR;  Service: General;  Laterality: N/A;     Family History   Problem Relation Age of Onset   • Diabetes Mother    • Hypertension Mother    • Thyroid disease Mother    • Hypertension Father    • Thyroid disease Father    • Arrhythmia Father    • Diabetes Maternal Grandfather    • Heart disease Maternal Grandfather    • Hypertension Maternal Grandfather    • Diabetes Paternal Grandmother    • Stroke Paternal Grandmother    • Heart disease Paternal Grandmother    • Hypertension Paternal Grandmother    • Thyroid disease Paternal Grandmother    • Hypertension Paternal Grandfather    •  Hypertension Sister    • No Known Problems Brother    • Colon cancer Neg Hx    • Colon polyps Neg Hx      Social History     Socioeconomic History   • Marital status:    Tobacco Use   • Smoking status: Current Every Day Smoker     Packs/day: 0.25     Types: Cigarettes     Start date: 1988   • Smokeless tobacco: Never Used   Vaping Use   • Vaping Use: Never used   Substance and Sexual Activity   • Alcohol use: Yes     Comment: occasionally   • Drug use: No   • Sexual activity: Defer     Allergies   Allergen Reactions   • Amitriptyline Anaphylaxis   • Amitriptyline Hcl Anaphylaxis   • Darvon [Propoxyphene] Anaphylaxis   • Zithromax [Azithromycin] Anaphylaxis     Current Outpatient Medications   Medication Sig Dispense Refill   • albuterol (PROVENTIL HFA;VENTOLIN HFA) 108 (90 BASE) MCG/ACT inhaler Inhale 2 puffs every 6 (six) hours as needed for wheezing.     • atorvastatin (LIPITOR) 40 MG tablet Take 40 mg by mouth Daily.  3   • Cholecalciferol (VITAMIN D3) 50 MCG (2000 UT) capsule Take 2,000 Units by mouth Daily.     • ciclopirox (LOPROX) 0.77 % cream Apply  topically to the appropriate area as directed 2 (Two) Times a Day. (Patient taking differently: Apply 1 application topically to the appropriate area as directed 2 (Two) Times a Day As Needed.) 30 g 5   • clonazePAM (KlonoPIN) 0.5 MG tablet Take 0.5 mg by mouth 2 (Two) Times a Day As Needed for Anxiety.     • clotrimazole-betamethasone (LOTRISONE) 1-0.05 % cream Apply  topically to the appropriate area as directed 2 (Two) Times a Day. 15 g 1   • DEXILANT 60 MG capsule Take 60 mg by mouth Daily.  1   • Uckatcs-Wvnnk-Zfzhrgoh-TenofAF (GENVOYA) [phone] MG per tablet Take 1 tablet by mouth daily.     • fluticasone (FLONASE) 50 MCG/ACT nasal spray 2 sprays into each nostril Daily. (Patient taking differently: 2 sprays into the nostril(s) as directed by provider Daily As Needed for Rhinitis.) 1 bottle 6   • insulin aspart (NovoLOG) 100 UNIT/ML injection  "Up to 150 units daily through pump 50 mL 11   • Insulin Syringe 31G X 5/16\" 1 ML misc Use 4 times daily  , ICD10 code is E11.9 120 each 11   • loperamide (IMODIUM) 2 MG capsule Take 2 mg by mouth 4 (Four) Times a Day As Needed for Diarrhea.     • loratadine (CLARITIN) 10 MG tablet Take 1 tablet by mouth Daily.     • losartan (COZAAR) 25 MG tablet Take 1 tablet by mouth Daily. 30 tablet 0   • meclizine (ANTIVERT) 25 MG tablet Take 25 mg by mouth Daily.     • Melatonin 5 MG capsule Take 5 mg by mouth every night at bedtime.  3   • metFORMIN (GLUCOPHAGE) 500 MG tablet Take 1,000 mg by mouth 2 (Two) Times a Day With Meals.  3   • mometasone (NASONEX) 50 MCG/ACT nasal spray 2 sprays into the nostril(s) as directed by provider Daily.     • mupirocin (BACTROBAN) 2 % ointment Apply 1 application topically to the appropriate area as directed Daily. 22 g 0   • ondansetron ODT (ZOFRAN-ODT) 4 MG disintegrating tablet Place 1 tablet on the tongue Every 6 (Six) Hours As Needed for Nausea. 12 tablet 0   • prazosin (MINIPRESS) 1 MG capsule Take 2 mg by mouth Every Night. Patient unsure of dosage     • promethazine (PHENERGAN) 25 MG tablet Take 25 mg by mouth every 6 (six) hours as needed for nausea or vomiting.     • propranolol LA (INDERAL LA) 80 MG 24 hr capsule Take 80 mg by mouth Daily.  5   • rizatriptan (MAXALT) 10 MG tablet Take 10 mg by mouth 1 (One) Time As Needed for Migraine. May repeat in 2 hours if needed     • sildenafil (Viagra) 50 MG tablet Take 1 tablet by mouth As Needed for Erectile Dysfunction. 20 tablet 11   • tamsulosin (FLOMAX) 0.4 MG capsule 24 hr capsule TAKE 2 CAPSULES BY MOUTH EVERY NIGHT AT BEDTIME. (Patient taking differently: Take 1 capsule by mouth every night at bedtime.) 60 capsule 0   • tiZANidine (ZANAFLEX) 4 MG tablet Take 4 mg by mouth Every 8 (Eight) Hours As Needed for Muscle Spasms.     • topiramate (TOPAMAX) 25 MG tablet Take 25 mg by mouth 2 (Two) Times a Day.     • traZODone (DESYREL) 100 " MG tablet Take 200 mg by mouth Every Night.  3   • Trulicity 4.5 MG/0.5ML solution pen-injector Inject 4.5 mg under the skin into the appropriate area as directed 1 (One) Time Per Week. Start after 1 month of 3 mg 2 mL 11   • Vortioxetine HBr 20 MG tablet Take 20 mg by mouth Daily With Breakfast.     • gabapentin (NEURONTIN) 800 MG tablet Take 800 mg by mouth.       No current facility-administered medications for this visit.     Review of Systems   Constitutional: Negative for chills and fever.   HENT: Negative for congestion.    Respiratory: Negative for shortness of breath.    Cardiovascular: Negative for chest pain and leg swelling.   Gastrointestinal: Negative for constipation, diarrhea, nausea and vomiting.   Musculoskeletal: Positive for arthralgias.        Foot pain   Skin: Positive for wound.        Painful callus to left plantar foot   Neurological: Positive for tremors and numbness.       OBJECTIVE     Vitals:    08/04/22 1302   BP: 132/68   Pulse: 85   SpO2: 98%       PHYSICAL EXAM  GEN:   Accompanied by none.     Foot/Ankle Exam:       General:   Appearance: appears stated age and healthy and obesity    Orientation: AAOx3    Affect: appropriate    Gait: unimpaired    Assistance: independent    Shoe Gear:  Casual shoes    VASCULAR      Right Foot Vascularity   Dorsalis pedis:  2+  Posterior tibial:  2+  Skin Temperature: warm    Edema Grading:  Trace  CFT:  3  Pedal Hair Growth:  Present  Varicosities: none       Left Foot Vascularity   Dorsalis pedis:  2+  Posterior tibial:  2+  Skin Temperature: warm    Edema Grading:  None  CFT:  3  Pedal Hair Growth:  Present  Varicosities: none        NEUROLOGIC     Right Foot Neurologic   Light touch sensation:  Diminished  Vibratory sensation:  Diminished  Hot/Cold sensation: diminished    Protective Sensation using [place]-Laura Monofilament:  2     Left Foot Neurologic   Light touch sensation:  Diminished  Vibratory sensation:  Diminished  Hot/cold sensation:  diminished    Protective Sensation using [place]-Laura Monofilament:  2     MUSCULOSKELETAL      Right Foot Musculoskeletal    Amputation   Toes amputated: fourth toe  Ecchymosis:  None  Tenderness: none    Arch:  Normal  Hammertoe:  Second toe  Hallux valgus: Yes    Hallux limitus: No       Left Foot Musculoskeletal   Ecchymosis:  None  Tenderness: left foot callus    Arch:  Normal  Hammertoe:  Second toe  Hallux valgus: Yes    Hallux limitus: No       MUSCLE STRENGTH     Right Foot Muscle Strength   Foot dorsiflexion:  5  Foot plantar flexion:  5  Foot inversion:  5  Foot eversion:  5     Left Foot Muscle Strength   Foot dorsiflexion:  5  Foot plantar flexion:  5  Foot inversion:  5  Foot eversion:  5     RANGE OF MOTION      Right Foot Range of Motion   Foot and ankle ROM within normal limits       Left Foot Range of Motion   Foot and ankle ROM within normal limits       DERMATOLOGIC     Right Foot Dermatologic   Skin: corn    Nails: abnormally thick, subungual debris and dystrophic nails       Left Foot Dermatologic   Skin: corn and ulcer    Nails: abnormally thick, subungual debris and dystrophic nails       Image:       RADIOLOGY/NUCLEAR:  No results found.    LABORATORY/CULTURE RESULTS:      PATHOLOGY RESULTS:       ASSESSMENT/PLAN     Diagnoses and all orders for this visit:    1. Onychomycosis (Primary)    2. Type 2 diabetes mellitus with hyperglycemia, with long-term current use of insulin (HCC)    3. Amputated toe, right ([facility])    4. HIV infection, unspecified symptom status ([facility])    5. Tobacco abuse    6. Foot callus    7. Neuropathic ulcer of foot, left, with fat layer exposed ([facility])  -     Ambulatory Referral to Wound Clinic      Comprehensive lower extremity examination and evaluation was performed.  Discussed findings and treatment plan including risks, benefits, and treatment options with patient in detail. Patient agreed with treatment plan.  After verbal consent obtained, nail(s) x9 debrided of  length and thickness with nail nipper without incidence  After verbal consent obtained, calluses x2 pared utilizing dermal curette and/or scalpel without incidence  Patient may maintain nails and calluses at home utilizing emery board or pumice stone between visits as needed  Reviewed at home diabetic foot care including daily foot checks  After verbal consent obtained, excisional debridement performed to remove skin, slough, non-viable, and subQ tissue down to level of healthy bleeding tissue with dermal curette and/or sharp instrumentation. Hemostasis achieved with silver nitrate.  Wound area debrided was entire measurement documented.   Applied abx ointment and light bandage. Pt to reapply daily until f/u in [facility].  Continue routine monitoring of blood glucose and follow with PCP for management.  Tobacco cessation needed  Refer to OP [facility]CC for treatment of left foot ulceration. Recommend use of CAM or surgical shoe at home for offloading.    After Visit Summary was printed and given to the patient at discharge, including (if requested) any available informative/educational handouts regarding diagnosis, treatment, or medications. All questions were answered to patient/family satisfaction. Should symptoms fail to improve or worsen they agree to call or return to clinic or to go to the Emergency Department. Discussed the importance of following up with any needed screening tests/labs/specialist appointments and any requested follow-up recommended by me today. Importance of maintaining follow-up discussed and patient accepts that missed appointments can delay diagnosis and potentially lead to worsening of conditions.  Return in about 3 months (around 11/4/2022) for Diabetic Foot Exam, Follow-up with Podiatry Provider., or sooner if acute issues arise.    Lab Frequency Next Occurrence   Follow Anesthesia Guidelines / Standing Orders Once 08/05/2021   Obtain Informed Consent Once 08/10/2021   Diet: Once 08/26/2021    Advance Diet as Tolerated Once 08/26/2021       This document has been electronically signed by Pacheco Harper DPM on August 4, 2022 13:29 CDT

## 2022-08-04 ENCOUNTER — OFFICE VISIT (OUTPATIENT)
Dept: PODIATRY | Facility: CLINIC | Age: 57
End: 2022-08-04

## 2022-08-04 VITALS
HEIGHT: 71 IN | WEIGHT: 249 LBS | OXYGEN SATURATION: 98 % | HEART RATE: 85 BPM | BODY MASS INDEX: 34.86 KG/M2 | DIASTOLIC BLOOD PRESSURE: 68 MMHG | SYSTOLIC BLOOD PRESSURE: 132 MMHG

## 2022-08-04 DIAGNOSIS — L97.522 NEUROPATHIC ULCER OF FOOT, LEFT, WITH FAT LAYER EXPOSED: ICD-10-CM

## 2022-08-04 DIAGNOSIS — Z72.0 TOBACCO ABUSE: ICD-10-CM

## 2022-08-04 DIAGNOSIS — S98.131A AMPUTATED TOE, RIGHT: ICD-10-CM

## 2022-08-04 DIAGNOSIS — Z79.4 TYPE 2 DIABETES MELLITUS WITH HYPERGLYCEMIA, WITH LONG-TERM CURRENT USE OF INSULIN: ICD-10-CM

## 2022-08-04 DIAGNOSIS — B35.1 ONYCHOMYCOSIS: Primary | ICD-10-CM

## 2022-08-04 DIAGNOSIS — B20 HIV INFECTION, UNSPECIFIED SYMPTOM STATUS: ICD-10-CM

## 2022-08-04 DIAGNOSIS — L84 FOOT CALLUS: ICD-10-CM

## 2022-08-04 DIAGNOSIS — E11.65 TYPE 2 DIABETES MELLITUS WITH HYPERGLYCEMIA, WITH LONG-TERM CURRENT USE OF INSULIN: ICD-10-CM

## 2022-08-04 PROCEDURE — 11721 DEBRIDE NAIL 6 OR MORE: CPT | Performed by: PODIATRIST

## 2022-08-04 PROCEDURE — 11056 PARNG/CUTG B9 HYPRKR LES 2-4: CPT | Performed by: PODIATRIST

## 2022-08-04 PROCEDURE — 11042 DBRDMT SUBQ TIS 1ST 20SQCM/<: CPT | Performed by: PODIATRIST

## 2022-08-04 PROCEDURE — 99213 OFFICE O/P EST LOW 20 MIN: CPT | Performed by: PODIATRIST

## 2022-08-05 DIAGNOSIS — L97.522 NEUROPATHIC ULCER OF FOOT, LEFT, WITH FAT LAYER EXPOSED: Primary | ICD-10-CM

## 2022-08-10 ENCOUNTER — OFFICE VISIT (OUTPATIENT)
Dept: WOUND CARE | Facility: HOSPITAL | Age: 57
End: 2022-08-10

## 2022-08-10 PROCEDURE — G0463 HOSPITAL OUTPT CLINIC VISIT: HCPCS

## 2022-08-11 ENCOUNTER — TRANSCRIBE ORDERS (OUTPATIENT)
Dept: PODIATRY | Facility: CLINIC | Age: 57
End: 2022-08-11

## 2022-08-11 DIAGNOSIS — E11.9 ENCOUNTER FOR DIABETIC FOOT EXAM: Primary | ICD-10-CM

## 2022-08-15 DIAGNOSIS — K52.1 DIARRHEA DUE TO DRUG: ICD-10-CM

## 2022-08-15 DIAGNOSIS — H69.81 DYSFUNCTION OF RIGHT EUSTACHIAN TUBE: ICD-10-CM

## 2022-08-15 RX ORDER — MOMETASONE FUROATE 50 UG/1
SPRAY, METERED NASAL
Qty: 17 G | Refills: 1 | Status: SHIPPED | OUTPATIENT
Start: 2022-08-15 | End: 2022-10-06

## 2022-08-15 RX ORDER — PROPRANOLOL HYDROCHLORIDE 80 MG/1
CAPSULE, EXTENDED RELEASE ORAL
Qty: 90 CAPSULE | Refills: 1 | Status: SHIPPED | OUTPATIENT
Start: 2022-08-15

## 2022-08-15 RX ORDER — MECLIZINE HYDROCHLORIDE 25 MG/1
TABLET ORAL
Qty: 30 TABLET | Refills: 1 | Status: SHIPPED | OUTPATIENT
Start: 2022-08-15 | End: 2022-10-06

## 2022-08-15 RX ORDER — LOPERAMIDE HYDROCHLORIDE 2 MG/1
2 CAPSULE ORAL 4 TIMES DAILY PRN
Qty: 30 CAPSULE | Refills: 1 | Status: SHIPPED | OUTPATIENT
Start: 2022-08-15 | End: 2022-10-06

## 2022-08-16 ENCOUNTER — DOCUMENTATION (OUTPATIENT)
Dept: ENDOCRINOLOGY | Facility: CLINIC | Age: 57
End: 2022-08-16

## 2022-08-17 ENCOUNTER — OFFICE VISIT (OUTPATIENT)
Dept: WOUND CARE | Facility: HOSPITAL | Age: 57
End: 2022-08-17

## 2022-08-19 ENCOUNTER — TELEPHONE (OUTPATIENT)
Dept: INTERNAL MEDICINE | Age: 57
End: 2022-08-19

## 2022-08-19 RX ORDER — ONDANSETRON 4 MG/1
4 TABLET, ORALLY DISINTEGRATING ORAL EVERY 8 HOURS PRN
Qty: 20 TABLET | Refills: 1 | Status: SHIPPED | OUTPATIENT
Start: 2022-08-19 | End: 2022-10-06

## 2022-08-19 NOTE — TELEPHONE ENCOUNTER
Patient has been having upset stomach and waking up with vomiting and nausea. Patient would like Bassam Graff called in to help with this.

## 2022-08-25 ENCOUNTER — OFFICE VISIT (OUTPATIENT)
Dept: WOUND CARE | Facility: HOSPITAL | Age: 57
End: 2022-08-25

## 2022-08-25 PROCEDURE — G0463 HOSPITAL OUTPT CLINIC VISIT: HCPCS

## 2022-08-26 ENCOUNTER — TELEPHONE (OUTPATIENT)
Dept: ENDOCRINOLOGY | Facility: CLINIC | Age: 57
End: 2022-08-26

## 2022-08-26 NOTE — TELEPHONE ENCOUNTER
Patient called stating [facility] said they need a prescription for Eamon 2 sensors sent to their pharmacy.      Thanks

## 2022-08-29 ENCOUNTER — TELEPHONE (OUTPATIENT)
Dept: ENDOCRINOLOGY | Facility: CLINIC | Age: 57
End: 2022-08-29

## 2022-08-29 NOTE — TELEPHONE ENCOUNTER
Ancelmo from [facility] called regarding the Freestyle Eamon 2 and sensors for the pt. They stated they faxed a request on 8/23 and 8/26 and have not yet received a response. They asked if you could fax this request back to them at [phone]. They said if you have any questions you can call at [phone].    Thanks

## 2022-09-01 ENCOUNTER — TELEPHONE (OUTPATIENT)
Dept: ENDOCRINOLOGY | Facility: CLINIC | Age: 57
End: 2022-09-01

## 2022-09-01 NOTE — TELEPHONE ENCOUNTER
Pt is out of Donde. He wants to know what he needs to do? He is still waiting to hear something from [facility].

## 2022-09-27 ENCOUNTER — OFFICE VISIT (OUTPATIENT)
Dept: INTERNAL MEDICINE | Age: 57
End: 2022-09-27
Payer: MEDICARE

## 2022-09-27 VITALS
HEIGHT: 72 IN | RESPIRATION RATE: 20 BRPM | HEART RATE: 71 BPM | OXYGEN SATURATION: 98 % | BODY MASS INDEX: 34.08 KG/M2 | WEIGHT: 251.6 LBS | SYSTOLIC BLOOD PRESSURE: 90 MMHG | DIASTOLIC BLOOD PRESSURE: 50 MMHG

## 2022-09-27 DIAGNOSIS — Z79.4 TYPE 2 DIABETES MELLITUS WITH DIABETIC POLYNEUROPATHY, WITH LONG-TERM CURRENT USE OF INSULIN (HCC): Primary | ICD-10-CM

## 2022-09-27 DIAGNOSIS — E11.42 TYPE 2 DIABETES MELLITUS WITH DIABETIC POLYNEUROPATHY, WITH LONG-TERM CURRENT USE OF INSULIN (HCC): Primary | ICD-10-CM

## 2022-09-27 DIAGNOSIS — B20 CURRENTLY ASYMPTOMATIC HIV INFECTION, WITH HISTORY OF HIV-RELATED ILLNESS (HCC): ICD-10-CM

## 2022-09-27 DIAGNOSIS — Z79.4 TYPE 2 DIABETES MELLITUS WITH DIABETIC POLYNEUROPATHY, WITH LONG-TERM CURRENT USE OF INSULIN (HCC): ICD-10-CM

## 2022-09-27 DIAGNOSIS — E11.42 TYPE 2 DIABETES MELLITUS WITH DIABETIC POLYNEUROPATHY, WITH LONG-TERM CURRENT USE OF INSULIN (HCC): ICD-10-CM

## 2022-09-27 DIAGNOSIS — G25.3 MYOCLONIC JERKING: ICD-10-CM

## 2022-09-27 DIAGNOSIS — Z23 FLU VACCINE NEED: ICD-10-CM

## 2022-09-27 DIAGNOSIS — F33.40 RECURRENT MAJOR DEPRESSIVE DISORDER, IN REMISSION (HCC): ICD-10-CM

## 2022-09-27 DIAGNOSIS — I10 PRIMARY HYPERTENSION: ICD-10-CM

## 2022-09-27 DIAGNOSIS — E78.2 MIXED HYPERLIPIDEMIA: ICD-10-CM

## 2022-09-27 LAB
ALBUMIN SERPL-MCNC: 4.5 G/DL (ref 3.5–5.2)
ALP BLD-CCNC: 57 U/L (ref 40–130)
ALT SERPL-CCNC: 24 U/L (ref 5–41)
ANION GAP SERPL CALCULATED.3IONS-SCNC: 12 MMOL/L (ref 7–19)
AST SERPL-CCNC: 19 U/L (ref 5–40)
BILIRUB SERPL-MCNC: 0.3 MG/DL (ref 0.2–1.2)
BUN BLDV-MCNC: 17 MG/DL (ref 6–20)
CALCIUM SERPL-MCNC: 9.3 MG/DL (ref 8.6–10)
CHLORIDE BLD-SCNC: 103 MMOL/L (ref 98–111)
CHOLESTEROL, TOTAL: 128 MG/DL (ref 160–199)
CO2: 24 MMOL/L (ref 22–29)
CREAT SERPL-MCNC: 0.8 MG/DL (ref 0.5–1.2)
CREATININE URINE: 144.7 MG/DL (ref 4.2–622)
GFR AFRICAN AMERICAN: >59
GFR NON-AFRICAN AMERICAN: >60
GLUCOSE BLD-MCNC: 306 MG/DL (ref 74–109)
HBA1C MFR BLD: 9.1 % (ref 4–6)
HCT VFR BLD CALC: 41.1 % (ref 42–52)
HDLC SERPL-MCNC: 55 MG/DL (ref 55–121)
HEMOGLOBIN: 13.7 G/DL (ref 14–18)
LDL CHOLESTEROL CALCULATED: 41 MG/DL
MCH RBC QN AUTO: 31.3 PG (ref 27–31)
MCHC RBC AUTO-ENTMCNC: 33.3 G/DL (ref 33–37)
MCV RBC AUTO: 93.8 FL (ref 80–94)
MICROALBUMIN UR-MCNC: 3.2 MG/DL (ref 0–19)
MICROALBUMIN/CREAT UR-RTO: 22.1 MG/G
PDW BLD-RTO: 13.2 % (ref 11.5–14.5)
PLATELET # BLD: 199 K/UL (ref 130–400)
PMV BLD AUTO: 11 FL (ref 9.4–12.4)
POTASSIUM SERPL-SCNC: 4.6 MMOL/L (ref 3.5–5)
RBC # BLD: 4.38 M/UL (ref 4.7–6.1)
SODIUM BLD-SCNC: 139 MMOL/L (ref 136–145)
TOTAL PROTEIN: 7 G/DL (ref 6.6–8.7)
TRIGL SERPL-MCNC: 162 MG/DL (ref 0–149)
TSH SERPL DL<=0.05 MIU/L-ACNC: 0.68 UIU/ML (ref 0.27–4.2)
WBC # BLD: 9.6 K/UL (ref 4.8–10.8)

## 2022-09-27 PROCEDURE — G0008 ADMIN INFLUENZA VIRUS VAC: HCPCS | Performed by: INTERNAL MEDICINE

## 2022-09-27 PROCEDURE — 90674 CCIIV4 VAC NO PRSV 0.5 ML IM: CPT | Performed by: INTERNAL MEDICINE

## 2022-09-27 PROCEDURE — 3046F HEMOGLOBIN A1C LEVEL >9.0%: CPT | Performed by: INTERNAL MEDICINE

## 2022-09-27 PROCEDURE — 99214 OFFICE O/P EST MOD 30 MIN: CPT | Performed by: INTERNAL MEDICINE

## 2022-09-27 RX ORDER — PREGABALIN 75 MG/1
75 CAPSULE ORAL 4 TIMES DAILY
COMMUNITY
Start: 2022-09-17

## 2022-09-27 RX ORDER — FLASH GLUCOSE SCANNING READER
EACH MISCELLANEOUS
COMMUNITY
Start: 2022-08-30

## 2022-09-27 RX ORDER — HYDROCODONE BITARTRATE AND ACETAMINOPHEN 5; 325 MG/1; MG/1
1 TABLET ORAL 2 TIMES DAILY
COMMUNITY
Start: 2022-09-03

## 2022-09-27 RX ORDER — FLASH GLUCOSE SENSOR
KIT MISCELLANEOUS
COMMUNITY
Start: 2022-08-30

## 2022-09-27 NOTE — PROGRESS NOTES
Chief Complaint   Patient presents with    Diabetes    Referral - General     He needs a referral for a urologist in [facility]. HPI: Patient is here today to follow-up diabetes hypertension hyperlipidemia HIV. He is currently living in the [facility] area needs referral to a urologist in that area. He still sees the [facility] clinic regarding HIV stable in that regard diabetes not good control we also have been following with Dr. Lola Edwards in [facility]. Patient's tremor seems better although still present. No fever or chills today he seems to be doing okay today.     Past Medical History:   Diagnosis Date    Controlled diabetes mellitus with diabetic polyneuropathy (HCC)     Depression     HIV-1 infection, symptomatic ([address]     Hyperlipidemia     Hypertension     Male hypogonadism     Osteopenia     Tobacco abuse 5/15/2020    Type 2 diabetes mellitus without complication (HCC)        Past Surgical History:   Procedure Laterality Date    LYMPHADENECTOMY Left     thoracic       Family History   Problem Relation Age of Onset    High Blood Pressure Mother     Diabetes Mother     High Blood Pressure Father     Diabetes Maternal Grandfather        Social History     Socioeconomic History    Marital status:      Spouse name: Not on file    Number of children: Not on file    Years of education: Not on file    Highest education level: Not on file   Occupational History    Not on file   Tobacco Use    Smoking status: Every Day     Packs/day: 1.00     Years: 33.00     Pack years: 33.00     Types: Cigarettes    Smokeless tobacco: Never   Vaping Use    Vaping Use: Never used   Substance and Sexual Activity    Alcohol use: Yes    Drug use: No    Sexual activity: Not on file   Other Topics Concern    Not on file   Social History Narrative    Not on file     Social Determinants of Health     Financial Resource Strain: Medium Risk    Difficulty of Paying Living Expenses: Somewhat hard   Food Insecurity: No Food Insecurity    Worried About Running Out of Food in the Last Year: Never true    Ran Out of Food in the Last Year: Never true   Transportation Needs: Not on file   Physical Activity: Not on file   Stress: Not on file   Social Connections: Not on file   Intimate Partner Violence: Not on file   Housing Stability: Not on file       Allergies   Allergen Reactions    Darvon [Propoxyphene] Anaphylaxis    Elavil [Amitriptyline Hcl]      Makes him bipolar    Zithromax [Azithromycin] Itching and Rash       Current Outpatient Medications   Medication Sig Dispense Refill    Continuous Blood Gluc  (FREESTYLE YURI 2 READER) HIMANSHU Replace yearly      Continuous Blood Gluc Sensor (FREESTYLE YURI 2 SENSOR) MISC USE as directed TO monitor blood glucose. replace every 14 DAYS. HYDROcodone-acetaminophen (NORCO) 5-325 MG per tablet 1 tablet in the morning and at bedtime. Indications: MANAGED BY DR. ZARATE      pregabalin (LYRICA) 75 MG capsule 75 mg 4 times daily. Indications: MANAGED BY DR. ZARATE      [facility] (NEURIVA PO) Take by mouth in the morning and at bedtime      propranolol (INDERAL LA) 80 MG extended release capsule TAKE 1 CAPSULE BY MOUTH DAILY 90 capsule 1    saccharomyces boulardii (FLORASTOR) 250 MG capsule Take 1 capsule bid 60 capsule 3    atorvastatin (LIPITOR) 40 MG tablet Take 1 tablet by mouth daily 90 tablet 2    metFORMIN (GLUCOPHAGE) 500 MG tablet TAKE 2 TABLETS BY MOUTH TWICE DAILY WITH MEALS 360 tablet 2    Melatonin 5 MG CAPS TAKE 1 CAPSULE BY MOUTH AT BEDTIME 90 capsule 1    losartan (COZAAR) 25 MG tablet Take 1 tablet by mouth Daily.  90 tablet 3    Cholecalciferol (VITAMIN D3) 50 MCG (2000 UT) CAPS 1 capsule by mouth daily 100 capsule 3    prazosin (MINIPRESS) 5 MG capsule Take 5 mg by mouth nightly      insulin aspart (NOVOLOG) 100 UNIT/ML injection vial Use as directed in insulin pump 10 mL 0    traZODone (DESYREL) 100 MG tablet Take 2 tablets by mouth nightly 180 tablet 3    UNIFINE PENTIPS 31G X 6 MM MISC for use FOUR TIMES DAILY 100 each 6    Multiple Vitamins-Minerals (THERAPEUTIC MULTIVITAMIN-MINERALS) tablet Take 1 tablet by mouth daily      Dulaglutide (TRULICITY) 1.5 ZV/8.4BG SOPN Inject 1.5 mg into the skin once a week (Patient taking differently: Inject 4 mg into the skin once a week) 5 pen 5    clotrimazole (LOTRIMIN) 1 % external solution Apply 1 actuation topically 2 times daily      nystatin (MYCOSTATIN) 068115 UNIT/GM cream Apply topically 2 times daily Apply topically 2 times daily. 30 g 1    triamcinolone (KENALOG) 0.1 % cream Apply topically 2 times daily prn 60 g 0    VORTIoxetine HBr (TRINTELLIX) 20 MG TABS tablet Take 20 mg by mouth daily       elvitegravir-cobicistat-emtricitabine-tenofovir alafenamide (GENVOYA) [phone] MG TABLET Take 1 tablet by mouth daily      loperamide (IMODIUM) 2 MG capsule Take 1 capsule by mouth 4 times daily as needed for Diarrhea 30 capsule 1    mometasone (NASONEX) 50 MCG/ACT nasal spray Use 2 sprays in each nostril DAILY 17 g 3    meclizine (ANTIVERT) 25 MG tablet Take 1 tablet by mouth daily as needed for Dizziness 30 tablet 3    rizatriptan (MAXALT) 10 MG tablet Take 1 tablet by mouth once as needed for Migraine May repeat in 2 hours if needed 9 tablet 1    ALLERGY RELIEF 10 MG tablet TAKE 1 TABLET BY MOUTH DAILY 30 tablet 5    DEXILANT 60 MG CPDR delayed release capsule TAKE 1 CAPSULE BY MOUTH DAILY 90 capsule 1    ondansetron (ZOFRAN-ODT) 4 MG disintegrating tablet Take 1 tablet by mouth every 8 hours as needed for Nausea or Vomiting 20 tablet 1    topiramate (TOPAMAX) 25 MG tablet Take 1 tablet by mouth 3 times daily 90 tablet 5    gabapentin (NEURONTIN) 800 MG tablet Take 800 mg by mouth 3 times daily.  (Patient not taking: Reported on 9/27/2022)      tamsulosin (FLOMAX) 0.4 MG capsule Take 0.4 mg by mouth daily (Patient not taking: Reported on 9/27/2022)       No current facility-administered medications for this visit. Review of Systems    BP (!) 90/50 (Site: Left Upper Arm, Position: Sitting)   Pulse 71   Resp 20   Ht 6' (1.829 m)   Wt 251 lb 9.6 oz (114.1 kg)   SpO2 98%   BMI 34.12 kg/m²   BP Readings from Last 7 Encounters:   09/27/22 (!) 90/50   06/09/22 124/70   03/01/22 (!) 140/80   01/18/22 130/80   01/04/22 130/74   09/16/21 102/60   05/11/21 120/64     Wt Readings from Last 7 Encounters:   09/27/22 251 lb 9.6 oz (114.1 kg)   06/09/22 252 lb (114.3 kg)   03/01/22 269 lb (122 kg)   01/18/22 263 lb (119.3 kg)   01/04/22 263 lb (119.3 kg)   09/16/21 280 lb (127 kg)   05/11/21 274 lb (124.3 kg)     BMI Readings from Last 7 Encounters:   09/27/22 34.12 kg/m²   06/09/22 34.18 kg/m²   03/01/22 36.48 kg/m²   01/18/22 35.67 kg/m²   01/04/22 35.67 kg/m²   09/16/21 37.97 kg/m²   05/11/21 37.16 kg/m²     Resp Readings from Last 7 Encounters:   09/27/22 20   05/05/18 14       Physical Exam  Constitutional:       General: He is not in acute distress. Eyes:      General: No scleral icterus. Cardiovascular:      Heart sounds: Normal heart sounds. Pulmonary:      Breath sounds: Normal breath sounds. Musculoskeletal:      Cervical back: Neck supple. Lymphadenopathy:      Cervical: No cervical adenopathy. Skin:     Findings: No rash.    Psychiatric:         Mood and Affect: Mood normal.       Results for orders placed or performed in visit on 06/09/22   Vitamin D 25 Hydroxy   Result Value Ref Range    Vit D, 25-Hydroxy 54.2 >=30 ng/mL   Lipid Panel   Result Value Ref Range    Cholesterol, Total 151 (L) 160 - 199 mg/dL    Triglycerides 123 0 - 149 mg/dL    HDL 51 (L) 55 - 121 mg/dL    LDL Calculated 75 <100 mg/dL   TSH   Result Value Ref Range    TSH 1.220 0.270 - 4.200 uIU/mL   Hemoglobin A1C   Result Value Ref Range    Hemoglobin A1C 10.7 (H) 4.0 - 6.0 %   Comprehensive Metabolic Panel   Result Value Ref Range    Sodium 142 136 - 145 mmol/L    Potassium 4.6 3.5 - 5.0 mmol/L    Chloride 100 98 - 111 mmol/L    CO2 28 22 - 29 mmol/L    Anion Gap 14 7 - 19 mmol/L    Glucose 204 (H) 74 - 109 mg/dL    BUN 15 6 - 20 mg/dL    Creatinine 0.9 0.5 - 1.2 mg/dL    GFR Non-African American >60 >60    GFR African American >59 >59    Calcium 9.9 8.6 - 10.0 mg/dL    Total Protein 7.6 6.6 - 8.7 g/dL    Albumin 4.4 3.5 - 5.2 g/dL    Total Bilirubin 0.4 0.2 - 1.2 mg/dL    Alkaline Phosphatase 69 40 - 130 U/L    ALT 28 5 - 41 U/L    AST 23 5 - 40 U/L   CBC   Result Value Ref Range    WBC 10.4 4.8 - 10.8 K/uL    RBC 4.73 4.70 - 6.10 M/uL    Hemoglobin 14.8 14.0 - 18.0 g/dL    Hematocrit 45.0 42.0 - 52.0 %    MCV 95.1 (H) 80.0 - 94.0 fL    MCH 31.3 (H) 27.0 - 31.0 pg    MCHC 32.9 (L) 33.0 - 37.0 g/dL    RDW 12.9 11.5 - 14.5 %    Platelets 448 922 - 343 K/uL    MPV 12.5 (H) 9.4 - 12.4 fL       ASSESSMENT/ PLAN:  1. Type 2 diabetes mellitus with diabetic polyneuropathy, with long-term current use of insulin ([address]  Patient is overdue lab work for us he gets his lab with us and with the [facility] clinic he lives somewhat far from here now with difficult to coincide all these things we told him it would be fine if he needs to get his lab in [facility] or summer closer to his home and have them sent to us we also always try to coincide our lab with his infectious disease doctor's lab. I wrote an order today we will see what the labs show assess we will send them to his other specialist infectious disease and endocrinology. He really needs to follow the diabetic diet he is aware of this we reviewed his meds we keep him up-to-date.  - CBC; Future  - Comprehensive Metabolic Panel; Future  - Hemoglobin A1C; Future  - TSH; Future  - Lipid Panel; Future  - Microalbumin / Creatinine Urine Ratio; Future    2. Primary hypertension  Blood pressure is low today repeated and got a higher reading. Usually he has good blood pressure control usually not too low but we need to keep an eye on this may need to back off some of his meds    3. Mixed hyperlipidemia  Continue with high-dose statin therapy Lipitor 40 goal LDL under 70 when his lab is back we will assess and if need to make change we will let him know    4. Currently asymptomatic HIV infection, with history of HIV-related illness ([address]  Very well controlled HIV his current medical regimen is Genvoya and this is followed and monitored by infectious disease    5. Flu vaccine need    - Influenza, FLUCELVAX, (age 10 mo+), IM, Preservative Free, 0.5 mL    6. Myoclonic jerking we have worked with his medications to try to adjust things diminished meds think we need to keep trying to eliminate meds he has a large number and this was really more contributing to the jerking movements they are better with discontinuation of some meds    7. Current major depressive disorder, in remission. We adjusted some of his meds and he is doing fine with a decrease in meds we encouraged him to let us know if we can be of any assistance and to use all supportive and assistance measures also through [facility] clinic. He also has good social support at home. Chart, medications, labs, vaccines reviewed. Keep up to date with routine care and follow up. Call with any problems or complaints. Keep up to date with routine screening recomendations and vaccines. We encouraged him we can try to work with him to get as much done as efficiently as possible we know there would be difficulties with him living farther away.

## 2022-09-27 NOTE — PROGRESS NOTES
After obtaining consent, and per orders of Dr. Young Bush, injection of HD flu given in Left deltoid by Bing Salvador. Patient instructed to remain in clinic for 20 minutes afterwards, and to report any adverse reaction to me immediately. [address] #[id number] LOT #[id number] EXP 6/30/23.

## 2022-10-03 DIAGNOSIS — G43.911 INTRACTABLE MIGRAINE WITH STATUS MIGRAINOSUS, UNSPECIFIED MIGRAINE TYPE: ICD-10-CM

## 2022-10-04 RX ORDER — TOPIRAMATE 25 MG/1
25 TABLET ORAL 3 TIMES DAILY
Qty: 90 TABLET | Refills: 5 | Status: SHIPPED | OUTPATIENT
Start: 2022-10-04

## 2022-10-05 DIAGNOSIS — K21.9 GASTROESOPHAGEAL REFLUX DISEASE WITHOUT ESOPHAGITIS: ICD-10-CM

## 2022-10-05 DIAGNOSIS — K52.1 DIARRHEA DUE TO DRUG: ICD-10-CM

## 2022-10-05 DIAGNOSIS — H69.81 DYSFUNCTION OF RIGHT EUSTACHIAN TUBE: ICD-10-CM

## 2022-10-05 NOTE — TELEPHONE ENCOUNTER
Last Appointment Date: 9/27/2022  Next Appointment Date: 1/27/2023    Allergies   Allergen Reactions    Darvon [Propoxyphene] Anaphylaxis    Elavil [Amitriptyline Hcl]      Makes him bipolar    Zithromax [Azithromycin] Itching and Rash       Patient needs refill on   Requested Prescriptions     Pending Prescriptions Disp Refills    loperamide (IMODIUM) 2 MG capsule [Pharmacy Med Name: loperamide 2 mg capsule] 30 capsule 1     Sig: Take 1 capsule by mouth 4 times daily as needed for Diarrhea    mometasone (NASONEX) 50 MCG/ACT nasal spray [Pharmacy Med Name: mometasone 50 mcg/actuation nasal spray] 17 g 1     Sig: Use 2 sprays in each nostril DAILY    meclizine (ANTIVERT) 25 MG tablet [Pharmacy Med Name: meclizine 25 mg tablet] 30 tablet 1     Sig: Take 1 tablet by mouth daily as needed for Dizziness    rizatriptan (MAXALT) 10 MG tablet [Pharmacy Med Name: rizatriptan 10 mg tablet] 9 tablet 1     Sig: Take 1 tablet by mouth once as needed for Migraine May repeat in 2 hours if needed    ALLERGY RELIEF 10 MG tablet [Pharmacy Med Name: Allergy Relief (loratadine) 10 mg tablet] 30 tablet 1     Sig: TAKE 1 TABLET BY MOUTH DAILY    DEXILANT 60 MG CPDR delayed release capsule [Pharmacy Med Name: Dexilant 60 mg capsule, delayed release] 90 capsule 1     Sig: TAKE 1 CAPSULE BY MOUTH DAILY    ondansetron (ZOFRAN-ODT) 4 MG disintegrating tablet [Pharmacy Med Name: ondansetron 4 mg disintegrating tablet] 20 tablet 1     Sig: Take 1 tablet by mouth every 8 hours as needed for Nausea or Vomiting

## 2022-10-06 RX ORDER — RIZATRIPTAN BENZOATE 10 MG/1
10 TABLET ORAL
Qty: 9 TABLET | Refills: 1 | Status: SHIPPED | OUTPATIENT
Start: 2022-10-06 | End: 2022-11-21

## 2022-10-06 RX ORDER — ONDANSETRON 4 MG/1
4 TABLET, ORALLY DISINTEGRATING ORAL EVERY 8 HOURS PRN
Qty: 20 TABLET | Refills: 1 | Status: SHIPPED | OUTPATIENT
Start: 2022-10-06

## 2022-10-06 RX ORDER — LORATADINE 10 MG/1
TABLET ORAL
Qty: 30 TABLET | Refills: 5 | Status: SHIPPED | OUTPATIENT
Start: 2022-10-06

## 2022-10-06 RX ORDER — MECLIZINE HYDROCHLORIDE 25 MG/1
TABLET ORAL
Qty: 30 TABLET | Refills: 3 | Status: SHIPPED | OUTPATIENT
Start: 2022-10-06

## 2022-10-06 RX ORDER — MOMETASONE FUROATE 50 UG/1
SPRAY, METERED NASAL
Qty: 17 G | Refills: 3 | Status: SHIPPED | OUTPATIENT
Start: 2022-10-06

## 2022-10-06 RX ORDER — DEXLANSOPRAZOLE 60 MG/1
CAPSULE, DELAYED RELEASE ORAL
Qty: 90 CAPSULE | Refills: 1 | Status: SHIPPED | OUTPATIENT
Start: 2022-10-06

## 2022-10-06 RX ORDER — LOPERAMIDE HYDROCHLORIDE 2 MG/1
2 CAPSULE ORAL 4 TIMES DAILY PRN
Qty: 30 CAPSULE | Refills: 1 | Status: SHIPPED | OUTPATIENT
Start: 2022-10-06 | End: 2022-11-21

## 2022-10-09 PROBLEM — G25.3 MYOCLONIC JERKING: Status: ACTIVE | Noted: 2022-10-09

## 2022-10-27 DIAGNOSIS — E11.65 TYPE 2 DIABETES MELLITUS WITH HYPERGLYCEMIA, WITH LONG-TERM CURRENT USE OF INSULIN: Primary | ICD-10-CM

## 2022-10-27 DIAGNOSIS — Z79.4 TYPE 2 DIABETES MELLITUS WITH HYPERGLYCEMIA, WITH LONG-TERM CURRENT USE OF INSULIN: Primary | ICD-10-CM

## 2022-10-27 RX ORDER — INSULIN PUMP CONTROLLER
1 EACH MISCELLANEOUS
Qty: 30 EACH | Refills: 3 | Status: SHIPPED | OUTPATIENT
Start: 2022-10-27 | End: 2022-11-09 | Stop reason: SDUPTHER

## 2022-10-27 NOTE — TELEPHONE ENCOUNTER
OMNIPOD DASH REFILLS SENT TO [facility] IN [place] PER PHARMACY REQUEST.

## 2022-11-02 RX ORDER — SACCHAROMYCES BOULARDII 250 MG
CAPSULE ORAL
Qty: 60 CAPSULE | Refills: 3 | Status: SHIPPED | OUTPATIENT
Start: 2022-11-02

## 2022-11-04 ENCOUNTER — OFFICE VISIT (OUTPATIENT)
Dept: FAMILY MEDICINE CLINIC | Facility: CLINIC | Age: 57
End: 2022-11-04

## 2022-11-04 ENCOUNTER — TELEPHONE (OUTPATIENT)
Dept: INTERNAL MEDICINE | Age: 57
End: 2022-11-04

## 2022-11-04 VITALS
OXYGEN SATURATION: 96 % | TEMPERATURE: 99.1 F | HEART RATE: 103 BPM | HEIGHT: 71 IN | SYSTOLIC BLOOD PRESSURE: 118 MMHG | DIASTOLIC BLOOD PRESSURE: 75 MMHG | WEIGHT: 245 LBS | BODY MASS INDEX: 34.3 KG/M2

## 2022-11-04 DIAGNOSIS — R11.14 BILIOUS VOMITING WITH NAUSEA: ICD-10-CM

## 2022-11-04 DIAGNOSIS — R19.7 DIARRHEA, UNSPECIFIED TYPE: Primary | ICD-10-CM

## 2022-11-04 LAB
EXPIRATION DATE: NORMAL
FLUAV AG UPPER RESP QL IA.RAPID: NOT DETECTED
FLUBV AG UPPER RESP QL IA.RAPID: NOT DETECTED
INTERNAL CONTROL: NORMAL
Lab: NORMAL
SARS-COV-2 AG UPPER RESP QL IA.RAPID: NOT DETECTED

## 2022-11-04 PROCEDURE — 87428 SARSCOV & INF VIR A&B AG IA: CPT | Performed by: NURSE PRACTITIONER

## 2022-11-04 PROCEDURE — 99213 OFFICE O/P EST LOW 20 MIN: CPT | Performed by: NURSE PRACTITIONER

## 2022-11-04 RX ORDER — PROMETHAZINE HYDROCHLORIDE 25 MG/1
25 TABLET ORAL EVERY 6 HOURS PRN
Qty: 16 TABLET | Refills: 0 | Status: SHIPPED | OUTPATIENT
Start: 2022-11-04

## 2022-11-04 NOTE — TELEPHONE ENCOUNTER
Having terrible reflux, severe heartburn, medication does not seem to be working? Also vommitting and diarrhea last 2 days?     please call and advise

## 2022-11-04 NOTE — PROGRESS NOTES
Subjective   Donis Yi is a 56 y.o. male. Nausea/vomiting/diarrhea     History of Present Illness   Patient presents today for nausea, vomiting, diarrhea. He says he started feeling bad around 5 days ago. Symptoms include heart burn, acid reflux, nausea, vomiting, and diarrhea. He says vomiting has subsided. He continues to experiencing diarrhea 2-3 times per day. Denies any fever. No recent sick contacts. Zofran provided minimal symptom relief.     The following portions of the patient's history were reviewed and updated as appropriate: allergies, current medications, past family history, past medical history, past social history, past surgical history, and problem list.    Review of Systems   Constitutional: Negative for chills, fatigue and fever.   HENT: Negative for congestion, ear pain, rhinorrhea and sore throat.    Eyes: Negative for blurred vision, double vision and visual disturbance.   Respiratory: Negative for cough, chest tightness, shortness of breath and wheezing.    Cardiovascular: Negative for chest pain, palpitations and leg swelling.   Gastrointestinal: Negative for abdominal pain, diarrhea, nausea and vomiting.   Endocrine: Negative for cold intolerance and heat intolerance.   Genitourinary: Negative for difficulty urinating, dysuria, frequency and hematuria.   Musculoskeletal: Negative for arthralgias, back pain, neck pain and neck stiffness.   Skin: Negative for dry skin, pallor, rash, skin lesions and wound.   Allergic/Immunologic: Negative for environmental allergies, food allergies and immunocompromised state.   Neurological: Negative for dizziness, syncope, weakness, light-headedness, headache and confusion.   Hematological: Negative for adenopathy. Does not bruise/bleed easily.   Psychiatric/Behavioral: Negative for self-injury, sleep disturbance, suicidal ideas, depressed mood and stress. The patient is not nervous/anxious.        Vitals:    11/04/22 1601   BP: 118/75   Pulse: 103    Temp: 99.1 °F (37.3 °C)   SpO2: 96%     Body mass index is 34.17 kg/m².    Objective   Physical Exam  Vitals and nursing note reviewed.   Constitutional:       General: He is not in acute distress.     Appearance: He is well-developed. He is not ill-appearing.   HENT:      Head: Normocephalic.      Right Ear: Hearing, tympanic membrane, ear canal and external ear normal.      Left Ear: Hearing, tympanic membrane, ear canal and external ear normal.      Nose: Nose normal.      Mouth/Throat:      Lips: Pink.      Mouth: Mucous membranes are moist.      Pharynx: Oropharynx is clear. Uvula midline. No oropharyngeal exudate.   Eyes:      General: Lids are normal. Gaze aligned appropriately.      Conjunctiva/sclera: Conjunctivae normal.      Pupils: Pupils are equal, round, and reactive to light.   Neck:      Thyroid: No thyroid mass, thyromegaly or thyroid tenderness.   Cardiovascular:      Rate and Rhythm: Normal rate and regular rhythm.      Heart sounds: Normal heart sounds, S1 normal and S2 normal. No murmur heard.  Pulmonary:      Effort: Pulmonary effort is normal.      Breath sounds: Normal breath sounds and air entry. No decreased breath sounds, wheezing, rhonchi or rales.   Abdominal:      General: Abdomen is flat. Bowel sounds are normal.      Palpations: Abdomen is soft.      Tenderness: There is abdominal tenderness in the epigastric area.   Musculoskeletal:         General: Normal range of motion.      Cervical back: Full passive range of motion without pain, normal range of motion and neck supple.   Lymphadenopathy:      Cervical: No cervical adenopathy.   Skin:     General: Skin is warm and dry.   Neurological:      General: No focal deficit present.      Mental Status: He is alert and oriented to person, place, and time.      Coordination: Coordination is intact.      Gait: Gait is intact.   Psychiatric:         Attention and Perception: Attention normal.         Mood and Affect: Mood normal.          Speech: Speech normal.         Behavior: Behavior normal. Behavior is cooperative.         Thought Content: Thought content normal.         Cognition and Memory: Cognition normal.         Judgment: Judgment normal.           Assessment & Plan   Diagnoses and all orders for this visit:    1. Diarrhea, unspecified type (Primary)  -     POCT SARS-CoV-2 Antigen ANGEL LUIS + Flu    2. Bilious vomiting with nausea  -     promethazine (PHENERGAN) 25 MG tablet; Take 1 tablet by mouth Every 6 (Six) Hours As Needed for Nausea or Vomiting.  Dispense: 16 tablet; Refill: 0      SARS/FLU SWAB WAS NEGATIVE.  I suspect viral gastroenteritis.  Acute viral gastroenteritis is usually transient and self-limited. Usual duration of illness is 1-3 days but it can last up to 7 days. Its important to rest and stay well hydrated. Practice good hand hygiene to avoid spreading to others.   Take promethazine 25 mg every 6 hours PRN for nausea/vomiting.    If symptoms do not improve or worsen, patient was instructed to return to clinic for further evaluation.         This document has been electronically signed by SOBIA Sheffield on  November 4, 2022 17:25 CDT

## 2022-11-04 NOTE — TELEPHONE ENCOUNTER
I spoke with patient and he voiced that he is not dehydrated, he is able to keep food and liquid down. He is going to a walk-in clinic near where he lives to be evaluated.

## 2022-11-09 ENCOUNTER — OFFICE VISIT (OUTPATIENT)
Dept: ENDOCRINOLOGY | Facility: CLINIC | Age: 57
End: 2022-11-09

## 2022-11-09 ENCOUNTER — OFFICE VISIT (OUTPATIENT)
Dept: PODIATRY | Facility: CLINIC | Age: 57
End: 2022-11-09

## 2022-11-09 VITALS
HEART RATE: 98 BPM | HEIGHT: 71 IN | SYSTOLIC BLOOD PRESSURE: 110 MMHG | DIASTOLIC BLOOD PRESSURE: 70 MMHG | BODY MASS INDEX: 35.14 KG/M2 | OXYGEN SATURATION: 98 % | WEIGHT: 251 LBS

## 2022-11-09 VITALS
OXYGEN SATURATION: 99 % | SYSTOLIC BLOOD PRESSURE: 90 MMHG | WEIGHT: 245 LBS | HEART RATE: 97 BPM | DIASTOLIC BLOOD PRESSURE: 62 MMHG | HEIGHT: 71 IN | BODY MASS INDEX: 34.3 KG/M2

## 2022-11-09 DIAGNOSIS — E11.649 TYPE 2 DIABETES MELLITUS WITH HYPOGLYCEMIA WITHOUT COMA, WITH LONG-TERM CURRENT USE OF INSULIN: Primary | ICD-10-CM

## 2022-11-09 DIAGNOSIS — E11.65 TYPE 2 DIABETES MELLITUS WITH HYPERGLYCEMIA, WITH LONG-TERM CURRENT USE OF INSULIN: ICD-10-CM

## 2022-11-09 DIAGNOSIS — E11.9 ENCOUNTER FOR DIABETIC FOOT EXAM: Primary | ICD-10-CM

## 2022-11-09 DIAGNOSIS — E11.65 TYPE 2 DIABETES MELLITUS WITH HYPERGLYCEMIA, WITH LONG-TERM CURRENT USE OF INSULIN: Primary | ICD-10-CM

## 2022-11-09 DIAGNOSIS — Z79.4 TYPE 2 DIABETES MELLITUS WITH HYPERGLYCEMIA, WITH LONG-TERM CURRENT USE OF INSULIN: ICD-10-CM

## 2022-11-09 DIAGNOSIS — I15.2 HYPERTENSION ASSOCIATED WITH DIABETES: ICD-10-CM

## 2022-11-09 DIAGNOSIS — L84 FOOT CALLUS: ICD-10-CM

## 2022-11-09 DIAGNOSIS — Z79.4 TYPE 2 DIABETES MELLITUS WITH HYPERGLYCEMIA, WITH LONG-TERM CURRENT USE OF INSULIN: Primary | ICD-10-CM

## 2022-11-09 DIAGNOSIS — E11.69 MIXED DIABETIC HYPERLIPIDEMIA ASSOCIATED WITH TYPE 2 DIABETES MELLITUS: ICD-10-CM

## 2022-11-09 DIAGNOSIS — E78.2 MIXED DIABETIC HYPERLIPIDEMIA ASSOCIATED WITH TYPE 2 DIABETES MELLITUS: ICD-10-CM

## 2022-11-09 DIAGNOSIS — S98.131A AMPUTATED TOE, RIGHT: ICD-10-CM

## 2022-11-09 DIAGNOSIS — Z79.4 TYPE 2 DIABETES MELLITUS WITH HYPOGLYCEMIA WITHOUT COMA, WITH LONG-TERM CURRENT USE OF INSULIN: Primary | ICD-10-CM

## 2022-11-09 DIAGNOSIS — E11.59 HYPERTENSION ASSOCIATED WITH DIABETES: ICD-10-CM

## 2022-11-09 DIAGNOSIS — E11.42 DIABETIC POLYNEUROPATHY ASSOCIATED WITH TYPE 2 DIABETES MELLITUS: ICD-10-CM

## 2022-11-09 LAB
EXPIRATION DATE: ABNORMAL
HBA1C MFR BLD: 6.9 %
Lab: 995

## 2022-11-09 PROCEDURE — 99214 OFFICE O/P EST MOD 30 MIN: CPT | Performed by: INTERNAL MEDICINE

## 2022-11-09 PROCEDURE — 83036 HEMOGLOBIN GLYCOSYLATED A1C: CPT | Performed by: INTERNAL MEDICINE

## 2022-11-09 PROCEDURE — 95251 CONT GLUC MNTR ANALYSIS I&R: CPT | Performed by: INTERNAL MEDICINE

## 2022-11-09 PROCEDURE — 3044F HG A1C LEVEL LT 7.0%: CPT | Performed by: INTERNAL MEDICINE

## 2022-11-09 PROCEDURE — 11721 DEBRIDE NAIL 6 OR MORE: CPT | Performed by: NURSE PRACTITIONER

## 2022-11-09 RX ORDER — MULTIPLE VITAMINS W/ MINERALS TAB 9MG-400MCG
1 TAB ORAL DAILY
COMMUNITY

## 2022-11-09 RX ORDER — INSULIN PUMP CONTROLLER
1 EACH MISCELLANEOUS
Qty: 45 EACH | Refills: 3 | Status: SHIPPED | OUTPATIENT
Start: 2022-11-09 | End: 2022-11-10 | Stop reason: SDUPTHER

## 2022-11-09 NOTE — PROGRESS NOTES
Donis Yi  1965  56 y.o. male  PCP: Oksana Mares 9/27/2022   BS: 88 per patient     11/9/2022    Chief Complaint   Patient presents with   • Left Foot - Callouses     Diabetic foot care    • Right Foot - Callouses     Diabetic foot care        History of Present Illness    Donis Yi is a 56 y.o.male came to clinic for diabetic foot care.     Patient was a previously taken care of by provider in [facility].  He is here today for follow-up diabetic care, nail care and to evaluate calluses on the bottom of his feet.  He denies any fever, chills or evidence of infection.  He is a diabetic and claims that his sugars are under control.  He has minimal to no foot pain at this point.      Past Medical History:   Diagnosis Date   • Allergic rhinitis    • Anxiety    • Bursitis    • DDD (degenerative disc disease), cervical    • Depression    • HIV (human immunodeficiency virus infection) ([facility])    • HIV disease ([facility])    • HLD (hyperlipidemia)    • HSP (Henoch Schonlein purpura) ([facility])    • HTN (hypertension)    • Migraine    • Myocardial infarction ([facility])    • Osteoporosis    • Sleep apnea    • Type 2 diabetes mellitus ([facility])          Past Surgical History:   Procedure Laterality Date   • AMPUTATION DIGIT Right 1/6/2022    Procedure: 4th Toe  Amputation - Right Foot;  Surgeon: Pacheco Harper DPM;  Location: [facility] OR;  Service: Podiatry;  Laterality: Right;   • ANKLE SURGERY     • CARDIAC CATHETERIZATION N/A 6/24/2020    Procedure: Coronary angiography;  Surgeon: Deon Bailey MD;  Location:  [facility] CATH INVASIVE LOCATION;  Service: Cardiology;  Laterality: N/A;  11am start time   • CARDIAC CATHETERIZATION N/A 6/24/2020    Procedure: Percutaneous Coronary Intervention;  Surgeon: Deon Bailey MD;  Location:  [facility] CATH INVASIVE LOCATION;  Service: Cardiology;  Laterality: N/A;   • COLONOSCOPY  06/19/2009    Normal exam repeat in 10 years   • COLONOSCOPY N/A 8/26/2021    Procedure: COLONOSCOPY  WITH ANESTHESIA;  Surgeon: Byron Rosenberg MD;  Location: [facility] ENDOSCOPY;  Service: Gastroenterology;  Laterality: N/A;  pre screen  post poor prep  dr yg gutiérrez   • ENDOSCOPY N/A 8/26/2021    Procedure: ESOPHAGOGASTRODUODENOSCOPY WITH ANESTHESIA;  Surgeon: Byron Rosenberg MD;  Location: [facility] ENDOSCOPY;  Service: Gastroenterology;  Laterality: N/A;  pre GERD  post normal  dr yg gutiérrez   • FOREARM SURGERY     • LYMPH NODE BIOPSY     • PILONIDAL CYSTECTOMY N/A 12/1/2020    Procedure: EXCISION PILONIDAL ABSCESS;  Surgeon: Ashlyn Posey MD;  Location: [facility] OR;  Service: General;  Laterality: N/A;         Family History   Problem Relation Age of Onset   • Diabetes Mother    • Hypertension Mother    • Thyroid disease Mother    • Hypertension Father    • Thyroid disease Father    • Arrhythmia Father    • Diabetes Maternal Grandfather    • Heart disease Maternal Grandfather    • Hypertension Maternal Grandfather    • Diabetes Paternal Grandmother    • Stroke Paternal Grandmother    • Heart disease Paternal Grandmother    • Hypertension Paternal Grandmother    • Thyroid disease Paternal Grandmother    • Hypertension Paternal Grandfather    • Hypertension Sister    • No Known Problems Brother    • Colon cancer Neg Hx    • Colon polyps Neg Hx        Allergies   Allergen Reactions   • Amitriptyline Anaphylaxis   • Amitriptyline Hcl Anaphylaxis   • Darvon [Propoxyphene] Anaphylaxis   • Zithromax [Azithromycin] Anaphylaxis       Social History     Socioeconomic History   • Marital status:    Tobacco Use   • Smoking status: Every Day     Packs/day: 0.25     Types: Cigarettes     Start date: 1988   • Smokeless tobacco: Never   Vaping Use   • Vaping Use: Never used   Substance and Sexual Activity   • Alcohol use: Yes     Comment: occasionally   • Drug use: No   • Sexual activity: Defer         Current Outpatient Medications   Medication Sig Dispense Refill   • albuterol (PROVENTIL HFA;VENTOLIN HFA) 108 (90  "BASE) MCG/ACT inhaler Inhale 2 puffs every 6 (six) hours as needed for wheezing.     • atorvastatin (LIPITOR) 40 MG tablet Take 40 mg by mouth Daily.  3   • Cholecalciferol (VITAMIN D3) 50 MCG (2000 UT) capsule Take 2,000 Units by mouth Daily.     • ciclopirox (LOPROX) 0.77 % cream Apply  topically to the appropriate area as directed 2 (Two) Times a Day. (Patient taking differently: Apply 1 application topically to the appropriate area as directed 2 (Two) Times a Day As Needed.) 30 g 5   • clonazePAM (KlonoPIN) 0.5 MG tablet Take 0.5 mg by mouth 2 (Two) Times a Day As Needed for Anxiety.     • clotrimazole-betamethasone (LOTRISONE) 1-0.05 % cream Apply  topically to the appropriate area as directed 2 (Two) Times a Day. 15 g 1   • DEXILANT 60 MG capsule Take 60 mg by mouth Daily.  1   • Zntwapm-Gdvbk-Nhncnkji-TenofAF (GENVOYA) 869-623-789-10 MG per tablet Take 1 tablet by mouth daily.     • fluticasone (FLONASE) 50 MCG/ACT nasal spray 2 sprays into each nostril Daily. (Patient taking differently: 2 sprays into the nostril(s) as directed by provider Daily As Needed for Rhinitis.) 1 bottle 6   • gabapentin (NEURONTIN) 800 MG tablet Take 800 mg by mouth.     • insulin aspart (NovoLOG) 100 UNIT/ML injection Up to 150 units daily through pump 50 mL 11   • Insulin Disposable Pump (Omnipod DASH Pods, Gen 4,) misc 1 each Every Other Day. DX CODE: E11.65 45 each 3   • Insulin Syringe 31G X 5/16\" 1 ML misc Use 4 times daily  , ICD10 code is E11.9 120 each 11   • loperamide (IMODIUM) 2 MG capsule Take 2 mg by mouth 4 (Four) Times a Day As Needed for Diarrhea.     • loratadine (CLARITIN) 10 MG tablet Take 1 tablet by mouth Daily.     • losartan (COZAAR) 25 MG tablet Take 1 tablet by mouth Daily. 30 tablet 0   • meclizine (ANTIVERT) 25 MG tablet Take 25 mg by mouth Daily.     • Melatonin 5 MG capsule Take 5 mg by mouth every night at bedtime.  3   • metFORMIN (GLUCOPHAGE) 500 MG tablet Take 1,000 mg by mouth 2 (Two) Times a Day " With Meals.  3   • mometasone (NASONEX) 50 MCG/ACT nasal spray 2 sprays into the nostril(s) as directed by provider Daily.     • mupirocin (BACTROBAN) 2 % ointment Apply 1 application topically to the appropriate area as directed Daily. 22 g 0   • ondansetron ODT (ZOFRAN-ODT) 4 MG disintegrating tablet Place 1 tablet on the tongue Every 6 (Six) Hours As Needed for Nausea. 12 tablet 0   • prazosin (MINIPRESS) 1 MG capsule Take 2 mg by mouth Every Night. Patient unsure of dosage     • promethazine (PHENERGAN) 25 MG tablet Take 1 tablet by mouth Every 6 (Six) Hours As Needed for Nausea or Vomiting. 16 tablet 0   • propranolol LA (INDERAL LA) 80 MG 24 hr capsule Take 80 mg by mouth Daily.  5   • rizatriptan (MAXALT) 10 MG tablet Take 10 mg by mouth 1 (One) Time As Needed for Migraine. May repeat in 2 hours if needed     • sildenafil (Viagra) 50 MG tablet Take 1 tablet by mouth As Needed for Erectile Dysfunction. 20 tablet 11   • tamsulosin (FLOMAX) 0.4 MG capsule 24 hr capsule TAKE 2 CAPSULES BY MOUTH EVERY NIGHT AT BEDTIME. (Patient taking differently: Take 1 capsule by mouth every night at bedtime.) 60 capsule 0   • tiZANidine (ZANAFLEX) 4 MG tablet Take 4 mg by mouth Every 8 (Eight) Hours As Needed for Muscle Spasms.     • topiramate (TOPAMAX) 25 MG tablet Take 25 mg by mouth 2 (Two) Times a Day.     • traZODone (DESYREL) 100 MG tablet Take 200 mg by mouth Every Night.  3   • Trulicity 4.5 MG/0.5ML solution pen-injector Inject 4.5 mg under the skin into the appropriate area as directed 1 (One) Time Per Week. Start after 1 month of 3 mg 2 mL 11   • Vortioxetine HBr 20 MG tablet Take 1 tablet by mouth Daily With Breakfast. Pt takes at night       No current facility-administered medications for this visit.       Review of Systems   Constitutional: Negative.    HENT: Negative.    Eyes: Negative.    Respiratory: Negative.    Cardiovascular: Negative.    Gastrointestinal: Negative.    Endocrine: Negative.   "  Genitourinary: Negative.    Musculoskeletal:        Foot pain    Skin: Negative.    Allergic/Immunologic: Negative.    Neurological: Negative.    Hematological: Negative.    Psychiatric/Behavioral: Negative.          OBJECTIVE    BP 90/62   Pulse 97   Ht 180.3 cm (71\")   Wt 111 kg (245 lb)   SpO2 99%   BMI 34.17 kg/m²     Physical Exam  Vitals reviewed.   Constitutional:       Appearance: Normal appearance. He is well-developed.   HENT:      Head: Normocephalic and atraumatic.   Neck:      Trachea: Trachea and phonation normal.   Cardiovascular:      Pulses:           Dorsalis pedis pulses are 1+ on the right side and 1+ on the left side.        Posterior tibial pulses are 1+ on the right side and 1+ on the left side.   Pulmonary:      Effort: Pulmonary effort is normal. No respiratory distress.   Abdominal:      General: There is no distension.      Palpations: Abdomen is soft.   Musculoskeletal:      Right foot: Decreased range of motion.      Left foot: Decreased range of motion.      Right Lower Extremity: (Fourth toe)  Feet:      Right foot:      Protective Sensation: 10 sites tested. 8 sites sensed.      Skin integrity: Callus (Right first metatarsal head there is significant callus formation with an underlying wound after debridement noted.  The wound is small.) present.      Toenail Condition: Right toenails are abnormally thick.      Left foot:      Protective Sensation: 10 sites tested. 8 sites sensed.      Skin integrity: Callus (Plantar under the metatarsal head there is a small callus with an underlying wound that had previously been treated by a wound care.) present.      Toenail Condition: Left toenails are abnormally thick.   Skin:     General: Skin is warm and dry.   Neurological:      Mental Status: He is alert and oriented to person, place, and time.      GCS: GCS eye subscore is 4. GCS verbal subscore is 5. GCS motor subscore is 6.   Psychiatric:         Speech: Speech normal.         " Behavior: Behavior normal. Behavior is cooperative.         Thought Content: Thought content normal.         Judgment: Judgment normal.                Procedures        ASSESSMENT AND PLAN    After his benefits and treatment options were discussed with the patient we proceeded with debridement of the callus and Medihoney was applied after each debridement.  Patient was referred to wound care.    Nail care was performed trimming all 9 nails followed by debridement.    Follow-up 1 month and/or sooner for worsening symptoms.     Diagnosis Plan   1. Encounter for diabetic foot exam ([facility])        2. Foot callus        3. Amputated toe, right ([facility])        4. Type 2 diabetes mellitus with hyperglycemia, with long-term current use of insulin ([facility])                  This document has been electronically signed by Fabian RAMSAY, FNP-C, ONP-C on November 9, 2022 10:14 CST

## 2022-11-09 NOTE — PROGRESS NOTES
" Donis Yi is a 56 y.o. male who presents for  evaluation of   Type 2 diabetes        Primary Care Provider    Oksana Mares MD    56 y.o. male  t2dm since age 41 y old complicated by neuropathy improved by use of eamon and omnipod.    States glucose better controlled after meeting with Ms. Tanner    Has been dx with Parkinson's   Lost 4th toe right foot        PE  /70   Pulse 98   Ht 180.3 cm (71\")   Wt 114 kg (251 lb)   SpO2 98%   BMI 35.01 kg/m²   AOx3  RRR  CTA  No edema  Callus   parkinosian tremor   Right 4th toe amputation    Lab Review    Lab Results   Component Value Date    WBC 9.6 09/27/2022    HGB 13.7 (L) 09/27/2022    HCT 41.1 (L) 09/27/2022    MCV 93.8 09/27/2022     09/27/2022     Lab Results   Component Value Date    GLUCOSE 306 (H) 09/27/2022    BUN 17 09/27/2022    CREATININE 0.8 09/27/2022    EGFRIFNONA >60 09/27/2022    EGFRIFAFRI >59 09/27/2022    BCR 21.4 01/08/2022    K 4.6 09/27/2022    CO2 24 09/27/2022    CALCIUM 9.3 09/27/2022    ALBUMIN 4.5 09/27/2022    LABIL2 CANCELED 03/29/2016    AST 19 09/27/2022    ALT 24 09/27/2022           Assessment & Plan       ICD-10-CM ICD-9-CM   1. Type 2 diabetes mellitus with hyperglycemia, with long-term current use of insulin (HCC)  E11.65 250.00    Z79.4 790.29     V58.67   2. Hypertension associated with diabetes (HCC)  E11.59 250.80    I15.2 401.9   3. Mixed diabetic hyperlipidemia associated with type 2 diabetes mellitus (HCC)  E11.69 250.80    E78.2 272.2         I reviewed and summarized records from Oksana Mares MD from present year  and I reviewed / ordered labs.   From review of records :    Patient has uncontrolled type 2 diabetes    Glycemic Management:   Lab Results   Component Value Date    HGBA1C 9.1 (H) 09/27/2022    HGBA1C 9.8 06/27/2022    HGBA1C 10.7 (H) 06/09/2022       Eamon , sent for scanning    TIR 78 %  2% low       Metformin 1000 mg bid      Trulicity  4.5 mg weekly       Hesitant about jardiance since " thirst and BPH     Omnipod U 100     Basal 3 units per hour -- didn't bring, he needs to decrease to 2.6 units per hous     Carb Ratio, first 60 grams are free then 1:4    Correction 20, target 120     In the past U500 but not using anymore     Sensor no longer falling on mastisol     he is getting pods through [facility]. We need to change from every 72 hours to ever 48 hours  So we need 45 pods for 3 months supply     Lipid Management  Lab Results   Component Value Date    CHOL 142 02/01/2021    CHLPL 128 (L) 09/27/2022    TRIG 162 (H) 09/27/2022    HDL 55 09/27/2022    LDL 41 09/27/2022       lipitor 80 mg qhs     Blood Pressure Management:    Vitals:    11/09/22 1427   BP: 110/70   Pulse: 98   SpO2: 98%     Controlled             Microvascular Complication Monitoring:      Eye Exam Evaluation  No retinopathy , has cataracts   Pending 2022  -----------    Last Microalbumin-Proteinuria Assessment    No results found for: MALBCRERATIO    No results found for: UTPCR    -----------      Neuropathy on lyrica 100 bid   And neurontin per pain management           Bone Health    Lab Results   Component Value Date    CALCIUM 9.3 09/27/2022    PHOS 2.7 05/11/2020    WARR35TF 54.2 06/09/2022       Thyroid Health    Lab Results   Component Value Date    TSH 0.680 09/27/2022    TSH 1.220 06/09/2022    TSH 1.460 01/04/2022           Lab Results   Component Value Date    USABRLHW46 344 02/01/2021     Wanted viagra    No longer on imdur and nitroglycerin     Written this time but only 52 since on genvoya and flomax           This document has been electronically signed by Demetri Griffin MD on November 9, 2022 14:45 CST

## 2022-11-10 ENCOUNTER — TELEPHONE (OUTPATIENT)
Dept: ENDOCRINOLOGY | Facility: CLINIC | Age: 57
End: 2022-11-10

## 2022-11-10 DIAGNOSIS — E11.65 TYPE 2 DIABETES MELLITUS WITH HYPERGLYCEMIA, WITH LONG-TERM CURRENT USE OF INSULIN: ICD-10-CM

## 2022-11-10 DIAGNOSIS — Z79.4 TYPE 2 DIABETES MELLITUS WITH HYPERGLYCEMIA, WITH LONG-TERM CURRENT USE OF INSULIN: ICD-10-CM

## 2022-11-10 RX ORDER — INSULIN PUMP CONTROLLER
1 EACH MISCELLANEOUS
Qty: 45 EACH | Refills: 3 | Status: SHIPPED | OUTPATIENT
Start: 2022-11-10

## 2022-11-10 NOTE — TELEPHONE ENCOUNTER
Attempted to reach pt to decrease basal rate on Omnipod from 3 u/hr to 2.5 u/hr per MD. VM was full; will re-attempt

## 2022-11-11 ENCOUNTER — DOCUMENTATION (OUTPATIENT)
Dept: ENDOCRINOLOGY | Facility: CLINIC | Age: 57
End: 2022-11-11

## 2022-11-21 DIAGNOSIS — K52.1 DIARRHEA DUE TO DRUG: ICD-10-CM

## 2022-11-21 RX ORDER — RIZATRIPTAN BENZOATE 10 MG/1
10 TABLET ORAL
Qty: 9 TABLET | Refills: 1 | Status: SHIPPED | OUTPATIENT
Start: 2022-11-21 | End: 2022-11-21

## 2022-11-21 RX ORDER — LOPERAMIDE HYDROCHLORIDE 2 MG/1
2 CAPSULE ORAL 4 TIMES DAILY PRN
Qty: 30 CAPSULE | Refills: 1 | Status: SHIPPED | OUTPATIENT
Start: 2022-11-21

## 2022-12-02 RX ORDER — FLUCONAZOLE 200 MG/1
200 TABLET ORAL DAILY
Qty: 5 TABLET | Refills: 0 | Status: SHIPPED | OUTPATIENT
Start: 2022-12-02 | End: 2022-12-07

## 2022-12-28 RX ORDER — TRAZODONE HYDROCHLORIDE 100 MG/1
200 TABLET ORAL NIGHTLY
Qty: 180 TABLET | Refills: 3 | Status: SHIPPED | OUTPATIENT
Start: 2022-12-28

## 2023-01-27 ENCOUNTER — OFFICE VISIT (OUTPATIENT)
Dept: INTERNAL MEDICINE | Age: 58
End: 2023-01-27
Payer: MEDICARE

## 2023-01-27 VITALS
OXYGEN SATURATION: 98 % | DIASTOLIC BLOOD PRESSURE: 68 MMHG | SYSTOLIC BLOOD PRESSURE: 110 MMHG | HEIGHT: 72 IN | WEIGHT: 256.6 LBS | HEART RATE: 103 BPM | BODY MASS INDEX: 34.75 KG/M2

## 2023-01-27 DIAGNOSIS — B20 HIV INFECTION, UNSPECIFIED SYMPTOM STATUS (HCC): ICD-10-CM

## 2023-01-27 DIAGNOSIS — E11.42 TYPE 2 DIABETES MELLITUS WITH DIABETIC POLYNEUROPATHY, WITH LONG-TERM CURRENT USE OF INSULIN (HCC): Primary | ICD-10-CM

## 2023-01-27 DIAGNOSIS — I10 PRIMARY HYPERTENSION: ICD-10-CM

## 2023-01-27 DIAGNOSIS — R39.14 BENIGN PROSTATIC HYPERPLASIA WITH INCOMPLETE BLADDER EMPTYING: ICD-10-CM

## 2023-01-27 DIAGNOSIS — R25.1 TREMOR: ICD-10-CM

## 2023-01-27 DIAGNOSIS — F32.A ANXIETY AND DEPRESSION: ICD-10-CM

## 2023-01-27 DIAGNOSIS — Z79.4 TYPE 2 DIABETES MELLITUS WITH DIABETIC POLYNEUROPATHY, WITH LONG-TERM CURRENT USE OF INSULIN (HCC): Primary | ICD-10-CM

## 2023-01-27 DIAGNOSIS — E78.2 MIXED HYPERLIPIDEMIA: ICD-10-CM

## 2023-01-27 DIAGNOSIS — E11.621 TYPE 2 DIABETES MELLITUS WITH FOOT ULCER, WITH LONG-TERM CURRENT USE OF INSULIN (HCC): ICD-10-CM

## 2023-01-27 DIAGNOSIS — E11.42 TYPE 2 DIABETES MELLITUS WITH DIABETIC POLYNEUROPATHY, WITH LONG-TERM CURRENT USE OF INSULIN (HCC): ICD-10-CM

## 2023-01-27 DIAGNOSIS — N40.1 BENIGN PROSTATIC HYPERPLASIA WITH INCOMPLETE BLADDER EMPTYING: ICD-10-CM

## 2023-01-27 DIAGNOSIS — Z79.4 TYPE 2 DIABETES MELLITUS WITH DIABETIC POLYNEUROPATHY, WITH LONG-TERM CURRENT USE OF INSULIN (HCC): ICD-10-CM

## 2023-01-27 DIAGNOSIS — Z12.11 SCREEN FOR COLON CANCER: ICD-10-CM

## 2023-01-27 DIAGNOSIS — L97.509 TYPE 2 DIABETES MELLITUS WITH FOOT ULCER, WITH LONG-TERM CURRENT USE OF INSULIN (HCC): ICD-10-CM

## 2023-01-27 DIAGNOSIS — Z79.4 TYPE 2 DIABETES MELLITUS WITH FOOT ULCER, WITH LONG-TERM CURRENT USE OF INSULIN (HCC): ICD-10-CM

## 2023-01-27 DIAGNOSIS — F41.9 ANXIETY AND DEPRESSION: ICD-10-CM

## 2023-01-27 DIAGNOSIS — Z13.31 POSITIVE DEPRESSION SCREENING: ICD-10-CM

## 2023-01-27 LAB
ALBUMIN SERPL-MCNC: 4.2 G/DL (ref 3.5–5.2)
ALP BLD-CCNC: 60 U/L (ref 40–130)
ALT SERPL-CCNC: 26 U/L (ref 5–41)
ANION GAP SERPL CALCULATED.3IONS-SCNC: 10 MMOL/L (ref 7–19)
AST SERPL-CCNC: 22 U/L (ref 5–40)
BILIRUB SERPL-MCNC: <0.2 MG/DL (ref 0.2–1.2)
BUN BLDV-MCNC: 13 MG/DL (ref 6–20)
CALCIUM SERPL-MCNC: 9.2 MG/DL (ref 8.6–10)
CHLORIDE BLD-SCNC: 108 MMOL/L (ref 98–111)
CO2: 26 MMOL/L (ref 22–29)
CREAT SERPL-MCNC: 1.1 MG/DL (ref 0.5–1.2)
GFR SERPL CREATININE-BSD FRML MDRD: >60 ML/MIN/{1.73_M2}
GLUCOSE BLD-MCNC: 106 MG/DL (ref 74–109)
HBA1C MFR BLD: 8.6 % (ref 4–6)
HCT VFR BLD CALC: 37.5 % (ref 42–52)
HEMOGLOBIN: 12.5 G/DL (ref 14–18)
MCH RBC QN AUTO: 33 PG (ref 27–31)
MCHC RBC AUTO-ENTMCNC: 33.3 G/DL (ref 33–37)
MCV RBC AUTO: 98.9 FL (ref 80–94)
PDW BLD-RTO: 12.9 % (ref 11.5–14.5)
PLATELET # BLD: 219 K/UL (ref 130–400)
PMV BLD AUTO: 11 FL (ref 9.4–12.4)
POTASSIUM SERPL-SCNC: 4.4 MMOL/L (ref 3.5–5)
RBC # BLD: 3.79 M/UL (ref 4.7–6.1)
SODIUM BLD-SCNC: 144 MMOL/L (ref 136–145)
TOTAL PROTEIN: 6.5 G/DL (ref 6.6–8.7)
TSH SERPL DL<=0.05 MIU/L-ACNC: 1.16 UIU/ML (ref 0.27–4.2)
WBC # BLD: 10.5 K/UL (ref 4.8–10.8)

## 2023-01-27 PROCEDURE — G8482 FLU IMMUNIZE ORDER/ADMIN: HCPCS | Performed by: INTERNAL MEDICINE

## 2023-01-27 PROCEDURE — 4004F PT TOBACCO SCREEN RCVD TLK: CPT | Performed by: INTERNAL MEDICINE

## 2023-01-27 PROCEDURE — G8417 CALC BMI ABV UP PARAM F/U: HCPCS | Performed by: INTERNAL MEDICINE

## 2023-01-27 PROCEDURE — 3078F DIAST BP <80 MM HG: CPT | Performed by: INTERNAL MEDICINE

## 2023-01-27 PROCEDURE — G8427 DOCREV CUR MEDS BY ELIG CLIN: HCPCS | Performed by: INTERNAL MEDICINE

## 2023-01-27 PROCEDURE — 3052F HG A1C>EQUAL 8.0%<EQUAL 9.0%: CPT | Performed by: INTERNAL MEDICINE

## 2023-01-27 PROCEDURE — 3074F SYST BP LT 130 MM HG: CPT | Performed by: INTERNAL MEDICINE

## 2023-01-27 PROCEDURE — 2022F DILAT RTA XM EVC RTNOPTHY: CPT | Performed by: INTERNAL MEDICINE

## 2023-01-27 PROCEDURE — 3017F COLORECTAL CA SCREEN DOC REV: CPT | Performed by: INTERNAL MEDICINE

## 2023-01-27 PROCEDURE — 99214 OFFICE O/P EST MOD 30 MIN: CPT | Performed by: INTERNAL MEDICINE

## 2023-01-27 RX ORDER — SENNA AND DOCUSATE SODIUM 50; 8.6 MG/1; MG/1
2 TABLET, FILM COATED ORAL NIGHTLY
Qty: 180 TABLET | Refills: 3 | Status: SHIPPED | OUTPATIENT
Start: 2023-01-27

## 2023-01-27 RX ORDER — TAMSULOSIN HYDROCHLORIDE 0.4 MG/1
0.4 CAPSULE ORAL DAILY
Qty: 90 CAPSULE | Refills: 1 | Status: SHIPPED | OUTPATIENT
Start: 2023-01-27

## 2023-01-27 ASSESSMENT — PATIENT HEALTH QUESTIONNAIRE - PHQ9
SUM OF ALL RESPONSES TO PHQ QUESTIONS 1-9: 12
1. LITTLE INTEREST OR PLEASURE IN DOING THINGS: 0
8. MOVING OR SPEAKING SO SLOWLY THAT OTHER PEOPLE COULD HAVE NOTICED. OR THE OPPOSITE, BEING SO FIGETY OR RESTLESS THAT YOU HAVE BEEN MOVING AROUND A LOT MORE THAN USUAL: 2
3. TROUBLE FALLING OR STAYING ASLEEP: 2
10. IF YOU CHECKED OFF ANY PROBLEMS, HOW DIFFICULT HAVE THESE PROBLEMS MADE IT FOR YOU TO DO YOUR WORK, TAKE CARE OF THINGS AT HOME, OR GET ALONG WITH OTHER PEOPLE: 1
6. FEELING BAD ABOUT YOURSELF - OR THAT YOU ARE A FAILURE OR HAVE LET YOURSELF OR YOUR FAMILY DOWN: 1
5. POOR APPETITE OR OVEREATING: 3
4. FEELING TIRED OR HAVING LITTLE ENERGY: 3
9. THOUGHTS THAT YOU WOULD BE BETTER OFF DEAD, OR OF HURTING YOURSELF: 0
SUM OF ALL RESPONSES TO PHQ QUESTIONS 1-9: 12
7. TROUBLE CONCENTRATING ON THINGS, SUCH AS READING THE NEWSPAPER OR WATCHING TELEVISION: 1
SUM OF ALL RESPONSES TO PHQ QUESTIONS 1-9: 12
SUM OF ALL RESPONSES TO PHQ9 QUESTIONS 1 & 2: 0
2. FEELING DOWN, DEPRESSED OR HOPELESS: 0
SUM OF ALL RESPONSES TO PHQ QUESTIONS 1-9: 12

## 2023-01-27 NOTE — PROGRESS NOTES
Chief Complaint   Patient presents with    Follow-up     4 month        HPI: Patient is here today for 4-month follow-up of diabetes hypertension hyperlipidemia he now lives closer to [facility] where there are a number of doctors he wants to transfer his specialty care there. We made multiple referrals. He also needed a foot exam on that we noticed 2 ulcers 1 is open draining some needs to see wound care/podiatry right away. No fever no chills no chest pain.   DM shoes / constipation/ TDK and bph  - pt is on norco 5 and lyrica 150 twice a day - 2 senekot at night    Past Medical History:   Diagnosis Date    Controlled diabetes mellitus with diabetic polyneuropathy (HCC)     Depression     HIV-1 infection, symptomatic ([facility] [address]     Hyperlipidemia     Hypertension     Male hypogonadism     Osteopenia     Tobacco abuse 5/15/2020    Type 2 diabetes mellitus without complication (HCC)        Past Surgical History:   Procedure Laterality Date    LYMPHADENECTOMY Left     thoracic       Family History   Problem Relation Age of Onset    High Blood Pressure Mother     Diabetes Mother     High Blood Pressure Father     Diabetes Maternal Grandfather        Social History     Socioeconomic History    Marital status:      Spouse name: Not on file    Number of children: Not on file    Years of education: Not on file    Highest education level: Not on file   Occupational History    Not on file   Tobacco Use    Smoking status: Every Day     Packs/day: 1.00     Years: 33.00     Pack years: 33.00     Types: Cigarettes    Smokeless tobacco: Never   Vaping Use    Vaping Use: Never used   Substance and Sexual Activity    Alcohol use: Yes    Drug use: No    Sexual activity: Not on file   Other Topics Concern    Not on file   Social History Narrative    Not on file     Social Determinants of Health     Financial Resource Strain: Medium Risk    Difficulty of Paying Living Expenses: Somewhat hard   Food Insecurity: No Food Insecurity Worried About Running Out of Food in the Last Year: Never true    Ran Out of Food in the Last Year: Never true   Transportation Needs: Not on file   Physical Activity: Not on file   Stress: Not on file   Social Connections: Not on file   Intimate Partner Violence: Not on file   Housing Stability: Not on file       Allergies   Allergen Reactions    Darvon [Propoxyphene] Anaphylaxis    Elavil [Amitriptyline Hcl]      Makes him bipolar    Zithromax [Azithromycin] Itching and Rash       Current Outpatient Medications   Medication Sig Dispense Refill    tamsulosin (FLOMAX) 0.4 MG capsule Take 1 capsule by mouth daily 90 capsule 1    sennosides-docusate sodium (SENOKOT-S) 8.6-50 MG tablet Take 2 tablets by mouth at bedtime 180 tablet 3    traZODone (DESYREL) 100 MG tablet Take 2 tablets by mouth nightly 180 tablet 3    rizatriptan (MAXALT) 10 MG tablet Take 1 tablet by mouth once as needed for Migraine May repeat in 2 hours if needed 9 tablet 1    Melatonin 5 MG CAPS TAKE 1 CAPSULE BY MOUTH AT BEDTIME 90 capsule 1    saccharomyces boulardii (FLORASTOR) 250 MG capsule TAKE 1 CAPSULE BY MOUTH TWICE DAILY 60 capsule 3    mometasone (NASONEX) 50 MCG/ACT nasal spray Use 2 sprays in each nostril DAILY 17 g 3    meclizine (ANTIVERT) 25 MG tablet Take 1 tablet by mouth daily as needed for Dizziness 30 tablet 3    ALLERGY RELIEF 10 MG tablet TAKE 1 TABLET BY MOUTH DAILY 30 tablet 5    DEXILANT 60 MG CPDR delayed release capsule TAKE 1 CAPSULE BY MOUTH DAILY 90 capsule 1    ondansetron (ZOFRAN-ODT) 4 MG disintegrating tablet Take 1 tablet by mouth every 8 hours as needed for Nausea or Vomiting 20 tablet 1    topiramate (TOPAMAX) 25 MG tablet Take 1 tablet by mouth 3 times daily 90 tablet 5    Continuous Blood Gluc  (FREESTYLE YURI 2 READER) HIMANSHU Replace yearly      Continuous Blood Gluc Sensor (FREESTYLE YURI 2 SENSOR) MISC USE as directed TO monitor blood glucose. replace every 14 DAYS.       HYDROcodone-acetaminophen (NORCO) 5-325 MG per tablet 1 tablet in the morning and at bedtime. Indications: MANAGED BY DR. ZARATE      pregabalin (LYRICA) 75 MG capsule 75 mg 4 times daily. Indications: MANAGED BY DR. ZARATE      [facility] [place] Natural Products (NEURIVA PO) Take by mouth in the morning and at bedtime      propranolol (INDERAL LA) 80 MG extended release capsule TAKE 1 CAPSULE BY MOUTH DAILY 90 capsule 1    atorvastatin (LIPITOR) 40 MG tablet Take 1 tablet by mouth daily 90 tablet 2    metFORMIN (GLUCOPHAGE) 500 MG tablet TAKE 2 TABLETS BY MOUTH TWICE DAILY WITH MEALS 360 tablet 2    losartan (COZAAR) 25 MG tablet Take 1 tablet by mouth Daily. 90 tablet 3    Cholecalciferol (VITAMIN D3) 50 MCG (2000 UT) CAPS 1 capsule by mouth daily 100 capsule 3    prazosin (MINIPRESS) 5 MG capsule Take 5 mg by mouth nightly      insulin aspart (NOVOLOG) 100 UNIT/ML injection vial Use as directed in insulin pump 10 mL 0    Multiple Vitamins-Minerals (THERAPEUTIC MULTIVITAMIN-MINERALS) tablet Take 1 tablet by mouth daily      Dulaglutide (TRULICITY) 1.5 HZ/8.2YZ SOPN Inject 1.5 mg into the skin once a week (Patient taking differently: Inject 4 mg into the skin once a week) 5 pen 5    clotrimazole (LOTRIMIN) 1 % external solution Apply 1 actuation topically 2 times daily      nystatin (MYCOSTATIN) [id number] UNIT/GM cream Apply topically 2 times daily Apply topically 2 times daily. 30 g 1    triamcinolone (KENALOG) 0.1 % cream Apply topically 2 times daily prn 60 g 0    VORTIoxetine HBr (TRINTELLIX) 20 MG TABS tablet Take 20 mg by mouth daily       elvitegravir-cobicistat-emtricitabine-tenofovir alafenamide (GENVOYA) [phone] MG TABLET Take 1 tablet by mouth daily      UNIFINE PENTIPS 31G X 6 MM MISC for use FOUR TIMES DAILY (Patient not taking: Reported on 1/27/2023) 100 each 6     No current facility-administered medications for this visit.        Review of Systems    /68   Pulse (!) 103   Ht 6' (1.829 m)   Wt 256 lb 9.6 oz (116.4 kg) SpO2 98%   BMI 34.80 kg/m²   BP Readings from Last 7 Encounters:   01/27/23 110/68   09/27/22 (!) 90/50   06/09/22 124/70   03/01/22 (!) 140/80   01/18/22 130/80   01/04/22 130/74   09/16/21 102/60     Wt Readings from Last 7 Encounters:   01/27/23 256 lb 9.6 oz (116.4 kg)   09/27/22 251 lb 9.6 oz (114.1 kg)   06/09/22 252 lb (114.3 kg)   03/01/22 269 lb (122 kg)   01/18/22 263 lb (119.3 kg)   01/04/22 263 lb (119.3 kg)   09/16/21 280 lb (127 kg)     BMI Readings from Last 7 Encounters:   01/27/23 34.80 kg/m²   09/27/22 34.12 kg/m²   06/09/22 34.18 kg/m²   03/01/22 36.48 kg/m²   01/18/22 35.67 kg/m²   01/04/22 35.67 kg/m²   09/16/21 37.97 kg/m²     Resp Readings from Last 7 Encounters:   09/27/22 20   05/05/18 14       Physical Exam  Constitutional:       General: He is not in acute distress. Eyes:      General: No scleral icterus. Cardiovascular:      Heart sounds: Normal heart sounds. Pulmonary:      Breath sounds: Normal breath sounds. Musculoskeletal:      Cervical back: Neck supple. Right lower leg: Edema present. Left lower leg: Edema present. Lymphadenopathy:      Cervical: No cervical adenopathy. Skin:     Findings: No rash. Comments: Ulcers under both metatarsal with draining from the 1 on the right especially. Neurological:      Comments: Jerking type movements. Psychiatric:         Mood and Affect: Mood normal.   Visual inspection:  Deformity/amputation: present - amputation of some toes.    Skin lesions/pre-ulcerative calluses: present - both feet under 1st toes metatarsal area  Edema: right- trace, left- trace    Sensory exam:  Monofilament sensation: abnormal - diminished over most of foot  (minimum of 5 random plantar locations tested, avoiding callused areas - > 1 area with absence of sensation is + for neuropathy)    Plus at least one of the following:  Pulses: abnormal - diminished bilateral pulses ,   Pinprick: Impaired  Proprioception: Impaired  Vibration (128 Hz): N/A     Results for orders placed or performed in visit on 09/27/22   Microalbumin / Creatinine Urine Ratio   Result Value Ref Range    Microalbumin, Random Urine 3.20 0.00 - 19.00 mg/dL    Creatinine, Ur 144.7 4.2 - 622.0 mg/dL    Microalbumin Creatinine Ratio 22.1 mg/g   Lipid Panel   Result Value Ref Range    Cholesterol, Total 128 (L) 160 - 199 mg/dL    Triglycerides 162 (H) 0 - 149 mg/dL    HDL 55 55 - 121 mg/dL    LDL Calculated 41 <100 mg/dL   TSH   Result Value Ref Range    TSH 0.680 0.270 - 4.200 uIU/mL   Hemoglobin A1C   Result Value Ref Range    Hemoglobin A1C 9.1 (H) 4.0 - 6.0 %   Comprehensive Metabolic Panel   Result Value Ref Range    Sodium 139 136 - 145 mmol/L    Potassium 4.6 3.5 - 5.0 mmol/L    Chloride 103 98 - 111 mmol/L    CO2 24 22 - 29 mmol/L    Anion Gap 12 7 - 19 mmol/L    Glucose 306 (H) 74 - 109 mg/dL    BUN 17 6 - 20 mg/dL    Creatinine 0.8 0.5 - 1.2 mg/dL    GFR Non-African American >60 >60    GFR African American >59 >59    Calcium 9.3 8.6 - 10.0 mg/dL    Total Protein 7.0 6.6 - 8.7 g/dL    Albumin 4.5 3.5 - 5.2 g/dL    Total Bilirubin 0.3 0.2 - 1.2 mg/dL    Alkaline Phosphatase 57 40 - 130 U/L    ALT 24 5 - 41 U/L    AST 19 5 - 40 U/L   CBC   Result Value Ref Range    WBC 9.6 4.8 - 10.8 K/uL    RBC 4.38 (L) 4.70 - 6.10 M/uL    Hemoglobin 13.7 (L) 14.0 - 18.0 g/dL    Hematocrit 41.1 (L) 42.0 - 52.0 %    MCV 93.8 80.0 - 94.0 fL    MCH 31.3 (H) 27.0 - 31.0 pg    MCHC 33.3 33.0 - 37.0 g/dL    RDW 13.2 11.5 - 14.5 %    Platelets 873 377 - 988 K/uL    MPV 11.0 9.4 - 12.4 fL       ASSESSMENT/ PLAN:  1. Type 2 diabetes mellitus with diabetic polyneuropathy, with long-term current use of insulin ([address]  He is overdue having his A1c checked we will do it today and he also needs to get back in with Dr. Mandi Young the endocrinologist in [facility] they are going to call and get that scheduled patient's mother is with him today  - CBC; Future  - Comprehensive Metabolic Panel;  Future  - Hemoglobin A1C; Future  - TSH; Future  - Microalbumin / Creatinine Urine Ratio; Future    2. Benign prostatic hyperplasia with incomplete bladder emptying  Tamsulosin was helping he has run out of that I renewed it and I am going to refer him to urology in [address]  - tamsulosin (FLOMAX) 0.4 MG capsule; Take 1 capsule by mouth daily  Dispense: 90 capsule; Refill: 1  - External Referral To Urology    3. Tremor  We will refer to neurology we really are trying to avoid more medicine has started dyskinesia type symptoms from meds he has been on he is a lot better it seems since we got rid of some medicine we will refer to neurology go from there  - External Referral To Neurology    4. Type 2 diabetes mellitus with foot ulcer, with long-term current use of insulin ([facility] [address]  He and his mother will call right away to get in with the wound clinic there in [address] -  - External Referral To Wound Clinic    5. Screen for colon cancer    - External Referral To Gastroenterology    6. Positive depression screening  Doing okay in regards to depression    7. HIV infection, unspecified symptom status ([facility] [address]  HIV well-controlled with treatment with infectious disease [facility] clinic    8. Anxiety and depression  Stable anxiety depression follow    9. Primary hypertension  Good blood pressure control    10. Mixed hyperlipidemia  And therapy and follow          PHQ-9 score today: (PHQ-9 Total Score: 12), additional evaluation and assessment performed, follow-up plan includes but not limited to: Medication management and Referral to /Specialist  for evaluation and management.   Patient had a high score on his depression screen but really he is doing much better his mother is with him today he has been in better spirits for some time especially compared to in the past regarding continue this medical regimen make referrals make sure we have got his healthcare addressed go from there

## 2023-02-01 ENCOUNTER — DOCUMENTATION (OUTPATIENT)
Dept: ENDOCRINOLOGY | Facility: CLINIC | Age: 58
End: 2023-02-01
Payer: MEDICARE

## 2023-02-02 ENCOUNTER — DOCUMENTATION (OUTPATIENT)
Dept: ENDOCRINOLOGY | Facility: CLINIC | Age: 58
End: 2023-02-02
Payer: MEDICARE

## 2023-02-06 ENCOUNTER — OFFICE VISIT (OUTPATIENT)
Dept: WOUND CARE | Facility: HOSPITAL | Age: 58
End: 2023-02-06
Payer: MEDICARE

## 2023-02-06 ENCOUNTER — TRANSCRIBE ORDERS (OUTPATIENT)
Dept: PODIATRY | Facility: CLINIC | Age: 58
End: 2023-02-06
Payer: MEDICARE

## 2023-02-06 DIAGNOSIS — L97.509 TYPE 2 DIABETES MELLITUS WITH FOOT ULCER, WITHOUT LONG-TERM CURRENT USE OF INSULIN: Primary | ICD-10-CM

## 2023-02-06 DIAGNOSIS — E11.621 TYPE 2 DIABETES MELLITUS WITH FOOT ULCER, WITHOUT LONG-TERM CURRENT USE OF INSULIN: Primary | ICD-10-CM

## 2023-02-06 PROCEDURE — 11056 PARNG/CUTG B9 HYPRKR LES 2-4: CPT

## 2023-02-06 PROCEDURE — G0463 HOSPITAL OUTPT CLINIC VISIT: HCPCS

## 2023-02-06 PROCEDURE — 97605 NEG PRS WND THER DME<=50SQCM: CPT

## 2023-02-14 ENCOUNTER — OFFICE VISIT (OUTPATIENT)
Dept: PODIATRY | Facility: CLINIC | Age: 58
End: 2023-02-14
Payer: MEDICARE

## 2023-02-14 ENCOUNTER — OFFICE VISIT (OUTPATIENT)
Dept: WOUND CARE | Facility: HOSPITAL | Age: 58
End: 2023-02-14
Payer: MEDICARE

## 2023-02-14 VITALS — HEIGHT: 71 IN | OXYGEN SATURATION: 98 % | BODY MASS INDEX: 35.14 KG/M2 | WEIGHT: 251 LBS | HEART RATE: 88 BPM

## 2023-02-14 DIAGNOSIS — L84 FOOT CALLUS: ICD-10-CM

## 2023-02-14 DIAGNOSIS — K52.1 DIARRHEA DUE TO DRUG: ICD-10-CM

## 2023-02-14 DIAGNOSIS — E11.65 TYPE 2 DIABETES MELLITUS WITH HYPERGLYCEMIA, WITH LONG-TERM CURRENT USE OF INSULIN: ICD-10-CM

## 2023-02-14 DIAGNOSIS — Z79.4 TYPE 2 DIABETES MELLITUS WITH HYPERGLYCEMIA, WITH LONG-TERM CURRENT USE OF INSULIN: ICD-10-CM

## 2023-02-14 DIAGNOSIS — H69.81 DYSFUNCTION OF RIGHT EUSTACHIAN TUBE: ICD-10-CM

## 2023-02-14 DIAGNOSIS — M79.672 LEFT FOOT PAIN: Primary | ICD-10-CM

## 2023-02-14 DIAGNOSIS — E11.9 ENCOUNTER FOR DIABETIC FOOT EXAM: ICD-10-CM

## 2023-02-14 DIAGNOSIS — S98.131A AMPUTATED TOE, RIGHT: ICD-10-CM

## 2023-02-14 DIAGNOSIS — B35.1 ONYCHOMYCOSIS: ICD-10-CM

## 2023-02-14 PROCEDURE — 99212 OFFICE O/P EST SF 10 MIN: CPT | Performed by: NURSE PRACTITIONER

## 2023-02-14 PROCEDURE — 11721 DEBRIDE NAIL 6 OR MORE: CPT | Performed by: NURSE PRACTITIONER

## 2023-02-14 RX ORDER — MOMETASONE FUROATE 50 UG/1
SPRAY, METERED NASAL
Qty: 17 G | Refills: 1 | Status: SHIPPED | OUTPATIENT
Start: 2023-02-14

## 2023-02-14 RX ORDER — PROPRANOLOL HYDROCHLORIDE 80 MG/1
CAPSULE, EXTENDED RELEASE ORAL
Qty: 90 CAPSULE | Refills: 1 | Status: SHIPPED | OUTPATIENT
Start: 2023-02-14

## 2023-02-14 RX ORDER — MECLIZINE HYDROCHLORIDE 25 MG/1
TABLET ORAL
Qty: 30 TABLET | Refills: 1 | Status: SHIPPED | OUTPATIENT
Start: 2023-02-14

## 2023-02-14 RX ORDER — LOPERAMIDE HYDROCHLORIDE 2 MG/1
2 CAPSULE ORAL 4 TIMES DAILY PRN
Qty: 30 CAPSULE | Refills: 1 | Status: SHIPPED | OUTPATIENT
Start: 2023-02-14

## 2023-02-14 RX ORDER — RIZATRIPTAN BENZOATE 10 MG/1
10 TABLET ORAL
Qty: 9 TABLET | Refills: 1 | Status: SHIPPED | OUTPATIENT
Start: 2023-02-14 | End: 2023-02-14

## 2023-02-14 RX ORDER — CHOLECALCIFEROL (VITAMIN D3) 125 MCG
CAPSULE ORAL
Qty: 100 CAPSULE | Refills: 3 | Status: SHIPPED | OUTPATIENT
Start: 2023-02-14

## 2023-02-14 RX ORDER — SACCHAROMYCES BOULARDII 250 MG
CAPSULE ORAL
Qty: 60 CAPSULE | Refills: 3 | Status: SHIPPED | OUTPATIENT
Start: 2023-02-14

## 2023-02-14 NOTE — PROGRESS NOTES
Donis Yi  1965  57 y.o. male  PCP: Yg Mares 1/27/2023  BS: 133 per patient     2/14/2023    Chief Complaint   Patient presents with   • Left Foot - Follow-up, [place]     Diabetic foot care    • Right Foot - Follow-up     Diabetic foot care        History of Present Illness    Donis Yi is a 57 y.o.male came to clinic for diabetic foot care         Past Medical History:   Diagnosis Date   • Allergic rhinitis    • Anxiety    • Bursitis    • DDD (degenerative disc disease), cervical    • Depression    • HIV (human immunodeficiency virus infection) (HCC)    • HIV disease (HCC)    • HLD (hyperlipidemia)    • HSP ([place] purpura) (HCC)    • HTN (hypertension)    • Migraine    • Myocardial infarction (HCC)    • Osteoporosis    • Sleep apnea    • Type 2 diabetes mellitus (HCC)          Past Surgical History:   Procedure Laterality Date   • AMPUTATION DIGIT Right 1/6/2022    Procedure: 4th Toe  Amputation - Right Foot;  Surgeon: Pacheco Harper DPM;  Location: [facility] OR;  Service: Podiatry;  Laterality: Right;   • ANKLE SURGERY     • CARDIAC CATHETERIZATION N/A 6/24/2020    Procedure: Coronary angiography;  Surgeon: Deon Bailey MD;  Location: [facility] CATH INVASIVE LOCATION;  Service: Cardiology;  Laterality: N/A;  11am start time   • CARDIAC CATHETERIZATION N/A 6/24/2020    Procedure: Percutaneous Coronary Intervention;  Surgeon: Deon Bailey MD;  Location: [facility] CATH INVASIVE LOCATION;  Service: Cardiology;  Laterality: N/A;   • COLONOSCOPY  06/19/2009    Normal exam repeat in 10 years   • COLONOSCOPY N/A 8/26/2021    Procedure: COLONOSCOPY WITH ANESTHESIA;  Surgeon: Byron Rosenberg MD;  Location: [facility] ENDOSCOPY;  Service: Gastroenterology;  Laterality: N/A;  pre screen  post poor prep  dr yg mares   • ENDOSCOPY N/A 8/26/2021    Procedure: ESOPHAGOGASTRODUODENOSCOPY WITH ANESTHESIA;  Surgeon: Byron Rosenberg MD;  Location: [facility] ENDOSCOPY;  Service:  Gastroenterology;  Laterality: N/A;  pre GERD  post normal  dr yg gutiérrez   • FOREARM SURGERY     • LYMPH NODE BIOPSY     • PILONIDAL CYSTECTOMY N/A 12/1/2020    Procedure: EXCISION PILONIDAL ABSCESS;  Surgeon: Ashlyn Posey MD;  Location: [facility] OR;  Service: General;  Laterality: N/A;         Family History   Problem Relation Age of Onset   • Diabetes Mother    • Hypertension Mother    • Thyroid disease Mother    • Hypertension Father    • Thyroid disease Father    • Arrhythmia Father    • Diabetes Maternal Grandfather    • Heart disease Maternal Grandfather    • Hypertension Maternal Grandfather    • Diabetes Paternal Grandmother    • Stroke Paternal Grandmother    • Heart disease Paternal Grandmother    • Hypertension Paternal Grandmother    • Thyroid disease Paternal Grandmother    • Hypertension Paternal Grandfather    • Hypertension Sister    • No Known Problems Brother    • Colon cancer Neg Hx    • Colon polyps Neg Hx        Allergies   Allergen Reactions   • Amitriptyline Anaphylaxis   • Amitriptyline Hcl Anaphylaxis     Makes him bipolar   • Darvon [Propoxyphene] Anaphylaxis   • Zithromax [Azithromycin] Anaphylaxis       Social History     Socioeconomic History   • Marital status:    Tobacco Use   • Smoking status: Every Day     Packs/day: 0.25     Types: Cigarettes     Start date: 1988   • Smokeless tobacco: Never   Vaping Use   • Vaping Use: Never used   Substance and Sexual Activity   • Alcohol use: Yes     Comment: occasionally   • Drug use: No   • Sexual activity: Defer         Current Outpatient Medications   Medication Sig Dispense Refill   • albuterol (PROVENTIL HFA;VENTOLIN HFA) 108 (90 BASE) MCG/ACT inhaler Inhale 2 puffs every 6 (six) hours as needed for wheezing.     • atorvastatin (LIPITOR) 40 MG tablet Take 40 mg by mouth Daily.  3   • Calcium Carb-Cholecalciferol (CALCIUM 600+D3 PO) Take  by mouth.     • Cholecalciferol (VITAMIN D3) 50 MCG (2000 UT) capsule Take 2,000 Units by  "mouth Daily.     • ciclopirox (LOPROX) 0.77 % cream Apply  topically to the appropriate area as directed 2 (Two) Times a Day. (Patient taking differently: Apply 1 application topically to the appropriate area as directed 2 (Two) Times a Day As Needed.) 30 g 5   • clonazePAM (KlonoPIN) 0.5 MG tablet Take 0.5 mg by mouth 2 (Two) Times a Day As Needed for Anxiety.     • clotrimazole-betamethasone (LOTRISONE) 1-0.05 % cream Apply  topically to the appropriate area as directed 2 (Two) Times a Day. 15 g 1   • DEXILANT 60 MG capsule Take 60 mg by mouth Daily.  1   • Fbcftrg-Evkpt-Crograwg-TenofAF (GENVOYA) [phone] MG per tablet Take 1 tablet by mouth daily.     • fluticasone (FLONASE) 50 MCG/ACT nasal spray 2 sprays into each nostril Daily. (Patient taking differently: 2 sprays into the nostril(s) as directed by provider Daily As Needed for Rhinitis.) 1 bottle 6   • gabapentin (NEURONTIN) 800 MG tablet Take 800 mg by mouth.     • insulin aspart (NovoLOG) 100 UNIT/ML injection Up to 150 units daily through pump 50 mL 11   • Insulin Disposable Pump (Omnipod DASH Pods, Gen 4,) misc 1 each Every Other Day. DX CODE: E11.65 45 each 3   • Insulin Syringe 31G X 5/16\" 1 ML misc Use 4 times daily  , ICD10 code is E11.9 120 each 11   • loperamide (IMODIUM) 2 MG capsule Take 2 mg by mouth 4 (Four) Times a Day As Needed for Diarrhea.     • loratadine (CLARITIN) 10 MG tablet Take 1 tablet by mouth Daily.     • losartan (COZAAR) 25 MG tablet Take 1 tablet by mouth Daily. 30 tablet 0   • meclizine (ANTIVERT) 25 MG tablet Take 25 mg by mouth Daily.     • Melatonin 5 MG capsule Take 5 mg by mouth every night at bedtime.  3   • metFORMIN (GLUCOPHAGE) 500 MG tablet Take 1,000 mg by mouth 2 (Two) Times a Day With Meals.  3   • mometasone (NASONEX) 50 MCG/ACT nasal spray 2 sprays into the nostril(s) as directed by provider Daily.     • multivitamin with minerals tablet tablet Take 1 tablet by mouth Daily.     • mupirocin (BACTROBAN) 2 % " ointment Apply 1 application topically to the appropriate area as directed Daily. 22 g 0   • ondansetron ODT (ZOFRAN-ODT) 4 MG disintegrating tablet Place 1 tablet on the tongue Every 6 (Six) Hours As Needed for Nausea. 12 tablet 0   • prazosin (MINIPRESS) 1 MG capsule Take 2 mg by mouth Every Night. Patient unsure of dosage     • promethazine (PHENERGAN) 25 MG tablet Take 1 tablet by mouth Every 6 (Six) Hours As Needed for Nausea or Vomiting. 16 tablet 0   • propranolol LA (INDERAL LA) 80 MG 24 hr capsule Take 80 mg by mouth Daily.  5   • rizatriptan (MAXALT) 10 MG tablet Take 10 mg by mouth 1 (One) Time As Needed for Migraine. May repeat in 2 hours if needed     • sildenafil (Viagra) 50 MG tablet Take 1 tablet by mouth As Needed for Erectile Dysfunction. 20 tablet 11   • tamsulosin (FLOMAX) 0.4 MG capsule 24 hr capsule TAKE 2 CAPSULES BY MOUTH EVERY NIGHT AT BEDTIME. (Patient taking differently: Take 1 capsule by mouth every night at bedtime.) 60 capsule 0   • tiZANidine (ZANAFLEX) 4 MG tablet Take 4 mg by mouth Every 8 (Eight) Hours As Needed for Muscle Spasms.     • topiramate (TOPAMAX) 25 MG tablet Take 25 mg by mouth 2 (Two) Times a Day.     • traZODone (DESYREL) 100 MG tablet Take 200 mg by mouth Every Night.  3   • Trulicity 4.5 MG/0.5ML solution pen-injector Inject 4.5 mg under the skin into the appropriate area as directed 1 (One) Time Per Week. Start after 1 month of 3 mg 2 mL 11   • Vortioxetine HBr 20 MG tablet Take 1 tablet by mouth Daily With Breakfast. Pt takes at night       No current facility-administered medications for this visit.       Review of Systems   Constitutional: Negative.    HENT: Negative.    Eyes: Negative.    Respiratory: Negative.    Cardiovascular: Negative.    Gastrointestinal: Negative.    Endocrine: Negative.    Genitourinary: Negative.    Musculoskeletal:        Foot pain    Skin: Negative.    Allergic/Immunologic: Negative.    Neurological: Negative.    Hematological:  "Negative.    Psychiatric/Behavioral: Negative.          OBJECTIVE    Pulse 88   Ht 180.3 cm (71\")   Wt 114 kg (251 lb)   SpO2 98%   BMI 35.01 kg/m²     Physical Exam  Vitals reviewed.   Constitutional:       Appearance: Normal appearance. He is well-developed.   HENT:      Head: Normocephalic and atraumatic.   Neck:      Trachea: Trachea and phonation normal.   Cardiovascular:      Pulses:           Dorsalis pedis pulses are 1+ on the right side and 1+ on the left side.        Posterior tibial pulses are 1+ on the right side and 1+ on the left side.   Pulmonary:      Effort: Pulmonary effort is normal. No respiratory distress.   Abdominal:      General: There is no distension.      Palpations: Abdomen is soft.   Musculoskeletal:      Right foot: Decreased range of motion.      Left foot: Decreased range of motion.        Feet:       Right Lower Extremity: (Fourth toe)  Feet:      Right foot:      Protective Sensation: 10 sites tested. 8 sites sensed.      Skin integrity: Callus (Right first metatarsal head there is significant callus formation with an underlying wound after debridement noted.  The wound is small.) present.      Toenail Condition: Right toenails are abnormally thick.      Left foot:      Protective Sensation: 10 sites tested. 8 sites sensed.      Skin integrity: Callus (Plantar under the metatarsal head there is a small callus with an underlying wound that had previously been treated by a wound care.) present.      Toenail Condition: Left toenails are abnormally thick.   Skin:     General: Skin is warm and dry.   Neurological:      Mental Status: He is alert and oriented to person, place, and time.      GCS: GCS eye subscore is 4. GCS verbal subscore is 5. GCS motor subscore is 6.   Psychiatric:         Speech: Speech normal.         Behavior: Behavior normal. Behavior is cooperative.         Thought Content: Thought content normal.         Judgment: Judgment normal.      "           Procedures        ASSESSMENT AND PLAN    - A diabetic foot screening exam was performed and the patient was educated on the foot complications related to diabetes,  preventative foot care and what signs and symptoms to watch for.  Instructed to contact our office if any foot problems develop before next visit.  -Discussed treatments for painful toenails. Treatment options discussed included nail removal versus debridement. Patient elected for routine nail debridement. An ABN has been signed by the patient.  - Toenails 1-4 left 1-5 right  were debrided in length and thickness with nail nipper and electric  to decrease fungal load and risk of infection.  - All the patients questions were answered.  - RTC 3 months or sooner if needed.     Continue with wound care     Follow-up with Dr. Raymundo to discuss lateral foot pain     Diagnosis Plan   1. Left foot pain  XR Foot 3+ View Left      2. Foot callus        3. Amputated toe, right (HCC)        4. Type 2 diabetes mellitus with hyperglycemia, with long-term current use of insulin (HCC)        5. Onychomycosis        6. Encounter for diabetic foot exam (HCC)                  This document has been electronically signed by ERICA BaronePSHANNAN, ONP-C on February 14, 2023 13:46 CST

## 2023-03-02 ENCOUNTER — OFFICE VISIT (OUTPATIENT)
Dept: WOUND CARE | Facility: HOSPITAL | Age: 58
End: 2023-03-02
Payer: MEDICARE

## 2023-03-08 RX ORDER — INSULIN ASPART 100 [IU]/ML
INJECTION, SOLUTION INTRAVENOUS; SUBCUTANEOUS
Qty: 50 ML | Refills: 2 | Status: SHIPPED | OUTPATIENT
Start: 2023-03-08

## 2023-03-09 ENCOUNTER — OFFICE VISIT (OUTPATIENT)
Dept: WOUND CARE | Facility: HOSPITAL | Age: 58
End: 2023-03-09
Payer: MEDICARE

## 2023-03-13 DIAGNOSIS — E78.00 PURE HYPERCHOLESTEROLEMIA: ICD-10-CM

## 2023-03-13 DIAGNOSIS — K21.9 GASTROESOPHAGEAL REFLUX DISEASE WITHOUT ESOPHAGITIS: ICD-10-CM

## 2023-03-13 DIAGNOSIS — K52.1 DIARRHEA DUE TO DRUG: ICD-10-CM

## 2023-03-13 DIAGNOSIS — G43.911 INTRACTABLE MIGRAINE WITH STATUS MIGRAINOSUS, UNSPECIFIED MIGRAINE TYPE: ICD-10-CM

## 2023-03-13 DIAGNOSIS — I10 ESSENTIAL HYPERTENSION: ICD-10-CM

## 2023-03-13 DIAGNOSIS — H69.81 DYSFUNCTION OF RIGHT EUSTACHIAN TUBE: ICD-10-CM

## 2023-03-13 RX ORDER — MOMETASONE FUROATE 50 UG/1
SPRAY, METERED NASAL
Qty: 17 G | Refills: 1 | Status: SHIPPED | OUTPATIENT
Start: 2023-03-13

## 2023-03-13 RX ORDER — ATORVASTATIN CALCIUM 40 MG/1
40 TABLET, FILM COATED ORAL DAILY
Qty: 90 TABLET | Refills: 2 | Status: SHIPPED | OUTPATIENT
Start: 2023-03-13

## 2023-03-13 RX ORDER — LOPERAMIDE HYDROCHLORIDE 2 MG/1
2 CAPSULE ORAL 4 TIMES DAILY PRN
Qty: 30 CAPSULE | Refills: 1 | Status: SHIPPED | OUTPATIENT
Start: 2023-03-13

## 2023-03-13 RX ORDER — RIZATRIPTAN BENZOATE 10 MG/1
10 TABLET ORAL
Qty: 9 TABLET | Refills: 1 | Status: SHIPPED | OUTPATIENT
Start: 2023-03-13 | End: 2023-03-13

## 2023-03-13 RX ORDER — LORATADINE 10 MG/1
TABLET ORAL
Qty: 30 TABLET | Refills: 5 | Status: SHIPPED | OUTPATIENT
Start: 2023-03-13

## 2023-03-13 RX ORDER — TOPIRAMATE 25 MG/1
25 TABLET ORAL 3 TIMES DAILY
Qty: 90 TABLET | Refills: 5 | Status: SHIPPED | OUTPATIENT
Start: 2023-03-13

## 2023-03-13 RX ORDER — DEXLANSOPRAZOLE 60 MG/1
CAPSULE, DELAYED RELEASE ORAL
Qty: 90 CAPSULE | Refills: 1 | Status: SHIPPED | OUTPATIENT
Start: 2023-03-13

## 2023-03-13 RX ORDER — LOSARTAN POTASSIUM 25 MG/1
25 TABLET ORAL DAILY
Qty: 90 TABLET | Refills: 3 | Status: SHIPPED | OUTPATIENT
Start: 2023-03-13

## 2023-03-13 RX ORDER — MECLIZINE HYDROCHLORIDE 25 MG/1
TABLET ORAL
Qty: 30 TABLET | Refills: 1 | Status: SHIPPED | OUTPATIENT
Start: 2023-03-13

## 2023-03-23 ENCOUNTER — OUTSIDE FACILITY SERVICE (OUTPATIENT)
Dept: WOUND CARE | Facility: HOSPITAL | Age: 58
End: 2023-03-23
Payer: MEDICARE

## 2023-03-23 ENCOUNTER — OFFICE VISIT (OUTPATIENT)
Dept: WOUND CARE | Facility: HOSPITAL | Age: 58
End: 2023-03-23
Payer: MEDICARE

## 2023-03-23 PROCEDURE — 11055 PARING/CUTG B9 HYPRKER LES 1: CPT

## 2023-03-24 ENCOUNTER — PREP FOR SURGERY (OUTPATIENT)
Dept: OTHER | Facility: HOSPITAL | Age: 58
End: 2023-03-24
Payer: MEDICARE

## 2023-03-24 DIAGNOSIS — R31.9 HEMATURIA SYNDROME: Primary | ICD-10-CM

## 2023-03-30 ENCOUNTER — OUTSIDE FACILITY SERVICE (OUTPATIENT)
Dept: WOUND CARE | Facility: HOSPITAL | Age: 58
End: 2023-03-30
Payer: MEDICARE

## 2023-03-30 ENCOUNTER — OFFICE VISIT (OUTPATIENT)
Dept: WOUND CARE | Facility: HOSPITAL | Age: 58
End: 2023-03-30
Payer: MEDICARE

## 2023-04-06 ENCOUNTER — OUTSIDE FACILITY SERVICE (OUTPATIENT)
Dept: WOUND CARE | Facility: HOSPITAL | Age: 58
End: 2023-04-06
Payer: MEDICARE

## 2023-04-06 ENCOUNTER — OFFICE VISIT (OUTPATIENT)
Dept: WOUND CARE | Facility: HOSPITAL | Age: 58
End: 2023-04-06
Payer: MEDICARE

## 2023-04-10 RX ORDER — DULAGLUTIDE 4.5 MG/.5ML
4.5 INJECTION, SOLUTION SUBCUTANEOUS WEEKLY
Qty: 2 ML | Refills: 11 | Status: SHIPPED | OUTPATIENT
Start: 2023-04-10

## 2023-04-11 ENCOUNTER — TELEPHONE (OUTPATIENT)
Dept: INTERNAL MEDICINE | Age: 58
End: 2023-04-11

## 2023-04-12 ENCOUNTER — PRE-ADMISSION TESTING (OUTPATIENT)
Dept: PREADMISSION TESTING | Facility: HOSPITAL | Age: 58
End: 2023-04-12
Payer: MEDICARE

## 2023-04-12 ENCOUNTER — OFFICE VISIT (OUTPATIENT)
Dept: WOUND CARE | Facility: HOSPITAL | Age: 58
End: 2023-04-12
Payer: MEDICARE

## 2023-04-12 ENCOUNTER — OUTSIDE FACILITY SERVICE (OUTPATIENT)
Dept: WOUND CARE | Facility: HOSPITAL | Age: 58
End: 2023-04-12
Payer: MEDICARE

## 2023-04-12 VITALS
OXYGEN SATURATION: 96 % | HEIGHT: 70 IN | RESPIRATION RATE: 20 BRPM | WEIGHT: 250 LBS | DIASTOLIC BLOOD PRESSURE: 60 MMHG | BODY MASS INDEX: 35.79 KG/M2 | HEART RATE: 86 BPM | SYSTOLIC BLOOD PRESSURE: 110 MMHG

## 2023-04-12 DIAGNOSIS — R31.9 HEMATURIA SYNDROME: ICD-10-CM

## 2023-04-12 LAB
ANION GAP SERPL CALCULATED.3IONS-SCNC: 12 MMOL/L (ref 5–15)
BILIRUB UR QL STRIP: NEGATIVE
BUN SERPL-MCNC: 16 MG/DL (ref 6–20)
BUN/CREAT SERPL: 18.2 (ref 7–25)
CALCIUM SPEC-SCNC: 9.4 MG/DL (ref 8.6–10.5)
CHLORIDE SERPL-SCNC: 104 MMOL/L (ref 98–107)
CLARITY UR: ABNORMAL
CO2 SERPL-SCNC: 24 MMOL/L (ref 22–29)
COLOR UR: YELLOW
CREAT SERPL-MCNC: 0.88 MG/DL (ref 0.76–1.27)
EGFRCR SERPLBLD CKD-EPI 2021: 101.6 ML/MIN/1.73
GLUCOSE SERPL-MCNC: 242 MG/DL (ref 65–99)
GLUCOSE UR STRIP-MCNC: ABNORMAL MG/DL
HGB UR QL STRIP.AUTO: ABNORMAL
KETONES UR QL STRIP: ABNORMAL
LEUKOCYTE ESTERASE UR QL STRIP.AUTO: ABNORMAL
NITRITE UR QL STRIP: NEGATIVE
PH UR STRIP.AUTO: 5.5 [PH] (ref 5–9)
POTASSIUM SERPL-SCNC: 4 MMOL/L (ref 3.5–5.2)
PROT UR QL STRIP: ABNORMAL
QT INTERVAL: 396 MS
QTC INTERVAL: 492 MS
SODIUM SERPL-SCNC: 140 MMOL/L (ref 136–145)
SP GR UR STRIP: 1.02 (ref 1–1.03)
UROBILINOGEN UR QL STRIP: ABNORMAL

## 2023-04-12 PROCEDURE — 93005 ELECTROCARDIOGRAM TRACING: CPT

## 2023-04-12 PROCEDURE — 36415 COLL VENOUS BLD VENIPUNCTURE: CPT

## 2023-04-12 PROCEDURE — 81003 URINALYSIS AUTO W/O SCOPE: CPT

## 2023-04-12 PROCEDURE — 80048 BASIC METABOLIC PNL TOTAL CA: CPT

## 2023-04-12 RX ORDER — LOSARTAN POTASSIUM 25 MG/1
25 TABLET ORAL NIGHTLY
COMMUNITY

## 2023-04-12 RX ORDER — SENNA PLUS 8.6 MG/1
2 TABLET ORAL NIGHTLY
COMMUNITY

## 2023-04-12 RX ORDER — PREGABALIN 75 MG/1
75 CAPSULE ORAL 3 TIMES DAILY
COMMUNITY

## 2023-04-12 RX ORDER — HYDROCODONE BITARTRATE AND ACETAMINOPHEN 5; 325 MG/1; MG/1
1 TABLET ORAL 2 TIMES DAILY
COMMUNITY

## 2023-04-12 RX ORDER — SODIUM CHLORIDE 9 MG/ML
1000 INJECTION, SOLUTION INTRAVENOUS CONTINUOUS
Status: CANCELLED | OUTPATIENT
Start: 2023-04-18

## 2023-04-12 RX ORDER — SACCHAROMYCES BOULARDII 250 MG
250 CAPSULE ORAL NIGHTLY
COMMUNITY

## 2023-04-18 ENCOUNTER — HOSPITAL ENCOUNTER (OUTPATIENT)
Facility: HOSPITAL | Age: 58
Setting detail: HOSPITAL OUTPATIENT SURGERY
Discharge: HOME | End: 2023-04-18
Attending: UROLOGY | Admitting: UROLOGY
Payer: MEDICARE

## 2023-04-18 ENCOUNTER — ANESTHESIA (OUTPATIENT)
Dept: PERIOP | Facility: HOSPITAL | Age: 58
End: 2023-04-18
Payer: MEDICARE

## 2023-04-18 ENCOUNTER — ANESTHESIA EVENT (OUTPATIENT)
Dept: PERIOP | Facility: HOSPITAL | Age: 58
End: 2023-04-18
Payer: MEDICARE

## 2023-04-18 VITALS
BODY MASS INDEX: 35.05 KG/M2 | WEIGHT: 250.35 LBS | HEART RATE: 83 BPM | HEIGHT: 71 IN | RESPIRATION RATE: 16 BRPM | DIASTOLIC BLOOD PRESSURE: 73 MMHG | TEMPERATURE: 97 F | SYSTOLIC BLOOD PRESSURE: 112 MMHG | OXYGEN SATURATION: 96 %

## 2023-04-18 DIAGNOSIS — R31.9 HEMATURIA SYNDROME: ICD-10-CM

## 2023-04-18 DIAGNOSIS — N99.115 POSTPROCEDURAL MALE FOSSA NAVICULARIS URETHRAL STRICTURE: Primary | ICD-10-CM

## 2023-04-18 LAB
GLUCOSE BLDC GLUCOMTR-MCNC: 174 MG/DL (ref 70–130)
GLUCOSE BLDC GLUCOMTR-MCNC: 240 MG/DL (ref 70–130)

## 2023-04-18 PROCEDURE — 25010000002 ONDANSETRON PER 1 MG: Performed by: NURSE ANESTHETIST, CERTIFIED REGISTERED

## 2023-04-18 PROCEDURE — 25010000002 FENTANYL CITRATE (PF) 50 MCG/ML SOLUTION: Performed by: NURSE ANESTHETIST, CERTIFIED REGISTERED

## 2023-04-18 PROCEDURE — 25010000002 NEOSTIGMINE 10 MG/10ML SOLUTION: Performed by: NURSE ANESTHETIST, CERTIFIED REGISTERED

## 2023-04-18 PROCEDURE — 25010000002 PROPOFOL 200 MG/20ML EMULSION: Performed by: NURSE ANESTHETIST, CERTIFIED REGISTERED

## 2023-04-18 PROCEDURE — 82962 GLUCOSE BLOOD TEST: CPT

## 2023-04-18 RX ORDER — FENTANYL CITRATE 50 UG/ML
INJECTION, SOLUTION INTRAMUSCULAR; INTRAVENOUS AS NEEDED
Status: DISCONTINUED | OUTPATIENT
Start: 2023-04-18 | End: 2023-04-18 | Stop reason: SURG

## 2023-04-18 RX ORDER — PROMETHAZINE HYDROCHLORIDE 25 MG/1
25 TABLET ORAL ONCE AS NEEDED
Status: DISCONTINUED | OUTPATIENT
Start: 2023-04-18 | End: 2023-04-18 | Stop reason: HOSPADM

## 2023-04-18 RX ORDER — PROPOFOL 10 MG/ML
INJECTION, EMULSION INTRAVENOUS AS NEEDED
Status: DISCONTINUED | OUTPATIENT
Start: 2023-04-18 | End: 2023-04-18 | Stop reason: SURG

## 2023-04-18 RX ORDER — NALOXONE HCL 0.4 MG/ML
0.4 VIAL (ML) INJECTION AS NEEDED
Status: DISCONTINUED | OUTPATIENT
Start: 2023-04-18 | End: 2023-04-18 | Stop reason: HOSPADM

## 2023-04-18 RX ORDER — SODIUM CHLORIDE 9 MG/ML
1000 INJECTION, SOLUTION INTRAVENOUS CONTINUOUS
Status: DISCONTINUED | OUTPATIENT
Start: 2023-04-18 | End: 2023-04-18 | Stop reason: HOSPADM

## 2023-04-18 RX ORDER — KETAMINE HYDROCHLORIDE 50 MG/ML
INJECTION, SOLUTION, CONCENTRATE INTRAMUSCULAR; INTRAVENOUS AS NEEDED
Status: DISCONTINUED | OUTPATIENT
Start: 2023-04-18 | End: 2023-04-18 | Stop reason: SURG

## 2023-04-18 RX ORDER — LIDOCAINE HYDROCHLORIDE 20 MG/ML
JELLY TOPICAL AS NEEDED
Status: DISCONTINUED | OUTPATIENT
Start: 2023-04-18 | End: 2023-04-18 | Stop reason: HOSPADM

## 2023-04-18 RX ORDER — MEPERIDINE HYDROCHLORIDE 50 MG/ML
12.5 INJECTION INTRAMUSCULAR; INTRAVENOUS; SUBCUTANEOUS
Status: DISCONTINUED | OUTPATIENT
Start: 2023-04-18 | End: 2023-04-18 | Stop reason: HOSPADM

## 2023-04-18 RX ORDER — FLUMAZENIL 0.1 MG/ML
0.2 INJECTION INTRAVENOUS AS NEEDED
Status: DISCONTINUED | OUTPATIENT
Start: 2023-04-18 | End: 2023-04-18 | Stop reason: HOSPADM

## 2023-04-18 RX ORDER — LEVOFLOXACIN 250 MG/1
250 TABLET ORAL DAILY
Qty: 7 TABLET | Refills: 0 | Status: SHIPPED | OUTPATIENT
Start: 2023-04-18 | End: 2023-04-25

## 2023-04-18 RX ORDER — ONDANSETRON 2 MG/ML
4 INJECTION INTRAMUSCULAR; INTRAVENOUS ONCE AS NEEDED
Status: DISCONTINUED | OUTPATIENT
Start: 2023-04-18 | End: 2023-04-18 | Stop reason: HOSPADM

## 2023-04-18 RX ORDER — ACETAMINOPHEN 325 MG/1
650 TABLET ORAL ONCE AS NEEDED
Status: COMPLETED | OUTPATIENT
Start: 2023-04-18 | End: 2023-04-18

## 2023-04-18 RX ORDER — EPHEDRINE SULFATE 50 MG/ML
5 INJECTION, SOLUTION INTRAVENOUS ONCE AS NEEDED
Status: DISCONTINUED | OUTPATIENT
Start: 2023-04-18 | End: 2023-04-18 | Stop reason: HOSPADM

## 2023-04-18 RX ORDER — DIPHENHYDRAMINE HYDROCHLORIDE 50 MG/ML
12.5 INJECTION INTRAMUSCULAR; INTRAVENOUS
Status: DISCONTINUED | OUTPATIENT
Start: 2023-04-18 | End: 2023-04-18 | Stop reason: HOSPADM

## 2023-04-18 RX ORDER — PROMETHAZINE HYDROCHLORIDE 25 MG/1
25 SUPPOSITORY RECTAL ONCE AS NEEDED
Status: DISCONTINUED | OUTPATIENT
Start: 2023-04-18 | End: 2023-04-18 | Stop reason: HOSPADM

## 2023-04-18 RX ORDER — ROCURONIUM BROMIDE 10 MG/ML
INJECTION, SOLUTION INTRAVENOUS AS NEEDED
Status: DISCONTINUED | OUTPATIENT
Start: 2023-04-18 | End: 2023-04-18 | Stop reason: SURG

## 2023-04-18 RX ORDER — NEOSTIGMINE METHYLSULFATE 1 MG/ML
INJECTION, SOLUTION INTRAVENOUS AS NEEDED
Status: DISCONTINUED | OUTPATIENT
Start: 2023-04-18 | End: 2023-04-18 | Stop reason: SURG

## 2023-04-18 RX ORDER — ONDANSETRON 2 MG/ML
INJECTION INTRAMUSCULAR; INTRAVENOUS AS NEEDED
Status: DISCONTINUED | OUTPATIENT
Start: 2023-04-18 | End: 2023-04-18 | Stop reason: SURG

## 2023-04-18 RX ORDER — ACETAMINOPHEN 650 MG/1
650 SUPPOSITORY RECTAL ONCE AS NEEDED
Status: COMPLETED | OUTPATIENT
Start: 2023-04-18 | End: 2023-04-18

## 2023-04-18 RX ADMIN — KETAMINE HYDROCHLORIDE 20 MG: 50 INJECTION INTRAMUSCULAR; INTRAVENOUS at 07:41

## 2023-04-18 RX ADMIN — PROPOFOL 50 MG: 10 INJECTION, EMULSION INTRAVENOUS at 07:43

## 2023-04-18 RX ADMIN — ONDANSETRON 4 MG: 2 INJECTION INTRAMUSCULAR; INTRAVENOUS at 07:34

## 2023-04-18 RX ADMIN — PROPOFOL 50 MG: 10 INJECTION, EMULSION INTRAVENOUS at 07:49

## 2023-04-18 RX ADMIN — PROPOFOL 50 MG: 10 INJECTION, EMULSION INTRAVENOUS at 07:39

## 2023-04-18 RX ADMIN — PROPOFOL 20 MG: 10 INJECTION, EMULSION INTRAVENOUS at 07:37

## 2023-04-18 RX ADMIN — ROCURONIUM BROMIDE 10 MG: 10 INJECTION INTRAVENOUS at 07:43

## 2023-04-18 RX ADMIN — PROPOFOL 50 MG: 10 INJECTION, EMULSION INTRAVENOUS at 07:45

## 2023-04-18 RX ADMIN — SODIUM CHLORIDE 1000 ML: 9 INJECTION, SOLUTION INTRAVENOUS at 06:32

## 2023-04-18 RX ADMIN — PROPOFOL 20 MG: 10 INJECTION, EMULSION INTRAVENOUS at 07:32

## 2023-04-18 RX ADMIN — PROPOFOL 30 MG: 10 INJECTION, EMULSION INTRAVENOUS at 07:47

## 2023-04-18 RX ADMIN — PROPOFOL 60 MG: 10 INJECTION, EMULSION INTRAVENOUS at 07:51

## 2023-04-18 RX ADMIN — FENTANYL CITRATE 25 MCG: 50 INJECTION, SOLUTION INTRAMUSCULAR; INTRAVENOUS at 07:44

## 2023-04-18 RX ADMIN — FENTANYL CITRATE 25 MCG: 50 INJECTION, SOLUTION INTRAMUSCULAR; INTRAVENOUS at 07:38

## 2023-04-18 RX ADMIN — ACETAMINOPHEN 650 MG: 325 TABLET, FILM COATED ORAL at 09:17

## 2023-04-18 RX ADMIN — FENTANYL CITRATE 25 MCG: 50 INJECTION, SOLUTION INTRAMUSCULAR; INTRAVENOUS at 08:02

## 2023-04-18 RX ADMIN — PROPOFOL 30 MG: 10 INJECTION, EMULSION INTRAVENOUS at 07:41

## 2023-04-18 RX ADMIN — KETAMINE HYDROCHLORIDE 5 MG: 50 INJECTION INTRAMUSCULAR; INTRAVENOUS at 07:39

## 2023-04-18 RX ADMIN — NEOSTIGMINE METHYLSULFATE 3 MG: 0.5 INJECTION INTRAVENOUS at 08:04

## 2023-04-18 RX ADMIN — GLYCOPYRROLATE 0.4 MG: 0.2 INJECTION, SOLUTION INTRAMUSCULAR; INTRAVITREAL at 08:04

## 2023-04-18 RX ADMIN — PROPOFOL 20 MG: 10 INJECTION, EMULSION INTRAVENOUS at 07:42

## 2023-04-18 RX ADMIN — PROPOFOL 80 MG: 10 INJECTION, EMULSION INTRAVENOUS at 07:29

## 2023-04-18 RX ADMIN — KETAMINE HYDROCHLORIDE 25 MG: 50 INJECTION INTRAMUSCULAR; INTRAVENOUS at 07:29

## 2023-04-18 NOTE — ANESTHESIA POSTPROCEDURE EVALUATION
Patient: Donis Yi    Procedure Summary     Date: 04/18/23 Room / Location: [facility] OR 02 / [facility] OR    Anesthesia Start: 0725 Anesthesia Stop: 0812    Procedure: CYSTOSCOPY (Urethra) Diagnosis:       Hematuria syndrome      (Hematuria syndrome [R31.9])    Surgeons: Kelly, Bairon CARLSON MD Provider: Bola Leyva CRNA    Anesthesia Type: MAC ASA Status: 3          Anesthesia Type: MAC    Vitals  No vitals data found for the desired time range.          Post Anesthesia Care and Evaluation    Patient location during evaluation: PACU  Patient participation: waiting for patient participation  Level of consciousness: sleepy but conscious  Pain score: 0  Pain management: adequate    Airway patency: patent  Anesthetic complications: No anesthetic complications  PONV Status: none  Cardiovascular status: acceptable  Respiratory status: acceptable, spontaneous ventilation and face mask  Hydration status: acceptable    Comments:   HR 88  /74  RR 16  T. 97.7  SpO2 98%  No anesthesia care post op

## 2023-04-18 NOTE — ANESTHESIA PREPROCEDURE EVALUATION
Anesthesia Evaluation     Patient summary reviewed and Nursing notes reviewed                Airway   Mallampati: III  TM distance: >3 FB  Neck ROM: full  Small opening and Difficult intubation highly probable  Dental    (+) poor dentition    Pulmonary     breath sounds clear to auscultation  (+) a smoker Current Smoked day of surgery, sleep apnea,   (-) asthma, shortness of breath, rhonchi, decreased breath sounds, wheezes, rales  Cardiovascular   Exercise tolerance: poor (<4 METS)    ECG reviewed  Patient on routine beta blocker and Beta blocker given within 24 hours of surgery  Rhythm: regular  Rate: normal    (+) hypertension, past MI  >12 months, dysrhythmias Tachycardia, PVC, hyperlipidemia,   (-) angina, murmur, cardiac stents, CABG, DVT    ROS comment: EK23  Normal sinus rhythm  Normal ECG  When compared with ECG of 2021 19:57,  Nonspecific T wave abnormality has replaced inverted T waves in Inferior leads    Cardiac Cath: 20  Impression:  1. Normal coronary arteries     Plan:   1.  TR band off in 2 hours  2.  Continue primary prevention with risk factor modification, daily ASA  3.  Consider non-cardiac sources for chest pain; no need for further cardiology follow-up    Stress Echo: 20  Interpretation Summary    ? High risk stress test.  ? Post-stress images show globally hypokinetic response, with particularly severe hypokinesis of inferior wall.      Neuro/Psych  (+) headaches (Migraines), tremors, psychiatric history Anxiety, Depression and PTSD,    (-) seizures, TIA, CVA  GI/Hepatic/Renal/Endo    (+) obesity,  GERD well controlled,  liver disease, diabetes mellitus type 2 poorly controlled using insulin,     Musculoskeletal         ROS comment: DDD  Osteoporosis    Abdominal   (+) obese,    Substance History   (-) alcohol use, drug use     OB/GYN          Other   arthritis,      ROS/Med Hx Other: Hx: HSP (Henoch Schonlein purpura): had since [age]. Just watches closely.     Hx:  Sleep Apnea: CPAP noncompliant    Hx:Tremors has a appointment to see neurologist.     HX:MI: 3-4 years ago    Hx:GERD controlled on Dexilant    T2DM: Last HgbA1C: 8.6. Has a pump on the left side of abdomen.     U/S Renal Bilateral: 3/20/23  Ultrasound kidneys: The aorta has a diameter of 2 cm . The bladder is not distended     The left kidney measures 11 cm and the right kidney measures 11.9 cm . No hydronephrosis nor mass . The parenchyma has a normal appearance .                  Anesthesia Plan    ASA 3     MAC     intravenous induction     Anesthetic plan, risks, benefits, and alternatives have been provided, discussed and informed consent has been obtained with: patient.        CODE STATUS:

## 2023-04-18 NOTE — H&P
[facility]   HISTORY AND PHYSICAL    Patient Name: Donis Yi  : 1965  MRN: [id number]  Primary Care Physician:  Yg Mares MD  Date of admission: 2023    Subjective   Subjective     Chief Complaint: LUTS    History of Present Illness patient comes in with frequency urgency post micturitional dribbling directional drip with also a tight foreskin with residual phimosis    Review of Systems LUTS    Personal History     Past Medical History:   Diagnosis Date   • Allergic rhinitis    • Anxiety    • Bursitis    • DDD (degenerative disc disease), cervical    • Depression    • GERD (gastroesophageal reflux disease)    • HIV (human immunodeficiency virus infection)    • HLD (hyperlipidemia)    • HSP (Henoch Schonlein purpura)    • Migraine    • Myocardial infarction    • Osteoporosis    • Sleep apnea    • Type 2 diabetes mellitus        Past Surgical History:   Procedure Laterality Date   • AMPUTATION DIGIT Right 2022    Procedure: 4th Toe  Amputation - Right Foot;  Surgeon: Pacheco Harper DPM;  Location: [facility] OR;  Service: Podiatry;  Laterality: Right;   • ANKLE SURGERY Right     ORIF   • CARDIAC CATHETERIZATION N/A 2020    Procedure: Coronary angiography;  Surgeon: Deon Bailey MD;  Location:  [facility] CATH INVASIVE LOCATION;  Service: Cardiology;  Laterality: N/A;  11am start time   • CARDIAC CATHETERIZATION N/A 2020    Procedure: Percutaneous Coronary Intervention;  Surgeon: Deon Bailey MD;  Location: [facility] CATH INVASIVE LOCATION;  Service: Cardiology;  Laterality: N/A;   • COLONOSCOPY  2009    Normal exam repeat in 10 years   • COLONOSCOPY N/A 2021    Procedure: COLONOSCOPY WITH ANESTHESIA;  Surgeon: Byron Rosenberg MD;  Location: [facility] ENDOSCOPY;  Service: Gastroenterology;  Laterality: N/A;  pre screen  post poor prep  dr yg mares   • ENDOSCOPY N/A 2021    Procedure: ESOPHAGOGASTRODUODENOSCOPY WITH ANESTHESIA;  Surgeon: Byron Rosenberg  MD GOPI;  Location: [facility] ENDOSCOPY;  Service: Gastroenterology;  Laterality: N/A;  pre GERD  post normal  dr yg gutiérrez   • LYMPH NODE BIOPSY Bilateral     neck   • ORIF ULNAR / RADIAL SHAFT FRACTURE Right    • PILONIDAL CYSTECTOMY N/A 12/01/2020    Procedure: EXCISION PILONIDAL ABSCESS;  Surgeon: Ashlyn Posey MD;  Location: [facility] OR;  Service: General;  Laterality: N/A;       Family History: family history includes Arrhythmia in his father; Diabetes in his maternal grandfather, mother, and paternal grandmother; Heart disease in his maternal grandfather and paternal grandmother; Hypertension in his father, maternal grandfather, mother, paternal grandfather, paternal grandmother, and sister; No Known Problems in his brother; Stroke in his paternal grandmother; Thyroid disease in his father, mother, and paternal grandmother. Otherwise pertinent FHx was reviewed and not pertinent to current issue.    Social History:  reports that he has been smoking cigarettes. He started smoking about 35 years ago. He has been smoking an average of .25 packs per day. He has never used smokeless tobacco. He reports current alcohol use. He reports that he does not use drugs.    Home Medications:  Calcium Carb-Cholecalciferol, Dulaglutide, Mupyzqo-Prafh-Qqufzryw-TenofAF, HYDROcodone-acetaminophen, Insulin Aspart, Insulin Syringe, Melatonin, Omnipod DASH Pods (Gen 4), Vortioxetine HBr, albuterol sulfate HFA, atorvastatin, ciclopirox, clotrimazole-betamethasone, dexlansoprazole, fluticasone, loratadine, losartan, meclizine, metFORMIN, mometasone, multivitamin with minerals, mupirocin, ondansetron ODT, prazosin, pregabalin, propranolol LA, rizatriptan, saccharomyces boulardii, senna, tamsulosin, topiramate, and traZODone    Allergies:  Allergies   Allergen Reactions   • Amitriptyline Hcl Anaphylaxis   • Darvon [Propoxyphene] Anaphylaxis   • Zithromax [Azithromycin] Anaphylaxis       Objective    Objective     Vitals:   Temp:  [97.2  °F (36.2 °C)] 97.2 °F (36.2 °C)  Heart Rate:  [90] 90  Resp:  [18] 18  BP: (124)/(61) 124/61    Physical Exam normocephalic neck supple lungs clear decreased bases bases ab benign foreskin with some phimosis genitalia within normal otherwise  Result Review    Result Review:  I have personally reviewed the results from the time of this admission to 4/18/2023 07:16 CDT and agree with these findings:  []  Laboratory list / accordion  []  Microbiology  []  Radiology  []  EKG/Telemetry   []  Cardiology/Vascular   []  Pathology  []  Old records  []  Other:  Most notable findings include: LUTS      Assessment & Plan   Assessment / Plan     Brief Patient Summary:  Donis Yi is a 57 y.o. male who BPH mild phimosis    Active Hospital Problems:  Active Hospital Problems    Diagnosis    • **Hematuria syndrome      Plan: Cystoscopy possible dilation of the foreskin risk and benefits discussed      DVT prophylaxis:  No DVT prophylaxis order currently exists.    CODE STATUS:       Admission Status:  I believe this patient meets  status.    Bairon Mendoza MD

## 2023-04-18 NOTE — ADDENDUM NOTE
Addendum  created 04/18/23 0854 by Bola Leyva, CRNA    Child order released for a procedure order, Clinical Note Signed, Intraprocedure Blocks edited, LDA created via procedure documentation, LDA properties accepted, SmartForm saved

## 2023-04-18 NOTE — OP NOTE
CYSTOSCOPY  Procedure Note    Donis Yi  4/18/2023    Pre-op Diagnosis:   Hematuria syndrome [R31.9]    Post-op Diagnosis:     Post-Op Diagnosis Codes:     * Hematuria syndrome [R31.9]    Procedure(s):  CYSTOSCOPY    Surgeon(s):  Bairon Mendoza MD    Anesthesia: Monitored Anesthesia Care    Staff:   Circulator: Guerline Bustillo RN; Isabelle Marshall RN  Scrub Person: Gracie Schulte          Estimated Blood Loss: minimal    Specimens:                None      Drains: * No LDAs found *    Findings: Urethral stricture phimosis    Complications: None    Indications: Same    Description of Procedure: Patient brought to surgery placed in the dorsolithotomy position sterile prep and drape genitalia in routine fashion patient had a phimosis for which we dilated with the dilators subsequent to this we took the Babcocks and help the foreskin open and then found a navicular urethral stricture for which we dilated with the sounds up to a 24 [place] then passed the cystoscope down the urethra into the bladder itself there was a mild stricture mid urethra which we dilated with the scope moderate obstructing prostate was noted no tumors or calculi in the bladder once is accomplished we drained the bladder remove the scope and anchored #16 [place] Posada catheter 10 cc placed in balloon and the patient taken recovery having tolerated the procedure well    Bairon Mendoza MD     Date: 4/18/2023  Time: 08:09 CDT

## 2023-04-18 NOTE — ANESTHESIA PROCEDURE NOTES
Airway  Urgency: elective    Date/Time: 4/18/2023 7:50 AM  End Time:4/18/2023 7:50 AM  Airway not difficult    General Information and Staff    Patient location during procedure: OR  CRNA/CAA: Bola Leyva CRNA  SRNA: Bran Ponce SRNA  Indications and Patient Condition  Indications for airway management: airway protection    Preoxygenated: yes  MILS maintained throughout  Mask difficulty assessment: 0 - not attempted    Final Airway Details  Final airway type: supraglottic airway      Successful airway: I-gel  Size 4     Number of attempts at approach: 1  Assessment: lips, teeth, and gum same as pre-op

## 2023-04-20 ENCOUNTER — HOSPITAL ENCOUNTER (EMERGENCY)
Facility: HOSPITAL | Age: 58
Discharge: HOME | End: 2023-04-20
Attending: EMERGENCY MEDICINE
Payer: MEDICARE

## 2023-04-20 ENCOUNTER — OFFICE VISIT (OUTPATIENT)
Dept: WOUND CARE | Facility: HOSPITAL | Age: 58
End: 2023-04-20
Payer: MEDICARE

## 2023-04-20 ENCOUNTER — OUTSIDE FACILITY SERVICE (OUTPATIENT)
Dept: WOUND CARE | Facility: HOSPITAL | Age: 58
End: 2023-04-20
Payer: MEDICARE

## 2023-04-20 VITALS
SYSTOLIC BLOOD PRESSURE: 126 MMHG | OXYGEN SATURATION: 97 % | TEMPERATURE: 97.7 F | HEART RATE: 97 BPM | DIASTOLIC BLOOD PRESSURE: 76 MMHG | RESPIRATION RATE: 18 BRPM | BODY MASS INDEX: 37.51 KG/M2 | WEIGHT: 262 LBS | HEIGHT: 70 IN

## 2023-04-20 DIAGNOSIS — T83.9XXA FOLEY CATHETER PROBLEM, INITIAL ENCOUNTER: Primary | ICD-10-CM

## 2023-04-20 PROCEDURE — 99282 EMERGENCY DEPT VISIT SF MDM: CPT

## 2023-04-20 NOTE — Clinical Note
[facility] EMERGENCY DEPARTMENT  [address] [place] [id number]  Phone: [phone]    Donis Yi was seen and treated in our emergency department on 4/20/2023.  He may return to work on 04/24/2023.         Thank you for choosing [facility].    Rich Rivas, DO

## 2023-04-20 NOTE — Clinical Note
[facility] EMERGENCY DEPARTMENT  [address] [place] [id number]  Phone: [phone]    Donis Yi was seen and treated in our emergency department on 4/20/2023.  He may return to work on 04/24/2023.         Thank you for choosing [facility].    Rich Rivas, DO

## 2023-04-21 NOTE — ED PROVIDER NOTES
Subjective   History of Present Illness  This is a 57-year-old male who recently had a Posada catheter placed due to a urethral stricture.  The patient states that this evening the bag and catheter became disconnected on accident.  He believes that the catheter was somewhat tugged and may be seated in a lower position than it was before.  He is having mild suprapubic discomfort.  He denies any hematuria.  He believes the catheter is still draining appropriately.  He denies fevers or chills.  He denies injury otherwise.  The patient has an appointment tomorrow to consider having his catheter removed with Dr. Mendoza urology.        Review of Systems   All other systems reviewed and are negative.      Past Medical History:   Diagnosis Date   • Allergic rhinitis    • Anxiety    • Bursitis    • DDD (degenerative disc disease), cervical    • Depression    • GERD (gastroesophageal reflux disease)    • HIV (human immunodeficiency virus infection)    • HLD (hyperlipidemia)    • HSP (Henoch Schonlein purpura)    • Migraine    • Myocardial infarction    • Osteoporosis    • Sleep apnea    • Type 2 diabetes mellitus        Allergies   Allergen Reactions   • Amitriptyline Hcl Anaphylaxis   • Darvon [Propoxyphene] Anaphylaxis   • Zithromax [Azithromycin] Anaphylaxis       Past Surgical History:   Procedure Laterality Date   • AMPUTATION DIGIT Right 01/06/2022    Procedure: 4th Toe  Amputation - Right Foot;  Surgeon: Pacheco Harper DPM;  Location: [facility] OR;  Service: Podiatry;  Laterality: Right;   • ANKLE SURGERY Right     ORIF   • CARDIAC CATHETERIZATION N/A 06/24/2020    Procedure: Coronary angiography;  Surgeon: Deon Bailey MD;  Location:  [facility] CATH INVASIVE LOCATION;  Service: Cardiology;  Laterality: N/A;  11am start time   • CARDIAC CATHETERIZATION N/A 06/24/2020    Procedure: Percutaneous Coronary Intervention;  Surgeon: Deon Bailey MD;  Location:  [facility] CATH INVASIVE LOCATION;  Service: Cardiology;   Laterality: N/A;   • COLONOSCOPY  06/19/2009    Normal exam repeat in 10 years   • COLONOSCOPY N/A 08/26/2021    Procedure: COLONOSCOPY WITH ANESTHESIA;  Surgeon: Byron Rosenberg MD;  Location: [facility] ENDOSCOPY;  Service: Gastroenterology;  Laterality: N/A;  pre screen  post poor prep  dr yg gutiérrez   • ENDOSCOPY N/A 08/26/2021    Procedure: ESOPHAGOGASTRODUODENOSCOPY WITH ANESTHESIA;  Surgeon: Byron Rosenberg MD;  Location: [facility] ENDOSCOPY;  Service: Gastroenterology;  Laterality: N/A;  pre GERD  post normal  dr yg gutiérrez   • LYMPH NODE BIOPSY Bilateral     neck   • ORIF ULNAR / RADIAL SHAFT FRACTURE Right    • PILONIDAL CYSTECTOMY N/A 12/01/2020    Procedure: EXCISION PILONIDAL ABSCESS;  Surgeon: Ashlyn Posey MD;  Location: [facility] OR;  Service: General;  Laterality: N/A;       Family History   Problem Relation Age of Onset   • Diabetes Mother    • Hypertension Mother    • Thyroid disease Mother    • Hypertension Father    • Thyroid disease Father    • Arrhythmia Father    • Diabetes Maternal Grandfather    • Heart disease Maternal Grandfather    • Hypertension Maternal Grandfather    • Diabetes Paternal Grandmother    • Stroke Paternal Grandmother    • Heart disease Paternal Grandmother    • Hypertension Paternal Grandmother    • Thyroid disease Paternal Grandmother    • Hypertension Paternal Grandfather    • Hypertension Sister    • No Known Problems Brother    • Colon cancer Neg Hx    • Colon polyps Neg Hx        Social History     Socioeconomic History   • Marital status:    Tobacco Use   • Smoking status: Every Day     Packs/day: 0.25     Types: Cigarettes     Start date: 1988   • Smokeless tobacco: Never   Vaping Use   • Vaping Use: Never used   Substance and Sexual Activity   • Alcohol use: Yes     Comment: socialy   • Drug use: No   • Sexual activity: Defer           Objective   Physical Exam  Vitals and nursing note reviewed.   Constitutional:       Appearance: He is obese.   HENT:       "Head: Normocephalic.      Mouth/Throat:      Mouth: Mucous membranes are moist.   Eyes:      General: No scleral icterus.  Cardiovascular:      Rate and Rhythm: Normal rate.   Pulmonary:      Effort: Pulmonary effort is normal.   Genitourinary:     Comments: Posada catheter is anchored and connected to drainage system/bag.  No obvious hematuria.  Musculoskeletal:         General: No signs of injury.   Skin:     General: Skin is warm and dry.   Neurological:      Mental Status: He is alert and oriented to person, place, and time.   Psychiatric:      Comments: Anxious         Procedures           ED Course                                           Medical Decision Making  The patient's recent past medical charts for this facility as well as outside facilities via the \"care everywhere\" application of epic reviewed.    The differential diagnosis includes Posada catheter damage, Posada catheter displacement, acute urinary retention, and urethral stricture, among others.    On final reevaluation the patient is in stable condition and reports improvement of symptoms.  We discussed need for follow-up with urology tomorrow and reasons for early return to the emergency department.      Posada catheter problem, initial encounter: acute illness or injury  Risk  OTC drugs.          Final diagnoses:   Posada catheter problem, initial encounter       ED Disposition  ED Disposition     ED Disposition   Discharge    Condition   Stable    Comment   --             Oksana Mares MD  [address] [place] [address] [address]  [facility] [address] [phone]      As needed    [place], Bairon CARLSON MD  [address] [facility] [address]  [facility] [address] [phone]    In 1 day  as planned         Medication List      Changed    NovoLOG 100 UNIT/ML injection  Generic drug: Insulin Aspart  Up to 150 units daily through pump (33 day supply)  What changed: See the new instructions.             Rich Rivas DO  04/21/23 0018    "

## 2023-04-27 ENCOUNTER — OFFICE VISIT (OUTPATIENT)
Dept: WOUND CARE | Facility: HOSPITAL | Age: 58
End: 2023-04-27
Payer: MEDICARE

## 2023-04-27 ENCOUNTER — OUTSIDE FACILITY SERVICE (OUTPATIENT)
Dept: WOUND CARE | Facility: HOSPITAL | Age: 58
End: 2023-04-27
Payer: MEDICARE

## 2023-04-27 PROCEDURE — 11055 PARING/CUTG B9 HYPRKER LES 1: CPT

## 2023-05-04 ENCOUNTER — OUTSIDE FACILITY SERVICE (OUTPATIENT)
Dept: WOUND CARE | Facility: HOSPITAL | Age: 58
End: 2023-05-04
Payer: MEDICARE

## 2023-05-04 ENCOUNTER — OFFICE VISIT (OUTPATIENT)
Dept: WOUND CARE | Facility: HOSPITAL | Age: 58
End: 2023-05-04
Payer: MEDICARE

## 2023-05-04 DIAGNOSIS — H69.81 DYSFUNCTION OF RIGHT EUSTACHIAN TUBE: ICD-10-CM

## 2023-05-04 DIAGNOSIS — K52.1 DIARRHEA DUE TO DRUG: ICD-10-CM

## 2023-05-04 RX ORDER — RIZATRIPTAN BENZOATE 10 MG/1
10 TABLET ORAL
Qty: 9 TABLET | Refills: 1 | Status: SHIPPED | OUTPATIENT
Start: 2023-05-04 | End: 2023-05-04

## 2023-05-04 RX ORDER — MECLIZINE HYDROCHLORIDE 25 MG/1
TABLET ORAL
Qty: 30 TABLET | Refills: 1 | Status: SHIPPED | OUTPATIENT
Start: 2023-05-04

## 2023-05-04 RX ORDER — INSULIN ASPART 100 [IU]/ML
INJECTION, SOLUTION INTRAVENOUS; SUBCUTANEOUS
Qty: 50 ML | Refills: 2 | Status: SHIPPED | OUTPATIENT
Start: 2023-05-04

## 2023-05-04 RX ORDER — LOPERAMIDE HYDROCHLORIDE 2 MG/1
2 CAPSULE ORAL 4 TIMES DAILY PRN
Qty: 30 CAPSULE | Refills: 1 | Status: SHIPPED | OUTPATIENT
Start: 2023-05-04

## 2023-05-04 RX ORDER — MOMETASONE FUROATE 50 UG/1
SPRAY, METERED NASAL
Qty: 17 G | Refills: 3 | Status: SHIPPED | OUTPATIENT
Start: 2023-05-04

## 2023-05-05 LAB
QT INTERVAL: 396 MS
QTC INTERVAL: 492 MS

## 2023-05-11 ENCOUNTER — OFFICE VISIT (OUTPATIENT)
Dept: WOUND CARE | Facility: HOSPITAL | Age: 58
End: 2023-05-11
Payer: MEDICARE

## 2023-05-11 ENCOUNTER — OUTSIDE FACILITY SERVICE (OUTPATIENT)
Dept: WOUND CARE | Facility: HOSPITAL | Age: 58
End: 2023-05-11
Payer: MEDICARE

## 2023-05-11 PROCEDURE — 11055 PARING/CUTG B9 HYPRKER LES 1: CPT

## 2023-05-16 ENCOUNTER — TELEPHONE (OUTPATIENT)
Dept: NEUROLOGY | Facility: CLINIC | Age: 58
End: 2023-05-16
Payer: MEDICARE

## 2023-05-16 ENCOUNTER — OFFICE VISIT (OUTPATIENT)
Dept: PODIATRY | Facility: CLINIC | Age: 58
End: 2023-05-16
Payer: MEDICARE

## 2023-05-16 VITALS — WEIGHT: 262 LBS | OXYGEN SATURATION: 99 % | BODY MASS INDEX: 37.51 KG/M2 | HEIGHT: 70 IN | HEART RATE: 100 BPM

## 2023-05-16 DIAGNOSIS — Z79.4 TYPE 2 DIABETES MELLITUS WITH HYPERGLYCEMIA, WITH LONG-TERM CURRENT USE OF INSULIN: ICD-10-CM

## 2023-05-16 DIAGNOSIS — E11.65 TYPE 2 DIABETES MELLITUS WITH HYPERGLYCEMIA, WITH LONG-TERM CURRENT USE OF INSULIN: ICD-10-CM

## 2023-05-16 DIAGNOSIS — M79.672 LEFT FOOT PAIN: ICD-10-CM

## 2023-05-16 DIAGNOSIS — E11.9 ENCOUNTER FOR DIABETIC FOOT EXAM: ICD-10-CM

## 2023-05-16 DIAGNOSIS — L84 FOOT CALLUS: ICD-10-CM

## 2023-05-16 DIAGNOSIS — M21.622 BUNIONETTE OF LEFT FOOT: ICD-10-CM

## 2023-05-16 DIAGNOSIS — B35.1 ONYCHOMYCOSIS: Primary | ICD-10-CM

## 2023-05-16 DIAGNOSIS — S98.131A AMPUTATED TOE, RIGHT: ICD-10-CM

## 2023-05-16 NOTE — PROGRESS NOTES
Donis Yi  1965  57 y.o. male  PCP: Yg Mares 4/12/2023  BS: 133 per patient     5/16/2023    Chief Complaint   Patient presents with   • Left Foot - Follow-up     Diabetic foot care    • Right Foot - Follow-up     Diabetic foot care        History of Present Illness    Donis Yi is a 57 y.o.male came to clinic for diabetic foot care         Past Medical History:   Diagnosis Date   • Allergic rhinitis    • Anxiety    • Bursitis    • DDD (degenerative disc disease), cervical    • Depression    • GERD (gastroesophageal reflux disease)    • HIV (human immunodeficiency virus infection)    • HLD (hyperlipidemia)    • HSP (Henoch Schonlein purpura)    • Migraine    • Myocardial infarction    • Osteoporosis    • Sleep apnea    • Type 2 diabetes mellitus          Past Surgical History:   Procedure Laterality Date   • AMPUTATION DIGIT Right 01/06/2022    Procedure: 4th Toe  Amputation - Right Foot;  Surgeon: Pacheco Harper DPM;  Location: [facility] OR;  Service: Podiatry;  Laterality: Right;   • ANKLE SURGERY Right     ORIF   • CARDIAC CATHETERIZATION N/A 06/24/2020    Procedure: Coronary angiography;  Surgeon: Deon Bailey MD;  Location: [facility] CATH INVASIVE LOCATION;  Service: Cardiology;  Laterality: N/A;  11am start time   • CARDIAC CATHETERIZATION N/A 06/24/2020    Procedure: Percutaneous Coronary Intervention;  Surgeon: Deon Bailey MD;  Location: [facility] CATH INVASIVE LOCATION;  Service: Cardiology;  Laterality: N/A;   • COLONOSCOPY  06/19/2009    Normal exam repeat in 10 years   • COLONOSCOPY N/A 08/26/2021    Procedure: COLONOSCOPY WITH ANESTHESIA;  Surgeon: Byron Rosenberg MD;  Location: [facility] ENDOSCOPY;  Service: Gastroenterology;  Laterality: N/A;  pre screen  post poor prep  dr yg mares   • CYSTOSCOPY N/A 4/18/2023    Procedure: CYSTOSCOPY;  Surgeon: Bairon Mendoza MD;  Location: [facility] OR;  Service: Urology;  Laterality: N/A;   • ENDOSCOPY N/A 08/26/2021    Procedure:  ESOPHAGOGASTRODUODENOSCOPY WITH ANESTHESIA;  Surgeon: Byron Rosenberg MD;  Location: [facility] ENDOSCOPY;  Service: Gastroenterology;  Laterality: N/A;  pre GERD  post normal  dr yg gutiérrez   • LYMPH NODE BIOPSY Bilateral     neck   • ORIF ULNAR / RADIAL SHAFT FRACTURE Right    • PILONIDAL CYSTECTOMY N/A 12/01/2020    Procedure: EXCISION PILONIDAL ABSCESS;  Surgeon: Ashlyn Posey MD;  Location:  [facility] OR;  Service: General;  Laterality: N/A;         Family History   Problem Relation Age of Onset   • Diabetes Mother    • Hypertension Mother    • Thyroid disease Mother    • Hypertension Father    • Thyroid disease Father    • Arrhythmia Father    • Diabetes Maternal Grandfather    • Heart disease Maternal Grandfather    • Hypertension Maternal Grandfather    • Diabetes Paternal Grandmother    • Stroke Paternal Grandmother    • Heart disease Paternal Grandmother    • Hypertension Paternal Grandmother    • Thyroid disease Paternal Grandmother    • Hypertension Paternal Grandfather    • Hypertension Sister    • No Known Problems Brother    • Colon cancer Neg Hx    • Colon polyps Neg Hx        Allergies   Allergen Reactions   • Amitriptyline Hcl Anaphylaxis   • Darvon [Propoxyphene] Anaphylaxis   • Zithromax [Azithromycin] Anaphylaxis       Social History     Socioeconomic History   • Marital status:    Tobacco Use   • Smoking status: Every Day     Packs/day: 0.25     Types: Cigarettes     Start date: 1988   • Smokeless tobacco: Never   Vaping Use   • Vaping Use: Never used   Substance and Sexual Activity   • Alcohol use: Yes     Comment: socialy   • Drug use: No   • Sexual activity: Defer         Current Outpatient Medications   Medication Sig Dispense Refill   • albuterol (PROVENTIL HFA;VENTOLIN HFA) 108 (90 BASE) MCG/ACT inhaler Inhale 2 puffs Every 6 (Six) Hours As Needed for Wheezing.     • atorvastatin (LIPITOR) 40 MG tablet Take 1 tablet by mouth Every Night.  3   • Calcium Carb-Cholecalciferol (CALCIUM  "600+D3 PO) Take  by mouth Every Night.     • ciclopirox (LOPROX) 0.77 % cream Apply  topically to the appropriate area as directed 2 (Two) Times a Day. 30 g 5   • clotrimazole-betamethasone (LOTRISONE) 1-0.05 % cream Apply  topically to the appropriate area as directed 2 (Two) Times a Day. 15 g 1   • DEXILANT 60 MG capsule Take 1 capsule by mouth Every Night.  1   • Imeduns-Vkzcp-Tkajjnwn-TenofAF (GENVOYA) [phone] MG per tablet Take 1 tablet by mouth Every Night.     • fluticasone (FLONASE) 50 MCG/ACT nasal spray 2 sprays into each nostril Daily. 1 bottle 6   • HYDROcodone-acetaminophen (NORCO) 5-325 MG per tablet Take 1 tablet by mouth 2 (Two) Times a Day.     • Insulin Disposable Pump (Omnipod DASH Pods, Gen 4,) misc 1 each Every Other Day. DX CODE: E11.65 45 each 3   • Insulin Syringe 31G X 5/16\" 1 ML misc Use 4 times daily  , ICD10 code is E11.9 120 each 11   • loratadine (CLARITIN) 10 MG tablet Take 1 tablet by mouth Every Night.     • losartan (COZAAR) 25 MG tablet Take 1 tablet by mouth Every Night.     • meclizine (ANTIVERT) 25 MG tablet Take 1 tablet by mouth Every Night.     • Melatonin 5 MG capsule Take 5 mg by mouth every night at bedtime.  3   • metFORMIN (GLUCOPHAGE) 500 MG tablet Take 2 tablets by mouth 2 (Two) Times a Day With Meals.  3   • mometasone (NASONEX) 50 MCG/ACT nasal spray 2 sprays into the nostril(s) as directed by provider Daily.     • multivitamin with minerals tablet tablet Take 1 tablet by mouth Every Night.     • mupirocin (BACTROBAN) 2 % ointment Apply 1 application topically to the appropriate area as directed Daily. 22 g 0   • NovoLOG 100 UNIT/ML injection Inject up to 150 units daily through pump 50 mL 2   • ondansetron ODT (ZOFRAN-ODT) 4 MG disintegrating tablet Place 1 tablet on the tongue Every 6 (Six) Hours As Needed for Nausea. 12 tablet 0   • prazosin (MINIPRESS) 2 MG capsule Take 1 capsule by mouth Every Night. Patient unsure of dosage     • pregabalin (LYRICA) 75 " "MG capsule Take 1 capsule by mouth 3 (Three) Times a Day.     • propranolol LA (INDERAL LA) 80 MG 24 hr capsule Take 1 capsule by mouth Every Night.  5   • rizatriptan (MAXALT) 10 MG tablet Take 1 tablet by mouth 1 (One) Time As Needed for Migraine. May repeat in 2 hours if needed     • saccharomyces boulardii (FLORASTOR) 250 MG capsule Take 1 capsule by mouth Every Night.     • senna (SENOKOT) 8.6 MG tablet Take 2 tablets by mouth Every Night.     • tamsulosin (FLOMAX) 0.4 MG capsule 24 hr capsule TAKE 2 CAPSULES BY MOUTH EVERY NIGHT AT BEDTIME. 60 capsule 0   • topiramate (TOPAMAX) 25 MG tablet Take 1 tablet by mouth 3 (Three) Times a Day.     • traZODone (DESYREL) 100 MG tablet Take 2 tablets by mouth Every Night.  3   • Trulicity 4.5 MG/0.5ML solution pen-injector Inject 4.5 mg under the skin into the appropriate area as directed 1 (One) Time Per Week. Start after 1 month of 3 mg 2 mL 11   • Vortioxetine HBr 20 MG tablet Take 1 tablet by mouth Every Night. Pt takes at night       No current facility-administered medications for this visit.       Review of Systems   Constitutional: Negative.    HENT: Negative.    Eyes: Negative.    Respiratory: Negative.    Cardiovascular: Negative.    Gastrointestinal: Negative.    Endocrine: Negative.    Genitourinary: Negative.    Musculoskeletal:        Foot pain    Skin: Negative.    Allergic/Immunologic: Negative.    Neurological: Negative.    Hematological: Negative.    Psychiatric/Behavioral: Negative.          OBJECTIVE    Pulse 100   Ht 177.8 cm (70\")   Wt 119 kg (262 lb)   SpO2 99%   BMI 37.59 kg/m²     Physical Exam  Vitals reviewed.   Constitutional:       Appearance: Normal appearance. He is well-developed.   HENT:      Head: Normocephalic and atraumatic.   Neck:      Trachea: Trachea and phonation normal.   Cardiovascular:      Pulses:           Dorsalis pedis pulses are 1+ on the right side and 1+ on the left side.        Posterior tibial pulses are 1+ on the " right side and 1+ on the left side.   Pulmonary:      Effort: Pulmonary effort is normal. No respiratory distress.   Abdominal:      General: There is no distension.      Palpations: Abdomen is soft.   Musculoskeletal:      Right foot: Decreased range of motion.      Left foot: Decreased range of motion.        Feet:       Right Lower Extremity: (Fourth toe)  Feet:      Right foot:      Protective Sensation: 10 sites tested. 8 sites sensed.      Skin integrity: Callus (Right first metatarsal head there is significant callus formation with an underlying wound after debridement noted.  The wound is small.) present.      Toenail Condition: Right toenails are abnormally thick.      Left foot:      Protective Sensation: 10 sites tested. 8 sites sensed.      Skin integrity: Callus (Plantar under the metatarsal head there is a small callus with an underlying wound that had previously been treated by a wound care.) present.      Toenail Condition: Left toenails are abnormally thick.   Skin:     General: Skin is warm and dry.   Neurological:      Mental Status: He is alert and oriented to person, place, and time.      GCS: GCS eye subscore is 4. GCS verbal subscore is 5. GCS motor subscore is 6.   Psychiatric:         Speech: Speech normal.         Behavior: Behavior normal. Behavior is cooperative.         Thought Content: Thought content normal.         Judgment: Judgment normal.                Procedures        ASSESSMENT AND PLAN    - A diabetic foot screening exam was performed and the patient was educated on the foot complications related to diabetes,  preventative foot care and what signs and symptoms to watch for.  Instructed to contact our office if any foot problems develop before next visit.  -Discussed treatments for painful toenails. Treatment options discussed included nail removal versus debridement. Patient elected for routine nail debridement. An ABN has been signed by the patient.  - Toenails 1-4 left 1-5  right  were debrided in length and thickness with nail nipper and electric  to decrease fungal load and risk of infection.  - All the patients questions were answered.  - RTC 3 months or sooner if needed.         Diagnosis Plan   1. Onychomycosis        2. Left foot pain        3. Foot callus        4. Amputated toe, right        5. Type 2 diabetes mellitus with hyperglycemia, with long-term current use of insulin        6. Encounter for diabetic foot exam        7. Bunionette of left foot                  This document has been electronically signed by Fabian RAMSAY, FNP-C, ONP-C on May 16, 2023 13:43 CDT

## 2023-05-16 NOTE — TELEPHONE ENCOUNTER
Spoke with the patient and advised of needing to r/s his appt on Thursday due to Yamilet Powell being out of town. Appt r/s for 5/31 at 1:30 with Daniel Quinonez. Patient verbalizes understanding.

## 2023-05-26 PROBLEM — R31.9 HEMATURIA SYNDROME: Status: ACTIVE | Noted: 2023-03-24

## 2023-05-26 PROBLEM — S98.131A AMPUTATED TOE, RIGHT (HCC): Status: ACTIVE | Noted: 2022-11-09

## 2023-05-30 ENCOUNTER — TELEPHONE (OUTPATIENT)
Dept: NEUROLOGY | Facility: CLINIC | Age: 58
End: 2023-05-30

## 2023-05-30 ENCOUNTER — OFFICE VISIT (OUTPATIENT)
Dept: PODIATRY | Facility: CLINIC | Age: 58
End: 2023-05-30

## 2023-05-30 VITALS
DIASTOLIC BLOOD PRESSURE: 70 MMHG | OXYGEN SATURATION: 94 % | WEIGHT: 262 LBS | SYSTOLIC BLOOD PRESSURE: 127 MMHG | HEART RATE: 76 BPM | BODY MASS INDEX: 37.51 KG/M2 | HEIGHT: 70 IN

## 2023-05-30 DIAGNOSIS — E11.65 TYPE 2 DIABETES MELLITUS WITH HYPERGLYCEMIA, WITH LONG-TERM CURRENT USE OF INSULIN: ICD-10-CM

## 2023-05-30 DIAGNOSIS — M21.622 BUNIONETTE OF LEFT FOOT: ICD-10-CM

## 2023-05-30 DIAGNOSIS — M79.672 LEFT FOOT PAIN: Primary | ICD-10-CM

## 2023-05-30 DIAGNOSIS — Z79.4 TYPE 2 DIABETES MELLITUS WITH HYPERGLYCEMIA, WITH LONG-TERM CURRENT USE OF INSULIN: ICD-10-CM

## 2023-05-30 NOTE — PROGRESS NOTES
Donis Yi  1965  57 y.o. male  PCP: Yg Mares 5/23  BS: 297    05/30/2023    Chief Complaint   Patient presents with   • Left Foot - Pain       History of Present Illness    Donis Yi is a 57 y.o.male presents to clinic today for a surgical consultation.  Patient has been having pain to the bottom of the left foot.      Past Medical History:   Diagnosis Date   • Allergic rhinitis    • Anxiety    • Bursitis    • DDD (degenerative disc disease), cervical    • Depression    • GERD (gastroesophageal reflux disease)    • HIV (human immunodeficiency virus infection)    • HLD (hyperlipidemia)    • HSP (Henoch Schonlein purpura)    • Migraine    • Myocardial infarction    • Osteoporosis    • Sleep apnea    • Type 2 diabetes mellitus          Past Surgical History:   Procedure Laterality Date   • AMPUTATION DIGIT Right 01/06/2022    Procedure: 4th Toe  Amputation - Right Foot;  Surgeon: Pacheco Harpre DPM;  Location: [facility] OR;  Service: Podiatry;  Laterality: Right;   • ANKLE SURGERY Right     ORIF   • CARDIAC CATHETERIZATION N/A 06/24/2020    Procedure: Coronary angiography;  Surgeon: Deon Bailey MD;  Location: [facility] CATH INVASIVE LOCATION;  Service: Cardiology;  Laterality: N/A;  11am start time   • CARDIAC CATHETERIZATION N/A 06/24/2020    Procedure: Percutaneous Coronary Intervention;  Surgeon: Deon Bailey MD;  Location: [facility] CATH INVASIVE LOCATION;  Service: Cardiology;  Laterality: N/A;   • COLONOSCOPY  06/19/2009    Normal exam repeat in 10 years   • COLONOSCOPY N/A 08/26/2021    Procedure: COLONOSCOPY WITH ANESTHESIA;  Surgeon: Byron Rosenberg MD;  Location: [facility] ENDOSCOPY;  Service: Gastroenterology;  Laterality: N/A;  pre screen  post poor prep  dr yg mares   • CYSTOSCOPY N/A 4/18/2023    Procedure: CYSTOSCOPY;  Surgeon: Bairon Mendoza MD;  Location: [facility] OR;  Service: Urology;  Laterality: N/A;   • ENDOSCOPY N/A 08/26/2021    Procedure:  ESOPHAGOGASTRODUODENOSCOPY WITH ANESTHESIA;  Surgeon: Byron Rosenberg MD;  Location: [facility] ENDOSCOPY;  Service: Gastroenterology;  Laterality: N/A;  pre GERD  post normal  dr yg gutiérrez   • LYMPH NODE BIOPSY Bilateral     neck   • ORIF ULNAR / RADIAL SHAFT FRACTURE Right    • PILONIDAL CYSTECTOMY N/A 12/01/2020    Procedure: EXCISION PILONIDAL ABSCESS;  Surgeon: Ashlyn Posey MD;  Location:  [facility] OR;  Service: General;  Laterality: N/A;         Family History   Problem Relation Age of Onset   • Diabetes Mother    • Hypertension Mother    • Thyroid disease Mother    • Hypertension Father    • Thyroid disease Father    • Arrhythmia Father    • Diabetes Maternal Grandfather    • Heart disease Maternal Grandfather    • Hypertension Maternal Grandfather    • Diabetes Paternal Grandmother    • Stroke Paternal Grandmother    • Heart disease Paternal Grandmother    • Hypertension Paternal Grandmother    • Thyroid disease Paternal Grandmother    • Hypertension Paternal Grandfather    • Hypertension Sister    • No Known Problems Brother    • Colon cancer Neg Hx    • Colon polyps Neg Hx        Allergies   Allergen Reactions   • Amitriptyline Hcl Anaphylaxis   • Darvon [Propoxyphene] Anaphylaxis   • Zithromax [Azithromycin] Anaphylaxis       Social History     Socioeconomic History   • Marital status:    Tobacco Use   • Smoking status: Every Day     Packs/day: 0.25     Types: Cigarettes     Start date: 1988   • Smokeless tobacco: Never   Vaping Use   • Vaping Use: Never used   Substance and Sexual Activity   • Alcohol use: Yes     Comment: socialy   • Drug use: No   • Sexual activity: Defer         Current Outpatient Medications   Medication Sig Dispense Refill   • albuterol (PROVENTIL HFA;VENTOLIN HFA) 108 (90 BASE) MCG/ACT inhaler Inhale 2 puffs Every 6 (Six) Hours As Needed for Wheezing.     • atorvastatin (LIPITOR) 40 MG tablet Take 1 tablet by mouth Every Night.  3   • Calcium Carb-Cholecalciferol (CALCIUM  "600+D3 PO) Take  by mouth Every Night.     • ciclopirox (LOPROX) 0.77 % cream Apply  topically to the appropriate area as directed 2 (Two) Times a Day. 30 g 5   • clotrimazole-betamethasone (LOTRISONE) 1-0.05 % cream Apply  topically to the appropriate area as directed 2 (Two) Times a Day. 15 g 1   • DEXILANT 60 MG capsule Take 1 capsule by mouth Every Night.  1   • Olqnhjb-Gfszg-Hiknrljm-TenofAF (GENVOYA) [phone] MG per tablet Take 1 tablet by mouth Every Night.     • fluticasone (FLONASE) 50 MCG/ACT nasal spray 2 sprays into each nostril Daily. 1 bottle 6   • HYDROcodone-acetaminophen (NORCO) 5-325 MG per tablet Take 1 tablet by mouth 2 (Two) Times a Day.     • Insulin Disposable Pump (Omnipod DASH Pods, Gen 4,) misc 1 each Every Other Day. DX CODE: E11.65 45 each 3   • Insulin Syringe 31G X 5/16\" 1 ML misc Use 4 times daily  , ICD10 code is E11.9 120 each 11   • loratadine (CLARITIN) 10 MG tablet Take 1 tablet by mouth Every Night.     • losartan (COZAAR) 25 MG tablet Take 1 tablet by mouth Every Night.     • meclizine (ANTIVERT) 25 MG tablet Take 1 tablet by mouth Every Night.     • Melatonin 5 MG capsule Take 5 mg by mouth every night at bedtime.  3   • metFORMIN (GLUCOPHAGE) 500 MG tablet Take 2 tablets by mouth 2 (Two) Times a Day With Meals.  3   • mometasone (NASONEX) 50 MCG/ACT nasal spray 2 sprays into the nostril(s) as directed by provider Daily.     • multivitamin with minerals tablet tablet Take 1 tablet by mouth Every Night.     • mupirocin (BACTROBAN) 2 % ointment Apply 1 application topically to the appropriate area as directed Daily. 22 g 0   • NovoLOG 100 UNIT/ML injection Inject up to 150 units daily through pump 50 mL 2   • ondansetron ODT (ZOFRAN-ODT) 4 MG disintegrating tablet Place 1 tablet on the tongue Every 6 (Six) Hours As Needed for Nausea. 12 tablet 0   • prazosin (MINIPRESS) 2 MG capsule Take 1 capsule by mouth Every Night. Patient unsure of dosage     • pregabalin (LYRICA) 75 " "MG capsule Take 1 capsule by mouth 3 (Three) Times a Day.     • propranolol LA (INDERAL LA) 80 MG 24 hr capsule Take 1 capsule by mouth Every Night.  5   • rizatriptan (MAXALT) 10 MG tablet Take 1 tablet by mouth 1 (One) Time As Needed for Migraine. May repeat in 2 hours if needed     • saccharomyces boulardii (FLORASTOR) 250 MG capsule Take 1 capsule by mouth Every Night.     • tamsulosin (FLOMAX) 0.4 MG capsule 24 hr capsule TAKE 2 CAPSULES BY MOUTH EVERY NIGHT AT BEDTIME. 60 capsule 0   • topiramate (TOPAMAX) 25 MG tablet Take 1 tablet by mouth 3 (Three) Times a Day.     • traZODone (DESYREL) 100 MG tablet Take 2 tablets by mouth Every Night.  3   • Trulicity 4.5 MG/0.5ML solution pen-injector Inject 4.5 mg under the skin into the appropriate area as directed 1 (One) Time Per Week. Start after 1 month of 3 mg 2 mL 11   • Vortioxetine HBr 20 MG tablet Take 1 tablet by mouth Every Night. Pt takes at night     • Continuous Blood Gluc Sensor (FreeStyle Eamon 2 Sensor) misc USE as directed TO monitor blood glucose. replace every 14 DAYS.     • lamoTRIgine (LaMICtal) 25 MG tablet Take 2 tablets by mouth Daily.     • naloxone (NARCAN) 4 MG/0.1ML nasal spray      • rOPINIRole (REQUIP) 0.25 MG tablet Take 1 tablet by mouth 3 (Three) Times a Day. 90 tablet 2   • Senna-Plus 8.6-50 MG per tablet Take 2 tablets by mouth every night at bedtime.       No current facility-administered medications for this visit.       Review of Systems   Musculoskeletal:        Foot pain   All other systems reviewed and are negative.        OBJECTIVE    /70   Pulse 76   Ht 177.8 cm (70\")   Wt 119 kg (262 lb)   SpO2 94%   BMI 37.59 kg/m²     Physical Exam  Constitutional:       General: He is not in acute distress.  HENT:      Head: Normocephalic.   Cardiovascular:      Pulses:           Dorsalis pedis pulses are 1+ on the left side.        Posterior tibial pulses are 1+ on the left side.   Pulmonary:      Effort: Pulmonary effort is " normal.   Musculoskeletal:        Feet:    Feet:      Left foot:      Skin integrity: Callus present.   Neurological:      Mental Status: He is alert.   Psychiatric:         Mood and Affect: Mood normal.                Procedures        ASSESSMENT AND PLAN    Diagnoses and all orders for this visit:    1. Left foot pain (Primary)    2. Bunionette of left foot  -     Case Request; Standing  -     ceFAZolin (ANCEF) 2 g in sodium chloride 0.9 % 100 mL IVPB  -     Case Request    3. Type 2 diabetes mellitus with hyperglycemia, with long-term current use of insulin    Other orders  -     Follow Anesthesia Guidelines / Protocol; Future  -     Follow Anesthesia Guidelines / Protocol; Standing  -     Verify NPO Status; Standing  -     Obtain informed consent (if not collected inpatient or PAT); Standing  -     Notify Physician - Standard; Standing        -Imaging reviewed.  Discussed treatment options conservative and surgical.  Patient elected to proceed with scheduling of surgery.  -Surgical plan is left fifth metatarsal head excision and all indicated procedures.  -Risks and benefits of the procedure including but not limited to postoperative infection, incisional dehiscence, numbness, swelling, residual pain and postoperative blood clot discussed in detail.  No guarantees were given or implied.  -Tentative date for the surgery July 6, 2023  -All questions were answered  -Recheck following surgery           This document has been electronically signed by Abdi Raymundo DPM on May 31, 2023 13:54 CDT     5/31/2023  13:54 CDT

## 2023-05-31 ENCOUNTER — OFFICE VISIT (OUTPATIENT)
Dept: NEUROLOGY | Facility: CLINIC | Age: 58
End: 2023-05-31

## 2023-05-31 ENCOUNTER — OFFICE VISIT (OUTPATIENT)
Dept: INTERNAL MEDICINE | Age: 58
End: 2023-05-31

## 2023-05-31 VITALS
DIASTOLIC BLOOD PRESSURE: 60 MMHG | HEIGHT: 70 IN | OXYGEN SATURATION: 97 % | WEIGHT: 265 LBS | HEART RATE: 75 BPM | BODY MASS INDEX: 37.94 KG/M2 | SYSTOLIC BLOOD PRESSURE: 104 MMHG

## 2023-05-31 VITALS
WEIGHT: 251 LBS | DIASTOLIC BLOOD PRESSURE: 58 MMHG | BODY MASS INDEX: 34 KG/M2 | HEIGHT: 72 IN | SYSTOLIC BLOOD PRESSURE: 120 MMHG | OXYGEN SATURATION: 99 % | HEART RATE: 82 BPM

## 2023-05-31 DIAGNOSIS — E11.40 TYPE 2 DIABETES MELLITUS WITH DIABETIC NEUROPATHY, WITH LONG-TERM CURRENT USE OF INSULIN (HCC): Primary | ICD-10-CM

## 2023-05-31 DIAGNOSIS — R25.1 TREMOR: ICD-10-CM

## 2023-05-31 DIAGNOSIS — G25.0 ESSENTIAL TREMOR: Primary | ICD-10-CM

## 2023-05-31 DIAGNOSIS — E78.2 MIXED HYPERLIPIDEMIA: ICD-10-CM

## 2023-05-31 DIAGNOSIS — I10 PRIMARY HYPERTENSION: ICD-10-CM

## 2023-05-31 DIAGNOSIS — Z79.4 TYPE 2 DIABETES MELLITUS WITH DIABETIC NEUROPATHY, WITH LONG-TERM CURRENT USE OF INSULIN (HCC): ICD-10-CM

## 2023-05-31 DIAGNOSIS — L81.9 ATYPICAL PIGMENTED SKIN LESION: ICD-10-CM

## 2023-05-31 DIAGNOSIS — E55.9 VITAMIN D DEFICIENCY: ICD-10-CM

## 2023-05-31 DIAGNOSIS — B20 CURRENTLY ASYMPTOMATIC HIV INFECTION, WITH HISTORY OF HIV-RELATED ILLNESS (HCC): ICD-10-CM

## 2023-05-31 DIAGNOSIS — Z79.4 TYPE 2 DIABETES MELLITUS WITH DIABETIC NEUROPATHY, WITH LONG-TERM CURRENT USE OF INSULIN (HCC): Primary | ICD-10-CM

## 2023-05-31 DIAGNOSIS — E11.40 TYPE 2 DIABETES MELLITUS WITH DIABETIC NEUROPATHY, WITH LONG-TERM CURRENT USE OF INSULIN (HCC): ICD-10-CM

## 2023-05-31 DIAGNOSIS — S98.131A AMPUTATED TOE, RIGHT (HCC): ICD-10-CM

## 2023-05-31 PROBLEM — M21.622 BUNIONETTE OF LEFT FOOT: Status: ACTIVE | Noted: 2023-05-31

## 2023-05-31 LAB
25(OH)D3 SERPL-MCNC: 43.9 NG/ML
ALBUMIN SERPL-MCNC: 4.3 G/DL (ref 3.5–5.2)
ALP SERPL-CCNC: 69 U/L (ref 40–130)
ALT SERPL-CCNC: 31 U/L (ref 5–41)
ANION GAP SERPL CALCULATED.3IONS-SCNC: 11 MMOL/L (ref 7–19)
AST SERPL-CCNC: 27 U/L (ref 5–40)
BILIRUB SERPL-MCNC: <0.2 MG/DL (ref 0.2–1.2)
BUN SERPL-MCNC: 27 MG/DL (ref 6–20)
CALCIUM SERPL-MCNC: 10 MG/DL (ref 8.6–10)
CHLORIDE SERPL-SCNC: 100 MMOL/L (ref 98–111)
CHOLEST SERPL-MCNC: 150 MG/DL (ref 160–199)
CO2 SERPL-SCNC: 25 MMOL/L (ref 22–29)
CREAT SERPL-MCNC: 1.1 MG/DL (ref 0.5–1.2)
CREAT UR-MCNC: 339.9 MG/DL (ref 4.2–622)
ERYTHROCYTE [DISTWIDTH] IN BLOOD BY AUTOMATED COUNT: 12.7 % (ref 11.5–14.5)
GLUCOSE SERPL-MCNC: 313 MG/DL (ref 74–109)
HBA1C MFR BLD: 9.2 % (ref 4–6)
HCT VFR BLD AUTO: 40.6 % (ref 42–52)
HDLC SERPL-MCNC: 47 MG/DL (ref 55–121)
HGB BLD-MCNC: 13.3 G/DL (ref 14–18)
LDLC SERPL CALC-MCNC: 67 MG/DL
MCH RBC QN AUTO: 32 PG (ref 27–31)
MCHC RBC AUTO-ENTMCNC: 32.8 G/DL (ref 33–37)
MCV RBC AUTO: 97.8 FL (ref 80–94)
MICROALBUMIN UR-MCNC: 7.8 MG/DL (ref 0–19)
MICROALBUMIN/CREAT UR-RTO: 22.9 MG/G
PLATELET # BLD AUTO: 226 K/UL (ref 130–400)
PMV BLD AUTO: 11.3 FL (ref 9.4–12.4)
POTASSIUM SERPL-SCNC: 4 MMOL/L (ref 3.5–5)
PROT SERPL-MCNC: 7.3 G/DL (ref 6.6–8.7)
RBC # BLD AUTO: 4.15 M/UL (ref 4.7–6.1)
SODIUM SERPL-SCNC: 136 MMOL/L (ref 136–145)
TRIGL SERPL-MCNC: 179 MG/DL (ref 0–149)
TSH SERPL DL<=0.005 MIU/L-ACNC: 3.18 UIU/ML (ref 0.27–4.2)
WBC # BLD AUTO: 9.7 K/UL (ref 4.8–10.8)

## 2023-05-31 RX ORDER — HYDROCODONE BITARTRATE AND ACETAMINOPHEN 5; 325 MG/1; MG/1
1 TABLET ORAL 2 TIMES DAILY
Qty: 60 TABLET | Refills: 0
Start: 2023-05-31 | End: 2023-06-30

## 2023-05-31 RX ORDER — PRIMIDONE 50 MG/1
50 TABLET ORAL 2 TIMES DAILY
COMMUNITY
End: 2023-05-31

## 2023-05-31 RX ORDER — LAMOTRIGINE 25 MG/1
50 TABLET ORAL DAILY
COMMUNITY
Start: 2023-05-10

## 2023-05-31 RX ORDER — SACCHAROMYCES BOULARDII 250 MG
CAPSULE ORAL
Qty: 60 CAPSULE | Refills: 3 | Status: SHIPPED | OUTPATIENT
Start: 2023-05-31

## 2023-05-31 RX ORDER — DULAGLUTIDE 4.5 MG/.5ML
4.5 INJECTION, SOLUTION SUBCUTANEOUS WEEKLY
Qty: 4 ADJUSTABLE DOSE PRE-FILLED PEN SYRINGE | Refills: 5
Start: 2023-05-31

## 2023-05-31 RX ORDER — PREGABALIN 75 MG/1
75 CAPSULE ORAL 2 TIMES DAILY PRN
Qty: 60 CAPSULE | Refills: 5
Start: 2023-05-31 | End: 2023-06-30

## 2023-05-31 RX ORDER — PRIMIDONE 50 MG/1
50 TABLET ORAL 2 TIMES DAILY
Qty: 60 TABLET | Refills: 1 | Status: SHIPPED | OUTPATIENT
Start: 2023-05-31 | End: 2023-05-31

## 2023-05-31 RX ORDER — DOCUSATE SODIUM, SENNOSIDES 50; 8.6 MG/1; MG/1
2 TABLET ORAL
COMMUNITY

## 2023-05-31 RX ORDER — ROPINIROLE 0.25 MG/1
0.25 TABLET, FILM COATED ORAL 3 TIMES DAILY
Qty: 90 TABLET | Refills: 2 | Status: SHIPPED | OUTPATIENT
Start: 2023-05-31 | End: 2024-05-30

## 2023-05-31 RX ORDER — FLUTICASONE PROPIONATE 50 MCG
1 SPRAY, SUSPENSION (ML) NASAL DAILY
COMMUNITY

## 2023-05-31 RX ORDER — ROPINIROLE 0.25 MG/1
0.25 TABLET, FILM COATED ORAL 3 TIMES DAILY
COMMUNITY

## 2023-05-31 RX ORDER — LAMOTRIGINE 25 MG/1
25 TABLET ORAL DAILY
COMMUNITY

## 2023-05-31 RX ORDER — NALOXONE HYDROCHLORIDE 4 MG/.1ML
SPRAY NASAL
COMMUNITY
Start: 2023-03-23

## 2023-05-31 SDOH — ECONOMIC STABILITY: FOOD INSECURITY: WITHIN THE PAST 12 MONTHS, YOU WORRIED THAT YOUR FOOD WOULD RUN OUT BEFORE YOU GOT MONEY TO BUY MORE.: NEVER TRUE

## 2023-05-31 SDOH — ECONOMIC STABILITY: HOUSING INSECURITY
IN THE LAST 12 MONTHS, WAS THERE A TIME WHEN YOU DID NOT HAVE A STEADY PLACE TO SLEEP OR SLEPT IN A SHELTER (INCLUDING NOW)?: NO

## 2023-05-31 SDOH — ECONOMIC STABILITY: INCOME INSECURITY: HOW HARD IS IT FOR YOU TO PAY FOR THE VERY BASICS LIKE FOOD, HOUSING, MEDICAL CARE, AND HEATING?: SOMEWHAT HARD

## 2023-05-31 SDOH — ECONOMIC STABILITY: FOOD INSECURITY: WITHIN THE PAST 12 MONTHS, THE FOOD YOU BOUGHT JUST DIDN'T LAST AND YOU DIDN'T HAVE MONEY TO GET MORE.: NEVER TRUE

## 2023-05-31 NOTE — PROGRESS NOTES
Neurology Progress Note      Chief Complaint:    Tremor    Subjective     Subjective:  This is a 57-year-old male with a family history of familial tremor who has previously been evaluated at [facility] neurology in 2019 for migraine.  The patient has father who was diagnosed with Parkinson's and actually had DBS.    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  He states that he used to get comments from nursing instructors about his tremor and anxiety.  However, nowadays, he is having difficulty with handwriting specifically and with using eating utensils.  Otherwise, it does not impair his usual activities.  He has no gait or mobility issues.  He has no falls.  He has had no recent neuroimaging.        Past Medical History:   Diagnosis Date    Allergic rhinitis     Anxiety     Bursitis     DDD (degenerative disc disease), cervical     Depression     GERD (gastroesophageal reflux disease)     HIV (human immunodeficiency virus infection)     HLD (hyperlipidemia)     HSP (Henoch Schonlein purpura)     Migraine     Myocardial infarction     Osteoporosis     Sleep apnea     Type 2 diabetes mellitus      Past Surgical History:   Procedure Laterality Date    AMPUTATION DIGIT Right 01/06/2022    Procedure: 4th Toe  Amputation - Right Foot;  Surgeon: Pacheco Harper DPM;  Location:  [facility] OR;  Service: Podiatry;  Laterality: Right;    ANKLE SURGERY Right     ORIF    CARDIAC CATHETERIZATION N/A 06/24/2020    Procedure: Coronary angiography;  Surgeon: Deon Bailey MD;  Location:  [facility] CATH INVASIVE LOCATION;  Service: Cardiology;  Laterality: N/A;  11am start time    CARDIAC CATHETERIZATION N/A 06/24/2020    Procedure: Percutaneous Coronary Intervention;  Surgeon: Deon Bailey MD;  Location:  [facility] CATH INVASIVE LOCATION;  Service: Cardiology;  Laterality: N/A;    COLONOSCOPY  06/19/2009    Normal exam repeat in 10 years    COLONOSCOPY N/A  08/26/2021    Procedure: COLONOSCOPY WITH ANESTHESIA;  Surgeon: Byron Rosenberg MD;  Location:  [facility] ENDOSCOPY;  Service: Gastroenterology;  Laterality: N/A;  pre screen  post poor prep  dr yg gutiérrez    CYSTOSCOPY N/A 4/18/2023    Procedure: CYSTOSCOPY;  Surgeon: Bairon Mendoza MD;  Location:  [facility] OR;  Service: Urology;  Laterality: N/A;    ENDOSCOPY N/A 08/26/2021    Procedure: ESOPHAGOGASTRODUODENOSCOPY WITH ANESTHESIA;  Surgeon: Byron Rosenberg MD;  Location:  [facility] ENDOSCOPY;  Service: Gastroenterology;  Laterality: N/A;  pre GERD  post normal  dr yg gutiérrez    LYMPH NODE BIOPSY Bilateral     neck    ORIF ULNAR / RADIAL SHAFT FRACTURE Right     PILONIDAL CYSTECTOMY N/A 12/01/2020    Procedure: EXCISION PILONIDAL ABSCESS;  Surgeon: Ashlyn Posey MD;  Location: [facility] OR;  Service: General;  Laterality: N/A;     Family History   Problem Relation Age of Onset    Diabetes Mother     Hypertension Mother     Thyroid disease Mother     Hypertension Father     Thyroid disease Father     Arrhythmia Father     Diabetes Maternal Grandfather     Heart disease Maternal Grandfather     Hypertension Maternal Grandfather     Diabetes Paternal Grandmother     Stroke Paternal Grandmother     Heart disease Paternal Grandmother     Hypertension Paternal Grandmother     Thyroid disease Paternal Grandmother     Hypertension Paternal Grandfather     Hypertension Sister     No Known Problems Brother     Colon cancer Neg Hx     Colon polyps Neg Hx      Social History     Tobacco Use    Smoking status: Every Day     Packs/day: 0.25     Types: Cigarettes     Start date: 1988    Smokeless tobacco: Never   Vaping Use    Vaping Use: Never used   Substance Use Topics    Alcohol use: Yes     Comment: socialy    Drug use: No       Medications:  Current Outpatient Medications   Medication Sig Dispense Refill    albuterol (PROVENTIL HFA;VENTOLIN HFA) 108 (90 BASE) MCG/ACT inhaler Inhale 2 puffs Every 6 (Six) Hours As Needed for  "Wheezing.      atorvastatin (LIPITOR) 40 MG tablet Take 1 tablet by mouth Every Night.  3    Calcium Carb-Cholecalciferol (CALCIUM 600+D3 PO) Take  by mouth Every Night.      ciclopirox (LOPROX) 0.77 % cream Apply  topically to the appropriate area as directed 2 (Two) Times a Day. 30 g 5    clotrimazole-betamethasone (LOTRISONE) 1-0.05 % cream Apply  topically to the appropriate area as directed 2 (Two) Times a Day. 15 g 1    Continuous Blood Gluc Sensor (FreeStyle Eamon 2 Sensor) misc USE as directed TO monitor blood glucose. replace every 14 DAYS.      DEXILANT 60 MG capsule Take 1 capsule by mouth Every Night.  1    Bfvwcke-Vqiui-Zmkfypxb-TenofAF (GENVOYA) [phone] MG per tablet Take 1 tablet by mouth Every Night.      fluticasone (FLONASE) 50 MCG/ACT nasal spray 2 sprays into each nostril Daily. 1 bottle 6    HYDROcodone-acetaminophen (NORCO) 5-325 MG per tablet Take 1 tablet by mouth 2 (Two) Times a Day.      Insulin Disposable Pump (Omnipod DASH Pods, Gen 4,) misc 1 each Every Other Day. DX CODE: E11.65 45 each 3    Insulin Syringe 31G X 5/16\" 1 ML misc Use 4 times daily  , ICD10 code is E11.9 120 each 11    lamoTRIgine (LaMICtal) 25 MG tablet Take 2 tablets by mouth Daily.      loratadine (CLARITIN) 10 MG tablet Take 1 tablet by mouth Every Night.      losartan (COZAAR) 25 MG tablet Take 1 tablet by mouth Every Night.      meclizine (ANTIVERT) 25 MG tablet Take 1 tablet by mouth Every Night.      Melatonin 5 MG capsule Take 5 mg by mouth every night at bedtime.  3    metFORMIN (GLUCOPHAGE) 500 MG tablet Take 2 tablets by mouth 2 (Two) Times a Day With Meals.  3    mometasone (NASONEX) 50 MCG/ACT nasal spray 2 sprays into the nostril(s) as directed by provider Daily.      multivitamin with minerals tablet tablet Take 1 tablet by mouth Every Night.      mupirocin (BACTROBAN) 2 % ointment Apply 1 application topically to the appropriate area as directed Daily. 22 g 0    naloxone (NARCAN) 4 MG/0.1ML nasal " spray       NovoLOG 100 UNIT/ML injection Inject up to 150 units daily through pump 50 mL 2    ondansetron ODT (ZOFRAN-ODT) 4 MG disintegrating tablet Place 1 tablet on the tongue Every 6 (Six) Hours As Needed for Nausea. 12 tablet 0    prazosin (MINIPRESS) 2 MG capsule Take 1 capsule by mouth Every Night. Patient unsure of dosage      pregabalin (LYRICA) 75 MG capsule Take 1 capsule by mouth 3 (Three) Times a Day.      propranolol LA (INDERAL LA) 80 MG 24 hr capsule Take 1 capsule by mouth Every Night.  5    rizatriptan (MAXALT) 10 MG tablet Take 1 tablet by mouth 1 (One) Time As Needed for Migraine. May repeat in 2 hours if needed      saccharomyces boulardii (FLORASTOR) 250 MG capsule Take 1 capsule by mouth Every Night.      Senna-Plus 8.6-50 MG per tablet Take 2 tablets by mouth every night at bedtime.      tamsulosin (FLOMAX) 0.4 MG capsule 24 hr capsule TAKE 2 CAPSULES BY MOUTH EVERY NIGHT AT BEDTIME. 60 capsule 0    topiramate (TOPAMAX) 25 MG tablet Take 1 tablet by mouth 3 (Three) Times a Day.      traZODone (DESYREL) 100 MG tablet Take 2 tablets by mouth Every Night.  3    Trulicity 4.5 MG/0.5ML solution pen-injector Inject 4.5 mg under the skin into the appropriate area as directed 1 (One) Time Per Week. Start after 1 month of 3 mg 2 mL 11    Vortioxetine HBr 20 MG tablet Take 1 tablet by mouth Every Night. Pt takes at night       No current facility-administered medications for this visit.       Allergies:    Amitriptyline hcl, Darvon [propoxyphene], and Zithromax [azithromycin]      Objective      Vital Signs  Heart Rate:  [75-76] 75  BP: (104-127)/(60-70) 104/60    Physical Exam:    General Exam:  Head:  Normocephalic, atraumatic  HEENT:  Neck supple  CVS:  Regular rate and rhythm.    Lungs:  Clear.  No audible wheezing.  Abdomen:  Non-tender, Non-distended  Extremities:  No signs of peripheral edema  Skin:  No rashes      Neurologic Exam:  Mental Status:      Motor: (strength out of 5:  1= minimal  movement, 2 = movement in plane of gravity, 3 = movement against gravity, 4 = movement against some resistance, 5 = full strength)     -Right Upper Ext: Proximal: 5 Distal: 5  -Left Upper Ext: Proximal: 5 Distal: 5    -Right Lower Ext: Proximal: 5 Distal: 5  -Left Lower Ext: Proximal: 5 Distal: 5       Deep Tendon Reflexes:  -Right              Biceps: 2+         Triceps: 2+      Brachioradialis: 2+              Patella: 2+       Ankle: 2+           -Left              Biceps: 2+         Triceps: 2+      Brachioradialis: 2+              Patella: 2+       Ankle: 2+             Tone (Modified César Scale):  No appreciable increase in tone or rigidity noted.  Essential tremor noted in both upper extremities.  Some challenges with performing an Archimedes spiral and writing a single line between too thin parallel lines, but is able to do so and had to write with out difficulty.  His cursive handwriting does not become diminutive over time as would be seen with a Parkinson's patient.     Sensory:  -Intact to light touch, pinprick BUE (C5-T1) and BLE (L2-S1).     Coordination:  -Finger to nose intact BUEs  -No ataxia     Gait  -No signs of ataxia  -ambulates unassisted  -Able to heel toe walk without difficulties.  No freezing or festination.  No shuffling gait.  Swings arms appropriately.       Results Review:    I reviewed the patient's new clinical results and findings.    Lab Results (last 24 hours)       ** No results found for the last 24 hours. **            Imaging Results (Last 24 Hours)       ** No results found for the last 24 hours. **            Assessment/Plan     Hospital Problem List      * No active hospital problems. *      Impression    Benign essential tremor  Chronic antiretroviral therapy    Plan    I reviewed the clinical findings with the patient detail.  Patient has difficulties performing an Archimedes spiral and handwriting, however, actually does quite well.  This tremor is relatively mild.   That being said, the patient is desiring treatment for this and, therefore, we are going to start with Mysoline 50 mg in the morning.  If after 1 to 2 weeks this is not adequate, he can increase to twice daily dosing, thereafter and we will have him follow-up in clinic in approximately 4 to 6 weeks to evaluate his clinical response.  There is the potential for a drug-drug interaction with protein binding with it and potentially altering the drug levels of his antiretroviral therapy.  I have asked that he contact Dr. Montanez, his infectious disease specialist, and discussed with his pharmacist, he acceptability of taking these 2 medications concurrently.  If they say no, then I am happy to consider alternate medications, however, this may be an ongoing issue with some of his medication.  The patient voices understanding and agreement with plan of care will call for any concerns or questions in the interim.    Thank you, Dr. Nicole, for the consultation and the opportunity to participate in care of your patient.          Daniel Quinonez PA-C  05/31/23  11:36 CDT    Greater than 45 minutes spent preparing the patient's visit in chart, reviewing past history and diagnostic studies including imaging and laboratory evaluation, obtaining clinical history, performing physical examination, and counseling the patient on the prescribed plan of therapy and care, ordering diagnostic testing, making appropriate referrals, documenting the encounter and interpretation of results and coordination of care.  This time does not include time spent in any separately reportable services.

## 2023-05-31 NOTE — TELEPHONE ENCOUNTER
Switched form primidone to Requip due to medication interactions. Patient was made aware and verbalizes understanding.

## 2023-06-05 ENCOUNTER — TELEPHONE (OUTPATIENT)
Dept: PODIATRY | Facility: CLINIC | Age: 58
End: 2023-06-05
Payer: MEDICARE

## 2023-06-05 NOTE — TELEPHONE ENCOUNTER
"Breanne Dykes states that he can barely walk and cannot walk to the mailbox to get his mail.  He is scheduled for surgery soon.  His partner also cannot walk to the mailbox for other issues.     He is requesting a letter addressed to the [employer] and the post office stating that he is a \"hardship case\" at the moment until he has recovered from surgery and can walk again.    They are willing to install a mailbox at his front door or bring his mail to his front door if this letter can be drawn up.    For further information, his call back no is [phone]  "

## 2023-06-07 ENCOUNTER — OFFICE VISIT (OUTPATIENT)
Dept: ENDOCRINOLOGY | Facility: CLINIC | Age: 58
End: 2023-06-07
Payer: MEDICARE

## 2023-06-07 VITALS
WEIGHT: 254 LBS | DIASTOLIC BLOOD PRESSURE: 60 MMHG | OXYGEN SATURATION: 97 % | BODY MASS INDEX: 36.36 KG/M2 | HEART RATE: 103 BPM | HEIGHT: 70 IN | SYSTOLIC BLOOD PRESSURE: 136 MMHG

## 2023-06-07 DIAGNOSIS — I15.2 HYPERTENSION ASSOCIATED WITH DIABETES: ICD-10-CM

## 2023-06-07 DIAGNOSIS — E11.42 DIABETIC POLYNEUROPATHY ASSOCIATED WITH TYPE 2 DIABETES MELLITUS: ICD-10-CM

## 2023-06-07 DIAGNOSIS — E11.69 MIXED DIABETIC HYPERLIPIDEMIA ASSOCIATED WITH TYPE 2 DIABETES MELLITUS: ICD-10-CM

## 2023-06-07 DIAGNOSIS — E78.2 MIXED DIABETIC HYPERLIPIDEMIA ASSOCIATED WITH TYPE 2 DIABETES MELLITUS: ICD-10-CM

## 2023-06-07 DIAGNOSIS — E11.59 HYPERTENSION ASSOCIATED WITH DIABETES: ICD-10-CM

## 2023-06-07 DIAGNOSIS — E11.65 TYPE 2 DIABETES MELLITUS WITH HYPERGLYCEMIA, WITH LONG-TERM CURRENT USE OF INSULIN: Primary | ICD-10-CM

## 2023-06-07 DIAGNOSIS — Z79.4 TYPE 2 DIABETES MELLITUS WITH HYPERGLYCEMIA, WITH LONG-TERM CURRENT USE OF INSULIN: Primary | ICD-10-CM

## 2023-06-07 RX ORDER — TIRZEPATIDE 5 MG/.5ML
5 INJECTION, SOLUTION SUBCUTANEOUS
Qty: 2 ML | Refills: 11 | Status: SHIPPED | OUTPATIENT
Start: 2023-06-07

## 2023-06-07 NOTE — PROGRESS NOTES
" Donis Yi is a 57 y.o. male who presents for  evaluation of   Type 2 diabetes        Primary Care Provider    Oksana Mares MD    57 y.o. male  t2dm since age 41 y old complicated by neuropathy improved by use of melba and omnipod.    States glucose better controlled after meeting with Ms. Tanner    Has been dx with Parkinson's   Lost 4th toe right foot        PE  /60   Pulse 103   Ht 177.8 cm (70\")   Wt 115 kg (254 lb)   SpO2 97%   BMI 36.45 kg/m²   AOx3  RRR  CTA  No edema  Callus   parkinosian tremor   Right 4th toe amputation    Lab Review    Lab Results   Component Value Date    WBC 9.7 05/31/2023    HGB 13.3 (L) 05/31/2023    HCT 40.6 (L) 05/31/2023    MCV 97.8 (H) 05/31/2023     05/31/2023     Lab Results   Component Value Date    GLUCOSE 242 (H) 04/12/2023    BUN 16 04/12/2023    CREATININE 0.88 04/12/2023    EGFRIFNONA >60 09/27/2022    EGFRIFAFRI >59 09/27/2022    BCR 18.2 04/12/2023    K 4.0 04/12/2023    CO2 24.0 04/12/2023    CALCIUM 9.4 04/12/2023    ALBUMIN 4.5 09/27/2022    LABIL2 CANCELED 03/29/2016    AST 19 09/27/2022    ALT 24 09/27/2022           Assessment & Plan       ICD-10-CM ICD-9-CM   1. Type 2 diabetes mellitus with hyperglycemia, with long-term current use of insulin  E11.65 250.00    Z79.4 790.29     V58.67   2. Hypertension associated with diabetes  E11.59 250.80    I15.2 401.9   3. Mixed diabetic hyperlipidemia associated with type 2 diabetes mellitus  E11.69 250.80    E78.2 272.2   4. Diabetic polyneuropathy associated with type 2 diabetes mellitus  E11.42 250.60     357.2         I reviewed and summarized records from Oksana Mares MD from present year  and I reviewed / ordered labs.   From review of records :    Patient has uncontrolled type 2 diabetes    Glycemic Management:   Lab Results   Component Value Date    HGBA1C 9.2 (H) 05/31/2023    HGBA1C 8.6 (H) 01/27/2023    HGBA1C 6.9 11/09/2022       Ambulatory Glucose Profile Report    Days Analyzed : 2 " week period ending on 06/07/23      Continuous Glucose Monitory Device:  FreeStyle Eamon 2     47% very high target range  36% high target range  17% in target range  0% below target range  GMI 9.3 %  Average glucose 251 mg/dl    Interpretation : Diabetes Type 2 Uncontrolled               Metformin 1000 mg bid add b12 in MVI      Trulicity  4.5 mg weekly  CHANGE TO MOUNJARO 5 MG WEEKLY       Hesitant about jardiance since thirst and BPH     Omnipod U 100     Basal 3 units per hour -- didn't bring, he needs to decrease to 2.6 units per hous     Carb Ratio, first 60 grams are free then 1:4    Correction 20, target 120     In the past U500 but not using anymore     Sensor no longer falling on mastisol     he is getting pods through [facility]. We need to change from every 72 hours to ever 48 hours  So we need 45 pods for 3 months supply     Lipid Management  Lab Results   Component Value Date    CHOL 142 02/01/2021    CHLPL 150 (L) 05/31/2023    TRIG 179 (H) 05/31/2023    HDL 47 (L) 05/31/2023    LDL 67 05/31/2023       lipitor 80 mg qhs     Blood Pressure Management:    Vitals:    06/07/23 1408   BP: 136/60   Pulse: 103   SpO2: 97%     Controlled             Microvascular Complication Monitoring:      Eye Exam Evaluation  No retinopathy , has cataracts   Pending 2022  -----------    Last Microalbumin-Proteinuria Assessment    No results found for: MALBCRERATIO    No results found for: UTPCR    -----------      Neuropathy on lyrica 100 bid   And neurontin per pain management           Bone Health    Lab Results   Component Value Date    CALCIUM 9.4 04/12/2023    PHOS 2.7 05/11/2020    YPRY40AG 43.9 05/31/2023       Thyroid Health    Lab Results   Component Value Date    TSH 3.180 05/31/2023    TSH 1.160 01/27/2023    TSH 0.680 09/27/2022           Lab Results   Component Value Date    PEKIEGNJ79 344 02/01/2021     Wanted viagra    No longer on imdur and nitroglycerin     Written this time but only 50 since on  genvoya and flomax           This document has been electronically signed by Demetri Griffin MD on June 7, 2023 14:15 CDT
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
## 2023-07-19 DIAGNOSIS — K52.1 DIARRHEA DUE TO DRUG: ICD-10-CM

## 2023-07-19 RX ORDER — PROPRANOLOL HYDROCHLORIDE 80 MG/1
CAPSULE, EXTENDED RELEASE ORAL
Qty: 90 CAPSULE | Refills: 1 | Status: SHIPPED | OUTPATIENT
Start: 2023-07-19 | End: 2023-07-20 | Stop reason: SDUPTHER

## 2023-07-19 RX ORDER — MECLIZINE HYDROCHLORIDE 25 MG/1
TABLET ORAL
Qty: 30 TABLET | Refills: 1 | Status: SHIPPED | OUTPATIENT
Start: 2023-07-19

## 2023-07-19 RX ORDER — RIZATRIPTAN BENZOATE 10 MG/1
10 TABLET ORAL
Qty: 9 TABLET | Refills: 1 | Status: SHIPPED | OUTPATIENT
Start: 2023-07-19 | End: 2023-07-19

## 2023-07-19 RX ORDER — LOPERAMIDE HYDROCHLORIDE 2 MG/1
2 CAPSULE ORAL 4 TIMES DAILY PRN
Qty: 30 CAPSULE | Refills: 1 | Status: SHIPPED | OUTPATIENT
Start: 2023-07-19

## 2023-07-19 NOTE — H&P (VIEW-ONLY)
Donis Yi  1965  57 y.o. male  PCP: Yg Mares 5/23  BS: 229 per patient     07/19/2023        Chief Complaint   Patient presents with    Left Foot - Follow-up       History of Present Illness    Donis Yi is a 57 y.o.male presents to clinic today for his post operative appointment. He had a left fifth metatarsal head excision on 7/6/23. Doing well.  He has a new complaint today of continued pain to his right ankle.  He does have history of an ankle fracture in the past requiring surgical fixation.      Past Medical History:   Diagnosis Date    Allergic rhinitis     Anxiety     Bursitis     DDD (degenerative disc disease), cervical     Depression     GERD (gastroesophageal reflux disease)     HIV (human immunodeficiency virus infection)     HLD (hyperlipidemia)     HSP (Henoch Schonlein purpura)     Migraine     Myocardial infarction     Osteoporosis     Parkinsonian tremor     PONV (postoperative nausea and vomiting)     Sleep apnea     Type 2 diabetes mellitus          Past Surgical History:   Procedure Laterality Date    AMPUTATION DIGIT Right 01/06/2022    Procedure: 4th Toe  Amputation - Right Foot;  Surgeon: Pacheco Harper DPM;  Location: [facility] OR;  Service: Podiatry;  Laterality: Right;    CARDIAC CATHETERIZATION N/A 06/24/2020    Procedure: Coronary angiography;  Surgeon: Deon Bailey MD;  Location: [facility] CATH INVASIVE LOCATION;  Service: Cardiology;  Laterality: N/A;  11am start time    CARDIAC CATHETERIZATION N/A 06/24/2020    Procedure: Percutaneous Coronary Intervention;  Surgeon: Deon Bailey MD;  Location: [facility] CATH INVASIVE LOCATION;  Service: Cardiology;  Laterality: N/A;    COLONOSCOPY  06/19/2009    Normal exam repeat in 10 years    COLONOSCOPY N/A 08/26/2021    Procedure: COLONOSCOPY WITH ANESTHESIA;  Surgeon: Byron Rosenberg MD;  Location: [facility] ENDOSCOPY;  Service: Gastroenterology;  Laterality: N/A;  pre screen  post poor prep  dr yg mares     CYSTOSCOPY N/A 04/18/2023    Procedure: CYSTOSCOPY;  Surgeon: Bairon Mendoza MD;  Location:  [facility] OR;  Service: Urology;  Laterality: N/A;    ENDOSCOPY N/A 08/26/2021    Procedure: ESOPHAGOGASTRODUODENOSCOPY WITH ANESTHESIA;  Surgeon: Byron Rosenberg MD;  Location:  [facility] ENDOSCOPY;  Service: Gastroenterology;  Laterality: N/A;  pre GERD  post normal  dr yg gutiérrez    LYMPH NODE BIOPSY Bilateral     neck    ORIF ULNAR / RADIAL SHAFT FRACTURE Right     PILONIDAL CYSTECTOMY N/A 12/01/2020    Procedure: EXCISION PILONIDAL ABSCESS;  Surgeon: Ashlyn Posey MD;  Location:  [facility] OR;  Service: General;  Laterality: N/A;    TOE SURGERY           Family History   Problem Relation Age of Onset    Diabetes Mother     Hypertension Mother     Thyroid disease Mother     Hypertension Father     Thyroid disease Father     Arrhythmia Father     Diabetes Maternal Grandfather     Heart disease Maternal Grandfather     Hypertension Maternal Grandfather     Diabetes Paternal Grandmother     Stroke Paternal Grandmother     Heart disease Paternal Grandmother     Hypertension Paternal Grandmother     Thyroid disease Paternal Grandmother     Hypertension Paternal Grandfather     Hypertension Sister     No Known Problems Brother     Colon cancer Neg Hx     Colon polyps Neg Hx        Allergies   Allergen Reactions    Amitriptyline Hcl Anaphylaxis    Darvon [Propoxyphene] Anaphylaxis    Zithromax [Azithromycin] Anaphylaxis       Social History     Socioeconomic History    Marital status:    Tobacco Use    Smoking status: Former     Packs/day: 0.25     Types: Cigarettes     Start date: 1988    Smokeless tobacco: Never   Vaping Use    Vaping Use: Never used   Substance and Sexual Activity    Alcohol use: Yes     Comment: socialy    Drug use: No    Sexual activity: Defer         Current Outpatient Medications   Medication Sig Dispense Refill    albuterol (PROVENTIL HFA;VENTOLIN HFA) 108 (90 BASE) MCG/ACT inhaler Inhale 2 puffs  "Every 6 (Six) Hours As Needed for Wheezing.      atorvastatin (LIPITOR) 40 MG tablet Take 1 tablet by mouth Every Night.  3    Calcium Carb-Cholecalciferol (CALCIUM 600+D3 PO) Take  by mouth Every Night.      ciclopirox (LOPROX) 0.77 % cream Apply  topically to the appropriate area as directed 2 (Two) Times a Day. 30 g 5    clotrimazole-betamethasone (LOTRISONE) 1-0.05 % cream Apply  topically to the appropriate area as directed 2 (Two) Times a Day. 15 g 1    Continuous Blood Gluc Sensor (FreeStyle Eamon 2 Sensor) misc USE as directed TO monitor blood glucose. replace every 14 DAYS. 2 each 3    DEXILANT 60 MG capsule Take 1 capsule by mouth Every Night.  1    Zfuyklt-Eadqv-Zfjuacti-TenofAF (GENVOYA) 264-855-300-10 MG per tablet Take 1 tablet by mouth Every Night.      fluticasone (FLONASE) 50 MCG/ACT nasal spray 2 sprays into each nostril Daily. 1 bottle 6    HYDROcodone-acetaminophen (Norco)  MG per tablet Take 1 tablet by mouth Every 6 (Six) Hours As Needed for Moderate Pain. 20 tablet 0    Insulin Disposable Pump (Omnipod DASH Pods, Gen 4,) misc 1 each Every Other Day. DX CODE: E11.65 45 each 3    Insulin Syringe 31G X 5/16\" 1 ML misc Use 4 times daily  , ICD10 code is E11.9 120 each 11    lamoTRIgine (LaMICtal) 25 MG tablet Take 1 tablet by mouth 2 (Two) Times a Day.      loratadine (CLARITIN) 10 MG tablet Take 1 tablet by mouth Every Night.      losartan (COZAAR) 25 MG tablet Take 1 tablet by mouth Every Night.      meclizine (ANTIVERT) 25 MG tablet Take 1 tablet by mouth Every Night.      Melatonin 5 MG capsule Take 5 mg by mouth every night at bedtime.  3    metFORMIN (GLUCOPHAGE) 500 MG tablet Take 2 tablets by mouth 2 (Two) Times a Day With Meals.  3    metoclopramide (REGLAN) 10 MG tablet Take 1 tablet by mouth As Needed (take 1 tablet daily as needed for migraine. max dose 1 tablet 24 hours.). 30 tablet 1    mometasone (NASONEX) 50 MCG/ACT nasal spray 2 sprays into the nostril(s) as directed by " "provider Daily.      multivitamin with minerals tablet tablet Take 1 tablet by mouth Every Night.      naloxone (NARCAN) 4 MG/0.1ML nasal spray       NovoLOG 100 UNIT/ML injection Inject up to 150 units daily through pump 50 mL 2    ondansetron ODT (ZOFRAN-ODT) 4 MG disintegrating tablet Place 1 tablet on the tongue Every 6 (Six) Hours As Needed for Nausea. 12 tablet 0    prazosin (MINIPRESS) 2 MG capsule Take 1 capsule by mouth Every Night. Patient unsure of dosage      pregabalin (LYRICA) 75 MG capsule Take 1 capsule by mouth 3 (Three) Times a Day.      propranolol LA (Inderal LA) 80 MG 24 hr capsule Take 2 capsules by mouth Daily. 60 capsule 2    rOPINIRole (REQUIP) 0.25 MG tablet Take 1 tablet by mouth 3 (Three) Times a Day. 90 tablet 2    saccharomyces boulardii (FLORASTOR) 250 MG capsule Take 1 capsule by mouth Every Night.      Senna-Plus 8.6-50 MG per tablet Take 2 tablets by mouth every night at bedtime.      tamsulosin (FLOMAX) 0.4 MG capsule 24 hr capsule Take 1 capsule by mouth Every Night.      Tirzepatide (Mounjaro) 5 MG/0.5ML solution pen-injector Inject 0.5 mL under the skin into the appropriate area as directed Every 7 (Seven) Days. 5 mg subcutaneously weekly (Patient taking differently: Inject 5 mg under the skin into the appropriate area as directed Every 7 (Seven) Days. monday) 2 mL 11    topiramate (TOPAMAX) 25 MG tablet Take 1 tablet by mouth 3 (Three) Times a Day.      traZODone (DESYREL) 100 MG tablet Take 2 tablets by mouth Every Night.  3    Vortioxetine HBr 20 MG tablet Take 1 tablet by mouth Every Night. Pt takes at night       No current facility-administered medications for this visit.       Review of Systems   Constitutional: Negative.    Cardiovascular:  Positive for leg swelling.   Musculoskeletal:         Right ankle pain   Psychiatric/Behavioral: Negative.         OBJECTIVE    /63   Pulse 62   Ht 177.8 cm (70\")   Wt 117 kg (257 lb)   SpO2 97%   BMI 36.88 kg/mý "     Physical Exam  Pulmonary:      Effort: Pulmonary effort is normal.   Musculoskeletal:      Right ankle: Swelling present. Tenderness present over the lateral malleolus.      Left ankle: Normal.   Neurological:      Mental Status: He is alert.   Psychiatric:         Mood and Affect: Mood normal.        Left lower extremity: Incision site well approximated.  No signs of dehiscence.      Procedures        ASSESSMENT AND PLAN    Diagnoses and all orders for this visit:    1. Right ankle pain, unspecified chronicity (Primary)  -     XR Ankle 3+ View Right    2. Painful orthopaedic hardware  -     ceFAZolin (ANCEF) 2 g in sodium chloride 0.9 % 100 mL IVPB  -     Case Request; Standing    3. S/P foot surgery    Other orders  -     Follow Anesthesia Guidelines / Protocol; Future  -     Follow Anesthesia Guidelines / Protocol; Standing  -     Verify NPO Status; Standing  -     Obtain informed consent (if not collected inpatient or PAT); Standing  -     Notify Physician - Standard; Standing        -doing well postoperatively.  Sutures removed.  -Imaging reviewed of the right ankle.  Hardware intact to a healed fibular fracture.  Discussed treatment options.  Patient elected for hardware removal.  -Surgical plan is hardware removal right ankle and all indicated procedures.  -Risks and benefits of the procedure including but not limited to postoperative infection, incisional dehiscence, numbness, swelling, residual pain and postoperative blood clot discussed in detail.  No guarantees were given or implied.  -Tentative date for the surgery August 3, 2023  -All questions were answered  -Recheck following surgery           This document has been electronically signed by Abdi Raymundo DPM on July 22, 2023 17:33 CDT     7/22/2023  17:33 CDT

## 2023-07-20 RX ORDER — PROPRANOLOL HYDROCHLORIDE 80 MG/1
80 TABLET ORAL 3 TIMES DAILY
COMMUNITY

## 2023-07-20 RX ORDER — METOCLOPRAMIDE 10 MG/1
1 TABLET ORAL DAILY PRN
COMMUNITY
Start: 2023-07-13

## 2023-07-24 DIAGNOSIS — E11.65 TYPE 2 DIABETES MELLITUS WITH HYPERGLYCEMIA, WITH LONG-TERM CURRENT USE OF INSULIN: ICD-10-CM

## 2023-07-24 DIAGNOSIS — Z79.4 TYPE 2 DIABETES MELLITUS WITH HYPERGLYCEMIA, WITH LONG-TERM CURRENT USE OF INSULIN: ICD-10-CM

## 2023-07-24 RX ORDER — INSULIN PUMP CONTROLLER
EACH MISCELLANEOUS
Qty: 30 EACH | Refills: 0 | Status: SHIPPED | OUTPATIENT
Start: 2023-07-24

## 2023-07-28 ENCOUNTER — PRE-ADMISSION TESTING (OUTPATIENT)
Dept: PREADMISSION TESTING | Facility: HOSPITAL | Age: 58
End: 2023-07-28
Payer: MEDICARE

## 2023-07-28 VITALS
HEIGHT: 70 IN | RESPIRATION RATE: 18 BRPM | SYSTOLIC BLOOD PRESSURE: 90 MMHG | BODY MASS INDEX: 36.79 KG/M2 | WEIGHT: 257 LBS | OXYGEN SATURATION: 98 % | HEART RATE: 69 BPM | DIASTOLIC BLOOD PRESSURE: 66 MMHG

## 2023-07-28 LAB
ANION GAP SERPL CALCULATED.3IONS-SCNC: 13 MMOL/L (ref 5–15)
BUN SERPL-MCNC: 30 MG/DL (ref 6–20)
BUN/CREAT SERPL: 23.4 (ref 7–25)
CALCIUM SPEC-SCNC: 9.4 MG/DL (ref 8.6–10.5)
CHLORIDE SERPL-SCNC: 102 MMOL/L (ref 98–107)
CO2 SERPL-SCNC: 23 MMOL/L (ref 22–29)
CREAT SERPL-MCNC: 1.28 MG/DL (ref 0.76–1.27)
EGFRCR SERPLBLD CKD-EPI 2021: 66.1 ML/MIN/1.73
GLUCOSE SERPL-MCNC: 205 MG/DL (ref 65–99)
POTASSIUM SERPL-SCNC: 4.2 MMOL/L (ref 3.5–5.2)
SODIUM SERPL-SCNC: 138 MMOL/L (ref 136–145)

## 2023-07-28 PROCEDURE — 80048 BASIC METABOLIC PNL TOTAL CA: CPT

## 2023-07-28 PROCEDURE — 36415 COLL VENOUS BLD VENIPUNCTURE: CPT

## 2023-07-28 RX ORDER — SODIUM CHLORIDE 9 MG/ML
1000 INJECTION, SOLUTION INTRAVENOUS CONTINUOUS
Status: CANCELLED | OUTPATIENT
Start: 2023-08-03

## 2023-07-28 NOTE — PAT
Chlorhexidine scrub given with instruction sheet.  Instruction reviewed in PAT, understanding verbalized.    Pt instructed to hold mounjaro on Monday 7-24, understanding verbalized.

## 2023-08-03 ENCOUNTER — ANESTHESIA EVENT (OUTPATIENT)
Dept: PERIOP | Facility: HOSPITAL | Age: 58
End: 2023-08-03
Payer: MEDICARE

## 2023-08-03 ENCOUNTER — HOSPITAL ENCOUNTER (OUTPATIENT)
Facility: HOSPITAL | Age: 58
Setting detail: HOSPITAL OUTPATIENT SURGERY
Discharge: HOME | End: 2023-08-03
Attending: PODIATRIST | Admitting: PODIATRIST
Payer: MEDICARE

## 2023-08-03 ENCOUNTER — APPOINTMENT (OUTPATIENT)
Dept: GENERAL RADIOLOGY | Facility: HOSPITAL | Age: 58
End: 2023-08-03
Payer: MEDICARE

## 2023-08-03 ENCOUNTER — ANESTHESIA (OUTPATIENT)
Dept: PERIOP | Facility: HOSPITAL | Age: 58
End: 2023-08-03
Payer: MEDICARE

## 2023-08-03 VITALS
RESPIRATION RATE: 18 BRPM | OXYGEN SATURATION: 100 % | TEMPERATURE: 97 F | SYSTOLIC BLOOD PRESSURE: 114 MMHG | DIASTOLIC BLOOD PRESSURE: 61 MMHG | HEART RATE: 71 BPM | WEIGHT: 251.54 LBS | HEIGHT: 70 IN | BODY MASS INDEX: 36.01 KG/M2

## 2023-08-03 DIAGNOSIS — T84.84XA PAINFUL ORTHOPAEDIC HARDWARE: ICD-10-CM

## 2023-08-03 LAB
GLUCOSE BLDC GLUCOMTR-MCNC: 227 MG/DL (ref 70–130)
GLUCOSE BLDC GLUCOMTR-MCNC: 229 MG/DL (ref 70–130)
GLUCOSE BLDC GLUCOMTR-MCNC: 253 MG/DL (ref 70–130)

## 2023-08-03 PROCEDURE — 63710000001 INSULIN REGULAR HUMAN PER 5 UNITS: Performed by: ANESTHESIOLOGY

## 2023-08-03 PROCEDURE — 76000 FLUOROSCOPY <1 HR PHYS/QHP: CPT

## 2023-08-03 PROCEDURE — 25010000002 MIDAZOLAM PER 1 MG: Performed by: NURSE ANESTHETIST, CERTIFIED REGISTERED

## 2023-08-03 PROCEDURE — 25010000002 BUPIVACAINE (PF) 0.5 % SOLUTION: Performed by: PODIATRIST

## 2023-08-03 PROCEDURE — 82948 REAGENT STRIP/BLOOD GLUCOSE: CPT

## 2023-08-03 PROCEDURE — 25010000002 ONDANSETRON PER 1 MG: Performed by: NURSE ANESTHETIST, CERTIFIED REGISTERED

## 2023-08-03 PROCEDURE — 20680 REMOVAL OF IMPLANT DEEP: CPT | Performed by: PODIATRIST

## 2023-08-03 PROCEDURE — 25010000002 FENTANYL CITRATE (PF) 50 MCG/ML SOLUTION: Performed by: NURSE ANESTHETIST, CERTIFIED REGISTERED

## 2023-08-03 PROCEDURE — 25010000002 KETOROLAC TROMETHAMINE PER 15 MG: Performed by: NURSE ANESTHETIST, CERTIFIED REGISTERED

## 2023-08-03 PROCEDURE — 25010000002 CEFAZOLIN PER 500 MG: Performed by: PODIATRIST

## 2023-08-03 PROCEDURE — 25010000002 PROPOFOL 10 MG/ML EMULSION: Performed by: NURSE ANESTHETIST, CERTIFIED REGISTERED

## 2023-08-03 PROCEDURE — 25010000002 DEXAMETHASONE PER 1 MG: Performed by: NURSE ANESTHETIST, CERTIFIED REGISTERED

## 2023-08-03 RX ORDER — LIDOCAINE HYDROCHLORIDE 20 MG/ML
INJECTION, SOLUTION EPIDURAL; INFILTRATION; INTRACAUDAL; PERINEURAL AS NEEDED
Status: DISCONTINUED | OUTPATIENT
Start: 2023-08-03 | End: 2023-08-03 | Stop reason: SURG

## 2023-08-03 RX ORDER — BUPIVACAINE HCL/0.9 % NACL/PF 0.1 %
2 PLASTIC BAG, INJECTION (ML) EPIDURAL ONCE
Status: COMPLETED | OUTPATIENT
Start: 2023-08-03 | End: 2023-08-03

## 2023-08-03 RX ORDER — DEXAMETHASONE SODIUM PHOSPHATE 4 MG/ML
INJECTION, SOLUTION INTRA-ARTICULAR; INTRALESIONAL; INTRAMUSCULAR; INTRAVENOUS; SOFT TISSUE AS NEEDED
Status: DISCONTINUED | OUTPATIENT
Start: 2023-08-03 | End: 2023-08-03 | Stop reason: SURG

## 2023-08-03 RX ORDER — ACETAMINOPHEN 325 MG/1
650 TABLET ORAL ONCE AS NEEDED
Status: DISCONTINUED | OUTPATIENT
Start: 2023-08-03 | End: 2023-08-03 | Stop reason: HOSPADM

## 2023-08-03 RX ORDER — SODIUM CHLORIDE 9 MG/ML
1000 INJECTION, SOLUTION INTRAVENOUS CONTINUOUS
Status: DISCONTINUED | OUTPATIENT
Start: 2023-08-03 | End: 2023-08-03 | Stop reason: HOSPADM

## 2023-08-03 RX ORDER — BUPIVACAINE HYDROCHLORIDE 5 MG/ML
INJECTION, SOLUTION EPIDURAL; INTRACAUDAL AS NEEDED
Status: DISCONTINUED | OUTPATIENT
Start: 2023-08-03 | End: 2023-08-03 | Stop reason: HOSPADM

## 2023-08-03 RX ORDER — FLUMAZENIL 0.1 MG/ML
0.2 INJECTION INTRAVENOUS AS NEEDED
Status: DISCONTINUED | OUTPATIENT
Start: 2023-08-03 | End: 2023-08-03 | Stop reason: HOSPADM

## 2023-08-03 RX ORDER — FENTANYL CITRATE 50 UG/ML
INJECTION, SOLUTION INTRAMUSCULAR; INTRAVENOUS AS NEEDED
Status: DISCONTINUED | OUTPATIENT
Start: 2023-08-03 | End: 2023-08-03 | Stop reason: SURG

## 2023-08-03 RX ORDER — MIDAZOLAM HYDROCHLORIDE 1 MG/ML
INJECTION INTRAMUSCULAR; INTRAVENOUS AS NEEDED
Status: DISCONTINUED | OUTPATIENT
Start: 2023-08-03 | End: 2023-08-03 | Stop reason: SURG

## 2023-08-03 RX ORDER — PROMETHAZINE HYDROCHLORIDE 25 MG/1
25 SUPPOSITORY RECTAL ONCE AS NEEDED
Status: DISCONTINUED | OUTPATIENT
Start: 2023-08-03 | End: 2023-08-03 | Stop reason: HOSPADM

## 2023-08-03 RX ORDER — PROPOFOL 10 MG/ML
VIAL (ML) INTRAVENOUS AS NEEDED
Status: DISCONTINUED | OUTPATIENT
Start: 2023-08-03 | End: 2023-08-03 | Stop reason: SURG

## 2023-08-03 RX ORDER — KETOROLAC TROMETHAMINE 30 MG/ML
INJECTION, SOLUTION INTRAMUSCULAR; INTRAVENOUS AS NEEDED
Status: DISCONTINUED | OUTPATIENT
Start: 2023-08-03 | End: 2023-08-03 | Stop reason: SURG

## 2023-08-03 RX ORDER — HYDROCODONE BITARTRATE AND ACETAMINOPHEN 10; 325 MG/1; MG/1
1 TABLET ORAL EVERY 6 HOURS PRN
Qty: 30 TABLET | Refills: 0 | Status: SHIPPED | OUTPATIENT
Start: 2023-08-03

## 2023-08-03 RX ORDER — MEPERIDINE HYDROCHLORIDE 25 MG/ML
12.5 INJECTION INTRAMUSCULAR; INTRAVENOUS; SUBCUTANEOUS
Status: DISCONTINUED | OUTPATIENT
Start: 2023-08-03 | End: 2023-08-03 | Stop reason: HOSPADM

## 2023-08-03 RX ORDER — ONDANSETRON 2 MG/ML
4 INJECTION INTRAMUSCULAR; INTRAVENOUS ONCE AS NEEDED
Status: DISCONTINUED | OUTPATIENT
Start: 2023-08-03 | End: 2023-08-03 | Stop reason: HOSPADM

## 2023-08-03 RX ORDER — ONDANSETRON 2 MG/ML
INJECTION INTRAMUSCULAR; INTRAVENOUS AS NEEDED
Status: DISCONTINUED | OUTPATIENT
Start: 2023-08-03 | End: 2023-08-03 | Stop reason: SURG

## 2023-08-03 RX ORDER — EPHEDRINE SULFATE 50 MG/ML
5 INJECTION, SOLUTION INTRAVENOUS ONCE AS NEEDED
Status: DISCONTINUED | OUTPATIENT
Start: 2023-08-03 | End: 2023-08-03 | Stop reason: HOSPADM

## 2023-08-03 RX ORDER — PROMETHAZINE HYDROCHLORIDE 25 MG/1
25 TABLET ORAL ONCE AS NEEDED
Status: DISCONTINUED | OUTPATIENT
Start: 2023-08-03 | End: 2023-08-03 | Stop reason: HOSPADM

## 2023-08-03 RX ORDER — NALOXONE HCL 0.4 MG/ML
0.4 VIAL (ML) INJECTION AS NEEDED
Status: DISCONTINUED | OUTPATIENT
Start: 2023-08-03 | End: 2023-08-03 | Stop reason: HOSPADM

## 2023-08-03 RX ORDER — SODIUM CHLORIDE, SODIUM GLUCONATE, SODIUM ACETATE, POTASSIUM CHLORIDE AND MAGNESIUM CHLORIDE 526; 502; 368; 37; 30 MG/100ML; MG/100ML; MG/100ML; MG/100ML; MG/100ML
INJECTION, SOLUTION INTRAVENOUS CONTINUOUS PRN
Status: DISCONTINUED | OUTPATIENT
Start: 2023-08-03 | End: 2023-08-03 | Stop reason: SURG

## 2023-08-03 RX ORDER — DIPHENHYDRAMINE HYDROCHLORIDE 50 MG/ML
12.5 INJECTION INTRAMUSCULAR; INTRAVENOUS
Status: DISCONTINUED | OUTPATIENT
Start: 2023-08-03 | End: 2023-08-03 | Stop reason: HOSPADM

## 2023-08-03 RX ADMIN — FENTANYL CITRATE 25 MCG: 50 INJECTION, SOLUTION INTRAMUSCULAR; INTRAVENOUS at 11:17

## 2023-08-03 RX ADMIN — PROPOFOL 120 MG: 10 INJECTION, EMULSION INTRAVENOUS at 10:55

## 2023-08-03 RX ADMIN — DEXAMETHASONE SODIUM PHOSPHATE 4 MG: 4 INJECTION, SOLUTION INTRAMUSCULAR; INTRAVENOUS at 11:03

## 2023-08-03 RX ADMIN — MIDAZOLAM HYDROCHLORIDE 2 MG: 1 INJECTION, SOLUTION INTRAMUSCULAR; INTRAVENOUS at 10:49

## 2023-08-03 RX ADMIN — SODIUM CHLORIDE, SODIUM GLUCONATE, SODIUM ACETATE, POTASSIUM CHLORIDE AND MAGNESIUM CHLORIDE: 526; 502; 368; 37; 30 INJECTION, SOLUTION INTRAVENOUS at 10:46

## 2023-08-03 RX ADMIN — HUMAN INSULIN 5 UNITS: 100 INJECTION, SOLUTION SUBCUTANEOUS at 11:47

## 2023-08-03 RX ADMIN — Medication 2 G: at 11:03

## 2023-08-03 RX ADMIN — KETOROLAC TROMETHAMINE 15 MG: 30 INJECTION, SOLUTION INTRAMUSCULAR; INTRAVENOUS at 11:03

## 2023-08-03 RX ADMIN — FENTANYL CITRATE 25 MCG: 50 INJECTION, SOLUTION INTRAMUSCULAR; INTRAVENOUS at 11:04

## 2023-08-03 RX ADMIN — SODIUM CHLORIDE 1000 ML: 9 INJECTION, SOLUTION INTRAVENOUS at 09:06

## 2023-08-03 RX ADMIN — ONDANSETRON 4 MG: 2 INJECTION INTRAMUSCULAR; INTRAVENOUS at 11:03

## 2023-08-03 RX ADMIN — LIDOCAINE HYDROCHLORIDE 100 MG: 20 INJECTION, SOLUTION EPIDURAL; INFILTRATION; INTRACAUDAL; PERINEURAL at 10:55

## 2023-08-03 NOTE — INTERVAL H&P NOTE
H&P reviewed. The patient was examined and there are no changes to the H&P.            This document has been electronically signed by Abdi Raymundo DPM on August 3, 2023 10:29 CDT

## 2023-08-03 NOTE — BRIEF OP NOTE
ANKLE/FOOT HARDWARE REMOVAL  Progress Note    Donis Yi  8/3/2023    Pre-op Diagnosis:   Painful orthopaedic hardware [T84.84XA]       Post-Op Diagnosis Codes:     * Painful orthopaedic hardware [T84.84XA]    Procedure/CPTr Codes:      Procedure(s):  ANKLE/FOOT HARDWARE REMOVAL          Surgeon(s):  Abdi Raymundo DPM    Anesthesia: General    Staff:   Circulator: Leila Ramos RN; Fely Zamora RN  Scrub Person: Keila Elizondo; Eulalia Marcus  Assistant: Mary Car MA  Assistant: Mary Car MA      Estimated Blood Loss: minimal    Urine Voided: * No values recorded between 8/3/2023 10:50 AM and 8/3/2023 11:32 AM *    Specimens:                None          Drains:   [REMOVED] Urethral Catheter Silicone;Double-lumen (Removed)       Findings: consistent with pre-op dx        Complications: none    Assistant: Mary Car MA  was responsible for performing the following activities: Retraction, Suction, Irrigation, and Placing Dressing and their skilled assistance was necessary for the success of this case.    Abdi Raymundo DPM     Date: 8/3/2023  Time: 11:36 CDT

## 2023-08-03 NOTE — DISCHARGE INSTRUCTIONS
Discharge home  Resume normal diet  Keep dressing clean,dry, and intact  Ice and elevate  Weight bearing as tolerated. Ok to take boot off while sitting or sleeping.   Norco 10mg as needed for pain. Alternate with 600mg ibu  Follow up in clinic within 1 week          This document has been electronically signed by Abdi Raymundo DPM on August 3, 2023 11:39 CDT

## 2023-08-04 NOTE — OP NOTE
Date of Procedure: 08/3/2023     SURGEON: Abdi Raymundo DPM      Assistant: Mary Car MA was responsible for performing the following activities: Retraction, Suction, and Placing Dressing and their skilled assistance was necessary for the success of this case.      PREOPERATIVE DIAGNOSIS: Painful hardware right ankle     POSTOPERATIVE DIAGNOSIS: Same as preop     PROCEDURES PERFORMED: Hardware removal right ankle     ANESTHESIA: General     HEMOSTASIS: Thigh tourniquet     ESTIMATED BLOOD LOSS: 10 mL.     SPECIMEN: Explanted surgical hardware     MATERIALS: None     COMPLICATIONS: None.      CONDITION: Stable.        INDICATIONS FOR PROCEDURE: This is a patient of mine who has painful hardware to the outside of his right ankle.  He agrees to undergo the surgical intervention at this time. Consent is signed and documented in the chart. History and physical exam were reviewed prior to patient being taken to the operating room and n.p.o. status was confirmed. No guarantees were given or implied regarding the outcome of this treatment.      DESCRIPTION OF PROCEDURE: After mild sedation was administered by the anesthesia team in the preoperative holding area the patient was transported to the operating room and placed in a supine position.  General anesthesia was then administered and the right lower extremity was prepped and draped in usual sterile technique and a timeout was performed to confirm the correct patient, correct site, and correct procedure.      Attention was then directed to the right lower extremity which was exsanguinated using an Esmarch bandage.  Tourniquet was inflated.  Linear incision was made over the distal fibula.  Sharp dissection down to hardware.  Hardware was removed intact.  Operative site was flushed and closed with staples.  Dressing applied.  Tourniquet deflated.  Patient tolerated procedure and anesthesia well and was transported from the operating room to the PACU with  vital signs stable and neurovascular status intact to the operative extremity.    Plan to discharge patient home once stable per anesthesia.  Patient can resume normal diet. Follow-up in 1 week.            This document has been electronically signed by Abdi Raymundo DPM on August 4, 2023 12:27 CDT

## 2023-08-08 DIAGNOSIS — G43.719 INTRACTABLE CHRONIC MIGRAINE WITHOUT AURA AND WITHOUT STATUS MIGRAINOSUS: ICD-10-CM

## 2023-08-08 DIAGNOSIS — G25.0 ESSENTIAL TREMOR: ICD-10-CM

## 2023-08-08 DIAGNOSIS — G25.0 ESSENTIAL TREMOR: Primary | ICD-10-CM

## 2023-08-08 RX ORDER — PROPRANOLOL HYDROCHLORIDE 80 MG/1
160 CAPSULE, EXTENDED RELEASE ORAL DAILY
Qty: 60 CAPSULE | Refills: 2 | Status: SHIPPED | OUTPATIENT
Start: 2023-08-08 | End: 2023-08-08

## 2023-08-08 RX ORDER — PROPRANOLOL HYDROCHLORIDE 80 MG/1
80 TABLET ORAL 3 TIMES DAILY
Qty: 270 TABLET | Refills: 1 | Status: SHIPPED | OUTPATIENT
Start: 2023-08-08 | End: 2024-02-04

## 2023-08-08 RX ORDER — ROPINIROLE 0.25 MG/1
0.25 TABLET, FILM COATED ORAL 3 TIMES DAILY
Qty: 90 TABLET | Refills: 6 | Status: SHIPPED | OUTPATIENT
Start: 2023-08-08 | End: 2024-08-07

## 2023-08-08 NOTE — TELEPHONE ENCOUNTER
MEDICATION REFILL REQUEST    Caller: Donis Yi    Relationship: Self    Best call back number: [phone]    Requested Prescriptions:   Requested Prescriptions     Pending Prescriptions Disp Refills    propranolol LA (Inderal LA) 80 MG 24 hr capsule 60 capsule 2     Sig: Take 2 capsules by mouth Daily.      Pharmacy where request should be sent: [facility] - [place] [id number] [facility] [phone] [facility] [phone]      Last office visit with prescribing clinician: 7/13/2023  Next office visit with prescribing clinician: 10/13/2023    Additional details provided by patient: PT STATES [facility] IS NEEDING AN UPDATED SCRIPT SENT OVER AS PT IS TAKING 1 80MG CAPSULE THREE TIMES A DAY; CURRENT SCRIPT WRITTEN ONLY FOR TWICE A DAY. PT IS COMPLETELY OUT OF THE MEDICATION AS OF THE TIME OF CALL.    Does the patient have less than a 3 day supply?:  [x] Yes  [] No    Would you like a call back once the refill request has been completed?: [] Yes  [x] No    If the office needs to give you a call back, can they leave a voicemail?: [] Yes  [x] No    PLEASE REVIEW AND ADVISE.    Soraida Romeo Rep   08/08/23 09:45 CDT

## 2023-08-09 ENCOUNTER — OFFICE VISIT (OUTPATIENT)
Dept: PODIATRY | Facility: CLINIC | Age: 58
End: 2023-08-09
Payer: MEDICARE

## 2023-08-09 VITALS
BODY MASS INDEX: 35.56 KG/M2 | OXYGEN SATURATION: 96 % | HEIGHT: 70 IN | WEIGHT: 248.4 LBS | SYSTOLIC BLOOD PRESSURE: 117 MMHG | DIASTOLIC BLOOD PRESSURE: 76 MMHG | HEART RATE: 79 BPM

## 2023-08-09 DIAGNOSIS — T84.84XA PAINFUL ORTHOPAEDIC HARDWARE: Primary | ICD-10-CM

## 2023-08-09 PROCEDURE — 99024 POSTOP FOLLOW-UP VISIT: CPT | Performed by: PODIATRIST

## 2023-08-09 NOTE — PROGRESS NOTES
Donis Yi  1965  57 y.o. male  PCP: Yg Mares MD 05/31/2023  BS: 336 per patient     08/09/2023        Chief Complaint   Patient presents with    Right Foot - Follow-up, Post-op       History of Present Illness    Donis Yi is a 57 y.o.male presents to clinic today for his post operative appointment. Patient had a ANKLE/FOOT HARDWARE REMOVAL- Right. Date of Surgery: 08/03/2023. Patient is doing well.    Past Medical History:   Diagnosis Date    Allergic rhinitis     Anxiety     Bursitis     DDD (degenerative disc disease), cervical     Depression     GERD (gastroesophageal reflux disease)     HIV (human immunodeficiency virus infection)     HLD (hyperlipidemia)     HSP (Henoch Schonlein purpura)     Migraine     Myocardial infarction     Osteoporosis     Parkinsonian tremor     PONV (postoperative nausea and vomiting)     Sleep apnea     Type 2 diabetes mellitus          Past Surgical History:   Procedure Laterality Date    AMPUTATION DIGIT Right 01/06/2022    Procedure: 4th Toe  Amputation - Right Foot;  Surgeon: Pacheco Harper DPM;  Location: [facility] OR;  Service: Podiatry;  Laterality: Right;    CARDIAC CATHETERIZATION N/A 06/24/2020    Procedure: Coronary angiography;  Surgeon: Deon Bailey MD;  Location: [facility] CATH INVASIVE LOCATION;  Service: Cardiology;  Laterality: N/A;  11am start time    CARDIAC CATHETERIZATION N/A 06/24/2020    Procedure: Percutaneous Coronary Intervention;  Surgeon: Deon Bailey MD;  Location: [facility] CATH INVASIVE LOCATION;  Service: Cardiology;  Laterality: N/A;    COLONOSCOPY  06/19/2009    Normal exam repeat in 10 years    COLONOSCOPY N/A 08/26/2021    Procedure: COLONOSCOPY WITH ANESTHESIA;  Surgeon: Byron Rosenberg MD;  Location: [facility] ENDOSCOPY;  Service: Gastroenterology;  Laterality: N/A;  pre screen  post poor prep  dr yg mares    CYSTOSCOPY N/A 04/18/2023    Procedure: CYSTOSCOPY;  Surgeon: Bairon Mendoza MD;  Location:   [facility] OR;  Service: Urology;  Laterality: N/A;    ENDOSCOPY N/A 08/26/2021    Procedure: ESOPHAGOGASTRODUODENOSCOPY WITH ANESTHESIA;  Surgeon: Byron Rosenberg MD;  Location:  [facility] ENDOSCOPY;  Service: Gastroenterology;  Laterality: N/A;  pre GERD  post normal  dr yg gutiérrez    LYMPH NODE BIOPSY Bilateral     neck    METATARSAL OSTEOTOMY Left 07/06/2023    Procedure: LEFT FIFTH METATARSAL HEAD EXCISION AND ALL INDICATED PROCEDURES;  Surgeon: Abdi Raymundo DPM;  Location:  [facility] OR;  Service: Podiatry;  Laterality: Left;    ORIF ANKLE FRACTURE Right     ORIF ULNAR / RADIAL SHAFT FRACTURE Right     PILONIDAL CYSTECTOMY N/A 12/01/2020    Procedure: EXCISION PILONIDAL ABSCESS;  Surgeon: Ashlyn Posey MD;  Location:  [facility] OR;  Service: General;  Laterality: N/A;    TOE SURGERY           Family History   Problem Relation Age of Onset    Diabetes Mother     Hypertension Mother     Thyroid disease Mother     Hypertension Father     Thyroid disease Father     Arrhythmia Father     Diabetes Maternal Grandfather     Heart disease Maternal Grandfather     Hypertension Maternal Grandfather     Diabetes Paternal Grandmother     Stroke Paternal Grandmother     Heart disease Paternal Grandmother     Hypertension Paternal Grandmother     Thyroid disease Paternal Grandmother     Hypertension Paternal Grandfather     Hypertension Sister     No Known Problems Brother     Colon cancer Neg Hx     Colon polyps Neg Hx        Allergies   Allergen Reactions    Amitriptyline Hcl Anaphylaxis    Darvon [Propoxyphene] Anaphylaxis    Zithromax [Azithromycin] Anaphylaxis       Social History     Socioeconomic History    Marital status:    Tobacco Use    Smoking status: Former     Packs/day: 0.25     Types: Cigarettes     Start date: 1988    Smokeless tobacco: Never   Vaping Use    Vaping Use: Never used   Substance and Sexual Activity    Alcohol use: Yes     Comment: socialy    Drug use: No    Sexual activity: Defer         Current  "Outpatient Medications   Medication Sig Dispense Refill    albuterol (PROVENTIL HFA;VENTOLIN HFA) 108 (90 BASE) MCG/ACT inhaler Inhale 2 puffs Every 6 (Six) Hours As Needed for Wheezing.      atorvastatin (LIPITOR) 40 MG tablet Take 1 tablet by mouth Every Night.  3    Calcium Carb-Cholecalciferol (CALCIUM 600+D3 PO) Take  by mouth Every Night.      ciclopirox (LOPROX) 0.77 % cream Apply  topically to the appropriate area as directed 2 (Two) Times a Day. (Patient taking differently: Apply  topically to the appropriate area as directed 2 (Two) Times a Day As Needed.) 30 g 5    clotrimazole-betamethasone (LOTRISONE) 1-0.05 % cream Apply  topically to the appropriate area as directed 2 (Two) Times a Day. 15 g 1    Continuous Blood Gluc Sensor (FreeStyle Eamon 2 Sensor) misc USE as directed TO monitor blood glucose. replace every 14 DAYS. 2 each 3    DEXILANT 60 MG capsule Take 1 capsule by mouth Every Night.  1    Nbgbrrb-Dthqk-Wajbjzgp-TenofAF (GENVOYA) 990-592-242-10 MG per tablet Take 1 tablet by mouth Every Night.      fluticasone (FLONASE) 50 MCG/ACT nasal spray 2 sprays into each nostril Daily. 1 bottle 6    HYDROcodone-acetaminophen (Norco)  MG per tablet Take 1 tablet by mouth Every 6 (Six) Hours As Needed for Moderate Pain. 30 tablet 0    Insulin Disposable Pump (Omnipod DASH Pods, Gen 4,) misc USE EACH POD EVERY 3 DAYS 30 each 0    Insulin Syringe 31G X 5/16\" 1 ML misc Use 4 times daily  , ICD10 code is E11.9 120 each 11    lamoTRIgine (LaMICtal) 25 MG tablet Take 1 tablet by mouth 2 (Two) Times a Day.      loratadine (CLARITIN) 10 MG tablet Take 1 tablet by mouth Every Night.      losartan (COZAAR) 25 MG tablet Take 1 tablet by mouth Every Night.      meclizine (ANTIVERT) 25 MG tablet Take 1 tablet by mouth Every Night.      Melatonin 10 MG tablet Take 1 tablet by mouth every night at bedtime.  3    metFORMIN (GLUCOPHAGE) 500 MG tablet Take 2 tablets by mouth 2 (Two) Times a Day With Meals.  3    " metoclopramide (REGLAN) 10 MG tablet Take 1 tablet by mouth As Needed (take 1 tablet daily as needed for migraine. max dose 1 tablet 24 hours.). 30 tablet 1    mometasone (NASONEX) 50 MCG/ACT nasal spray 2 sprays into the nostril(s) as directed by provider Daily.      multivitamin with minerals tablet tablet Take 1 tablet by mouth Every Night.      naloxone (NARCAN) 4 MG/0.1ML nasal spray       NovoLOG 100 UNIT/ML injection Inject up to 150 units daily through pump 50 mL 2    ondansetron ODT (ZOFRAN-ODT) 4 MG disintegrating tablet Place 1 tablet on the tongue Every 6 (Six) Hours As Needed for Nausea. 12 tablet 0    prazosin (MINIPRESS) 2 MG capsule Take 1 capsule by mouth Every Night. Patient unsure of dosage      pregabalin (LYRICA) 75 MG capsule Take 1 capsule by mouth 3 (Three) Times a Day.      propranolol (INDERAL) 80 MG tablet Take 1 tablet by mouth 3 (Three) Times a Day for 180 days. 270 tablet 1    rOPINIRole (REQUIP) 0.25 MG tablet TAKE 1 TABLET BY MOUTH 3 (THREE) TIMES A DAY. 90 tablet 6    saccharomyces boulardii (FLORASTOR) 250 MG capsule Take 1 capsule by mouth Every Night.      Senna-Plus 8.6-50 MG per tablet Take 2 tablets by mouth every night at bedtime.      tamsulosin (FLOMAX) 0.4 MG capsule 24 hr capsule Take 1 capsule by mouth Every Night.      Tirzepatide (Mounjaro) 5 MG/0.5ML solution pen-injector Inject 0.5 mL under the skin into the appropriate area as directed Every 7 (Seven) Days. 5 mg subcutaneously weekly (Patient taking differently: Inject 0.5 mL under the skin into the appropriate area as directed Every 7 (Seven) Days. monday) 2 mL 11    topiramate (TOPAMAX) 25 MG tablet Take 1 tablet by mouth 3 (Three) Times a Day.      traZODone (DESYREL) 100 MG tablet Take 2 tablets by mouth Every Night.  3    Vortioxetine HBr 20 MG tablet Take 1 tablet by mouth Every Night. Pt takes at night       No current facility-administered medications for this visit.       Review of Systems   Constitutional:  "Negative.    Cardiovascular:  Positive for leg swelling.   Musculoskeletal:         Right ankle pain   Psychiatric/Behavioral: Negative.     All other systems reviewed and are negative.      OBJECTIVE    /76   Pulse 79   Ht 177.8 cm (70\")   Wt 113 kg (248 lb 6.4 oz)   SpO2 96%   BMI 35.64 kg/mý     Physical Exam  Pulmonary:      Effort: Pulmonary effort is normal.   Musculoskeletal:      Right ankle: Swelling present.      Left ankle: Normal.   Neurological:      Mental Status: He is alert.   Psychiatric:         Mood and Affect: Mood normal.        Right lower extremity: Incision site well-approximated.  No signs of dehiscence.  Mild edema.      Procedures        ASSESSMENT AND PLAN    Diagnoses and all orders for this visit:    1. Painful orthopaedic hardware (Primary)        -doing well postoperatively.  -Compression stocking for swelling.  -Weightbearing as tolerated in [place].  -All questions were answered.  -Follow-up in one week.          This document has been electronically signed by Abdi Raymundo DPM on August 9, 2023 15:03 CDT     8/9/2023  15:03 CDT    "

## 2023-08-15 RX ORDER — INSULIN ASPART 100 [IU]/ML
INJECTION, SOLUTION INTRAVENOUS; SUBCUTANEOUS
Qty: 50 ML | Refills: 2 | Status: SHIPPED | OUTPATIENT
Start: 2023-08-15

## 2023-08-16 ENCOUNTER — TELEPHONE (OUTPATIENT)
Dept: PODIATRY | Facility: CLINIC | Age: 58
End: 2023-08-16
Payer: MEDICARE

## 2023-08-16 NOTE — TELEPHONE ENCOUNTER
Erika Dykes is supposed to have his stitches removed tomorrow at his appt.  He needs to know if he needs to bring a shoe to put on afterward or if he is staying in the boot.    Call stephane is [phone]

## 2023-08-16 NOTE — TELEPHONE ENCOUNTER
Called patient. Informed patient that he can bring his shoe if he wants to, but it's up to the provider.

## 2023-08-17 ENCOUNTER — OFFICE VISIT (OUTPATIENT)
Dept: PODIATRY | Facility: CLINIC | Age: 58
End: 2023-08-17
Payer: MEDICARE

## 2023-08-17 VITALS
OXYGEN SATURATION: 99 % | SYSTOLIC BLOOD PRESSURE: 124 MMHG | HEIGHT: 70 IN | HEART RATE: 84 BPM | BODY MASS INDEX: 35.5 KG/M2 | WEIGHT: 248 LBS | DIASTOLIC BLOOD PRESSURE: 80 MMHG

## 2023-08-17 DIAGNOSIS — Z98.890 S/P FOOT SURGERY: Primary | ICD-10-CM

## 2023-08-17 DIAGNOSIS — B35.1 ONYCHOMYCOSIS: ICD-10-CM

## 2023-08-17 DIAGNOSIS — Z79.4 TYPE 2 DIABETES MELLITUS WITH HYPERGLYCEMIA, WITH LONG-TERM CURRENT USE OF INSULIN: ICD-10-CM

## 2023-08-17 DIAGNOSIS — L97.522 NEUROPATHIC ULCER OF FOOT, LEFT, WITH FAT LAYER EXPOSED: ICD-10-CM

## 2023-08-17 DIAGNOSIS — T84.84XA PAINFUL ORTHOPAEDIC HARDWARE: ICD-10-CM

## 2023-08-17 DIAGNOSIS — E11.65 TYPE 2 DIABETES MELLITUS WITH HYPERGLYCEMIA, WITH LONG-TERM CURRENT USE OF INSULIN: ICD-10-CM

## 2023-08-17 NOTE — PROGRESS NOTES
Donis Yi  1965  57 y.o. male        08/17/2023    Chief Complaint   Patient presents with    Left Foot - Pain, Follow-up       History of Present Illness    Donis Yi is a 57 y.o.male presents to clinic today for his post operative appointment. He had a left fifth metatarsal head excision on 7/6/23. Doing well.  He has a new complaint today of continued pain to his right ankle.  He does have history of an ankle fracture in the past requiring surgical fixation.         Past Medical History:   Diagnosis Date    Allergic rhinitis     Anxiety     Bursitis     DDD (degenerative disc disease), cervical     Depression     GERD (gastroesophageal reflux disease)     HIV (human immunodeficiency virus infection)     HLD (hyperlipidemia)     HSP (Henoch Schonlein purpura)     Migraine     Myocardial infarction     Osteoporosis     Parkinsonian tremor     PONV (postoperative nausea and vomiting)     Sleep apnea     Type 2 diabetes mellitus          Past Surgical History:   Procedure Laterality Date    AMPUTATION DIGIT Right 01/06/2022    Procedure: 4th Toe  Amputation - Right Foot;  Surgeon: Pacheco Harper DPM;  Location: [facility] OR;  Service: Podiatry;  Laterality: Right;    CARDIAC CATHETERIZATION N/A 06/24/2020    Procedure: Coronary angiography;  Surgeon: Deon Bailey MD;  Location: [facility] CATH INVASIVE LOCATION;  Service: Cardiology;  Laterality: N/A;  11am start time    CARDIAC CATHETERIZATION N/A 06/24/2020    Procedure: Percutaneous Coronary Intervention;  Surgeon: Deon Bailey MD;  Location: [facility] CATH INVASIVE LOCATION;  Service: Cardiology;  Laterality: N/A;    COLONOSCOPY  06/19/2009    Normal exam repeat in 10 years    COLONOSCOPY N/A 08/26/2021    Procedure: COLONOSCOPY WITH ANESTHESIA;  Surgeon: Byron Rosenberg MD;  Location: [facility] ENDOSCOPY;  Service: Gastroenterology;  Laterality: N/A;  pre screen  post poor prep  dr yg gutiérrez    CYSTOSCOPY N/A 04/18/2023     Procedure: CYSTOSCOPY;  Surgeon: Bairon Mendoza MD;  Location: [facility] OR;  Service: Urology;  Laterality: N/A;    ENDOSCOPY N/A 08/26/2021    Procedure: ESOPHAGOGASTRODUODENOSCOPY WITH ANESTHESIA;  Surgeon: Byron Rosenberg MD;  Location: [facility] ENDOSCOPY;  Service: Gastroenterology;  Laterality: N/A;  pre GERD  post normal  dr yg gutiérrez    LYMPH NODE BIOPSY Bilateral     neck    METATARSAL OSTEOTOMY Left 07/06/2023    Procedure: LEFT FIFTH METATARSAL HEAD EXCISION AND ALL INDICATED PROCEDURES;  Surgeon: Abdi Raymundo DPM;  Location: [facility] OR;  Service: Podiatry;  Laterality: Left;    ORIF ANKLE FRACTURE Right     ORIF ULNAR / RADIAL SHAFT FRACTURE Right     PILONIDAL CYSTECTOMY N/A 12/01/2020    Procedure: EXCISION PILONIDAL ABSCESS;  Surgeon: Ashlyn Posey MD;  Location: [facility] OR;  Service: General;  Laterality: N/A;    TOE SURGERY           Family History   Problem Relation Age of Onset    Diabetes Mother     Hypertension Mother     Thyroid disease Mother     Hypertension Father     Thyroid disease Father     Arrhythmia Father     Diabetes Maternal Grandfather     Heart disease Maternal Grandfather     Hypertension Maternal Grandfather     Diabetes Paternal Grandmother     Stroke Paternal Grandmother     Heart disease Paternal Grandmother     Hypertension Paternal Grandmother     Thyroid disease Paternal Grandmother     Hypertension Paternal Grandfather     Hypertension Sister     No Known Problems Brother     Colon cancer Neg Hx     Colon polyps Neg Hx        Allergies   Allergen Reactions    Amitriptyline Hcl Anaphylaxis    Darvon [Propoxyphene] Anaphylaxis    Zithromax [Azithromycin] Anaphylaxis       Social History     Socioeconomic History    Marital status:    Tobacco Use    Smoking status: Former     Packs/day: 0.25     Types: Cigarettes     Start date: 1988    Smokeless tobacco: Never   Vaping Use    Vaping Use: Never used   Substance and Sexual Activity    Alcohol use: Yes      "Comment: socialy    Drug use: No    Sexual activity: Defer         Current Outpatient Medications   Medication Sig Dispense Refill    albuterol (PROVENTIL HFA;VENTOLIN HFA) 108 (90 BASE) MCG/ACT inhaler Inhale 2 puffs Every 6 (Six) Hours As Needed for Wheezing.      atorvastatin (LIPITOR) 40 MG tablet Take 1 tablet by mouth Every Night.  3    Calcium Carb-Cholecalciferol (CALCIUM 600+D3 PO) Take  by mouth Every Night.      ciclopirox (LOPROX) 0.77 % cream Apply  topically to the appropriate area as directed 2 (Two) Times a Day. (Patient taking differently: Apply  topically to the appropriate area as directed 2 (Two) Times a Day As Needed.) 30 g 5    clotrimazole-betamethasone (LOTRISONE) 1-0.05 % cream Apply  topically to the appropriate area as directed 2 (Two) Times a Day. 15 g 1    Continuous Blood Gluc Sensor (FreeStyle Eamon 2 Sensor) misc USE as directed TO monitor blood glucose. replace every 14 DAYS. 2 each 3    DEXILANT 60 MG capsule Take 1 capsule by mouth Every Night.  1    Vcglmef-Riyvc-Irlskncf-TenofAF (GENVOYA) [id number] MG per tablet Take 1 tablet by mouth Every Night.      fluticasone (FLONASE) 50 MCG/ACT nasal spray 2 sprays into each nostril Daily. 1 bottle 6    HYDROcodone-acetaminophen (Norco)  MG per tablet Take 1 tablet by mouth Every 6 (Six) Hours As Needed for Moderate Pain. 30 tablet 0    Insulin Disposable Pump (Omnipod DASH Pods, Gen 4,) misc USE EACH POD EVERY 3 DAYS 30 each 0    Insulin Syringe 31G X 5/16\" 1 ML misc Use 4 times daily  , ICD10 code is E11.9 120 each 11    lamoTRIgine (LaMICtal) 25 MG tablet Take 1 tablet by mouth 2 (Two) Times a Day.      loratadine (CLARITIN) 10 MG tablet Take 1 tablet by mouth Every Night.      losartan (COZAAR) 25 MG tablet Take 1 tablet by mouth Every Night.      meclizine (ANTIVERT) 25 MG tablet Take 1 tablet by mouth Every Night.      Melatonin 10 MG tablet Take 1 tablet by mouth every night at bedtime.  3    metFORMIN (GLUCOPHAGE) 500 MG " tablet Take 2 tablets by mouth 2 (Two) Times a Day With Meals.  3    metoclopramide (REGLAN) 10 MG tablet Take 1 tablet by mouth As Needed (take 1 tablet daily as needed for migraine. max dose 1 tablet 24 hours.). 30 tablet 1    mometasone (NASONEX) 50 MCG/ACT nasal spray 2 sprays into the nostril(s) as directed by provider Daily.      multivitamin with minerals tablet tablet Take 1 tablet by mouth Every Night.      naloxone (NARCAN) 4 MG/0.1ML nasal spray       NovoLOG 100 UNIT/ML injection Inject up to 150 units daily through pump 50 mL 2    ondansetron ODT (ZOFRAN-ODT) 4 MG disintegrating tablet Place 1 tablet on the tongue Every 6 (Six) Hours As Needed for Nausea. 12 tablet 0    prazosin (MINIPRESS) 2 MG capsule Take 1 capsule by mouth Every Night. Patient unsure of dosage      pregabalin (LYRICA) 75 MG capsule Take 1 capsule by mouth 3 (Three) Times a Day.      propranolol (INDERAL) 80 MG tablet Take 1 tablet by mouth 3 (Three) Times a Day for 180 days. 270 tablet 1    rOPINIRole (REQUIP) 0.25 MG tablet TAKE 1 TABLET BY MOUTH 3 (THREE) TIMES A DAY. 90 tablet 6    saccharomyces boulardii (FLORASTOR) 250 MG capsule Take 1 capsule by mouth Every Night.      Senna-Plus 8.6-50 MG per tablet Take 2 tablets by mouth every night at bedtime.      tamsulosin (FLOMAX) 0.4 MG capsule 24 hr capsule Take 1 capsule by mouth Every Night.      Tirzepatide (Mounjaro) 5 MG/0.5ML solution pen-injector Inject 0.5 mL under the skin into the appropriate area as directed Every 7 (Seven) Days. 5 mg subcutaneously weekly (Patient taking differently: Inject 0.5 mL under the skin into the appropriate area as directed Every 7 (Seven) Days. monday) 2 mL 11    topiramate (TOPAMAX) 25 MG tablet Take 1 tablet by mouth 3 (Three) Times a Day.      traZODone (DESYREL) 100 MG tablet Take 2 tablets by mouth Every Night.  3    Vortioxetine HBr 20 MG tablet Take 1 tablet by mouth Every Night. Pt takes at night       No current facility-administered  "medications for this visit.       Review of Systems      OBJECTIVE    /80   Pulse 84   Ht 177.8 cm (70\")   Wt 112 kg (248 lb)   SpO2 99%   BMI 35.58 kg/mý     Body mass index is 35.58 kg/mý.        Physical Exam  Vitals reviewed.   Constitutional:       Appearance: Normal appearance. He is well-developed.   HENT:      Head: Normocephalic and atraumatic.   Neck:      Trachea: Trachea and phonation normal.   Pulmonary:      Effort: Pulmonary effort is normal. No respiratory distress.   Abdominal:      General: There is no distension.      Palpations: Abdomen is soft.   Musculoskeletal:      Comments: Lateral ankle incision healing, staple removed      Unna boot placed on right lower ex, patient neurovascular intact post procedure.    Skin:     General: Skin is warm and dry.   Neurological:      Mental Status: He is alert and oriented to person, place, and time.      GCS: GCS eye subscore is 4. GCS verbal subscore is 5. GCS motor subscore is 6.   Psychiatric:         Speech: Speech normal.         Behavior: Behavior normal. Behavior is cooperative.         Thought Content: Thought content normal.         Judgment: Judgment normal.              Procedures        ASSESSMENT AND PLAN    Diagnoses and all orders for this visit:    1. S/P foot surgery (Primary)  -     XR Ankle 3+ View Right  -     XR Foot 3+ View Right    2. Painful orthopaedic hardware    3. Type 2 diabetes mellitus with hyperglycemia, with long-term current use of insulin    4. Onychomycosis    5. Neuropathic ulcer of foot, left, with fat layer exposed      Body mass index is 35.58 kg/mý.    Recommend follow-up with primary care to discuss BMI greater than 30    - A diabetic foot screening exam was performed and the patient was educated on the foot complications related to diabetes,  preventative foot care and what signs and symptoms to watch for.  Instructed to contact our office if any foot problems develop before next visit.  -Discussed " treatments for  painful toenails. Treatment options discussed included nail removal versus debridement. Patient elected for routine nail debridement. An ABN has been signed by the patient.  - Toenails R1-4 L 1-5  were debrided in length and thickness with nail nipper and electric  to decrease fungal load and risk of infection.  - All the patients questions were answered.  - RTC 3 months or sooner if needed.           This document has been electronically signed by Fabian RAMSAY, FNP-C, ONP-C on August 17, 2023 11:29 CDT

## 2023-08-24 ENCOUNTER — OFFICE VISIT (OUTPATIENT)
Dept: PODIATRY | Facility: CLINIC | Age: 58
End: 2023-08-24
Payer: MEDICARE

## 2023-08-24 VITALS
BODY MASS INDEX: 35.5 KG/M2 | SYSTOLIC BLOOD PRESSURE: 160 MMHG | HEIGHT: 70 IN | HEART RATE: 66 BPM | DIASTOLIC BLOOD PRESSURE: 100 MMHG | WEIGHT: 248 LBS | OXYGEN SATURATION: 98 %

## 2023-08-24 DIAGNOSIS — T84.84XA PAINFUL ORTHOPAEDIC HARDWARE: ICD-10-CM

## 2023-08-24 DIAGNOSIS — Z98.890 S/P FOOT SURGERY: ICD-10-CM

## 2023-08-24 DIAGNOSIS — E11.65 TYPE 2 DIABETES MELLITUS WITH HYPERGLYCEMIA, WITH LONG-TERM CURRENT USE OF INSULIN: Primary | ICD-10-CM

## 2023-08-24 DIAGNOSIS — Z79.4 TYPE 2 DIABETES MELLITUS WITH HYPERGLYCEMIA, WITH LONG-TERM CURRENT USE OF INSULIN: Primary | ICD-10-CM

## 2023-08-24 LAB — GLUCOSE BLDC GLUCOMTR-MCNC: 299 MG/DL (ref 70–130)

## 2023-08-24 NOTE — PROGRESS NOTES
Donis Yi  1965  57 y.o. male        08/24/2023    Chief Complaint   Patient presents with    Right Foot - Follow-up     Post op        History of Present Illness    Donis Yi is a 57 y.o.male presents to clinic today for his post operative appointment. He had a removal of hardware right ankle, doing well.           Past Medical History:   Diagnosis Date    Allergic rhinitis     Anxiety     Bursitis     DDD (degenerative disc disease), cervical     Depression     GERD (gastroesophageal reflux disease)     HIV (human immunodeficiency virus infection)     HLD (hyperlipidemia)     HSP (Henoch Schonlein purpura)     Migraine     Myocardial infarction     Osteoporosis     Parkinsonian tremor     PONV (postoperative nausea and vomiting)     Sleep apnea     Type 2 diabetes mellitus          Past Surgical History:   Procedure Laterality Date    AMPUTATION DIGIT Right 01/06/2022    Procedure: 4th Toe  Amputation - Right Foot;  Surgeon: Pacheco Harper DPM;  Location: [facility] OR;  Service: Podiatry;  Laterality: Right;    CARDIAC CATHETERIZATION N/A 06/24/2020    Procedure: Coronary angiography;  Surgeon: Deon Bailey MD;  Location: [facility] CATH INVASIVE LOCATION;  Service: Cardiology;  Laterality: N/A;  11am start time    CARDIAC CATHETERIZATION N/A 06/24/2020    Procedure: Percutaneous Coronary Intervention;  Surgeon: Deon Bailey MD;  Location: [facility] CATH INVASIVE LOCATION;  Service: Cardiology;  Laterality: N/A;    COLONOSCOPY  06/19/2009    Normal exam repeat in 10 years    COLONOSCOPY N/A 08/26/2021    Procedure: COLONOSCOPY WITH ANESTHESIA;  Surgeon: Byron Rosenberg MD;  Location: [facility] ENDOSCOPY;  Service: Gastroenterology;  Laterality: N/A;  pre screen  post poor prep  dr yg gutiérrez    CYSTOSCOPY N/A 04/18/2023    Procedure: CYSTOSCOPY;  Surgeon: Bairon Mendoza MD;  Location: [facility] OR;  Service: Urology;  Laterality: N/A;    ENDOSCOPY N/A 08/26/2021    Procedure:  ESOPHAGOGASTRODUODENOSCOPY WITH ANESTHESIA;  Surgeon: Byron Rosenberg MD;  Location: [facility] ENDOSCOPY;  Service: Gastroenterology;  Laterality: N/A;  pre GERD  post normal  dr yg gutiérrez    HARDWARE REMOVAL Right 8/3/2023    Procedure: ANKLE/FOOT HARDWARE REMOVAL;  Surgeon: Abdi Raymundo DPM;  Location: [facility] OR;  Service: Podiatry;  Laterality: Right;    LYMPH NODE BIOPSY Bilateral     neck    METATARSAL OSTEOTOMY Left 07/06/2023    Procedure: LEFT FIFTH METATARSAL HEAD EXCISION AND ALL INDICATED PROCEDURES;  Surgeon: Abdi Raymundo DPM;  Location: [facility] OR;  Service: Podiatry;  Laterality: Left;    ORIF ANKLE FRACTURE Right     ORIF ULNAR / RADIAL SHAFT FRACTURE Right     PILONIDAL CYSTECTOMY N/A 12/01/2020    Procedure: EXCISION PILONIDAL ABSCESS;  Surgeon: Ashlyn Posey MD;  Location: [facility] OR;  Service: General;  Laterality: N/A;    TOE SURGERY           Family History   Problem Relation Age of Onset    Diabetes Mother     Hypertension Mother     Thyroid disease Mother     Hypertension Father     Thyroid disease Father     Arrhythmia Father     Diabetes Maternal Grandfather     Heart disease Maternal Grandfather     Hypertension Maternal Grandfather     Diabetes Paternal Grandmother     Stroke Paternal Grandmother     Heart disease Paternal Grandmother     Hypertension Paternal Grandmother     Thyroid disease Paternal Grandmother     Hypertension Paternal Grandfather     Hypertension Sister     No Known Problems Brother     Colon cancer Neg Hx     Colon polyps Neg Hx        Allergies   Allergen Reactions    Amitriptyline Hcl Anaphylaxis    Darvon [Propoxyphene] Anaphylaxis    Zithromax [Azithromycin] Anaphylaxis       Social History     Socioeconomic History    Marital status:    Tobacco Use    Smoking status: Former     Packs/day: 0.25     Types: Cigarettes     Start date: 1988    Smokeless tobacco: Never   Vaping Use    Vaping Use: Never used   Substance and Sexual Activity    Alcohol  "use: Yes     Comment: socialy    Drug use: No    Sexual activity: Defer         Current Outpatient Medications   Medication Sig Dispense Refill    traZODone (DESYREL) 100 MG tablet Take 3 tablets by mouth Every Night.  3    albuterol (PROVENTIL HFA;VENTOLIN HFA) 108 (90 BASE) MCG/ACT inhaler Inhale 2 puffs Every 6 (Six) Hours As Needed for Wheezing.      atorvastatin (LIPITOR) 40 MG tablet Take 1 tablet by mouth Every Night.  3    Calcium Carb-Cholecalciferol (CALCIUM 600+D3 PO) Take  by mouth Every Night.      ciclopirox (LOPROX) 0.77 % cream Apply  topically to the appropriate area as directed 2 (Two) Times a Day. (Patient taking differently: Apply  topically to the appropriate area as directed 2 (Two) Times a Day As Needed.) 30 g 5    clotrimazole-betamethasone (LOTRISONE) 1-0.05 % cream Apply  topically to the appropriate area as directed 2 (Two) Times a Day. 15 g 1    Continuous Blood Gluc Sensor (FreeStyle Eamon 2 Sensor) misc USE as directed TO monitor blood glucose. replace every 14 DAYS. 2 each 3    DEXILANT 60 MG capsule Take 1 capsule by mouth Every Night.  1    Jgcqrrw-Jyhhr-Zckmkdfl-TenofAF (GENVOYA) [id number] MG per tablet Take 1 tablet by mouth Every Night.      fluticasone (FLONASE) 50 MCG/ACT nasal spray 2 sprays into each nostril Daily. 1 bottle 6    HYDROcodone-acetaminophen (Norco)  MG per tablet Take 1 tablet by mouth Every 6 (Six) Hours As Needed for Moderate Pain. 30 tablet 0    Insulin Disposable Pump (Omnipod DASH Pods, Gen 4,) misc USE EACH POD EVERY 3 DAYS 30 each 0    Insulin Syringe 31G X 5/16\" 1 ML misc Use 4 times daily  , ICD10 code is E11.9 120 each 11    lamoTRIgine (LaMICtal) 25 MG tablet Take 4 tablets by mouth 2 (Two) Times a Day.      loratadine (CLARITIN) 10 MG tablet Take 1 tablet by mouth Every Night.      losartan (COZAAR) 25 MG tablet Take 1 tablet by mouth Every Night.      meclizine (ANTIVERT) 25 MG tablet Take 1 tablet by mouth Every Night.      Melatonin 10 " MG tablet Take 1 tablet by mouth every night at bedtime.  3    metFORMIN (GLUCOPHAGE) 500 MG tablet Take 2 tablets by mouth 2 (Two) Times a Day With Meals.  3    metoclopramide (REGLAN) 10 MG tablet Take 1 tablet by mouth As Needed (take 1 tablet daily as needed for migraine. max dose 1 tablet 24 hours.). 30 tablet 1    mometasone (NASONEX) 50 MCG/ACT nasal spray 2 sprays into the nostril(s) as directed by provider Daily.      multivitamin with minerals tablet tablet Take 1 tablet by mouth Every Night.      naloxone (NARCAN) 4 MG/0.1ML nasal spray       NovoLOG 100 UNIT/ML injection Inject up to 150 units daily through pump 50 mL 2    ondansetron ODT (ZOFRAN-ODT) 4 MG disintegrating tablet Place 1 tablet on the tongue Every 6 (Six) Hours As Needed for Nausea. 12 tablet 0    prazosin (MINIPRESS) 2 MG capsule Take 1 capsule by mouth Every Night. Patient unsure of dosage      pregabalin (LYRICA) 75 MG capsule Take 1 capsule by mouth 3 (Three) Times a Day.      propranolol (INDERAL) 80 MG tablet Take 1 tablet by mouth 3 (Three) Times a Day for 180 days. 270 tablet 1    rOPINIRole (REQUIP) 0.25 MG tablet TAKE 1 TABLET BY MOUTH 3 (THREE) TIMES A DAY. 90 tablet 6    saccharomyces boulardii (FLORASTOR) 250 MG capsule Take 1 capsule by mouth Every Night.      Senna-Plus 8.6-50 MG per tablet Take 2 tablets by mouth every night at bedtime.      tamsulosin (FLOMAX) 0.4 MG capsule 24 hr capsule Take 1 capsule by mouth Every Night.      Tirzepatide (Mounjaro) 5 MG/0.5ML solution pen-injector Inject 0.5 mL under the skin into the appropriate area as directed Every 7 (Seven) Days. 5 mg subcutaneously weekly (Patient taking differently: Inject 0.5 mL under the skin into the appropriate area as directed Every 7 (Seven) Days. monday) 2 mL 11    topiramate (TOPAMAX) 25 MG tablet Take 1 tablet by mouth 3 (Three) Times a Day.      Vortioxetine HBr 20 MG tablet Take 1 tablet by mouth Every Night. Pt takes at night       No current  "facility-administered medications for this visit.       Review of Systems      OBJECTIVE    /100   Pulse 66   Ht 177.8 cm (70\")   Wt 112 kg (248 lb)   SpO2 98%   BMI 35.58 kg/mý     Body mass index is 35.58 kg/mý.        Physical Exam  Vitals reviewed.   Constitutional:       Appearance: Normal appearance. He is well-developed.   HENT:      Head: Normocephalic and atraumatic.   Neck:      Trachea: Trachea and phonation normal.   Pulmonary:      Effort: Pulmonary effort is normal. No respiratory distress.   Abdominal:      General: There is no distension.      Palpations: Abdomen is soft.   Musculoskeletal:      Comments: Healing, no evidence of infection and edges of the incision are approximated.   Skin:     General: Skin is warm and dry.   Neurological:      Mental Status: He is alert and oriented to person, place, and time.      GCS: GCS eye subscore is 4. GCS verbal subscore is 5. GCS motor subscore is 6.   Psychiatric:         Speech: Speech normal.         Behavior: Behavior normal. Behavior is cooperative.         Thought Content: Thought content normal.         Judgment: Judgment normal.              Procedures        ASSESSMENT AND PLAN    Diagnoses and all orders for this visit:    1. Type 2 diabetes mellitus with hyperglycemia, with long-term current use of insulin (Primary)  -     POC Glucose Fingerstick    2. S/P foot surgery    3. Painful orthopaedic hardware      Body mass index is 35.58 kg/mý.    Recommend follow-up with primary care to discuss BMI greater than 30    Compression wraps and/or compression stockings for swelling modalities  May transition into a regular shoe gear as tolerated   Follow-up 1 month for recheck          This document has been electronically signed by Fabian RAMSAY, FNP-C, ONP-C on August 24, 2023 14:15 CDT     "

## 2023-09-20 DIAGNOSIS — G43.911 INTRACTABLE MIGRAINE WITH STATUS MIGRAINOSUS, UNSPECIFIED MIGRAINE TYPE: ICD-10-CM

## 2023-09-20 DIAGNOSIS — H69.81 DYSFUNCTION OF RIGHT EUSTACHIAN TUBE: ICD-10-CM

## 2023-09-20 DIAGNOSIS — K52.1 DIARRHEA DUE TO DRUG: ICD-10-CM

## 2023-09-20 DIAGNOSIS — K21.9 GASTROESOPHAGEAL REFLUX DISEASE WITHOUT ESOPHAGITIS: ICD-10-CM

## 2023-09-20 RX ORDER — DEXLANSOPRAZOLE 60 MG/1
CAPSULE, DELAYED RELEASE ORAL
Qty: 90 CAPSULE | Refills: 1 | Status: SHIPPED | OUTPATIENT
Start: 2023-09-20

## 2023-09-20 RX ORDER — MECLIZINE HYDROCHLORIDE 25 MG/1
TABLET ORAL
Qty: 30 TABLET | Refills: 1 | Status: SHIPPED | OUTPATIENT
Start: 2023-09-20

## 2023-09-20 RX ORDER — LOPERAMIDE HYDROCHLORIDE 2 MG/1
2 CAPSULE ORAL 4 TIMES DAILY PRN
Qty: 30 CAPSULE | Refills: 1 | Status: SHIPPED | OUTPATIENT
Start: 2023-09-20

## 2023-09-20 RX ORDER — LORATADINE 10 MG/1
TABLET ORAL
Qty: 30 TABLET | Refills: 1 | Status: SHIPPED | OUTPATIENT
Start: 2023-09-20

## 2023-09-20 RX ORDER — MOMETASONE FUROATE 50 UG/1
SPRAY, METERED NASAL
Qty: 17 G | Refills: 1 | Status: SHIPPED | OUTPATIENT
Start: 2023-09-20

## 2023-09-20 RX ORDER — TOPIRAMATE 25 MG/1
25 TABLET ORAL 3 TIMES DAILY
Qty: 90 TABLET | Refills: 1 | Status: SHIPPED | OUTPATIENT
Start: 2023-09-20

## 2023-09-21 ENCOUNTER — OFFICE VISIT (OUTPATIENT)
Dept: PODIATRY | Facility: CLINIC | Age: 58
End: 2023-09-21
Payer: MEDICARE

## 2023-09-21 VITALS
WEIGHT: 251 LBS | HEIGHT: 70 IN | DIASTOLIC BLOOD PRESSURE: 70 MMHG | HEART RATE: 83 BPM | BODY MASS INDEX: 35.93 KG/M2 | SYSTOLIC BLOOD PRESSURE: 118 MMHG | OXYGEN SATURATION: 97 %

## 2023-09-21 DIAGNOSIS — Z98.890 S/P FOOT SURGERY: Primary | ICD-10-CM

## 2023-09-21 DIAGNOSIS — E11.65 TYPE 2 DIABETES MELLITUS WITH HYPERGLYCEMIA, WITH LONG-TERM CURRENT USE OF INSULIN: ICD-10-CM

## 2023-09-21 DIAGNOSIS — T84.84XA PAINFUL ORTHOPAEDIC HARDWARE: ICD-10-CM

## 2023-09-21 DIAGNOSIS — Z79.4 TYPE 2 DIABETES MELLITUS WITH HYPERGLYCEMIA, WITH LONG-TERM CURRENT USE OF INSULIN: ICD-10-CM

## 2023-09-21 RX ORDER — TRAZODONE HYDROCHLORIDE 150 MG/1
TABLET ORAL
COMMUNITY
Start: 2023-09-11

## 2023-09-21 RX ORDER — LAMOTRIGINE 100 MG/1
TABLET ORAL
COMMUNITY
Start: 2023-09-11

## 2023-09-21 NOTE — PROGRESS NOTES
Donis Yi  1965  57 y.o. male        09/21/2023    Chief Complaint   Patient presents with    Right Foot - Pain, Follow-up     Post op       History of Present Illness    Donis Yi is a 57 y.o.male presents to clinic today for his post operative appointment. He had a removal of hardware right ankle. Patient also has concerns of right foot rash that showed up about 2 weeks ago, he states it itches and burns and keeps him up at night.          Past Medical History:   Diagnosis Date    Allergic rhinitis     Anxiety     Bursitis     DDD (degenerative disc disease), cervical     Depression     GERD (gastroesophageal reflux disease)     HIV (human immunodeficiency virus infection)     HLD (hyperlipidemia)     HSP (Henoch Schonlein purpura)     Migraine     Myocardial infarction     Osteoporosis     Parkinsonian tremor     PONV (postoperative nausea and vomiting)     Sleep apnea     Type 2 diabetes mellitus          Past Surgical History:   Procedure Laterality Date    AMPUTATION DIGIT Right 01/06/2022    Procedure: 4th Toe  Amputation - Right Foot;  Surgeon: Pacheco Harper DPM;  Location: [facility] OR;  Service: Podiatry;  Laterality: Right;    CARDIAC CATHETERIZATION N/A 06/24/2020    Procedure: Coronary angiography;  Surgeon: Deon Bailey MD;  Location: [facility] CATH INVASIVE LOCATION;  Service: Cardiology;  Laterality: N/A;  11am start time    CARDIAC CATHETERIZATION N/A 06/24/2020    Procedure: Percutaneous Coronary Intervention;  Surgeon: Deon Bailey MD;  Location: [facility] CATH INVASIVE LOCATION;  Service: Cardiology;  Laterality: N/A;    COLONOSCOPY  06/19/2009    Normal exam repeat in 10 years    COLONOSCOPY N/A 08/26/2021    Procedure: COLONOSCOPY WITH ANESTHESIA;  Surgeon: Byron Rosenberg MD;  Location: [facility] ENDOSCOPY;  Service: Gastroenterology;  Laterality: N/A;  pre screen  post poor prep  dr yg gutiérrez    CYSTOSCOPY N/A 04/18/2023    Procedure: CYSTOSCOPY;  Surgeon:  Bairon Mendoza MD;  Location: [facility] OR;  Service: Urology;  Laterality: N/A;    ENDOSCOPY N/A 08/26/2021    Procedure: ESOPHAGOGASTRODUODENOSCOPY WITH ANESTHESIA;  Surgeon: Byron Rosenberg MD;  Location: [facility] ENDOSCOPY;  Service: Gastroenterology;  Laterality: N/A;  pre GERD  post normal  dr yg gutiérrez    HARDWARE REMOVAL Right 8/3/2023    Procedure: ANKLE/FOOT HARDWARE REMOVAL;  Surgeon: Abdi Raymundo DPM;  Location: [facility] OR;  Service: Podiatry;  Laterality: Right;    LYMPH NODE BIOPSY Bilateral     neck    METATARSAL OSTEOTOMY Left 07/06/2023    Procedure: LEFT FIFTH METATARSAL HEAD EXCISION AND ALL INDICATED PROCEDURES;  Surgeon: Abdi Raymundo DPM;  Location: [facility] OR;  Service: Podiatry;  Laterality: Left;    ORIF ANKLE FRACTURE Right     ORIF ULNAR / RADIAL SHAFT FRACTURE Right     PILONIDAL CYSTECTOMY N/A 12/01/2020    Procedure: EXCISION PILONIDAL ABSCESS;  Surgeon: Ashlyn Posey MD;  Location: [facility] OR;  Service: General;  Laterality: N/A;    TOE SURGERY           Family History   Problem Relation Age of Onset    Diabetes Mother     Hypertension Mother     Thyroid disease Mother     Hypertension Father     Thyroid disease Father     Arrhythmia Father     Diabetes Maternal Grandfather     Heart disease Maternal Grandfather     Hypertension Maternal Grandfather     Diabetes Paternal Grandmother     Stroke Paternal Grandmother     Heart disease Paternal Grandmother     Hypertension Paternal Grandmother     Thyroid disease Paternal Grandmother     Hypertension Paternal Grandfather     Hypertension Sister     No Known Problems Brother     Colon cancer Neg Hx     Colon polyps Neg Hx        Allergies   Allergen Reactions    Amitriptyline Hcl Anaphylaxis    Darvon [Propoxyphene] Anaphylaxis    Zithromax [Azithromycin] Anaphylaxis       Social History     Socioeconomic History    Marital status:    Tobacco Use    Smoking status: Former     Packs/day: 0.25     Types: Cigarettes     Start  "date: 1988    Smokeless tobacco: Never   Vaping Use    Vaping Use: Never used   Substance and Sexual Activity    Alcohol use: Yes     Comment: socialy    Drug use: No    Sexual activity: Defer         Current Outpatient Medications   Medication Sig Dispense Refill    lamoTRIgine (LaMICtal) 100 MG tablet       traZODone (DESYREL) 150 MG tablet       albuterol (PROVENTIL HFA;VENTOLIN HFA) 108 (90 BASE) MCG/ACT inhaler Inhale 2 puffs Every 6 (Six) Hours As Needed for Wheezing.      atorvastatin (LIPITOR) 40 MG tablet Take 1 tablet by mouth Every Night.  3    Calcium Carb-Cholecalciferol (CALCIUM 600+D3 PO) Take  by mouth Every Night.      ciclopirox (LOPROX) 0.77 % cream Apply  topically to the appropriate area as directed 2 (Two) Times a Day. (Patient taking differently: Apply  topically to the appropriate area as directed 2 (Two) Times a Day As Needed.) 30 g 5    clotrimazole-betamethasone (LOTRISONE) 1-0.05 % cream Apply  topically to the appropriate area as directed 2 (Two) Times a Day. 15 g 1    Continuous Blood Gluc Sensor (FreeStyle Eamon 2 Sensor) misc USE as directed TO monitor blood glucose. replace every 14 DAYS. 2 each 3    DEXILANT 60 MG capsule Take 1 capsule by mouth Every Night.  1    Cggonha-Rdaxm-Rgjwhhhd-TenofAF (GENVOYA) [phone] MG per tablet Take 1 tablet by mouth Every Night.      fluticasone (FLONASE) 50 MCG/ACT nasal spray 2 sprays into each nostril Daily. 1 bottle 6    HYDROcodone-acetaminophen (Norco)  MG per tablet Take 1 tablet by mouth Every 6 (Six) Hours As Needed for Moderate Pain. 30 tablet 0    Insulin Disposable Pump (Omnipod DASH Pods, Gen 4,) misc USE EACH POD EVERY 3 DAYS 30 each 0    Insulin Syringe 31G X 5/16\" 1 ML misc Use 4 times daily  , ICD10 code is E11.9 120 each 11    lamoTRIgine (LaMICtal) 25 MG tablet Take 4 tablets by mouth 2 (Two) Times a Day. (Patient not taking: Reported on 9/21/2023)      loratadine (CLARITIN) 10 MG tablet Take 1 tablet by mouth Every " Night.      losartan (COZAAR) 25 MG tablet Take 1 tablet by mouth Every Night.      meclizine (ANTIVERT) 25 MG tablet Take 1 tablet by mouth Every Night.      Melatonin 10 MG tablet Take 1 tablet by mouth every night at bedtime.  3    metFORMIN (GLUCOPHAGE) 500 MG tablet Take 2 tablets by mouth 2 (Two) Times a Day With Meals.  3    metoclopramide (REGLAN) 10 MG tablet Take 1 tablet by mouth As Needed (take 1 tablet daily as needed for migraine. max dose 1 tablet 24 hours.). 30 tablet 1    mometasone (NASONEX) 50 MCG/ACT nasal spray 2 sprays into the nostril(s) as directed by provider Daily.      multivitamin with minerals tablet tablet Take 1 tablet by mouth Every Night.      naloxone (NARCAN) 4 MG/0.1ML nasal spray       NovoLOG 100 UNIT/ML injection Inject up to 150 units daily through pump 50 mL 2    ondansetron ODT (ZOFRAN-ODT) 4 MG disintegrating tablet Place 1 tablet on the tongue Every 6 (Six) Hours As Needed for Nausea. 12 tablet 0    prazosin (MINIPRESS) 2 MG capsule Take 1 capsule by mouth Every Night. Patient unsure of dosage      pregabalin (LYRICA) 75 MG capsule Take 1 capsule by mouth 3 (Three) Times a Day.      propranolol (INDERAL) 80 MG tablet Take 1 tablet by mouth 3 (Three) Times a Day for 180 days. 270 tablet 1    rOPINIRole (REQUIP) 0.25 MG tablet TAKE 1 TABLET BY MOUTH 3 (THREE) TIMES A DAY. 90 tablet 6    saccharomyces boulardii (FLORASTOR) 250 MG capsule Take 1 capsule by mouth Every Night.      Senna-Plus 8.6-50 MG per tablet Take 2 tablets by mouth every night at bedtime.      tamsulosin (FLOMAX) 0.4 MG capsule 24 hr capsule Take 1 capsule by mouth Every Night.      Tirzepatide (Mounjaro) 5 MG/0.5ML solution pen-injector Inject 0.5 mL under the skin into the appropriate area as directed Every 7 (Seven) Days. 5 mg subcutaneously weekly (Patient taking differently: Inject 0.5 mL under the skin into the appropriate area as directed Every 7 (Seven) Days. monday) 2 mL 11    topiramate (TOPAMAX)  "25 MG tablet Take 1 tablet by mouth 3 (Three) Times a Day.      traZODone (DESYREL) 100 MG tablet Take 3 tablets by mouth Every Night. (Patient not taking: Reported on 9/21/2023)  3    Vortioxetine HBr 20 MG tablet Take 1 tablet by mouth Every Night. Pt takes at night       No current facility-administered medications for this visit.       Review of Systems      OBJECTIVE    /70   Pulse 83   Ht 177.8 cm (70\")   Wt 114 kg (251 lb)   SpO2 97%   BMI 36.01 kg/m²     Body mass index is 36.01 kg/m².        Physical Exam  Vitals reviewed.   Constitutional:       Appearance: Normal appearance. He is well-developed.   HENT:      Head: Normocephalic and atraumatic.   Neck:      Trachea: Trachea and phonation normal.   Pulmonary:      Effort: Pulmonary effort is normal. No respiratory distress.   Abdominal:      General: There is no distension.      Palpations: Abdomen is soft.   Musculoskeletal:      Comments: Ankle healed no evidence of infection noted     Skin:     General: Skin is warm and dry.   Neurological:      Mental Status: He is alert and oriented to person, place, and time.      GCS: GCS eye subscore is 4. GCS verbal subscore is 5. GCS motor subscore is 6.   Psychiatric:         Speech: Speech normal.         Behavior: Behavior normal. Behavior is cooperative.         Thought Content: Thought content normal.         Judgment: Judgment normal.              Procedures        ASSESSMENT AND PLAN    Diagnoses and all orders for this visit:    1. S/P foot surgery (Primary)  -     XR Ankle 3+ View Right  -     XR Foot 3+ View Right    2. Type 2 diabetes mellitus with hyperglycemia, with long-term current use of insulin    3. Painful orthopaedic hardware      Body mass index is 36.01 kg/m².    Recommend follow-up with primary care to discuss BMI greater than 30    Continue progression of range of motion tolerated follow-up as needed for worsening symptoms          This document has been electronically signed by " Fabian RAMSAY, FNP-C, ONP-C on September 21, 2023 13:38 CDT

## 2023-10-12 ENCOUNTER — TELEPHONE (OUTPATIENT)
Dept: NEUROLOGY | Facility: CLINIC | Age: 58
End: 2023-10-12
Payer: MEDICARE

## 2023-10-12 NOTE — TELEPHONE ENCOUNTER
Confirmed video visit with Parveen Berrios NP for Oct. 13th at 0930am.  Patient agreed to rescheduled from 1300 to 0930am.

## 2023-10-13 ENCOUNTER — TELEMEDICINE (OUTPATIENT)
Dept: NEUROLOGY | Facility: CLINIC | Age: 58
End: 2023-10-13
Payer: MEDICARE

## 2023-10-13 VITALS — WEIGHT: 257 LBS | BODY MASS INDEX: 36.79 KG/M2 | RESPIRATION RATE: 18 BRPM | HEIGHT: 70 IN

## 2023-10-13 DIAGNOSIS — G43.719 INTRACTABLE CHRONIC MIGRAINE WITHOUT AURA AND WITHOUT STATUS MIGRAINOSUS: ICD-10-CM

## 2023-10-13 DIAGNOSIS — G25.0 ESSENTIAL TREMOR: ICD-10-CM

## 2023-10-13 RX ORDER — PROPRANOLOL HYDROCHLORIDE 80 MG/1
80 TABLET ORAL 3 TIMES DAILY
Qty: 270 TABLET | Refills: 1 | Status: SHIPPED | OUTPATIENT
Start: 2023-10-13 | End: 2024-04-10

## 2023-10-13 RX ORDER — ZOLPIDEM TARTRATE 5 MG/1
5 TABLET ORAL NIGHTLY
COMMUNITY

## 2023-10-13 NOTE — PROGRESS NOTES
Neurology Consult Note    [facility]  [address], [address]  [place] [address]  Phone: [phone]  Fax: [phone]    Referring Provider:   No ref. provider found  Primary Care Provider:  Oksana Mares MD    Reason for Consult:  Migraine   Tremor   Subjective        Donis Abdirahman Yi presents to [facility]    History of Present Illness    57-year-old male seen for follow-up of migraine and tremor.  Encounter conducted via video visit through [facility].  Patient was at home and I was in my office.  Patient reported he has been doing his propranolol 80 mg twice daily instead of 3 times daily due to pharmacy issues.  He reports yesterday he had a severe migraine.  Described as left frontal.  Described as sharp penetrating.  Constant duration.  Vomiting.  Light sensitivity.  Patient is on Genvoya for HIV, and is unable to take CRGP's.  Additionally patient is unable to take triptans due to history of MI.  Patient was given Reglan for migraine  and nausea.  He does report this does help.  His blood pressures have been running approximately 117-125 systolic.  He also reports he was restarted on Ambien 5 mg for sleep.  He does have trazodone as needed for sleep.  His Lamictal was also recently increased.    Patient Active Problem List   Diagnosis    Chronic sinus complaints    Allergic rhinitis    ETD (eustachian tube dysfunction)    Hearing loss, sensorineural    Deviated nasal septum    Age related osteoporosis    Osteoporosis    Bennett's neuroma, left    HIV (human immunodeficiency virus infection)    Acute UTI    Mixed hyperlipidemia    SRINIVAS (obstructive sleep apnea)    Tobacco abuse    Hepatic steatosis    Transaminitis    Type 2 diabetes mellitus with hyperglycemia, with long-term current use of insulin    Chest pain    Anemia    Anxiety and depression    Gastroesophageal reflux disease without esophagitis    HIV-1 infection, symptomatic     Hypertension    Other insomnia    History of MI (myocardial infarction)    Abnormal stress test    Stable angina pectoris    Chest pain    Pilonidal abscess    Encounter for screening for malignant neoplasm of colon    Gangrene of toe    HIV (human immunodeficiency virus infection)    Acute pain    Osteomyelitis of fourth toe of right foot    Acute post-traumatic stress disorder    Closed nondisplaced fracture of navicular bone of left foot with routine healing    Diabetic foot infection    Foot ulcer due to secondary DM    Intractable migraine without aura    Tremor    Gangrene of toe of right foot    Currently asymptomatic HIV infection, with history of HIV-related illness    Primary hypertension    Hypercholesterolemia    Foot callus    Amputated toe, right    Hematuria syndrome    Essential tremor    Bunionette of left foot        Past Medical History:   Diagnosis Date    Allergic rhinitis     Anxiety     Bursitis     DDD (degenerative disc disease), cervical     Depression     GERD (gastroesophageal reflux disease)     HIV (human immunodeficiency virus infection)     HLD (hyperlipidemia)     HSP (Henoch Schonlein purpura)     Migraine     Myocardial infarction     Osteoporosis     Parkinsonian tremor     PONV (postoperative nausea and vomiting)     Sleep apnea     Type 2 diabetes mellitus         Social History     Socioeconomic History    Marital status:    Tobacco Use    Smoking status: Former     Packs/day: .25     Types: Cigarettes     Start date: 1988    Smokeless tobacco: Never   Vaping Use    Vaping Use: Never used   Substance and Sexual Activity    Alcohol use: Yes     Comment: socialy    Drug use: No    Sexual activity: Defer        Allergies   Allergen Reactions    Amitriptyline Hcl Anaphylaxis    Darvon [Propoxyphene] Anaphylaxis    Zithromax [Azithromycin] Anaphylaxis          Current Outpatient Medications:     albuterol (PROVENTIL HFA;VENTOLIN HFA) 108 (90 BASE) MCG/ACT inhaler, Inhale 2  "puffs Every 6 (Six) Hours As Needed for Wheezing., Disp: , Rfl:     atorvastatin (LIPITOR) 40 MG tablet, Take 1 tablet by mouth Every Night., Disp: , Rfl: 3    Calcium Carb-Cholecalciferol (CALCIUM 600+D3 PO), Take  by mouth Every Night., Disp: , Rfl:     ciclopirox (LOPROX) 0.77 % cream, Apply  topically to the appropriate area as directed 2 (Two) Times a Day. (Patient taking differently: Apply  topically to the appropriate area as directed 2 (Two) Times a Day As Needed.), Disp: 30 g, Rfl: 5    clotrimazole-betamethasone (LOTRISONE) 1-0.05 % cream, Apply  topically to the appropriate area as directed 2 (Two) Times a Day., Disp: 15 g, Rfl: 1    Continuous Blood Gluc Sensor (FreeStyle Eamon 2 Sensor) [place], USE as directed TO monitor blood glucose. replace every 14 DAYS., Disp: 2 each, Rfl: 3    DEXILANT 60 MG capsule, Take 1 capsule by mouth Every Night., Disp: , Rfl: 1    Ioyegbb-Hxptq-Wgprpcfy-TenofAF (GENVOYA) [phone] MG per tablet, Take 1 tablet by mouth Every Night., Disp: , Rfl:     fluticasone (FLONASE) 50 MCG/ACT nasal spray, 2 sprays into each nostril Daily., Disp: 1 bottle, Rfl: 6    HYDROcodone-acetaminophen (Norco)  MG per tablet, Take 1 tablet by mouth Every 6 (Six) Hours As Needed for Moderate Pain., Disp: 30 tablet, Rfl: 0    Insulin Disposable Pump (Omnipod DASH Pods, Gen 4,) misc, USE EACH POD EVERY 3 DAYS, Disp: 30 each, Rfl: 0    Insulin Syringe 31G X 5/16\" 1 ML misc, Use 4 times daily  , ICD10 code is E11.9, Disp: 120 each, Rfl: 11    lamoTRIgine (LaMICtal) 100 MG tablet, , Disp: , Rfl:     loratadine (CLARITIN) 10 MG tablet, Take 1 tablet by mouth Every Night., Disp: , Rfl:     losartan (COZAAR) 25 MG tablet, Take 1 tablet by mouth Every Night., Disp: , Rfl:     meclizine (ANTIVERT) 25 MG tablet, Take 1 tablet by mouth Every Night., Disp: , Rfl:     metFORMIN (GLUCOPHAGE) 500 MG tablet, Take 2 tablets by mouth 2 (Two) Times a Day With Meals., Disp: , Rfl: 3    metoclopramide (REGLAN) " 10 MG tablet, Take 1 tablet by mouth As Needed (take 1 tablet daily as needed for migraine. max dose 1 tablet 24 hours.)., Disp: 30 tablet, Rfl: 1    mometasone (NASONEX) 50 MCG/ACT nasal spray, 2 sprays into the nostril(s) as directed by provider Daily., Disp: , Rfl:     multivitamin with minerals tablet tablet, Take 1 tablet by mouth Every Night., Disp: , Rfl:     naloxone (NARCAN) 4 MG/0.1ML nasal spray, , Disp: , Rfl:     NovoLOG 100 UNIT/ML injection, Inject up to 150 units daily through pump, Disp: 50 mL, Rfl: 2    ondansetron ODT (ZOFRAN-ODT) 4 MG disintegrating tablet, Place 1 tablet on the tongue Every 6 (Six) Hours As Needed for Nausea., Disp: 12 tablet, Rfl: 0    prazosin (MINIPRESS) 2 MG capsule, Take 1 capsule by mouth Every Night. Patient unsure of dosage, Disp: , Rfl:     pregabalin (LYRICA) 75 MG capsule, Take 1 capsule by mouth 3 (Three) Times a Day., Disp: , Rfl:     propranolol (INDERAL) 80 MG tablet, Take 1 tablet by mouth 3 (Three) Times a Day for 180 days., Disp: 270 tablet, Rfl: 1    rOPINIRole (REQUIP) 0.25 MG tablet, TAKE 1 TABLET BY MOUTH 3 (THREE) TIMES A DAY., Disp: 90 tablet, Rfl: 6    saccharomyces boulardii (FLORASTOR) 250 MG capsule, Take 1 capsule by mouth Every Night., Disp: , Rfl:     Senna-Plus 8.6-50 MG per tablet, Take 2 tablets by mouth every night at bedtime., Disp: , Rfl:     tamsulosin (FLOMAX) 0.4 MG capsule 24 hr capsule, Take 1 capsule by mouth Every Night., Disp: , Rfl:     Tirzepatide (Mounjaro) 5 MG/0.5ML solution pen-injector, Inject 0.5 mL under the skin into the appropriate area as directed Every 7 (Seven) Days. 5 mg subcutaneously weekly (Patient taking differently: Inject 0.5 mL under the skin into the appropriate area as directed Every 7 (Seven) Days. monday), Disp: 2 mL, Rfl: 11    topiramate (TOPAMAX) 25 MG tablet, Take 1 tablet by mouth 3 (Three) Times a Day., Disp: , Rfl:     traZODone (DESYREL) 150 MG tablet, Take 1 tablet by mouth At Night As Needed for  "Sleep., Disp: , Rfl:     Vortioxetine HBr 20 MG tablet, Take 1 tablet by mouth Every Night. Pt takes at night, Disp: , Rfl:     zolpidem (Ambien) 5 MG tablet, Take 1 tablet by mouth Every Night., Disp: , Rfl:     lamoTRIgine (LaMICtal) 25 MG tablet, Take 4 tablets by mouth 2 (Two) Times a Day. (Patient not taking: Reported on 9/21/2023), Disp: , Rfl:     traZODone (DESYREL) 100 MG tablet, Take 3 tablets by mouth Every Night. (Patient not taking: Reported on 9/21/2023), Disp: , Rfl: 3       Objective   Vital Signs:   Resp 18   Ht 177.8 cm (70\")   Wt 117 kg (257 lb)   BMI 36.88 kg/mý       Physical Exam  Vitals and nursing note reviewed.   Constitutional:       General: He is not in acute distress.     Appearance: He is obese. He is not ill-appearing.   HENT:      Head: Normocephalic.   Eyes:      General: Lids are normal.   Pulmonary:      Effort: Pulmonary effort is normal. No respiratory distress.   Skin:     General: Skin is warm and dry.   Neurological:      Mental Status: He is alert.   Psychiatric:         Speech: Speech normal.        Neurological Exam  Mental Status  Alert. Oriented to person, place, time and situation. Speech is normal. Language is fluent with no aphasia.    Cranial Nerves  CN III, IV, VI: Normal lids and orbits bilaterally.  CN VII: Full and symmetric facial movement.    Motor  Normal muscle bulk throughout. No fasciculations present. Normal muscle tone. No abnormal involuntary movements.      Result Review :   The following data was reviewed by: SOBIA Millan on 10/13/2023:         Progress Notes by Parveen Berrios APRN (07/13/2023 13:00)     Patient Message with Parveen Berrios APRN (08/08/2023)                Impression:  Donis Yi is a 57 y.o. male who presents for follow-up of migraine tremors.  At last visit we had continued propranolol 80 mg twice daily.  Patient had messaged us in August in regards to increasing headaches.  We then wanted to try " propranolol 80 mg 3 times daily.  Patient did try this for a little bit until it came time for refills.  The pharmacy would not refill this and he has not been trying it 3 times daily.  Unfortunately due to patient's history and current list of medications he has a very limited availability of treatments.  I would like to continue with propranolol and not add additional medications.    Diagnoses and all orders for this visit:    1. Intractable chronic migraine without aura and without status migrainosus  -     propranolol (INDERAL) 80 MG tablet; Take 1 tablet by mouth 3 (Three) Times a Day for 180 days.  Dispense: 270 tablet; Refill: 1    2. Essential tremor  -     propranolol (INDERAL) 80 MG tablet; Take 1 tablet by mouth 3 (Three) Times a Day for 180 days.  Dispense: 270 tablet; Refill: 1        Plan:  Continue propranolol 80 mg 3 times daily for migraine prevention and treatment of tremor  Continue Reglan 10 mg as needed for migraine   Avoid migraine tremors  Follow-up in 3 months    The patient and I have discussed the plan of care and he is in full agreement at this time.     Follow Up   Return in about 3 months (around 2024) for Migraine, tremor.            SOBIA Millan  10/13/23  09:57 CDT

## 2023-11-09 DIAGNOSIS — H69.91 DYSFUNCTION OF RIGHT EUSTACHIAN TUBE: ICD-10-CM

## 2023-11-09 DIAGNOSIS — K52.1 DIARRHEA DUE TO DRUG: ICD-10-CM

## 2023-11-09 DIAGNOSIS — G43.911 INTRACTABLE MIGRAINE WITH STATUS MIGRAINOSUS, UNSPECIFIED MIGRAINE TYPE: ICD-10-CM

## 2023-11-09 RX ORDER — LOPERAMIDE HYDROCHLORIDE 2 MG/1
2 CAPSULE ORAL 4 TIMES DAILY PRN
Qty: 30 CAPSULE | Refills: 1 | Status: SHIPPED | OUTPATIENT
Start: 2023-11-09

## 2023-11-09 RX ORDER — TOPIRAMATE 25 MG/1
25 TABLET ORAL 3 TIMES DAILY
Qty: 90 TABLET | Refills: 1 | Status: SHIPPED | OUTPATIENT
Start: 2023-11-09

## 2023-11-09 RX ORDER — LORATADINE 10 MG/1
TABLET ORAL
Qty: 30 TABLET | Refills: 1 | Status: SHIPPED | OUTPATIENT
Start: 2023-11-09

## 2023-11-09 RX ORDER — MOMETASONE FUROATE 50 UG/1
SPRAY, METERED NASAL
Qty: 17 G | Refills: 1 | Status: SHIPPED | OUTPATIENT
Start: 2023-11-09

## 2023-11-09 RX ORDER — MECLIZINE HYDROCHLORIDE 25 MG/1
TABLET ORAL
Qty: 30 TABLET | Refills: 1 | Status: SHIPPED | OUTPATIENT
Start: 2023-11-09

## 2023-11-09 RX ORDER — SACCHAROMYCES BOULARDII 250 MG
CAPSULE ORAL
Qty: 60 CAPSULE | Refills: 1 | Status: SHIPPED | OUTPATIENT
Start: 2023-11-09

## 2023-11-14 DIAGNOSIS — G25.0 ESSENTIAL TREMOR: ICD-10-CM

## 2023-11-14 RX ORDER — ROPINIROLE 0.25 MG/1
0.25 TABLET, FILM COATED ORAL 3 TIMES DAILY
Qty: 90 TABLET | Refills: 2 | Status: SHIPPED | OUTPATIENT
Start: 2023-11-14

## 2023-11-14 NOTE — TELEPHONE ENCOUNTER
Caller: Donis Yi    Relationship: Self    Best call back number: [phone] (home)       Requested Prescriptions:   Requested Prescriptions     Pending Prescriptions Disp Refills    rOPINIRole (REQUIP) 0.25 MG tablet 90 tablet 6     Sig: Take 1 tablet by mouth 3 (Three) Times a Day.        Pharmacy where request should be sent: [facility], [address] [phone] [facility] [phone] FX     Last office visit with prescribing clinician: 7/13/2023   Last telemedicine visit with prescribing clinician: 10/13/2023   Next office visit with prescribing clinician: 1/12/2024     Additional details provided by patient: PATIENT IS COMPLETELY OUT AND HAS BEEN OUT SINCE AUGUST. STATED THE PHARM HAS BEEN TRYING TO GET REFILLS FROM THE OFFICE BUT NO RESPONSE.     Does the patient have less than a 3 day supply:  [x] Yes  [] No    Would you like a call back once the refill request has been completed: [x] Yes [] No    If the office needs to give you a call back, can they leave a voicemail: [x] Yes [] No    Soraida Morales Rep   11/14/23 13:47 CST

## 2023-11-28 ENCOUNTER — TELEPHONE (OUTPATIENT)
Dept: INTERNAL MEDICINE | Age: 58
End: 2023-11-28

## 2023-11-28 NOTE — TELEPHONE ENCOUNTER
----- Message from Alyson Conte sent at 11/28/2023 12:53 PM CST -----  Subject: Message to Provider    QUESTIONS  Information for Provider? pt wants to leave a message just incase her   doctor wants to reach out and wants to know how the surgery went her   number is and can be reached at [phone] [phone]  ---------------------------------------------------------------------------  --------------  CALL BACK INFO  [phone]; OK to leave message on voicemail  ---------------------------------------------------------------------------  --------------  SCRIPT ANSWERS  Relationship to Patient?  Self

## 2023-12-05 DIAGNOSIS — E78.00 PURE HYPERCHOLESTEROLEMIA: ICD-10-CM

## 2023-12-05 RX ORDER — TRAZODONE HYDROCHLORIDE 100 MG/1
200 TABLET ORAL NIGHTLY
Qty: 180 TABLET | Refills: 2 | Status: SHIPPED | OUTPATIENT
Start: 2023-12-05

## 2023-12-05 RX ORDER — ATORVASTATIN CALCIUM 40 MG/1
40 TABLET, FILM COATED ORAL DAILY
Qty: 90 TABLET | Refills: 2 | Status: SHIPPED | OUTPATIENT
Start: 2023-12-05

## 2023-12-08 ENCOUNTER — TELEPHONE (OUTPATIENT)
Dept: INTERNAL MEDICINE | Age: 58
End: 2023-12-08

## 2023-12-08 NOTE — TELEPHONE ENCOUNTER
He was released from [facility] today and transferred to [facility] swing bed unit for rehab. He may have to change his HFU to a virtual visit.

## 2023-12-08 NOTE — TELEPHONE ENCOUNTER
I spoke with Shekhar Emery and informed her that since her  had been admitted into [facility] for therapy we would cancel the appointment for 12/14/23. I asked Miss Home Harkins to contact the office when her  has been released to we can schedule him a HFU appointment at that time. Miss Home Harkins voiced understanding.

## 2023-12-26 DIAGNOSIS — G43.719 INTRACTABLE CHRONIC MIGRAINE WITHOUT AURA AND WITHOUT STATUS MIGRAINOSUS: ICD-10-CM

## 2023-12-26 NOTE — TELEPHONE ENCOUNTER
[facility] said they have Inderal 80 mg on hold.  He hasn't filled that script, he was using the [facility].

## 2023-12-28 ENCOUNTER — OFFICE VISIT (OUTPATIENT)
Dept: INTERNAL MEDICINE | Age: 58
End: 2023-12-28
Payer: MEDICARE

## 2023-12-28 ENCOUNTER — HOSPITAL ENCOUNTER (OUTPATIENT)
Dept: CT IMAGING | Age: 58
Discharge: HOME | End: 2023-12-28
Payer: MEDICARE

## 2023-12-28 VITALS
HEART RATE: 104 BPM | BODY MASS INDEX: 34 KG/M2 | WEIGHT: 251 LBS | SYSTOLIC BLOOD PRESSURE: 130 MMHG | OXYGEN SATURATION: 98 % | HEIGHT: 72 IN | DIASTOLIC BLOOD PRESSURE: 68 MMHG

## 2023-12-28 DIAGNOSIS — D64.9 ANEMIA, UNSPECIFIED TYPE: ICD-10-CM

## 2023-12-28 DIAGNOSIS — Z12.11 SCREEN FOR COLON CANCER: ICD-10-CM

## 2023-12-28 DIAGNOSIS — E78.2 MIXED HYPERLIPIDEMIA: ICD-10-CM

## 2023-12-28 DIAGNOSIS — E11.40 TYPE 2 DIABETES MELLITUS WITH DIABETIC NEUROPATHY, WITH LONG-TERM CURRENT USE OF INSULIN (HCC): Primary | ICD-10-CM

## 2023-12-28 DIAGNOSIS — E55.9 VITAMIN D DEFICIENCY: ICD-10-CM

## 2023-12-28 DIAGNOSIS — Z79.4 TYPE 2 DIABETES MELLITUS WITH DIABETIC NEUROPATHY, WITH LONG-TERM CURRENT USE OF INSULIN (HCC): Primary | ICD-10-CM

## 2023-12-28 DIAGNOSIS — Z87.891 PERSONAL HISTORY OF TOBACCO USE: ICD-10-CM

## 2023-12-28 DIAGNOSIS — Z79.4 TYPE 2 DIABETES MELLITUS WITH DIABETIC NEUROPATHY, WITH LONG-TERM CURRENT USE OF INSULIN (HCC): ICD-10-CM

## 2023-12-28 DIAGNOSIS — E11.40 TYPE 2 DIABETES MELLITUS WITH DIABETIC NEUROPATHY, WITH LONG-TERM CURRENT USE OF INSULIN (HCC): ICD-10-CM

## 2023-12-28 LAB
25(OH)D3 SERPL-MCNC: 35 NG/ML
ALBUMIN SERPL-MCNC: 3.9 G/DL (ref 3.5–5.2)
ALP SERPL-CCNC: 59 U/L (ref 40–130)
ALT SERPL-CCNC: 18 U/L (ref 5–41)
ANION GAP SERPL CALCULATED.3IONS-SCNC: 13 MMOL/L (ref 7–19)
AST SERPL-CCNC: 11 U/L (ref 5–40)
BILIRUB SERPL-MCNC: <0.2 MG/DL (ref 0.2–1.2)
BUN SERPL-MCNC: 31 MG/DL (ref 6–20)
CALCIUM SERPL-MCNC: 9.4 MG/DL (ref 8.6–10)
CHLORIDE SERPL-SCNC: 107 MMOL/L (ref 98–111)
CO2 SERPL-SCNC: 21 MMOL/L (ref 22–29)
CREAT SERPL-MCNC: 0.8 MG/DL (ref 0.5–1.2)
ERYTHROCYTE [DISTWIDTH] IN BLOOD BY AUTOMATED COUNT: 14.4 % (ref 11.5–14.5)
FOLATE SERPL-MCNC: 9.7 NG/ML (ref 4.5–32.2)
GLUCOSE SERPL-MCNC: 251 MG/DL (ref 74–109)
HBA1C MFR BLD: 9.1 % (ref 4–6)
HCT VFR BLD AUTO: 35.2 % (ref 42–52)
HGB BLD-MCNC: 11.5 G/DL (ref 14–18)
IRON SERPL-MCNC: 44 UG/DL (ref 59–158)
MCH RBC QN AUTO: 32.6 PG (ref 27–31)
MCHC RBC AUTO-ENTMCNC: 32.7 G/DL (ref 33–37)
MCV RBC AUTO: 99.7 FL (ref 80–94)
PLATELET # BLD AUTO: 208 K/UL (ref 130–400)
PMV BLD AUTO: 11.2 FL (ref 9.4–12.4)
POTASSIUM SERPL-SCNC: 4.4 MMOL/L (ref 3.5–5)
PROT SERPL-MCNC: 6.7 G/DL (ref 6.6–8.7)
RBC # BLD AUTO: 3.53 M/UL (ref 4.7–6.1)
SODIUM SERPL-SCNC: 141 MMOL/L (ref 136–145)
TSH SERPL DL<=0.005 MIU/L-ACNC: 1.41 UIU/ML (ref 0.27–4.2)
VIT B12 SERPL-MCNC: 321 PG/ML (ref 211–946)
WBC # BLD AUTO: 10.5 K/UL (ref 4.8–10.8)

## 2023-12-28 PROCEDURE — 2022F DILAT RTA XM EVC RTNOPTHY: CPT | Performed by: INTERNAL MEDICINE

## 2023-12-28 PROCEDURE — 3078F DIAST BP <80 MM HG: CPT | Performed by: INTERNAL MEDICINE

## 2023-12-28 PROCEDURE — G0296 VISIT TO DETERM LDCT ELIG: HCPCS | Performed by: INTERNAL MEDICINE

## 2023-12-28 PROCEDURE — G8484 FLU IMMUNIZE NO ADMIN: HCPCS | Performed by: INTERNAL MEDICINE

## 2023-12-28 PROCEDURE — 3017F COLORECTAL CA SCREEN DOC REV: CPT | Performed by: INTERNAL MEDICINE

## 2023-12-28 PROCEDURE — G8427 DOCREV CUR MEDS BY ELIG CLIN: HCPCS | Performed by: INTERNAL MEDICINE

## 2023-12-28 PROCEDURE — G8417 CALC BMI ABV UP PARAM F/U: HCPCS | Performed by: INTERNAL MEDICINE

## 2023-12-28 PROCEDURE — 99214 OFFICE O/P EST MOD 30 MIN: CPT | Performed by: INTERNAL MEDICINE

## 2023-12-28 PROCEDURE — 4004F PT TOBACCO SCREEN RCVD TLK: CPT | Performed by: INTERNAL MEDICINE

## 2023-12-28 PROCEDURE — 3046F HEMOGLOBIN A1C LEVEL >9.0%: CPT | Performed by: INTERNAL MEDICINE

## 2023-12-28 PROCEDURE — 71271 CT THORAX LUNG CANCER SCR C-: CPT

## 2023-12-28 PROCEDURE — 3075F SYST BP GE 130 - 139MM HG: CPT | Performed by: INTERNAL MEDICINE

## 2023-12-28 RX ORDER — PROPRANOLOL HYDROCHLORIDE 80 MG/1
160 CAPSULE, EXTENDED RELEASE ORAL DAILY
Qty: 60 CAPSULE | Refills: 1 | OUTPATIENT
Start: 2023-12-28

## 2023-12-28 NOTE — PATIENT INSTRUCTIONS
Learning About Lung Cancer Screening  What is screening for lung cancer?     Lung cancer screening is a way to find some lung cancers early, before a person has any symptoms of the cancer.  Lung cancer screening may help those who have the highest risk for lung cancer--people age 50 and older who are or were heavy smokers. For most people, who aren't at increased risk, screening for lung cancer probably isn't helpful.  Screening won't prevent cancer. And it may not find all lung cancers. Lung cancer screening may lower the risk of dying from lung cancer in a small number of people.  How is it done?  Lung cancer screening is done with a low-dose CT (computed tomography) scan. A CT scan uses X-rays, or radiation, to make detailed pictures of your body. Experts recommend that screening be done in medical centers that focus on finding and treating lung cancer.  Who is screening recommended for?  Lung cancer screening is recommended for people age 50 and older who are or were heavy smokers. That means people with a smoking history of at least 20 pack years. A pack year is a way to measure how heavy a smoker you are or were.  To figure out your pack years, multiply how many packs a day on average (assuming 20 cigarettes per pack) you have smoked by how many years you have smoked. For example:  If you smoked 1 pack a day for 20 years, that's 1 times 20. So you have a smoking history of 20 pack years.  If you smoked 2 packs a day for 10 years, that's 2 times 10. So you have a smoking history of 20 pack years.  Experts agree that screening is for people who have a high risk of lung cancer. But experts don't agree on what high risk means. Some say people age 50 or older with at least a 20-pack-year smoking history are high risk. Others say it's people age 55 or older with a 30-pack-year history.  To see if you could benefit from screening, first find out if you are at high risk for lung cancer. Your doctor can help you

## 2023-12-28 NOTE — PROGRESS NOTES
monitor we talked about it for getting hemoglobin A1c today blood sugar does not appear to be in control we have emphasized to him the importance of diabetes control toward his overall health.  - CBC; Future  - Comprehensive Metabolic Panel; Future  - Hemoglobin A1C; Future  - TSH; Future    2. Mixed hyperlipidemia  Follow goal LDL under 70    3. Vitamin D deficiency    - Vitamin D 25 Hydroxy; Future    4. Anemia, unspecified type  Check iron B12 folate anemia of chronic disease  - Iron; Future  - Vitamin B12; Future  - Folate; Future    5. Screen for colon cancer    - Sergio Anguiano MD, Gastroenterology, [place]    6. Personal history of tobacco use  I emphasized to him with his multiple medical problems he needs smoking cessation  - Ambulatory referral to Smoking Cessation Program  - MI VISIT TO DISCUSS LUNG CA SCREEN W LDCT  - CT Lung Screen (Initial/Annual/Baseline); Future    Chart, medications, labs, vaccines reviewed.  Keep up to date with routine care and follow up.  Call with any problems or complaints.  Keep up to date with routine screening recomendations and vaccines.                       Discussed with the patient the current USPSTF guidelines released March 9, 2021 for screening for lung cancer.    For adults aged 50 to 80 years who have a 20 pack-year smoking history and currently smoke or have quit within the past 15 years the grade B recommendation is to:  Screen for lung cancer with low-dose computed tomography (LDCT) every year.  Stop screening once a person has not smoked for 15 years or has a health problem that limits life expectancy or the ability to have lung surgery.    The patient  reports that he has been smoking cigarettes. He has a 33.0 pack-year smoking history. He has never used smokeless tobacco.. Discussed with patient the risks and benefits of screening, including over-diagnosis, false positive rate, and total radiation exposure.  The patient currently exhibits no

## 2024-01-10 ENCOUNTER — TELEPHONE (OUTPATIENT)
Dept: NEUROLOGY | Facility: CLINIC | Age: 59
End: 2024-01-10
Payer: MEDICARE

## 2024-01-12 ENCOUNTER — TELEMEDICINE (OUTPATIENT)
Dept: NEUROLOGY | Facility: CLINIC | Age: 59
End: 2024-01-12
Payer: MEDICARE

## 2024-01-12 DIAGNOSIS — G25.0 ESSENTIAL TREMOR: Primary | ICD-10-CM

## 2024-01-12 DIAGNOSIS — G43.719 INTRACTABLE CHRONIC MIGRAINE WITHOUT AURA AND WITHOUT STATUS MIGRAINOSUS: ICD-10-CM

## 2024-01-12 RX ORDER — PROPRANOLOL HYDROCHLORIDE 80 MG/1
80 TABLET ORAL 4 TIMES DAILY
Qty: 360 TABLET | Refills: 1 | Status: SHIPPED | OUTPATIENT
Start: 2024-01-12 | End: 2024-07-10

## 2024-01-12 NOTE — PROGRESS NOTES
Neurology Consult Note    [facility]  [address], [address]  [place] [address]  Phone: [phone]  Fax: [phone]    Referring Provider:   No ref. provider found  Primary Care Provider:  Oksana Mares MD    Reason for Consult:  Tremor   Migraine   Subjective      Donis Abdirahman Yi presents to [facility]    History of Present Illness  58-year-old male seen for follow-up of tremor and migraine.  Patient was last seen via telehealth on 10/13/2023.  At that time we had continued his propranolol 80 mg 3 times daily for migraine prevention and treatment of tremor.  He was on Reglan 10 mg for migraine .    Today patient was seen again via telehealth.  Patient was at his home and I was in my office.  Reports continued tremor.  Unchanged.  He is tolerating the propranolol well.  He reports his blood pressure has been the last few days.  His migraine frequency has improved as well.  He is only had 2 migraines over the last 30 days.  He does report he had 4 toes amputated last month and was in rehab for a few weeks.  During that time he had stopped his propranolol 80 mg and gave him propranolol 40 mg.  Notes during that time his tremor did get worse.  He does note stress makes his tremor worse.    Patient Active Problem List   Diagnosis    Chronic sinus complaints    Allergic rhinitis    ETD (eustachian tube dysfunction)    Hearing loss, sensorineural    Deviated nasal septum    Age related osteoporosis    Osteoporosis    Bennett's neuroma, left    HIV (human immunodeficiency virus infection)    Acute UTI    Mixed hyperlipidemia    SRINIVAS (obstructive sleep apnea)    Tobacco abuse    Hepatic steatosis    Transaminitis    Type 2 diabetes mellitus with hyperglycemia, with long-term current use of insulin    Chest pain    Anemia    Anxiety and depression    Gastroesophageal reflux disease without esophagitis    HIV-1 infection, symptomatic    Hypertension    Other  insomnia    History of MI (myocardial infarction)    Abnormal stress test    Stable angina pectoris    Chest pain    Pilonidal abscess    Encounter for screening for malignant neoplasm of colon    Gangrene of toe    HIV (human immunodeficiency virus infection)    Acute pain    Osteomyelitis of fourth toe of right foot    Acute post-traumatic stress disorder    Closed nondisplaced fracture of navicular bone of left foot with routine healing    Diabetic foot infection    Foot ulcer due to secondary DM    Intractable migraine without aura    Tremor    Gangrene of toe of right foot    Currently asymptomatic HIV infection, with history of HIV-related illness    Primary hypertension    Hypercholesterolemia    Foot callus    Amputated toe, right    Hematuria syndrome    Essential tremor    Bunionette of left foot        Past Medical History:   Diagnosis Date    Allergic rhinitis     Anxiety     Bursitis     DDD (degenerative disc disease), cervical     Depression     GERD (gastroesophageal reflux disease)     HIV (human immunodeficiency virus infection)     HLD (hyperlipidemia)     HSP (Henoch Schonlein purpura)     Migraine     Myocardial infarction     Osteoporosis     Parkinsonian tremor     PONV (postoperative nausea and vomiting)     Sleep apnea     Type 2 diabetes mellitus         Social History     Socioeconomic History    Marital status:    Tobacco Use    Smoking status: Former     Packs/day: .25     Types: Cigarettes     Start date: 1988    Smokeless tobacco: Never   Vaping Use    Vaping Use: Never used   Substance and Sexual Activity    Alcohol use: Yes     Comment: socialy    Drug use: No    Sexual activity: Defer        Allergies   Allergen Reactions    Amitriptyline Hcl Anaphylaxis    Darvon [Propoxyphene] Anaphylaxis    Zithromax [Azithromycin] Anaphylaxis          Current Outpatient Medications:     propranolol (INDERAL) 80 MG tablet, Take 1 tablet by mouth 4 (Four) Times a Day for 180 days. Take 1  "tablet in the morning, 1 tablet in the afternoon and 2 tablets at bedtime, Disp: 360 tablet, Rfl: 1    rOPINIRole (REQUIP) 0.25 MG tablet, Take 1 tablet by mouth 3 (Three) Times a Day., Disp: 90 tablet, Rfl: 2    albuterol (PROVENTIL HFA;VENTOLIN HFA) 108 (90 BASE) MCG/ACT inhaler, Inhale 2 puffs Every 6 (Six) Hours As Needed for Wheezing., Disp: , Rfl:     atorvastatin (LIPITOR) 40 MG tablet, Take 1 tablet by mouth Every Night., Disp: , Rfl: 3    Calcium Carb-Cholecalciferol (CALCIUM 600+D3 PO), Take  by mouth Every Night., Disp: , Rfl:     ciclopirox (LOPROX) 0.77 % cream, Apply  topically to the appropriate area as directed 2 (Two) Times a Day. (Patient taking differently: Apply  topically to the appropriate area as directed 2 (Two) Times a Day As Needed.), Disp: 30 g, Rfl: 5    clotrimazole-betamethasone (LOTRISONE) 1-0.05 % cream, Apply  topically to the appropriate area as directed 2 (Two) Times a Day., Disp: 15 g, Rfl: 1    Continuous Blood Gluc Sensor (FreeStyle Eamon 2 Sensor) misc, USE as directed TO monitor blood glucose. replace every 14 DAYS., Disp: 2 each, Rfl: 3    DEXILANT 60 MG capsule, Take 1 capsule by mouth Every Night., Disp: , Rfl: 1    Fnjpfdf-Urunb-Okoiwlny-TenofAF (GENVOYA) [phone] MG per tablet, Take 1 tablet by mouth Every Night., Disp: , Rfl:     fluticasone (FLONASE) 50 MCG/ACT nasal spray, 2 sprays into each nostril Daily., Disp: 1 bottle, Rfl: 6    HYDROcodone-acetaminophen (Norco)  MG per tablet, Take 1 tablet by mouth Every 6 (Six) Hours As Needed for Moderate Pain., Disp: 30 tablet, Rfl: 0    Insulin Disposable Pump (Omnipod DASH Pods, Gen 4,) misc, USE EACH POD EVERY 3 DAYS, Disp: 30 each, Rfl: 0    Insulin Syringe 31G X 5/16\" 1 ML misc, Use 4 times daily  , ICD10 code is E11.9, Disp: 120 each, Rfl: 11    lamoTRIgine (LaMICtal) 100 MG tablet, , Disp: , Rfl:     lamoTRIgine (LaMICtal) 25 MG tablet, Take 4 tablets by mouth 2 (Two) Times a Day. (Patient not taking: " Reported on 9/21/2023), Disp: , Rfl:     loratadine (CLARITIN) 10 MG tablet, Take 1 tablet by mouth Every Night., Disp: , Rfl:     losartan (COZAAR) 25 MG tablet, Take 1 tablet by mouth Every Night., Disp: , Rfl:     meclizine (ANTIVERT) 25 MG tablet, Take 1 tablet by mouth Every Night., Disp: , Rfl:     metFORMIN (GLUCOPHAGE) 500 MG tablet, Take 2 tablets by mouth 2 (Two) Times a Day With Meals., Disp: , Rfl: 3    metoclopramide (REGLAN) 10 MG tablet, Take 1 tablet by mouth As Needed (take 1 tablet daily as needed for migraine. max dose 1 tablet 24 hours.)., Disp: 30 tablet, Rfl: 1    mometasone (NASONEX) 50 MCG/ACT nasal spray, 2 sprays into the nostril(s) as directed by provider Daily., Disp: , Rfl:     multivitamin with minerals tablet tablet, Take 1 tablet by mouth Every Night., Disp: , Rfl:     naloxone (NARCAN) 4 MG/0.1ML nasal spray, , Disp: , Rfl:     NovoLOG 100 UNIT/ML injection, Inject up to 150 units daily through pump, Disp: 50 mL, Rfl: 2    ondansetron ODT (ZOFRAN-ODT) 4 MG disintegrating tablet, Place 1 tablet on the tongue Every 6 (Six) Hours As Needed for Nausea., Disp: 12 tablet, Rfl: 0    prazosin (MINIPRESS) 2 MG capsule, Take 1 capsule by mouth Every Night. Patient unsure of dosage, Disp: , Rfl:     pregabalin (LYRICA) 75 MG capsule, Take 1 capsule by mouth 3 (Three) Times a Day., Disp: , Rfl:     saccharomyces boulardii (FLORASTOR) 250 MG capsule, Take 1 capsule by mouth Every Night., Disp: , Rfl:     Senna-Plus 8.6-50 MG per tablet, Take 2 tablets by mouth every night at bedtime., Disp: , Rfl:     tamsulosin (FLOMAX) 0.4 MG capsule 24 hr capsule, Take 1 capsule by mouth Every Night., Disp: , Rfl:     Tirzepatide (Mounjaro) 5 MG/0.5ML solution pen-injector, Inject 0.5 mL under the skin into the appropriate area as directed Every 7 (Seven) Days. 5 mg subcutaneously weekly (Patient taking differently: Inject 0.5 mL under the skin into the appropriate area as directed Every 7 (Seven) Days.  monday), Disp: 2 mL, Rfl: 11    topiramate (TOPAMAX) 25 MG tablet, Take 1 tablet by mouth 3 (Three) Times a Day., Disp: , Rfl:     traZODone (DESYREL) 100 MG tablet, Take 3 tablets by mouth Every Night. (Patient not taking: Reported on 9/21/2023), Disp: , Rfl: 3    traZODone (DESYREL) 150 MG tablet, Take 1 tablet by mouth At Night As Needed for Sleep., Disp: , Rfl:     Vortioxetine HBr 20 MG tablet, Take 1 tablet by mouth Every Night. Pt takes at night, Disp: , Rfl:     zolpidem (Ambien) 5 MG tablet, Take 1 tablet by mouth Every Night., Disp: , Rfl:        Objective   Vital Signs:   There were no vitals taken for this visit.      Physical Exam  Vitals and nursing note reviewed.   Constitutional:       General: He is not in acute distress.  Pulmonary:      Effort: Pulmonary effort is normal. No respiratory distress.   Neurological:      Mental Status: He is alert.   Psychiatric:         Speech: Speech normal.        Neurological Exam  Mental Status  Alert. Oriented to person, place, time and situation. Speech is normal. Language is fluent with no aphasia.    Cranial Nerves  CN VII: Full and symmetric facial movement.    Motor   The following abnormal movements were seen: Essential tremor noted with arms out stretched and when brought to chin. .        Result Review :   The following data was reviewed by: SOBIA Millan on 01/12/2024:                    Impression:  Donis Yi is a 58 y.o. male who presents for follow-up of tremor and migraine.  His tremor is compatible with an essential tremor.  Will be treated with propranolol.  We will increase his propranolol to a total of 320 mg daily.  In regards to migraines, he is reporting good compliance and control.  We are utilizing Topamax 25 mg 3 times daily as well as propranolol 80 mg 3 times daily currently.  Reglan for abortive therapy.  Continue current plan.  We will increase propranolol as noted.    Diagnoses and all orders for this visit:    1.  Essential tremor (Primary)  -     propranolol (INDERAL) 80 MG tablet; Take 1 tablet by mouth 4 (Four) Times a Day for 180 days. Take 1 tablet in the morning, 1 tablet in the afternoon and 2 tablets at bedtime  Dispense: 360 tablet; Refill: 1    2. Intractable chronic migraine without aura and without status migrainosus  -     propranolol (INDERAL) 80 MG tablet; Take 1 tablet by mouth 4 (Four) Times a Day for 180 days. Take 1 tablet in the morning, 1 tablet in the afternoon and 2 tablets at bedtime  Dispense: 360 tablet; Refill: 1        Plan:  Increase propranolol 80 mg tablet to 1 tablet in the morning, 1 tablet in the afternoon and 2 tablets at bedtime  Continue Topamax 25 mg tablet 3 times daily  Continue Reglan 10 mg as needed for migraine   Patient to notify the office of status update in 2 weeks  Follow-up with PCP as scheduled  Follow-up with me 3 months    The patient and I have discussed the plan of care and he is in full agreement at this time.     Follow Up   No follow-ups on file.            Parveen Berrios, SOBIA  24  13:02 CST

## 2024-01-17 ENCOUNTER — PATIENT MESSAGE (OUTPATIENT)
Dept: NEUROLOGY | Facility: CLINIC | Age: 59
End: 2024-01-17
Payer: MEDICARE

## 2024-01-18 NOTE — TELEPHONE ENCOUNTER
Explained that [facility] had the script on hold.  They couldn't get a 90 day supply to go through, they were able to get a 30 day supply to go through.

## 2024-01-19 ENCOUNTER — TELEPHONE (OUTPATIENT)
Dept: OTHER | Age: 59
End: 2024-01-19

## 2024-01-19 NOTE — TELEPHONE ENCOUNTER
Dr. Crews ordered smoking cessation referral for patient due to nicotine dependence.  Offered smoking cessation class beginning in January.  Left message with all pertinent information provided.

## 2024-01-23 DIAGNOSIS — K52.1 DIARRHEA DUE TO DRUG: ICD-10-CM

## 2024-01-23 DIAGNOSIS — G43.911 INTRACTABLE MIGRAINE WITH STATUS MIGRAINOSUS, UNSPECIFIED MIGRAINE TYPE: ICD-10-CM

## 2024-01-23 DIAGNOSIS — H69.91 DYSFUNCTION OF RIGHT EUSTACHIAN TUBE: ICD-10-CM

## 2024-01-23 RX ORDER — DOCUSATE SODIUM, SENNOSIDES 50; 8.6 MG/1; MG/1
2 TABLET ORAL NIGHTLY
Qty: 180 TABLET | Refills: 1 | Status: SHIPPED | OUTPATIENT
Start: 2024-01-23

## 2024-01-23 RX ORDER — LORATADINE 10 MG/1
TABLET ORAL
Qty: 30 TABLET | Refills: 1 | Status: SHIPPED | OUTPATIENT
Start: 2024-01-23

## 2024-01-23 RX ORDER — MECLIZINE HYDROCHLORIDE 25 MG/1
TABLET ORAL
Qty: 30 TABLET | Refills: 1 | Status: SHIPPED | OUTPATIENT
Start: 2024-01-23

## 2024-01-23 RX ORDER — SACCHAROMYCES BOULARDII 250 MG
CAPSULE ORAL
Qty: 60 CAPSULE | Refills: 1 | Status: SHIPPED | OUTPATIENT
Start: 2024-01-23

## 2024-01-23 RX ORDER — MOMETASONE FUROATE 50 UG/1
SPRAY, METERED NASAL
Qty: 17 G | Refills: 1 | Status: SHIPPED | OUTPATIENT
Start: 2024-01-23

## 2024-01-23 RX ORDER — TOPIRAMATE 25 MG/1
25 TABLET ORAL 3 TIMES DAILY
Qty: 90 TABLET | Refills: 1 | Status: SHIPPED | OUTPATIENT
Start: 2024-01-23

## 2024-01-23 RX ORDER — LOPERAMIDE HYDROCHLORIDE 2 MG/1
2 CAPSULE ORAL 4 TIMES DAILY PRN
Qty: 30 CAPSULE | Refills: 1 | Status: SHIPPED | OUTPATIENT
Start: 2024-01-23

## 2024-02-20 ENCOUNTER — TELEPHONE (OUTPATIENT)
Dept: OTHER | Age: 59
End: 2024-02-20

## 2024-02-20 RX ORDER — CHOLECALCIFEROL (VITAMIN D3) 125 MCG
CAPSULE ORAL
Qty: 100 CAPSULE | Refills: 3 | Status: SHIPPED | OUTPATIENT
Start: 2024-02-20

## 2024-02-20 NOTE — TELEPHONE ENCOUNTER
Follow up from January. Left message with all information for the next two classes.  He is to call back if he opts to sign up.

## 2024-03-19 DIAGNOSIS — K52.1 DIARRHEA DUE TO DRUG: ICD-10-CM

## 2024-03-19 DIAGNOSIS — I10 ESSENTIAL HYPERTENSION: ICD-10-CM

## 2024-03-19 DIAGNOSIS — H69.91 DYSFUNCTION OF RIGHT EUSTACHIAN TUBE: ICD-10-CM

## 2024-03-19 DIAGNOSIS — K21.9 GASTROESOPHAGEAL REFLUX DISEASE WITHOUT ESOPHAGITIS: ICD-10-CM

## 2024-03-19 DIAGNOSIS — R39.14 BENIGN PROSTATIC HYPERPLASIA WITH INCOMPLETE BLADDER EMPTYING: ICD-10-CM

## 2024-03-19 DIAGNOSIS — N40.1 BENIGN PROSTATIC HYPERPLASIA WITH INCOMPLETE BLADDER EMPTYING: ICD-10-CM

## 2024-03-19 DIAGNOSIS — G43.911 INTRACTABLE MIGRAINE WITH STATUS MIGRAINOSUS, UNSPECIFIED MIGRAINE TYPE: ICD-10-CM

## 2024-03-19 RX ORDER — MECLIZINE HYDROCHLORIDE 25 MG/1
TABLET ORAL
Qty: 30 TABLET | Refills: 1 | Status: SHIPPED | OUTPATIENT
Start: 2024-03-19

## 2024-03-19 RX ORDER — LORATADINE 10 MG/1
TABLET ORAL
Qty: 30 TABLET | Refills: 1 | Status: SHIPPED | OUTPATIENT
Start: 2024-03-19

## 2024-03-19 RX ORDER — LOSARTAN POTASSIUM 25 MG/1
25 TABLET ORAL DAILY
Qty: 90 TABLET | Refills: 1 | Status: SHIPPED | OUTPATIENT
Start: 2024-03-19

## 2024-03-19 RX ORDER — TOPIRAMATE 25 MG/1
25 TABLET ORAL 3 TIMES DAILY
Qty: 90 TABLET | Refills: 1 | Status: SHIPPED | OUTPATIENT
Start: 2024-03-19

## 2024-03-19 RX ORDER — MOMETASONE FUROATE 50 UG/1
SPRAY, METERED NASAL
Qty: 17 G | Refills: 1 | Status: SHIPPED | OUTPATIENT
Start: 2024-03-19

## 2024-03-19 RX ORDER — LOPERAMIDE HYDROCHLORIDE 2 MG/1
2 CAPSULE ORAL 4 TIMES DAILY PRN
Qty: 30 CAPSULE | Refills: 1 | Status: SHIPPED | OUTPATIENT
Start: 2024-03-19

## 2024-03-19 RX ORDER — TAMSULOSIN HYDROCHLORIDE 0.4 MG/1
0.4 CAPSULE ORAL DAILY
Qty: 90 CAPSULE | Refills: 1 | Status: SHIPPED | OUTPATIENT
Start: 2024-03-19

## 2024-03-19 RX ORDER — SACCHAROMYCES BOULARDII 250 MG
CAPSULE ORAL
Qty: 60 CAPSULE | Refills: 1 | Status: SHIPPED | OUTPATIENT
Start: 2024-03-19

## 2024-03-19 RX ORDER — DEXLANSOPRAZOLE 60 MG/1
CAPSULE, DELAYED RELEASE ORAL
Qty: 90 CAPSULE | Refills: 1 | Status: SHIPPED | OUTPATIENT
Start: 2024-03-19

## 2024-03-27 ENCOUNTER — TELEPHONE (OUTPATIENT)
Dept: INTERNAL MEDICINE | Age: 59
End: 2024-03-27

## 2024-03-27 NOTE — TELEPHONE ENCOUNTER
CARMELO - he wanted to let us know that he fell and fractured his knee in 2 places.  Dr George at [facility] did the surgery.  He is at the [facility] in [place] at this time.

## 2024-04-15 ENCOUNTER — OFFICE VISIT (OUTPATIENT)
Dept: NEUROLOGY | Facility: CLINIC | Age: 59
End: 2024-04-15
Payer: MEDICARE

## 2024-04-15 VITALS
HEART RATE: 82 BPM | DIASTOLIC BLOOD PRESSURE: 72 MMHG | WEIGHT: 257 LBS | SYSTOLIC BLOOD PRESSURE: 110 MMHG | BODY MASS INDEX: 36.79 KG/M2 | HEIGHT: 70 IN | RESPIRATION RATE: 20 BRPM

## 2024-04-15 DIAGNOSIS — G43.719 INTRACTABLE CHRONIC MIGRAINE WITHOUT AURA AND WITHOUT STATUS MIGRAINOSUS: ICD-10-CM

## 2024-04-15 DIAGNOSIS — G25.0 ESSENTIAL TREMOR: Primary | ICD-10-CM

## 2024-04-15 PROCEDURE — 99214 OFFICE O/P EST MOD 30 MIN: CPT

## 2024-04-15 PROCEDURE — 3078F DIAST BP <80 MM HG: CPT

## 2024-04-15 PROCEDURE — 3074F SYST BP LT 130 MM HG: CPT

## 2024-04-15 PROCEDURE — 1160F RVW MEDS BY RX/DR IN RCRD: CPT

## 2024-04-15 PROCEDURE — 1159F MED LIST DOCD IN RCRD: CPT

## 2024-04-15 NOTE — PROGRESS NOTES
Neurology Consult Note    [facility]  [address], [address]  [place] [address]  Phone: [phone]  Fax: [phone]    Referring Provider:   No ref. provider found  Primary Care Provider:  Oksana Mares MD    Reason for Consult:  Tremor   Migraine   Subjective      Donis Abdirahman Yi presents to [facility]    History of Present Illness  58-year-old male known to me and seen for tremor and migraine.  Currently patient is treated with propranolol 320 mg daily and topiramate 75 mg daily.  He was last seen in clinic on 1/12/2024 per telehealth.    Today patient presents via wheelchair with his mother.  Since last being seen he has an unfortunate turn of events.  Last month he did fall and fractured his right patella.  He is in a splint.  No plan for surgical indication.  He does note once we increased his propranolol at last visit he did notice an improvement in his tremor.  He did fall from a mechanical issue where he slipped on something slick on the floor.  He did not experience any dizziness with increased dose of medications.    Patient has had a tremor for several years.  He does not this worsen around 2021.  He has had a recent increase in his migraines in relation to his injuries.    I did discuss with patient if pharmacological intervention is not beneficial, we could consider DBS.  Patient is adamantly against this.  Apparently his father had DBS and had adverse events prior to his passing.  Patient Active Problem List   Diagnosis    Chronic sinus complaints    Allergic rhinitis    ETD (eustachian tube dysfunction)    Hearing loss, sensorineural    Deviated nasal septum    Age related osteoporosis    Osteoporosis    Bennett's neuroma, left    HIV (human immunodeficiency virus infection)    Acute UTI    Mixed hyperlipidemia    SRINIVAS (obstructive sleep apnea)    Tobacco abuse    Hepatic steatosis    Transaminitis    Type 2 diabetes mellitus with  hyperglycemia, with long-term current use of insulin    Chest pain    Anemia    Anxiety and depression    Gastroesophageal reflux disease without esophagitis    HIV-1 infection, symptomatic    Hypertension    Other insomnia    History of MI (myocardial infarction)    Abnormal stress test    Stable angina pectoris    Chest pain    Pilonidal abscess    Encounter for screening for malignant neoplasm of colon    Gangrene of toe    HIV (human immunodeficiency virus infection)    Acute pain    Osteomyelitis of fourth toe of right foot    Acute post-traumatic stress disorder    Closed nondisplaced fracture of navicular bone of left foot with routine healing    Diabetic foot infection    Foot ulcer due to secondary DM    Intractable migraine without aura    Tremor    Gangrene of toe of right foot    Currently asymptomatic HIV infection, with history of HIV-related illness    Primary hypertension    Hypercholesterolemia    Foot callus    Amputated toe, right    Hematuria syndrome    Essential tremor    Bunionette of left foot        Past Medical History:   Diagnosis Date    Allergic rhinitis     Anxiety     Bursitis     DDD (degenerative disc disease), cervical     Depression     GERD (gastroesophageal reflux disease)     HIV (human immunodeficiency virus infection)     HLD (hyperlipidemia)     HSP (Henoch Schonlein purpura)     Migraine     Myocardial infarction     Osteoporosis     Parkinsonian tremor     PONV (postoperative nausea and vomiting)     Sleep apnea     Type 2 diabetes mellitus         Social History     Socioeconomic History    Marital status:    Tobacco Use    Smoking status: Former     Current packs/day: 0.25     Average packs/day: 0.3 packs/day for 36.3 years (9.1 ttl pk-yrs)     Types: Cigarettes     Start date: 1988    Smokeless tobacco: Never   Vaping Use    Vaping status: Never Used   Substance and Sexual Activity    Alcohol use: Yes     Comment: socialy    Drug use: No    Sexual activity: Defer  "       Allergies   Allergen Reactions    Amitriptyline Hcl Anaphylaxis    Darvon [Propoxyphene] Anaphylaxis    Zithromax [Azithromycin] Anaphylaxis          Current Outpatient Medications:     albuterol (PROVENTIL HFA;VENTOLIN HFA) 108 (90 BASE) MCG/ACT inhaler, Inhale 2 puffs Every 6 (Six) Hours As Needed for Wheezing., Disp: , Rfl:     atorvastatin (LIPITOR) 40 MG tablet, Take 1 tablet by mouth Every Night., Disp: , Rfl: 3    ciclopirox (LOPROX) 0.77 % cream, Apply  topically to the appropriate area as directed 2 (Two) Times a Day. (Patient taking differently: Apply  topically to the appropriate area as directed 2 (Two) Times a Day As Needed.), Disp: 30 g, Rfl: 5    clotrimazole-betamethasone (LOTRISONE) 1-0.05 % cream, Apply  topically to the appropriate area as directed 2 (Two) Times a Day., Disp: 15 g, Rfl: 1    Continuous Blood Gluc Sensor (FreeStyle Eamon 2 Sensor) [facility], USE as directed TO monitor blood glucose. replace every 14 DAYS., Disp: 2 each, Rfl: 3    DEXILANT 60 MG capsule, Take 1 capsule by mouth Every Night., Disp: , Rfl: 1    Oniltby-Jmdqd-Eubssqpa-TenofAF (GENVOYA) [phone] MG per tablet, Take 1 tablet by mouth Every Night., Disp: , Rfl:     fluticasone (FLONASE) 50 MCG/ACT nasal spray, 2 sprays into each nostril Daily., Disp: 1 bottle, Rfl: 6    HYDROcodone-acetaminophen (Norco)  MG per tablet, Take 1 tablet by mouth Every 6 (Six) Hours As Needed for Moderate Pain., Disp: 30 tablet, Rfl: 0    Insulin Disposable Pump (Omnipod DASH Pods, Gen 4,) misc, USE EACH POD EVERY 3 DAYS, Disp: 30 each, Rfl: 0    Insulin Syringe 31G X 5/16\" 1 ML misc, Use 4 times daily  , ICD10 code is E11.9, Disp: 120 each, Rfl: 11    lamoTRIgine (LaMICtal) 100 MG tablet, , Disp: , Rfl:     loratadine (CLARITIN) 10 MG tablet, Take 1 tablet by mouth Every Night., Disp: , Rfl:     losartan (COZAAR) 25 MG tablet, Take 1 tablet by mouth Every Night., Disp: , Rfl:     meclizine (ANTIVERT) 25 MG tablet, Take 1 tablet " by mouth Every Night., Disp: , Rfl:     metFORMIN (GLUCOPHAGE) 500 MG tablet, Take 2 tablets by mouth 2 (Two) Times a Day With Meals., Disp: , Rfl: 3    metoclopramide (REGLAN) 10 MG tablet, Take 1 tablet by mouth As Needed (take 1 tablet daily as needed for migraine. max dose 1 tablet 24 hours.)., Disp: 30 tablet, Rfl: 1    mometasone (NASONEX) 50 MCG/ACT nasal spray, 2 sprays into the nostril(s) as directed by provider Daily., Disp: , Rfl:     multivitamin with minerals tablet tablet, Take 1 tablet by mouth Every Night., Disp: , Rfl:     naloxone (NARCAN) 4 MG/0.1ML nasal spray, , Disp: , Rfl:     NovoLOG 100 UNIT/ML injection, Inject up to 150 units daily through pump, Disp: 50 mL, Rfl: 2    ondansetron ODT (ZOFRAN-ODT) 4 MG disintegrating tablet, Place 1 tablet on the tongue Every 6 (Six) Hours As Needed for Nausea., Disp: 12 tablet, Rfl: 0    prazosin (MINIPRESS) 2 MG capsule, Take 1 capsule by mouth Every Night. Patient unsure of dosage, Disp: , Rfl:     pregabalin (LYRICA) 75 MG capsule, Take 1 capsule by mouth 3 (Three) Times a Day., Disp: , Rfl:     propranolol (INDERAL) 80 MG tablet, Take 1 tablet by mouth 4 (Four) Times a Day for 180 days. Take 1 tablet in the morning, 1 tablet in the afternoon and 2 tablets at bedtime, Disp: 360 tablet, Rfl: 1    rOPINIRole (REQUIP) 0.25 MG tablet, Take 1 tablet by mouth 3 (Three) Times a Day., Disp: 90 tablet, Rfl: 2    saccharomyces boulardii (FLORASTOR) 250 MG capsule, Take 1 capsule by mouth Every Night., Disp: , Rfl:     Semaglutide (OZEMPIC, 1 MG/DOSE, SC), Inject  under the skin into the appropriate area as directed 1 (One) Time Per Week., Disp: , Rfl:     Senna-Plus 8.6-50 MG per tablet, Take 2 tablets by mouth every night at bedtime., Disp: , Rfl:     tamsulosin (FLOMAX) 0.4 MG capsule 24 hr capsule, Take 1 capsule by mouth Every Night., Disp: , Rfl:     topiramate (TOPAMAX) 25 MG tablet, Take 1 tablet by mouth 3 (Three) Times a Day., Disp: , Rfl:      "Vortioxetine HBr 20 MG tablet, Take 1 tablet by mouth Every Night. Pt takes at night, Disp: , Rfl:     zolpidem (Ambien) 5 MG tablet, Take 1 tablet by mouth Every Night., Disp: , Rfl:     lamoTRIgine (LaMICtal) 25 MG tablet, Take 4 tablets by mouth 2 (Two) Times a Day. (Patient not taking: Reported on 9/21/2023), Disp: , Rfl:     traZODone (DESYREL) 100 MG tablet, Take 3 tablets by mouth Every Night. (Patient not taking: Reported on 9/21/2023), Disp: , Rfl: 3    traZODone (DESYREL) 150 MG tablet, Take 1 tablet by mouth At Night As Needed for Sleep., Disp: , Rfl:        Objective   Vital Signs:   /72 (BP Location: Left arm, Patient Position: Sitting)   Pulse 82   Resp 20   Ht 177.8 cm (70\")   Wt 117 kg (257 lb)   BMI 36.88 kg/m²       Physical Exam  Vitals and nursing note reviewed.   Constitutional:       Appearance: Normal appearance.   Eyes:      General: Lids are normal.      Extraocular Movements: Extraocular movements intact.   Pulmonary:      Effort: Pulmonary effort is normal. No respiratory distress.   Skin:     General: Skin is warm and dry.   Neurological:      Mental Status: He is alert.      Deep Tendon Reflexes: Reflexes are normal and symmetric.   Psychiatric:         Speech: Speech normal.        Neurological Exam  Mental Status  Alert. Oriented to person, place, time and situation. Speech is normal. Language is fluent with no aphasia.    Cranial Nerves  CN III, IV, VI: Extraocular movements intact bilaterally. Normal lids and orbits bilaterally.  CN VII: Full and symmetric facial movement.  CN XII: Tongue midline without atrophy or fasciculations.    Motor  Normal muscle bulk throughout. No fasciculations present. Normal muscle tone. No rigidity, no bradykinesia. Resting tremor note, no pill rolling. Worsened with intention and kinetic movements, which is a oscillatory tremor. No head tremor. .      Sensory  Light touch is normal in upper and lower extremities.     Reflexes  Deep tendon " reflexes are 2+ and symmetric in all four extremities.    Coordination  Right: Finger-to-nose abnormality: Tremor worsened with intention toward examiner's finger. .Left: Finger-to-nose abnormality:    Gait    No assessed due to right toe amputations and right patella fracture. Currently in brace. Denies any shuffling gait. .      Result Review :   The following data was reviewed by: SOBIA Millan on 04/15/2024:       Progress Notes by Parveen Berrios APRN (01/12/2024 13:00)                Impression:  Donis Yi is a 58 y.o. male who presents for follow-up of tremor.  His tremor is most characteristic of an essential tremor.  He has had this for several years.  Worsened in 2021.  He has been responsive to propranolol.  He does have a strong family history of essential tremor as well.  Unfortunately patient's medical history is complicated by his HIV and other chronic medical problems.  I will be hesitant to add other medications due to his extensive medication list.  I would recommend to monitor patient at this time.  Will like to reassess his tremor after he has healed from his fall.  Patient is in agreement.    Diagnoses and all orders for this visit:    1. Essential tremor (Primary)    2. Intractable chronic migraine without aura and without status migrainosus        Plan:  Continue propranolol 80 mg 4 times daily  Continue topiramate 75 mg daily  Contact her office for any worsening symptoms  Follow-up with PCP as scheduled  Follow-up in our clinic in 3 months or sooner if needed    The patient and I have discussed the plan of care and he is in full agreement at this time.     Follow Up   Return in about 3 months (around 7/15/2024) for tremor.            SOBIA Millan  04/15/24  13:09 CDT

## 2024-04-16 ENCOUNTER — PATIENT MESSAGE (OUTPATIENT)
Dept: INTERNAL MEDICINE | Age: 59
End: 2024-04-16

## 2024-04-17 ENCOUNTER — TELEPHONE (OUTPATIENT)
Dept: INTERNAL MEDICINE | Age: 59
End: 2024-04-17

## 2024-04-17 NOTE — TELEPHONE ENCOUNTER
Care Transitions Initial Follow Up Call    Outreach made within 2 business days of discharge: Yes  Workman's comp claim?No  Patient: Jhonny Vieyra   Patient : 1965   MRN: [id number]    Reason for Admission: Therapy  Discharge Date: 24       Spoke with: Generic voicemail, unable to leave a message.     [facility]      Scheduled appointment with PCP within 7-14 days    Follow Up  Future Appointments   Date Time Provider Department Center   2024  9:00 AM Lebuhn, Rosemary, MD [facility]   2024 11:00 AM Rosemary Wiley MD [facility] Lorrie Crawford, MA

## 2024-04-17 NOTE — TELEPHONE ENCOUNTER
Care Transitions Initial Follow Up Call    Outreach made within 2 business days of discharge: Yes  Workman's comp claim?No  Patient: Jhonny Vieyra   Patient : 1965   MRN: [id number]   Reason for Admission: Therapy  Discharge Date: 24       Spoke with: I called the patient  on 4/15/23 and 24, to try and set him up an appointment for a hospital follow up and ask him TCM questions but he was unavailable.     [facility]      Scheduled appointment with PCP within 7-14 days    Follow Up  Future Appointments   Date Time Provider Department Center   2024  9:00 AM Lebuhn, Rosemary, MD [facility]   2024 11:00 AM Rosemary Wiley MD [facility] HANNY Kate MA

## 2024-04-18 ENCOUNTER — TELEPHONE (OUTPATIENT)
Dept: INTERNAL MEDICINE | Age: 59
End: 2024-04-18

## 2024-04-18 NOTE — TELEPHONE ENCOUNTER
Care Transitions Initial Follow Up Call    Outreach made within 2 business days of discharge: Yes  Workman's comp claim?No  Patient: Jhonny Vieyra   Patient : 1965   MRN: [id number]    Reason for Admission: Therapy  Discharge Date: 24     Spoke with: Jhonny Vieyra    Discharge department/facility: [facility]    TCM Interactive Patient Contact:  Was patient able to fill all prescriptions: Yes  Was patient instructed to bring all medications to the follow-up visit: Yes  Is patient taking all medications as directed in the discharge summary? Yes  Does patient understand their discharge instructions: Yes  Does patient have questions or concerns that need addressed prior to 7-14 day follow up office visit: no    The patient has been having nausea and diarrhea from taking Ozempic. He has contacted his endocrinologist to speak with him about this. The patient is in pain since being home. He isn't able to eat much.     Scheduled appointment with PCP within 7-14 days    Follow Up  Future Appointments   Date Time Provider Department Center   2024  9:00 AM Rosemary Wiley MD [facility]   2024 11:00 AM Rosemary Wiley MD [facility]       Sandy Kate MA

## 2024-04-19 ENCOUNTER — TELEMEDICINE (OUTPATIENT)
Dept: INTERNAL MEDICINE | Age: 59
End: 2024-04-19

## 2024-04-19 DIAGNOSIS — Z79.4 TYPE 2 DIABETES MELLITUS WITH DIABETIC NEUROPATHY, WITH LONG-TERM CURRENT USE OF INSULIN (HCC): ICD-10-CM

## 2024-04-19 DIAGNOSIS — F33.40 RECURRENT MAJOR DEPRESSIVE DISORDER, IN REMISSION (HCC): ICD-10-CM

## 2024-04-19 DIAGNOSIS — L97.509 TYPE 2 DIABETES MELLITUS WITH FOOT ULCER, WITH LONG-TERM CURRENT USE OF INSULIN (HCC): ICD-10-CM

## 2024-04-19 DIAGNOSIS — E11.40 TYPE 2 DIABETES MELLITUS WITH DIABETIC NEUROPATHY, WITH LONG-TERM CURRENT USE OF INSULIN (HCC): ICD-10-CM

## 2024-04-19 DIAGNOSIS — Z09 HOSPITAL DISCHARGE FOLLOW-UP: ICD-10-CM

## 2024-04-19 DIAGNOSIS — S98.131A AMPUTATED TOE, RIGHT (HCC): ICD-10-CM

## 2024-04-19 DIAGNOSIS — Z79.4 TYPE 2 DIABETES MELLITUS WITH FOOT ULCER, WITH LONG-TERM CURRENT USE OF INSULIN (HCC): ICD-10-CM

## 2024-04-19 DIAGNOSIS — E11.621 TYPE 2 DIABETES MELLITUS WITH FOOT ULCER, WITH LONG-TERM CURRENT USE OF INSULIN (HCC): ICD-10-CM

## 2024-04-19 DIAGNOSIS — B20 CURRENTLY ASYMPTOMATIC HIV INFECTION, WITH HISTORY OF HIV-RELATED ILLNESS (HCC): ICD-10-CM

## 2024-04-19 DIAGNOSIS — S82.041D CLOSED DISPLACED COMMINUTED FRACTURE OF RIGHT PATELLA WITH ROUTINE HEALING: Primary | ICD-10-CM

## 2024-04-19 PROBLEM — M21.622 BUNIONETTE OF LEFT FOOT: Status: ACTIVE | Noted: 2023-05-31

## 2024-04-19 RX ORDER — SEMAGLUTIDE 1.34 MG/ML
1 INJECTION, SOLUTION SUBCUTANEOUS WEEKLY
COMMUNITY
Start: 2024-02-01 | End: 2025-02-02

## 2024-04-19 RX ORDER — LAMOTRIGINE 100 MG/1
100 TABLET ORAL DAILY
COMMUNITY
Start: 2024-04-13

## 2024-04-19 RX ORDER — PROCHLORPERAZINE 25 MG/1
SUPPOSITORY RECTAL
COMMUNITY
Start: 2024-01-26

## 2024-04-19 RX ORDER — INSULIN GLARGINE 100 [IU]/ML
INJECTION, SOLUTION SUBCUTANEOUS NIGHTLY
COMMUNITY
Start: 2024-04-13

## 2024-04-19 ASSESSMENT — PATIENT HEALTH QUESTIONNAIRE - PHQ9
1. LITTLE INTEREST OR PLEASURE IN DOING THINGS: SEVERAL DAYS
SUM OF ALL RESPONSES TO PHQ9 QUESTIONS 1 & 2: 2
4. FEELING TIRED OR HAVING LITTLE ENERGY: SEVERAL DAYS
SUM OF ALL RESPONSES TO PHQ QUESTIONS 1-9: 6
SUM OF ALL RESPONSES TO PHQ QUESTIONS 1-9: 6
9. THOUGHTS THAT YOU WOULD BE BETTER OFF DEAD, OR OF HURTING YOURSELF: NOT AT ALL
7. TROUBLE CONCENTRATING ON THINGS, SUCH AS READING THE NEWSPAPER OR WATCHING TELEVISION: NOT AT ALL
3. TROUBLE FALLING OR STAYING ASLEEP: SEVERAL DAYS
6. FEELING BAD ABOUT YOURSELF - OR THAT YOU ARE A FAILURE OR HAVE LET YOURSELF OR YOUR FAMILY DOWN: SEVERAL DAYS
SUM OF ALL RESPONSES TO PHQ QUESTIONS 1-9: 6
5. POOR APPETITE OR OVEREATING: SEVERAL DAYS
10. IF YOU CHECKED OFF ANY PROBLEMS, HOW DIFFICULT HAVE THESE PROBLEMS MADE IT FOR YOU TO DO YOUR WORK, TAKE CARE OF THINGS AT HOME, OR GET ALONG WITH OTHER PEOPLE: SOMEWHAT DIFFICULT
8. MOVING OR SPEAKING SO SLOWLY THAT OTHER PEOPLE COULD HAVE NOTICED. OR THE OPPOSITE, BEING SO FIGETY OR RESTLESS THAT YOU HAVE BEEN MOVING AROUND A LOT MORE THAN USUAL: NOT AT ALL
2. FEELING DOWN, DEPRESSED OR HOPELESS: SEVERAL DAYS
SUM OF ALL RESPONSES TO PHQ QUESTIONS 1-9: 6

## 2024-04-19 NOTE — PROGRESS NOTES
Post-Discharge Transitional Care Management Progress Note      Jhonny Vieyra   YOB: 1965    Date of Office Visit:  4/19/2024  Date of Hospital Admission: 3/4/2024  Date of Hospital Discharge: 4/13/2024    Care management risk score Rising risk (score 2-5) and Complex Care (Scores >=6): No Risk Score On File     Non face to face  following discharge, date last encounter closed (first attempt may have been earlier): 04/18/2024 04/18/2024    Call initiated 2 business days of discharge: Yes    ASSESSMENT/PLAN:   Below is the assessment and plan developed based on review of pertinent history, physical exam, labs, studies, and medications.  Closed displaced comminuted fracture of right patella with routine healing  Type 2 diabetes mellitus with foot ulcer, with long-term current use of insulin ([facility])  Recurrent major depressive disorder, in remission ([facility])  Currently asymptomatic HIV infection, with history of HIV-related illness ([facility])  Type 2 diabetes mellitus with diabetic neuropathy, with long-term current use of insulin ([facility])  Amputated toe, right ([facility])  Work with physical therapy keep close follow-up patient labs most remarkable for this does not stay endocrinology needs to keep close follow-ups person monitor closely continue current plan of care patient also had amputation right toes need to walk with balance safety fall prevention close monitoring my biggest concern is diabetes terrible control for a long time he does follow with endocrinology he has an insulin pump he was removed at the rehab facility he is never placed I told him to contact them immediately after the phone and that his insulin pump restarted and keep the close follow-up as scheduled with endocrinology    Medical Decision Making: moderate  No follow-ups on file.         Subjective:   HPI:  Follow up of Hospital problems/diagnosis(es):   HPI: Pt fell and broke patella and went to ER.  On 3/4 he was admitted to the hospital in

## 2024-04-26 PROBLEM — L97.412 DIABETIC ULCER OF RIGHT MIDFOOT ASSOCIATED WITH TYPE 2 DIABETES MELLITUS, WITH FAT LAYER EXPOSED (HCC): Status: ACTIVE | Noted: 2024-03-01

## 2024-04-26 PROBLEM — R27.8 OTHER LACK OF COORDINATION: Status: ACTIVE | Noted: 2024-03-14

## 2024-04-26 PROBLEM — Z79.4 LONG TERM (CURRENT) USE OF INSULIN (HCC): Status: ACTIVE | Noted: 2024-03-12

## 2024-04-26 PROBLEM — E11.65 TYPE 2 DIABETES MELLITUS WITH HYPERGLYCEMIA (HCC): Status: ACTIVE | Noted: 2024-03-12

## 2024-04-26 PROBLEM — G89.29 OTHER CHRONIC PAIN: Status: ACTIVE | Noted: 2024-03-12

## 2024-04-26 PROBLEM — L03.031 CELLULITIS OF RIGHT TOE: Status: ACTIVE | Noted: 2024-03-12

## 2024-04-26 PROBLEM — S82.001A CLOSED DISPLACED FRACTURE OF RIGHT PATELLA: Status: ACTIVE | Noted: 2024-03-07

## 2024-04-26 PROBLEM — J30.1 ALLERGIC RHINITIS DUE TO POLLEN: Status: ACTIVE | Noted: 2024-03-12

## 2024-04-26 PROBLEM — E11.621 DIABETIC ULCER OF RIGHT MIDFOOT ASSOCIATED WITH TYPE 2 DIABETES MELLITUS, WITH FAT LAYER EXPOSED (HCC): Status: ACTIVE | Noted: 2024-03-01

## 2024-04-26 PROBLEM — G25.81 RESTLESS LEGS SYNDROME: Status: ACTIVE | Noted: 2024-03-12

## 2024-04-26 PROBLEM — L97.412 NON-PRESSURE CHRONIC ULCER OF RIGHT HEEL AND MIDFOOT WITH FAT LAYER EXPOSED (HCC): Status: ACTIVE | Noted: 2024-03-12

## 2024-04-26 PROBLEM — G62.9 POLYNEUROPATHY, UNSPECIFIED: Status: ACTIVE | Noted: 2024-03-12

## 2024-04-28 PROBLEM — S82.041D CLOSED DISPLACED COMMINUTED FRACTURE OF RIGHT PATELLA WITH ROUTINE HEALING: Status: ACTIVE | Noted: 2024-03-07

## 2024-05-15 RX ORDER — INSULIN LISPRO 100 [IU]/ML
INJECTION, SOLUTION INTRAVENOUS; SUBCUTANEOUS
COMMUNITY

## 2024-05-16 ENCOUNTER — TELEPHONE (OUTPATIENT)
Dept: OTHER | Age: 59
End: 2024-05-16

## 2024-05-30 DIAGNOSIS — G43.719 INTRACTABLE CHRONIC MIGRAINE WITHOUT AURA AND WITHOUT STATUS MIGRAINOSUS: ICD-10-CM

## 2024-05-30 RX ORDER — METOCLOPRAMIDE 10 MG/1
10 TABLET ORAL AS NEEDED
Qty: 30 TABLET | Refills: 0 | Status: SHIPPED | OUTPATIENT
Start: 2024-05-30

## 2024-06-10 ENCOUNTER — TELEPHONE (OUTPATIENT)
Dept: INTERNAL MEDICINE | Age: 59
End: 2024-06-10

## 2024-06-10 NOTE — TELEPHONE ENCOUNTER
----- Message from Myranda Garvey sent at 6/10/2024  9:58 AM CDT -----  Regarding: ECC Message to Provider  ECC Message to Provider    Relationship to Patient: Covered Entity /Beth    Additional Information Beth ask the Authorization sent in for physical theraphy of the patient.   --------------------------------------------------------------------------------------------------------------------------    Call Back Information: OK to leave message on voicemail  Preferred Call Back Number: Phone [phone] (home)       Does Rena Alejo follow with pulmonologist? Usually CPAP machine/supplies are from pulmonologist

## 2024-06-25 DIAGNOSIS — G43.911 INTRACTABLE MIGRAINE WITH STATUS MIGRAINOSUS, UNSPECIFIED MIGRAINE TYPE: ICD-10-CM

## 2024-06-25 DIAGNOSIS — H69.91 DYSFUNCTION OF RIGHT EUSTACHIAN TUBE: ICD-10-CM

## 2024-06-25 DIAGNOSIS — K52.1 DIARRHEA DUE TO DRUG: ICD-10-CM

## 2024-06-26 RX ORDER — MECLIZINE HYDROCHLORIDE 25 MG/1
TABLET ORAL
Qty: 30 TABLET | Refills: 1 | Status: SHIPPED | OUTPATIENT
Start: 2024-06-26

## 2024-06-26 RX ORDER — TOPIRAMATE 25 MG/1
25 TABLET ORAL 3 TIMES DAILY
Qty: 90 TABLET | Refills: 1 | Status: SHIPPED | OUTPATIENT
Start: 2024-06-26

## 2024-06-26 RX ORDER — SACCHAROMYCES BOULARDII 250 MG
CAPSULE ORAL
Qty: 60 CAPSULE | Refills: 1 | Status: SHIPPED | OUTPATIENT
Start: 2024-06-26

## 2024-06-26 RX ORDER — MOMETASONE FUROATE MONOHYDRATE 50 UG/1
SPRAY, METERED NASAL
Qty: 17 G | Refills: 1 | Status: SHIPPED | OUTPATIENT
Start: 2024-06-26

## 2024-06-26 RX ORDER — LOPERAMIDE HYDROCHLORIDE 2 MG/1
2 CAPSULE ORAL 4 TIMES DAILY PRN
Qty: 30 CAPSULE | Refills: 1 | Status: SHIPPED | OUTPATIENT
Start: 2024-06-26

## 2024-06-28 ENCOUNTER — OFFICE VISIT (OUTPATIENT)
Dept: INTERNAL MEDICINE | Age: 59
End: 2024-06-28
Payer: COMMERCIAL

## 2024-06-28 VITALS
OXYGEN SATURATION: 99 % | BODY MASS INDEX: 35.28 KG/M2 | HEIGHT: 71 IN | WEIGHT: 252 LBS | HEART RATE: 67 BPM | SYSTOLIC BLOOD PRESSURE: 100 MMHG | DIASTOLIC BLOOD PRESSURE: 66 MMHG

## 2024-06-28 DIAGNOSIS — Z79.4 TYPE 2 DIABETES MELLITUS WITH DIABETIC NEUROPATHY, WITH LONG-TERM CURRENT USE OF INSULIN (HCC): Primary | ICD-10-CM

## 2024-06-28 DIAGNOSIS — F41.9 ANXIETY AND DEPRESSION: ICD-10-CM

## 2024-06-28 DIAGNOSIS — F43.10 PTSD (POST-TRAUMATIC STRESS DISORDER): ICD-10-CM

## 2024-06-28 DIAGNOSIS — B20 CURRENTLY ASYMPTOMATIC HIV INFECTION, WITH HISTORY OF HIV-RELATED ILLNESS (HCC): ICD-10-CM

## 2024-06-28 DIAGNOSIS — Z12.5 SCREENING FOR PROSTATE CANCER: ICD-10-CM

## 2024-06-28 DIAGNOSIS — R25.1 TREMOR: ICD-10-CM

## 2024-06-28 DIAGNOSIS — E11.40 TYPE 2 DIABETES MELLITUS WITH DIABETIC NEUROPATHY, WITH LONG-TERM CURRENT USE OF INSULIN (HCC): ICD-10-CM

## 2024-06-28 DIAGNOSIS — E66.01 SEVERE OBESITY (BMI 35.0-39.9) WITH COMORBIDITY (HCC): ICD-10-CM

## 2024-06-28 DIAGNOSIS — E55.9 VITAMIN D DEFICIENCY: ICD-10-CM

## 2024-06-28 DIAGNOSIS — G25.0 ESSENTIAL TREMOR: ICD-10-CM

## 2024-06-28 DIAGNOSIS — F32.A ANXIETY AND DEPRESSION: ICD-10-CM

## 2024-06-28 DIAGNOSIS — G25.81 RESTLESS LEGS SYNDROME: ICD-10-CM

## 2024-06-28 DIAGNOSIS — E11.40 TYPE 2 DIABETES MELLITUS WITH DIABETIC NEUROPATHY, WITH LONG-TERM CURRENT USE OF INSULIN (HCC): Primary | ICD-10-CM

## 2024-06-28 DIAGNOSIS — S82.041D CLOSED DISPLACED COMMINUTED FRACTURE OF RIGHT PATELLA WITH ROUTINE HEALING: ICD-10-CM

## 2024-06-28 DIAGNOSIS — Z79.4 TYPE 2 DIABETES MELLITUS WITH DIABETIC NEUROPATHY, WITH LONG-TERM CURRENT USE OF INSULIN (HCC): ICD-10-CM

## 2024-06-28 LAB
25(OH)D3 SERPL-MCNC: 41.4 NG/ML
ALBUMIN SERPL-MCNC: 4.1 G/DL (ref 3.5–5.2)
ALP SERPL-CCNC: 81 U/L (ref 40–130)
ALT SERPL-CCNC: 16 U/L (ref 5–41)
ANION GAP SERPL CALCULATED.3IONS-SCNC: 12 MMOL/L (ref 7–19)
AST SERPL-CCNC: 14 U/L (ref 5–40)
BILIRUB SERPL-MCNC: 0.2 MG/DL (ref 0.2–1.2)
BUN SERPL-MCNC: 17 MG/DL (ref 6–20)
CALCIUM SERPL-MCNC: 9.2 MG/DL (ref 8.6–10)
CHLORIDE SERPL-SCNC: 105 MMOL/L (ref 98–111)
CHOLEST SERPL-MCNC: 115 MG/DL (ref 160–199)
CO2 SERPL-SCNC: 23 MMOL/L (ref 22–29)
CREAT SERPL-MCNC: 1.1 MG/DL (ref 0.5–1.2)
ERYTHROCYTE [DISTWIDTH] IN BLOOD BY AUTOMATED COUNT: 12.8 % (ref 11.5–14.5)
GLUCOSE SERPL-MCNC: 54 MG/DL (ref 74–109)
HBA1C MFR BLD: 11.1 % (ref 4–6)
HCT VFR BLD AUTO: 38.5 % (ref 42–52)
HDLC SERPL-MCNC: 52 MG/DL (ref 55–121)
HGB BLD-MCNC: 11.8 G/DL (ref 14–18)
LDLC SERPL CALC-MCNC: 46 MG/DL
MCH RBC QN AUTO: 29.3 PG (ref 27–31)
MCHC RBC AUTO-ENTMCNC: 30.6 G/DL (ref 33–37)
MCV RBC AUTO: 95.5 FL (ref 80–94)
PLATELET # BLD AUTO: 300 K/UL (ref 130–400)
PMV BLD AUTO: 11.3 FL (ref 9.4–12.4)
POTASSIUM SERPL-SCNC: 3.4 MMOL/L (ref 3.5–5)
PROT SERPL-MCNC: 7.4 G/DL (ref 6.6–8.7)
PSA SERPL-MCNC: 0.73 NG/ML (ref 0–4)
RBC # BLD AUTO: 4.03 M/UL (ref 4.7–6.1)
SODIUM SERPL-SCNC: 140 MMOL/L (ref 136–145)
TRIGL SERPL-MCNC: 84 MG/DL (ref 0–149)
TSH SERPL DL<=0.005 MIU/L-ACNC: 0.99 UIU/ML (ref 0.27–4.2)
WBC # BLD AUTO: 10.1 K/UL (ref 4.8–10.8)

## 2024-06-28 PROCEDURE — 3046F HEMOGLOBIN A1C LEVEL >9.0%: CPT | Performed by: INTERNAL MEDICINE

## 2024-06-28 PROCEDURE — 99214 OFFICE O/P EST MOD 30 MIN: CPT | Performed by: INTERNAL MEDICINE

## 2024-06-28 PROCEDURE — 3074F SYST BP LT 130 MM HG: CPT | Performed by: INTERNAL MEDICINE

## 2024-06-28 PROCEDURE — 3078F DIAST BP <80 MM HG: CPT | Performed by: INTERNAL MEDICINE

## 2024-06-28 RX ORDER — PROPRANOLOL HYDROCHLORIDE 80 MG/1
80 TABLET ORAL 4 TIMES DAILY
Qty: 120 TABLET | Refills: 0
Start: 2024-06-28

## 2024-06-28 SDOH — ECONOMIC STABILITY: FOOD INSECURITY: WITHIN THE PAST 12 MONTHS, THE FOOD YOU BOUGHT JUST DIDN'T LAST AND YOU DIDN'T HAVE MONEY TO GET MORE.: SOMETIMES TRUE

## 2024-06-28 SDOH — ECONOMIC STABILITY: FOOD INSECURITY: WITHIN THE PAST 12 MONTHS, YOU WORRIED THAT YOUR FOOD WOULD RUN OUT BEFORE YOU GOT MONEY TO BUY MORE.: SOMETIMES TRUE

## 2024-06-28 SDOH — ECONOMIC STABILITY: INCOME INSECURITY: HOW HARD IS IT FOR YOU TO PAY FOR THE VERY BASICS LIKE FOOD, HOUSING, MEDICAL CARE, AND HEATING?: SOMEWHAT HARD
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
## 2024-07-01 ENCOUNTER — TELEPHONE (OUTPATIENT)
Dept: NEUROLOGY | Facility: CLINIC | Age: 59
End: 2024-07-01
Payer: MEDICARE

## 2024-07-01 DIAGNOSIS — G43.719 INTRACTABLE CHRONIC MIGRAINE WITHOUT AURA AND WITHOUT STATUS MIGRAINOSUS: ICD-10-CM

## 2024-07-01 RX ORDER — METOCLOPRAMIDE 10 MG/1
10 TABLET ORAL AS NEEDED
Qty: 10 TABLET | Refills: 3 | Status: SHIPPED | OUTPATIENT
Start: 2024-07-01

## 2024-07-08 PROBLEM — E11.42 DIABETIC POLYNEUROPATHY ASSOCIATED WITH TYPE 2 DIABETES MELLITUS (HCC): Status: ACTIVE | Noted: 2024-03-12

## 2024-07-08 PROBLEM — R27.8 OTHER LACK OF COORDINATION: Status: RESOLVED | Noted: 2024-03-14 | Resolved: 2024-07-08

## 2024-07-08 PROBLEM — G89.29 OTHER CHRONIC PAIN: Status: RESOLVED | Noted: 2024-03-12 | Resolved: 2024-07-08

## 2024-07-08 PROBLEM — L03.031 CELLULITIS OF RIGHT TOE: Status: RESOLVED | Noted: 2024-03-12 | Resolved: 2024-07-08

## 2024-07-08 PROBLEM — J30.1 ALLERGIC RHINITIS DUE TO POLLEN: Status: RESOLVED | Noted: 2024-03-12 | Resolved: 2024-07-08

## 2024-07-08 PROBLEM — J31.0 CHRONIC RHINITIS: Status: RESOLVED | Noted: 2021-09-16 | Resolved: 2024-07-08

## 2024-07-08 PROBLEM — B37.9 YEAST INFECTION: Status: RESOLVED | Noted: 2019-06-17 | Resolved: 2024-07-08

## 2024-07-08 PROBLEM — R07.89 OTHER CHEST PAIN: Status: RESOLVED | Noted: 2017-10-06 | Resolved: 2024-07-08

## 2024-07-08 PROBLEM — R31.9 HEMATURIA SYNDROME: Status: RESOLVED | Noted: 2023-03-24 | Resolved: 2024-07-08

## 2024-07-08 PROBLEM — E13.621 FOOT ULCER DUE TO SECONDARY DM (HCC): Status: RESOLVED | Noted: 2020-07-31 | Resolved: 2024-07-08

## 2024-07-08 PROBLEM — F43.10 PTSD (POST-TRAUMATIC STRESS DISORDER): Status: ACTIVE | Noted: 2022-01-06

## 2024-07-08 PROBLEM — L97.509 FOOT ULCER DUE TO SECONDARY DM (HCC): Status: RESOLVED | Noted: 2020-07-31 | Resolved: 2024-07-08

## 2024-07-09 DIAGNOSIS — G25.0 ESSENTIAL TREMOR: ICD-10-CM

## 2024-07-09 RX ORDER — ROPINIROLE 0.25 MG/1
TABLET, FILM COATED ORAL
Qty: 90 TABLET | Refills: 0 | Status: SHIPPED | OUTPATIENT
Start: 2024-07-09

## 2024-07-26 ENCOUNTER — TELEPHONE (OUTPATIENT)
Dept: PRIMARY CARE CLINIC | Age: 59
End: 2024-07-26

## 2024-07-26 NOTE — TELEPHONE ENCOUNTER
----- Message from Grant Harry sent at 7/26/2024  9:03 AM CDT -----  Regarding: ECC Appointment Request  ECC Appointment Request    Patient needs appointment for ECC Appointment Type: Hospital Follow Up.    Patient Requested Dates(s): as soon as possible  Patient Requested Time: anytime   Provider Name: Rosemary Wiley MD    Reason for Appointment Request: Other practice are not answering and patient will be discharged today July 26, 2024  --------------------------------------------------------------------------------------------------------------------------    Relationship to Patient: Covered Entity     Call Back Information: OK to leave message on voicemail  Preferred Call Back Number: Phone [phone]

## 2024-08-14 ENCOUNTER — TELEPHONE (OUTPATIENT)
Dept: NEUROLOGY | Facility: CLINIC | Age: 59
End: 2024-08-14
Payer: MEDICARE

## 2024-08-14 DIAGNOSIS — G25.0 ESSENTIAL TREMOR: ICD-10-CM

## 2024-08-14 DIAGNOSIS — G43.719 INTRACTABLE CHRONIC MIGRAINE WITHOUT AURA AND WITHOUT STATUS MIGRAINOSUS: ICD-10-CM

## 2024-08-14 RX ORDER — METOCLOPRAMIDE 10 MG/1
10 TABLET ORAL AS NEEDED
Qty: 10 TABLET | Refills: 3 | Status: SHIPPED | OUTPATIENT
Start: 2024-08-14

## 2024-08-14 RX ORDER — PROPRANOLOL HYDROCHLORIDE 80 MG/1
80 TABLET ORAL 4 TIMES DAILY
Qty: 360 TABLET | Refills: 1 | Status: SHIPPED | OUTPATIENT
Start: 2024-08-14 | End: 2025-02-10

## 2024-08-14 RX ORDER — ROPINIROLE 0.25 MG/1
0.25 TABLET, FILM COATED ORAL 3 TIMES DAILY
Qty: 90 TABLET | Refills: 3 | Status: SHIPPED | OUTPATIENT
Start: 2024-08-14

## 2024-08-14 NOTE — TELEPHONE ENCOUNTER
PATIENT STATES HE CANNOT AFFORD COST OF TRANSPORTATION TO NEXT OV 24 AND IS REQUESTING TEL-A-HEALTH VISIT.  PLEASE ADVISE VIA [employer].    ALSO, PATIENT IS OUT OF MEDICATION ROPINIROLE 0.25 MG TABLET  PRESCRIPTION IS  - NO REFILLS  WILL MEDICATION BE EXTENDED UNTIL OV OR WILL PT HAVE TO WAIT TILL OV?    IF MED CAN BE REFILLED PLEASE USE RX:    [facility] [address] [facility] [phone] [facility] [phone] FX

## 2024-08-16 DIAGNOSIS — K21.9 GASTROESOPHAGEAL REFLUX DISEASE WITHOUT ESOPHAGITIS: ICD-10-CM

## 2024-08-16 RX ORDER — DEXLANSOPRAZOLE 60 MG/1
60 CAPSULE, DELAYED RELEASE ORAL DAILY
Qty: 90 CAPSULE | Refills: 1 | Status: SHIPPED | OUTPATIENT
Start: 2024-08-16

## 2024-08-22 ENCOUNTER — TELEPHONE (OUTPATIENT)
Dept: NEUROLOGY | Facility: CLINIC | Age: 59
End: 2024-08-22

## 2024-08-22 ENCOUNTER — TELEMEDICINE (OUTPATIENT)
Dept: NEUROLOGY | Facility: CLINIC | Age: 59
End: 2024-08-22
Payer: MEDICARE

## 2024-08-22 VITALS — BODY MASS INDEX: 36.79 KG/M2 | HEIGHT: 70 IN | WEIGHT: 257 LBS

## 2024-08-22 DIAGNOSIS — G25.0 ESSENTIAL TREMOR: ICD-10-CM

## 2024-08-22 DIAGNOSIS — G43.719 INTRACTABLE CHRONIC MIGRAINE WITHOUT AURA AND WITHOUT STATUS MIGRAINOSUS: Primary | ICD-10-CM

## 2024-08-22 RX ORDER — INSULIN LISPRO 100 [IU]/ML
INJECTION, SOLUTION INTRAVENOUS; SUBCUTANEOUS
COMMUNITY
Start: 2024-05-07

## 2024-08-22 RX ORDER — ONDANSETRON 4 MG/1
4 TABLET, FILM COATED ORAL EVERY 8 HOURS PRN
Qty: 30 TABLET | Refills: 3 | Status: SHIPPED | OUTPATIENT
Start: 2024-08-22 | End: 2025-08-22

## 2024-08-22 RX ORDER — CHOLECALCIFEROL (VITAMIN D3) 125 MCG
CAPSULE ORAL
COMMUNITY
Start: 2024-05-07

## 2024-08-22 NOTE — TELEPHONE ENCOUNTER
Detailed voicemail left asking if pt would like to move video visit appt up to 11am instead of 4pm.      OK FOR HUB TO RELAY

## 2024-08-22 NOTE — PROGRESS NOTES
Neurology Consult Note    [facility]  [address], [address]  [place] [address]  Phone: [phone]  Fax: [phone]    Referring Provider:   No ref. provider found  Primary Care Provider:  Oksana Mares MD    Reason for Consult:  Essential Tremor   Chonic Migraine  Subjective      Donis Abdirahman Yi presents to [facility]    History of Present Illness  58-year-old male seen for follow-up of essential tremor and chronic migraine.  He was last seen in clinic on 4/15/2024.  During that encounter we continue patient on his propranolol 80 mg 4 times daily as well as topiramate 25 mg tablet 3 times daily.     Today patient is seen via telehealth.  He is at his home in [facility] and I am in my office in [facility].  Patient reports to me recent hospitalization for a pressure ulcer on his left foot.  He did have surgery and was started on cefepime IV therapy at home.  Since then he has noted worsening tremors.  He is also had worsening migraine frequency.  He has been taking an increased amount of Reglan lately.  He notes he is taking it 2-3 times daily.     Prevention Medications Used:   Propranolol   Topiramate  Losartan  Lamictal      Abortive Medications Used:   Maxalt  Axert  Imitrex   Reglan    Medications Contraindicated:  CRGP medications due to current use of Genvoya (CY Inhibitor)  Triptan medication class due to prior history of myocardial infarction    Patient Active Problem List   Diagnosis    Chronic sinus complaints    Allergic rhinitis    ETD (eustachian tube dysfunction)    Hearing loss, sensorineural    Deviated nasal septum    Age related osteoporosis    Osteoporosis    Bennett's neuroma, left    HIV (human immunodeficiency virus infection)    Acute UTI    Mixed hyperlipidemia    SRINIVAS (obstructive sleep apnea)    Tobacco abuse    Hepatic steatosis    Transaminitis    Type 2 diabetes mellitus with hyperglycemia, with long-term  current use of insulin    Chest pain    Anemia    Anxiety and depression    Gastroesophageal reflux disease without esophagitis    HIV-1 infection, symptomatic    Hypertension    Other insomnia    History of MI (myocardial infarction)    Abnormal stress test    Stable angina pectoris    Chest pain    Pilonidal abscess    Encounter for screening for malignant neoplasm of colon    Gangrene of toe    HIV (human immunodeficiency virus infection)    Acute pain    Osteomyelitis of fourth toe of right foot    Acute post-traumatic stress disorder    Closed nondisplaced fracture of navicular bone of left foot with routine healing    Diabetic foot infection    Foot ulcer due to secondary DM    Intractable migraine without aura    Tremor    Gangrene of toe of right foot    Currently asymptomatic HIV infection, with history of HIV-related illness    Primary hypertension    Hypercholesterolemia    Foot callus    Amputated toe, right    Hematuria syndrome    Essential tremor    Bunionette of left foot        Past Medical History:   Diagnosis Date    Allergic rhinitis     Anxiety     Bursitis     DDD (degenerative disc disease), cervical     Depression     GERD (gastroesophageal reflux disease)     HIV (human immunodeficiency virus infection)     HLD (hyperlipidemia)     HSP (Henoch Schonlein purpura)     Migraine     Myocardial infarction     Osteoporosis     Parkinsonian tremor     PONV (postoperative nausea and vomiting)     Sleep apnea     Type 2 diabetes mellitus         Social History     Socioeconomic History    Marital status:    Tobacco Use    Smoking status: Former     Current packs/day: 0.25     Average packs/day: 0.3 packs/day for 36.6 years (9.2 ttl pk-yrs)     Types: Cigarettes     Start date: 1988    Smokeless tobacco: Never   Vaping Use    Vaping status: Never Used   Substance and Sexual Activity    Alcohol use: Yes     Comment: socialy    Drug use: No    Sexual activity: Defer        Allergies   Allergen  "Reactions    Amitriptyline Hcl Anaphylaxis    Darvon [Propoxyphene] Anaphylaxis    Zithromax [Azithromycin] Anaphylaxis          Current Outpatient Medications:     albuterol (PROVENTIL HFA;VENTOLIN HFA) 108 (90 BASE) MCG/ACT inhaler, Inhale 2 puffs Every 6 (Six) Hours As Needed for Wheezing., Disp: , Rfl:     atorvastatin (LIPITOR) 40 MG tablet, Take 1 tablet by mouth Every Night., Disp: , Rfl: 3    ciclopirox (LOPROX) 0.77 % cream, Apply  topically to the appropriate area as directed 2 (Two) Times a Day. (Patient taking differently: Apply  topically to the appropriate area as directed 2 (Two) Times a Day As Needed.), Disp: 30 g, Rfl: 5    clotrimazole-betamethasone (LOTRISONE) 1-0.05 % cream, Apply  topically to the appropriate area as directed 2 (Two) Times a Day., Disp: 15 g, Rfl: 1    Continuous Blood Gluc Sensor (FreeStyle Eamon 2 Sensor) [facility], USE as directed TO monitor blood glucose. replace every 14 DAYS., Disp: 2 each, Rfl: 3    D3 High Potency 50 MCG (2000 UT) capsule, TAKE 1 CAPSULE BY MOUTH DAILY WITH FOOD, Disp: , Rfl:     DEXILANT 60 MG capsule, Take 1 capsule by mouth Every Night., Disp: , Rfl: 1    Zaubuai-Oessr-Uulqnebs-TenofAF (GENVOYA) 013-157-122-10 MG per tablet, Take 1 tablet by mouth Every Night., Disp: , Rfl:     fluticasone (FLONASE) 50 MCG/ACT nasal spray, 2 sprays into each nostril Daily., Disp: 1 bottle, Rfl: 6    HumaLOG 100 UNIT/ML injection, VIA PUMP, Disp: , Rfl:     HYDROcodone-acetaminophen (Norco)  MG per tablet, Take 1 tablet by mouth Every 6 (Six) Hours As Needed for Moderate Pain., Disp: 30 tablet, Rfl: 0    Insulin Disposable Pump (Omnipod DASH Pods, Gen 4,) misc, USE EACH POD EVERY 3 DAYS, Disp: 30 each, Rfl: 0    Insulin Syringe 31G X 5/16\" 1 ML misc, Use 4 times daily  , ICD10 code is E11.9, Disp: 120 each, Rfl: 11    lamoTRIgine (LaMICtal) 100 MG tablet, Take 1 tablet by mouth Daily., Disp: , Rfl:     loratadine (CLARITIN) 10 MG tablet, Take 1 tablet by mouth Every " Night., Disp: , Rfl:     losartan (COZAAR) 25 MG tablet, Take 1 tablet by mouth Every Night., Disp: , Rfl:     meclizine (ANTIVERT) 25 MG tablet, Take 1 tablet by mouth Every Night., Disp: , Rfl:     metFORMIN (GLUCOPHAGE) 500 MG tablet, Take 2 tablets by mouth 2 (Two) Times a Day With Meals., Disp: , Rfl: 3    metoclopramide (REGLAN) 10 MG tablet, Take 1 tablet by mouth As Needed (1 tablet at onset of migraine. Max dose 1 tab in 24 hours. Max use monthly 10 times.)., Disp: 10 tablet, Rfl: 3    mometasone (NASONEX) 50 MCG/ACT nasal spray, 2 sprays into the nostril(s) as directed by provider Daily., Disp: , Rfl:     multivitamin with minerals tablet tablet, Take 1 tablet by mouth Every Night., Disp: , Rfl:     naloxone (NARCAN) 4 MG/0.1ML nasal spray, , Disp: , Rfl:     NovoLOG 100 UNIT/ML injection, Inject up to 150 units daily through pump, Disp: 50 mL, Rfl: 2    ondansetron ODT (ZOFRAN-ODT) 4 MG disintegrating tablet, Place 1 tablet on the tongue Every 6 (Six) Hours As Needed for Nausea., Disp: 12 tablet, Rfl: 0    prazosin (MINIPRESS) 2 MG capsule, Take 1 capsule by mouth Every Night. Patient unsure of dosage, Disp: , Rfl:     pregabalin (LYRICA) 75 MG capsule, Take 1 capsule by mouth 3 (Three) Times a Day., Disp: , Rfl:     propranolol (INDERAL) 80 MG tablet, Take 1 tablet by mouth 4 (Four) Times a Day for 180 days. Take 1 tablet in the morning, 1 tablet in the afternoon and 2 tablets at bedtime, Disp: 360 tablet, Rfl: 1    rOPINIRole (REQUIP) 0.25 MG tablet, Take 1 tablet by mouth 3 (Three) Times a Day. Take 1 hour before bedtime., Disp: 90 tablet, Rfl: 3    saccharomyces boulardii (FLORASTOR) 250 MG capsule, Take 1 capsule by mouth Every Night., Disp: , Rfl:     Semaglutide (OZEMPIC, 1 MG/DOSE, SC), Inject  under the skin into the appropriate area as directed 1 (One) Time Per Week., Disp: , Rfl:     Senna-Plus 8.6-50 MG per tablet, Take 2 tablets by mouth every night at bedtime., Disp: , Rfl:     tamsulosin  "(FLOMAX) 0.4 MG capsule 24 hr capsule, Take 1 capsule by mouth Every Night., Disp: , Rfl:     topiramate (TOPAMAX) 25 MG tablet, Take 1 tablet by mouth 3 (Three) Times a Day., Disp: , Rfl:     Vortioxetine HBr (TRINTELLIX) 20 MG tablet, Take 1 tablet by mouth Daily With Breakfast., Disp: , Rfl:     zolpidem (Ambien) 5 MG tablet, Take 1 tablet by mouth Every Night., Disp: , Rfl:     ondansetron (Zofran) 4 MG tablet, Take 1 tablet by mouth Every 8 (Eight) Hours As Needed for Nausea or Vomiting., Disp: 30 tablet, Rfl: 3       Objective   Vital Signs:   Ht 177.8 cm (70\")   Wt 117 kg (257 lb)   BMI 36.88 kg/m²       Physical Exam   Neurological Exam    Result Review :   The following data was reviewed by: SOBIA Millan on 2024:       Office Visit with Parveen Berrios APRN (04/15/2024)                  Impression:  Donis Yi is a 58 y.o. male who presents for follow-up of essential tremor and chronic migraine.  Patient has a very extensive medical history medication list.  We have very limited in offerings.  He has had a positive response with use of propranolol and topiramate.  We will continue both medicines.  In regards to the use of Reglan, I did encourage him to stop using this as frequently and to keep it for migraine  only.  We will try some Zofran to help with nausea related to his medication.    Diagnoses and all orders for this visit:    1. Intractable chronic migraine without aura and without status migrainosus (Primary)  -     ondansetron (Zofran) 4 MG tablet; Take 1 tablet by mouth Every 8 (Eight) Hours As Needed for Nausea or Vomiting.  Dispense: 30 tablet; Refill: 3    2. Essential tremor        Plan:  Continue propranolol 80 mg 4 times daily for essential tremor chronic migraine  Continue topiramate 25 mg 3 times daily for chronic migraine  Continue Requip 0.25 mg, 1 tablet 3 times daily for essential tremor  Continue Reglan 10 mg tablet for migraine   Trial " of Zofran for medication due to nausea  Contact our office with any acute or worsening symptoms  Follow-up with primary care as scheduled  Follow-up in our clinic 6 months or sooner if needed    The patient and I have discussed the plan of care and he is in full agreement at this time.     Follow Up   Return in about 6 months (around 2/22/2025) for Tremor, Migraine.            SOBIA Millan  08/22/24  10:59 CDT

## 2024-08-23 ENCOUNTER — NURSE TRIAGE (OUTPATIENT)
Dept: CALL CENTER | Facility: HOSPITAL | Age: 59
End: 2024-08-23
Payer: MEDICARE

## 2024-08-23 NOTE — TELEPHONE ENCOUNTER
"Missed call.  Epic records reviewed.  Nothing noted in Epic about calling patient.  Reason for Disposition  • General information question, no triage required and triager able to answer question    Additional Information  • Negative: [1] Caller is not with the adult (patient) AND [2] reporting urgent symptoms  • Negative: Lab result questions  • Negative: Medication questions  • Negative: Caller can't be reached by phone  • Negative: Caller has already spoken to PCP or another triager  • Negative: RN needs further essential information from caller in order to complete triage  • Negative: Requesting regular office appointment  • Negative: [1] Caller requesting NON-URGENT health information AND [2] PCP's office is the best resource  • Negative: Health Information question, no triage required and triager able to answer question    Answer Assessment - Initial Assessment Questions  1. REASON FOR CALL or QUESTION: \"What is your reason for calling today?\" or \"How can I best help you?\" or \"What question do you have that I can help answer?\"      I missed a call.    Protocols used: Information Only Call - No Triage-ADULT-    "

## 2024-08-27 DIAGNOSIS — G43.911 INTRACTABLE MIGRAINE WITH STATUS MIGRAINOSUS, UNSPECIFIED MIGRAINE TYPE: ICD-10-CM

## 2024-08-27 DIAGNOSIS — H69.91 DYSFUNCTION OF RIGHT EUSTACHIAN TUBE: ICD-10-CM

## 2024-08-27 DIAGNOSIS — K52.1 DIARRHEA DUE TO DRUG: ICD-10-CM

## 2024-08-27 PROBLEM — J30.2 SEASONAL ALLERGIES: Status: ACTIVE | Noted: 2024-08-27

## 2024-08-27 RX ORDER — SACCHAROMYCES BOULARDII 250 MG
CAPSULE ORAL
Qty: 60 CAPSULE | Refills: 1 | Status: SHIPPED | OUTPATIENT
Start: 2024-08-27

## 2024-08-27 RX ORDER — LOPERAMIDE HCL 2 MG
2 CAPSULE ORAL 4 TIMES DAILY PRN
Qty: 30 CAPSULE | Refills: 1 | Status: SHIPPED | OUTPATIENT
Start: 2024-08-27

## 2024-08-27 RX ORDER — TOPIRAMATE 25 MG/1
25 TABLET, FILM COATED ORAL 3 TIMES DAILY
Qty: 90 TABLET | Refills: 1 | Status: SHIPPED | OUTPATIENT
Start: 2024-08-27

## 2024-08-27 RX ORDER — MECLIZINE HYDROCHLORIDE 25 MG/1
TABLET ORAL
Qty: 30 TABLET | Refills: 1 | Status: SHIPPED | OUTPATIENT
Start: 2024-08-27

## 2024-08-27 RX ORDER — SENNOSIDES AND DOCUSATE SODIUM 8.6; 5 MG/1; MG/1
2 TABLET ORAL NIGHTLY
Qty: 180 TABLET | Refills: 1 | Status: SHIPPED | OUTPATIENT
Start: 2024-08-27

## 2024-08-27 RX ORDER — MOMETASONE FUROATE MONOHYDRATE 50 UG/1
SPRAY, METERED NASAL
Qty: 17 G | Refills: 1 | Status: SHIPPED | OUTPATIENT
Start: 2024-08-27

## 2024-09-16 ENCOUNTER — TELEPHONE (OUTPATIENT)
Dept: INTERNAL MEDICINE | Age: 59
End: 2024-09-16

## 2024-09-16 RX ORDER — FLUCONAZOLE 150 MG/1
150 TABLET ORAL DAILY
Qty: 5 TABLET | Refills: 0 | Status: SHIPPED | OUTPATIENT
Start: 2024-09-16 | End: 2024-09-17 | Stop reason: SDUPTHER

## 2024-09-17 RX ORDER — FLUCONAZOLE 150 MG/1
150 TABLET ORAL DAILY
Qty: 5 TABLET | Refills: 0 | Status: SHIPPED | OUTPATIENT
Start: 2024-09-17 | End: 2024-09-22

## 2024-10-08 ENCOUNTER — TELEPHONE (OUTPATIENT)
Dept: INTERNAL MEDICINE | Age: 59
End: 2024-10-08

## 2024-10-08 NOTE — TELEPHONE ENCOUNTER
CARMELO - he just wanted us to know that his last A1c was down to 12.1 and he is trying to eat better, low carb.  Blood sugars in the morning have been being below 200.

## 2024-10-09 NOTE — TELEPHONE ENCOUNTER
Be sure he tells Dr. Clark what bs is running since he is managing it -- still need it better but glad it is improving

## 2024-10-23 ENCOUNTER — OFFICE VISIT (OUTPATIENT)
Dept: INTERNAL MEDICINE | Age: 59
End: 2024-10-23
Payer: MEDICAID

## 2024-10-23 VITALS
WEIGHT: 256 LBS | SYSTOLIC BLOOD PRESSURE: 118 MMHG | OXYGEN SATURATION: 98 % | HEART RATE: 71 BPM | TEMPERATURE: 97.6 F | BODY MASS INDEX: 36.21 KG/M2 | DIASTOLIC BLOOD PRESSURE: 76 MMHG

## 2024-10-23 DIAGNOSIS — Z79.4 TYPE 2 DIABETES MELLITUS WITH DIABETIC NEUROPATHY, WITH LONG-TERM CURRENT USE OF INSULIN (HCC): ICD-10-CM

## 2024-10-23 DIAGNOSIS — B20 CURRENTLY ASYMPTOMATIC HIV INFECTION, WITH HISTORY OF HIV-RELATED ILLNESS (HCC): ICD-10-CM

## 2024-10-23 DIAGNOSIS — G25.0 ESSENTIAL TREMOR: ICD-10-CM

## 2024-10-23 DIAGNOSIS — F43.10 PTSD (POST-TRAUMATIC STRESS DISORDER): ICD-10-CM

## 2024-10-23 DIAGNOSIS — E11.40 TYPE 2 DIABETES MELLITUS WITH DIABETIC NEUROPATHY, WITH LONG-TERM CURRENT USE OF INSULIN (HCC): ICD-10-CM

## 2024-10-23 DIAGNOSIS — E55.9 VITAMIN D DEFICIENCY: ICD-10-CM

## 2024-10-23 DIAGNOSIS — Z79.4 TYPE 2 DIABETES MELLITUS WITH DIABETIC NEUROPATHY, WITH LONG-TERM CURRENT USE OF INSULIN (HCC): Primary | ICD-10-CM

## 2024-10-23 DIAGNOSIS — I10 PRIMARY HYPERTENSION: ICD-10-CM

## 2024-10-23 DIAGNOSIS — F41.9 ANXIETY AND DEPRESSION: ICD-10-CM

## 2024-10-23 DIAGNOSIS — E11.42 DIABETIC POLYNEUROPATHY ASSOCIATED WITH TYPE 2 DIABETES MELLITUS (HCC): ICD-10-CM

## 2024-10-23 DIAGNOSIS — F32.A ANXIETY AND DEPRESSION: ICD-10-CM

## 2024-10-23 DIAGNOSIS — E11.40 TYPE 2 DIABETES MELLITUS WITH DIABETIC NEUROPATHY, WITH LONG-TERM CURRENT USE OF INSULIN (HCC): Primary | ICD-10-CM

## 2024-10-23 LAB
25(OH)D3 SERPL-MCNC: 43.6 NG/ML
ALBUMIN SERPL-MCNC: 3.9 G/DL (ref 3.5–5.2)
ALP SERPL-CCNC: 72 U/L (ref 40–129)
ALT SERPL-CCNC: 20 U/L (ref 5–41)
ANION GAP SERPL CALCULATED.3IONS-SCNC: 12 MMOL/L (ref 7–19)
AST SERPL-CCNC: 16 U/L (ref 5–40)
BILIRUB SERPL-MCNC: <0.2 MG/DL (ref 0.2–1.2)
BUN SERPL-MCNC: 26 MG/DL (ref 6–20)
CALCIUM SERPL-MCNC: 8.9 MG/DL (ref 8.6–10)
CHLORIDE SERPL-SCNC: 106 MMOL/L (ref 98–111)
CO2 SERPL-SCNC: 23 MMOL/L (ref 22–29)
CREAT SERPL-MCNC: 1.2 MG/DL (ref 0.7–1.2)
ERYTHROCYTE [DISTWIDTH] IN BLOOD BY AUTOMATED COUNT: 15.1 % (ref 11.5–14.5)
GLUCOSE SERPL-MCNC: 227 MG/DL (ref 70–99)
HBA1C MFR BLD: 10 % (ref 4–5.6)
HCT VFR BLD AUTO: 33.8 % (ref 42–52)
HGB BLD-MCNC: 10.4 G/DL (ref 14–18)
MCH RBC QN AUTO: 29.5 PG (ref 27–31)
MCHC RBC AUTO-ENTMCNC: 30.8 G/DL (ref 33–37)
MCV RBC AUTO: 95.8 FL (ref 80–94)
PLATELET # BLD AUTO: 181 K/UL (ref 130–400)
PMV BLD AUTO: 12 FL (ref 9.4–12.4)
POTASSIUM SERPL-SCNC: 4.2 MMOL/L (ref 3.5–5)
PROT SERPL-MCNC: 7.5 G/DL (ref 6.4–8.3)
RBC # BLD AUTO: 3.53 M/UL (ref 4.7–6.1)
SODIUM SERPL-SCNC: 141 MMOL/L (ref 136–145)
TSH SERPL DL<=0.005 MIU/L-ACNC: 1.6 UIU/ML (ref 0.27–4.2)
WBC # BLD AUTO: 6.6 K/UL (ref 4.8–10.8)

## 2024-10-23 PROCEDURE — 3046F HEMOGLOBIN A1C LEVEL >9.0%: CPT | Performed by: INTERNAL MEDICINE

## 2024-10-23 PROCEDURE — 3078F DIAST BP <80 MM HG: CPT | Performed by: INTERNAL MEDICINE

## 2024-10-23 PROCEDURE — 99214 OFFICE O/P EST MOD 30 MIN: CPT | Performed by: INTERNAL MEDICINE

## 2024-10-23 PROCEDURE — 3074F SYST BP LT 130 MM HG: CPT | Performed by: INTERNAL MEDICINE

## 2024-10-23 RX ORDER — BLOOD-GLUCOSE METER
EACH MISCELLANEOUS
COMMUNITY
Start: 2024-09-26

## 2024-10-23 RX ORDER — APIXABAN 2.5 MG/1
TABLET, FILM COATED ORAL
COMMUNITY
Start: 2024-09-30

## 2024-10-23 NOTE — PROGRESS NOTES
Chief Complaint   Patient presents with    3 Month Follow-Up       HPI: Is here today to follow-up diabetes hypertension hyperlipidemia HIV and other medical issues he has had surgery on both feet recently had surgeries.  Has a lot of ongoing medical issues and chronic pain.  Frustration and then rage - unexplained fits of this- following with psychiatry.  Recent foot procedures - in an unna boot and following closely with podiatry.  Seeing a psychiatry NP and a counselor.     Past Medical History:   Diagnosis Date    Controlled diabetes mellitus with diabetic polyneuropathy (HCC)     Depression     HIV-1 infection, symptomatic (HCC)     Hyperlipidemia     Hypertension     Male hypogonadism     Osteopenia     Tobacco abuse 5/15/2020    Type 2 diabetes mellitus without complication (HCC)        Past Surgical History:   Procedure Laterality Date    COLONOSCOPY  08/26/2021    DR Guzman- poor prep-- suboptimal exam    LYMPHADENECTOMY Left     thoracic    UPPER GASTROINTESTINAL ENDOSCOPY  08/26/2021    DR Guzman       Family History   Problem Relation Age of Onset    High Blood Pressure Mother     Diabetes Mother     High Blood Pressure Father     Diabetes Maternal Grandfather        Social History     Socioeconomic History    Marital status:      Spouse name: Not on file    Number of children: Not on file    Years of education: Not on file    Highest education level: Not on file   Occupational History    Not on file   Tobacco Use    Smoking status: Every Day     Current packs/day: 1.00     Average packs/day: 1 pack/day for 33.0 years (33.0 ttl pk-yrs)     Types: Cigarettes    Smokeless tobacco: Never   Vaping Use    Vaping status: Never Used   Substance and Sexual Activity    Alcohol use: Yes    Drug use: No    Sexual activity: Not on file   Other Topics Concern    Not on file   Social History Narrative    Not on file     Social Determinants of Health     Financial Resource Strain: Medium Risk (6/28/2024)

## 2024-10-28 ENCOUNTER — TELEPHONE (OUTPATIENT)
Dept: INTERNAL MEDICINE | Age: 59
End: 2024-10-28

## 2024-10-28 NOTE — TELEPHONE ENCOUNTER
He did a home covid test and it was negative.  Ear pain, headache, chills, fatigue.  Just feels bad.

## 2024-10-28 NOTE — TELEPHONE ENCOUNTER
Call him and see how he is- he may need to go locally (lives in [place]) to be seen tomorrow at a walk in if not better-- let me know if we need do anything - may need seen locally - still might be covid or other virus

## 2024-10-29 DIAGNOSIS — K21.9 GASTROESOPHAGEAL REFLUX DISEASE WITHOUT ESOPHAGITIS: ICD-10-CM

## 2024-10-29 DIAGNOSIS — N40.1 BENIGN PROSTATIC HYPERPLASIA WITH INCOMPLETE BLADDER EMPTYING: ICD-10-CM

## 2024-10-29 DIAGNOSIS — I10 ESSENTIAL HYPERTENSION: ICD-10-CM

## 2024-10-29 DIAGNOSIS — G43.911 INTRACTABLE MIGRAINE WITH STATUS MIGRAINOSUS, UNSPECIFIED MIGRAINE TYPE: ICD-10-CM

## 2024-10-29 DIAGNOSIS — E78.00 PURE HYPERCHOLESTEROLEMIA: ICD-10-CM

## 2024-10-29 DIAGNOSIS — H69.91 DYSFUNCTION OF RIGHT EUSTACHIAN TUBE: ICD-10-CM

## 2024-10-29 DIAGNOSIS — K52.1 DIARRHEA DUE TO DRUG: ICD-10-CM

## 2024-10-29 DIAGNOSIS — R39.14 BENIGN PROSTATIC HYPERPLASIA WITH INCOMPLETE BLADDER EMPTYING: ICD-10-CM

## 2024-10-30 RX ORDER — ATORVASTATIN CALCIUM 40 MG/1
40 TABLET, FILM COATED ORAL DAILY
Qty: 90 TABLET | Refills: 3 | Status: SHIPPED | OUTPATIENT
Start: 2024-10-30

## 2024-10-30 RX ORDER — DEXLANSOPRAZOLE 60 MG/1
60 CAPSULE, DELAYED RELEASE ORAL DAILY
Qty: 90 CAPSULE | Refills: 3 | Status: SHIPPED | OUTPATIENT
Start: 2024-10-30

## 2024-10-30 RX ORDER — LOSARTAN POTASSIUM 25 MG/1
25 TABLET ORAL DAILY
Qty: 90 TABLET | Refills: 3 | Status: SHIPPED | OUTPATIENT
Start: 2024-10-30

## 2024-10-30 RX ORDER — FLUCONAZOLE 150 MG/1
150 TABLET ORAL DAILY
Qty: 5 TABLET | Refills: 0 | OUTPATIENT
Start: 2024-10-30 | End: 2024-11-04

## 2024-10-30 RX ORDER — SACCHAROMYCES BOULARDII 250 MG
CAPSULE ORAL
Qty: 60 CAPSULE | Refills: 3 | OUTPATIENT
Start: 2024-10-30

## 2024-10-30 RX ORDER — LOPERAMIDE HYDROCHLORIDE 2 MG/1
2 CAPSULE ORAL 4 TIMES DAILY PRN
Qty: 30 CAPSULE | Refills: 1 | Status: SHIPPED | OUTPATIENT
Start: 2024-10-30

## 2024-10-30 RX ORDER — MOMETASONE FUROATE MONOHYDRATE 50 UG/1
SPRAY, METERED NASAL
Qty: 17 G | Refills: 3 | Status: SHIPPED | OUTPATIENT
Start: 2024-10-30

## 2024-10-30 RX ORDER — MECLIZINE HYDROCHLORIDE 25 MG/1
TABLET ORAL
Qty: 30 TABLET | Refills: 1 | OUTPATIENT
Start: 2024-10-30

## 2024-10-30 RX ORDER — TRAZODONE HYDROCHLORIDE 100 MG/1
200 TABLET ORAL NIGHTLY
Qty: 180 TABLET | Refills: 3 | Status: SHIPPED | OUTPATIENT
Start: 2024-10-30

## 2024-10-30 RX ORDER — TOPIRAMATE 25 MG/1
25 TABLET, FILM COATED ORAL 3 TIMES DAILY
Qty: 90 TABLET | Refills: 3 | Status: SHIPPED | OUTPATIENT
Start: 2024-10-30

## 2024-10-30 RX ORDER — TAMSULOSIN HYDROCHLORIDE 0.4 MG/1
0.4 CAPSULE ORAL DAILY
Qty: 90 CAPSULE | Refills: 3 | Status: SHIPPED | OUTPATIENT
Start: 2024-10-30

## 2024-10-31 ENCOUNTER — TELEPHONE (OUTPATIENT)
Dept: INTERNAL MEDICINE | Age: 59
End: 2024-10-31

## 2024-10-31 NOTE — TELEPHONE ENCOUNTER
Another MD gave him some reading material about a pain pump.  He would like to discuss this option with you at your convenience.  They are wanting to get this done before his next appointment with us.

## 2024-12-02 ENCOUNTER — TELEPHONE (OUTPATIENT)
Dept: INTERNAL MEDICINE | Age: 59
End: 2024-12-02

## 2024-12-02 DIAGNOSIS — F43.21 GRIEF REACTION: Primary | ICD-10-CM

## 2024-12-02 RX ORDER — LORAZEPAM 0.5 MG/1
0.5 TABLET ORAL DAILY PRN
Qty: 25 TABLET | Refills: 0 | Status: SHIPPED | OUTPATIENT
Start: 2024-12-02 | End: 2024-12-27

## 2024-12-09 RX ORDER — SACCHAROMYCES BOULARDII 250 MG
CAPSULE ORAL
Qty: 180 CAPSULE | Refills: 3 | Status: SHIPPED | OUTPATIENT
Start: 2024-12-09

## 2024-12-09 RX ORDER — MECLIZINE HYDROCHLORIDE 25 MG/1
25 TABLET ORAL DAILY PRN
Qty: 30 TABLET | Refills: 3 | Status: SHIPPED | OUTPATIENT
Start: 2024-12-09

## 2025-01-21 DIAGNOSIS — K52.1 DIARRHEA DUE TO DRUG: ICD-10-CM

## 2025-01-21 RX ORDER — LOPERAMIDE HYDROCHLORIDE 2 MG/1
2 CAPSULE ORAL 4 TIMES DAILY PRN
Qty: 30 CAPSULE | Refills: 1 | Status: SHIPPED | OUTPATIENT
Start: 2025-01-21

## 2025-01-22 DIAGNOSIS — G25.0 ESSENTIAL TREMOR: ICD-10-CM

## 2025-01-22 RX ORDER — ROPINIROLE 0.25 MG/1
0.25 TABLET, FILM COATED ORAL 3 TIMES DAILY
Qty: 90 TABLET | Refills: 2 | Status: SHIPPED | OUTPATIENT
Start: 2025-01-22

## 2025-02-18 DIAGNOSIS — H69.91 DYSFUNCTION OF RIGHT EUSTACHIAN TUBE: ICD-10-CM

## 2025-02-18 DIAGNOSIS — G43.911 INTRACTABLE MIGRAINE WITH STATUS MIGRAINOSUS, UNSPECIFIED MIGRAINE TYPE: ICD-10-CM

## 2025-02-18 RX ORDER — TOPIRAMATE 25 MG/1
25 TABLET, FILM COATED ORAL 3 TIMES DAILY
Qty: 90 TABLET | Refills: 3 | Status: SHIPPED | OUTPATIENT
Start: 2025-02-18

## 2025-02-18 RX ORDER — SENNOSIDES AND DOCUSATE SODIUM 8.6; 5 MG/1; MG/1
2 TABLET ORAL NIGHTLY
Qty: 180 TABLET | Refills: 3 | Status: SHIPPED | OUTPATIENT
Start: 2025-02-18

## 2025-02-18 RX ORDER — MOMETASONE FUROATE MONOHYDRATE 50 UG/1
SPRAY, METERED NASAL
Qty: 17 G | Refills: 3 | Status: SHIPPED | OUTPATIENT
Start: 2025-02-18

## 2025-02-21 ENCOUNTER — TELEMEDICINE (OUTPATIENT)
Dept: NEUROLOGY | Facility: CLINIC | Age: 60
End: 2025-02-21
Payer: MEDICARE

## 2025-02-21 ENCOUNTER — TELEMEDICINE (OUTPATIENT)
Dept: INTERNAL MEDICINE | Age: 60
End: 2025-02-21
Payer: MEDICARE

## 2025-02-21 DIAGNOSIS — E11.40 TYPE 2 DIABETES MELLITUS WITH DIABETIC NEUROPATHY, WITH LONG-TERM CURRENT USE OF INSULIN (HCC): Primary | ICD-10-CM

## 2025-02-21 DIAGNOSIS — E11.621 TYPE 2 DIABETES MELLITUS WITH FOOT ULCER, WITH LONG-TERM CURRENT USE OF INSULIN (HCC): ICD-10-CM

## 2025-02-21 DIAGNOSIS — G25.0 ESSENTIAL TREMOR: Primary | ICD-10-CM

## 2025-02-21 DIAGNOSIS — B20 CURRENTLY ASYMPTOMATIC HIV INFECTION, WITH HISTORY OF HIV-RELATED ILLNESS (HCC): ICD-10-CM

## 2025-02-21 DIAGNOSIS — I10 PRIMARY HYPERTENSION: ICD-10-CM

## 2025-02-21 DIAGNOSIS — E55.9 VITAMIN D DEFICIENCY: ICD-10-CM

## 2025-02-21 DIAGNOSIS — Z79.4 TYPE 2 DIABETES MELLITUS WITH FOOT ULCER, WITH LONG-TERM CURRENT USE OF INSULIN (HCC): ICD-10-CM

## 2025-02-21 DIAGNOSIS — Z79.4 TYPE 2 DIABETES MELLITUS WITH DIABETIC NEUROPATHY, WITH LONG-TERM CURRENT USE OF INSULIN (HCC): Primary | ICD-10-CM

## 2025-02-21 DIAGNOSIS — G43.719 INTRACTABLE CHRONIC MIGRAINE WITHOUT AURA AND WITHOUT STATUS MIGRAINOSUS: ICD-10-CM

## 2025-02-21 DIAGNOSIS — L97.509 TYPE 2 DIABETES MELLITUS WITH FOOT ULCER, WITH LONG-TERM CURRENT USE OF INSULIN (HCC): ICD-10-CM

## 2025-02-21 PROBLEM — L97.529 CHRONIC ULCER OF GREAT TOE OF LEFT FOOT (HCC): Status: ACTIVE | Noted: 2024-06-25

## 2025-02-21 PROBLEM — M86.272 SUBACUTE OSTEOMYELITIS OF LEFT FOOT (HCC): Status: ACTIVE | Noted: 2024-07-22

## 2025-02-21 PROBLEM — Z86.69 HISTORY OF SLEEP APNEA: Status: ACTIVE | Noted: 2024-10-30

## 2025-02-21 PROCEDURE — 99213 OFFICE O/P EST LOW 20 MIN: CPT | Performed by: INTERNAL MEDICINE

## 2025-02-21 RX ORDER — TRAZODONE HYDROCHLORIDE 100 MG/1
200 TABLET ORAL NIGHTLY
COMMUNITY

## 2025-02-21 SDOH — ECONOMIC STABILITY: FOOD INSECURITY: WITHIN THE PAST 12 MONTHS, YOU WORRIED THAT YOUR FOOD WOULD RUN OUT BEFORE YOU GOT MONEY TO BUY MORE.: SOMETIMES TRUE

## 2025-02-21 SDOH — ECONOMIC STABILITY: TRANSPORTATION INSECURITY
IN THE PAST 12 MONTHS, HAS THE LACK OF TRANSPORTATION KEPT YOU FROM MEDICAL APPOINTMENTS OR FROM GETTING MEDICATIONS?: YES

## 2025-02-21 SDOH — ECONOMIC STABILITY: INCOME INSECURITY: IN THE LAST 12 MONTHS, WAS THERE A TIME WHEN YOU WERE NOT ABLE TO PAY THE MORTGAGE OR RENT ON TIME?: NO

## 2025-02-21 SDOH — ECONOMIC STABILITY: TRANSPORTATION INSECURITY
IN THE PAST 12 MONTHS, HAS LACK OF TRANSPORTATION KEPT YOU FROM MEETINGS, WORK, OR FROM GETTING THINGS NEEDED FOR DAILY LIVING?: YES

## 2025-02-21 SDOH — ECONOMIC STABILITY: FOOD INSECURITY: WITHIN THE PAST 12 MONTHS, THE FOOD YOU BOUGHT JUST DIDN'T LAST AND YOU DIDN'T HAVE MONEY TO GET MORE.: NEVER TRUE

## 2025-02-21 NOTE — PROGRESS NOTES
Neurology Consult Note    [facility]  [address], [address]  [place] [address]  Phone: [phone]  Fax: [phone]    Referring Provider:   No ref. provider found  Primary Care Provider:  Oksana Mares MD    Reason for Consult:  Chronic migraine  Essential tremor  Subjective        Donis Abdirahman Yi presents to [facility]    History of Present Illness  Pleasant 59-year-old male who I am following for chronic migraine as well as essential tremor.  Last seen in clinic on 2024.  We did encounter shows we continue patient on propranolol for his essential tremor as well as chronic migraines.  He is also maintained on topiramate for chronic migraine and Requip for essential tremor.  This was started by previous provider.  He is also maintained on Reglan for migraine .    Today patient was seen via telehealth.  He was at his home in [place] and I was in my office in [facility].  Patient reporting since last being seen he has had a slight increase in his tremor severity as well as migraines.  During this time in December he lost his significant other suddenly.  Additionally his mother had a CABG procedure.  He does note since then the stress has decreased however still present.  His tremor has slightly improved.  He does note Reglan continues to be an effective abortive agent.      Prevention Medications Used:   Propranolol   Topiramate  Losartan  Lamictal      Abortive Medications Used:   Maxalt  Axert  Imitrex   Reglan     Medications Contraindicated:  CRGP medications due to current use of Genvoya (CY Inhibitor)  Triptan medication class due to prior history of myocardial infarction  Patient Active Problem List   Diagnosis    Chronic sinus complaints    Allergic rhinitis    ETD (eustachian tube dysfunction)    Hearing loss, sensorineural    Deviated nasal septum    Age related osteoporosis    Osteoporosis    Bennett's neuroma, left     HIV (human immunodeficiency virus infection)    Acute UTI    Mixed hyperlipidemia    SRINIVAS (obstructive sleep apnea)    Tobacco abuse    Hepatic steatosis    Transaminitis    Type 2 diabetes mellitus with hyperglycemia, with long-term current use of insulin    Chest pain    Anemia    Anxiety and depression    Gastroesophageal reflux disease without esophagitis    HIV-1 infection, symptomatic    Hypertension    Other insomnia    History of MI (myocardial infarction)    Abnormal stress test    Stable angina pectoris    Chest pain    Pilonidal abscess    Encounter for screening for malignant neoplasm of colon    Gangrene of toe    HIV (human immunodeficiency virus infection)    Acute pain    Osteomyelitis of fourth toe of right foot    Acute post-traumatic stress disorder    Closed nondisplaced fracture of navicular bone of left foot with routine healing    Diabetic foot infection    Foot ulcer due to secondary DM    Intractable migraine without aura    Tremor    Gangrene of toe of right foot    Currently asymptomatic HIV infection, with history of HIV-related illness    Primary hypertension    Hypercholesterolemia    Foot callus    Amputated toe, right    Hematuria syndrome    Essential tremor    Bunionette of left foot        Past Medical History:   Diagnosis Date    Allergic rhinitis     Anxiety     Bursitis     DDD (degenerative disc disease), cervical     Depression     GERD (gastroesophageal reflux disease)     HIV (human immunodeficiency virus infection)     HLD (hyperlipidemia)     HSP (Henoch Schonlein purpura)     Migraine     Myocardial infarction     Osteoporosis     Parkinsonian tremor     PONV (postoperative nausea and vomiting)     Sleep apnea     Type 2 diabetes mellitus         Social History     Socioeconomic History    Marital status:    Tobacco Use    Smoking status: Former     Current packs/day: 0.25     Average packs/day: 0.3 packs/day for 37.1 years (9.3 ttl pk-yrs)     Types: Cigarettes  "    Start date: 1988    Smokeless tobacco: Never   Vaping Use    Vaping status: Never Used   Substance and Sexual Activity    Alcohol use: Yes     Comment: socialy    Drug use: No    Sexual activity: Defer        Allergies   Allergen Reactions    Amitriptyline Hcl Anaphylaxis    Darvon [Propoxyphene] Anaphylaxis    Zithromax [Azithromycin] Anaphylaxis          Current Outpatient Medications:     albuterol (PROVENTIL HFA;VENTOLIN HFA) 108 (90 BASE) MCG/ACT inhaler, Inhale 2 puffs Every 6 (Six) Hours As Needed for Wheezing., Disp: , Rfl:     atorvastatin (LIPITOR) 40 MG tablet, Take 1 tablet by mouth Every Night., Disp: , Rfl: 3    ciclopirox (LOPROX) 0.77 % cream, Apply  topically to the appropriate area as directed 2 (Two) Times a Day. (Patient taking differently: Apply  topically to the appropriate area as directed 2 (Two) Times a Day As Needed.), Disp: 30 g, Rfl: 5    clotrimazole-betamethasone (LOTRISONE) 1-0.05 % cream, Apply  topically to the appropriate area as directed 2 (Two) Times a Day., Disp: 15 g, Rfl: 1    Continuous Blood Gluc Sensor (FreeStyle Eamon 2 Sensor) [facility] [place] [place], USE as directed TO monitor blood glucose. replace every 14 DAYS., Disp: 2 each, Rfl: 3    D3 High Potency 50 MCG (2000 UT) capsule, TAKE 1 CAPSULE BY MOUTH DAILY WITH FOOD, Disp: , Rfl:     DEXILANT 60 MG capsule, Take 1 capsule by mouth Every Night., Disp: , Rfl: 1    Vbnezom-Qwzml-Xztfztvk-TenofAF (GENVOYA) 912-257-674-10 MG per tablet, Take 1 tablet by mouth Every Night., Disp: , Rfl:     fluticasone (FLONASE) 50 MCG/ACT nasal spray, 2 sprays into each nostril Daily., Disp: 1 bottle, Rfl: 6    HYDROcodone-acetaminophen (Norco)  MG per tablet, Take 1 tablet by mouth Every 6 (Six) Hours As Needed for Moderate Pain., Disp: 30 tablet, Rfl: 0    Insulin Disposable Pump (Omnipod DASH Pods, Gen 4,) misc, USE EACH POD EVERY 3 DAYS, Disp: 30 each, Rfl: 0    Insulin Syringe 31G X 5/16\" 1 ML misc, Use 4 times daily  , ICD10 code is E11.9, " Disp: 120 each, Rfl: 11    lamoTRIgine (LaMICtal) 100 MG tablet, Take 1 tablet by mouth Daily., Disp: , Rfl:     loratadine (CLARITIN) 10 MG tablet, Take 1 tablet by mouth Every Night., Disp: , Rfl:     losartan (COZAAR) 25 MG tablet, Take 1 tablet by mouth Every Night., Disp: , Rfl:     meclizine (ANTIVERT) 25 MG tablet, Take 1 tablet by mouth Every Night., Disp: , Rfl:     metFORMIN (GLUCOPHAGE) 500 MG tablet, Take 2 tablets by mouth 2 (Two) Times a Day With Meals., Disp: , Rfl: 3    metoclopramide (REGLAN) 10 MG tablet, Take 1 tablet by mouth As Needed (1 tablet at onset of migraine. Max dose 1 tab in 24 hours. Max use monthly 10 times.)., Disp: 10 tablet, Rfl: 3    mometasone (NASONEX) 50 MCG/ACT nasal spray, Administer 2 sprays into the nostril(s) as directed by provider Daily., Disp: , Rfl:     multivitamin with minerals tablet tablet, Take 1 tablet by mouth Every Night., Disp: , Rfl:     naloxone (NARCAN) 4 MG/0.1ML nasal spray, , Disp: , Rfl:     NovoLOG 100 UNIT/ML injection, Inject up to 150 units daily through pump, Disp: 50 mL, Rfl: 2    ondansetron (Zofran) 4 MG tablet, Take 1 tablet by mouth Every 8 (Eight) Hours As Needed for Nausea or Vomiting., Disp: 30 tablet, Rfl: 3    prazosin (MINIPRESS) 2 MG capsule, Take 1 capsule by mouth Every Night. Patient unsure of dosage, Disp: , Rfl:     pregabalin (LYRICA) 75 MG capsule, Take 1 capsule by mouth 3 (Three) Times a Day., Disp: , Rfl:     rOPINIRole (REQUIP) 0.25 MG tablet, TAKE 1 TABLET BY MOUTH 3 (THREE) TIMES A DAY. TAKE 1 HOUR BEFORE BEDTIME., Disp: 90 tablet, Rfl: 2    saccharomyces boulardii (FLORASTOR) 250 MG capsule, Take 1 capsule by mouth Every Night., Disp: , Rfl:     Senna-Plus 8.6-50 MG per tablet, Take 2 tablets by mouth every night at bedtime., Disp: , Rfl:     tamsulosin (FLOMAX) 0.4 MG capsule 24 hr capsule, Take 1 capsule by mouth Every Night., Disp: , Rfl:     topiramate (TOPAMAX) 25 MG tablet, Take 1 tablet by mouth 3 (Three) Times a  Day., Disp: , Rfl:     Vortioxetine HBr (TRINTELLIX) 20 MG tablet, Take 1 tablet by mouth Daily With Breakfast., Disp: , Rfl:     zolpidem (Ambien) 5 MG tablet, Take 1 tablet by mouth Every Night., Disp: , Rfl:     HumaLOG 100 UNIT/ML injection, VIA PUMP, Disp: , Rfl:     ondansetron ODT (ZOFRAN-ODT) 4 MG disintegrating tablet, Place 1 tablet on the tongue Every 6 (Six) Hours As Needed for Nausea. (Patient not taking: Reported on 2/21/2025), Disp: 12 tablet, Rfl: 0    propranolol (INDERAL) 80 MG tablet, Take 1 tablet by mouth 4 (Four) Times a Day for 180 days. Take 1 tablet in the morning, 1 tablet in the afternoon and 2 tablets at bedtime, Disp: 360 tablet, Rfl: 1    Semaglutide (OZEMPIC, 1 MG/DOSE, SC), Inject  under the skin into the appropriate area as directed 1 (One) Time Per Week., Disp: , Rfl:     traZODone (DESYREL) 100 MG tablet, Take 2 tablets by mouth Every Night., Disp: , Rfl:        Objective   Vital Signs:   There were no vitals taken for this visit.      Physical Exam  Vitals and nursing note reviewed.   Constitutional:       Appearance: Normal appearance.   HENT:      Head: Normocephalic.   Eyes:      General: Lids are normal.      Extraocular Movements: Extraocular movements intact.   Pulmonary:      Effort: Pulmonary effort is normal. No respiratory distress.   Neurological:      Mental Status: He is alert.   Psychiatric:         Speech: Speech normal.        Neurological Exam  Mental Status  Alert. Oriented to person, place, time and situation. Speech is normal. Language is fluent with no aphasia.    Cranial Nerves  CN III, IV, VI: Extraocular movements intact bilaterally. Normal lids and orbits bilaterally.  CN VII: Full and symmetric facial movement.    Motor    Moves all extremities equally.  Limited exam due to telehealth    Result Review :   The following data was reviewed by: SOBIA Millan on 02/21/2025:       Telemedicine with Parveen Berrios APRN (08/22/2024)                 Impression:  Donis Yi is a 59 y.o. male who presents for follow-up of tremor and migraine.  At this time he is reporting good control with his migraines and tremors.  He notes this month he is only had 3 migraines.  Reglan is not effective abortive agent.  We will continue his propranolol and topiramate.  He is to contact my office with any needs arise.    Diagnoses and all orders for this visit:    1. Essential tremor (Primary)    2. Intractable chronic migraine without aura and without status migrainosus        Plan:  Continue propranolol 80 mg tablet, 1 tablet 4 times daily  Continue topiramate 25 mg tablet, 1 tablet 3 times daily  Continue metoclopramide 10 mg tablet as needed for migraine onset  Contact our office with any worsening features  Follow-up with primary care provider as scheduled  Follow-up in my clinic 6 months or sooner if needed    The patient and I have discussed the plan of care and he is in full agreement at this time.     Follow Up   No follow-ups on file.            Parveen Berrios, SOBIA  02/21/25  12:05 CST

## 2025-02-25 ENCOUNTER — TELEPHONE (OUTPATIENT)
Dept: INTERNAL MEDICINE | Age: 60
End: 2025-02-25

## 2025-02-25 PROBLEM — F33.41 RECURRENT MAJOR DEPRESSIVE DISORDER, IN PARTIAL REMISSION: Status: ACTIVE | Noted: 2020-04-03

## 2025-02-25 RX ORDER — CEFDINIR 300 MG/1
300 CAPSULE ORAL 2 TIMES DAILY
Qty: 14 CAPSULE | Refills: 0 | Status: SHIPPED | OUTPATIENT
Start: 2025-02-25 | End: 2025-03-04

## 2025-02-25 NOTE — TELEPHONE ENCOUNTER
See if he can do a home covid test - -I sent in an antibiotic to take if that is negative- if positive let us know ----or go to a local walk in--- I did send an antibiotic to local pharmacy as he was having issues last week on video visit- I sent to [facility]

## 2025-02-25 NOTE — TELEPHONE ENCOUNTER
He is having cough and congestion, nose stopped up.  He has used his albubteral inhaler and nasal spray and it hasn't helped.  Asking for medicine to be sent in.

## 2025-02-25 NOTE — TELEPHONE ENCOUNTER
I called him and I sent in paxlovid and told him not to take lipitor while on it and to let us know if not better-- just save omnicef for the future

## 2025-03-03 ENCOUNTER — TELEPHONE (OUTPATIENT)
Dept: INTERNAL MEDICINE | Age: 60
End: 2025-03-03

## 2025-03-03 RX ORDER — BENZONATATE 200 MG/1
200 CAPSULE ORAL 3 TIMES DAILY PRN
Qty: 30 CAPSULE | Refills: 0 | Status: SHIPPED | OUTPATIENT
Start: 2025-03-03 | End: 2025-03-13

## 2025-03-03 RX ORDER — ALBUTEROL SULFATE 90 UG/1
2 INHALANT RESPIRATORY (INHALATION) 4 TIMES DAILY PRN
Qty: 18 G | Refills: 0 | Status: SHIPPED | OUTPATIENT
Start: 2025-03-03

## 2025-03-18 ENCOUNTER — TELEPHONE (OUTPATIENT)
Dept: INTERNAL MEDICINE | Age: 60
End: 2025-03-18

## 2025-03-18 RX ORDER — ESOMEPRAZOLE MAGNESIUM 40 MG/1
40 CAPSULE, DELAYED RELEASE ORAL 2 TIMES DAILY
Qty: 180 CAPSULE | Refills: 1 | Status: CANCELLED | OUTPATIENT
Start: 2025-03-18

## 2025-03-18 RX ORDER — FAMOTIDINE 20 MG/1
20 TABLET, FILM COATED ORAL NIGHTLY PRN
Qty: 90 TABLET | Refills: 1 | Status: CANCELLED | OUTPATIENT
Start: 2025-03-18

## 2025-03-18 NOTE — TELEPHONE ENCOUNTER
We just added tirzapatide (mounjaro and it can cause gerd)-- for some reason I started with 5mg - usually start with 2.5mg -- -tell him to stop the tirzapetide and see if better in a few weeks and and let us know--it will take a few weeks - since dexilant worked prior to that I'd just stay on it as it coincides with the mounjaro   his DM is in very poor control - he needs f/u also with Dr. Elder office  Can you ask him to verify all of his meds- he has tons of meds on his list and we need to verify them and he needs to bring all of his pills to his next ov

## 2025-03-18 NOTE — TELEPHONE ENCOUNTER
I called him and he will stop the Mounjaro and stay on dexalant.  And he has an upcoming appt. With Dr Clark.

## 2025-03-18 NOTE — TELEPHONE ENCOUNTER
The Dexilant is no longer working and he can not sleep well due to reflux.  Pharmacy told him to ask for Nexium 40mg BID and use Pepcid 20mg at night PRN.  If this is ok, please send to [facility] today.

## 2025-04-02 DIAGNOSIS — K52.1 DIARRHEA DUE TO DRUG: ICD-10-CM

## 2025-04-02 RX ORDER — LOPERAMIDE HYDROCHLORIDE 2 MG/1
2 CAPSULE ORAL 4 TIMES DAILY PRN
Qty: 30 CAPSULE | Refills: 3 | Status: SHIPPED | OUTPATIENT
Start: 2025-04-02

## 2025-04-02 RX ORDER — ACETAMINOPHEN 160 MG
TABLET,DISINTEGRATING ORAL
Qty: 100 CAPSULE | Refills: 3 | Status: SHIPPED | OUTPATIENT
Start: 2025-04-02

## 2025-04-02 RX ORDER — MECLIZINE HYDROCHLORIDE 25 MG/1
25 TABLET ORAL DAILY PRN
Qty: 30 TABLET | Refills: 3 | Status: SHIPPED | OUTPATIENT
Start: 2025-04-02

## 2025-04-08 DIAGNOSIS — G43.719 INTRACTABLE CHRONIC MIGRAINE WITHOUT AURA AND WITHOUT STATUS MIGRAINOSUS: ICD-10-CM

## 2025-04-08 DIAGNOSIS — G25.0 ESSENTIAL TREMOR: ICD-10-CM

## 2025-04-08 RX ORDER — ROPINIROLE 0.25 MG/1
0.25 TABLET, FILM COATED ORAL 3 TIMES DAILY
Qty: 90 TABLET | Refills: 1 | Status: SHIPPED | OUTPATIENT
Start: 2025-04-08

## 2025-04-08 RX ORDER — PROPRANOLOL HYDROCHLORIDE 80 MG/1
80 TABLET ORAL 4 TIMES DAILY
Qty: 360 TABLET | Refills: 0 | Status: SHIPPED | OUTPATIENT
Start: 2025-04-08 | End: 2025-10-05

## 2025-04-22 ENCOUNTER — TELEPHONE (OUTPATIENT)
Dept: INTERNAL MEDICINE | Age: 60
End: 2025-04-22

## 2025-04-22 NOTE — TELEPHONE ENCOUNTER
Yes he is in the age group to get it.  We don't have here- he can get it at the local pharmacy or [facility].

## 2025-06-06 DIAGNOSIS — Z79.4 TYPE 2 DIABETES MELLITUS WITH FOOT ULCER, WITH LONG-TERM CURRENT USE OF INSULIN (HCC): ICD-10-CM

## 2025-06-06 DIAGNOSIS — E11.621 TYPE 2 DIABETES MELLITUS WITH FOOT ULCER, WITH LONG-TERM CURRENT USE OF INSULIN (HCC): ICD-10-CM

## 2025-06-06 DIAGNOSIS — L97.509 TYPE 2 DIABETES MELLITUS WITH FOOT ULCER, WITH LONG-TERM CURRENT USE OF INSULIN (HCC): ICD-10-CM

## 2025-06-06 DIAGNOSIS — E11.40 TYPE 2 DIABETES MELLITUS WITH DIABETIC NEUROPATHY, WITH LONG-TERM CURRENT USE OF INSULIN (HCC): ICD-10-CM

## 2025-06-06 DIAGNOSIS — Z79.4 TYPE 2 DIABETES MELLITUS WITH DIABETIC NEUROPATHY, WITH LONG-TERM CURRENT USE OF INSULIN (HCC): ICD-10-CM

## 2025-06-08 RX ORDER — TIRZEPATIDE 5 MG/.5ML
INJECTION, SOLUTION SUBCUTANEOUS
Qty: 4 ML | Refills: 0 | Status: SHIPPED | OUTPATIENT
Start: 2025-06-08

## 2025-06-17 SDOH — HEALTH STABILITY: PHYSICAL HEALTH: ON AVERAGE, HOW MANY DAYS PER WEEK DO YOU ENGAGE IN MODERATE TO STRENUOUS EXERCISE (LIKE A BRISK WALK)?: 0 DAYS

## 2025-06-17 SDOH — ECONOMIC STABILITY: FOOD INSECURITY: WITHIN THE PAST 12 MONTHS, YOU WORRIED THAT YOUR FOOD WOULD RUN OUT BEFORE YOU GOT MONEY TO BUY MORE.: SOMETIMES TRUE

## 2025-06-17 SDOH — ECONOMIC STABILITY: INCOME INSECURITY: IN THE LAST 12 MONTHS, WAS THERE A TIME WHEN YOU WERE NOT ABLE TO PAY THE MORTGAGE OR RENT ON TIME?: NO

## 2025-06-17 SDOH — HEALTH STABILITY: PHYSICAL HEALTH: ON AVERAGE, HOW MANY MINUTES DO YOU ENGAGE IN EXERCISE AT THIS LEVEL?: 0 MIN

## 2025-06-17 SDOH — ECONOMIC STABILITY: FOOD INSECURITY: WITHIN THE PAST 12 MONTHS, THE FOOD YOU BOUGHT JUST DIDN'T LAST AND YOU DIDN'T HAVE MONEY TO GET MORE.: NEVER TRUE

## 2025-06-17 ASSESSMENT — PATIENT HEALTH QUESTIONNAIRE - PHQ9
SUM OF ALL RESPONSES TO PHQ QUESTIONS 1-9: 2
SUM OF ALL RESPONSES TO PHQ QUESTIONS 1-9: 2
2. FEELING DOWN, DEPRESSED OR HOPELESS: SEVERAL DAYS
SUM OF ALL RESPONSES TO PHQ QUESTIONS 1-9: 2
1. LITTLE INTEREST OR PLEASURE IN DOING THINGS: SEVERAL DAYS
SUM OF ALL RESPONSES TO PHQ QUESTIONS 1-9: 2

## 2025-06-17 ASSESSMENT — LIFESTYLE VARIABLES
HOW OFTEN DO YOU HAVE A DRINK CONTAINING ALCOHOL: MONTHLY OR LESS
HOW OFTEN DO YOU HAVE A DRINK CONTAINING ALCOHOL: 2
HOW MANY STANDARD DRINKS CONTAINING ALCOHOL DO YOU HAVE ON A TYPICAL DAY: 1 OR 2
HOW OFTEN DO YOU HAVE SIX OR MORE DRINKS ON ONE OCCASION: 1
HOW MANY STANDARD DRINKS CONTAINING ALCOHOL DO YOU HAVE ON A TYPICAL DAY: 1

## 2025-06-18 ENCOUNTER — RESULTS FOLLOW-UP (OUTPATIENT)
Dept: INTERNAL MEDICINE | Age: 60
End: 2025-06-18

## 2025-06-18 ENCOUNTER — OFFICE VISIT (OUTPATIENT)
Dept: INTERNAL MEDICINE | Age: 60
End: 2025-06-18
Payer: MEDICARE

## 2025-06-18 VITALS
DIASTOLIC BLOOD PRESSURE: 70 MMHG | WEIGHT: 266 LBS | HEART RATE: 79 BPM | HEIGHT: 70 IN | OXYGEN SATURATION: 98 % | BODY MASS INDEX: 38.08 KG/M2 | SYSTOLIC BLOOD PRESSURE: 126 MMHG

## 2025-06-18 DIAGNOSIS — M51.16 LUMBAR DISC DISEASE WITH RADICULOPATHY: ICD-10-CM

## 2025-06-18 DIAGNOSIS — E11.40 TYPE 2 DIABETES MELLITUS WITH DIABETIC NEUROPATHY, WITH LONG-TERM CURRENT USE OF INSULIN (HCC): ICD-10-CM

## 2025-06-18 DIAGNOSIS — E78.2 MIXED HYPERLIPIDEMIA: ICD-10-CM

## 2025-06-18 DIAGNOSIS — B20 CURRENTLY ASYMPTOMATIC HIV INFECTION, WITH HISTORY OF HIV-RELATED ILLNESS (HCC): ICD-10-CM

## 2025-06-18 DIAGNOSIS — Z79.4 TYPE 2 DIABETES MELLITUS WITH DIABETIC NEUROPATHY, WITH LONG-TERM CURRENT USE OF INSULIN (HCC): ICD-10-CM

## 2025-06-18 DIAGNOSIS — E11.40 TYPE 2 DIABETES MELLITUS WITH DIABETIC NEUROPATHY, WITHOUT LONG-TERM CURRENT USE OF INSULIN (HCC): Primary | ICD-10-CM

## 2025-06-18 DIAGNOSIS — Z78.9 HEPATITIS B IMMUNE: ICD-10-CM

## 2025-06-18 DIAGNOSIS — S98.911S: ICD-10-CM

## 2025-06-18 DIAGNOSIS — Z11.59 SCREENING FOR MEASLES: ICD-10-CM

## 2025-06-18 DIAGNOSIS — Z12.5 SCREENING FOR PROSTATE CANCER: ICD-10-CM

## 2025-06-18 DIAGNOSIS — Z00.00 MEDICARE ANNUAL WELLNESS VISIT, SUBSEQUENT: Primary | ICD-10-CM

## 2025-06-18 DIAGNOSIS — E55.9 VITAMIN D DEFICIENCY: ICD-10-CM

## 2025-06-18 DIAGNOSIS — Z11.59 NEED FOR HEPATITIS C SCREENING TEST: ICD-10-CM

## 2025-06-18 DIAGNOSIS — G43.911 INTRACTABLE MIGRAINE WITH STATUS MIGRAINOSUS, UNSPECIFIED MIGRAINE TYPE: ICD-10-CM

## 2025-06-18 DIAGNOSIS — Z87.891 PERSONAL HISTORY OF TOBACCO USE: ICD-10-CM

## 2025-06-18 DIAGNOSIS — H69.91 DYSFUNCTION OF RIGHT EUSTACHIAN TUBE: ICD-10-CM

## 2025-06-18 DIAGNOSIS — I10 PRIMARY HYPERTENSION: ICD-10-CM

## 2025-06-18 LAB
ALBUMIN SERPL-MCNC: 4.2 G/DL (ref 3.5–5.2)
ALP SERPL-CCNC: 90 U/L (ref 40–129)
ALT SERPL-CCNC: 24 U/L (ref 10–50)
ANION GAP SERPL CALCULATED.3IONS-SCNC: 13 MMOL/L (ref 8–16)
AST SERPL-CCNC: 23 U/L (ref 10–50)
BILIRUB SERPL-MCNC: <0.2 MG/DL (ref 0.2–1.2)
BUN SERPL-MCNC: 27 MG/DL (ref 6–20)
CALCIUM SERPL-MCNC: 9.4 MG/DL (ref 8.6–10)
CHLORIDE SERPL-SCNC: 105 MMOL/L (ref 98–107)
CO2 SERPL-SCNC: 21 MMOL/L (ref 22–29)
CREAT SERPL-MCNC: 1.1 MG/DL (ref 0.7–1.2)
ERYTHROCYTE [DISTWIDTH] IN BLOOD BY AUTOMATED COUNT: 13.4 % (ref 11.5–14.5)
GLUCOSE SERPL-MCNC: 393 MG/DL (ref 70–99)
HBA1C MFR BLD: 12.3 % (ref 4–5.6)
HCT VFR BLD AUTO: 35.8 % (ref 42–52)
HCV AB SERPL QL IA: NORMAL
HGB BLD-MCNC: 11.6 G/DL (ref 14–18)
MCH RBC QN AUTO: 29.4 PG (ref 27–31)
MCHC RBC AUTO-ENTMCNC: 32.4 G/DL (ref 33–37)
MCV RBC AUTO: 90.9 FL (ref 80–94)
PLATELET # BLD AUTO: 221 K/UL (ref 130–400)
PMV BLD AUTO: 11.6 FL (ref 9.4–12.4)
POTASSIUM SERPL-SCNC: 4.6 MMOL/L (ref 3.5–5.1)
PROT SERPL-MCNC: 7.5 G/DL (ref 6.4–8.3)
RBC # BLD AUTO: 3.94 M/UL (ref 4.7–6.1)
SODIUM SERPL-SCNC: 139 MMOL/L (ref 136–145)
TSH SERPL DL<=0.005 MIU/L-ACNC: 0.99 UIU/ML (ref 0.27–4.2)
WBC # BLD AUTO: 9.8 K/UL (ref 4.8–10.8)

## 2025-06-18 PROCEDURE — 3046F HEMOGLOBIN A1C LEVEL >9.0%: CPT | Performed by: INTERNAL MEDICINE

## 2025-06-18 PROCEDURE — 99213 OFFICE O/P EST LOW 20 MIN: CPT | Performed by: INTERNAL MEDICINE

## 2025-06-18 PROCEDURE — G0439 PPPS, SUBSEQ VISIT: HCPCS | Performed by: INTERNAL MEDICINE

## 2025-06-18 PROCEDURE — 3074F SYST BP LT 130 MM HG: CPT | Performed by: INTERNAL MEDICINE

## 2025-06-18 PROCEDURE — G0296 VISIT TO DETERM LDCT ELIG: HCPCS | Performed by: INTERNAL MEDICINE

## 2025-06-18 PROCEDURE — 3078F DIAST BP <80 MM HG: CPT | Performed by: INTERNAL MEDICINE

## 2025-06-18 RX ORDER — PRAZOSIN HYDROCHLORIDE 2 MG/1
2 CAPSULE ORAL NIGHTLY
COMMUNITY
Start: 2025-04-21

## 2025-06-18 RX ORDER — TRAZODONE HYDROCHLORIDE 150 MG/1
150 TABLET ORAL NIGHTLY
COMMUNITY
Start: 2025-04-21

## 2025-06-18 RX ORDER — LAMOTRIGINE 150 MG/1
150 TABLET ORAL DAILY
COMMUNITY
Start: 2025-04-21

## 2025-06-18 RX ORDER — TOPIRAMATE 25 MG/1
25 TABLET, FILM COATED ORAL 3 TIMES DAILY
Qty: 90 TABLET | Refills: 3 | Status: SHIPPED | OUTPATIENT
Start: 2025-06-18

## 2025-06-18 RX ORDER — MOMETASONE FUROATE MONOHYDRATE 50 UG/1
2 SPRAY, METERED NASAL DAILY
Qty: 17 G | Refills: 3 | Status: SHIPPED | OUTPATIENT
Start: 2025-06-18

## 2025-06-18 RX ORDER — PREGABALIN 100 MG/1
100 CAPSULE ORAL DAILY
COMMUNITY
Start: 2025-05-16

## 2025-06-18 RX ORDER — DEXLANSOPRAZOLE 60 MG/1
60 CAPSULE, DELAYED RELEASE ORAL DAILY
COMMUNITY
Start: 2025-04-03

## 2025-06-18 RX ORDER — PROCHLORPERAZINE 25 MG/1
SUPPOSITORY RECTAL
COMMUNITY
Start: 2025-04-02

## 2025-06-18 RX ORDER — ZOLPIDEM TARTRATE 10 MG/1
TABLET ORAL NIGHTLY PRN
COMMUNITY
Start: 2025-05-23

## 2025-06-18 NOTE — PROGRESS NOTES
Chief Complaint   Patient presents with    Medicare AWV     Would like to order a scooter       HPI:Pt is here today for medicare annual wellness exam and to f/u dm, htn, hiv and other medical problems.  amputation 5 toes on right foot and removal of small toe metarsal left foot   History of Present Illness  The patient presents for medicare annual wellness and for evaluation of diabetes management, weight management, foot ulcers, mobility issues, and ear discomfort.    He manages diabetes with dietary modifications and fingerstick glucose monitoring due to Dexcom issues. He consults Dr. Clark and is on Mounjaro 5 mg, effective without adverse effects. He previously had severe side effects with Ozempic.    He recently had cataract surgery by Dr. Carlos Foster in [place], improving his vision to 20/20.    His  weight decreased from 272 to 266 pounds. No current foot ulcers. He has new diabetic shoes with inserts, one extending to her amputated foot and the other with a divot for previous ulcer areas. History of amputation of all five toes on the right foot, partial metatarsal removal on the small toe of the left foot, and a procedure on the large toe of the right foot.    He is considering a scooter for mobility but is uncertain about eligibility. Difficulty walking long distances due to knee, hip, and lower back pain. Recently fell on his unaffected knee and is managing the injury at home. Previously underwent physical therapy at [facility].    He is concerned about immunosuppression due to HIV and is considering repeat vaccinations for measles, mumps, and rubella. History of mumps and measles in childhood.    He reports a reverb sensation in her ear when yawning.       Past Medical History:   Diagnosis Date    Controlled diabetes mellitus with diabetic polyneuropathy (HCC)     Depression     HIV-1 infection, symptomatic (HCC)     Hyperlipidemia     Hypertension     Male hypogonadism     Osteopenia

## 2025-06-21 LAB
MEV IGG SER IA-ACNC: >300 AU/ML
MEV IGM SER IA-ACNC: 0.14 AU (ref 0–0.79)
MUV IGG SER IA-ACNC: 82.8 AU/ML
MUV IGM SER IA-ACNC: 0.31 IV

## 2025-06-22 LAB — HBV SURFACE AG SERPL QL NT: NORMAL

## 2025-06-30 PROBLEM — G62.9 POLYNEUROPATHY: Status: ACTIVE | Noted: 2024-03-12

## 2025-07-01 ASSESSMENT — ENCOUNTER SYMPTOMS
COUGH: 0
SHORTNESS OF BREATH: 0
SINUS PRESSURE: 0
EYE REDNESS: 0
BACK PAIN: 1
EYE DISCHARGE: 0
ABDOMINAL PAIN: 0
ABDOMINAL DISTENTION: 0

## 2025-07-02 ENCOUNTER — TELEPHONE (OUTPATIENT)
Dept: INTERNAL MEDICINE | Age: 60
End: 2025-07-02

## 2025-07-02 NOTE — TELEPHONE ENCOUNTER
The xray dept. At [place] called and they need a low dose CT screening form filled out before they will schedule the CT lung screening for Omid.  They are faxing the form to us.  Look for form tomorrow.    Radiology phone [phone]   fax [phone]

## 2025-07-03 DIAGNOSIS — E11.621 TYPE 2 DIABETES MELLITUS WITH FOOT ULCER, WITH LONG-TERM CURRENT USE OF INSULIN (HCC): ICD-10-CM

## 2025-07-03 DIAGNOSIS — L97.509 TYPE 2 DIABETES MELLITUS WITH FOOT ULCER, WITH LONG-TERM CURRENT USE OF INSULIN (HCC): ICD-10-CM

## 2025-07-03 DIAGNOSIS — E11.40 TYPE 2 DIABETES MELLITUS WITH DIABETIC NEUROPATHY, WITH LONG-TERM CURRENT USE OF INSULIN (HCC): ICD-10-CM

## 2025-07-03 DIAGNOSIS — Z79.4 TYPE 2 DIABETES MELLITUS WITH FOOT ULCER, WITH LONG-TERM CURRENT USE OF INSULIN (HCC): ICD-10-CM

## 2025-07-03 DIAGNOSIS — Z79.4 TYPE 2 DIABETES MELLITUS WITH DIABETIC NEUROPATHY, WITH LONG-TERM CURRENT USE OF INSULIN (HCC): ICD-10-CM

## 2025-07-03 RX ORDER — TIRZEPATIDE 5 MG/.5ML
INJECTION, SOLUTION SUBCUTANEOUS
Qty: 4 ML | Refills: 0 | OUTPATIENT
Start: 2025-07-03

## 2025-07-07 DIAGNOSIS — H91.90 HEARING LOSS, UNSPECIFIED HEARING LOSS TYPE, UNSPECIFIED LATERALITY: Primary | ICD-10-CM

## 2025-07-14 DIAGNOSIS — G25.0 ESSENTIAL TREMOR: ICD-10-CM

## 2025-07-14 DIAGNOSIS — G43.719 INTRACTABLE CHRONIC MIGRAINE WITHOUT AURA AND WITHOUT STATUS MIGRAINOSUS: ICD-10-CM

## 2025-07-14 RX ORDER — ONDANSETRON 4 MG/1
4 TABLET, ORALLY DISINTEGRATING ORAL EVERY 6 HOURS PRN
Qty: 12 TABLET | Refills: 0 | Status: SHIPPED | OUTPATIENT
Start: 2025-07-14

## 2025-07-14 RX ORDER — ROPINIROLE 0.25 MG/1
0.25 TABLET, FILM COATED ORAL 3 TIMES DAILY
Qty: 270 TABLET | Refills: 3 | Status: SHIPPED | OUTPATIENT
Start: 2025-07-14

## 2025-07-14 RX ORDER — METOCLOPRAMIDE 10 MG/1
10 TABLET ORAL AS NEEDED
Qty: 10 TABLET | Refills: 3 | Status: SHIPPED | OUTPATIENT
Start: 2025-07-14

## 2025-07-14 RX ORDER — PROPRANOLOL HYDROCHLORIDE 80 MG/1
80 TABLET ORAL 4 TIMES DAILY
Qty: 360 TABLET | Refills: 3 | Status: SHIPPED | OUTPATIENT
Start: 2025-07-14 | End: 2026-01-10

## 2025-07-14 NOTE — TELEPHONE ENCOUNTER
"    Caller: Kassidy Donis Gary \"Abdirahman\"    Relationship: Self    Best call back number: [phone]    Requested Prescriptions:   Requested Prescriptions     Pending Prescriptions Disp Refills    rOPINIRole (REQUIP) 0.25 MG tablet [Pharmacy Med Name: ROPINIROLE HYDROCHLORIDE 0.250 MG TABLET] 90 tablet 0     Sig: TAKE 1 TABLET BY MOUTH 3 (THREE) TIMES A DAY. TAKE 1 HOUR BEFORE BEDTIME.    propranolol (INDERAL) 80 MG tablet 360 tablet 0     Sig: Take 1 tablet by mouth 4 (Four) Times a Day for 180 days. Take 1 tablet in the morning, 1 tablet in the afternoon and 2 tablets at bedtime    metoclopramide (REGLAN) 10 MG tablet 10 tablet 3     Sig: Take 1 tablet by mouth As Needed (1 tablet at onset of migraine. Max dose 1 tab in 24 hours. Max use monthly 10 times.).    ondansetron ODT (ZOFRAN-ODT) 4 MG disintegrating tablet 12 tablet 0     Sig: Place 1 tablet on the tongue Every 6 (Six) Hours As Needed for Nausea.        Pharmacy where request should be sent: [facility], [address] [phone] [facility] [phone]      Last office visit with prescribing clinician: 4/15/2024   Last telemedicine visit with prescribing clinician: 2/21/2025   Next office visit with prescribing clinician: 8/22/2025     Additional details provided by patient: COMPLETELY OUT     Does the patient have less than a 3 day supply:  [x] Yes  [] No    Would you like a call back once the refill request has been completed: [] Yes [x] No    If the office needs to give you a call back, can they leave a voicemail: [] Yes [x] No    Soraida Burrows   07/14/25 10:12 CDT       "

## 2025-07-15 DIAGNOSIS — K52.1 DIARRHEA DUE TO DRUG: ICD-10-CM

## 2025-07-15 RX ORDER — LOPERAMIDE HYDROCHLORIDE 2 MG/1
2 CAPSULE ORAL 4 TIMES DAILY PRN
Qty: 30 CAPSULE | Refills: 3 | Status: SHIPPED | OUTPATIENT
Start: 2025-07-15

## 2025-07-15 RX ORDER — MECLIZINE HYDROCHLORIDE 25 MG/1
25 TABLET ORAL DAILY PRN
Qty: 30 TABLET | Refills: 3 | Status: SHIPPED | OUTPATIENT
Start: 2025-07-15

## 2025-07-21 ENCOUNTER — TELEPHONE (OUTPATIENT)
Dept: PRIMARY CARE CLINIC | Age: 60
End: 2025-07-21

## 2025-07-21 DIAGNOSIS — Z12.2 SCREENING FOR MALIGNANT NEOPLASM OF RESPIRATORY ORGAN: Primary | ICD-10-CM

## 2025-07-21 RX ORDER — FLUCONAZOLE 100 MG/1
TABLET ORAL
Qty: 3 TABLET | Refills: 0 | Status: SHIPPED | OUTPATIENT
Start: 2025-07-21 | End: 2025-07-24

## 2025-07-21 NOTE — TELEPHONE ENCOUNTER
Care Transitions Initial Follow Up Call    Outreach made within 2 business days of discharge: Yes    Patient: Jhonny Vieyra   Patient : 1965   MRN: [id number]    Reason for Admission: Delirium  Discharge Date: 25       Spoke with: Omid    Discharge department/facility: [facility] Interactive Patient Contact:  Was patient able to fill all prescriptions: Yes  Was patient instructed to bring all medications to the follow-up visit: Yes  Is patient taking all medications as directed in the discharge summary? Yes  Does patient understand their discharge instructions: Yes  Does patient have questions or concerns that need addressed prior to 7-14 day follow up office visit: no    Additional needs identified to be addressed with provider  No needs identified             Scheduled appointment with PCP within 7-14 days    Spoke with Omid. He states he is doing better now. His records are in Epic. He gives a different account of what caused his hospitalization than what is in his chart. Otherwise he is doing well. His appetite is good. He denies any fever, chills or signs of infection. He voices no needs or concerns at this time.    Follow Up  Future Appointments   Date Time Provider Department Center   2025 12:30 PM Rosemary Wiley MD [facility]       Edel Juarez MA

## 2025-07-23 DIAGNOSIS — E11.65 TYPE 2 DIABETES MELLITUS WITH HYPERGLYCEMIA, WITH LONG-TERM CURRENT USE OF INSULIN (HCC): Primary | ICD-10-CM

## 2025-07-23 DIAGNOSIS — Z79.4 TYPE 2 DIABETES MELLITUS WITH HYPERGLYCEMIA, WITH LONG-TERM CURRENT USE OF INSULIN (HCC): Primary | ICD-10-CM

## 2025-07-24 ENCOUNTER — OFFICE VISIT (OUTPATIENT)
Dept: INTERNAL MEDICINE | Age: 60
End: 2025-07-24

## 2025-07-24 VITALS
OXYGEN SATURATION: 99 % | BODY MASS INDEX: 40.74 KG/M2 | DIASTOLIC BLOOD PRESSURE: 60 MMHG | WEIGHT: 291 LBS | SYSTOLIC BLOOD PRESSURE: 114 MMHG | HEART RATE: 68 BPM | HEIGHT: 71 IN

## 2025-07-24 DIAGNOSIS — H69.91 DYSFUNCTION OF RIGHT EUSTACHIAN TUBE: ICD-10-CM

## 2025-07-24 DIAGNOSIS — G43.911 INTRACTABLE MIGRAINE WITH STATUS MIGRAINOSUS, UNSPECIFIED MIGRAINE TYPE: ICD-10-CM

## 2025-07-24 DIAGNOSIS — E11.40 TYPE 2 DIABETES MELLITUS WITH DIABETIC NEUROPATHY, WITH LONG-TERM CURRENT USE OF INSULIN (HCC): ICD-10-CM

## 2025-07-24 DIAGNOSIS — E66.813 OBESITY, CLASS 3 (HCC): ICD-10-CM

## 2025-07-24 DIAGNOSIS — G47.09 OTHER INSOMNIA: ICD-10-CM

## 2025-07-24 DIAGNOSIS — T50.901S ACCIDENTAL DRUG OVERDOSE, SEQUELA: ICD-10-CM

## 2025-07-24 DIAGNOSIS — Z79.4 TYPE 2 DIABETES MELLITUS WITH DIABETIC NEUROPATHY, WITH LONG-TERM CURRENT USE OF INSULIN (HCC): ICD-10-CM

## 2025-07-24 DIAGNOSIS — Z09 HOSPITAL DISCHARGE FOLLOW-UP: ICD-10-CM

## 2025-07-24 DIAGNOSIS — R41.0 DELIRIUM: Primary | ICD-10-CM

## 2025-07-24 RX ORDER — VONOPRAZAN FUMARATE 26.72 MG/1
20 TABLET ORAL DAILY
COMMUNITY
Start: 2025-07-07 | End: 2025-10-06

## 2025-07-24 RX ORDER — PREGABALIN 100 MG/1
100 CAPSULE ORAL NIGHTLY
Qty: 30 CAPSULE | Refills: 1
Start: 2025-07-24 | End: 2025-08-23

## 2025-07-24 RX ORDER — ZOLPIDEM TARTRATE 10 MG/1
5 TABLET ORAL NIGHTLY PRN
Qty: 45 TABLET | Refills: 0
Start: 2025-07-24 | End: 2025-10-22

## 2025-07-24 RX ORDER — TOPIRAMATE 25 MG/1
25 TABLET, FILM COATED ORAL 2 TIMES DAILY
Qty: 60 TABLET | Refills: 3 | Status: SHIPPED | OUTPATIENT
Start: 2025-07-24

## 2025-07-24 RX ORDER — MOMETASONE FUROATE MONOHYDRATE 50 UG/1
2 SPRAY, METERED NASAL DAILY
Qty: 17 G | Refills: 3
Start: 2025-07-24

## 2025-07-24 RX ORDER — AMOXICILLIN 250 MG
2 CAPSULE ORAL DAILY PRN
Qty: 180 TABLET | Refills: 3
Start: 2025-07-24

## 2025-07-24 RX ORDER — TRAZODONE HYDROCHLORIDE 100 MG/1
200 TABLET ORAL NIGHTLY
COMMUNITY
Start: 2025-06-30 | End: 2025-07-24

## 2025-07-24 RX ORDER — INSULIN ASPART 100 [IU]/ML
200 INJECTION, SOLUTION INTRAVENOUS; SUBCUTANEOUS DAILY
COMMUNITY
Start: 2025-06-30

## 2025-07-24 RX ORDER — METOCLOPRAMIDE 10 MG/1
10 TABLET ORAL PRN
COMMUNITY
Start: 2025-07-14

## 2025-07-24 RX ORDER — HYDROCODONE BITARTRATE AND ACETAMINOPHEN 5; 325 MG/1; MG/1
1 TABLET ORAL 2 TIMES DAILY PRN
Qty: 60 TABLET | Refills: 0
Start: 2025-07-24 | End: 2025-08-23

## 2025-07-25 PROBLEM — M15.0 PRIMARY OSTEOARTHRITIS INVOLVING MULTIPLE JOINTS: Status: ACTIVE | Noted: 2025-07-25

## 2025-07-28 ENCOUNTER — TELEPHONE (OUTPATIENT)
Dept: INTERNAL MEDICINE | Age: 60
End: 2025-07-28

## 2025-07-28 RX ORDER — TIOTROPIUM BROMIDE 18 UG/1
18 CAPSULE ORAL; RESPIRATORY (INHALATION) DAILY
Qty: 90 CAPSULE | Refills: 1 | Status: SHIPPED | OUTPATIENT
Start: 2025-07-28

## 2025-07-28 RX ORDER — ALBUTEROL SULFATE 90 UG/1
2 INHALANT RESPIRATORY (INHALATION) 4 TIMES DAILY PRN
Qty: 18 G | Refills: 3 | Status: SHIPPED | OUTPATIENT
Start: 2025-07-28

## 2025-07-28 RX ORDER — ALBUTEROL SULFATE 90 UG/1
2 INHALANT RESPIRATORY (INHALATION) 4 TIMES DAILY PRN
Qty: 18 G | Refills: 0 | Status: SHIPPED | OUTPATIENT
Start: 2025-07-28 | End: 2025-07-28 | Stop reason: SDUPTHER

## 2025-07-28 RX ORDER — TIOTROPIUM BROMIDE 18 UG/1
18 CAPSULE ORAL; RESPIRATORY (INHALATION) DAILY
Qty: 90 CAPSULE | Refills: 1 | Status: SHIPPED | OUTPATIENT
Start: 2025-07-28 | End: 2025-07-28 | Stop reason: SDUPTHER

## 2025-07-28 RX ORDER — ALBUTEROL SULFATE 90 UG/1
2 INHALANT RESPIRATORY (INHALATION) 4 TIMES DAILY PRN
Qty: 18 G | Refills: 3 | Status: SHIPPED | OUTPATIENT
Start: 2025-07-28 | End: 2025-07-28 | Stop reason: SDUPTHER

## 2025-07-28 NOTE — TELEPHONE ENCOUNTER
He is having shortness of breath, Spo2 is 95%.  He is almost out of Albuterol inhaler.   Do you want to refill this or suggest something else?

## 2025-07-29 PROBLEM — L97.509 FOOT ULCER DUE TO SECONDARY DM (HCC): Status: ACTIVE | Noted: 2020-07-31

## 2025-07-29 PROBLEM — E13.621 FOOT ULCER DUE TO SECONDARY DM (HCC): Status: ACTIVE | Noted: 2020-07-31

## 2025-07-29 PROBLEM — N40.0 BENIGN PROSTATIC HYPERPLASIA: Status: ACTIVE | Noted: 2024-03-12

## 2025-07-29 PROBLEM — E11.9 TYPE 2 DIABETES MELLITUS (HCC): Status: ACTIVE | Noted: 2024-03-12

## 2025-08-04 ENCOUNTER — TELEPHONE (OUTPATIENT)
Dept: NEUROLOGY | Facility: CLINIC | Age: 60
End: 2025-08-04
Payer: MEDICARE

## 2025-08-19 ENCOUNTER — OFFICE VISIT (OUTPATIENT)
Dept: NEUROLOGY | Facility: CLINIC | Age: 60
End: 2025-08-19
Payer: MEDICARE

## 2025-08-19 VITALS
HEIGHT: 70 IN | BODY MASS INDEX: 36.79 KG/M2 | WEIGHT: 257 LBS | DIASTOLIC BLOOD PRESSURE: 60 MMHG | HEART RATE: 77 BPM | SYSTOLIC BLOOD PRESSURE: 92 MMHG | OXYGEN SATURATION: 98 %

## 2025-08-19 DIAGNOSIS — Z91.89 AT HIGH RISK FOR ADVERSE MEDICATION EVENT: ICD-10-CM

## 2025-08-19 DIAGNOSIS — R25.1 TREMOR: Primary | ICD-10-CM

## 2025-08-19 DIAGNOSIS — Z91.89 AT RISK FOR POLYPHARMACY: ICD-10-CM

## 2025-08-19 DIAGNOSIS — G43.719 INTRACTABLE CHRONIC MIGRAINE WITHOUT AURA AND WITHOUT STATUS MIGRAINOSUS: ICD-10-CM

## 2025-08-19 RX ORDER — TOPIRAMATE 100 MG/1
100 CAPSULE, EXTENDED RELEASE ORAL DAILY
Qty: 30 CAPSULE | Refills: 1 | Status: SHIPPED | OUTPATIENT
Start: 2025-08-19

## 2025-08-19 RX ORDER — PROCHLORPERAZINE 25 MG/1
SUPPOSITORY RECTAL
COMMUNITY

## 2025-08-19 RX ORDER — PROPRANOLOL HYDROCHLORIDE 120 MG/1
120 CAPSULE, EXTENDED RELEASE ORAL DAILY
Qty: 30 CAPSULE | Refills: 1 | Status: SHIPPED | OUTPATIENT
Start: 2025-08-19

## 2025-08-19 RX ORDER — TIRZEPATIDE 5 MG/.5ML
INJECTION, SOLUTION SUBCUTANEOUS
COMMUNITY

## 2025-09-02 DIAGNOSIS — E11.40 TYPE 2 DIABETES MELLITUS WITH DIABETIC NEUROPATHY, WITH LONG-TERM CURRENT USE OF INSULIN (HCC): ICD-10-CM

## 2025-09-02 DIAGNOSIS — L97.509 TYPE 2 DIABETES MELLITUS WITH FOOT ULCER, WITH LONG-TERM CURRENT USE OF INSULIN (HCC): ICD-10-CM

## 2025-09-02 DIAGNOSIS — Z79.4 TYPE 2 DIABETES MELLITUS WITH FOOT ULCER, WITH LONG-TERM CURRENT USE OF INSULIN (HCC): ICD-10-CM

## 2025-09-02 DIAGNOSIS — Z79.4 TYPE 2 DIABETES MELLITUS WITH DIABETIC NEUROPATHY, WITH LONG-TERM CURRENT USE OF INSULIN (HCC): ICD-10-CM

## 2025-09-02 DIAGNOSIS — E11.621 TYPE 2 DIABETES MELLITUS WITH FOOT ULCER, WITH LONG-TERM CURRENT USE OF INSULIN (HCC): ICD-10-CM

## 2025-09-02 RX ORDER — TIRZEPATIDE 5 MG/.5ML
INJECTION, SOLUTION SUBCUTANEOUS
Qty: 4 ML | Refills: 0 | Status: SHIPPED | OUTPATIENT
Start: 2025-09-02

## (undated) DEVICE — PK CATH CARD 30 CA/4

## (undated) DEVICE — SUP ARMBRD ART/LINE BLU

## (undated) DEVICE — 4-PORT MANIFOLD: Brand: NEPTUNE 2

## (undated) DEVICE — HANDPIECE SET WITH HIGH FLOW TIP AND SUCTION TUBE: Brand: INTERPULSE

## (undated) DEVICE — ANTIBACTERIAL UNDYED BRAIDED (POLYGLACTIN 910), SYNTHETIC ABSORBABLE SUTURE: Brand: COATED VICRYL

## (undated) DEVICE — CATH F6INF PIG 145 110CM 6SH: Brand: INFINITI

## (undated) DEVICE — GAUZE,SPONGE,FLUFF,6"X6.75",STRL,10/TRAY: Brand: MEDLINE

## (undated) DEVICE — Device: Brand: DEFENDO AIR/WATER/SUCTION AND BIOPSY VALVE

## (undated) DEVICE — SUT ETHLN 1 LR 30IN 489T

## (undated) DEVICE — TR BAND RADIAL ARTERY COMPRESSION DEVICE: Brand: TR BAND

## (undated) DEVICE — VAGINAL PREP TRAY: Brand: MEDLINE INDUSTRIES, INC.

## (undated) DEVICE — SENSR O2 OXIMAX FNGR A/ 18IN NONSTR

## (undated) DEVICE — GLV SURG BIOGEL M LTX PF 7 1/2

## (undated) DEVICE — ELECTRD PAD DEFIB A/

## (undated) DEVICE — PK TURNOVER RM ADV

## (undated) DEVICE — SUT ETHLN 4/0 FS2 18IN 662H

## (undated) DEVICE — BNDG ELAS CO-FLEX SLF ADHR 4IN5YD LF STRL

## (undated) DEVICE — RADIFOCUS OPTITORQUE ANGIOGRAPHIC CATHETER: Brand: OPTITORQUE

## (undated) DEVICE — SYR LUERLOK 20CC BX/50

## (undated) DEVICE — TRAP FLD MINIVAC MEGADYNE 100ML

## (undated) DEVICE — TBG SMPL FLTR LINE NASL 02/C02 A/ BX/100

## (undated) DEVICE — GW STARTER FXD CORE J .035 3X260CM 3MM

## (undated) DEVICE — CONMED SCOPE SAVER BITE BLOCK, 20X27 MM: Brand: SCOPE SAVER

## (undated) DEVICE — PAD MINOR UNIVERSAL: Brand: MEDLINE INDUSTRIES, INC.

## (undated) DEVICE — INTENDED FOR TISSUE SEPARATION, AND OTHER PROCEDURES THAT REQUIRE A SHARP SURGICAL BLADE TO PUNCTURE OR CUT.: Brand: BARD-PARKER ® STAINLESS STEEL BLADES

## (undated) DEVICE — SUT VIC 4/0 RB1 27IN VCP214H

## (undated) DEVICE — BANDAGE,GAUZE,BULKEE II,4.5"X4.1YD,STRL: Brand: MEDLINE

## (undated) DEVICE — HDRST INTUB GENTLETOUCH SLOT 7IN RT

## (undated) DEVICE — NDL HYPO PRECISIONGLIDE REG 25G 1 1/2

## (undated) DEVICE — CANN CO2/O2 NASL A/

## (undated) DEVICE — PREP SOL POVIDONE/IODINE BT 4OZ

## (undated) DEVICE — GLIDESHEATH SLENDER STAINLESS STEEL KIT: Brand: GLIDESHEATH SLENDER

## (undated) DEVICE — TBG PENCL TELESCP MEGADYNE SMOKE EVAC 10FT

## (undated) DEVICE — PANTY KNIT WASHABLE LG/XL BRN/GRN LF

## (undated) DEVICE — THE CHANNEL CLEANING BRUSH IS A NYLON FLEXI BRUSH ATTACHED TO A FLEXIBLE PLASTIC SHEATH DESIGNED TO SAFELY REMOVE DEBRIS FROM FLEXIBLE ENDOSCOPES.

## (undated) DEVICE — CUFF,BP,DISP,1 TUBE,ADULT,HP: Brand: MEDLINE

## (undated) DEVICE — YANKAUER,BULB TIP WITH VENT: Brand: ARGYLE

## (undated) DEVICE — SOLIDIFIER LIQUI LOC PLUS 2000CC

## (undated) DEVICE — SNAP KOVER: Brand: UNBRANDED

## (undated) DEVICE — Device

## (undated) DEVICE — SOL IRR NACL 0.9PCT BT 1000ML

## (undated) DEVICE — PK EXTRM 30

## (undated) DEVICE — CATH F6INF TL JR 5 100CM: Brand: INFINITI

## (undated) DEVICE — GLV SURG SENSICARE PI ORTHO PF SZ7 LF STRL

## (undated) DEVICE — Device: Brand: MEDEX

## (undated) DEVICE — GLV SURG SENSICARE PI ORTHO SZ7.5 LF STRL

## (undated) DEVICE — DISPOSABLE TOURNIQUET CUFF SINGLE BLADDER, SINGLE PORT AND QUICK CONNECT CONNECTOR: Brand: COLOR CUFF

## (undated) DEVICE — MASK,OXYGEN,MED CONC,ADLT,7' TUB, UC: Brand: PENDING

## (undated) DEVICE — TIBURON BRACHIAL ANGIOGRAPHY DRAPE: Brand: CONVERTORS

## (undated) DEVICE — SOL BETADINE 4OZ

## (undated) DEVICE — DRSNG WND GZ CURAD OIL EMULSION 3X3IN STRL

## (undated) DEVICE — PAD,ABDOMINAL,8"X10",ST,LF: Brand: MEDLINE